# Patient Record
Sex: MALE | Race: WHITE | Employment: OTHER | URBAN - NONMETROPOLITAN AREA
[De-identification: names, ages, dates, MRNs, and addresses within clinical notes are randomized per-mention and may not be internally consistent; named-entity substitution may affect disease eponyms.]

---

## 2017-03-03 ENCOUNTER — HOSPITAL ENCOUNTER (OUTPATIENT)
Dept: CT IMAGING | Age: 67
Discharge: HOME | End: 2017-03-03
Payer: OTHER GOVERNMENT

## 2017-03-03 DIAGNOSIS — D86.0 SARCOIDOSIS OF LUNG (HCC): ICD-10-CM

## 2017-03-03 PROCEDURE — 71250 CT THORAX DX C-: CPT

## 2017-05-16 ENCOUNTER — OFFICE VISIT (OUTPATIENT)
Dept: CARDIOLOGY | Age: 67
End: 2017-05-16
Payer: MEDICARE

## 2017-05-16 VITALS
HEIGHT: 69 IN | HEART RATE: 96 BPM | WEIGHT: 192.2 LBS | SYSTOLIC BLOOD PRESSURE: 170 MMHG | BODY MASS INDEX: 28.47 KG/M2 | DIASTOLIC BLOOD PRESSURE: 70 MMHG

## 2017-05-16 DIAGNOSIS — I48.0 PAROXYSMAL A-FIB (HCC): Primary | ICD-10-CM

## 2017-05-16 DIAGNOSIS — Z98.890 S/P ABLATION OF ATRIAL FIBRILLATION: ICD-10-CM

## 2017-05-16 DIAGNOSIS — Z86.79 S/P ABLATION OF ATRIAL FIBRILLATION: ICD-10-CM

## 2017-05-16 DIAGNOSIS — E78.2 MIXED HYPERLIPIDEMIA: ICD-10-CM

## 2017-05-16 DIAGNOSIS — I10 ESSENTIAL HYPERTENSION: ICD-10-CM

## 2017-05-16 PROCEDURE — G8420 CALC BMI NORM PARAMETERS: HCPCS | Performed by: CLINICAL NURSE SPECIALIST

## 2017-05-16 PROCEDURE — 1123F ACP DISCUSS/DSCN MKR DOCD: CPT | Performed by: CLINICAL NURSE SPECIALIST

## 2017-05-16 PROCEDURE — G8427 DOCREV CUR MEDS BY ELIG CLIN: HCPCS | Performed by: CLINICAL NURSE SPECIALIST

## 2017-05-16 PROCEDURE — 93000 ELECTROCARDIOGRAM COMPLETE: CPT | Performed by: CLINICAL NURSE SPECIALIST

## 2017-05-16 PROCEDURE — 99213 OFFICE O/P EST LOW 20 MIN: CPT | Performed by: CLINICAL NURSE SPECIALIST

## 2017-05-16 PROCEDURE — 3017F COLORECTAL CA SCREEN DOC REV: CPT | Performed by: CLINICAL NURSE SPECIALIST

## 2017-05-16 PROCEDURE — 1036F TOBACCO NON-USER: CPT | Performed by: CLINICAL NURSE SPECIALIST

## 2017-05-16 PROCEDURE — 4040F PNEUMOC VAC/ADMIN/RCVD: CPT | Performed by: CLINICAL NURSE SPECIALIST

## 2017-05-16 RX ORDER — LOSARTAN POTASSIUM 50 MG/1
TABLET ORAL
COMMUNITY
Start: 2017-03-30 | End: 2017-05-22 | Stop reason: DRUGHIGH

## 2017-05-16 RX ORDER — METOPROLOL SUCCINATE 25 MG/1
25 TABLET, EXTENDED RELEASE ORAL DAILY
Qty: 30 TABLET | Refills: 5 | Status: SHIPPED | OUTPATIENT
Start: 2017-05-16 | End: 2017-05-22 | Stop reason: ALTCHOICE

## 2017-05-16 ASSESSMENT — ENCOUNTER SYMPTOMS
VOMITING: 0
BLOOD IN STOOL: 0
COUGH: 0
NAUSEA: 0
BLURRED VISION: 0
SHORTNESS OF BREATH: 0
HEARTBURN: 0
ORTHOPNEA: 0

## 2017-05-22 RX ORDER — LOSARTAN POTASSIUM 50 MG/1
50 TABLET ORAL DAILY
COMMUNITY
End: 2017-08-17 | Stop reason: SDUPTHER

## 2017-06-14 ENCOUNTER — OFFICE VISIT (OUTPATIENT)
Dept: CARDIOLOGY | Age: 67
End: 2017-06-14
Payer: MEDICARE

## 2017-06-14 VITALS
HEIGHT: 69 IN | SYSTOLIC BLOOD PRESSURE: 144 MMHG | WEIGHT: 188 LBS | HEART RATE: 96 BPM | DIASTOLIC BLOOD PRESSURE: 76 MMHG | BODY MASS INDEX: 27.85 KG/M2

## 2017-06-14 DIAGNOSIS — I48.0 PAROXYSMAL A-FIB (HCC): ICD-10-CM

## 2017-06-14 DIAGNOSIS — I10 ESSENTIAL HYPERTENSION: Primary | ICD-10-CM

## 2017-06-14 PROCEDURE — 4040F PNEUMOC VAC/ADMIN/RCVD: CPT | Performed by: CLINICAL NURSE SPECIALIST

## 2017-06-14 PROCEDURE — G8419 CALC BMI OUT NRM PARAM NOF/U: HCPCS | Performed by: CLINICAL NURSE SPECIALIST

## 2017-06-14 PROCEDURE — 1123F ACP DISCUSS/DSCN MKR DOCD: CPT | Performed by: CLINICAL NURSE SPECIALIST

## 2017-06-14 PROCEDURE — 3017F COLORECTAL CA SCREEN DOC REV: CPT | Performed by: CLINICAL NURSE SPECIALIST

## 2017-06-14 PROCEDURE — G8427 DOCREV CUR MEDS BY ELIG CLIN: HCPCS | Performed by: CLINICAL NURSE SPECIALIST

## 2017-06-14 PROCEDURE — 1036F TOBACCO NON-USER: CPT | Performed by: CLINICAL NURSE SPECIALIST

## 2017-06-14 PROCEDURE — 99213 OFFICE O/P EST LOW 20 MIN: CPT | Performed by: CLINICAL NURSE SPECIALIST

## 2017-06-14 RX ORDER — METOPROLOL SUCCINATE 25 MG/1
TABLET, EXTENDED RELEASE ORAL
COMMUNITY
Start: 2017-05-16 | End: 2017-06-14 | Stop reason: ALTCHOICE

## 2017-08-17 ENCOUNTER — OFFICE VISIT (OUTPATIENT)
Dept: INTERNAL MEDICINE | Age: 67
End: 2017-08-17
Payer: MEDICARE

## 2017-08-17 VITALS
DIASTOLIC BLOOD PRESSURE: 82 MMHG | OXYGEN SATURATION: 98 % | BODY MASS INDEX: 28.29 KG/M2 | HEART RATE: 97 BPM | SYSTOLIC BLOOD PRESSURE: 140 MMHG | WEIGHT: 191 LBS | HEIGHT: 69 IN

## 2017-08-17 DIAGNOSIS — R25.2 CRAMP OF BOTH LOWER EXTREMITIES: ICD-10-CM

## 2017-08-17 DIAGNOSIS — I48.0 PAROXYSMAL A-FIB (HCC): ICD-10-CM

## 2017-08-17 DIAGNOSIS — E11.42 TYPE 2 DIABETES MELLITUS WITH PERIPHERAL NEUROPATHY (HCC): ICD-10-CM

## 2017-08-17 DIAGNOSIS — I10 ESSENTIAL HYPERTENSION: ICD-10-CM

## 2017-08-17 DIAGNOSIS — Z00.00 ROUTINE GENERAL MEDICAL EXAMINATION AT A HEALTH CARE FACILITY: Primary | ICD-10-CM

## 2017-08-17 LAB
ALBUMIN SERPL-MCNC: 4.3 G/DL (ref 3.5–5.2)
ALP BLD-CCNC: 95 U/L (ref 40–130)
ALT SERPL-CCNC: 23 U/L (ref 5–41)
ANION GAP SERPL CALCULATED.3IONS-SCNC: 13 MMOL/L (ref 7–19)
AST SERPL-CCNC: 25 U/L (ref 5–40)
BILIRUB SERPL-MCNC: 0.6 MG/DL (ref 0.2–1.2)
BUN BLDV-MCNC: 15 MG/DL (ref 8–23)
CALCIUM SERPL-MCNC: 10.5 MG/DL (ref 8.8–10.2)
CHLORIDE BLD-SCNC: 95 MMOL/L (ref 98–111)
CHOLESTEROL, TOTAL: 175 MG/DL (ref 160–199)
CO2: 29 MMOL/L (ref 22–29)
CREAT SERPL-MCNC: 0.8 MG/DL (ref 0.5–1.2)
CREATININE URINE: 131.6 MG/DL (ref 4.2–622)
GFR NON-AFRICAN AMERICAN: >60
GLUCOSE BLD-MCNC: 248 MG/DL (ref 74–109)
HBA1C MFR BLD: 7.5 %
HCT VFR BLD CALC: 38.2 % (ref 42–52)
HDLC SERPL-MCNC: 42 MG/DL (ref 55–121)
HEMOGLOBIN: 13.3 G/DL (ref 14–18)
LDL CHOLESTEROL CALCULATED: 98 MG/DL
MCH RBC QN AUTO: 29.7 PG (ref 27–31)
MCHC RBC AUTO-ENTMCNC: 34.8 G/DL (ref 33–37)
MCV RBC AUTO: 85.3 FL (ref 80–94)
MICROALBUMIN UR-MCNC: 3.8 MG/DL (ref 0–19)
MICROALBUMIN/CREAT UR-RTO: 28.9 MG/G
PDW BLD-RTO: 13.5 % (ref 11.5–14.5)
PLATELET # BLD: 158 K/UL (ref 130–400)
PMV BLD AUTO: 10.4 FL (ref 9.4–12.4)
POTASSIUM SERPL-SCNC: 4.4 MMOL/L (ref 3.5–5)
RBC # BLD: 4.48 M/UL (ref 4.7–6.1)
SODIUM BLD-SCNC: 137 MMOL/L (ref 136–145)
TOTAL PROTEIN: 7.8 G/DL (ref 6.6–8.7)
TRIGL SERPL-MCNC: 177 MG/DL (ref 150–199)
WBC # BLD: 4.8 K/UL (ref 4.8–10.8)

## 2017-08-17 PROCEDURE — G0438 PPPS, INITIAL VISIT: HCPCS | Performed by: INTERNAL MEDICINE

## 2017-08-17 PROCEDURE — 1036F TOBACCO NON-USER: CPT | Performed by: INTERNAL MEDICINE

## 2017-08-17 PROCEDURE — 4040F PNEUMOC VAC/ADMIN/RCVD: CPT | Performed by: INTERNAL MEDICINE

## 2017-08-17 PROCEDURE — G8419 CALC BMI OUT NRM PARAM NOF/U: HCPCS | Performed by: INTERNAL MEDICINE

## 2017-08-17 PROCEDURE — 3017F COLORECTAL CA SCREEN DOC REV: CPT | Performed by: INTERNAL MEDICINE

## 2017-08-17 PROCEDURE — G8427 DOCREV CUR MEDS BY ELIG CLIN: HCPCS | Performed by: INTERNAL MEDICINE

## 2017-08-17 PROCEDURE — 1123F ACP DISCUSS/DSCN MKR DOCD: CPT | Performed by: INTERNAL MEDICINE

## 2017-08-17 PROCEDURE — 99213 OFFICE O/P EST LOW 20 MIN: CPT | Performed by: INTERNAL MEDICINE

## 2017-08-17 PROCEDURE — 3046F HEMOGLOBIN A1C LEVEL >9.0%: CPT | Performed by: INTERNAL MEDICINE

## 2017-08-17 RX ORDER — LOSARTAN POTASSIUM 100 MG/1
100 TABLET ORAL DAILY
Qty: 90 TABLET | Refills: 3 | Status: SHIPPED | OUTPATIENT
Start: 2017-08-17 | End: 2018-03-01 | Stop reason: SDUPTHER

## 2017-08-17 RX ORDER — QUININE SULFATE 324 MG/1
324 CAPSULE ORAL NIGHTLY PRN
Qty: 90 CAPSULE | Refills: 1 | Status: SHIPPED | OUTPATIENT
Start: 2017-08-17 | End: 2017-09-16

## 2017-08-17 ASSESSMENT — ENCOUNTER SYMPTOMS
RHINORRHEA: 0
SORE THROAT: 0
COUGH: 0
SHORTNESS OF BREATH: 0
ABDOMINAL PAIN: 0
NAUSEA: 0
TROUBLE SWALLOWING: 0

## 2017-08-17 ASSESSMENT — LIFESTYLE VARIABLES: HOW OFTEN DO YOU HAVE A DRINK CONTAINING ALCOHOL: 0

## 2017-08-17 ASSESSMENT — ANXIETY QUESTIONNAIRES: GAD7 TOTAL SCORE: 0

## 2017-08-17 ASSESSMENT — PATIENT HEALTH QUESTIONNAIRE - PHQ9: SUM OF ALL RESPONSES TO PHQ QUESTIONS 1-9: 0

## 2017-10-02 ENCOUNTER — HOSPITAL ENCOUNTER (OUTPATIENT)
Dept: GENERAL RADIOLOGY | Age: 67
Discharge: HOME | End: 2017-10-02
Payer: OTHER GOVERNMENT

## 2017-10-02 DIAGNOSIS — D86.0 PULMONARY SARCOIDOSIS (HCC): ICD-10-CM

## 2017-10-02 PROCEDURE — 71020 XR CHEST STANDARD TWO VW: CPT

## 2017-11-27 ENCOUNTER — OFFICE VISIT (OUTPATIENT)
Dept: CARDIOLOGY | Age: 67
End: 2017-11-27
Payer: MEDICARE

## 2017-11-27 VITALS
WEIGHT: 189 LBS | HEIGHT: 69 IN | SYSTOLIC BLOOD PRESSURE: 160 MMHG | DIASTOLIC BLOOD PRESSURE: 80 MMHG | HEART RATE: 84 BPM | BODY MASS INDEX: 27.99 KG/M2

## 2017-11-27 DIAGNOSIS — I10 ESSENTIAL HYPERTENSION: ICD-10-CM

## 2017-11-27 DIAGNOSIS — Z86.79 S/P ABLATION OF ATRIAL FIBRILLATION: ICD-10-CM

## 2017-11-27 DIAGNOSIS — I48.0 PAROXYSMAL A-FIB (HCC): ICD-10-CM

## 2017-11-27 DIAGNOSIS — R06.09 DOE (DYSPNEA ON EXERTION): Primary | ICD-10-CM

## 2017-11-27 DIAGNOSIS — Z98.890 S/P ABLATION OF ATRIAL FIBRILLATION: ICD-10-CM

## 2017-11-27 PROCEDURE — 1036F TOBACCO NON-USER: CPT | Performed by: NURSE PRACTITIONER

## 2017-11-27 PROCEDURE — G8417 CALC BMI ABV UP PARAM F/U: HCPCS | Performed by: NURSE PRACTITIONER

## 2017-11-27 PROCEDURE — G8427 DOCREV CUR MEDS BY ELIG CLIN: HCPCS | Performed by: NURSE PRACTITIONER

## 2017-11-27 PROCEDURE — G8484 FLU IMMUNIZE NO ADMIN: HCPCS | Performed by: NURSE PRACTITIONER

## 2017-11-27 PROCEDURE — 4040F PNEUMOC VAC/ADMIN/RCVD: CPT | Performed by: NURSE PRACTITIONER

## 2017-11-27 PROCEDURE — 99214 OFFICE O/P EST MOD 30 MIN: CPT | Performed by: NURSE PRACTITIONER

## 2017-11-27 PROCEDURE — 3017F COLORECTAL CA SCREEN DOC REV: CPT | Performed by: NURSE PRACTITIONER

## 2017-11-27 PROCEDURE — 1123F ACP DISCUSS/DSCN MKR DOCD: CPT | Performed by: NURSE PRACTITIONER

## 2017-11-27 RX ORDER — AMLODIPINE BESYLATE 5 MG/1
5 TABLET ORAL DAILY
Qty: 30 TABLET | Refills: 5 | Status: SHIPPED | OUTPATIENT
Start: 2017-11-27 | End: 2017-12-12 | Stop reason: SDUPTHER

## 2017-11-27 NOTE — PATIENT INSTRUCTIONS
Add Norvasc (amlodipine) 5 mg one daily for blood pressure control. Continue other current medications as prescribed. Continue to follow up with primary care provider for non cardiac medical problems. Call the office with any problems, questions or concerns at [phone]. Follow up as scheduled with your cardiologist - 6 months Dr. Oksana Thomas. The following educational material has been included in this after visit summary for your review: Xarelto.     Additional instructions:  Xarelto can increase your risk of bleeding. If you notice blood in urine or stool, bleeding gums, excessive bruising or cough productive of bloody sputum, notify the office. Information on this blood thinner has been included in your after visit summary. Coronary artery disease risk factors you can control: Smoking, high blood pressure, high cholesterol, diabetes, being overweight, lack of exercise and stress. Continue heart healthy diet. Take medications as directed. Exercise as tolerated. Strive for 15 minutes of exercise most days of the week. Keep a blood pressure log, do so for 2 weeks. Call the office to report readings at [phone]. Blood pressure goal is 140/90 or less. If you are a diabetic, the goal is 130/80 or less. If you are taking cholesterol lowering medications, it is recommended that lab work be checked annually. Always keep a current medication list. Bring your medications to every office visit.      Patient Education     rivaroxaban  Pronunciation:  CHERYL a GASTON a ban  Brand:  Xarelto  What is the most important information I should know about rivaroxaban? You should not use this medicine if you have an artificial heart valve, or if you have active or uncontrolled bleeding.   Rivaroxaban can cause a very serious blood clot around your spinal cord if you undergo a spinal tap or receive spinal anesthesia (epidural), especially if you have a genetic spinal defect, if you have a spinal catheter in place, if you have a history of spinal surgery or repeated spinal taps, or if you are also using other drugs that can affect blood clotting. This type of blood clot can lead to long-term or permanent paralysis. Get emergency medical help if you have symptoms of a spinal cord blood clot such as back pain, numbness or muscle weakness in your lower body, or loss of bladder or bowel control. Do not stop taking rivaroxaban without first talking to your doctor. Stopping suddenly can increase your risk of blood clot or stroke. What is rivaroxaban? Rivaroxaban is an anticoagulant (blood thinner) that prevents the formation of blood clots. Rivaroxaban is used to prevent or treat a type of blood clot called deep vein thrombosis (DVT), which can lead to blood clots in the lungs (pulmonary embolism). A DVT can occur after certain types of surgery. Rivaroxaban is also used in people with atrial fibrillation (a heart rhythm disorder) to lower the risk of stroke caused by a blood clot. Rivaroxaban may also be used for purposes not listed in this medication guide. What should I discuss with my healthcare provider before taking rivaroxaban? You should not use this medication if you are allergic to rivaroxaban, or if you have:  · an artificial heart valve; or  · active or uncontrolled bleeding. Rivaroxaban can cause a very serious blood clot around your spinal cord if you undergo a spinal tap or receive spinal anesthesia (epidural). This type of blood clot could cause long-term paralysis, and may be more likely to occur if:   · you have a genetic spinal defect;  · you have a spinal catheter in place;  · you have a history of spinal surgery or repeated spinal taps;  · you have recently had a spinal tap or epidural anesthesia;  · you are taking an NSAID--Advil, Aleve, Motrin, and others; or  · you are using other medicines to treat or prevent blood clots.   Rivaroxaban may cause you to bleed more easily, especially if you have:  · a your dose or stop taking this medication without first talking to your doctor. Stopping suddenly can increase your risk of blood clot or stroke. Store at room temperature away from moisture and heat. What happens if I miss a dose? If you take rivaroxaban 1 time each day: Take the missed dose as soon as you remember. Take your next dose the following day and stay on your once-daily schedule. Do not take extra medicine to make up the missed dose. If you take rivaroxaban 2 times each day: Take the missed dose as soon as you remember. You may take 2 doses at the same time to make up a missed dose. What happens if I overdose? Seek emergency medical attention or call the Poison Help line at [phone]. Overdose may cause excessive bleeding. What should I avoid while taking rivaroxaban? Avoid activities that may increase your risk of bleeding or injury. Use extra care to prevent bleeding while shaving or brushing your teeth. What are the possible side effects of rivaroxaban? Get emergency medical help if you have signs of an allergic reaction: hives; difficult breathing; swelling of your face, lips, tongue, or throat. Also seek emergency medical attention if you have symptoms of a spinal blood clot: back pain, numbness or muscle weakness in your lower body, or loss of bladder or bowel control. Call your doctor at once if you have:  · easy bruising or bleeding (nosebleeds, bleeding gums, heavy menstrual bleeding);  · pain, swelling, or drainage from a wound or where a needle was injected in your skin;  · bleeding from wounds or needle injections, any bleeding that will not stop;  · headache, dizziness, weakness, feeling like you might pass out;  · urine that looks red, pink, or brown; or  · bloody or tarry stools, coughing up blood or vomit that looks like coffee grounds. Common side effects may include:  · muscle pain;  · itching; or  · pain in your arms or legs.   This is not a complete list of side drug combination in no way should be construed to indicate that the drug or drug combination is safe, effective or appropriate for any given patient. [facility] does not assume any responsibility for any aspect of healthcare administered with the aid of information [facility] provides. The information contained herein is not intended to cover all possible uses, directions, precautions, warnings, drug interactions, allergic reactions, or adverse effects. If you have questions about the drugs you are taking, check with your doctor, nurse or pharmacist.  Copyright 4915-7228 [address]: 2.08. Revision date: 5/24/2016. Care instructions adapted under license by [facility] ([facility]). If you have questions about a medical condition or this instruction, always ask your healthcare professional. [place] [address] any warranty or liability for your use of this information.

## 2017-11-27 NOTE — PROGRESS NOTES
neuropathy ([facility] E11.42    Sarcoidosis, lung ([facility] D86.0    Leg cramps R25.2    GARNETT (dyspnea on exertion) R06.09     Past Medical History:   Diagnosis Date    Acute pancreatitis     Anemia     Asthma     Atrial fibrillation (HCC)     h/o paroxysmal a-fib    Benign colonic polyp     Chest pain     Chronic pain syndrome     Depression     Diabetic peripheral neuropathy (HCC)     Extrinsic asthma     Hyperlipidemia     Hypertension     Idiopathic peripheral neuropathy     Lyme disease     Mediastinal lymphadenopathy     Microalbuminuria     Mixed hyperlipidemia     Paroxysmal a-fib (HCC)     S/P ablation of atrial fibrillation     4/16 A fib ablation , Dr Shawn Brantley, [place]     Sarcoidosis, lung ([facility]     Tick bite     Type 2 diabetes mellitus with peripheral neuropathy (HCC)     Type 2 diabetes, controlled, with neuropathy ([facility]      Past Surgical History:   Procedure Laterality Date    ANKLE FRACTURE SURGERY  april 14, 2015    ATRIAL ABLATION SURGERY  2015    Dr. Shawn Brantley- [facility]      Catheter Ablation A-fib    CHOLECYSTECTOMY      COLONOSCOPY  12/02/2014    Melecio     Family History   Problem Relation Age of Onset    Coronary Art Dis Father     Cancer Sister      Social History   Substance Use Topics    Smoking status: Former Smoker    Smokeless tobacco: Never Used    Alcohol use No      Current Outpatient Prescriptions   Medication Sig Dispense Refill    amLODIPine (NORVASC) 5 MG tablet Take 1 tablet by mouth daily 30 tablet 5    losartan (COZAAR) 100 MG tablet Take 1 tablet by mouth daily 90 tablet 3    metFORMIN (GLUCOPHAGE) 500 MG tablet Take 500 mg by mouth daily (with breakfast)      fluticasone-salmeterol (ADVAIR) 500-50 MCG/DOSE diskus inhaler Inhale 1 puff into the lungs every 12 hours      rivaroxaban (XARELTO) 20 MG TABS tablet Take 20 mg by mouth daily (with breakfast).  aspirin 81 MG tablet Take 81 mg by mouth daily.        No current facility-administered medications for this visit. Allergies: Acetaminophen; Gabapentin; Penicillins; and Toprol xl [metoprolol]    Review of Systems  Constitutional - no appetite change, or unexpected weight change. No fever, chills or diaphoresis. No significant change in activity level or new onset of fatigue. HEENT - no significant rhinorrhea or epistaxis. No tinnitus or significant hearing loss. Eyes - no sudden vision change or amaurosis. No corneal arcus, xantholasma, subconjunctival hemorrhage or discharge. Respiratory - no significant wheezing, stridor, apnea or cough.  + GARNETT. Cardiovascular - no exertional chest pain to suggest myocardial ischemia. No orthopnea or PND. No sensation of sustained arrythmia. No occurrence of slow heart rate. No palpitations. No claudication. No leg edema. Gastrointestinal - no abdominal swelling or pain. No blood in stool. No severe constipation, diarrhea, nausea, or vomiting. Genitourinary - no dysuria, frequency, or urgency. No flank pain or hematuria. Musculoskeletal - no back pain or myalgia. No problems with gait. Extremities - no clubbing, cyanosis or edema. Skin - no color change or rash. No pallor. No new surgical incision. Neurologic - no speech difficulty, facial asymmetry or lateralizing weakness. No seizures, presyncope or syncope. No significant dizziness. Hematologic - no easy bruising or excessive bleeding. Psychiatric - no severe anxiety or insomnia. No confusion. All other review of systems are negative. Objective  Vital Signs - BP (!) 160/80   Pulse 84   Ht 5' 9\" (1.753 m)   Wt 189 lb (85.7 kg)   BMI 27.91 kg/m²   General - Alvaro is alert, cooperative, and pleasant. Well groomed. No acute distress. Body habitus - Body mass index is 27.91 kg/m². HEENT - Head is normocephalic. No circumoral cyanosis. Dentition is normal.  EYES -   Lids normal without ptosis. No discharge, edema or subconjunctival hemorrhage. Neck - Symmetrical without apparent mass or lymphadenopathy. Respiratory - Normal respiratory effort without use of accessory muscles. Ausculatation reveals vesicular breath sounds without crackles, wheezes, rub or rhonchi. Cardiovascular - No jugular venous distention. Auscultation reveals regular rate and rhythm. No audible clicks, gallop or rub. No murmur. No lower extremity varicosities. No carotid bruits. Abdominal -  No visible distention, mass or pulsations. Extremities - No clubbing or cyanosis. No statis dermatitis or ulcers. without edema. Musculoskeletal -   No Osler's nodes. No kyphosis or scoliosis. Gait is even and regular without limp or shuffle. Ambulates without assistance. Skin -  Warm and dry; no rash or pallor. No new surgical wound. Neurological - No focal neurological deficits. Thought processes coherent. No apparent tremor. Oriented to person, place and time. Psychiatric -  Appropriate affect and mood. Assessment:    1. GARNETT (dyspnea on exertion)     2. Paroxysmal a-fib (HCC)     3. S/P ablation of atrial fibrillation     4. Essential hypertension     Stable CV status without symptoms of overt heart failure, arrhythmia or angina. GARNETT more than likely COPD related as patient responded to steroid therapy. He will follow up with Dr. Glory Darnell next week. CBC 8/171/7 showed hemoglobin 13.3 - previous labs show hemoglobin 11.8 in 2015. No bleeding issues on Xaretlo. Added amlodipine 5 mg daily for better BP control. Patient will log BP and call back in 2 weeks. Patient is compliant with medication regimen.     BP Readings from Last 3 Encounters:   11/27/17 (!) 160/80   08/17/17 (!) 140/82   06/14/17 (!) 144/76    Pulse Readings from Last 3 Encounters:   11/27/17 84   08/17/17 97   06/14/17 96        Wt Readings from Last 3 Encounters:   11/27/17 189 lb (85.7 kg)   08/17/17 191 lb (86.6 kg)   06/14/17 188 lb (85.3 kg)     Plan  Previous cardiac history and

## 2017-12-12 RX ORDER — AMLODIPINE BESYLATE 5 MG/1
5 TABLET ORAL DAILY
Qty: 30 TABLET | Refills: 5 | Status: SHIPPED | OUTPATIENT
Start: 2017-12-12 | End: 2017-12-27 | Stop reason: SDUPTHER

## 2017-12-14 RX ORDER — GABAPENTIN 100 MG/1
100 CAPSULE ORAL DAILY
COMMUNITY
End: 2017-12-27

## 2017-12-20 DIAGNOSIS — E11.42 TYPE 2 DIABETES MELLITUS WITH PERIPHERAL NEUROPATHY (HCC): ICD-10-CM

## 2017-12-20 DIAGNOSIS — I10 ESSENTIAL HYPERTENSION: ICD-10-CM

## 2017-12-20 DIAGNOSIS — R25.2 CRAMP OF BOTH LOWER EXTREMITIES: ICD-10-CM

## 2017-12-20 DIAGNOSIS — I48.0 PAROXYSMAL A-FIB (HCC): ICD-10-CM

## 2017-12-20 LAB
ALBUMIN SERPL-MCNC: 4.4 G/DL (ref 3.5–5.2)
ALP BLD-CCNC: 94 U/L (ref 40–130)
ALT SERPL-CCNC: 18 U/L (ref 5–41)
ANION GAP SERPL CALCULATED.3IONS-SCNC: 14 MMOL/L (ref 7–19)
AST SERPL-CCNC: 22 U/L (ref 5–40)
BILIRUB SERPL-MCNC: 0.8 MG/DL (ref 0.2–1.2)
BUN BLDV-MCNC: 10 MG/DL (ref 8–23)
CALCIUM SERPL-MCNC: 10.8 MG/DL (ref 8.8–10.2)
CHLORIDE BLD-SCNC: 99 MMOL/L (ref 98–111)
CO2: 29 MMOL/L (ref 22–29)
CREAT SERPL-MCNC: 0.9 MG/DL (ref 0.5–1.2)
GFR NON-AFRICAN AMERICAN: >60
GLUCOSE BLD-MCNC: 211 MG/DL (ref 74–109)
HBA1C MFR BLD: 6.5 %
POTASSIUM SERPL-SCNC: 4.1 MMOL/L (ref 3.5–5)
SODIUM BLD-SCNC: 142 MMOL/L (ref 136–145)
TOTAL PROTEIN: 7.6 G/DL (ref 6.6–8.7)

## 2017-12-27 ENCOUNTER — OFFICE VISIT (OUTPATIENT)
Dept: INTERNAL MEDICINE | Age: 67
End: 2017-12-27
Payer: MEDICARE

## 2017-12-27 VITALS
WEIGHT: 187 LBS | BODY MASS INDEX: 27.7 KG/M2 | HEIGHT: 69 IN | SYSTOLIC BLOOD PRESSURE: 152 MMHG | OXYGEN SATURATION: 97 % | DIASTOLIC BLOOD PRESSURE: 86 MMHG | HEART RATE: 92 BPM

## 2017-12-27 DIAGNOSIS — I10 ESSENTIAL HYPERTENSION: ICD-10-CM

## 2017-12-27 DIAGNOSIS — D86.0 SARCOIDOSIS, LUNG (HCC): ICD-10-CM

## 2017-12-27 DIAGNOSIS — E11.40 TYPE 2 DIABETES, CONTROLLED, WITH NEUROPATHY (HCC): ICD-10-CM

## 2017-12-27 DIAGNOSIS — Z23 NEED FOR INFLUENZA VACCINATION: Primary | ICD-10-CM

## 2017-12-27 DIAGNOSIS — I48.0 PAROXYSMAL A-FIB (HCC): ICD-10-CM

## 2017-12-27 PROCEDURE — 3044F HG A1C LEVEL LT 7.0%: CPT | Performed by: INTERNAL MEDICINE

## 2017-12-27 PROCEDURE — 1036F TOBACCO NON-USER: CPT | Performed by: INTERNAL MEDICINE

## 2017-12-27 PROCEDURE — 4040F PNEUMOC VAC/ADMIN/RCVD: CPT | Performed by: INTERNAL MEDICINE

## 2017-12-27 PROCEDURE — G8484 FLU IMMUNIZE NO ADMIN: HCPCS | Performed by: INTERNAL MEDICINE

## 2017-12-27 PROCEDURE — G0008 ADMIN INFLUENZA VIRUS VAC: HCPCS | Performed by: INTERNAL MEDICINE

## 2017-12-27 PROCEDURE — 99214 OFFICE O/P EST MOD 30 MIN: CPT | Performed by: INTERNAL MEDICINE

## 2017-12-27 PROCEDURE — G8427 DOCREV CUR MEDS BY ELIG CLIN: HCPCS | Performed by: INTERNAL MEDICINE

## 2017-12-27 PROCEDURE — G8417 CALC BMI ABV UP PARAM F/U: HCPCS | Performed by: INTERNAL MEDICINE

## 2017-12-27 PROCEDURE — 1123F ACP DISCUSS/DSCN MKR DOCD: CPT | Performed by: INTERNAL MEDICINE

## 2017-12-27 PROCEDURE — 90662 IIV NO PRSV INCREASED AG IM: CPT | Performed by: INTERNAL MEDICINE

## 2017-12-27 PROCEDURE — 3017F COLORECTAL CA SCREEN DOC REV: CPT | Performed by: INTERNAL MEDICINE

## 2017-12-27 RX ORDER — AMLODIPINE BESYLATE 10 MG/1
10 TABLET ORAL DAILY
Qty: 90 TABLET | Refills: 3 | Status: SHIPPED | OUTPATIENT
Start: 2017-12-27 | End: 2018-05-04

## 2017-12-27 RX ORDER — AMLODIPINE BESYLATE 10 MG/1
10 TABLET ORAL DAILY
Qty: 30 TABLET | Refills: 0 | Status: SHIPPED | OUTPATIENT
Start: 2017-12-27 | End: 2017-12-27 | Stop reason: SDUPTHER

## 2017-12-27 RX ORDER — AMLODIPINE BESYLATE 10 MG/1
10 TABLET ORAL DAILY
Qty: 90 TABLET | Refills: 3 | Status: SHIPPED | OUTPATIENT
Start: 2017-12-27 | End: 2017-12-27 | Stop reason: SDUPTHER

## 2017-12-27 ASSESSMENT — ENCOUNTER SYMPTOMS
SORE THROAT: 0
SHORTNESS OF BREATH: 0
ABDOMINAL PAIN: 0
COUGH: 0
NAUSEA: 0
RHINORRHEA: 0
TROUBLE SWALLOWING: 0

## 2017-12-27 NOTE — PROGRESS NOTES
[facility] Internal Medicine    Chief Complaint   Patient presents with    Other     Pt is here for 4 month follow up of hypertension and DM and review. of recent labs. Pt states that he has been experiencing some pain to his right flank area over the past  2-3 weeks. States this comes and goes. He feels he may have \"strained\" his back. Pt's bp is elevated this am. He states that has been the case lately and when he saw Alexa Langston a few weeks ago he was started on amlodipine 5 mg one daily. HPI:Alvaro returns for reassessment several problems, including paroxysmal atrial fibrillation, hypertension, sarcoidosis, diabetes type II. Is doing fairly well, continues to see Dr. Marii Greenwood for eye exams, Dr. Peggy Rosales for pulmonology/sarcoidosis, and Dr. Jill Vásquez for his rhythm disturbance. Amlodipine 5 mg was recently added. He does have occasional shortness of breath, Dr. Peggy Rosales felt the primary status was stable, did prescribe 2 weeks of steroids. Emmanuel Barreto He relates no headache, syncope, chest pain. he recently had to do some plumbing at home, will replace a toilet, water heater, and strained his back, has some pain over the right flank. recently took 2 weeks of steroids, Dr. Peggy Rosales, is happy to be over this . that was difficult.     Past Medical History:   Diagnosis Date    Acute pancreatitis     Anemia     Asthma     Atrial fibrillation (HCC)     h/o paroxysmal a-fib ? anesthsia induced    Benign colonic polyp     cscope 12/07 Dr Charbel Whitt adenomatous: 12/14 adenoma    Chest pain     Chronic pain syndrome     Depression     Diabetic peripheral neuropathy (HCC)     Extrinsic asthma     Hyperlipidemia     Hypertension     Idiopathic peripheral neuropathy     Lyme disease     Mediastinal lymphadenopathy     Microalbuminuria     Mixed hyperlipidemia     Paroxysmal a-fib (HCC)     S/P ablation of atrial fibrillation     4/16 A fib ablation , Dr Eber Macdonald, [place]     Sarcoidosis, lung ([facility]     restrictive lung impairment    Tick bite     Type 2 diabetes mellitus with peripheral neuropathy (HCC)     Type 2 diabetes, controlled, with neuropathy ([facility]       Family History   Problem Relation Age of Onset    Coronary Art Dis Father      AICD pacer age 68    Cancer Sister      childhood chest tumor      Social History     Social History    Marital status:      Spouse name: N/A    Number of children: N/A    Years of education: N/A     Occupational History    Not on file. Social History Main Topics    Smoking status: Former Smoker    Smokeless tobacco: Never Used    Alcohol use No    Drug use: Unknown    Sexual activity: Not on file     Other Topics Concern    Not on file     Social History Narrative    No narrative on file      Allergies   Allergen Reactions    Acetaminophen     Gabapentin      Causes blisters to head.  Penicillins     Toprol Xl [Metoprolol]      Leg pain       Current Outpatient Prescriptions   Medication Sig Dispense Refill    amLODIPine (NORVASC) 10 MG tablet Take 1 tablet by mouth daily 90 tablet 3    losartan (COZAAR) 100 MG tablet Take 1 tablet by mouth daily 90 tablet 3    metFORMIN (GLUCOPHAGE) 500 MG tablet Take 500 mg by mouth daily (with breakfast)      fluticasone-salmeterol (ADVAIR) 500-50 MCG/DOSE diskus inhaler Inhale 1 puff into the lungs every 12 hours      rivaroxaban (XARELTO) 20 MG TABS tablet Take 20 mg by mouth daily (with breakfast).  aspirin 81 MG tablet Take 81 mg by mouth daily. No current facility-administered medications for this visit. Review of Systems   Constitutional: Negative for fatigue and fever. HENT: Negative for rhinorrhea, sore throat and trouble swallowing. Respiratory: Negative for cough and shortness of breath. Cardiovascular: Negative for chest pain and palpitations. Gastrointestinal: Negative for abdominal pain and nausea. Endocrine: Negative for cold intolerance and heat intolerance. Genitourinary: Negative for dysuria and frequency. Skin: Negative for rash and wound. Neurological: Negative for seizures and syncope. Psychiatric/Behavioral: Negative for behavioral problems and hallucinations. occasional shortness of air    BP (!) 152/86   Pulse 92   Ht 5' 9\" (1.753 m)   Wt 187 lb (84.8 kg)   SpO2 97%   BMI 27.62 kg/m²   Physical Exam   Constitutional: He appears well-developed and well-nourished. HENT:   Head: Normocephalic and atraumatic. Eyes: Pupils are equal, round, and reactive to light. No scleral icterus. Neck: No JVD present. Cardiovascular: Regular rhythm. No murmur heard. Pulmonary/Chest: No respiratory distress. He exhibits no tenderness. Abdominal: He exhibits no mass. There is no tenderness. Musculoskeletal: He exhibits no edema or deformity. Lymphadenopathy:     He has no cervical adenopathy. Neurological: He is alert. No cranial nerve deficit. Skin: Skin is warm. No erythema.     Repeat blood pressure 170/90  Foot exam: Feet are warm, well-perfused, no lesions, sensation slightly decreased   Orders Only on 12/20/2017   Component Date Value Ref Range Status    Sodium 12/20/2017 142  136 - 145 mmol/L Final    Potassium 12/20/2017 4.1  3.5 - 5.0 mmol/L Final    Chloride 12/20/2017 99  98 - 111 mmol/L Final    CO2 12/20/2017 29  22 - 29 mmol/L Final    Anion Gap 12/20/2017 14  7 - 19 mmol/L Final    Glucose 12/20/2017 211* 74 - 109 mg/dL Final    BUN 12/20/2017 10  8 - 23 mg/dL Final    CREATININE 12/20/2017 0.9  0.5 - 1.2 mg/dL Final    GFR Non- 12/20/2017 >60  >60 Final    Calcium 12/20/2017 10.8* 8.8 - 10.2 mg/dL Final    Total Protein 12/20/2017 7.6  6.6 - 8.7 g/dL Final    Alb 12/20/2017 4.4  3.5 - 5.2 g/dL Final    Total Bilirubin 12/20/2017 0.8  0.2 - 1.2 mg/dL Final    Alkaline Phosphatase 12/20/2017 94  40 - 130 U/L Final    ALT 12/20/2017 18  5 - 41 U/L Final    AST 12/20/2017 22  5 - 40 U/L Final   

## 2018-02-28 NOTE — TELEPHONE ENCOUNTER
Pt lmom asking for a call. No other details. Attempted to return call. No ans. Lmom asking him to call back.

## 2018-03-01 RX ORDER — LOSARTAN POTASSIUM 100 MG/1
100 TABLET ORAL DAILY
Qty: 90 TABLET | Refills: 3 | Status: SHIPPED | OUTPATIENT
Start: 2018-03-01 | End: 2018-12-28 | Stop reason: SDUPTHER

## 2018-03-05 RX ORDER — LOSARTAN POTASSIUM 50 MG/1
TABLET ORAL
Qty: 90 TABLET | Refills: 3 | Status: SHIPPED | OUTPATIENT
Start: 2018-03-05 | End: 2018-04-10

## 2018-03-15 ENCOUNTER — TELEPHONE (OUTPATIENT)
Dept: INTERNAL MEDICINE | Age: 68
End: 2018-03-15

## 2018-03-15 NOTE — TELEPHONE ENCOUNTER
[facility] sent paperwork asking for clarification on pt's losartan dosage. Spoke with pt's wife. They are to call back with strength and dosing instructions.

## 2018-03-28 ENCOUNTER — HOSPITAL ENCOUNTER (EMERGENCY)
Age: 68
Discharge: HOME | End: 2018-03-28
Payer: MEDICARE

## 2018-03-28 VITALS
TEMPERATURE: 97.9 F | OXYGEN SATURATION: 96 % | WEIGHT: 185 LBS | HEIGHT: 69 IN | HEART RATE: 97 BPM | DIASTOLIC BLOOD PRESSURE: 80 MMHG | SYSTOLIC BLOOD PRESSURE: 178 MMHG | BODY MASS INDEX: 27.4 KG/M2 | RESPIRATION RATE: 18 BRPM

## 2018-03-28 DIAGNOSIS — L03.115 CELLULITIS OF RIGHT LOWER EXTREMITY: Primary | ICD-10-CM

## 2018-03-28 LAB
ALBUMIN SERPL-MCNC: 4 G/DL (ref 3.5–5.2)
ALP BLD-CCNC: 97 U/L (ref 40–130)
ALT SERPL-CCNC: 15 U/L (ref 5–41)
ANION GAP SERPL CALCULATED.3IONS-SCNC: 16 MMOL/L (ref 7–19)
APTT: 40.3 SEC (ref 26–36.2)
AST SERPL-CCNC: 18 U/L (ref 5–40)
BASOPHILS ABSOLUTE: 0 K/UL (ref 0–0.2)
BASOPHILS RELATIVE PERCENT: 0.3 % (ref 0–1)
BILIRUB SERPL-MCNC: 1.6 MG/DL (ref 0.2–1.2)
BUN BLDV-MCNC: 15 MG/DL (ref 8–23)
CALCIUM SERPL-MCNC: 10.6 MG/DL (ref 8.8–10.2)
CHLORIDE BLD-SCNC: 92 MMOL/L (ref 98–111)
CO2: 26 MMOL/L (ref 22–29)
CREAT SERPL-MCNC: 1 MG/DL (ref 0.5–1.2)
EOSINOPHILS ABSOLUTE: 0.1 K/UL (ref 0–0.6)
EOSINOPHILS RELATIVE PERCENT: 1.5 % (ref 0–5)
GFR NON-AFRICAN AMERICAN: >60
GLUCOSE BLD-MCNC: 304 MG/DL (ref 74–109)
HCT VFR BLD CALC: 37.1 % (ref 42–52)
HEMOGLOBIN: 12.6 G/DL (ref 14–18)
INR BLD: 1.36 (ref 0.88–1.18)
LYMPHOCYTES ABSOLUTE: 1.6 K/UL (ref 1.1–4.5)
LYMPHOCYTES RELATIVE PERCENT: 17 % (ref 20–40)
MCH RBC QN AUTO: 29.4 PG (ref 27–31)
MCHC RBC AUTO-ENTMCNC: 34 G/DL (ref 33–37)
MCV RBC AUTO: 86.5 FL (ref 80–94)
MONOCYTES ABSOLUTE: 0.9 K/UL (ref 0–0.9)
MONOCYTES RELATIVE PERCENT: 9.7 % (ref 0–10)
NEUTROPHILS ABSOLUTE: 6.7 K/UL (ref 1.5–7.5)
NEUTROPHILS RELATIVE PERCENT: 71 % (ref 50–65)
PDW BLD-RTO: 13.9 % (ref 11.5–14.5)
PLATELET # BLD: 150 K/UL (ref 130–400)
PMV BLD AUTO: 10.2 FL (ref 9.4–12.4)
POTASSIUM SERPL-SCNC: 3.7 MMOL/L (ref 3.5–5)
PROTHROMBIN TIME: 16.7 SEC (ref 12–14.6)
RBC # BLD: 4.29 M/UL (ref 4.7–6.1)
SODIUM BLD-SCNC: 134 MMOL/L (ref 136–145)
TOTAL PROTEIN: 7.4 G/DL (ref 6.6–8.7)
WBC # BLD: 9.5 K/UL (ref 4.8–10.8)

## 2018-03-28 PROCEDURE — 99284 EMERGENCY DEPT VISIT MOD MDM: CPT | Performed by: NURSE PRACTITIONER

## 2018-03-28 PROCEDURE — 80053 COMPREHEN METABOLIC PANEL: CPT

## 2018-03-28 PROCEDURE — 36415 COLL VENOUS BLD VENIPUNCTURE: CPT

## 2018-03-28 PROCEDURE — 85610 PROTHROMBIN TIME: CPT

## 2018-03-28 PROCEDURE — 85025 COMPLETE CBC W/AUTO DIFF WBC: CPT

## 2018-03-28 PROCEDURE — 85730 THROMBOPLASTIN TIME PARTIAL: CPT

## 2018-03-28 PROCEDURE — 99283 EMERGENCY DEPT VISIT LOW MDM: CPT

## 2018-03-28 PROCEDURE — 93971 EXTREMITY STUDY: CPT

## 2018-03-28 RX ORDER — TRAMADOL HYDROCHLORIDE 50 MG/1
50 TABLET ORAL EVERY 6 HOURS PRN
Qty: 10 TABLET | Refills: 0 | Status: SHIPPED | OUTPATIENT
Start: 2018-03-28 | End: 2018-03-31

## 2018-03-28 RX ORDER — DOXYCYCLINE HYCLATE 100 MG/1
100 CAPSULE ORAL 2 TIMES DAILY
Qty: 20 CAPSULE | Refills: 0 | Status: SHIPPED | OUTPATIENT
Start: 2018-03-28 | End: 2018-04-07

## 2018-03-28 ASSESSMENT — ENCOUNTER SYMPTOMS
SHORTNESS OF BREATH: 0
DIARRHEA: 0
RHINORRHEA: 0
CONSTIPATION: 0
COUGH: 0
NAUSEA: 0
TROUBLE SWALLOWING: 0
ABDOMINAL PAIN: 0
SORE THROAT: 0
VOMITING: 0

## 2018-03-28 ASSESSMENT — PAIN DESCRIPTION - LOCATION: LOCATION: LEG

## 2018-03-28 ASSESSMENT — PAIN SCALES - GENERAL: PAINLEVEL_OUTOF10: 5

## 2018-03-28 ASSESSMENT — PAIN DESCRIPTION - ORIENTATION: ORIENTATION: RIGHT

## 2018-03-28 ASSESSMENT — PAIN DESCRIPTION - PAIN TYPE: TYPE: ACUTE PAIN

## 2018-03-29 NOTE — ED PROVIDER NOTES
except as noted above in the HPI. PAST MEDICAL HISTORY     Past Medical History:   Diagnosis Date    Acute pancreatitis     Anemia     Asthma     Atrial fibrillation (HCC)     h/o paroxysmal a-fib ? anesthsia induced    Benign colonic polyp     cscope 12/07 Dr Mead Him adenomatous: 12/14 adenoma    Chest pain     Chronic pain syndrome     Depression     Diabetic peripheral neuropathy (HCC)     Extrinsic asthma     Hyperlipidemia     Hypertension     Idiopathic peripheral neuropathy     Lyme disease     Mediastinal lymphadenopathy     Microalbuminuria     Mixed hyperlipidemia     Paroxysmal a-fib (HCC)     S/P ablation of atrial fibrillation     4/16 A fib ablation , Dr Tommy Bentley, [place]     Sarcoidosis, lung ([facility]     restrictive lung impairment    Tick bite     Type 2 diabetes mellitus with peripheral neuropathy ([facility]     Type 2 diabetes, controlled, with neuropathy ([facility]          SURGICAL HISTORY       Past Surgical History:   Procedure Laterality Date    ANKLE FRACTURE SURGERY  april 14, 2015    ATRIAL ABLATION SURGERY  2015    Dr. Tommy Hopper [place]      Catheter Ablation A-fib    CHOLECYSTECTOMY      COLONOSCOPY  12/02/2014    Melecio         CURRENT MEDICATIONS       Previous Medications    ADVAIR DISKUS 500-50 MCG/DOSE DISKUS INHALER    INHALE ONE DOSE BY MOUTH TWICE DAILY    AMLODIPINE (NORVASC) 10 MG TABLET    Take 1 tablet by mouth daily    ASPIRIN 81 MG TABLET    Take 81 mg by mouth daily. LOSARTAN (COZAAR) 100 MG TABLET    Take 1 tablet by mouth daily    LOSARTAN (COZAAR) 50 MG TABLET    TAKE 1 TABLET EVERY DAY    METFORMIN (GLUCOPHAGE) 500 MG TABLET    Take 1 tablet by mouth daily (with breakfast)    RIVAROXABAN (XARELTO) 20 MG TABS TABLET    Take 20 mg by mouth daily (with breakfast).        ALLERGIES     Acetaminophen; Gabapentin; Penicillins; and Toprol xl [metoprolol]    FAMILY HISTORY       Family History   Problem Relation Age of Onset    Pain for up to 10 doses. Attestation: The Prescription Monitoring Report for this patient was reviewed today. ([id number]) NUNO Nava)  Documentation: Possible medication side effects, risk of tolerance/dependence & alternative treatments discussed., No signs of potential drug abuse or diversion identified.  Jael Cuello, NUNO)    (Please note that portions of this note were completed with a voice recognition program.  Efforts were made to edit the dictations but occasionally words are mis-transcribed.)    NUNO Nava (electronically signed)  Attending Emergency Physician           Sakina Kinsey, [address] [place] [address]  03/28/18 [id number]

## 2018-04-09 ENCOUNTER — TELEPHONE (OUTPATIENT)
Dept: INTERNAL MEDICINE | Age: 68
End: 2018-04-09

## 2018-04-10 ENCOUNTER — OFFICE VISIT (OUTPATIENT)
Dept: INTERNAL MEDICINE | Age: 68
End: 2018-04-10
Payer: MEDICARE

## 2018-04-10 VITALS
HEART RATE: 83 BPM | OXYGEN SATURATION: 97 % | DIASTOLIC BLOOD PRESSURE: 80 MMHG | BODY MASS INDEX: 27.64 KG/M2 | SYSTOLIC BLOOD PRESSURE: 140 MMHG | WEIGHT: 186.6 LBS | HEIGHT: 69 IN | TEMPERATURE: 96.6 F

## 2018-04-10 DIAGNOSIS — M25.571 ACUTE RIGHT ANKLE PAIN: ICD-10-CM

## 2018-04-10 DIAGNOSIS — L03.115 CELLULITIS OF RIGHT LOWER EXTREMITY: Primary | ICD-10-CM

## 2018-04-10 DIAGNOSIS — R60.0 LOWER EXTREMITY EDEMA: ICD-10-CM

## 2018-04-10 PROCEDURE — 4040F PNEUMOC VAC/ADMIN/RCVD: CPT | Performed by: PHYSICIAN ASSISTANT

## 2018-04-10 PROCEDURE — G8417 CALC BMI ABV UP PARAM F/U: HCPCS | Performed by: PHYSICIAN ASSISTANT

## 2018-04-10 PROCEDURE — 99213 OFFICE O/P EST LOW 20 MIN: CPT | Performed by: PHYSICIAN ASSISTANT

## 2018-04-10 PROCEDURE — 3017F COLORECTAL CA SCREEN DOC REV: CPT | Performed by: PHYSICIAN ASSISTANT

## 2018-04-10 PROCEDURE — G8427 DOCREV CUR MEDS BY ELIG CLIN: HCPCS | Performed by: PHYSICIAN ASSISTANT

## 2018-04-10 PROCEDURE — 1123F ACP DISCUSS/DSCN MKR DOCD: CPT | Performed by: PHYSICIAN ASSISTANT

## 2018-04-10 PROCEDURE — 1036F TOBACCO NON-USER: CPT | Performed by: PHYSICIAN ASSISTANT

## 2018-04-10 RX ORDER — DOXYCYCLINE 100 MG/1
100 TABLET ORAL 2 TIMES DAILY
Qty: 20 TABLET | Refills: 0 | Status: SHIPPED | OUTPATIENT
Start: 2018-04-10 | End: 2018-04-20

## 2018-04-10 ASSESSMENT — ENCOUNTER SYMPTOMS
WHEEZING: 0
SHORTNESS OF BREATH: 0
DIARRHEA: 0
EYE REDNESS: 0
COUGH: 0
SINUS PRESSURE: 0
RHINORRHEA: 0
VOMITING: 0
NAUSEA: 0
PHOTOPHOBIA: 0
ABDOMINAL PAIN: 0
CONSTIPATION: 0
COLOR CHANGE: 0
BACK PAIN: 0
CHEST TIGHTNESS: 0
EYE PAIN: 0
SORE THROAT: 0

## 2018-04-20 ENCOUNTER — TELEPHONE (OUTPATIENT)
Dept: INTERNAL MEDICINE | Age: 68
End: 2018-04-20

## 2018-04-20 DIAGNOSIS — E11.40 TYPE 2 DIABETES, CONTROLLED, WITH NEUROPATHY (HCC): ICD-10-CM

## 2018-04-20 DIAGNOSIS — Z23 NEED FOR INFLUENZA VACCINATION: Primary | ICD-10-CM

## 2018-04-20 DIAGNOSIS — I48.0 PAROXYSMAL A-FIB (HCC): ICD-10-CM

## 2018-04-20 DIAGNOSIS — I10 ESSENTIAL HYPERTENSION: ICD-10-CM

## 2018-04-20 DIAGNOSIS — D86.0 SARCOIDOSIS, LUNG (HCC): ICD-10-CM

## 2018-04-20 DIAGNOSIS — R73.9 HYPERGLYCEMIA: ICD-10-CM

## 2018-04-20 DIAGNOSIS — E83.52 SERUM CALCIUM ELEVATED: ICD-10-CM

## 2018-04-20 LAB
ALBUMIN SERPL-MCNC: 4.3 G/DL (ref 3.5–5.2)
ALP BLD-CCNC: 81 U/L (ref 40–130)
ALT SERPL-CCNC: 15 U/L (ref 5–41)
ANION GAP SERPL CALCULATED.3IONS-SCNC: 19 MMOL/L (ref 7–19)
AST SERPL-CCNC: 21 U/L (ref 5–40)
BILIRUB SERPL-MCNC: 0.9 MG/DL (ref 0.2–1.2)
BUN BLDV-MCNC: 14 MG/DL (ref 8–23)
CALCIUM SERPL-MCNC: 12.2 MG/DL (ref 8.8–10.2)
CHLORIDE BLD-SCNC: 99 MMOL/L (ref 98–111)
CHOLESTEROL, TOTAL: 182 MG/DL (ref 160–199)
CO2: 26 MMOL/L (ref 22–29)
CREAT SERPL-MCNC: 1.1 MG/DL (ref 0.5–1.2)
CREATININE URINE: 173.2 MG/DL (ref 4.2–622)
GFR NON-AFRICAN AMERICAN: >60
GLUCOSE BLD-MCNC: 135 MG/DL (ref 74–109)
HBA1C MFR BLD: 6.6 %
HCT VFR BLD CALC: 36.3 % (ref 42–52)
HDLC SERPL-MCNC: 41 MG/DL (ref 55–121)
HEMOGLOBIN: 12.3 G/DL (ref 14–18)
LDL CHOLESTEROL CALCULATED: 100 MG/DL
MCH RBC QN AUTO: 29.2 PG (ref 27–31)
MCHC RBC AUTO-ENTMCNC: 33.9 G/DL (ref 33–37)
MCV RBC AUTO: 86.2 FL (ref 80–94)
MICROALBUMIN UR-MCNC: 4.9 MG/DL (ref 0–19)
MICROALBUMIN/CREAT UR-RTO: 28.3 MG/G
PDW BLD-RTO: 13.2 % (ref 11.5–14.5)
PLATELET # BLD: 138 K/UL (ref 130–400)
PMV BLD AUTO: 10.4 FL (ref 9.4–12.4)
POTASSIUM SERPL-SCNC: 3.9 MMOL/L (ref 3.5–5)
RBC # BLD: 4.21 M/UL (ref 4.7–6.1)
SODIUM BLD-SCNC: 144 MMOL/L (ref 136–145)
TOTAL PROTEIN: 7.5 G/DL (ref 6.6–8.7)
TRIGL SERPL-MCNC: 204 MG/DL (ref 0–149)
TSH SERPL DL<=0.05 MIU/L-ACNC: 5.28 UIU/ML (ref 0.27–4.2)
WBC # BLD: 3.4 K/UL (ref 4.8–10.8)

## 2018-04-23 DIAGNOSIS — R73.9 HYPERGLYCEMIA: ICD-10-CM

## 2018-04-23 DIAGNOSIS — E83.52 SERUM CALCIUM ELEVATED: ICD-10-CM

## 2018-04-23 LAB
ANION GAP SERPL CALCULATED.3IONS-SCNC: 15 MMOL/L (ref 7–19)
BUN BLDV-MCNC: 12 MG/DL (ref 8–23)
CALCIUM SERPL-MCNC: 11.3 MG/DL (ref 8.8–10.2)
CHLORIDE BLD-SCNC: 98 MMOL/L (ref 98–111)
CO2: 28 MMOL/L (ref 22–29)
CREAT SERPL-MCNC: 1 MG/DL (ref 0.5–1.2)
GFR NON-AFRICAN AMERICAN: >60
GLUCOSE BLD-MCNC: 138 MG/DL (ref 74–109)
PARATHYROID HORMONE INTACT: 8.5 PG/ML (ref 15–65)
POTASSIUM SERPL-SCNC: 4.4 MMOL/L (ref 3.5–5)
SODIUM BLD-SCNC: 141 MMOL/L (ref 136–145)
VITAMIN D 25-HYDROXY: 15 NG/ML

## 2018-04-26 ENCOUNTER — OFFICE VISIT (OUTPATIENT)
Dept: INTERNAL MEDICINE | Age: 68
End: 2018-04-26
Payer: MEDICARE

## 2018-04-26 VITALS
BODY MASS INDEX: 27.25 KG/M2 | WEIGHT: 184 LBS | HEART RATE: 89 BPM | OXYGEN SATURATION: 98 % | HEIGHT: 69 IN | DIASTOLIC BLOOD PRESSURE: 70 MMHG | SYSTOLIC BLOOD PRESSURE: 154 MMHG

## 2018-04-26 DIAGNOSIS — E78.2 MIXED HYPERLIPIDEMIA: ICD-10-CM

## 2018-04-26 DIAGNOSIS — D86.0 SARCOIDOSIS, LUNG (HCC): Primary | ICD-10-CM

## 2018-04-26 DIAGNOSIS — D86.9 HYPERCALCEMIA DUE TO SARCOIDOSIS: ICD-10-CM

## 2018-04-26 DIAGNOSIS — I48.0 PAROXYSMAL A-FIB (HCC): ICD-10-CM

## 2018-04-26 DIAGNOSIS — I10 ESSENTIAL HYPERTENSION: ICD-10-CM

## 2018-04-26 DIAGNOSIS — E11.42 TYPE 2 DIABETES MELLITUS WITH PERIPHERAL NEUROPATHY (HCC): ICD-10-CM

## 2018-04-26 DIAGNOSIS — E11.40 TYPE 2 DIABETES, CONTROLLED, WITH NEUROPATHY (HCC): ICD-10-CM

## 2018-04-26 DIAGNOSIS — E83.52 HYPERCALCEMIA DUE TO SARCOIDOSIS: ICD-10-CM

## 2018-04-26 PROCEDURE — 2022F DILAT RTA XM EVC RTNOPTHY: CPT | Performed by: INTERNAL MEDICINE

## 2018-04-26 PROCEDURE — 3044F HG A1C LEVEL LT 7.0%: CPT | Performed by: INTERNAL MEDICINE

## 2018-04-26 PROCEDURE — 4040F PNEUMOC VAC/ADMIN/RCVD: CPT | Performed by: INTERNAL MEDICINE

## 2018-04-26 PROCEDURE — 99214 OFFICE O/P EST MOD 30 MIN: CPT | Performed by: INTERNAL MEDICINE

## 2018-04-26 PROCEDURE — 1036F TOBACCO NON-USER: CPT | Performed by: INTERNAL MEDICINE

## 2018-04-26 PROCEDURE — G8417 CALC BMI ABV UP PARAM F/U: HCPCS | Performed by: INTERNAL MEDICINE

## 2018-04-26 PROCEDURE — 3017F COLORECTAL CA SCREEN DOC REV: CPT | Performed by: INTERNAL MEDICINE

## 2018-04-26 PROCEDURE — G8427 DOCREV CUR MEDS BY ELIG CLIN: HCPCS | Performed by: INTERNAL MEDICINE

## 2018-04-26 PROCEDURE — 1123F ACP DISCUSS/DSCN MKR DOCD: CPT | Performed by: INTERNAL MEDICINE

## 2018-04-26 ASSESSMENT — ENCOUNTER SYMPTOMS
SHORTNESS OF BREATH: 0
COUGH: 0
RHINORRHEA: 0
NAUSEA: 0
TROUBLE SWALLOWING: 0
SORE THROAT: 0
ABDOMINAL PAIN: 0

## 2018-05-04 DIAGNOSIS — E11.40 TYPE 2 DIABETES, CONTROLLED, WITH NEUROPATHY (HCC): ICD-10-CM

## 2018-05-04 RX ORDER — AMLODIPINE BESYLATE 10 MG/1
TABLET ORAL
Qty: 30 TABLET | Refills: 0 | Status: SHIPPED | OUTPATIENT
Start: 2018-05-04 | End: 2018-06-01 | Stop reason: SDUPTHER

## 2018-05-09 ENCOUNTER — HOSPITAL ENCOUNTER (EMERGENCY)
Age: 68
Discharge: HOME | End: 2018-05-09
Attending: EMERGENCY MEDICINE
Payer: MEDICARE

## 2018-05-09 VITALS
SYSTOLIC BLOOD PRESSURE: 129 MMHG | HEART RATE: 83 BPM | DIASTOLIC BLOOD PRESSURE: 69 MMHG | OXYGEN SATURATION: 95 % | RESPIRATION RATE: 14 BRPM | TEMPERATURE: 97.8 F

## 2018-05-09 DIAGNOSIS — T63.441A ALLERGIC REACTION TO BEE STING: Primary | ICD-10-CM

## 2018-05-09 LAB
GLUCOSE BLD-MCNC: 197 MG/DL
GLUCOSE BLD-MCNC: 197 MG/DL (ref 70–99)
PERFORMED ON: ABNORMAL
PERFORMED ON: NORMAL
POC TROPONIN I: 0.01 NG/ML (ref 0–0.08)
TROPONIN: <0.01 NG/ML (ref 0–0.03)

## 2018-05-09 PROCEDURE — 96375 TX/PRO/DX INJ NEW DRUG ADDON: CPT

## 2018-05-09 PROCEDURE — 99283 EMERGENCY DEPT VISIT LOW MDM: CPT | Performed by: EMERGENCY MEDICINE

## 2018-05-09 PROCEDURE — 84484 ASSAY OF TROPONIN QUANT: CPT

## 2018-05-09 PROCEDURE — 36415 COLL VENOUS BLD VENIPUNCTURE: CPT

## 2018-05-09 PROCEDURE — 2500000003 HC RX 250 WO HCPCS: Performed by: EMERGENCY MEDICINE

## 2018-05-09 PROCEDURE — 96374 THER/PROPH/DIAG INJ IV PUSH: CPT

## 2018-05-09 PROCEDURE — 82948 REAGENT STRIP/BLOOD GLUCOSE: CPT

## 2018-05-09 PROCEDURE — 99283 EMERGENCY DEPT VISIT LOW MDM: CPT

## 2018-05-09 PROCEDURE — 93005 ELECTROCARDIOGRAM TRACING: CPT

## 2018-05-09 PROCEDURE — 6360000002 HC RX W HCPCS: Performed by: EMERGENCY MEDICINE

## 2018-05-09 PROCEDURE — S0028 INJECTION, FAMOTIDINE, 20 MG: HCPCS | Performed by: EMERGENCY MEDICINE

## 2018-05-09 RX ORDER — EPINEPHRINE 0.3 MG/.3ML
0.3 INJECTION SUBCUTANEOUS ONCE
Qty: 1 EACH | Refills: 0 | Status: SHIPPED | OUTPATIENT
Start: 2018-05-09 | End: 2018-11-26 | Stop reason: DRUGHIGH

## 2018-05-09 RX ORDER — DIPHENHYDRAMINE HYDROCHLORIDE 50 MG/ML
50 INJECTION INTRAMUSCULAR; INTRAVENOUS ONCE
Status: COMPLETED | OUTPATIENT
Start: 2018-05-09 | End: 2018-05-09

## 2018-05-09 RX ORDER — METHYLPREDNISOLONE SODIUM SUCCINATE 125 MG/2ML
80 INJECTION, POWDER, LYOPHILIZED, FOR SOLUTION INTRAMUSCULAR; INTRAVENOUS ONCE
Status: COMPLETED | OUTPATIENT
Start: 2018-05-09 | End: 2018-05-09

## 2018-05-09 RX ADMIN — METHYLPREDNISOLONE SODIUM SUCCINATE 80 MG: 125 INJECTION, POWDER, FOR SOLUTION INTRAMUSCULAR; INTRAVENOUS at 12:21

## 2018-05-09 RX ADMIN — FAMOTIDINE 20 MG: 10 INJECTION INTRAVENOUS at 12:21

## 2018-05-09 RX ADMIN — DIPHENHYDRAMINE HYDROCHLORIDE 50 MG: 50 INJECTION, SOLUTION INTRAMUSCULAR; INTRAVENOUS at 12:21

## 2018-05-09 ASSESSMENT — ENCOUNTER SYMPTOMS
DIFFICULTY BREATHING: 1
ALLERGIC REACTION: 1
WHEEZING: 0
TROUBLE SWALLOWING: 1

## 2018-05-10 LAB
EKG P AXIS: 56 DEGREES
EKG P AXIS: 73 DEGREES
EKG P-R INTERVAL: 148 MS
EKG P-R INTERVAL: 148 MS
EKG Q-T INTERVAL: 328 MS
EKG Q-T INTERVAL: 342 MS
EKG QRS DURATION: 92 MS
EKG QRS DURATION: 98 MS
EKG QTC CALCULATION (BAZETT): 368 MS
EKG QTC CALCULATION (BAZETT): 370 MS
EKG T AXIS: 58 DEGREES
EKG T AXIS: 69 DEGREES

## 2018-05-25 RX ORDER — RIVAROXABAN 20 MG/1
TABLET, FILM COATED ORAL
Qty: 90 TABLET | Refills: 3 | Status: ON HOLD | OUTPATIENT
Start: 2018-05-25 | End: 2018-06-27

## 2018-05-30 ENCOUNTER — OFFICE VISIT (OUTPATIENT)
Dept: CARDIOLOGY | Age: 68
End: 2018-05-30
Payer: MEDICARE

## 2018-05-30 VITALS
HEART RATE: 96 BPM | DIASTOLIC BLOOD PRESSURE: 64 MMHG | BODY MASS INDEX: 27.4 KG/M2 | HEIGHT: 69 IN | SYSTOLIC BLOOD PRESSURE: 132 MMHG | WEIGHT: 185 LBS

## 2018-05-30 DIAGNOSIS — I10 ESSENTIAL HYPERTENSION: ICD-10-CM

## 2018-05-30 DIAGNOSIS — R07.9 CHEST PAIN IN ADULT: Primary | ICD-10-CM

## 2018-05-30 DIAGNOSIS — I48.0 PAROXYSMAL A-FIB (HCC): ICD-10-CM

## 2018-05-30 PROCEDURE — 1123F ACP DISCUSS/DSCN MKR DOCD: CPT | Performed by: INTERNAL MEDICINE

## 2018-05-30 PROCEDURE — 1036F TOBACCO NON-USER: CPT | Performed by: INTERNAL MEDICINE

## 2018-05-30 PROCEDURE — 3017F COLORECTAL CA SCREEN DOC REV: CPT | Performed by: INTERNAL MEDICINE

## 2018-05-30 PROCEDURE — 99213 OFFICE O/P EST LOW 20 MIN: CPT | Performed by: INTERNAL MEDICINE

## 2018-05-30 PROCEDURE — G8417 CALC BMI ABV UP PARAM F/U: HCPCS | Performed by: INTERNAL MEDICINE

## 2018-05-30 PROCEDURE — G8428 CUR MEDS NOT DOCUMENT: HCPCS | Performed by: INTERNAL MEDICINE

## 2018-05-30 PROCEDURE — 4040F PNEUMOC VAC/ADMIN/RCVD: CPT | Performed by: INTERNAL MEDICINE

## 2018-05-31 ENCOUNTER — TELEPHONE (OUTPATIENT)
Dept: CARDIOLOGY | Age: 68
End: 2018-05-31

## 2018-06-01 DIAGNOSIS — E11.40 TYPE 2 DIABETES, CONTROLLED, WITH NEUROPATHY (HCC): ICD-10-CM

## 2018-06-01 RX ORDER — AMLODIPINE BESYLATE 10 MG/1
TABLET ORAL
Qty: 30 TABLET | Refills: 0 | Status: SHIPPED | OUTPATIENT
Start: 2018-06-01 | End: 2018-06-07

## 2018-06-05 ENCOUNTER — HOSPITAL ENCOUNTER (OUTPATIENT)
Dept: NON INVASIVE DIAGNOSTICS | Age: 68
Discharge: HOME | End: 2018-06-05
Payer: MEDICARE

## 2018-06-05 DIAGNOSIS — R07.9 CHEST PAIN IN ADULT: ICD-10-CM

## 2018-06-05 LAB
LV EF: 50 %
LVEF MODALITY: NORMAL

## 2018-06-05 PROCEDURE — 93350 STRESS TTE ONLY: CPT

## 2018-06-07 ENCOUNTER — OFFICE VISIT (OUTPATIENT)
Dept: INTERNAL MEDICINE | Age: 68
End: 2018-06-07
Payer: MEDICARE

## 2018-06-07 VITALS
SYSTOLIC BLOOD PRESSURE: 182 MMHG | TEMPERATURE: 98.1 F | DIASTOLIC BLOOD PRESSURE: 80 MMHG | HEART RATE: 90 BPM | OXYGEN SATURATION: 98 %

## 2018-06-07 DIAGNOSIS — R82.998 DARK URINE: Primary | ICD-10-CM

## 2018-06-07 DIAGNOSIS — R31.9 HEMATURIA, UNSPECIFIED TYPE: Primary | ICD-10-CM

## 2018-06-07 PROCEDURE — 1123F ACP DISCUSS/DSCN MKR DOCD: CPT | Performed by: INTERNAL MEDICINE

## 2018-06-07 PROCEDURE — 1036F TOBACCO NON-USER: CPT | Performed by: INTERNAL MEDICINE

## 2018-06-07 PROCEDURE — 4040F PNEUMOC VAC/ADMIN/RCVD: CPT | Performed by: INTERNAL MEDICINE

## 2018-06-07 PROCEDURE — 3017F COLORECTAL CA SCREEN DOC REV: CPT | Performed by: INTERNAL MEDICINE

## 2018-06-07 PROCEDURE — G8427 DOCREV CUR MEDS BY ELIG CLIN: HCPCS | Performed by: INTERNAL MEDICINE

## 2018-06-07 PROCEDURE — G8417 CALC BMI ABV UP PARAM F/U: HCPCS | Performed by: INTERNAL MEDICINE

## 2018-06-07 PROCEDURE — 99213 OFFICE O/P EST LOW 20 MIN: CPT | Performed by: INTERNAL MEDICINE

## 2018-06-07 RX ORDER — CIPROFLOXACIN 500 MG/1
500 TABLET, FILM COATED ORAL 2 TIMES DAILY
Qty: 20 TABLET | Refills: 0 | Status: SHIPPED | OUTPATIENT
Start: 2018-06-07 | End: 2018-06-17

## 2018-06-07 RX ORDER — AMLODIPINE BESYLATE 5 MG/1
5 TABLET ORAL
COMMUNITY
End: 2018-06-08 | Stop reason: SDUPTHER

## 2018-06-07 ASSESSMENT — ENCOUNTER SYMPTOMS
BACK PAIN: 0
EYE DISCHARGE: 0
NAUSEA: 0
ABDOMINAL PAIN: 0
SHORTNESS OF BREATH: 0
VOMITING: 0
TROUBLE SWALLOWING: 0
WHEEZING: 0
SORE THROAT: 0
BLOOD IN STOOL: 0
EYE ITCHING: 0
ABDOMINAL DISTENTION: 0
SINUS PRESSURE: 0

## 2018-06-08 DIAGNOSIS — I10 HYPERTENSION, UNSPECIFIED TYPE: Primary | ICD-10-CM

## 2018-06-08 PROBLEM — J18.9 PNEUMONIA: Status: ACTIVE | Noted: 2018-06-08

## 2018-06-08 PROBLEM — J45.909 ASTHMA: Status: ACTIVE | Noted: 2018-06-08

## 2018-06-08 PROBLEM — K63.5 POLYP OF COLON: Status: ACTIVE | Noted: 2018-06-08

## 2018-06-08 PROBLEM — F32.A DEPRESSION: Status: ACTIVE | Noted: 2018-06-08

## 2018-06-08 PROBLEM — K85.90 PANCREATITIS: Status: ACTIVE | Noted: 2018-06-08

## 2018-06-08 PROBLEM — G62.9 PERIPHERAL NERVE DISEASE: Status: ACTIVE | Noted: 2018-06-08

## 2018-06-08 RX ORDER — AMLODIPINE BESYLATE 10 MG/1
10 TABLET ORAL DAILY
Qty: 30 TABLET | Refills: 5
Start: 2018-06-08 | End: 2018-08-22 | Stop reason: ALTCHOICE

## 2018-06-12 ENCOUNTER — HOSPITAL ENCOUNTER (OUTPATIENT)
Dept: ULTRASOUND IMAGING | Age: 68
Discharge: HOME | End: 2018-06-12
Payer: MEDICARE

## 2018-06-12 DIAGNOSIS — R31.9 HEMATURIA, UNSPECIFIED TYPE: ICD-10-CM

## 2018-06-12 DIAGNOSIS — N20.0 NEPHROLITHIASIS: Primary | ICD-10-CM

## 2018-06-12 PROCEDURE — 76770 US EXAM ABDO BACK WALL COMP: CPT

## 2018-06-12 RX ORDER — TAMSULOSIN HYDROCHLORIDE 0.4 MG/1
0.4 CAPSULE ORAL DAILY
Qty: 14 CAPSULE | Refills: 0 | Status: SHIPPED | OUTPATIENT
Start: 2018-06-12 | End: 2018-06-25 | Stop reason: SDUPTHER

## 2018-06-13 ENCOUNTER — TELEPHONE (OUTPATIENT)
Dept: INTERNAL MEDICINE | Age: 68
End: 2018-06-13

## 2018-06-21 ENCOUNTER — OFFICE VISIT (OUTPATIENT)
Dept: UROLOGY | Age: 68
End: 2018-06-21
Payer: MEDICARE

## 2018-06-21 ENCOUNTER — HOSPITAL ENCOUNTER (OUTPATIENT)
Dept: GENERAL RADIOLOGY | Age: 68
Discharge: HOME | End: 2018-06-21
Payer: MEDICARE

## 2018-06-21 ENCOUNTER — HOSPITAL ENCOUNTER (OUTPATIENT)
Dept: CT IMAGING | Age: 68
Discharge: HOME | End: 2018-06-21
Payer: MEDICARE

## 2018-06-21 VITALS
HEIGHT: 69 IN | SYSTOLIC BLOOD PRESSURE: 144 MMHG | HEART RATE: 87 BPM | BODY MASS INDEX: 26.96 KG/M2 | WEIGHT: 182 LBS | TEMPERATURE: 97.5 F | DIASTOLIC BLOOD PRESSURE: 77 MMHG

## 2018-06-21 DIAGNOSIS — N13.30 HYDRONEPHROSIS, LEFT: ICD-10-CM

## 2018-06-21 DIAGNOSIS — N20.0 NEPHROLITHIASIS: ICD-10-CM

## 2018-06-21 DIAGNOSIS — N20.0 NEPHROLITHIASIS: Primary | ICD-10-CM

## 2018-06-21 LAB
BACTERIA URINE, POC: ABNORMAL
BILIRUBIN URINE: 0 MG/DL
BLOOD, URINE: POSITIVE
CASTS URINE, POC: ABNORMAL
CLARITY: CLEAR
COLOR: YELLOW
CRYSTALS URINE, POC: ABNORMAL
EPI CELLS URINE, POC: ABNORMAL
GLUCOSE URINE: POSITIVE
KETONES, URINE: NEGATIVE
LEUKOCYTE EST, POC: ABNORMAL
NITRITE, URINE: NEGATIVE
PH UA: 6 (ref 4.5–8)
PROTEIN UA: POSITIVE
RBC URINE, POC: ABNORMAL
SPECIFIC GRAVITY UA: 1.02 (ref 1–1.03)
UROBILINOGEN, URINE: NORMAL
WBC URINE, POC: ABNORMAL
YEAST URINE, POC: ABNORMAL

## 2018-06-21 PROCEDURE — 74018 RADEX ABDOMEN 1 VIEW: CPT

## 2018-06-21 PROCEDURE — 3017F COLORECTAL CA SCREEN DOC REV: CPT | Performed by: PHYSICIAN ASSISTANT

## 2018-06-21 PROCEDURE — 1036F TOBACCO NON-USER: CPT | Performed by: PHYSICIAN ASSISTANT

## 2018-06-21 PROCEDURE — 1123F ACP DISCUSS/DSCN MKR DOCD: CPT | Performed by: PHYSICIAN ASSISTANT

## 2018-06-21 PROCEDURE — 81001 URINALYSIS AUTO W/SCOPE: CPT | Performed by: PHYSICIAN ASSISTANT

## 2018-06-21 PROCEDURE — G8417 CALC BMI ABV UP PARAM F/U: HCPCS | Performed by: PHYSICIAN ASSISTANT

## 2018-06-21 PROCEDURE — 74176 CT ABD & PELVIS W/O CONTRAST: CPT

## 2018-06-21 PROCEDURE — G8427 DOCREV CUR MEDS BY ELIG CLIN: HCPCS | Performed by: PHYSICIAN ASSISTANT

## 2018-06-21 PROCEDURE — 4040F PNEUMOC VAC/ADMIN/RCVD: CPT | Performed by: PHYSICIAN ASSISTANT

## 2018-06-21 PROCEDURE — 99212 OFFICE O/P EST SF 10 MIN: CPT | Performed by: PHYSICIAN ASSISTANT

## 2018-06-21 ASSESSMENT — ENCOUNTER SYMPTOMS
ABDOMINAL PAIN: 0
BLURRED VISION: 0
WHEEZING: 0
SHORTNESS OF BREATH: 0
EYE PAIN: 0
SINUS PAIN: 0
VOMITING: 0
BACK PAIN: 0

## 2018-06-22 ENCOUNTER — OFFICE VISIT (OUTPATIENT)
Dept: INTERNAL MEDICINE | Age: 68
End: 2018-06-22
Payer: MEDICARE

## 2018-06-22 VITALS
DIASTOLIC BLOOD PRESSURE: 60 MMHG | HEART RATE: 87 BPM | OXYGEN SATURATION: 96 % | WEIGHT: 182 LBS | RESPIRATION RATE: 18 BRPM | SYSTOLIC BLOOD PRESSURE: 130 MMHG | HEIGHT: 69 IN | BODY MASS INDEX: 26.96 KG/M2

## 2018-06-22 DIAGNOSIS — R16.1 SPLENOMEGALY: ICD-10-CM

## 2018-06-22 DIAGNOSIS — N20.0 NEPHROLITHIASIS: ICD-10-CM

## 2018-06-22 DIAGNOSIS — I10 HYPERTENSION, UNSPECIFIED TYPE: Primary | ICD-10-CM

## 2018-06-22 DIAGNOSIS — R63.4 WEIGHT LOSS: ICD-10-CM

## 2018-06-22 DIAGNOSIS — Z86.19 HISTORY OF HISTOPLASMOSIS: ICD-10-CM

## 2018-06-22 DIAGNOSIS — I48.20 CHRONIC ATRIAL FIBRILLATION (HCC): ICD-10-CM

## 2018-06-22 DIAGNOSIS — D86.9 SARCOIDOSIS: ICD-10-CM

## 2018-06-22 DIAGNOSIS — R59.0 ABDOMINAL LYMPHADENOPATHY: ICD-10-CM

## 2018-06-22 PROBLEM — R59.1 LYMPHADENOPATHY: Status: ACTIVE | Noted: 2018-06-22

## 2018-06-22 PROCEDURE — G8427 DOCREV CUR MEDS BY ELIG CLIN: HCPCS | Performed by: NURSE PRACTITIONER

## 2018-06-22 PROCEDURE — 99214 OFFICE O/P EST MOD 30 MIN: CPT | Performed by: NURSE PRACTITIONER

## 2018-06-22 PROCEDURE — 1123F ACP DISCUSS/DSCN MKR DOCD: CPT | Performed by: NURSE PRACTITIONER

## 2018-06-22 PROCEDURE — G8417 CALC BMI ABV UP PARAM F/U: HCPCS | Performed by: NURSE PRACTITIONER

## 2018-06-22 PROCEDURE — 3017F COLORECTAL CA SCREEN DOC REV: CPT | Performed by: NURSE PRACTITIONER

## 2018-06-22 PROCEDURE — 4040F PNEUMOC VAC/ADMIN/RCVD: CPT | Performed by: NURSE PRACTITIONER

## 2018-06-22 PROCEDURE — 1036F TOBACCO NON-USER: CPT | Performed by: NURSE PRACTITIONER

## 2018-06-22 ASSESSMENT — ENCOUNTER SYMPTOMS
ABDOMINAL DISTENTION: 0
BLOOD IN STOOL: 0
STRIDOR: 0
CHOKING: 0
ABDOMINAL PAIN: 0
EYE ITCHING: 0
CONSTIPATION: 0
EYE DISCHARGE: 0
DIARRHEA: 0
COLOR CHANGE: 0
VOMITING: 0
WHEEZING: 0
NAUSEA: 0
TROUBLE SWALLOWING: 0
SHORTNESS OF BREATH: 0
COUGH: 0
SORE THROAT: 0

## 2018-06-25 ENCOUNTER — PREP FOR PROCEDURE (OUTPATIENT)
Dept: UROLOGY | Age: 68
End: 2018-06-25

## 2018-06-25 ENCOUNTER — TELEPHONE (OUTPATIENT)
Dept: UROLOGY | Age: 68
End: 2018-06-25

## 2018-06-25 DIAGNOSIS — N20.1 LEFT URETERAL STONE: ICD-10-CM

## 2018-06-25 DIAGNOSIS — N13.30 HYDRONEPHROSIS, LEFT: ICD-10-CM

## 2018-06-25 RX ORDER — TAMSULOSIN HYDROCHLORIDE 0.4 MG/1
CAPSULE ORAL
Qty: 14 CAPSULE | Refills: 0 | Status: ON HOLD | OUTPATIENT
Start: 2018-06-25 | End: 2018-06-27

## 2018-06-26 ENCOUNTER — HOSPITAL ENCOUNTER (OUTPATIENT)
Dept: PREADMISSION TESTING | Age: 68
Discharge: HOME | End: 2018-06-30
Payer: MEDICARE

## 2018-06-26 ENCOUNTER — TELEPHONE (OUTPATIENT)
Dept: UROLOGY | Age: 68
End: 2018-06-26

## 2018-06-26 VITALS — WEIGHT: 180 LBS | HEIGHT: 69 IN | BODY MASS INDEX: 26.66 KG/M2

## 2018-06-26 LAB
ALBUMIN SERPL-MCNC: 3.9 G/DL (ref 3.5–5.2)
ALP BLD-CCNC: 85 U/L (ref 40–130)
ALT SERPL-CCNC: 8 U/L (ref 5–41)
ANION GAP SERPL CALCULATED.3IONS-SCNC: 19 MMOL/L (ref 7–19)
AST SERPL-CCNC: 12 U/L (ref 5–40)
BACTERIA: NEGATIVE /HPF
BASOPHILS ABSOLUTE: 0 K/UL (ref 0–0.2)
BASOPHILS RELATIVE PERCENT: 0.6 % (ref 0–1)
BILIRUB SERPL-MCNC: 0.6 MG/DL (ref 0.2–1.2)
BILIRUBIN URINE: NEGATIVE
BLOOD, URINE: ABNORMAL
BUN BLDV-MCNC: 15 MG/DL (ref 8–23)
CALCIUM SERPL-MCNC: 11.8 MG/DL (ref 8.8–10.2)
CHLORIDE BLD-SCNC: 95 MMOL/L (ref 98–111)
CLARITY: CLEAR
CO2: 25 MMOL/L (ref 22–29)
COLOR: YELLOW
CREAT SERPL-MCNC: 1.6 MG/DL (ref 0.5–1.2)
EOSINOPHILS ABSOLUTE: 0.3 K/UL (ref 0–0.6)
EOSINOPHILS RELATIVE PERCENT: 5.4 % (ref 0–5)
EPITHELIAL CELLS, UA: 0 /HPF (ref 0–5)
GFR NON-AFRICAN AMERICAN: 43
GLUCOSE BLD-MCNC: 182 MG/DL (ref 74–109)
GLUCOSE URINE: 250 MG/DL
HCT VFR BLD CALC: 32.3 % (ref 42–52)
HEMOGLOBIN: 10.8 G/DL (ref 14–18)
HYALINE CASTS: 2 /HPF (ref 0–8)
KETONES, URINE: NEGATIVE MG/DL
LEUKOCYTE ESTERASE, URINE: ABNORMAL
LYMPHOCYTES ABSOLUTE: 1.3 K/UL (ref 1.1–4.5)
LYMPHOCYTES RELATIVE PERCENT: 25.2 % (ref 20–40)
MCH RBC QN AUTO: 28.3 PG (ref 27–31)
MCHC RBC AUTO-ENTMCNC: 33.4 G/DL (ref 33–37)
MCV RBC AUTO: 84.8 FL (ref 80–94)
MONOCYTES ABSOLUTE: 0.6 K/UL (ref 0–0.9)
MONOCYTES RELATIVE PERCENT: 12.3 % (ref 0–10)
NEUTROPHILS ABSOLUTE: 2.9 K/UL (ref 1.5–7.5)
NEUTROPHILS RELATIVE PERCENT: 55.7 % (ref 50–65)
NITRITE, URINE: NEGATIVE
PDW BLD-RTO: 12.9 % (ref 11.5–14.5)
PH UA: 6.5
PLATELET # BLD: 211 K/UL (ref 130–400)
PMV BLD AUTO: 10.2 FL (ref 9.4–12.4)
POTASSIUM SERPL-SCNC: 4.2 MMOL/L (ref 3.5–5)
PROTEIN UA: NEGATIVE MG/DL
RBC # BLD: 3.81 M/UL (ref 4.7–6.1)
RBC UA: 4 /HPF (ref 0–4)
SODIUM BLD-SCNC: 139 MMOL/L (ref 136–145)
SPECIFIC GRAVITY UA: 1.01
TOTAL PROTEIN: 7.5 G/DL (ref 6.6–8.7)
URINE REFLEX TO CULTURE: YES
UROBILINOGEN, URINE: 0.2 E.U./DL
WBC # BLD: 5.2 K/UL (ref 4.8–10.8)
WBC UA: 8 /HPF (ref 0–5)
YEAST: ABNORMAL /HPF

## 2018-06-26 PROCEDURE — 85025 COMPLETE CBC W/AUTO DIFF WBC: CPT

## 2018-06-26 PROCEDURE — 81001 URINALYSIS AUTO W/SCOPE: CPT

## 2018-06-26 PROCEDURE — 80053 COMPREHEN METABOLIC PANEL: CPT

## 2018-06-26 PROCEDURE — 87086 URINE CULTURE/COLONY COUNT: CPT

## 2018-06-26 PROCEDURE — 93005 ELECTROCARDIOGRAM TRACING: CPT

## 2018-06-26 RX ORDER — CIPROFLOXACIN 2 MG/ML
400 INJECTION, SOLUTION INTRAVENOUS ONCE
Status: CANCELLED | OUTPATIENT
Start: 2018-06-27

## 2018-06-27 ENCOUNTER — ANESTHESIA EVENT (OUTPATIENT)
Dept: OPERATING ROOM | Age: 68
End: 2018-06-27
Payer: MEDICARE

## 2018-06-27 ENCOUNTER — ANESTHESIA (OUTPATIENT)
Dept: OPERATING ROOM | Age: 68
End: 2018-06-27
Payer: MEDICARE

## 2018-06-27 ENCOUNTER — HOSPITAL ENCOUNTER (OUTPATIENT)
Dept: GENERAL RADIOLOGY | Age: 68
Discharge: HOME | End: 2018-06-27
Attending: UROLOGY
Payer: MEDICARE

## 2018-06-27 ENCOUNTER — TELEPHONE (OUTPATIENT)
Dept: UROLOGY | Age: 68
End: 2018-06-27

## 2018-06-27 ENCOUNTER — APPOINTMENT (OUTPATIENT)
Dept: GENERAL RADIOLOGY | Age: 68
End: 2018-06-27
Attending: UROLOGY
Payer: MEDICARE

## 2018-06-27 ENCOUNTER — HOSPITAL ENCOUNTER (OUTPATIENT)
Age: 68
Setting detail: OUTPATIENT SURGERY
Discharge: HOME | End: 2018-06-27
Attending: UROLOGY | Admitting: UROLOGY
Payer: MEDICARE

## 2018-06-27 VITALS
SYSTOLIC BLOOD PRESSURE: 107 MMHG | RESPIRATION RATE: 12 BRPM | OXYGEN SATURATION: 94 % | DIASTOLIC BLOOD PRESSURE: 58 MMHG

## 2018-06-27 VITALS
WEIGHT: 180 LBS | BODY MASS INDEX: 26.66 KG/M2 | HEIGHT: 69 IN | TEMPERATURE: 98.7 F | OXYGEN SATURATION: 97 % | RESPIRATION RATE: 16 BRPM | DIASTOLIC BLOOD PRESSURE: 55 MMHG | SYSTOLIC BLOOD PRESSURE: 135 MMHG | HEART RATE: 77 BPM

## 2018-06-27 DIAGNOSIS — N20.1 LEFT URETERAL STONE: Primary | ICD-10-CM

## 2018-06-27 DIAGNOSIS — G89.18 POST-OP PAIN: ICD-10-CM

## 2018-06-27 DIAGNOSIS — N20.0 KIDNEY STONE ON LEFT SIDE: ICD-10-CM

## 2018-06-27 LAB
GLUCOSE BLD-MCNC: 128 MG/DL (ref 70–99)
PERFORMED ON: ABNORMAL

## 2018-06-27 PROCEDURE — 7100000001 HC PACU RECOVERY - ADDTL 15 MIN: Performed by: UROLOGY

## 2018-06-27 PROCEDURE — 3700000001 HC ADD 15 MINUTES (ANESTHESIA): Performed by: UROLOGY

## 2018-06-27 PROCEDURE — 2500000003 HC RX 250 WO HCPCS: Performed by: NURSE ANESTHETIST, CERTIFIED REGISTERED

## 2018-06-27 PROCEDURE — 7100000010 HC PHASE II RECOVERY - FIRST 15 MIN: Performed by: UROLOGY

## 2018-06-27 PROCEDURE — 6370000000 HC RX 637 (ALT 250 FOR IP): Performed by: UROLOGY

## 2018-06-27 PROCEDURE — 7100000000 HC PACU RECOVERY - FIRST 15 MIN: Performed by: UROLOGY

## 2018-06-27 PROCEDURE — 2720000010 HC SURG SUPPLY STERILE: Performed by: UROLOGY

## 2018-06-27 PROCEDURE — 74420 UROGRAPHY RTRGR +-KUB: CPT

## 2018-06-27 PROCEDURE — C2617 STENT, NON-COR, TEM W/O DEL: HCPCS | Performed by: UROLOGY

## 2018-06-27 PROCEDURE — 3209999900 FLUORO FOR SURGICAL PROCEDURES

## 2018-06-27 PROCEDURE — C1726 CATH, BAL DIL, NON-VASCULAR: HCPCS | Performed by: UROLOGY

## 2018-06-27 PROCEDURE — 3700000000 HC ANESTHESIA ATTENDED CARE: Performed by: UROLOGY

## 2018-06-27 PROCEDURE — 6360000002 HC RX W HCPCS: Performed by: UROLOGY

## 2018-06-27 PROCEDURE — 52356 CYSTO/URETERO W/LITHOTRIPSY: CPT | Performed by: UROLOGY

## 2018-06-27 PROCEDURE — 3600000014 HC SURGERY LEVEL 4 ADDTL 15MIN: Performed by: UROLOGY

## 2018-06-27 PROCEDURE — 99999 PR OFFICE/OUTPT VISIT,PROCEDURE ONLY: CPT | Performed by: UROLOGY

## 2018-06-27 PROCEDURE — C1758 CATHETER, URETERAL: HCPCS | Performed by: UROLOGY

## 2018-06-27 PROCEDURE — 6360000002 HC RX W HCPCS: Performed by: NURSE ANESTHETIST, CERTIFIED REGISTERED

## 2018-06-27 PROCEDURE — 82948 REAGENT STRIP/BLOOD GLUCOSE: CPT

## 2018-06-27 PROCEDURE — C1769 GUIDE WIRE: HCPCS | Performed by: UROLOGY

## 2018-06-27 PROCEDURE — 2580000003 HC RX 258: Performed by: UROLOGY

## 2018-06-27 PROCEDURE — 7100000011 HC PHASE II RECOVERY - ADDTL 15 MIN: Performed by: UROLOGY

## 2018-06-27 PROCEDURE — 3600000004 HC SURGERY LEVEL 4 BASE: Performed by: UROLOGY

## 2018-06-27 PROCEDURE — C1887 CATHETER, GUIDING: HCPCS | Performed by: UROLOGY

## 2018-06-27 DEVICE — STENT URET 6FR L24CM PERCFLX HYDR+ DBL PGTL THRD 2: Type: IMPLANTABLE DEVICE | Site: URETER | Status: FUNCTIONAL

## 2018-06-27 RX ORDER — SODIUM CHLORIDE 0.9 % (FLUSH) 0.9 %
10 SYRINGE (ML) INJECTION PRN
Status: DISCONTINUED | OUTPATIENT
Start: 2018-06-27 | End: 2018-06-27 | Stop reason: HOSPADM

## 2018-06-27 RX ORDER — TAMSULOSIN HYDROCHLORIDE 0.4 MG/1
0.4 CAPSULE ORAL DAILY
Qty: 30 CAPSULE | Refills: 1 | Status: SHIPPED | OUTPATIENT
Start: 2018-06-27 | End: 2018-08-22 | Stop reason: ALTCHOICE

## 2018-06-27 RX ORDER — ONDANSETRON 2 MG/ML
INJECTION INTRAMUSCULAR; INTRAVENOUS PRN
Status: DISCONTINUED | OUTPATIENT
Start: 2018-06-27 | End: 2018-06-27 | Stop reason: SDUPTHER

## 2018-06-27 RX ORDER — MORPHINE SULFATE 1 MG/ML
2 INJECTION, SOLUTION EPIDURAL; INTRATHECAL; INTRAVENOUS EVERY 5 MIN PRN
Status: DISCONTINUED | OUTPATIENT
Start: 2018-06-27 | End: 2018-06-27 | Stop reason: HOSPADM

## 2018-06-27 RX ORDER — OXYCODONE HYDROCHLORIDE 5 MG/1
5 TABLET ORAL EVERY 4 HOURS PRN
Qty: 30 TABLET | Refills: 0 | Status: SHIPPED | OUTPATIENT
Start: 2018-06-27 | End: 2018-07-04

## 2018-06-27 RX ORDER — MORPHINE SULFATE 1 MG/ML
4 INJECTION, SOLUTION EPIDURAL; INTRATHECAL; INTRAVENOUS EVERY 5 MIN PRN
Status: DISCONTINUED | OUTPATIENT
Start: 2018-06-27 | End: 2018-06-27 | Stop reason: HOSPADM

## 2018-06-27 RX ORDER — MIDAZOLAM HYDROCHLORIDE 1 MG/ML
2 INJECTION INTRAMUSCULAR; INTRAVENOUS
Status: DISCONTINUED | OUTPATIENT
Start: 2018-06-27 | End: 2018-06-27 | Stop reason: HOSPADM

## 2018-06-27 RX ORDER — ROCURONIUM BROMIDE 10 MG/ML
INJECTION, SOLUTION INTRAVENOUS PRN
Status: DISCONTINUED | OUTPATIENT
Start: 2018-06-27 | End: 2018-06-27 | Stop reason: SDUPTHER

## 2018-06-27 RX ORDER — HYDROMORPHONE HCL IN 0.9% NACL 0.5 MG/ML
0.5 SYRINGE (ML) INTRAVENOUS EVERY 5 MIN PRN
Status: DISCONTINUED | OUTPATIENT
Start: 2018-06-27 | End: 2018-06-27 | Stop reason: HOSPADM

## 2018-06-27 RX ORDER — LIDOCAINE HYDROCHLORIDE 10 MG/ML
INJECTION, SOLUTION EPIDURAL; INFILTRATION; INTRACAUDAL; PERINEURAL PRN
Status: DISCONTINUED | OUTPATIENT
Start: 2018-06-27 | End: 2018-06-27 | Stop reason: SDUPTHER

## 2018-06-27 RX ORDER — FENTANYL CITRATE 50 UG/ML
50 INJECTION, SOLUTION INTRAMUSCULAR; INTRAVENOUS
Status: DISCONTINUED | OUTPATIENT
Start: 2018-06-27 | End: 2018-06-27 | Stop reason: HOSPADM

## 2018-06-27 RX ORDER — HYDRALAZINE HYDROCHLORIDE 20 MG/ML
5 INJECTION INTRAMUSCULAR; INTRAVENOUS EVERY 10 MIN PRN
Status: DISCONTINUED | OUTPATIENT
Start: 2018-06-27 | End: 2018-06-27 | Stop reason: HOSPADM

## 2018-06-27 RX ORDER — MEPERIDINE HYDROCHLORIDE 50 MG/ML
12.5 INJECTION INTRAMUSCULAR; INTRAVENOUS; SUBCUTANEOUS EVERY 5 MIN PRN
Status: DISCONTINUED | OUTPATIENT
Start: 2018-06-27 | End: 2018-06-27 | Stop reason: HOSPADM

## 2018-06-27 RX ORDER — HYDROMORPHONE HCL IN 0.9% NACL 0.5 MG/ML
1 SYRINGE (ML) INTRAVENOUS
Status: DISCONTINUED | OUTPATIENT
Start: 2018-06-27 | End: 2018-06-27 | Stop reason: HOSPADM

## 2018-06-27 RX ORDER — HYDROMORPHONE HCL IN 0.9% NACL 0.5 MG/ML
0.25 SYRINGE (ML) INTRAVENOUS EVERY 5 MIN PRN
Status: DISCONTINUED | OUTPATIENT
Start: 2018-06-27 | End: 2018-06-27 | Stop reason: HOSPADM

## 2018-06-27 RX ORDER — FENTANYL CITRATE 50 UG/ML
25 INJECTION, SOLUTION INTRAMUSCULAR; INTRAVENOUS
Status: DISCONTINUED | OUTPATIENT
Start: 2018-06-27 | End: 2018-06-27 | Stop reason: HOSPADM

## 2018-06-27 RX ORDER — SODIUM CHLORIDE, SODIUM LACTATE, POTASSIUM CHLORIDE, CALCIUM CHLORIDE 600; 310; 30; 20 MG/100ML; MG/100ML; MG/100ML; MG/100ML
INJECTION, SOLUTION INTRAVENOUS CONTINUOUS
Status: DISCONTINUED | OUTPATIENT
Start: 2018-06-27 | End: 2018-06-27 | Stop reason: HOSPADM

## 2018-06-27 RX ORDER — ATROPA BELLADONNA AND OPIUM 16.2; 6 MG/1; MG/1
SUPPOSITORY RECTAL PRN
Status: DISCONTINUED | OUTPATIENT
Start: 2018-06-27 | End: 2018-06-27 | Stop reason: HOSPADM

## 2018-06-27 RX ORDER — LIDOCAINE HYDROCHLORIDE 10 MG/ML
1 INJECTION, SOLUTION EPIDURAL; INFILTRATION; INTRACAUDAL; PERINEURAL
Status: DISCONTINUED | OUTPATIENT
Start: 2018-06-27 | End: 2018-06-27 | Stop reason: HOSPADM

## 2018-06-27 RX ORDER — PROPOFOL 10 MG/ML
INJECTION, EMULSION INTRAVENOUS PRN
Status: DISCONTINUED | OUTPATIENT
Start: 2018-06-27 | End: 2018-06-27 | Stop reason: SDUPTHER

## 2018-06-27 RX ORDER — SODIUM CHLORIDE 9 MG/ML
INJECTION, SOLUTION INTRAVENOUS CONTINUOUS
Status: DISCONTINUED | OUTPATIENT
Start: 2018-06-27 | End: 2018-06-27 | Stop reason: HOSPADM

## 2018-06-27 RX ORDER — CIPROFLOXACIN 2 MG/ML
400 INJECTION, SOLUTION INTRAVENOUS ONCE
Status: COMPLETED | OUTPATIENT
Start: 2018-06-27 | End: 2018-06-27

## 2018-06-27 RX ORDER — MIDAZOLAM HYDROCHLORIDE 1 MG/ML
INJECTION INTRAMUSCULAR; INTRAVENOUS PRN
Status: DISCONTINUED | OUTPATIENT
Start: 2018-06-27 | End: 2018-06-27 | Stop reason: SDUPTHER

## 2018-06-27 RX ORDER — ENALAPRILAT 2.5 MG/2ML
1.25 INJECTION INTRAVENOUS
Status: DISCONTINUED | OUTPATIENT
Start: 2018-06-27 | End: 2018-06-27 | Stop reason: HOSPADM

## 2018-06-27 RX ORDER — EPHEDRINE SULFATE 50 MG/ML
INJECTION, SOLUTION INTRAVENOUS PRN
Status: DISCONTINUED | OUTPATIENT
Start: 2018-06-27 | End: 2018-06-27 | Stop reason: SDUPTHER

## 2018-06-27 RX ORDER — TAMSULOSIN HYDROCHLORIDE 0.4 MG/1
0.4 CAPSULE ORAL ONCE
Status: COMPLETED | OUTPATIENT
Start: 2018-06-27 | End: 2018-06-27

## 2018-06-27 RX ORDER — DIPHENHYDRAMINE HYDROCHLORIDE 50 MG/ML
12.5 INJECTION INTRAMUSCULAR; INTRAVENOUS
Status: DISCONTINUED | OUTPATIENT
Start: 2018-06-27 | End: 2018-06-27 | Stop reason: HOSPADM

## 2018-06-27 RX ORDER — FENTANYL CITRATE 50 UG/ML
INJECTION, SOLUTION INTRAMUSCULAR; INTRAVENOUS PRN
Status: DISCONTINUED | OUTPATIENT
Start: 2018-06-27 | End: 2018-06-27 | Stop reason: SDUPTHER

## 2018-06-27 RX ORDER — METOCLOPRAMIDE HYDROCHLORIDE 5 MG/ML
10 INJECTION INTRAMUSCULAR; INTRAVENOUS
Status: DISCONTINUED | OUTPATIENT
Start: 2018-06-27 | End: 2018-06-27 | Stop reason: HOSPADM

## 2018-06-27 RX ORDER — ONDANSETRON 2 MG/ML
4 INJECTION INTRAMUSCULAR; INTRAVENOUS EVERY 4 HOURS PRN
Status: DISCONTINUED | OUTPATIENT
Start: 2018-06-27 | End: 2018-06-27 | Stop reason: HOSPADM

## 2018-06-27 RX ORDER — LEVOFLOXACIN 250 MG/1
250 TABLET ORAL DAILY
Qty: 7 TABLET | Refills: 0 | Status: SHIPPED | OUTPATIENT
Start: 2018-06-27 | End: 2018-07-04

## 2018-06-27 RX ORDER — PROMETHAZINE HYDROCHLORIDE 25 MG/ML
6.25 INJECTION, SOLUTION INTRAMUSCULAR; INTRAVENOUS
Status: DISCONTINUED | OUTPATIENT
Start: 2018-06-27 | End: 2018-06-27 | Stop reason: HOSPADM

## 2018-06-27 RX ORDER — SODIUM CHLORIDE 0.9 % (FLUSH) 0.9 %
10 SYRINGE (ML) INJECTION EVERY 12 HOURS SCHEDULED
Status: DISCONTINUED | OUTPATIENT
Start: 2018-06-27 | End: 2018-06-27 | Stop reason: HOSPADM

## 2018-06-27 RX ADMIN — ROCURONIUM BROMIDE 10 MG: 10 INJECTION INTRAVENOUS at 09:17

## 2018-06-27 RX ADMIN — ROCURONIUM BROMIDE 30 MG: 10 INJECTION INTRAVENOUS at 08:14

## 2018-06-27 RX ADMIN — CIPROFLOXACIN 400 MG: 2 INJECTION, SOLUTION INTRAVENOUS at 08:19

## 2018-06-27 RX ADMIN — LIDOCAINE HYDROCHLORIDE 50 MG: 10 INJECTION, SOLUTION EPIDURAL; INFILTRATION; INTRACAUDAL; PERINEURAL at 08:14

## 2018-06-27 RX ADMIN — FENTANYL CITRATE 100 MCG: 50 INJECTION, SOLUTION INTRAMUSCULAR; INTRAVENOUS at 08:13

## 2018-06-27 RX ADMIN — ONDANSETRON HYDROCHLORIDE 4 MG: 2 SOLUTION INTRAMUSCULAR; INTRAVENOUS at 09:01

## 2018-06-27 RX ADMIN — MIDAZOLAM HYDROCHLORIDE 2 MG: 1 INJECTION, SOLUTION INTRAMUSCULAR; INTRAVENOUS at 08:08

## 2018-06-27 RX ADMIN — TAMSULOSIN HYDROCHLORIDE 0.4 MG: 0.4 CAPSULE ORAL at 10:29

## 2018-06-27 RX ADMIN — ROCURONIUM BROMIDE 10 MG: 10 INJECTION INTRAVENOUS at 09:03

## 2018-06-27 RX ADMIN — PROPOFOL 100 MG: 10 INJECTION, EMULSION INTRAVENOUS at 08:14

## 2018-06-27 RX ADMIN — SODIUM CHLORIDE, SODIUM LACTATE, POTASSIUM CHLORIDE, AND CALCIUM CHLORIDE: 600; 310; 30; 20 INJECTION, SOLUTION INTRAVENOUS at 08:40

## 2018-06-27 RX ADMIN — EPHEDRINE SULFATE 10 MG: 50 INJECTION, SOLUTION INTRAMUSCULAR; INTRAVENOUS; SUBCUTANEOUS at 09:03

## 2018-06-27 RX ADMIN — SODIUM CHLORIDE, SODIUM LACTATE, POTASSIUM CHLORIDE, AND CALCIUM CHLORIDE: 600; 310; 30; 20 INJECTION, SOLUTION INTRAVENOUS at 06:49

## 2018-06-27 RX ADMIN — EPHEDRINE SULFATE 20 MG: 50 INJECTION, SOLUTION INTRAMUSCULAR; INTRAVENOUS; SUBCUTANEOUS at 08:29

## 2018-06-27 RX ADMIN — PROPOFOL 30 MG: 10 INJECTION, EMULSION INTRAVENOUS at 09:18

## 2018-06-27 ASSESSMENT — PAIN SCALES - GENERAL
PAINLEVEL_OUTOF10: 0
PAINLEVEL_OUTOF10: 0

## 2018-06-27 ASSESSMENT — ENCOUNTER SYMPTOMS: SHORTNESS OF BREATH: 0

## 2018-06-27 ASSESSMENT — LIFESTYLE VARIABLES: SMOKING_STATUS: 0

## 2018-06-28 LAB
EKG P AXIS: 70 DEGREES
EKG P-R INTERVAL: 152 MS
EKG Q-T INTERVAL: 336 MS
EKG QRS DURATION: 98 MS
EKG QTC CALCULATION (BAZETT): 390 MS
EKG T AXIS: 85 DEGREES
URINE CULTURE, ROUTINE: NORMAL

## 2018-07-12 ENCOUNTER — TELEPHONE (OUTPATIENT)
Dept: UROLOGY | Age: 68
End: 2018-07-12

## 2018-07-13 ENCOUNTER — HOSPITAL ENCOUNTER (OUTPATIENT)
Dept: CT IMAGING | Age: 68
Discharge: HOME | End: 2018-07-13
Payer: MEDICARE

## 2018-07-13 DIAGNOSIS — R16.1 SPLENOMEGALY: ICD-10-CM

## 2018-07-13 LAB
GFR NON-AFRICAN AMERICAN: 43
PERFORMED ON: ABNORMAL
POC CREATININE: 1.6 MG/DL (ref 0.3–1.3)
POC SAMPLE TYPE: ABNORMAL

## 2018-07-13 PROCEDURE — 74177 CT ABD & PELVIS W/CONTRAST: CPT

## 2018-07-13 PROCEDURE — 6360000004 HC RX CONTRAST MEDICATION: Performed by: INTERNAL MEDICINE

## 2018-07-13 PROCEDURE — 82565 ASSAY OF CREATININE: CPT

## 2018-07-13 PROCEDURE — 71260 CT THORAX DX C+: CPT

## 2018-07-13 RX ADMIN — IOPAMIDOL 90 ML: 755 INJECTION, SOLUTION INTRAVENOUS at 08:34

## 2018-07-17 ENCOUNTER — OFFICE VISIT (OUTPATIENT)
Dept: UROLOGY | Age: 68
End: 2018-07-17
Payer: MEDICARE

## 2018-07-17 VITALS
TEMPERATURE: 97.7 F | HEIGHT: 69 IN | WEIGHT: 179 LBS | BODY MASS INDEX: 26.51 KG/M2 | DIASTOLIC BLOOD PRESSURE: 67 MMHG | HEART RATE: 90 BPM | SYSTOLIC BLOOD PRESSURE: 132 MMHG

## 2018-07-17 DIAGNOSIS — N32.89 BLADDER WALL THICKENING: ICD-10-CM

## 2018-07-17 DIAGNOSIS — R31.0 HEMATURIA, GROSS: Primary | ICD-10-CM

## 2018-07-17 LAB
BILIRUBIN, POC: ABNORMAL
BLOOD URINE, POC: ABNORMAL
CLARITY, POC: ABNORMAL
COLOR, POC: ABNORMAL
GLUCOSE URINE, POC: ABNORMAL
KETONES, POC: ABNORMAL
LEUKOCYTE EST, POC: ABNORMAL
NITRITE, POC: POSITIVE
PH, POC: 6
PROTEIN, POC: 100
SPECIFIC GRAVITY, POC: 1.01
UROBILINOGEN, POC: 0.2

## 2018-07-17 PROCEDURE — 4040F PNEUMOC VAC/ADMIN/RCVD: CPT | Performed by: NURSE PRACTITIONER

## 2018-07-17 PROCEDURE — 99213 OFFICE O/P EST LOW 20 MIN: CPT | Performed by: NURSE PRACTITIONER

## 2018-07-17 PROCEDURE — 1036F TOBACCO NON-USER: CPT | Performed by: NURSE PRACTITIONER

## 2018-07-17 PROCEDURE — 1123F ACP DISCUSS/DSCN MKR DOCD: CPT | Performed by: NURSE PRACTITIONER

## 2018-07-17 PROCEDURE — G8417 CALC BMI ABV UP PARAM F/U: HCPCS | Performed by: NURSE PRACTITIONER

## 2018-07-17 PROCEDURE — 81003 URINALYSIS AUTO W/O SCOPE: CPT | Performed by: NURSE PRACTITIONER

## 2018-07-17 PROCEDURE — 1101F PT FALLS ASSESS-DOCD LE1/YR: CPT | Performed by: NURSE PRACTITIONER

## 2018-07-17 PROCEDURE — G8427 DOCREV CUR MEDS BY ELIG CLIN: HCPCS | Performed by: NURSE PRACTITIONER

## 2018-07-17 PROCEDURE — 3017F COLORECTAL CA SCREEN DOC REV: CPT | Performed by: NURSE PRACTITIONER

## 2018-07-17 ASSESSMENT — ENCOUNTER SYMPTOMS
BACK PAIN: 0
SHORTNESS OF BREATH: 0
SINUS PAIN: 0
BLURRED VISION: 0
ABDOMINAL PAIN: 0
VOMITING: 0
EYE PAIN: 0
WHEEZING: 0

## 2018-07-17 NOTE — PROGRESS NOTES
Jerardo Reed is a 75 y.o. male who presents today   Chief Complaint   Patient presents with    Follow-up     has sx on 06/27  still having bleeding since sx      Patient presents with hematuria post Left ureteroscopy, laser lithotripsy and placement of a 6-Icelandic x 24 cm left double-J ureteral stent. Patient is currently on Xarelto for treatment of atrial fibrillation. He has noted bright red blood, clots, and dark brown urine. He does note slight pain to the left flank with the beginning of urination, this pain is mild and last only around one second. He is having no difficulty voiding, there is no sensation of poor bladder emptying. He did stop his Xarelto for 5 days last week and noted improvement in the urine.      Past Medical History:   Diagnosis Date    Acute pancreatitis     Anemia     Asthma     Atrial fibrillation (HCC)     h/o paroxysmal a-fib ? anesthsia induced    Benign colonic polyp     cscope 12/07 Dr Fredda Brittle adenomatous: 12/14 adenoma    Chest pain     Chronic pain syndrome     Depression     Diabetic peripheral neuropathy (HCC)     Extrinsic asthma     Hyperlipidemia     Hypertension     Idiopathic peripheral neuropathy     Lyme disease     Mediastinal lymphadenopathy     Microalbuminuria     Mixed hyperlipidemia     Paroxysmal a-fib (HCC)     S/P ablation of atrial fibrillation     4/16 A fib ablation , Dr Carlitos Falk, [facility]     Sarcoidosis, lung ([facility] [address]     restrictive lung impairment    Tick bite     Type 2 diabetes mellitus with peripheral neuropathy ([facility] [address]     Type 2 diabetes, controlled, with neuropathy [facility])        Past Surgical History:   Procedure Laterality Date    ANKLE FRACTURE SURGERY  april 14, 2015    ATRIAL ABLATION SURGERY  2015    Dr. Ash Fisher      Catheter Ablation A-fib    CHOLECYSTECTOMY      COLONOSCOPY  12/02/2014    Melecio VALDEZ CYSTOSCOPY,MANIPULATN Left 6/27/2018    LASER LITHOTRIPSY STONE MANIPULATION WITH DOUBLE J Psychiatric: He has a normal mood and affect. His behavior is normal. Judgment normal.   Nursing note and vitals reviewed. DATA:  CBC with Differential:    Lab Results   Component Value Date    WBC 5.2 06/26/2018    RBC 3.81 06/26/2018    HGB 10.8 06/26/2018    HCT 32.3 06/26/2018     06/26/2018    MCV 84.8 06/26/2018    MCH 28.3 06/26/2018    MCHC 33.4 06/26/2018    RDW 12.9 06/26/2018    LYMPHOPCT 25.2 06/26/2018    MONOPCT 12.3 06/26/2018    BASOPCT 0.6 06/26/2018    MONOSABS 0.60 06/26/2018    LYMPHSABS 1.3 06/26/2018    EOSABS 0.30 06/26/2018    BASOSABS 0.00 06/26/2018     CMP:    Lab Results   Component Value Date     06/26/2018    K 4.2 06/26/2018    CL 95 06/26/2018    CO2 25 06/26/2018    BUN 15 06/26/2018    CREATININE 1.6 07/13/2018    CREATININE 1.6 06/26/2018    LABGLOM 43 07/13/2018    GLUCOSE 182 06/26/2018    PROT 7.5 06/26/2018    LABALBU 3.9 06/26/2018    CALCIUM 11.8 06/26/2018    BILITOT 0.6 06/26/2018    ALKPHOS 85 06/26/2018    AST 12 06/26/2018    ALT 8 06/26/2018       Imaging:  CT abd pelvis with contrast 7/13/18  1. Redemonstrated pulmonary sarcoidosis. 2. Chronic mild splenomegaly with tiny 10 mm less nodules. This may be   also related to history of sarcoidosis. 3. Retroperitoneal, pelvic, and inguinal adenopathy. An underlying   lymphoproliferative disorder is possible although this may be a   systemic manifestation of known sarcoidosis. No prior imaging for   comparison. A right inguinal node may be accessible for biopsy. 4. Asymmetric wall thickening of the right lateral urinary bladder. Correlate with cystoscopy. 1. Hematuria, gross  Discussed with patient that hematuria is a common finding while ureteral stent is in place. Patient is to keep appointment for cystoscopy and removal of stent in six days on 7/23/18 with Dr. Charlotte Taylor. Urine shows no evidence of infection.  - POCT Urinalysis No Micro (Auto)     2.  Bladder wall thickening  Right sided,

## 2018-07-23 ENCOUNTER — PROCEDURE VISIT (OUTPATIENT)
Dept: UROLOGY | Age: 68
End: 2018-07-23
Payer: MEDICARE

## 2018-07-23 DIAGNOSIS — N20.1 LEFT URETERAL STONE: Primary | ICD-10-CM

## 2018-07-23 LAB
BACTERIA URINE, POC: ABNORMAL
BILIRUBIN URINE: 2 MG/DL
BLOOD, URINE: POSITIVE
CASTS URINE, POC: ABNORMAL
CLARITY: ABNORMAL
COLOR: ABNORMAL
CRYSTALS URINE, POC: ABNORMAL
EPI CELLS URINE, POC: ABNORMAL
GLUCOSE URINE: 100
KETONES, URINE: POSITIVE
LEUKOCYTE EST, POC: ABNORMAL
NITRITE, URINE: NEGATIVE
PH UA: 5.5 (ref 4.5–8)
PROTEIN UA: POSITIVE
RBC URINE, POC: ABNORMAL
SPECIFIC GRAVITY UA: 1.02 (ref 1–1.03)
UROBILINOGEN, URINE: 4
WBC URINE, POC: ABNORMAL
YEAST URINE, POC: ABNORMAL

## 2018-07-23 PROCEDURE — 52310 CYSTOSCOPY AND TREATMENT: CPT | Performed by: UROLOGY

## 2018-07-23 PROCEDURE — 99999 PR OFFICE/OUTPT VISIT,PROCEDURE ONLY: CPT | Performed by: UROLOGY

## 2018-07-23 PROCEDURE — 81001 URINALYSIS AUTO W/SCOPE: CPT | Performed by: UROLOGY

## 2018-07-23 NOTE — LETTER
CONSENT TO OPERATION  AND/OR OTHER PROCEDURE(S)          PATIENT :  Nickie Jain            YOB: 1950      DATE :  7/23/18        1. I request and consent that Dr. Dixon Dawson, and/or his associates or assistants perform an operation and/or procedures on the above patient at  [facility], to treat the condition(s) which appear indicated by the diagnostic studies already performed. I have been told that in general terms the nature, purpose and reasonable expectations of the operation and/or procedure(s) are:     2. It has been explained to me by the informing physician that during the course of the operation and/or procedure(s) unforeseen conditions may be revealed that necessitate an extension of the original operation and/or procedure(s) or different operation and/or procedures than those set forth in Paragraph 1. I therefore authorize and request that my physician and/or his associates or assistants perform such operations and/or procedures as are necessary and desirable in the exercise of professional judgment. The authority granted under this Paragraph 2 shall extend to all conditions that require treatment and are known to my physician at the time the operation is commenced. 3. I have been made aware by the informing physician of certain risks and consequences that are associated with the operation and/or procedure(s) described in Paragraph 1, the reasonable alternative methods or treatment, the possible consequences, the possibility that the operation and/or procedure(s) may be unsuccessful and the possibility of complications. I understand the reasonably known risks to be:      ? Bleeding  ? Infection  ? Poor Healing  ? Possible Damage to Nerve, Vessel, Tendon/Muscle or Bone  ? Need for further Treatment/Surgery  ? Stiffness  ? Pain  ? Residual or Recurrent Symptoms  ? Anesthetic and/or Medical Risks          4.  I have also been informed by the informing physician that there are

## 2018-08-01 ENCOUNTER — TELEPHONE (OUTPATIENT)
Dept: CARDIOLOGY | Age: 68
End: 2018-08-01

## 2018-08-02 ENCOUNTER — HOSPITAL ENCOUNTER (OUTPATIENT)
Dept: GENERAL RADIOLOGY | Facility: HOSPITAL | Age: 68
Discharge: HOME | End: 2018-08-02
Admitting: SPECIALIST

## 2018-08-02 ENCOUNTER — APPOINTMENT (OUTPATIENT)
Dept: PREADMISSION TESTING | Facility: HOSPITAL | Age: 68
End: 2018-08-02

## 2018-08-02 VITALS
OXYGEN SATURATION: 97 % | HEART RATE: 85 BPM | HEIGHT: 68 IN | RESPIRATION RATE: 18 BRPM | SYSTOLIC BLOOD PRESSURE: 159 MMHG | WEIGHT: 181 LBS | DIASTOLIC BLOOD PRESSURE: 71 MMHG | BODY MASS INDEX: 27.43 KG/M2

## 2018-08-02 LAB
ALBUMIN SERPL-MCNC: 4.1 G/DL (ref 3.5–5)
ALBUMIN/GLOB SERPL: 1.4 G/DL (ref 1.1–2.5)
ALP SERPL-CCNC: 76 U/L (ref 24–120)
ALT SERPL W P-5'-P-CCNC: <15 U/L (ref 0–54)
ANION GAP SERPL CALCULATED.3IONS-SCNC: 12 MMOL/L (ref 4–13)
AST SERPL-CCNC: 24 U/L (ref 7–45)
BASOPHILS # BLD AUTO: 0.02 10*3/MM3 (ref 0–0.2)
BASOPHILS NFR BLD AUTO: 0.6 % (ref 0–2)
BILIRUB SERPL-MCNC: 0.6 MG/DL (ref 0.1–1)
BUN BLD-MCNC: 12 MG/DL (ref 5–21)
BUN/CREAT SERPL: 11 (ref 7–25)
CALCIUM SPEC-SCNC: 10.7 MG/DL (ref 8.4–10.4)
CHLORIDE SERPL-SCNC: 101 MMOL/L (ref 98–110)
CO2 SERPL-SCNC: 28 MMOL/L (ref 24–31)
CREAT BLD-MCNC: 1.09 MG/DL (ref 0.5–1.4)
DEPRECATED RDW RBC AUTO: 42.5 FL (ref 40–54)
EOSINOPHIL # BLD AUTO: 0.22 10*3/MM3 (ref 0–0.7)
EOSINOPHIL NFR BLD AUTO: 6.4 % (ref 0–4)
ERYTHROCYTE [DISTWIDTH] IN BLOOD BY AUTOMATED COUNT: 14.4 % (ref 12–15)
GFR SERPL CREATININE-BSD FRML MDRD: 67 ML/MIN/1.73
GLOBULIN UR ELPH-MCNC: 2.9 GM/DL
GLUCOSE BLD-MCNC: 179 MG/DL (ref 70–100)
HCT VFR BLD AUTO: 29.8 % (ref 40–52)
HGB BLD-MCNC: 10.1 G/DL (ref 14–18)
IMM GRANULOCYTES # BLD: 0.03 10*3/MM3 (ref 0–0.03)
IMM GRANULOCYTES NFR BLD: 0.9 % (ref 0–5)
LYMPHOCYTES # BLD AUTO: 1.01 10*3/MM3 (ref 0.72–4.86)
LYMPHOCYTES NFR BLD AUTO: 29.5 % (ref 15–45)
MCH RBC QN AUTO: 28.1 PG (ref 28–32)
MCHC RBC AUTO-ENTMCNC: 33.9 G/DL (ref 33–36)
MCV RBC AUTO: 82.8 FL (ref 82–95)
MONOCYTES # BLD AUTO: 0.38 10*3/MM3 (ref 0.19–1.3)
MONOCYTES NFR BLD AUTO: 11.1 % (ref 4–12)
NEUTROPHILS # BLD AUTO: 1.76 10*3/MM3 (ref 1.87–8.4)
NEUTROPHILS NFR BLD AUTO: 51.5 % (ref 39–78)
NRBC BLD MANUAL-RTO: 0 /100 WBC (ref 0–0)
PLATELET # BLD AUTO: 145 10*3/MM3 (ref 130–400)
PMV BLD AUTO: 9.9 FL (ref 6–12)
POTASSIUM BLD-SCNC: 4.5 MMOL/L (ref 3.5–5.3)
PROT SERPL-MCNC: 7 G/DL (ref 6.3–8.7)
RBC # BLD AUTO: 3.6 10*6/MM3 (ref 4.8–5.9)
SODIUM BLD-SCNC: 141 MMOL/L (ref 135–145)
WBC NRBC COR # BLD: 3.42 10*3/MM3 (ref 4.8–10.8)

## 2018-08-02 PROCEDURE — 93005 ELECTROCARDIOGRAM TRACING: CPT

## 2018-08-02 PROCEDURE — 71046 X-RAY EXAM CHEST 2 VIEWS: CPT

## 2018-08-02 PROCEDURE — 85025 COMPLETE CBC W/AUTO DIFF WBC: CPT | Performed by: SPECIALIST

## 2018-08-02 PROCEDURE — 80053 COMPREHEN METABOLIC PANEL: CPT | Performed by: SPECIALIST

## 2018-08-02 PROCEDURE — 36415 COLL VENOUS BLD VENIPUNCTURE: CPT

## 2018-08-02 PROCEDURE — 93010 ELECTROCARDIOGRAM REPORT: CPT | Performed by: INTERNAL MEDICINE

## 2018-08-02 RX ORDER — AMLODIPINE BESYLATE 10 MG/1
10 TABLET ORAL DAILY
COMMUNITY
End: 2019-04-02

## 2018-08-02 RX ORDER — ASPIRIN 81 MG/1
81 TABLET ORAL DAILY
COMMUNITY
End: 2019-04-02

## 2018-08-02 RX ORDER — LOSARTAN POTASSIUM 50 MG/1
100 TABLET ORAL DAILY
COMMUNITY
End: 2019-04-02

## 2018-08-02 NOTE — DISCHARGE INSTRUCTIONS
DAY OF SURGERY INSTRUCTIONS        YOUR SURGEON: JAMEY SHERONSARAH    PROCEDURE: RIGHT GROIN EXCISIONAL BIOPSY    DATE OF SURGERY: AUGUST 7TH 2018    ARRIVAL TIME: AS DIRECTED BY OFFICE    DAY OF SURGERY TAKE ONLY THESE MEDICATIONS UNLESS OTHERWISE INSTRUCTED BY YOUR PHYSICIAN: AMLODIPINE AND METOPROLOL        MANAGING PAIN AFTER SURGERY    We know you are probably wondering what your pain will be like after surgery.  Following surgery it is unrealistic to expect you will not have pain.   Pain is how our bodies let us know that something is wrong or cautions us to be careful.  That said, our goal is to make your pain tolerable.    Methods we may use to treat your pain include (oral or IV medications, PCAs, epidurals, nerve blocks, etc.)   While some procedures require IV pain medications for a short time after surgery, transitioning to pain medications by mouth allows for better management of pain.   Your nurse will encourage you to take oral pain medications whenever possible.  IV medications work almost immediately, but only last a short while.  Taking medications by mouth allows for a more constant level of medication in your blood stream for a longer period of time.      Once your pain is out of control it is harder to get back under control.  It is important you are aware when your next dose of pain medication is due.  If you are admitted, your nurse may write the time of your next dose on the white board in your room to help you remember.      We are interested in your pain and encourage you to inform us about aggravating factors during your visit.   Many times a simple repositioning every few hours can make a big difference.    If your physician says it is okay, do not let your pain prevent you from getting out of bed. Be sure to call your nurse for assistance prior to getting up so you do not fall.      Before surgery, please decide your tolerable pain goal.  These faces help describe the pain ratings we use  on a 0-10 scale.   Be prepared to tell us your goal and whether or not you take pain or anxiety medications at home.          BEFORE YOU COME TO THE HOSPITAL  (Pre-op instructions)  • Do not eat, drink, smoke or chew gum after midnight the night before surgery.  This also includes no mints.  • Morning of surgery take only the medicines you have been instructed with a sip of water unless otherwise instructed  by your physician.  • Do not shave, wear makeup or dark nail polish.  • Remove all jewelry including rings.  • Leave anything you consider valuable at home.  • Leave your suitcase in the car until after your surgery.  • Bring the following with you if applicable:  o Picture ID and insurance, Medicare or Medicaid cards  o Co-pay/deductible required by insurance (cash, check, credit card)  o Copy of advance directive, living will or power-of- documents if not brought to PAT  o CPAP or BIPAP mask and tubing  o Relaxation aids (MP3 player, book, magazine)  • On the day of surgery check in at registration located at the main entrance of the hospital.       Outpatient Surgery Guidelines, Adult  Outpatient procedures are those for which the person having the procedure is allowed to go home the same day as the procedure. Various procedures are done on an outpatient basis. You should follow some general guidelines if you will be having an outpatient procedure.  LET YOUR HEALTH CARE PROVIDER KNOW ABOUT:  · Any allergies you have.  · All medicines you are taking, including vitamins, herbs, eye drops, creams, and over-the-counter medicines.  · Previous problems you or members of your family have had with the use of anesthetics.  · Any blood disorders you have.  · Previous surgeries you have had.  · Medical conditions you have.  RISKS AND COMPLICATIONS  Your health care provider will discuss possible risks and complications with you before surgery. Common risks and complications include:    · Problems due to the use  of anesthetics.  · Blood loss and replacement (does not apply to minor surgical procedures).  · Temporary increase in pain due to surgery.  · Uncorrected pain or problems that the surgery was meant to correct.  · Infection.  · New damage.  BEFORE THE PROCEDURE  · Ask your health care provider about changing or stopping your regular medicines. You may need to stop taking certain medicines in the days or weeks before the procedure.  · Stop smoking at least 2 weeks before surgery. This lowers your risk for complications during and after surgery. Ask your health care provider for help with this if needed.  · Eat your usual meals and a light supper the day before surgery. Continue fluid intake. Do not drink alcohol.  · Do not eat or drink after midnight the night before your surgery.   · Arrange for someone to take you home and to stay with you for 24 hours after the procedure. Medicine given for your procedure may affect your ability to drive or to care for yourself.  · Call your health care provider's office if you develop an illness or problem that may prevent you from safely having your procedure.  AFTER THE PROCEDURE  After surgery, you will be taken to a recovery area, where your progress will be monitored. If there are no complications, you will be allowed to go home when you are awake, stable, and taking fluids well. You may have numbness around the surgical site. Healing will take some time. You will have tenderness at the surgical site and may have some swelling and bruising. You may also have some nausea.  HOME CARE INSTRUCTIONS  · Do not drive for 24 hours, or as directed by your health care provider. Do not drive while taking prescription pain medicines.  · Do not drink alcohol for 24 hours.  · Do not make important decisions or sign legal documents for 24 hours.  · You may resume a normal diet and activities as directed.  · Do not lift anything heavier than 10 pounds (4.5 kg) or play contact sports until  your health care provider says it is okay.  · Change your bandages (dressings) as directed.  · Only take over-the-counter or prescription medicines as directed by your health care provider.  · Follow up with your health care provider as directed.  SEEK MEDICAL CARE IF:  · You have increased bleeding (more than a small spot) from the surgical site.  · You have redness, swelling, or increasing pain in the wound.  · You see pus coming from the wound.  · You have a fever.  · You notice a bad smell coming from the wound or dressing.  · You feel lightheaded or faint.  · You develop a rash.  · You have trouble breathing.  · You develop allergies.  MAKE SURE YOU:  · Understand these instructions.  · Will watch your condition.  · Will get help right away if you are not doing well or get worse.     This information is not intended to replace advice given to you by your health care provider. Make sure you discuss any questions you have with your health care provider.     Document Released: 09/12/2002 Document Revised: 05/03/2016 Document Reviewed: 05/22/2014  [employer] Interactive Patient Education ©2016 [employer]       Fall Prevention in Hospitals, Adult  As a hospital patient, your condition and the treatments you receive can increase your risk for falls. Some additional risk factors for falls in a hospital include:  · Being in an unfamiliar environment.  · Being on bed rest.  · Your surgery.  · Taking certain medicines.  · Your tubing requirements, such as intravenous (IV) therapy or catheters.  It is important that you learn how to decrease fall risks while at the hospital. Below are important tips that can help prevent falls.  SAFETY TIPS FOR PREVENTING FALLS  Talk about your risk of falling.  · Ask your health care provider why you are at risk for falling. Is it your medicine, illness, tubing placement, or something else?  · Make a plan with your health care provider to keep you safe from falls.  · Ask your health  care provider or pharmacist about side effects of your medicines. Some medicines can make you dizzy or affect your coordination.  Ask for help.  · Ask for help before getting out of bed. You may need to press your call button.  · Ask for assistance in getting safely to the toilet.  · Ask for a walker or cane to be put at your bedside. Ask that most of the side rails on your bed be placed up before your health care provider leaves the room.  · Ask family or friends to sit with you.  · Ask for things that are out of your reach, such as your glasses, hearing aids, telephone, bedside table, or call button.  Follow these tips to avoid falling:  · Stay lying or seated, rather than standing, while waiting for help.  · Wear rubber-soled slippers or shoes whenever you walk in the hospital.  · Avoid quick, sudden movements.  ¨ Change positions slowly.  ¨ Sit on the side of your bed before standing.  ¨ Stand up slowly and wait before you start to walk.  · Let your health care provider know if there is a spill on the floor.  · Pay careful attention to the medical equipment, electrical cords, and tubes around you.  · When you need help, use your call button by your bed or in the bathroom. Wait for one of your health care providers to help you.  · If you feel dizzy or unsure of your footing, return to bed and wait for assistance.  · Avoid being distracted by the TV, telephone, or another person in your room.  · Do not lean or support yourself on rolling objects, such as IV poles or bedside tables.     This information is not intended to replace advice given to you by your health care provider. Make sure you discuss any questions you have with your health care provider.     Document Released: 12/15/2001 Document Revised: 01/08/2016 Document Reviewed: 08/25/2013  [employer] Interactive Patient Education ©2016 [employer]       Surgical Site Infections FAQs  What is a Surgical Site Infection (SSI)?  A surgical site infection is an  infection that occurs after surgery in the part of the body where the surgery took place. Most patients who have surgery do not develop an infection. However, infections develop in about 1 to 3 out of every 100 patients who have surgery.  Some of the common symptoms of a surgical site infection are:  · Redness and pain around the area where you had surgery  · Drainage of cloudy fluid from your surgical wound  · Fever  Can SSIs be treated?  Yes. Most surgical site infections can be treated with antibiotics. The antibiotic given to you depends on the bacteria (germs) causing the infection. Sometimes patients with SSIs also need another surgery to treat the infection.  What are some of the things that hospitals are doing to prevent SSIs?  To prevent SSIs, doctors, nurses, and other healthcare providers:  · Clean their hands and arms up to their elbows with an antiseptic agent just before the surgery.  · Clean their hands with soap and water or an alcohol-based hand rub before and after caring for each patient.  · May remove some of your hair immediately before your surgery using electric clippers if the hair is in the same area where the procedure will occur. They should not shave you with a razor.  · Wear special hair covers, masks, gowns, and gloves during surgery to keep the surgery area clean.  · Give you antibiotics before your surgery starts. In most cases, you should get antibiotics within 60 minutes before the surgery starts and the antibiotics should be stopped within 24 hours after surgery.  · Clean the skin at the site of your surgery with a special soap that kills germs.  What can I do to help prevent SSIs?  Before your surgery:  · Tell your doctor about other medical problems you may have. Health problems such as allergies, diabetes, and obesity could affect your surgery and your treatment.  · Quit smoking. Patients who smoke get more infections. Talk to your doctor about how you can quit before your  surgery.  · Do not shave near where you will have surgery. Shaving with a razor can irritate your skin and make it easier to develop an infection.  At the time of your surgery:  · Speak up if someone tries to shave you with a razor before surgery. Ask why you need to be shaved and talk with your surgeon if you have any concerns.  · Ask if you will get antibiotics before surgery.  After your surgery:  · Make sure that your healthcare providers clean their hands before examining you, either with soap and water or an alcohol-based hand rub.  · If you do not see your providers clean their hands, please ask them to do so.  · Family and friends who visit you should not touch the surgical wound or dressings.  · Family and friends should clean their hands with soap and water or an alcohol-based hand rub before and after visiting you. If you do not see them clean their hands, ask them to clean their hands.  What do I need to do when I go home from the hospital?  · Before you go home, your doctor or nurse should explain everything you need to know about taking care of your wound. Make sure you understand how to care for your wound before you leave the hospital.  · Always clean your hands before and after caring for your wound.  · Before you go home, make sure you know who to contact if you have questions or problems after you get home.  · If you have any symptoms of an infection, such as redness and pain at the surgery site, drainage, or fever, call your doctor immediately.  If you have additional questions, please ask your doctor or nurse.  Developed and co-sponsored by The Society for Healthcare Epidemiology of [place] (SHEA); Infectious Diseases Society of [place] (IDSA); American Hospital Association; Association for Professionals in Infection Control and Epidemiology (APIC); Centers for Disease Control and Prevention (CDC); and The Joint Commission.     This information is not intended to replace advice given to you by  your health care provider. Make sure you discuss any questions you have with your health care provider.     Document Released: 12/23/2014 Document Revised: 01/08/2016 Document Reviewed: 03/02/2016  [employer] Interactive Patient Education ©2016 [employer]       [facility]  CHG 4% Patient Instruction Sheet    Preparing the Skin Before Surgery  Preparing or “prepping” skin before surgery can reduce the risk of infection at the surgical site. To make the process easier,[facility] has chosen 4% Chlorhexidine Gluconate (CHG) antiseptic solution.   The steps below outline the prepping process and should be carefully followed.                                                                                                                                                      Prep the skin at the following time(s):                                                      We recommend you shower the night before surgery, and again the morning of surgery with the 4% CHG antiseptic solution using half of the bottle and a cloth each time.  Dress in clean clothes/sleepwear after showering.  See instructions below for application.          Do not apply any lotions or moisturizers.       Do not shave the area to be prepped for at least 2 days prior to surgery.    Clipping the hair may be done immediately prior to your surgery at the hospital    if needed.    Directions:  Thoroughly rinse your body with water.  Apply 4% CHG to a cloth and wash skin gently, paying special attention to the operative site.  Rinse again thoroughly.  Once you have begun using this product do not apply anything else to your skin. If itching or redness persists, rinse affected areas and discontinue use.    When using this product:  • Keep out of eyes, ears, and mouth.  • If solution should contact these areas, rinse out promptly and thoroughly with water.  • For external use only.  • Do not use in genital area, on your face or  head.      PATIENT/FAMILY/RESPONSIBLE PARTY VERBALIZES UNDERSTANDING OF ABOVE EDUCATION.  COPY OF PAIN SCALE GIVEN AND REVIEWED WITH VERBALIZED UNDERSTANDING.

## 2018-08-04 DIAGNOSIS — E11.40 TYPE 2 DIABETES, CONTROLLED, WITH NEUROPATHY (HCC): ICD-10-CM

## 2018-08-06 RX ORDER — AMLODIPINE BESYLATE 10 MG/1
TABLET ORAL
Qty: 30 TABLET | Refills: 0 | Status: SHIPPED | OUTPATIENT
Start: 2018-08-06 | End: 2018-08-22 | Stop reason: ALTCHOICE

## 2018-08-07 ENCOUNTER — ANESTHESIA EVENT (OUTPATIENT)
Dept: PERIOP | Facility: HOSPITAL | Age: 68
End: 2018-08-07

## 2018-08-07 ENCOUNTER — HOSPITAL ENCOUNTER (OUTPATIENT)
Facility: HOSPITAL | Age: 68
Setting detail: HOSPITAL OUTPATIENT SURGERY
Discharge: HOME | End: 2018-08-07
Attending: SPECIALIST | Admitting: SPECIALIST

## 2018-08-07 ENCOUNTER — ANESTHESIA (OUTPATIENT)
Dept: PERIOP | Facility: HOSPITAL | Age: 68
End: 2018-08-07

## 2018-08-07 VITALS
TEMPERATURE: 99.8 F | RESPIRATION RATE: 16 BRPM | DIASTOLIC BLOOD PRESSURE: 53 MMHG | SYSTOLIC BLOOD PRESSURE: 134 MMHG | HEART RATE: 79 BPM | OXYGEN SATURATION: 94 %

## 2018-08-07 DIAGNOSIS — R59.9 LYMPH NODE ENLARGEMENT: ICD-10-CM

## 2018-08-07 LAB
GLUCOSE BLDC GLUCOMTR-MCNC: 106 MG/DL (ref 70–130)
GLUCOSE BLDC GLUCOMTR-MCNC: 140 MG/DL (ref 70–130)

## 2018-08-07 PROCEDURE — 25010000002 ONDANSETRON PER 1 MG: Performed by: NURSE ANESTHETIST, CERTIFIED REGISTERED

## 2018-08-07 PROCEDURE — 87116 MYCOBACTERIA CULTURE: CPT | Performed by: SPECIALIST

## 2018-08-07 PROCEDURE — 88305 TISSUE EXAM BY PATHOLOGIST: CPT | Performed by: SPECIALIST

## 2018-08-07 PROCEDURE — 88185 FLOWCYTOMETRY/TC ADD-ON: CPT | Performed by: SPECIALIST

## 2018-08-07 PROCEDURE — 25010000002 MIDAZOLAM PER 1 MG: Performed by: NURSE ANESTHETIST, CERTIFIED REGISTERED

## 2018-08-07 PROCEDURE — 88184 FLOWCYTOMETRY/ TC 1 MARKER: CPT | Performed by: SPECIALIST

## 2018-08-07 PROCEDURE — 82962 GLUCOSE BLOOD TEST: CPT

## 2018-08-07 PROCEDURE — 88331 PATH CONSLTJ SURG 1 BLK 1SPC: CPT | Performed by: SPECIALIST

## 2018-08-07 PROCEDURE — 88334 PATH CONSLTJ SURG CYTO XM EA: CPT | Performed by: SPECIALIST

## 2018-08-07 PROCEDURE — 87102 FUNGUS ISOLATION CULTURE: CPT | Performed by: SPECIALIST

## 2018-08-07 PROCEDURE — 25010000002 FENTANYL CITRATE (PF) 100 MCG/2ML SOLUTION: Performed by: NURSE ANESTHETIST, CERTIFIED REGISTERED

## 2018-08-07 PROCEDURE — 25010000002 PROPOFOL 10 MG/ML EMULSION: Performed by: NURSE ANESTHETIST, CERTIFIED REGISTERED

## 2018-08-07 PROCEDURE — 87206 SMEAR FLUORESCENT/ACID STAI: CPT | Performed by: SPECIALIST

## 2018-08-07 PROCEDURE — 25010000002 VANCOMYCIN PER 500 MG: Performed by: SPECIALIST

## 2018-08-07 PROCEDURE — 88312 SPECIAL STAINS GROUP 1: CPT | Performed by: SPECIALIST

## 2018-08-07 RX ORDER — MAGNESIUM HYDROXIDE 1200 MG/15ML
LIQUID ORAL AS NEEDED
Status: DISCONTINUED | OUTPATIENT
Start: 2018-08-07 | End: 2018-08-07 | Stop reason: HOSPADM

## 2018-08-07 RX ORDER — LIDOCAINE HYDROCHLORIDE 20 MG/ML
INJECTION, SOLUTION INFILTRATION; PERINEURAL AS NEEDED
Status: DISCONTINUED | OUTPATIENT
Start: 2018-08-07 | End: 2018-08-07 | Stop reason: SURG

## 2018-08-07 RX ORDER — PROPOFOL 10 MG/ML
VIAL (ML) INTRAVENOUS AS NEEDED
Status: DISCONTINUED | OUTPATIENT
Start: 2018-08-07 | End: 2018-08-07 | Stop reason: SURG

## 2018-08-07 RX ORDER — SODIUM CHLORIDE 0.9 % (FLUSH) 0.9 %
1-10 SYRINGE (ML) INJECTION AS NEEDED
Status: DISCONTINUED | OUTPATIENT
Start: 2018-08-07 | End: 2018-08-07 | Stop reason: HOSPADM

## 2018-08-07 RX ORDER — SODIUM CHLORIDE, SODIUM LACTATE, POTASSIUM CHLORIDE, CALCIUM CHLORIDE 600; 310; 30; 20 MG/100ML; MG/100ML; MG/100ML; MG/100ML
100 INJECTION, SOLUTION INTRAVENOUS CONTINUOUS
Status: DISCONTINUED | OUTPATIENT
Start: 2018-08-07 | End: 2018-08-07 | Stop reason: HOSPADM

## 2018-08-07 RX ORDER — PHENYLEPHRINE HCL IN 0.9% NACL 0.8MG/10ML
SYRINGE (ML) INTRAVENOUS AS NEEDED
Status: DISCONTINUED | OUTPATIENT
Start: 2018-08-07 | End: 2018-08-07 | Stop reason: SURG

## 2018-08-07 RX ORDER — SODIUM CHLORIDE 0.9 % (FLUSH) 0.9 %
3 SYRINGE (ML) INJECTION AS NEEDED
Status: DISCONTINUED | OUTPATIENT
Start: 2018-08-07 | End: 2018-08-07 | Stop reason: HOSPADM

## 2018-08-07 RX ORDER — OXYCODONE AND ACETAMINOPHEN 10; 325 MG/1; MG/1
1 TABLET ORAL ONCE AS NEEDED
Status: DISCONTINUED | OUTPATIENT
Start: 2018-08-07 | End: 2018-08-07 | Stop reason: HOSPADM

## 2018-08-07 RX ORDER — ONDANSETRON HCL 8 MG
8 TABLET ORAL EVERY 8 HOURS PRN
Qty: 10 TABLET | Refills: 1 | Status: SHIPPED | OUTPATIENT
Start: 2018-08-07 | End: 2019-04-02

## 2018-08-07 RX ORDER — ACETAMINOPHEN 500 MG
1000 TABLET ORAL ONCE
Status: COMPLETED | OUTPATIENT
Start: 2018-08-07 | End: 2018-08-07

## 2018-08-07 RX ORDER — IBUPROFEN 600 MG/1
600 TABLET ORAL ONCE AS NEEDED
Status: DISCONTINUED | OUTPATIENT
Start: 2018-08-07 | End: 2018-08-07 | Stop reason: HOSPADM

## 2018-08-07 RX ORDER — MIDAZOLAM HYDROCHLORIDE 1 MG/ML
1 INJECTION INTRAMUSCULAR; INTRAVENOUS
Status: DISCONTINUED | OUTPATIENT
Start: 2018-08-07 | End: 2018-08-07 | Stop reason: HOSPADM

## 2018-08-07 RX ORDER — ONDANSETRON 2 MG/ML
INJECTION INTRAMUSCULAR; INTRAVENOUS AS NEEDED
Status: DISCONTINUED | OUTPATIENT
Start: 2018-08-07 | End: 2018-08-07 | Stop reason: SURG

## 2018-08-07 RX ORDER — METOPROLOL TARTRATE 5 MG/5ML
2 INJECTION INTRAVENOUS ONCE AS NEEDED
Status: COMPLETED | OUTPATIENT
Start: 2018-08-07 | End: 2018-08-07

## 2018-08-07 RX ORDER — IPRATROPIUM BROMIDE AND ALBUTEROL SULFATE 2.5; .5 MG/3ML; MG/3ML
3 SOLUTION RESPIRATORY (INHALATION) ONCE AS NEEDED
Status: DISCONTINUED | OUTPATIENT
Start: 2018-08-07 | End: 2018-08-07 | Stop reason: HOSPADM

## 2018-08-07 RX ORDER — FENTANYL CITRATE 50 UG/ML
25 INJECTION, SOLUTION INTRAMUSCULAR; INTRAVENOUS AS NEEDED
Status: DISCONTINUED | OUTPATIENT
Start: 2018-08-07 | End: 2018-08-07 | Stop reason: HOSPADM

## 2018-08-07 RX ORDER — SULFAMETHOXAZOLE AND TRIMETHOPRIM 800; 160 MG/1; MG/1
1 TABLET ORAL 2 TIMES DAILY
Qty: 14 TABLET | Refills: 0 | Status: SHIPPED | OUTPATIENT
Start: 2018-08-07 | End: 2018-08-14

## 2018-08-07 RX ORDER — OXYCODONE AND ACETAMINOPHEN 7.5; 325 MG/1; MG/1
2 TABLET ORAL ONCE AS NEEDED
Status: DISCONTINUED | OUTPATIENT
Start: 2018-08-07 | End: 2018-08-07 | Stop reason: HOSPADM

## 2018-08-07 RX ORDER — NALOXONE HCL 0.4 MG/ML
0.4 VIAL (ML) INJECTION AS NEEDED
Status: DISCONTINUED | OUTPATIENT
Start: 2018-08-07 | End: 2018-08-07 | Stop reason: HOSPADM

## 2018-08-07 RX ORDER — SODIUM CHLORIDE, SODIUM LACTATE, POTASSIUM CHLORIDE, CALCIUM CHLORIDE 600; 310; 30; 20 MG/100ML; MG/100ML; MG/100ML; MG/100ML
1000 INJECTION, SOLUTION INTRAVENOUS CONTINUOUS
Status: DISCONTINUED | OUTPATIENT
Start: 2018-08-07 | End: 2018-08-07 | Stop reason: HOSPADM

## 2018-08-07 RX ORDER — BUPIVACAINE HYDROCHLORIDE AND EPINEPHRINE 5; 5 MG/ML; UG/ML
INJECTION, SOLUTION PERINEURAL AS NEEDED
Status: DISCONTINUED | OUTPATIENT
Start: 2018-08-07 | End: 2018-08-07 | Stop reason: HOSPADM

## 2018-08-07 RX ORDER — LABETALOL HYDROCHLORIDE 5 MG/ML
5 INJECTION, SOLUTION INTRAVENOUS
Status: DISCONTINUED | OUTPATIENT
Start: 2018-08-07 | End: 2018-08-07 | Stop reason: HOSPADM

## 2018-08-07 RX ORDER — METHYLCELLULOSE 2 G/19G
2 POWDER, FOR SOLUTION ORAL DAILY
Qty: 60 G | Refills: 6 | Status: SHIPPED | OUTPATIENT
Start: 2018-08-07 | End: 2018-09-06

## 2018-08-07 RX ORDER — MEPERIDINE HYDROCHLORIDE 50 MG/ML
12.5 INJECTION INTRAMUSCULAR; INTRAVENOUS; SUBCUTANEOUS
Status: DISCONTINUED | OUTPATIENT
Start: 2018-08-07 | End: 2018-08-07 | Stop reason: HOSPADM

## 2018-08-07 RX ORDER — ONDANSETRON 2 MG/ML
4 INJECTION INTRAMUSCULAR; INTRAVENOUS ONCE AS NEEDED
Status: DISCONTINUED | OUTPATIENT
Start: 2018-08-07 | End: 2018-08-07 | Stop reason: HOSPADM

## 2018-08-07 RX ORDER — HYDROCODONE BITARTRATE AND ACETAMINOPHEN 7.5; 325 MG/1; MG/1
1 TABLET ORAL EVERY 4 HOURS PRN
Qty: 20 TABLET | Refills: 0 | Status: SHIPPED | OUTPATIENT
Start: 2018-08-07 | End: 2019-04-02

## 2018-08-07 RX ORDER — FENTANYL CITRATE 50 UG/ML
INJECTION, SOLUTION INTRAMUSCULAR; INTRAVENOUS AS NEEDED
Status: DISCONTINUED | OUTPATIENT
Start: 2018-08-07 | End: 2018-08-07 | Stop reason: SURG

## 2018-08-07 RX ORDER — METOCLOPRAMIDE HYDROCHLORIDE 5 MG/ML
5 INJECTION INTRAMUSCULAR; INTRAVENOUS
Status: DISCONTINUED | OUTPATIENT
Start: 2018-08-07 | End: 2018-08-07 | Stop reason: HOSPADM

## 2018-08-07 RX ORDER — MIDAZOLAM HYDROCHLORIDE 1 MG/ML
2 INJECTION INTRAMUSCULAR; INTRAVENOUS
Status: DISCONTINUED | OUTPATIENT
Start: 2018-08-07 | End: 2018-08-07 | Stop reason: HOSPADM

## 2018-08-07 RX ADMIN — FENTANYL CITRATE 50 MCG: 50 INJECTION, SOLUTION INTRAMUSCULAR; INTRAVENOUS at 08:05

## 2018-08-07 RX ADMIN — Medication 160 MCG: at 08:29

## 2018-08-07 RX ADMIN — PROPOFOL 150 MG: 10 INJECTION, EMULSION INTRAVENOUS at 07:51

## 2018-08-07 RX ADMIN — FENTANYL CITRATE 50 MCG: 50 INJECTION, SOLUTION INTRAMUSCULAR; INTRAVENOUS at 07:53

## 2018-08-07 RX ADMIN — METOROPROLOL TARTRATE 2 MG: 5 INJECTION, SOLUTION INTRAVENOUS at 07:02

## 2018-08-07 RX ADMIN — ONDANSETRON HYDROCHLORIDE 4 MG: 2 SOLUTION INTRAMUSCULAR; INTRAVENOUS at 09:09

## 2018-08-07 RX ADMIN — Medication 80 MCG: at 08:19

## 2018-08-07 RX ADMIN — SODIUM CHLORIDE, POTASSIUM CHLORIDE, SODIUM LACTATE AND CALCIUM CHLORIDE 100 ML/HR: 600; 310; 30; 20 INJECTION, SOLUTION INTRAVENOUS at 07:02

## 2018-08-07 RX ADMIN — Medication 160 MCG: at 08:53

## 2018-08-07 RX ADMIN — ACETAMINOPHEN 1000 MG: 500 TABLET, FILM COATED ORAL at 07:01

## 2018-08-07 RX ADMIN — VANCOMYCIN HYDROCHLORIDE 1000 MG: 1 INJECTION, POWDER, LYOPHILIZED, FOR SOLUTION INTRAVENOUS at 07:42

## 2018-08-07 RX ADMIN — Medication 160 MCG: at 08:38

## 2018-08-07 RX ADMIN — MIDAZOLAM 2 MG: 1 INJECTION INTRAMUSCULAR; INTRAVENOUS at 07:02

## 2018-08-07 RX ADMIN — LIDOCAINE HYDROCHLORIDE 100 MG: 20 INJECTION, SOLUTION INFILTRATION; PERINEURAL at 07:51

## 2018-08-07 RX ADMIN — SODIUM CHLORIDE, POTASSIUM CHLORIDE, SODIUM LACTATE AND CALCIUM CHLORIDE: 600; 310; 30; 20 INJECTION, SOLUTION INTRAVENOUS at 07:42

## 2018-08-07 RX ADMIN — EPHEDRINE SULFATE 10 MG: 50 INJECTION INTRAMUSCULAR; INTRAVENOUS; SUBCUTANEOUS at 08:38

## 2018-08-07 NOTE — OP NOTE
INGUINAL NODE BIOPSY  Procedure Note    Caio Riley  8/7/2018    Pre-op Diagnosis:   Enlarged right inguinal lymph nodes  Post-op Diagnosis:     Excisional biopsy of several enlarged right inguinal lymph nodes    Procedure/CPT® Codes:      Procedure(s):  Excisional biopsy of several enlarged right inguinal lymph nodes and drain placement    Surgeon(s):  Ced Aguirre MD    Anesthesia: General    Staff:   Circulator: Kailee Leon RN  Scrub Person: Emily Kaplan  Assistant: Saadia Henderson    Estimated Blood Loss: 25 mL    Specimens:                ID Type Source Tests Collected by Time   A : RIGHT GROIN LYMPH NODE TISSUE (F LO- CYTOMETRY) Tissue Lymph Node TISSUE PATHOLOGY EXAM Ced Aguirre MD 8/7/2018 0816         Drains:   Closed/Suction Drain 1 Right Groin Other (Comment) 15 Fr. (Active)       Indications: Mr. Caio Riley is a 67-year-old gentleman who has several enlarged right inguinal lymph nodes.  I have treated him in the past for a very difficult acute and chronic cholecystitis in 2001.  Well from surgery noted recently to have some adenopathy and with this he had some enlarged lymph nodes in his intra-abdominal area also splenomegaly has a history of pulmonary sarcoidosis history of histoplasmosis and now with enlarged lymph nodes in his inguinal region as large as 2 cm.  He is for excisional biopsy of these right inguinal lymph nodes and closure.  He is aware of the procedure the risk and benefits and gives his informed consent for surgery.    Findings: Exploration of the right infra inguinal crease, removal of several enlarged lymph nodes from the right inguinal region some as large as 2 cm, capsular dissection was  completed and removal of several enlarged lymph nodes and closure over a 15 mm round Garrett-Kennedy drain.    Complications: There were no complications blood loss 25 mL    Procedure: Patient was placed in supine position the surgical suite and after adequate  prepping and draping had been completed with alcohol and Betadine and Ioban was applied.  Local anesthesia of 1% Xylocaine with epinephrine was infiltrated with 25 mL of local injected with a 25-gauge spinal needle a curvilinear incision was made in the infra-inguinal fold incising through the skin and subcutaneous tissue.  Dissection down to subcutaneous taste tissue was accomplished Bishnu's fascia was incised and dissection in the deep cavity into the right medial thigh was completed.  There was a superficial saphenous branch noted and this was ligated with 2-0 silk suture dissection was completed to this large adenopathy in the groin and using capsular dissection was completed and the lymph node was dissected free from the surrounding attachments and ligated with 2-0 silk suture.  With the lymph node dissected free of surrounding attachments and the attachments were controlled using 2-0 silk excision of this lymph node was removed and sent for pathologic evaluation to include a flow cytometry evaluation.  Once completed a 15 mm round Garrett-Kennedy drain was placed and was affixed to the skin using 3-0 nylon, the deep dermis was reapproximated using simple inverted interrupted sutures of 3-0 Vicryl and the skin was enclosed using a running subcuticular suture of 4-0 Vicryl.  Following this Mastisol, Steri-Strips, 4 x 4 and Tegaderm applied the patient was then extubated and transported to the recovery room in good condition.    Ced Aguirre MD     Date: 8/7/2018  Time: 9:15 AM    EMR Dragon/Transcription disclaimer: Much of this encounter note is an electronic transcription/translation of spoken language to printed text. The electronic translation of spoken language may permit erroneous, or at times, nonsensical words or phrases to be inadvertently transcribed; although I have reviewed the note for such errors, some may still exist.

## 2018-08-07 NOTE — ANESTHESIA POSTPROCEDURE EVALUATION
Patient: Caio Riley    Procedure Summary     Date:  08/07/18 Room / Location:  [facility] OR  /  [facility] OR    Anesthesia Start:  0746 Anesthesia Stop:  0918    Procedure:  RIGHT GROIN EXCISIONAL BIOPSY (Right Groin) Diagnosis:  (ENLARGE LYMPH NODES)    Surgeon:  Ced Aguirre MD Provider:  Ephraim Hubbard CRNA    Anesthesia Type:  general ASA Status:  3          Anesthesia Type: general  Last vitals  BP   137/53 (08/07/18 1000)   Temp   99.8 °F (37.7 °C) (08/07/18 0934)   Pulse   78 (08/07/18 1000)   Resp   16 (08/07/18 1000)     SpO2   93 % (08/07/18 1000)     Post Anesthesia Care and Evaluation    PONV Status: none  Comments: Patient d/c from PACU prior to anes eval based on Marti score.  Please see RN notes for details of d/c criteria.    Blood pressure 137/53, pulse 78, temperature 99.8 °F (37.7 °C), temperature source Temporal Artery , resp. rate 16, SpO2 93 %.

## 2018-08-07 NOTE — ANESTHESIA PREPROCEDURE EVALUATION
Anesthesia Evaluation     NPO Solid Status: > 8 hours  NPO Liquid Status: > 8 hours           Airway   Mallampati: II  TM distance: >3 FB  Neck ROM: full  No difficulty expected  Dental - normal exam     Pulmonary - normal exam   (+) COPD, asthma,   Cardiovascular - normal exam  Exercise tolerance: good (4-7 METS)    ECG reviewed  Patient on routine beta blocker and Beta blocker not taken-may be given intraoperatively    (+) hypertension, dysrhythmias Atrial Fib,       Neuro/Psych- negative ROS  GI/Hepatic/Renal/Endo    (+)   renal disease stones, diabetes mellitus,     Musculoskeletal     Abdominal  - normal exam   Substance History - negative use     OB/GYN negative ob/gyn ROS         Other   (+) arthritis                     Anesthesia Plan    ASA 3     general     intravenous induction   Anesthetic plan and risks discussed with patient.

## 2018-08-07 NOTE — DISCHARGE INSTRUCTIONS

## 2018-08-07 NOTE — ANESTHESIA PROCEDURE NOTES
Airway  Urgency: elective    Airway not difficult    General Information and Staff    Patient location during procedure: OR  CRNA: CLOVIS BENITO    Indications and Patient Condition  Indications for airway management: airway protection    Preoxygenated: yes  Mask difficulty assessment: 0 - not attempted    Final Airway Details  Final airway type: supraglottic airway      Successful airway: classic  Size 4  Airway Seal Pressure (cm H2O): 18    Number of attempts at approach: 1

## 2018-08-09 LAB
CYTO UR: NORMAL
LAB AP CASE REPORT: NORMAL
LAB AP CLINICAL INFORMATION: NORMAL
Lab: NORMAL
PATH REPORT.FINAL DX SPEC: NORMAL
PATH REPORT.GROSS SPEC: NORMAL

## 2018-08-22 ENCOUNTER — OFFICE VISIT (OUTPATIENT)
Dept: INTERNAL MEDICINE | Age: 68
End: 2018-08-22
Payer: MEDICARE

## 2018-08-22 VITALS
OXYGEN SATURATION: 98 % | HEIGHT: 69 IN | HEART RATE: 95 BPM | WEIGHT: 181 LBS | BODY MASS INDEX: 26.81 KG/M2 | SYSTOLIC BLOOD PRESSURE: 152 MMHG | DIASTOLIC BLOOD PRESSURE: 84 MMHG

## 2018-08-22 DIAGNOSIS — E11.42 TYPE 2 DIABETES MELLITUS WITH PERIPHERAL NEUROPATHY (HCC): ICD-10-CM

## 2018-08-22 DIAGNOSIS — I10 HYPERTENSION, UNSPECIFIED TYPE: ICD-10-CM

## 2018-08-22 DIAGNOSIS — M89.9 DISORDER OF BONE: ICD-10-CM

## 2018-08-22 DIAGNOSIS — Z12.5 ENCOUNTER FOR SCREENING FOR MALIGNANT NEOPLASM OF PROSTATE: ICD-10-CM

## 2018-08-22 DIAGNOSIS — N52.8 OTHER MALE ERECTILE DYSFUNCTION: ICD-10-CM

## 2018-08-22 DIAGNOSIS — R59.9 LYMPH NODE ENLARGEMENT: ICD-10-CM

## 2018-08-22 DIAGNOSIS — D86.9 SARCOIDOSIS: ICD-10-CM

## 2018-08-22 DIAGNOSIS — D50.9 IRON DEFICIENCY ANEMIA, UNSPECIFIED IRON DEFICIENCY ANEMIA TYPE: ICD-10-CM

## 2018-08-22 DIAGNOSIS — Z00.00 HEALTH CARE MAINTENANCE: Primary | ICD-10-CM

## 2018-08-22 PROBLEM — J18.9 PNEUMONIA: Status: RESOLVED | Noted: 2018-06-08 | Resolved: 2018-08-22

## 2018-08-22 PROBLEM — R25.2 LEG CRAMPS: Status: RESOLVED | Noted: 2017-08-17 | Resolved: 2018-08-22

## 2018-08-22 PROCEDURE — 1036F TOBACCO NON-USER: CPT | Performed by: NURSE PRACTITIONER

## 2018-08-22 PROCEDURE — 99214 OFFICE O/P EST MOD 30 MIN: CPT | Performed by: NURSE PRACTITIONER

## 2018-08-22 PROCEDURE — 2022F DILAT RTA XM EVC RTNOPTHY: CPT | Performed by: NURSE PRACTITIONER

## 2018-08-22 PROCEDURE — G8427 DOCREV CUR MEDS BY ELIG CLIN: HCPCS | Performed by: NURSE PRACTITIONER

## 2018-08-22 PROCEDURE — 4040F PNEUMOC VAC/ADMIN/RCVD: CPT | Performed by: NURSE PRACTITIONER

## 2018-08-22 PROCEDURE — 3044F HG A1C LEVEL LT 7.0%: CPT | Performed by: NURSE PRACTITIONER

## 2018-08-22 PROCEDURE — G8417 CALC BMI ABV UP PARAM F/U: HCPCS | Performed by: NURSE PRACTITIONER

## 2018-08-22 PROCEDURE — 1101F PT FALLS ASSESS-DOCD LE1/YR: CPT | Performed by: NURSE PRACTITIONER

## 2018-08-22 PROCEDURE — 1123F ACP DISCUSS/DSCN MKR DOCD: CPT | Performed by: NURSE PRACTITIONER

## 2018-08-22 PROCEDURE — 3017F COLORECTAL CA SCREEN DOC REV: CPT | Performed by: NURSE PRACTITIONER

## 2018-08-22 RX ORDER — SILDENAFIL CITRATE 20 MG/1
TABLET ORAL
Qty: 60 TABLET | Refills: 2 | Status: SHIPPED | OUTPATIENT
Start: 2018-08-22 | End: 2018-10-31 | Stop reason: CLARIF

## 2018-08-22 ASSESSMENT — ENCOUNTER SYMPTOMS
EYE DISCHARGE: 0
EYE ITCHING: 0
SHORTNESS OF BREATH: 0
WHEEZING: 0
CONSTIPATION: 0
COUGH: 0
SORE THROAT: 0
STRIDOR: 0
DIARRHEA: 0
NAUSEA: 0
ABDOMINAL DISTENTION: 0
TROUBLE SWALLOWING: 0
BLOOD IN STOOL: 0
ABDOMINAL PAIN: 0
VOMITING: 0
CHOKING: 0
COLOR CHANGE: 0

## 2018-08-22 ASSESSMENT — PATIENT HEALTH QUESTIONNAIRE - PHQ9
SUM OF ALL RESPONSES TO PHQ QUESTIONS 1-9: 0
SUM OF ALL RESPONSES TO PHQ QUESTIONS 1-9: 0
SUM OF ALL RESPONSES TO PHQ9 QUESTIONS 1 & 2: 0
2. FEELING DOWN, DEPRESSED OR HOPELESS: 0
1. LITTLE INTEREST OR PLEASURE IN DOING THINGS: 0

## 2018-08-22 NOTE — PROGRESS NOTES
diabetes mellitus with peripheral neuropathy (HCC)     Type 2 diabetes, controlled, with neuropathy ([address]       Past Surgical History:   Procedure Laterality Date    ANKLE FRACTURE SURGERY  april 14, 2015    ATRIAL ABLATION SURGERY  2015    Dr. Halle Hurt COLONOSCOPY  12/02/2014    Melecio VALDEZ CYSTOSCOPY,MANIPULATN Left 6/27/2018    LASER LITHOTRIPSY STONE MANIPULATION WITH DOUBLE J STENT PLACEMENT performed by Malena Cano MD at [facility] Left 6/27/2018    CYSTOSCOPY URETEROSCOPY RETROGRADE PYELOGRAMS performed by Malena Cano MD at [address] [place] [address] 8/22/2018 7/17/2018 6/27/2018 6/27/2018 6/27/2018 1/69/9036   SYSTOLIC 462 932 - - 353 -   DIASTOLIC 84 67 - - 58 -   Site Left Arm - - - - -   Position Sitting - - - - -   Pulse 95 90 - - - -   Temp - 97.7 - - - -   Resp - - 12 15 5 8   Weight 181 lb 179 lb - - - -   Height 5' 9\" 5' 9\" - - - -   BMI (wt*703/ht~2) 26.73 kg/m2 26.43 kg/m2 - - - -   Some recent data might be hidden       Family History   Problem Relation Age of Onset    Coronary Art Dis Father         AICD pacer age 68    Cancer Sister         childhood chest tumor       Social History   Substance Use Topics    Smoking status: Former Smoker     Packs/day: 1.00     Years: 3.00     Quit date: 6/22/1983    Smokeless tobacco: Never Used    Alcohol use No      Current Outpatient Prescriptions   Medication Sig Dispense Refill    metoprolol tartrate (LOPRESSOR) 25 MG tablet Take 25 mg by mouth 2 times daily      IRON PO Take by mouth      sildenafil (REVATIO) 20 MG tablet Take 1-2 as needed 60 tablet 2    rivaroxaban (XARELTO) 20 MG TABS tablet TAKE ONE TABLET DAILY WITH  EVENING  MEAL 90 tablet 3    metFORMIN (GLUCOPHAGE) 500 MG tablet Take 1 tablet by mouth daily (with breakfast) 90 tablet 3    losartan (COZAAR) 100 MG tablet Take 1 tablet by mouth daily 90 and gait problem. Skin: Negative for color change and rash. Allergic/Immunologic: Negative for food allergies and immunocompromised state. Neurological: Negative for dizziness, tremors, syncope, speech difficulty, weakness and headaches. Hematological: Negative for adenopathy. Does not bruise/bleed easily. Psychiatric/Behavioral: Negative for confusion and hallucinations. Objective:     Physical Exam   Constitutional: He is oriented to person, place, and time. He appears well-developed and well-nourished. No distress. HENT:   Head: Normocephalic and atraumatic. Eyes: Pupils are equal, round, and reactive to light. Right eye exhibits no discharge. Left eye exhibits no discharge. No scleral icterus. Neck: Normal range of motion. Neck supple. No JVD present. No thyromegaly present. Cardiovascular: Normal rate, regular rhythm and normal heart sounds. No murmur heard. Pulmonary/Chest: Effort normal and breath sounds normal. No respiratory distress. He has no wheezes. He has no rales. Abdominal: Soft. Bowel sounds are normal. He exhibits no distension and no mass. There is no tenderness. There is no rebound and no guarding. Musculoskeletal: Normal range of motion. He exhibits no edema or tenderness. Neurological: He is alert and oriented to person, place, and time. He has normal reflexes. No cranial nerve deficit. Coordination normal.   Skin: Skin is warm and dry. No rash noted. No erythema. Psychiatric: He has a normal mood and affect. His behavior is normal. Judgment and thought content normal. He does not exhibit a depressed mood. BP (!) 152/84 (Site: Left Arm, Position: Sitting)   Pulse 95   Ht 5' 9\" (1.753 m)   Wt 181 lb (82.1 kg)   SpO2 98%   BMI 26.73 kg/m²     Assessment:       Diagnosis Orders   1. Health care maintenance  CBC    Comprehensive Metabolic Panel    Lipid Panel    Vitamin D 25 Hydroxy    TSH without Reflex    Psa screening   2.  Disorder of bone   Vitamin D exist.

## 2018-08-22 NOTE — PATIENT INSTRUCTIONS
1.  Right groin biopsy; Stable for now   2. Anemia; Stable followed by DR Brewer  3. Left renal stone  Followed by Kimberly Drake   4.   Sarcoidosis  Followed by Dr Colby Castillo  5.  HTn;  Stable no changes  6.  ED;  We will try viagra for now;  Script sent     Fu in 6 months with labs

## 2018-09-04 ENCOUNTER — HOSPITAL ENCOUNTER (OUTPATIENT)
Dept: ULTRASOUND IMAGING | Age: 68
Discharge: HOME | End: 2018-09-04
Payer: MEDICARE

## 2018-09-04 DIAGNOSIS — N20.1 LEFT URETERAL STONE: ICD-10-CM

## 2018-09-04 PROCEDURE — 76770 US EXAM ABDO BACK WALL COMP: CPT

## 2018-09-10 ENCOUNTER — OFFICE VISIT (OUTPATIENT)
Dept: UROLOGY | Age: 68
End: 2018-09-10
Payer: MEDICARE

## 2018-09-10 VITALS
WEIGHT: 181 LBS | DIASTOLIC BLOOD PRESSURE: 75 MMHG | TEMPERATURE: 97.8 F | BODY MASS INDEX: 26.81 KG/M2 | HEIGHT: 69 IN | HEART RATE: 90 BPM | SYSTOLIC BLOOD PRESSURE: 136 MMHG

## 2018-09-10 DIAGNOSIS — N20.1 LEFT URETERAL STONE: Primary | ICD-10-CM

## 2018-09-10 PROCEDURE — 99213 OFFICE O/P EST LOW 20 MIN: CPT | Performed by: UROLOGY

## 2018-09-10 PROCEDURE — 3017F COLORECTAL CA SCREEN DOC REV: CPT | Performed by: UROLOGY

## 2018-09-10 PROCEDURE — 1036F TOBACCO NON-USER: CPT | Performed by: UROLOGY

## 2018-09-10 PROCEDURE — G8417 CALC BMI ABV UP PARAM F/U: HCPCS | Performed by: UROLOGY

## 2018-09-10 PROCEDURE — 1123F ACP DISCUSS/DSCN MKR DOCD: CPT | Performed by: UROLOGY

## 2018-09-10 PROCEDURE — 1101F PT FALLS ASSESS-DOCD LE1/YR: CPT | Performed by: UROLOGY

## 2018-09-10 PROCEDURE — 4040F PNEUMOC VAC/ADMIN/RCVD: CPT | Performed by: UROLOGY

## 2018-09-10 PROCEDURE — G8427 DOCREV CUR MEDS BY ELIG CLIN: HCPCS | Performed by: UROLOGY

## 2018-09-10 ASSESSMENT — ENCOUNTER SYMPTOMS
HEARTBURN: 0
SHORTNESS OF BREATH: 0
SORE THROAT: 0
BLURRED VISION: 0
NAUSEA: 0
WHEEZING: 0
DOUBLE VISION: 0

## 2018-09-10 NOTE — PROGRESS NOTES
lung ([facility]     restrictive lung impairment    Tick bite     Type 2 diabetes mellitus with peripheral neuropathy (HCC)     Type 2 diabetes, controlled, with neuropathy ([facility]        Past Surgical History:   Procedure Laterality Date    ANKLE FRACTURE SURGERY  april 14, 2015    ATRIAL ABLATION SURGERY  2015    Dr. Halle Hurt COLONOSCOPY  12/02/2014    Melecio VALDEZ CYSTOSCOPY,MANIPULATN Left 6/27/2018    LASER LITHOTRIPSY STONE MANIPULATION WITH DOUBLE J STENT PLACEMENT performed by Malena Cano MD at [facility] Left 6/27/2018    CYSTOSCOPY URETEROSCOPY RETROGRADE PYELOGRAMS performed by Malena Cano MD at [address] [place] [address] OR       Current Outpatient Prescriptions   Medication Sig Dispense Refill    metoprolol tartrate (LOPRESSOR) 25 MG tablet Take 25 mg by mouth 2 times daily      IRON PO Take by mouth      sildenafil (REVATIO) 20 MG tablet Take 1-2 as needed 60 tablet 2    rivaroxaban (XARELTO) 20 MG TABS tablet TAKE ONE TABLET DAILY WITH  EVENING  MEAL 90 tablet 3    metFORMIN (GLUCOPHAGE) 500 MG tablet Take 1 tablet by mouth daily (with breakfast) 90 tablet 3    losartan (COZAAR) 100 MG tablet Take 1 tablet by mouth daily 90 tablet 3    ADVAIR DISKUS 500-50 MCG/DOSE diskus inhaler INHALE ONE DOSE BY MOUTH TWICE DAILY 1 Inhaler 5    aspirin 81 MG tablet Take 81 mg by mouth daily.  EPINEPHrine (EPIPEN 2-PRACHI) 0.3 MG/0.3ML SOAJ injection Inject 0.3 mLs into the muscle once for 1 dose Use as directed for allergic reaction 1 each 0     No current facility-administered medications for this visit. Allergies   Allergen Reactions    Acetaminophen     Bee Venom     Gabapentin      Causes blisters to head.      Penicillins     Toprol Xl [Metoprolol]      Leg pain        Social History     Social History    Marital status:      Spouse name: N/A    Number of children: N/A  Years of education: N/A     Social History Main Topics    Smoking status: Former Smoker     Packs/day: 1.00     Years: 3.00     Quit date: 6/22/1983    Smokeless tobacco: Never Used    Alcohol use No    Drug use: No    Sexual activity: Not Asked     Other Topics Concern    None     Social History Narrative    None       Family History   Problem Relation Age of Onset    Coronary Art Dis Father         AICLADARIUS pacer age 68    Cancer Sister         childhood chest tumor       REVIEW OF SYSTEMS:  Review of Systems   Constitutional: Negative for chills and fever. HENT: Negative for congestion and sore throat. Eyes: Negative for blurred vision and double vision. Respiratory: Negative for shortness of breath and wheezing. Cardiovascular: Negative for chest pain and palpitations. Gastrointestinal: Negative for heartburn and nausea. Genitourinary: Negative for dysuria, flank pain, frequency, hematuria and urgency. Musculoskeletal: Negative for falls and neck pain. Skin: Negative for itching and rash. Neurological: Negative for dizziness and tingling. Endo/Heme/Allergies: Does not bruise/bleed easily. Psychiatric/Behavioral: The patient does not have insomnia. PHYSICAL EXAM:  /75   Pulse 90   Temp 97.8 °F (36.6 °C) (Temporal)   Ht 5' 9\" (1.753 m)   Wt 181 lb (82.1 kg)   BMI 26.73 kg/m²   Physical Exam   Constitutional: He is oriented to person, place, and time. He appears well-developed and well-nourished. No distress. HENT:   Head: Normocephalic and atraumatic. Nose: Nose normal.   Eyes: Pupils are equal, round, and reactive to light. Conjunctivae and EOM are normal. No scleral icterus. Neck: Normal range of motion. Neck supple. No tracheal deviation present. Cardiovascular: Normal rate, regular rhythm and intact distal pulses. Pulmonary/Chest: Effort normal and breath sounds normal. No stridor. He exhibits no tenderness. Abdominal: Soft.  Bowel sounds are

## 2018-09-17 LAB
FUNGUS WND CULT: NORMAL
MYCOBACTERIUM SPEC CULT: NORMAL
NIGHT BLUE STAIN TISS: NORMAL
NIGHT BLUE STAIN TISS: NORMAL

## 2018-10-09 ENCOUNTER — OFFICE VISIT (OUTPATIENT)
Dept: INTERNAL MEDICINE | Age: 68
End: 2018-10-09
Payer: MEDICARE

## 2018-10-09 VITALS
BODY MASS INDEX: 27.5 KG/M2 | DIASTOLIC BLOOD PRESSURE: 88 MMHG | WEIGHT: 185.7 LBS | SYSTOLIC BLOOD PRESSURE: 184 MMHG | OXYGEN SATURATION: 98 % | HEART RATE: 85 BPM | HEIGHT: 69 IN

## 2018-10-09 DIAGNOSIS — N28.9 RENAL INSUFFICIENCY: ICD-10-CM

## 2018-10-09 DIAGNOSIS — E11.42 TYPE 2 DIABETES MELLITUS WITH PERIPHERAL NEUROPATHY (HCC): Primary | ICD-10-CM

## 2018-10-09 DIAGNOSIS — D50.8 OTHER IRON DEFICIENCY ANEMIA: ICD-10-CM

## 2018-10-09 DIAGNOSIS — E11.42 TYPE 2 DIABETES MELLITUS WITH DIABETIC POLYNEUROPATHY, WITHOUT LONG-TERM CURRENT USE OF INSULIN (HCC): ICD-10-CM

## 2018-10-09 DIAGNOSIS — D86.9 SARCOIDOSIS: ICD-10-CM

## 2018-10-09 PROBLEM — D64.9 ANEMIA: Status: ACTIVE | Noted: 2018-10-09

## 2018-10-09 PROCEDURE — 1036F TOBACCO NON-USER: CPT | Performed by: NURSE PRACTITIONER

## 2018-10-09 PROCEDURE — G8417 CALC BMI ABV UP PARAM F/U: HCPCS | Performed by: NURSE PRACTITIONER

## 2018-10-09 PROCEDURE — 4040F PNEUMOC VAC/ADMIN/RCVD: CPT | Performed by: NURSE PRACTITIONER

## 2018-10-09 PROCEDURE — 3017F COLORECTAL CA SCREEN DOC REV: CPT | Performed by: NURSE PRACTITIONER

## 2018-10-09 PROCEDURE — 3044F HG A1C LEVEL LT 7.0%: CPT | Performed by: NURSE PRACTITIONER

## 2018-10-09 PROCEDURE — G8484 FLU IMMUNIZE NO ADMIN: HCPCS | Performed by: NURSE PRACTITIONER

## 2018-10-09 PROCEDURE — 1101F PT FALLS ASSESS-DOCD LE1/YR: CPT | Performed by: NURSE PRACTITIONER

## 2018-10-09 PROCEDURE — 2022F DILAT RTA XM EVC RTNOPTHY: CPT | Performed by: NURSE PRACTITIONER

## 2018-10-09 PROCEDURE — G8427 DOCREV CUR MEDS BY ELIG CLIN: HCPCS | Performed by: NURSE PRACTITIONER

## 2018-10-09 PROCEDURE — 99214 OFFICE O/P EST MOD 30 MIN: CPT | Performed by: NURSE PRACTITIONER

## 2018-10-09 PROCEDURE — 1123F ACP DISCUSS/DSCN MKR DOCD: CPT | Performed by: NURSE PRACTITIONER

## 2018-10-09 RX ORDER — PREGABALIN 50 MG/1
50 CAPSULE ORAL NIGHTLY
Qty: 30 CAPSULE | Refills: 0 | Status: SHIPPED | OUTPATIENT
Start: 2018-10-09 | End: 2018-11-13 | Stop reason: SDUPTHER

## 2018-10-09 RX ORDER — CLONIDINE HYDROCHLORIDE 0.1 MG/1
0.1 TABLET ORAL 2 TIMES DAILY PRN
Qty: 60 TABLET | Refills: 3 | Status: SHIPPED | OUTPATIENT
Start: 2018-10-09 | End: 2018-11-26 | Stop reason: DRUGHIGH

## 2018-10-09 ASSESSMENT — ENCOUNTER SYMPTOMS
NAUSEA: 0
TROUBLE SWALLOWING: 0
ABDOMINAL PAIN: 0
EYE DISCHARGE: 0
BLOOD IN STOOL: 0
VOMITING: 0
CHOKING: 0
CONSTIPATION: 0
STRIDOR: 0
SORE THROAT: 0
COLOR CHANGE: 0
COUGH: 0
EYE ITCHING: 0
ABDOMINAL DISTENTION: 0
DIARRHEA: 0
SHORTNESS OF BREATH: 1
WHEEZING: 0

## 2018-10-09 NOTE — PROGRESS NOTES
Genitourinary: Negative for difficulty urinating, dysuria, frequency and urgency. Musculoskeletal: Negative for arthralgias and gait problem. Skin: Negative for color change and rash. Allergic/Immunologic: Negative for food allergies and immunocompromised state. Neurological: Negative for dizziness, tremors, syncope, speech difficulty, weakness and headaches. Neuropathy    Hematological: Negative for adenopathy. Does not bruise/bleed easily. Psychiatric/Behavioral: Negative for confusion and hallucinations. Objective:     Physical Exam   Constitutional: He is oriented to person, place, and time. He appears well-developed and well-nourished. No distress. HENT:   Head: Normocephalic and atraumatic. Eyes: Pupils are equal, round, and reactive to light. Right eye exhibits no discharge. Left eye exhibits no discharge. No scleral icterus. Neck: Normal range of motion. Neck supple. No JVD present. No thyromegaly present. Cardiovascular: Normal rate, regular rhythm and normal heart sounds. No murmur heard. Pulmonary/Chest: Effort normal and breath sounds normal. No respiratory distress. He has no wheezes. He has no rales. Abdominal: Soft. Bowel sounds are normal. He exhibits no distension and no mass. There is no tenderness. There is no rebound and no guarding. Musculoskeletal: Normal range of motion. He exhibits no edema or tenderness. Neurological: He is alert and oriented to person, place, and time. He has normal reflexes. He displays normal reflexes. No cranial nerve deficit. Coordination normal.   Skin: Skin is warm and dry. No rash noted. No erythema. Psychiatric: His behavior is normal. Judgment and thought content normal. He does not exhibit a depressed mood. BP (!) 184/88   Pulse 85   Ht 5' 9\" (1.753 m)   Wt 185 lb 11.2 oz (84.2 kg)   SpO2 98%   BMI 27.42 kg/m²     Assessment:       Diagnosis Orders   1.  Type 2 diabetes mellitus with peripheral neuropathy (HCC)

## 2018-10-18 ENCOUNTER — HOSPITAL ENCOUNTER (OUTPATIENT)
Dept: CT IMAGING | Age: 68
Discharge: HOME | End: 2018-10-18
Payer: COMMERCIAL

## 2018-10-18 ENCOUNTER — HOSPITAL ENCOUNTER (OUTPATIENT)
Dept: GENERAL RADIOLOGY | Age: 68
Discharge: HOME | End: 2018-10-18
Payer: COMMERCIAL

## 2018-10-18 ENCOUNTER — OFFICE VISIT (OUTPATIENT)
Dept: INTERNAL MEDICINE | Age: 68
End: 2018-10-18
Payer: COMMERCIAL

## 2018-10-18 VITALS
HEIGHT: 69 IN | WEIGHT: 190.4 LBS | OXYGEN SATURATION: 96 % | DIASTOLIC BLOOD PRESSURE: 80 MMHG | SYSTOLIC BLOOD PRESSURE: 182 MMHG | BODY MASS INDEX: 28.2 KG/M2 | HEART RATE: 94 BPM

## 2018-10-18 DIAGNOSIS — R06.2 WHEEZING: ICD-10-CM

## 2018-10-18 DIAGNOSIS — R06.02 SHORTNESS OF BREATH: ICD-10-CM

## 2018-10-18 DIAGNOSIS — J98.4: ICD-10-CM

## 2018-10-18 DIAGNOSIS — N28.9 RENAL INSUFFICIENCY: ICD-10-CM

## 2018-10-18 DIAGNOSIS — R05.9 COUGHING: ICD-10-CM

## 2018-10-18 DIAGNOSIS — I10 HYPERTENSION, UNSPECIFIED TYPE: ICD-10-CM

## 2018-10-18 DIAGNOSIS — D86.9 SARCOIDOSIS: Primary | ICD-10-CM

## 2018-10-18 DIAGNOSIS — I48.0 PAROXYSMAL A-FIB (HCC): ICD-10-CM

## 2018-10-18 LAB
ANION GAP SERPL CALCULATED.3IONS-SCNC: 14 MMOL/L (ref 7–19)
BUN BLDV-MCNC: 13 MG/DL (ref 8–23)
CALCIUM SERPL-MCNC: 10.5 MG/DL (ref 8.8–10.2)
CHLORIDE BLD-SCNC: 102 MMOL/L (ref 98–111)
CO2: 27 MMOL/L (ref 22–29)
CREAT SERPL-MCNC: 1.1 MG/DL (ref 0.5–1.2)
D DIMER: 1.12 UG/ML FEU (ref 0–0.48)
GFR NON-AFRICAN AMERICAN: >60
GLUCOSE BLD-MCNC: 204 MG/DL (ref 74–109)
POTASSIUM SERPL-SCNC: 4.3 MMOL/L (ref 3.5–5)
SODIUM BLD-SCNC: 143 MMOL/L (ref 136–145)

## 2018-10-18 PROCEDURE — G8427 DOCREV CUR MEDS BY ELIG CLIN: HCPCS | Performed by: PHYSICIAN ASSISTANT

## 2018-10-18 PROCEDURE — 71275 CT ANGIOGRAPHY CHEST: CPT

## 2018-10-18 PROCEDURE — 71046 X-RAY EXAM CHEST 2 VIEWS: CPT

## 2018-10-18 PROCEDURE — G8417 CALC BMI ABV UP PARAM F/U: HCPCS | Performed by: PHYSICIAN ASSISTANT

## 2018-10-18 PROCEDURE — 1101F PT FALLS ASSESS-DOCD LE1/YR: CPT | Performed by: PHYSICIAN ASSISTANT

## 2018-10-18 PROCEDURE — 93000 ELECTROCARDIOGRAM COMPLETE: CPT | Performed by: PHYSICIAN ASSISTANT

## 2018-10-18 PROCEDURE — 3017F COLORECTAL CA SCREEN DOC REV: CPT | Performed by: PHYSICIAN ASSISTANT

## 2018-10-18 PROCEDURE — 1036F TOBACCO NON-USER: CPT | Performed by: PHYSICIAN ASSISTANT

## 2018-10-18 PROCEDURE — 96372 THER/PROPH/DIAG INJ SC/IM: CPT | Performed by: PHYSICIAN ASSISTANT

## 2018-10-18 PROCEDURE — 1123F ACP DISCUSS/DSCN MKR DOCD: CPT | Performed by: PHYSICIAN ASSISTANT

## 2018-10-18 PROCEDURE — 6360000004 HC RX CONTRAST MEDICATION: Performed by: PHYSICIAN ASSISTANT

## 2018-10-18 PROCEDURE — G8484 FLU IMMUNIZE NO ADMIN: HCPCS | Performed by: PHYSICIAN ASSISTANT

## 2018-10-18 PROCEDURE — 4040F PNEUMOC VAC/ADMIN/RCVD: CPT | Performed by: PHYSICIAN ASSISTANT

## 2018-10-18 PROCEDURE — 99214 OFFICE O/P EST MOD 30 MIN: CPT | Performed by: PHYSICIAN ASSISTANT

## 2018-10-18 RX ORDER — METHYLPREDNISOLONE ACETATE 80 MG/ML
80 INJECTION, SUSPENSION INTRA-ARTICULAR; INTRALESIONAL; INTRAMUSCULAR; SOFT TISSUE ONCE
Status: COMPLETED | OUTPATIENT
Start: 2018-10-18 | End: 2018-10-18

## 2018-10-18 RX ORDER — LEVOFLOXACIN 500 MG/1
500 TABLET, FILM COATED ORAL DAILY
Qty: 10 TABLET | Refills: 0 | Status: SHIPPED | OUTPATIENT
Start: 2018-10-18 | End: 2018-10-19 | Stop reason: CLARIF

## 2018-10-18 RX ORDER — METHYLPREDNISOLONE 4 MG/1
TABLET ORAL
Qty: 1 KIT | Refills: 0 | Status: SHIPPED | OUTPATIENT
Start: 2018-10-18 | End: 2018-10-19

## 2018-10-18 RX ORDER — ALBUTEROL SULFATE 90 UG/1
2 AEROSOL, METERED RESPIRATORY (INHALATION) EVERY 6 HOURS PRN
Qty: 1 INHALER | Refills: 5 | Status: SHIPPED | OUTPATIENT
Start: 2018-10-18 | End: 2019-11-25 | Stop reason: SDUPTHER

## 2018-10-18 RX ADMIN — IOPAMIDOL 90 ML: 755 INJECTION, SOLUTION INTRAVENOUS at 14:21

## 2018-10-18 RX ADMIN — METHYLPREDNISOLONE ACETATE 80 MG: 80 INJECTION, SUSPENSION INTRA-ARTICULAR; INTRALESIONAL; INTRAMUSCULAR; SOFT TISSUE at 13:21

## 2018-10-18 ASSESSMENT — ENCOUNTER SYMPTOMS
BACK PAIN: 0
PHOTOPHOBIA: 0
EYE PAIN: 0
WHEEZING: 1
VOMITING: 0
SORE THROAT: 0
RHINORRHEA: 0
CONSTIPATION: 0
NAUSEA: 0
DIARRHEA: 0
SHORTNESS OF BREATH: 1
CHEST TIGHTNESS: 0
SINUS PRESSURE: 0
ABDOMINAL PAIN: 0
COLOR CHANGE: 0
COUGH: 1
EYE REDNESS: 0

## 2018-10-18 NOTE — PROGRESS NOTES
increase in size of a left axillary   lymph node. The scattered nodular infiltrate and pulmonary nodules bilaterally   which appears stable since the previous study. An encasement and marked narrowing of the right middle lobe bronchus   and right middle lobe consolidation/atelectasis which is stable since   the previous study. A moderate right basal pleural effusion and a trace left basal pleural   effusion which was not noted in the previous study. Signed by Dr Oscar Crowder on 10/18/2018 2:49 PM     Narrative   EXAMINATION:  XR CHEST (2 VW)  10/18/2018 2:24 PM   HISTORY: Shortness of breath. History of sarcoidosis   COMPARISON: Chest x-rays 10/2/2017. CT the chest with contrast   7/13/2018. FINDINGS:  PA and lateral views of the chest were obtained. The lungs   are hypoaerated, there is interstitial thickening and nodular   opacities are seen within both lungs, centrally predominant and   probably related to the history of sarcoidosis. There is no definite   superimposed acute pneumonia. No pneumothorax or pleural effusion is   seen. Heart size is normal.       Impression   Shallow inspiration with extensive chronic lung changes   compatible with the history of sarcoidosis, with no definite   superimposed acute infiltrate/pneumonia. Signed by Dr Vonna Bosworth. Frank on 10/18/2018 2:28 PM         10/18/2018  2:55 PM - Rafa Joel Incoming Lab Results From [facility] [facility] Results     Component Value Ref Range & Units Status Collected Lab   D-Dimer, Quant 1.12   0.00 - 0.48 ug/mL FEU Final 10/18/2018  1:41 PM [facility]   Interpretation:     D-DIMER < 0.48 UG/FEUs     Thrombosis or Disseminated   Intravascular Coagulation is   unlikely. D-DIMER >OR = 0.48 UG/FEUs Thrombosis or Disseminated   Intravascular Coagulation cannot   be ruled out. EKG: Rate 87 bpm,  MS,  MS, QTC H 381 MS, QRSd 102 MS, P/QRS/T 90/-17/54°.   Sinus rhythm with an occasional premature atrial contraction ×1 incomplete right bundle branch block. No ST elevations or depression noted no significant Q waves. Fairly unremarkable EKG    ASSESSMENT      ICD-10-CM    1. Sarcoidosis D86.9    2. Shortness of breath R06.02 XR CHEST STANDARD (2 VW)     CTA CHEST W WO CONTRAST     D-Dimer, Quantitative   3. Wheezing R06.2 albuterol sulfate HFA (VENTOLIN HFA) 108 (90 Base) MCG/ACT inhaler     levofloxacin (LEVAQUIN) 500 MG tablet     methylPREDNISolone (MEDROL DOSEPACK) 4 MG tablet     methylPREDNISolone acetate (DEPO-MEDROL) injection 80 mg     CTA CHEST W WO CONTRAST     D-Dimer, Quantitative   4. Coughing R05    5. Lung trouble J98.4    6. Hypertension, unspecified type I10    7. Paroxysmal A-fib ([address] I48.0        PLAN  Patient has a history of sarcoidosis. We will go ahead and do a chest x-ray and d-dimer will be in process and then with patient having history of atrial fibrillation and having the right-sided chest pain and patient was recently worked up for cancer but it evidently was benign lymph nodes not enlarged. I went ahead and got patient in for a CT angiogram of the chest to rule out pulmonary embolism since this came on fairly quickly and patient having the wheezing in the pain in the right side of the chest and lung. We did a EKG which was fairly unremarkable other than a premature atrial contraction and an incomplete right bundle-branch block and no ST elevation or depression. No significant Q waves. Patient is on Xarelto 20 mg for clot prevention. Patient's blood pressure has been elevated. Patient was given clonidine 0.1 mg in the office. Patient has not taken his clonidine today. Patient denies any chest pain. Patient will continue to monitor blood pressure if consistently staying high may need to adjust his medication more. Patient does need to take his clonidine every day if his blood pressures are running high or we need to start a different medication.   We went ahead and started

## 2018-10-19 ENCOUNTER — OFFICE VISIT (OUTPATIENT)
Dept: INTERNAL MEDICINE | Age: 68
End: 2018-10-19
Payer: OTHER GOVERNMENT

## 2018-10-19 VITALS
HEART RATE: 91 BPM | WEIGHT: 185 LBS | OXYGEN SATURATION: 95 % | BODY MASS INDEX: 27.4 KG/M2 | HEIGHT: 69 IN | DIASTOLIC BLOOD PRESSURE: 82 MMHG | SYSTOLIC BLOOD PRESSURE: 176 MMHG | RESPIRATION RATE: 16 BRPM

## 2018-10-19 DIAGNOSIS — N28.9 RENAL INSUFFICIENCY: ICD-10-CM

## 2018-10-19 DIAGNOSIS — I10 HYPERTENSION, UNSPECIFIED TYPE: ICD-10-CM

## 2018-10-19 DIAGNOSIS — J90 PLEURAL EFFUSION ON RIGHT: Primary | ICD-10-CM

## 2018-10-19 DIAGNOSIS — E11.42 TYPE 2 DIABETES MELLITUS WITH PERIPHERAL NEUROPATHY (HCC): ICD-10-CM

## 2018-10-19 DIAGNOSIS — J20.9 ACUTE BRONCHITIS, UNSPECIFIED ORGANISM: ICD-10-CM

## 2018-10-19 PROCEDURE — 2022F DILAT RTA XM EVC RTNOPTHY: CPT | Performed by: NURSE PRACTITIONER

## 2018-10-19 PROCEDURE — G8427 DOCREV CUR MEDS BY ELIG CLIN: HCPCS | Performed by: NURSE PRACTITIONER

## 2018-10-19 PROCEDURE — G8484 FLU IMMUNIZE NO ADMIN: HCPCS | Performed by: NURSE PRACTITIONER

## 2018-10-19 PROCEDURE — 3044F HG A1C LEVEL LT 7.0%: CPT | Performed by: NURSE PRACTITIONER

## 2018-10-19 PROCEDURE — 1036F TOBACCO NON-USER: CPT | Performed by: NURSE PRACTITIONER

## 2018-10-19 PROCEDURE — 3017F COLORECTAL CA SCREEN DOC REV: CPT | Performed by: NURSE PRACTITIONER

## 2018-10-19 PROCEDURE — 1101F PT FALLS ASSESS-DOCD LE1/YR: CPT | Performed by: NURSE PRACTITIONER

## 2018-10-19 PROCEDURE — G8417 CALC BMI ABV UP PARAM F/U: HCPCS | Performed by: NURSE PRACTITIONER

## 2018-10-19 PROCEDURE — 1123F ACP DISCUSS/DSCN MKR DOCD: CPT | Performed by: NURSE PRACTITIONER

## 2018-10-19 PROCEDURE — 4040F PNEUMOC VAC/ADMIN/RCVD: CPT | Performed by: NURSE PRACTITIONER

## 2018-10-19 PROCEDURE — 99214 OFFICE O/P EST MOD 30 MIN: CPT | Performed by: NURSE PRACTITIONER

## 2018-10-19 ASSESSMENT — ENCOUNTER SYMPTOMS
TROUBLE SWALLOWING: 0
DIARRHEA: 0
STRIDOR: 0
ABDOMINAL DISTENTION: 0
NAUSEA: 0
SHORTNESS OF BREATH: 0
CHOKING: 0
ABDOMINAL PAIN: 0
EYE ITCHING: 0
COLOR CHANGE: 0
BLOOD IN STOOL: 0
VOMITING: 0
SORE THROAT: 0
CONSTIPATION: 0
EYE DISCHARGE: 0
COUGH: 0
WHEEZING: 0

## 2018-10-31 RX ORDER — SILDENAFIL 100 MG/1
100 TABLET, FILM COATED ORAL PRN
COMMUNITY
End: 2018-10-31 | Stop reason: SDUPTHER

## 2018-10-31 RX ORDER — SILDENAFIL 100 MG/1
100 TABLET, FILM COATED ORAL PRN
Qty: 30 TABLET | Refills: 5 | Status: SHIPPED | OUTPATIENT
Start: 2018-10-31 | End: 2018-11-26

## 2018-11-02 DIAGNOSIS — N28.9 RENAL INSUFFICIENCY: ICD-10-CM

## 2018-11-02 LAB
ANION GAP SERPL CALCULATED.3IONS-SCNC: 16 MMOL/L (ref 7–19)
BUN BLDV-MCNC: 21 MG/DL (ref 8–23)
CALCIUM SERPL-MCNC: 11.3 MG/DL (ref 8.8–10.2)
CHLORIDE BLD-SCNC: 95 MMOL/L (ref 98–111)
CO2: 29 MMOL/L (ref 22–29)
CREAT SERPL-MCNC: 1.2 MG/DL (ref 0.5–1.2)
GFR NON-AFRICAN AMERICAN: >60
GLUCOSE BLD-MCNC: 211 MG/DL (ref 74–109)
POTASSIUM SERPL-SCNC: 4.3 MMOL/L (ref 3.5–5)
SODIUM BLD-SCNC: 140 MMOL/L (ref 136–145)

## 2018-11-05 DIAGNOSIS — E11.42 TYPE 2 DIABETES MELLITUS WITH PERIPHERAL NEUROPATHY (HCC): Primary | ICD-10-CM

## 2018-11-05 RX ORDER — GLIPIZIDE 5 MG/1
5 TABLET ORAL DAILY
Qty: 60 TABLET | Refills: 3 | Status: SHIPPED | OUTPATIENT
Start: 2018-11-05 | End: 2019-03-26 | Stop reason: ALTCHOICE

## 2018-11-13 DIAGNOSIS — E11.42 TYPE 2 DIABETES MELLITUS WITH PERIPHERAL NEUROPATHY (HCC): ICD-10-CM

## 2018-11-13 NOTE — TELEPHONE ENCOUNTER
Jd Finch called requesting a refill of the below medication which has been pended for you:     Requested Prescriptions     Pending Prescriptions Disp Refills    LYRICA 50 MG capsule [Pharmacy Med Name: LYRICA 50MG         CAP] 30 capsule 0     Sig: TAKE 1 CAPSULE BY MOUTH NIGHTLY       Last Appointment Date: 10/19/2018  Next Appointment Date: 2/25/2019    Allergies   Allergen Reactions    Acetaminophen     Bee Venom     Gabapentin      Causes blisters to head.      Penicillins     Toprol Xl [Metoprolol]      Leg pain

## 2018-11-18 DIAGNOSIS — E11.42 TYPE 2 DIABETES MELLITUS WITH PERIPHERAL NEUROPATHY (HCC): ICD-10-CM

## 2018-11-19 RX ORDER — PREGABALIN 50 MG/1
50 CAPSULE ORAL EVERY EVENING
Qty: 30 CAPSULE | Refills: 0 | Status: SHIPPED | OUTPATIENT
Start: 2018-11-19 | End: 2019-01-31 | Stop reason: SDUPTHER

## 2018-11-26 ENCOUNTER — OFFICE VISIT (OUTPATIENT)
Dept: CARDIOLOGY | Age: 68
End: 2018-11-26
Payer: MEDICARE

## 2018-11-26 VITALS
WEIGHT: 188 LBS | HEART RATE: 84 BPM | SYSTOLIC BLOOD PRESSURE: 138 MMHG | DIASTOLIC BLOOD PRESSURE: 70 MMHG | BODY MASS INDEX: 27.85 KG/M2 | HEIGHT: 69 IN

## 2018-11-26 DIAGNOSIS — Z86.79 S/P ABLATION OF ATRIAL FIBRILLATION: ICD-10-CM

## 2018-11-26 DIAGNOSIS — Z98.890 S/P ABLATION OF ATRIAL FIBRILLATION: ICD-10-CM

## 2018-11-26 DIAGNOSIS — I10 ESSENTIAL HYPERTENSION: ICD-10-CM

## 2018-11-26 DIAGNOSIS — I48.20 CHRONIC ATRIAL FIBRILLATION (HCC): Primary | ICD-10-CM

## 2018-11-26 DIAGNOSIS — E78.2 MIXED HYPERLIPIDEMIA: ICD-10-CM

## 2018-11-26 PROCEDURE — 1101F PT FALLS ASSESS-DOCD LE1/YR: CPT | Performed by: NURSE PRACTITIONER

## 2018-11-26 PROCEDURE — G8484 FLU IMMUNIZE NO ADMIN: HCPCS | Performed by: NURSE PRACTITIONER

## 2018-11-26 PROCEDURE — 3017F COLORECTAL CA SCREEN DOC REV: CPT | Performed by: NURSE PRACTITIONER

## 2018-11-26 PROCEDURE — 99214 OFFICE O/P EST MOD 30 MIN: CPT | Performed by: NURSE PRACTITIONER

## 2018-11-26 PROCEDURE — G8427 DOCREV CUR MEDS BY ELIG CLIN: HCPCS | Performed by: NURSE PRACTITIONER

## 2018-11-26 PROCEDURE — G8417 CALC BMI ABV UP PARAM F/U: HCPCS | Performed by: NURSE PRACTITIONER

## 2018-11-26 RX ORDER — EPINEPHRINE 0.3 MG/.3ML
0.3 INJECTION SUBCUTANEOUS PRN
COMMUNITY
End: 2019-05-31 | Stop reason: SDUPTHER

## 2018-11-26 RX ORDER — CLONIDINE HYDROCHLORIDE 0.1 MG/1
0.1 TABLET ORAL DAILY
COMMUNITY
End: 2019-03-26

## 2018-11-26 NOTE — PROGRESS NOTES
SOAJ injection Inject 0.3 mg into the muscle as needed      rivaroxaban (XARELTO) 20 MG TABS tablet Take 1 tablet by mouth Daily with supper TAKE ONE TABLET DAILY WITH  EVENING  MEAL 30 tablet 5    SITagliptin (JANUVIA) 50 MG tablet Take 2 tablets by mouth daily 90 tablet 2    pregabalin (LYRICA) 50 MG capsule Take 1 capsule by mouth every evening for 30 days. . 30 capsule 0    LYRICA 50 MG capsule TAKE 1 CAPSULE BY MOUTH NIGHTLY 30 capsule 0    glipiZIDE (GLUCOTROL) 5 MG tablet Take 1 tablet by mouth daily 60 tablet 3    albuterol sulfate HFA (VENTOLIN HFA) 108 (90 Base) MCG/ACT inhaler Inhale 2 puffs into the lungs every 6 hours as needed for Wheezing 1 Inhaler 5    metoprolol tartrate (LOPRESSOR) 25 MG tablet Take 25 mg by mouth 2 times daily      IRON PO Take 2 tablets by mouth daily       losartan (COZAAR) 100 MG tablet Take 1 tablet by mouth daily 90 tablet 3    aspirin 81 MG tablet Take 81 mg by mouth daily. No current facility-administered medications for this visit. Allergies: Acetaminophen; Bee venom; Gabapentin; Penicillins; and Toprol xl [metoprolol]    Review of Systems  Constitutional - no appetite change, or unexpected weight change. No fever, chills or diaphoresis. No significant change in activity level or new onset of fatigue. HEENT - no significant rhinorrhea or epistaxis. No tinnitus or significant hearing loss. Eyes - no sudden vision change or amaurosis. No corneal arcus, xantholasma, subconjunctival hemorrhage or discharge. Respiratory - no significant wheezing, stridor, apnea or cough. No dyspnea on exertion or shortness of air. Cardiovascular - no exertional chest pain to suggest myocardial ischemia. No orthopnea or PND. No sensation of sustained arrythmia. No occurrence of slow heart rate. No palpitations. No claudication. No leg edema. Gastrointestinal - no abdominal swelling or pain. No blood in stool.  No severe constipation, diarrhea, nausea, or medical problems. Call the office with any problems, questions or concerns at [phone]. Follow up as scheduled with your cardiologist. Dr. Elias Mahan 6 months. The following educational material has been included in this after visit summary for your review: Life simple 7. Atrial fibrillation, Xarelto.     Additional instructions:  Xarelto can increase your risk of bleeding. If you notice blood in urine or stool, bleeding gums, excessive bruising or cough productive of bloody sputum, notify the office. Information on this blood thinner has been included in your after visit summary. Coronary artery disease risk factors you can control: Smoking, high blood pressure, high cholesterol, diabetes, being overweight, lack of exercise and stress. Continue heart healthy diet. Take medications as directed. Exercise as tolerated. Strive for 15 minutes of exercise most days of the week. If asked to keep a blood pressure log, do so for 2 weeks. Call the office to report readings at [phone]. Blood pressure goal  is less than 120/70. Elevated blood pressure at 120-129/80 or less. High blood pressure at 130-139/80-89. If you are taking cholesterol lowering medications, it is recommended that lab work be checked annually. Always keep a current medication list. Bring your medications to every office visit. Life simple 7  1) Manage blood pressure - high blood pressure is a major risk factor for heart disease and stroke. Keeping blood pressure in health range reduces strain on your heart, arteries and kidneys. 2) Control cholesterol - contributes to plaque, which can clog arteries and lead to heart disease and stroke. When you control your cholesterol you are giving your arteries their best chance to remain clear. 3) Reduce blood sugar - most of the food we eat is turning into glucose or blood sugar that our body uses for energy.   Over time, high levels of blood sugar can damage your heart, kidneys, eyes and nerves. 4) Get active - living an active life is one of the most rewarding gifts you can give yourself and those you love. Simply put, daily physical activity increases your length and quality of life. 5)  Eat better - A healthy diet is one of your best weapons for fighting cardiovascular disease. When you eat a heart healthy diet, you improve your chances for feeling good and staying healthy for life. 6)  Lose weight - when you shed extra fat an unnecessary pounds, you reduce the burden on your hear, lungs, blood vessels and skeleton. You give yourself the gift of active living, you lower your blood pressure and help yourself feel better. 7) Stop smoking - cigarette smokers have a higher risk of developing cardiovascular disease. If  You smoke, quitting is the best thing you can do for your health.   Check American Heart Association on line for more information on Life's Simple 7 and tips for healthy living.        NUNO Gasca

## 2018-11-26 NOTE — PATIENT INSTRUCTIONS
one time. If you take rivaroxaban 2 times each day: Take the missed dose as soon as you remember. You may take 2 doses at the same time to make up a missed dose. Get your prescription refilled before you run out of medicine completely. What happens if I overdose? Seek emergency medical attention or call the Poison Help line at [phone]. Overdose may cause excessive bleeding. What should I avoid while taking rivaroxaban? Avoid activities that may increase your risk of bleeding or injury. Use extra care to prevent bleeding while shaving or brushing your teeth. What are the possible side effects of rivaroxaban? Get emergency medical help if you have signs of an allergic reaction: hives; difficult breathing; swelling of your face, lips, tongue, or throat. Also seek emergency medical attention if you have symptoms of a spinal blood clot: back pain, numbness or muscle weakness in your lower body, or loss of bladder or bowel control. Rivaroxaban can cause you to bleed more easily. Call your doctor at once if you have signs of bleeding such as:  · easy bruising or bleeding (nosebleeds, bleeding gums, heavy menstrual bleeding);  · pain, swelling, or drainage from a wound or where a needle was injected in your skin;  · bleeding from wounds or needle injections, any bleeding that will not stop;  · headaches, dizziness, weakness, feeling like you might pass out;  · urine that looks red, pink, or brown; or  · bloody or tarry stools, coughing up blood or vomit that looks like coffee grounds. Bleeding is the most common side effect of rivaroxaban. This is not a complete list of side effects and others may occur. Call your doctor for medical advice about side effects. You may report side effects to FDA at 4-228-FDA-9330. What other drugs will affect rivaroxaban? Sometimes it is not safe to use certain medications at the same time.  Some drugs can affect your blood levels of other drugs you take, which may increase side effects or make the medications less effective. Other drugs may affect rivaroxaban, including prescription and over-the-counter medicines, vitamins, and herbal products. Tell your doctor about all your current medicines and any medicine you start or stop using. Where can I get more information? Your pharmacist can provide more information about rivaroxaban. Remember, keep this and all other medicines out of the reach of children, never share your medicines with others, and use this medication only for the indication prescribed. Every effort has been made to ensure that the information provided by [address] is accurate, up-to-date, and complete, but no guarantee is made to that effect. Drug information contained herein may be time sensitive. [facility] information has been compiled for use by healthcare practitioners and consumers in the [place] and therefore [facility] does not warrant that uses outside of the [place] are appropriate, unless specifically indicated otherwise. [facility]'s drug information does not endorse drugs, diagnose patients or recommend therapy. [facility] drug information is an informational resource designed to assist licensed healthcare practitioners in caring for their patients and/or to serve consumers viewing this service as a supplement to, and not a substitute for, the expertise, skill, knowledge and judgment of healthcare practitioners. The absence of a warning for a given drug or drug combination in no way should be construed to indicate that the drug or drug combination is safe, effective or appropriate for any given patient. [facility] does not assume any responsibility for any aspect of healthcare administered with the aid of information [facility] provides. The information contained herein is not intended to cover all possible uses, directions, precautions, warnings, drug interactions, allergic reactions, or adverse effects.  If you have questions

## 2018-11-27 ENCOUNTER — OFFICE VISIT (OUTPATIENT)
Dept: PULMONOLOGY | Facility: CLINIC | Age: 68
End: 2018-11-27

## 2018-11-27 VITALS
OXYGEN SATURATION: 97 % | HEIGHT: 68 IN | RESPIRATION RATE: 16 BRPM | SYSTOLIC BLOOD PRESSURE: 138 MMHG | DIASTOLIC BLOOD PRESSURE: 70 MMHG | WEIGHT: 185 LBS | BODY MASS INDEX: 28.04 KG/M2 | HEART RATE: 80 BPM

## 2018-11-27 DIAGNOSIS — J98.4 RESTRICTIVE LUNG DISEASE: ICD-10-CM

## 2018-11-27 DIAGNOSIS — D86.9 SARCOIDOSIS: Primary | ICD-10-CM

## 2018-11-27 DIAGNOSIS — J44.9 STAGE 2 MODERATE COPD BY GOLD CLASSIFICATION (HCC): ICD-10-CM

## 2018-11-27 LAB
DIFF CAP.CO: NORMAL ML/MMHG SEC
FEV1/FVC: NORMAL %
FEV1: NORMAL LITERS
FVC VOL RESPIRATORY: NORMAL LITERS
TLC: NORMAL LITERS

## 2018-11-27 PROCEDURE — 94729 DIFFUSING CAPACITY: CPT | Performed by: NURSE PRACTITIONER

## 2018-11-27 PROCEDURE — 99214 OFFICE O/P EST MOD 30 MIN: CPT | Performed by: NURSE PRACTITIONER

## 2018-11-27 PROCEDURE — 94010 BREATHING CAPACITY TEST: CPT | Performed by: NURSE PRACTITIONER

## 2018-11-27 PROCEDURE — 94727 GAS DIL/WSHOT DETER LNG VOL: CPT | Performed by: NURSE PRACTITIONER

## 2018-11-27 RX ORDER — EPINEPHRINE 0.3 MG/.3ML
0.3 INJECTION SUBCUTANEOUS
COMMUNITY

## 2018-11-27 RX ORDER — PREGABALIN 50 MG/1
50 CAPSULE ORAL
COMMUNITY
Start: 2018-11-13 | End: 2018-12-19

## 2018-11-27 RX ORDER — CLONIDINE HYDROCHLORIDE 0.1 MG/1
0.1 TABLET ORAL
COMMUNITY
End: 2019-07-12

## 2018-11-27 RX ORDER — ALBUTEROL SULFATE 90 UG/1
AEROSOL, METERED RESPIRATORY (INHALATION)
COMMUNITY
Start: 2018-10-18 | End: 2019-11-25 | Stop reason: SDUPTHER

## 2018-11-27 NOTE — PROGRESS NOTES
AURORA Dailey  [facility]   Respiratory Disease Clinic  [address] [place] [address]  Phone: [phone]  Fax: [phone]     Caio Riley is a 68 y.o. male.   CC:   Chief Complaint   Patient presents with   • Sarcoidosis      HPI: Caio Riley is a pleasant 68 y.o. male. The patient is here today for follow up of sarcoidosis.  The patient states that from a breathing standpoint he has been doing okay.  He had a spell last month where he saw his primary care provider, was given Levaquin and a Medrol Dosepak and is better now.  I did review CT scan and chest x-ray from October 2018.  Currently he has no increasing shortness of breath, no fever, no chills, no cough with purulent sputum.  He states that he has also had a recent kidney stone and removal of the stone.  He has also followed up with Dr. Hairston regarding abnormal calcium levels, and has now been placed on prednisone daily.  He does not know the dosage.  The patient is not happy about having to be on prednisone for his sarcoidosis and is asking if there are other treatment options.  We did discuss the possibility of Acthar; however, we would refer the patient to a tertiary care facility for evaluation.  He was given some information from the office today regarding Acthar and will take it home and read over it.  He will discuss this with Dr. Shah his next follow-up visit.  The patient's PCP is Iris Valdovinos APRN.  The patient is current with flu vaccine.  The patient is current with pneumonia vaccine.      The following portions of the patient's history were reviewed and updated as appropriate: allergies, current medications, past family history, past medical history, past social history, past surgical history and problem list.  Past Medical History:   Diagnosis Date   • A-fib (CMS/[facility])    • Arthritis    • Asthma    • COPD (chronic obstructive pulmonary disease) (CMS/[facility])    • Diabetes mellitus (CMS/[facility])    •  Hypertension    • Kidney stones    • Neuropathy    • Pancreatitis    • Sarcoidosis      History reviewed. No pertinent family history.  Social History     Socioeconomic History   • Marital status:      Spouse name: Not on file   • Number of children: Not on file   • Years of education: Not on file   • Highest education level: Not on file   Social Needs   • Financial resource strain: Not on file   • Food insecurity - worry: Not on file   • Food insecurity - inability: Not on file   • Transportation needs - medical: Not on file   • Transportation needs - non-medical: Not on file   Occupational History   • Not on file   Tobacco Use   • Smoking status: Former Smoker     Packs/day: 0.50     Years: 2.00     Pack years: 1.00     Types: Cigarettes     Last attempt to quit:      Years since quittin.9   • Smokeless tobacco: Never Used   Substance and Sexual Activity   • Alcohol use: No   • Drug use: Not on file   • Sexual activity: Defer   Other Topics Concern   • Not on file   Social History Narrative   • Not on file     Review of Systems   Constitutional: Negative for chills and fever.   HENT: Negative for congestion.    Eyes: Negative for blurred vision.   Respiratory: Negative for cough and shortness of breath.    Cardiovascular: Negative for chest pain.   Gastrointestinal: Negative for diarrhea, nausea and vomiting.   Endocrine: Negative for cold intolerance and heat intolerance.   Genitourinary: Negative for dysuria.   Musculoskeletal: Negative for arthralgias.   Skin: Negative for rash.   Neurological: Negative for dizziness, weakness and light-headedness.   Hematological: Does not bruise/bleed easily.   Psychiatric/Behavioral: Negative for agitation. The patient is not nervous/anxious.      Vitals:    18 1024   BP: 138/70   Pulse: 80   Resp: 16   SpO2: 97%     Physical Exam   Constitutional: He is oriented to person, place, and time. He appears well-developed and well-nourished. No distress.    HENT:   Head: Normocephalic and atraumatic.   Eyes: Conjunctivae and EOM are normal. Pupils are equal, round, and reactive to light. No scleral icterus.   Neck: Normal range of motion. Neck supple.   Cardiovascular: Normal rate, regular rhythm and normal heart sounds. Exam reveals no friction rub.   No murmur heard.  Pulmonary/Chest: Effort normal and breath sounds normal. No respiratory distress. He has no wheezes. He has no rales.   Abdominal: Soft. Bowel sounds are normal. He exhibits no distension. There is no tenderness.   Musculoskeletal: Normal range of motion. He exhibits no edema.   Neurological: He is alert and oriented to person, place, and time.   Skin: Skin is warm and dry.   Psychiatric: He has a normal mood and affect. His behavior is normal. Judgment and thought content normal.   Nursing note and vitals reviewed.    Pulmonary Functions Testing Results:  FEV1   Date Value Ref Range Status   11/27/2018 53% liters Final     FVC   Date Value Ref Range Status   11/27/2018 61% liters Final     FEV1/FVC   Date Value Ref Range Status   11/27/2018 68.07% % Final     TLC   Date Value Ref Range Status   11/27/2018 69% liters Final     DLCO   Date Value Ref Range Status   11/27/2018 55% ml/mmHg sec Final     My PFT Interpretation: Spirometry is consistent with moderate obstructive disease and very severe small airway disease.  Lung volumes are indicative of restrictive lung disease as evidenced by reduced vital capacity and residual volume.  Diffusion shows a moderate diffusion impairment when corrected for alveolar volume is normal.  Imaging: CTA of the chest on 10/18/2018 at [facility]  No evidence of pulmonary embolism.  Atheromatous changes of thoracic aorta. No aneurysmal dilatation or  dissection.  Atheromatous changes of coronary arteries.  Evidence of persistent mediastinal and hilar lymphadenopathy. Evidence  of axillary lymphadenopathy with increase in size of a left axillary  lymph node.  The  scattered nodular infiltrate and pulmonary nodules bilaterally  which appears stable since the previous study.  An encasement and marked narrowing of the right middle lobe bronchus  and right middle lobe consolidation/atelectasis which is stable since  the previous study.  A moderate right basal pleural effusion and a trace left basal pleural  effusion which was not noted in the previous study.    Chest x-ray from 10/18/2018 at [facility]  Shallow inspiration with extensive chronic lung changes  compatible with the history of sarcoidosis, with no definite  superimposed acute infiltrate/pneumonia.    Assessment and Plan:   Caio was seen today for sarcoidosis.    Diagnoses and all orders for this visit:    Sarcoidosis  -     Cancel: XR Chest 2 View; Future  -     XR Chest 2 View; Future  Continue current treatment per Dr. Hairston with prednisone.  The patient was given information regarding Acthar today from the office.  If he does wish to pursue this and would recommend referral to tertiary care facility for evaluation.  He will discuss this at his next office visit with Dr. Shah.  Chest x-ray prior to return  Stage 2 moderate COPD by GOLD classification (CMS/HCC)  Most likely this is related to restrictive lung disease from sarcoidosis.  Continue Advair.  Restrictive lung disease  Secondary to sarcoidosis.    Patient's Body mass index is 28.13 kg/m². BMI is within normal parameters. No follow-up required.    Follow up: 4 months with chest x-ray  Tamera Ferrer, APRN  11/27/2018  4:16 PM

## 2018-11-27 NOTE — PATIENT INSTRUCTIONS
Sarcoidosis  Sarcoidosis is a disease that causes inflammation in your organs and other areas of your body. The lungs are most often affected (pulmonary sarcoidosis). Sarcoidosis can also affect your lymph nodes, liver, eyes, skin, or any other body tissue.  When you have sarcoidosis, small clumps of tissue (granulomas) form in the affected area of your body. Granulomas are made up of your body’s defense (immune) cells. Inflammation results when your body reacts to a harmful substance. Normally, inflammation goes away after immune cells get rid of the harmful substance. In sarcoidosis, the immune cells form granulomas instead.  What are the causes?  The exact cause of sarcoidosis is not known. Something triggers the immune system to respond, such as dust, chemicals, bacteria, or a virus.  What increases the risk?  You may be at a greater risk for sarcoidosis if you:  · Have a family history of the disease.  · Are .  · Are of [place]  ancestry.  · Are 20-50 years old.  · Are female.    What are the signs or symptoms?  Many people with sarcoidosis have no symptoms. Others have very mild symptoms. Sarcoidosis most often affects the lungs. Symptoms include:  · Chest pain.  · Coughing.  · Wheezing.  · Shortness of breath.    Other common symptoms include:  · Night sweats.  · Weight loss.  · Fatigue.  · Depression.  · A sense of uneasiness.    How is this diagnosed?  Sarcoidosis may be diagnosed by:  · Medical history and physical exam.  · Chest X-ray. This looks for granulomas in your lungs.  · Lung function tests. These measure your breathing and look for problems related to sarcoidosis.  · Examining a sample of tissue under a microscope (biopsy).    How is this treated?  Sarcoidosis usually clears up without treatment. You may take medicines to reduce inflammation or relieve symptoms. These may include:  · Prednisone. This steroid reduces inflammation related to sarcoidosis.  · Chloroquine or  hydroxychloroquine. These are antimalarial medicines used to treat sarcoidosis that affects the skin or brain.  · Methotrexate, leflunomide, or azathioprine. These medicines affect the immune system and can help with sarcoidosis in the joints, eyes, skin, or lungs.  · Inhalers. Inhaled medicines can help you breathe if sarcoidosis is affecting your lungs.    Follow these instructions at home:  · Do not use any tobacco products, including cigarettes, chewing tobacco, or electronic cigarettes. If you need help quitting, ask your health care provider.  · Avoid secondhand smoke.  · Avoid irritating dust and chemicals. Stay indoors on days when air quality is poor in your area.  · Take medicines only as directed by your health care provider.  Contact a health care provider if:  · You have vision problems.  · You have shortness of breath.  · You have a dry, persistent cough.  · You have an irregular heartbeat.  · You have pain or ache in your joints, hands, or feet.  · You have an unexplained rash.  Get help right away if:  You have chest pain.  This information is not intended to replace advice given to you by your health care provider. Make sure you discuss any questions you have with your health care provider.  Document Released: 10/18/2005 Document Revised: 05/25/2017 Document Reviewed: 04/15/2015  [employer] Patient Education © 2018 [employer]

## 2018-12-03 DIAGNOSIS — E11.42 TYPE 2 DIABETES MELLITUS WITH PERIPHERAL NEUROPATHY (HCC): ICD-10-CM

## 2018-12-28 ENCOUNTER — TELEPHONE (OUTPATIENT)
Dept: INTERNAL MEDICINE | Age: 68
End: 2018-12-28

## 2018-12-28 RX ORDER — LOSARTAN POTASSIUM 100 MG/1
100 TABLET ORAL DAILY
Qty: 90 TABLET | Refills: 3 | Status: SHIPPED | OUTPATIENT
Start: 2018-12-28 | End: 2020-01-16

## 2019-01-02 RX ORDER — SITAGLIPTIN 50 MG/1
TABLET, FILM COATED ORAL
Qty: 30 TABLET | Refills: 2 | Status: SHIPPED | OUTPATIENT
Start: 2019-01-02 | End: 2019-02-25 | Stop reason: DRUGHIGH

## 2019-01-31 DIAGNOSIS — E11.42 TYPE 2 DIABETES MELLITUS WITH PERIPHERAL NEUROPATHY (HCC): ICD-10-CM

## 2019-02-25 ENCOUNTER — OFFICE VISIT (OUTPATIENT)
Dept: INTERNAL MEDICINE | Age: 69
End: 2019-02-25
Payer: COMMERCIAL

## 2019-02-25 VITALS
HEIGHT: 69 IN | BODY MASS INDEX: 28.29 KG/M2 | SYSTOLIC BLOOD PRESSURE: 136 MMHG | HEART RATE: 82 BPM | DIASTOLIC BLOOD PRESSURE: 80 MMHG | OXYGEN SATURATION: 97 % | WEIGHT: 191 LBS | RESPIRATION RATE: 16 BRPM

## 2019-02-25 DIAGNOSIS — E11.42 TYPE 2 DIABETES MELLITUS WITH PERIPHERAL NEUROPATHY (HCC): Primary | ICD-10-CM

## 2019-02-25 DIAGNOSIS — D86.9 SARCOIDOSIS: ICD-10-CM

## 2019-02-25 DIAGNOSIS — Z00.00 HEALTH CARE MAINTENANCE: ICD-10-CM

## 2019-02-25 DIAGNOSIS — E11.42 TYPE 2 DIABETES MELLITUS WITH PERIPHERAL NEUROPATHY (HCC): ICD-10-CM

## 2019-02-25 DIAGNOSIS — G89.4 CHRONIC PAIN SYNDROME: ICD-10-CM

## 2019-02-25 DIAGNOSIS — E83.52 HYPERCALCEMIA DUE TO SARCOIDOSIS: ICD-10-CM

## 2019-02-25 DIAGNOSIS — M89.9 DISORDER OF BONE: ICD-10-CM

## 2019-02-25 DIAGNOSIS — D86.9 HYPERCALCEMIA DUE TO SARCOIDOSIS: ICD-10-CM

## 2019-02-25 DIAGNOSIS — J43.8 OTHER EMPHYSEMA (HCC): ICD-10-CM

## 2019-02-25 DIAGNOSIS — E11.42 TYPE 2 DIABETES MELLITUS WITH DIABETIC POLYNEUROPATHY, WITHOUT LONG-TERM CURRENT USE OF INSULIN (HCC): ICD-10-CM

## 2019-02-25 DIAGNOSIS — Z12.5 ENCOUNTER FOR SCREENING FOR MALIGNANT NEOPLASM OF PROSTATE: ICD-10-CM

## 2019-02-25 DIAGNOSIS — I10 ESSENTIAL HYPERTENSION: ICD-10-CM

## 2019-02-25 DIAGNOSIS — L98.9 ARM SKIN LESION, LEFT: ICD-10-CM

## 2019-02-25 LAB
ALBUMIN SERPL-MCNC: 4.3 G/DL (ref 3.5–5.2)
ALP BLD-CCNC: 84 U/L (ref 40–130)
ALT SERPL-CCNC: 11 U/L (ref 5–41)
ANION GAP SERPL CALCULATED.3IONS-SCNC: 21 MMOL/L (ref 7–19)
AST SERPL-CCNC: 16 U/L (ref 5–40)
BILIRUB SERPL-MCNC: 0.6 MG/DL (ref 0.2–1.2)
BUN BLDV-MCNC: 15 MG/DL (ref 8–23)
CALCIUM SERPL-MCNC: 10.5 MG/DL (ref 8.8–10.2)
CHLORIDE BLD-SCNC: 101 MMOL/L (ref 98–111)
CHOLESTEROL, TOTAL: 178 MG/DL (ref 160–199)
CO2: 23 MMOL/L (ref 22–29)
CREAT SERPL-MCNC: 1.1 MG/DL (ref 0.5–1.2)
GFR NON-AFRICAN AMERICAN: >60
GLUCOSE BLD-MCNC: 208 MG/DL (ref 74–109)
HBA1C MFR BLD: 6.5 % (ref 4–6)
HCT VFR BLD CALC: 33.5 % (ref 42–52)
HDLC SERPL-MCNC: 41 MG/DL (ref 55–121)
HEMOGLOBIN: 11.5 G/DL (ref 14–18)
LDL CHOLESTEROL CALCULATED: 104 MG/DL
MCH RBC QN AUTO: 29.9 PG (ref 27–31)
MCHC RBC AUTO-ENTMCNC: 34.3 G/DL (ref 33–37)
MCV RBC AUTO: 87 FL (ref 80–94)
PDW BLD-RTO: 13.9 % (ref 11.5–14.5)
PLATELET # BLD: 125 K/UL (ref 130–400)
PMV BLD AUTO: 10.3 FL (ref 9.4–12.4)
POTASSIUM SERPL-SCNC: 4.3 MMOL/L (ref 3.5–5)
PROSTATE SPECIFIC ANTIGEN: 1.3 NG/ML (ref 0–4)
RBC # BLD: 3.85 M/UL (ref 4.7–6.1)
SODIUM BLD-SCNC: 145 MMOL/L (ref 136–145)
TOTAL PROTEIN: 7.4 G/DL (ref 6.6–8.7)
TRIGL SERPL-MCNC: 166 MG/DL (ref 0–149)
TSH SERPL DL<=0.05 MIU/L-ACNC: 4.87 UIU/ML (ref 0.27–4.2)
VITAMIN D 25-HYDROXY: 11.1 NG/ML
WBC # BLD: 5.1 K/UL (ref 4.8–10.8)

## 2019-02-25 PROCEDURE — 3044F HG A1C LEVEL LT 7.0%: CPT | Performed by: NURSE PRACTITIONER

## 2019-02-25 PROCEDURE — 3023F SPIROM DOC REV: CPT | Performed by: NURSE PRACTITIONER

## 2019-02-25 PROCEDURE — 4040F PNEUMOC VAC/ADMIN/RCVD: CPT | Performed by: NURSE PRACTITIONER

## 2019-02-25 PROCEDURE — G8427 DOCREV CUR MEDS BY ELIG CLIN: HCPCS | Performed by: NURSE PRACTITIONER

## 2019-02-25 PROCEDURE — G8926 SPIRO NO PERF OR DOC: HCPCS | Performed by: NURSE PRACTITIONER

## 2019-02-25 PROCEDURE — 1123F ACP DISCUSS/DSCN MKR DOCD: CPT | Performed by: NURSE PRACTITIONER

## 2019-02-25 PROCEDURE — G8417 CALC BMI ABV UP PARAM F/U: HCPCS | Performed by: NURSE PRACTITIONER

## 2019-02-25 PROCEDURE — G8482 FLU IMMUNIZE ORDER/ADMIN: HCPCS | Performed by: NURSE PRACTITIONER

## 2019-02-25 PROCEDURE — 99214 OFFICE O/P EST MOD 30 MIN: CPT | Performed by: NURSE PRACTITIONER

## 2019-02-25 PROCEDURE — 1101F PT FALLS ASSESS-DOCD LE1/YR: CPT | Performed by: NURSE PRACTITIONER

## 2019-02-25 PROCEDURE — 1036F TOBACCO NON-USER: CPT | Performed by: NURSE PRACTITIONER

## 2019-02-25 PROCEDURE — 2022F DILAT RTA XM EVC RTNOPTHY: CPT | Performed by: NURSE PRACTITIONER

## 2019-02-25 PROCEDURE — 3017F COLORECTAL CA SCREEN DOC REV: CPT | Performed by: NURSE PRACTITIONER

## 2019-02-25 RX ORDER — AZATHIOPRINE 50 MG/1
50 TABLET ORAL DAILY
COMMUNITY
Start: 2019-02-15

## 2019-02-25 RX ORDER — ERGOCALCIFEROL 1.25 MG/1
50000 CAPSULE ORAL WEEKLY
Qty: 30 CAPSULE | Refills: 3 | Status: SHIPPED | OUTPATIENT
Start: 2019-02-25 | End: 2019-05-21 | Stop reason: ALTCHOICE

## 2019-02-25 ASSESSMENT — ENCOUNTER SYMPTOMS
VOMITING: 0
CHOKING: 0
SORE THROAT: 0
DIARRHEA: 0
ABDOMINAL PAIN: 0
COLOR CHANGE: 0
BLOOD IN STOOL: 0
TROUBLE SWALLOWING: 0
COUGH: 0
SHORTNESS OF BREATH: 0
CONSTIPATION: 0
EYE ITCHING: 0
WHEEZING: 0
ABDOMINAL DISTENTION: 0
EYE DISCHARGE: 0
STRIDOR: 0
NAUSEA: 0

## 2019-02-26 ENCOUNTER — TELEPHONE (OUTPATIENT)
Dept: INTERNAL MEDICINE | Age: 69
End: 2019-02-26

## 2019-02-26 RX ORDER — CLONIDINE HYDROCHLORIDE 0.1 MG/1
TABLET ORAL
Qty: 60 TABLET | Refills: 3 | Status: SHIPPED | OUTPATIENT
Start: 2019-02-26 | End: 2019-05-21 | Stop reason: SDUPTHER

## 2019-03-07 ENCOUNTER — TELEPHONE (OUTPATIENT)
Dept: INTERNAL MEDICINE | Age: 69
End: 2019-03-07

## 2019-03-08 DIAGNOSIS — E11.42 TYPE 2 DIABETES MELLITUS WITH PERIPHERAL NEUROPATHY (HCC): ICD-10-CM

## 2019-03-26 ENCOUNTER — OFFICE VISIT (OUTPATIENT)
Dept: INTERNAL MEDICINE | Age: 69
End: 2019-03-26
Payer: MEDICARE

## 2019-03-26 ENCOUNTER — HOSPITAL ENCOUNTER (OUTPATIENT)
Dept: GENERAL RADIOLOGY | Age: 69
Discharge: HOME | End: 2019-03-26
Payer: MEDICARE

## 2019-03-26 VITALS
HEIGHT: 69 IN | DIASTOLIC BLOOD PRESSURE: 72 MMHG | WEIGHT: 188 LBS | OXYGEN SATURATION: 95 % | BODY MASS INDEX: 27.85 KG/M2 | RESPIRATION RATE: 18 BRPM | SYSTOLIC BLOOD PRESSURE: 180 MMHG | HEART RATE: 76 BPM

## 2019-03-26 DIAGNOSIS — M25.551 PAIN OF RIGHT HIP JOINT: ICD-10-CM

## 2019-03-26 DIAGNOSIS — M89.9 DISORDER OF BONE: ICD-10-CM

## 2019-03-26 DIAGNOSIS — E11.42 TYPE 2 DIABETES MELLITUS WITH PERIPHERAL NEUROPATHY (HCC): ICD-10-CM

## 2019-03-26 DIAGNOSIS — R06.09 DOE (DYSPNEA ON EXERTION): ICD-10-CM

## 2019-03-26 DIAGNOSIS — D86.0 SARCOIDOSIS, LUNG (HCC): ICD-10-CM

## 2019-03-26 DIAGNOSIS — I48.20 CHRONIC ATRIAL FIBRILLATION (HCC): ICD-10-CM

## 2019-03-26 DIAGNOSIS — M25.551 PAIN OF RIGHT HIP JOINT: Primary | ICD-10-CM

## 2019-03-26 DIAGNOSIS — H00.014 HORDEOLUM EXTERNUM OF LEFT UPPER EYELID: ICD-10-CM

## 2019-03-26 PROCEDURE — G8417 CALC BMI ABV UP PARAM F/U: HCPCS | Performed by: NURSE PRACTITIONER

## 2019-03-26 PROCEDURE — G8427 DOCREV CUR MEDS BY ELIG CLIN: HCPCS | Performed by: NURSE PRACTITIONER

## 2019-03-26 PROCEDURE — G8482 FLU IMMUNIZE ORDER/ADMIN: HCPCS | Performed by: NURSE PRACTITIONER

## 2019-03-26 PROCEDURE — 3044F HG A1C LEVEL LT 7.0%: CPT | Performed by: NURSE PRACTITIONER

## 2019-03-26 PROCEDURE — 1036F TOBACCO NON-USER: CPT | Performed by: NURSE PRACTITIONER

## 2019-03-26 PROCEDURE — 1123F ACP DISCUSS/DSCN MKR DOCD: CPT | Performed by: NURSE PRACTITIONER

## 2019-03-26 PROCEDURE — 99214 OFFICE O/P EST MOD 30 MIN: CPT | Performed by: NURSE PRACTITIONER

## 2019-03-26 PROCEDURE — 2022F DILAT RTA XM EVC RTNOPTHY: CPT | Performed by: NURSE PRACTITIONER

## 2019-03-26 PROCEDURE — 72100 X-RAY EXAM L-S SPINE 2/3 VWS: CPT

## 2019-03-26 PROCEDURE — 4040F PNEUMOC VAC/ADMIN/RCVD: CPT | Performed by: NURSE PRACTITIONER

## 2019-03-26 PROCEDURE — 3017F COLORECTAL CA SCREEN DOC REV: CPT | Performed by: NURSE PRACTITIONER

## 2019-03-26 PROCEDURE — 73502 X-RAY EXAM HIP UNI 2-3 VIEWS: CPT

## 2019-03-26 RX ORDER — DONEPEZIL HYDROCHLORIDE 5 MG/1
5 TABLET, FILM COATED ORAL NIGHTLY
Qty: 30 TABLET | Refills: 0 | Status: SHIPPED | OUTPATIENT
Start: 2019-03-26 | End: 2019-04-18 | Stop reason: SDUPTHER

## 2019-03-26 RX ORDER — TRAMADOL HYDROCHLORIDE 50 MG/1
50 TABLET ORAL EVERY 4 HOURS PRN
Qty: 42 TABLET | Refills: 0 | Status: SHIPPED | OUTPATIENT
Start: 2019-03-26 | End: 2019-05-13 | Stop reason: SDUPTHER

## 2019-03-26 ASSESSMENT — ENCOUNTER SYMPTOMS
BLOOD IN STOOL: 0
SHORTNESS OF BREATH: 1
STRIDOR: 0
NAUSEA: 0
CONSTIPATION: 0
CHOKING: 0
WHEEZING: 0
BACK PAIN: 1
ABDOMINAL PAIN: 0
SORE THROAT: 0
ABDOMINAL DISTENTION: 0
EYE DISCHARGE: 0
DIARRHEA: 0
EYE PAIN: 1
EYE ITCHING: 0
VOMITING: 0
COLOR CHANGE: 0
COUGH: 0
TROUBLE SWALLOWING: 0

## 2019-03-27 ENCOUNTER — HOSPITAL ENCOUNTER (OUTPATIENT)
Dept: GENERAL RADIOLOGY | Age: 69
Discharge: HOME | End: 2019-03-27
Payer: COMMERCIAL

## 2019-03-27 DIAGNOSIS — J90 PLEURAL EFFUSION ON RIGHT: ICD-10-CM

## 2019-03-27 DIAGNOSIS — D86.9 SARCOIDOSIS: ICD-10-CM

## 2019-03-27 PROCEDURE — 71046 X-RAY EXAM CHEST 2 VIEWS: CPT

## 2019-03-29 ENCOUNTER — HOSPITAL ENCOUNTER (OUTPATIENT)
Dept: WOMENS IMAGING | Age: 69
Discharge: HOME | End: 2019-03-29
Payer: MEDICARE

## 2019-03-29 DIAGNOSIS — M89.9 DISORDER OF BONE: ICD-10-CM

## 2019-03-29 DIAGNOSIS — M25.551 PAIN OF RIGHT HIP JOINT: ICD-10-CM

## 2019-03-29 PROCEDURE — 77080 DXA BONE DENSITY AXIAL: CPT

## 2019-04-02 ENCOUNTER — OFFICE VISIT (OUTPATIENT)
Dept: PULMONOLOGY | Facility: CLINIC | Age: 69
End: 2019-04-02

## 2019-04-02 VITALS
SYSTOLIC BLOOD PRESSURE: 138 MMHG | HEART RATE: 80 BPM | HEIGHT: 69 IN | DIASTOLIC BLOOD PRESSURE: 74 MMHG | OXYGEN SATURATION: 98 % | BODY MASS INDEX: 27.99 KG/M2 | WEIGHT: 189 LBS

## 2019-04-02 DIAGNOSIS — J98.4 RESTRICTIVE LUNG DISEASE: ICD-10-CM

## 2019-04-02 DIAGNOSIS — D86.2 SARCOIDOSIS OF LUNG WITH SARCOIDOSIS OF LYMPH NODES (HCC): Primary | ICD-10-CM

## 2019-04-02 DIAGNOSIS — J44.9 COPD, MODERATE (HCC): ICD-10-CM

## 2019-04-02 PROCEDURE — 99214 OFFICE O/P EST MOD 30 MIN: CPT | Performed by: INTERNAL MEDICINE

## 2019-04-02 RX ORDER — SULFAMETHOXAZOLE AND TRIMETHOPRIM 400; 80 MG/1; MG/1
1 TABLET ORAL 3 TIMES WEEKLY
Refills: 6 | COMMUNITY
Start: 2019-03-23 | End: 2022-06-17

## 2019-04-02 RX ORDER — PEN NEEDLE, DIABETIC 32GX 5/32"
NEEDLE, DISPOSABLE MISCELLANEOUS
COMMUNITY
Start: 2019-02-25

## 2019-04-02 RX ORDER — DONEPEZIL HYDROCHLORIDE 5 MG/1
TABLET, FILM COATED ORAL
Refills: 0 | COMMUNITY
Start: 2019-03-26

## 2019-04-02 RX ORDER — INSULIN GLARGINE 100 [IU]/ML
14 INJECTION, SOLUTION SUBCUTANEOUS
COMMUNITY
Start: 2019-02-25 | End: 2022-06-17

## 2019-04-02 RX ORDER — TRAMADOL HYDROCHLORIDE 50 MG/1
TABLET ORAL
Refills: 0 | COMMUNITY
Start: 2019-03-26 | End: 2021-05-05

## 2019-04-02 RX ORDER — AZATHIOPRINE 50 MG/1
TABLET ORAL
Refills: 5 | COMMUNITY
Start: 2019-03-22

## 2019-04-02 RX ORDER — SITAGLIPTIN 50 MG/1
TABLET, FILM COATED ORAL
COMMUNITY
Start: 2019-03-05

## 2019-04-02 RX ORDER — LOSARTAN POTASSIUM 100 MG/1
TABLET ORAL
COMMUNITY
Start: 2019-03-18

## 2019-04-02 RX ORDER — MULTIVIT WITH MINERALS/LUTEIN
1000 TABLET ORAL DAILY
COMMUNITY

## 2019-04-02 NOTE — PATIENT INSTRUCTIONS
I did advise the patient that if he had problems with wheezing positionally at night he could use his inhaler short-term and also we will try to position himself so that the wheezing is demise.  Otherwise I told him I think he is doing well on his Imuran therapy and I will see him back in 3 months with complete pulmonary functions on return.  I also advised him I reviewed his recent chest x-ray and it appears stable.

## 2019-04-02 NOTE — PROGRESS NOTES
Subjective   Caio Riley is a 68 y.o. male.     Chief Complaint   Patient presents with   • Shortness of Breath      No My Sticky Note on file.    History of Present Illness   The patient states that he had difficulties with his vision while on prednisone and he stopped this is now on Imuran for his sarcoidosis and problems with hypercalcemia.  He apparently developed some renal insufficiency.  His most recent x-ray appears stable compared to films done several months ago with some bilateral infiltrates which feel are due to some scarring from his sarcoidosis.  He and his wife accompanies him state that his breathing seems a bit better since he has been on the Imuran.  He does state however at times when he lays on his left side at night he will have some wheezing.  I told him I am not sure the exact etiology of this in terms of the positional change.  He denies any reflux symptoms or problems with postnasal drip which might make wheezing worse when he is laying down in general.  I told him just to use his rescue inhaler as needed for these episodes.    Medical/Family/Social History   has a past medical history of A-fib (CMS/[facility]), Arthritis, Asthma, COPD (chronic obstructive pulmonary disease) (CMS/[facility]), Diabetes mellitus (CMS/[facility]), Hypertension, Kidney stones, Neuropathy, Pancreatitis, and Sarcoidosis.   has a past surgical history that includes Fracture surgery (Right); Laparoscopic cholecystectomy; LASIK (Bilateral); Fracture surgery (Left); and Inguinal Node Biopsy (Right, 8/7/2018).  family history is not on file.   reports that he quit smoking about 51 years ago. His smoking use included cigarettes. He has a 1.00 pack-year smoking history. He has never used smokeless tobacco. Drug use questions deferred to the physician. He reports that he does not drink alcohol.  Allergies   Allergen Reactions   • Bee Venom Anaphylaxis   • Penicillins Swelling and Unknown (See Comments)     Reaction: SWELLING/RASH     •  Acetaminophen Unknown (See Comments)     Other reaction(s): NAUSEA   Start Date: ADULT     • Gabapentin Unknown (See Comments)     Causes blisters to head.   Causes blisters to head.      • Metoprolol Unknown (See Comments)     Leg pain   Leg pain        Medications    Current Outpatient Medications:   •  albuterol (PROVENTIL HFA;VENTOLIN HFA) 108 (90 Base) MCG/ACT inhaler, Inhale., Disp: , Rfl:   •  ascorbic acid (VITAMIN C) 1000 MG tablet, Take 1,000 mg by mouth Daily., Disp: , Rfl:   •  azaTHIOprine (IMURAN) 50 MG tablet, , Disp: , Rfl: 5  •  CloNIDine (CATAPRES) 0.1 MG tablet, Take 0.1 mg by mouth., Disp: , Rfl:   •  donepezil (ARICEPT) 5 MG tablet, , Disp: , Rfl: 0  •  EPINEPHrine (EPIPEN) 0.3 MG/0.3ML solution auto-injector injection, Inject 0.3 mg into the appropriate muscle as directed by prescriber., Disp: , Rfl:   •  fluticasone-salmeterol (ADVAIR) 500-50 MCG/DOSE DISKUS, Inhale 2 (Two) Times a Day., Disp: , Rfl:   •  Insulin Pen Needle (BD PEN NEEDLE DAGMAR U/F) 32G X 4 MM misc, 1 each., Disp: , Rfl:   •  IRON PO, Take 2 tablets by mouth., Disp: , Rfl:   •  JANUVIA 50 MG tablet, , Disp: , Rfl:   •  LANTUS SOLOSTAR 100 UNIT/ML injection pen, , Disp: , Rfl:   •  losartan (COZAAR) 100 MG tablet, , Disp: , Rfl:   •  LYRICA 50 MG capsule, , Disp: , Rfl:   •  RELION PEN NEEDLES 32G X 4 MM misc, , Disp: , Rfl:   •  rivaroxaban (XARELTO) 20 MG tablet, Take 20 mg by mouth Daily. STOPPED 8/1/18, Disp: , Rfl:   •  sulfamethoxazole-trimethoprim (BACTRIM,SEPTRA) 400-80 MG tablet, , Disp: , Rfl: 6  •  traMADol (ULTRAM) 50 MG tablet, , Disp: , Rfl: 0    Review of Systems  Constitutional: Negative for chills and fever.   HENT: Negative for congestion.    Eyes: Negative for blurred vision.   Respiratory: He has some mild dyspnea but that is doing better since he has been on the Imuran.  Again he is having some intermittent wheezing particularly at night if he lays on his left side.  Cardiovascular: Negative for chest pain.  "  Gastrointestinal: Negative for diarrhea, nausea and vomiting.   Endocrine: Negative for cold intolerance and heat intolerance.   Genitourinary: Negative for dysuria.   Musculoskeletal: Negative for arthralgias.   Skin: Negative for rash.   Neurological: Negative for dizziness, weakness and light-headedness.   Hematological: Does not bruise/bleed easily.   Psychiatric/Behavioral: Negative for agitation. The patient is not nervous/anxious.        Objective   /74   Pulse 80   Ht 175.3 cm (69\")   Wt 85.7 kg (189 lb)   SpO2 98% Comment: RA  BMI 27.91 kg/m²   Physical Exam Constitutional: He is oriented to person, place, and time. He appears well-developed and well-nourished. No distress.   HENT:   Head: Normocephalic and atraumatic.   Eyes: Conjunctivae and EOM are normal. Pupils are equal, round, and reactive to light. No scleral icterus.   Neck: Normal range of motion. Neck supple.   Cardiovascular: Normal rate, regular rhythm and normal heart sounds. Exam reveals no friction rub.   No murmur heard.  Pulmonary/Chest: Effort normal and breath sounds normal. No respiratory distress. He has no wheezes. He has no rales.  Breath sounds are somewhat diminished.  Abdominal: Soft. Bowel sounds are normal. He exhibits no distension. There is no tenderness.   Musculoskeletal: Normal range of motion. He exhibits no edema.   Neurological: He is alert and oriented to person, place, and time.   Skin: Skin is warm and dry.   Psychiatric: He has a normal mood and affect. His behavior is normal. Judgment and thought content normal.             Pulmonary Functions Testing Results:  FEV1   Date Value Ref Range Status   11/27/2018 53% liters Final     FVC   Date Value Ref Range Status   11/27/2018 61% liters Final     FEV1/FVC   Date Value Ref Range Status   11/27/2018 68.07% % Final     TLC   Date Value Ref Range Status   11/27/2018 69% liters Final     DLCO   Date Value Ref Range Status   11/27/2018 55% ml/mmHg sec Final    "     Assessment/Plan   Caio was seen today for shortness of breath.    Diagnoses and all orders for this visit:    Sarcoidosis of lung with sarcoidosis of lymph nodes (CMS/HCC)    COPD, moderate (CMS/HCC)    Restrictive lung disease      Patient's Body mass index is 27.91 kg/m². BMI is within normal parameters. No follow-up required..    Again he is doing better with the Imuran.  I told him he can use his rescue inhaler if he has episodes of wheezing particularly when laying flat.  I will see him back in 3 months with complete pulmonary functions on return.

## 2019-04-03 RX ORDER — SITAGLIPTIN 50 MG/1
TABLET, FILM COATED ORAL
Qty: 30 TABLET | Refills: 2 | OUTPATIENT
Start: 2019-04-03

## 2019-04-06 DIAGNOSIS — E11.42 TYPE 2 DIABETES MELLITUS WITH PERIPHERAL NEUROPATHY (HCC): ICD-10-CM

## 2019-04-08 RX ORDER — SITAGLIPTIN 50 MG/1
TABLET, FILM COATED ORAL
Qty: 30 TABLET | Refills: 2 | Status: SHIPPED | OUTPATIENT
Start: 2019-04-08 | End: 2019-07-03 | Stop reason: SDUPTHER

## 2019-04-15 ENCOUNTER — TELEPHONE (OUTPATIENT)
Dept: INTERNAL MEDICINE | Age: 69
End: 2019-04-15

## 2019-04-15 NOTE — TELEPHONE ENCOUNTER
PCM called to follow up on the paperwork they dropped off for this patient and to see if it can be ready to  soon. They dropped it off on March 28.

## 2019-04-22 RX ORDER — DONEPEZIL HYDROCHLORIDE 5 MG/1
5 TABLET, FILM COATED ORAL NIGHTLY
Qty: 90 TABLET | Refills: 3 | Status: SHIPPED | OUTPATIENT
Start: 2019-04-22 | End: 2020-08-18

## 2019-04-25 ENCOUNTER — TELEPHONE (OUTPATIENT)
Dept: INTERNAL MEDICINE | Age: 69
End: 2019-04-25

## 2019-05-04 DIAGNOSIS — E11.42 TYPE 2 DIABETES MELLITUS WITH PERIPHERAL NEUROPATHY (HCC): ICD-10-CM

## 2019-05-13 DIAGNOSIS — M25.551 PAIN OF RIGHT HIP JOINT: ICD-10-CM

## 2019-05-13 RX ORDER — TRAMADOL HYDROCHLORIDE 50 MG/1
TABLET ORAL
Qty: 42 TABLET | Refills: 0 | Status: SHIPPED | OUTPATIENT
Start: 2019-05-13 | End: 2019-06-03 | Stop reason: ALTCHOICE

## 2019-05-13 NOTE — TELEPHONE ENCOUNTER
Alvaro called requesting a refill of the below medication which has been pended for you:     Requested Prescriptions     Pending Prescriptions Disp Refills    traMADol (ULTRAM) 50 MG tablet [Pharmacy Med Name: TRAMADOL 50MG TAB] 42 tablet 0     Sig: TAKE 1 TABLET BY MOUTH EVERY 4 HOURS AS NEEDED FOR PAIN FOR  UP  TO  7  DAYS  **TAKE  LOWEST  DOSE  POSSIBLE  TO  MANAGE  PAIN       Last Appointment Date: 3/26/2019  Next Appointment Date: 6/3/2019    Allergies   Allergen Reactions    Acetaminophen     Bee Venom     Gabapentin      Causes blisters to head.      Penicillins     Toprol Xl [Metoprolol]      Leg pain

## 2019-05-20 ENCOUNTER — TELEPHONE (OUTPATIENT)
Dept: INTERNAL MEDICINE | Age: 69
End: 2019-05-20

## 2019-05-20 DIAGNOSIS — E11.42 TYPE 2 DIABETES MELLITUS WITH PERIPHERAL NEUROPATHY (HCC): ICD-10-CM

## 2019-05-20 DIAGNOSIS — I10 ESSENTIAL HYPERTENSION: ICD-10-CM

## 2019-05-20 LAB
ALBUMIN SERPL-MCNC: 4.2 G/DL (ref 3.5–5.2)
ALP BLD-CCNC: 72 U/L (ref 40–130)
ALT SERPL-CCNC: 13 U/L (ref 5–41)
ANION GAP SERPL CALCULATED.3IONS-SCNC: 12 MMOL/L (ref 7–19)
AST SERPL-CCNC: 21 U/L (ref 5–40)
BASOPHILS ABSOLUTE: 0 K/UL (ref 0–0.2)
BASOPHILS RELATIVE PERCENT: 0.4 % (ref 0–1)
BILIRUB SERPL-MCNC: 0.4 MG/DL (ref 0.2–1.2)
BUN BLDV-MCNC: 20 MG/DL (ref 8–23)
CALCIUM SERPL-MCNC: 12.2 MG/DL (ref 8.8–10.2)
CHLORIDE BLD-SCNC: 102 MMOL/L (ref 98–111)
CO2: 26 MMOL/L (ref 22–29)
CREAT SERPL-MCNC: 1.2 MG/DL (ref 0.5–1.2)
EOSINOPHILS ABSOLUTE: 0.3 K/UL (ref 0–0.6)
EOSINOPHILS RELATIVE PERCENT: 6.9 % (ref 0–5)
GFR NON-AFRICAN AMERICAN: 60
GLUCOSE BLD-MCNC: 203 MG/DL (ref 74–109)
HBA1C MFR BLD: 5.8 % (ref 4–6)
HCT VFR BLD CALC: 32.9 % (ref 42–52)
HEMOGLOBIN: 11.4 G/DL (ref 14–18)
LYMPHOCYTES ABSOLUTE: 1.2 K/UL (ref 1.1–4.5)
LYMPHOCYTES RELATIVE PERCENT: 26 % (ref 20–40)
MCH RBC QN AUTO: 31.2 PG (ref 27–31)
MCHC RBC AUTO-ENTMCNC: 34.7 G/DL (ref 33–37)
MCV RBC AUTO: 90.1 FL (ref 80–94)
MONOCYTES ABSOLUTE: 0.6 K/UL (ref 0–0.9)
MONOCYTES RELATIVE PERCENT: 13.9 % (ref 0–10)
NEUTROPHILS ABSOLUTE: 2.3 K/UL (ref 1.5–7.5)
NEUTROPHILS RELATIVE PERCENT: 52.4 % (ref 50–65)
PDW BLD-RTO: 13.5 % (ref 11.5–14.5)
PLATELET # BLD: 141 K/UL (ref 130–400)
PMV BLD AUTO: 10.9 FL (ref 9.4–12.4)
POTASSIUM SERPL-SCNC: 4.4 MMOL/L (ref 3.5–5)
RBC # BLD: 3.65 M/UL (ref 4.7–6.1)
SODIUM BLD-SCNC: 140 MMOL/L (ref 136–145)
TOTAL PROTEIN: 7.1 G/DL (ref 6.6–8.7)
WBC # BLD: 4.5 K/UL (ref 4.8–10.8)

## 2019-05-20 NOTE — TELEPHONE ENCOUNTER
Please call him; He has had this for a long time; Ask when he sees kidney next;     If not soon w/i a month we need to call and get him in sooner for hypercalcemia;

## 2019-05-21 ENCOUNTER — OFFICE VISIT (OUTPATIENT)
Dept: CARDIOLOGY | Age: 69
End: 2019-05-21
Payer: MEDICARE

## 2019-05-21 VITALS
BODY MASS INDEX: 28.14 KG/M2 | HEIGHT: 69 IN | HEART RATE: 90 BPM | WEIGHT: 190 LBS | SYSTOLIC BLOOD PRESSURE: 132 MMHG | DIASTOLIC BLOOD PRESSURE: 70 MMHG

## 2019-05-21 DIAGNOSIS — R68.84 JAW PAIN: ICD-10-CM

## 2019-05-21 DIAGNOSIS — R07.9 CHEST PAIN IN ADULT: ICD-10-CM

## 2019-05-21 DIAGNOSIS — I48.20 CHRONIC ATRIAL FIBRILLATION (HCC): Primary | ICD-10-CM

## 2019-05-21 PROCEDURE — G8427 DOCREV CUR MEDS BY ELIG CLIN: HCPCS | Performed by: INTERNAL MEDICINE

## 2019-05-21 PROCEDURE — 93000 ELECTROCARDIOGRAM COMPLETE: CPT | Performed by: INTERNAL MEDICINE

## 2019-05-21 PROCEDURE — 3017F COLORECTAL CA SCREEN DOC REV: CPT | Performed by: INTERNAL MEDICINE

## 2019-05-21 PROCEDURE — 99213 OFFICE O/P EST LOW 20 MIN: CPT | Performed by: INTERNAL MEDICINE

## 2019-05-21 PROCEDURE — 1123F ACP DISCUSS/DSCN MKR DOCD: CPT | Performed by: INTERNAL MEDICINE

## 2019-05-21 PROCEDURE — 1036F TOBACCO NON-USER: CPT | Performed by: INTERNAL MEDICINE

## 2019-05-21 PROCEDURE — G8417 CALC BMI ABV UP PARAM F/U: HCPCS | Performed by: INTERNAL MEDICINE

## 2019-05-21 PROCEDURE — 4040F PNEUMOC VAC/ADMIN/RCVD: CPT | Performed by: INTERNAL MEDICINE

## 2019-05-21 RX ORDER — NITROGLYCERIN 0.4 MG/1
0.4 TABLET SUBLINGUAL EVERY 5 MIN PRN
Qty: 25 TABLET | Refills: 3 | Status: SHIPPED | OUTPATIENT
Start: 2019-05-21 | End: 2022-02-28 | Stop reason: SDUPTHER

## 2019-05-21 RX ORDER — MULTIVIT WITH MINERALS/LUTEIN
1000 TABLET ORAL DAILY
COMMUNITY
End: 2021-01-26

## 2019-05-21 RX ORDER — CLONIDINE HYDROCHLORIDE 0.1 MG/1
TABLET ORAL
Qty: 270 TABLET | Refills: 3 | Status: SHIPPED | OUTPATIENT
Start: 2019-05-21 | End: 2019-10-08

## 2019-05-21 RX ORDER — SULFAMETHOXAZOLE AND TRIMETHOPRIM 400; 80 MG/1; MG/1
1 TABLET ORAL EVERY OTHER DAY
Status: ON HOLD | COMMUNITY
End: 2019-08-22 | Stop reason: HOSPADM

## 2019-05-21 ASSESSMENT — ENCOUNTER SYMPTOMS
BACK PAIN: 0
CHOKING: 0
COUGH: 0
WHEEZING: 0
BLOOD IN STOOL: 0
CHEST TIGHTNESS: 0
SHORTNESS OF BREATH: 0
ABDOMINAL DISTENTION: 0
APNEA: 0
NAUSEA: 0

## 2019-05-21 NOTE — PATIENT INSTRUCTIONS
may be having a heart attack or other serious heart problem. · To prevent angina from exercise or stress, use 1 tablet 5 to 10 minutes before the activity.

## 2019-05-21 NOTE — PROGRESS NOTES
MRN: [id number] Todays Date: 2019  LastAppointment: Visit date not found  Elena Lott is a 70 y.o. patient  : 1950  PCP: NUNO Pretty    [x] Old records reviewed  [x]  Independent review of EKG and documented below    History of Present Illness:   59-year-old diabetic with a history of prior ablation for atrial fibrillation and long-standing hypertension returns for follow-up. Ablation performed in 2016 since which time he's had no symptoms to suggest recurrent episodes of atrial fibrillation. He remains on Xarelto daily. 25 year history of diabetes originally controlled with oral agents but more recently requiring daily insulin therapy. No prior history of coronary disease though strong family history of such with father manifesting disease in his 45s. This past week while doing some yard work he experienced an episode of dull aching bilateral jaw pain which lasted less than a minute but prompted him to stop his activity. No prior or subsequent episodes of such. Risk Factors:  Family History   Problem Relation Age of Onset    Coronary Art Dis Father         AICD pacer age 68    Cancer Sister         childhood chest tumor     HTN: uncontrolled      Diabetes: Yes  insulin      Allergies   Allergen Reactions    Acetaminophen     Bee Venom     Gabapentin      Causes blisters to head.      Penicillins     Toprol Xl [Metoprolol]      Leg pain          Current Outpatient Medications:     sulfamethoxazole-trimethoprim (BACTRIM;SEPTRA) 400-80 MG per tablet, Take 1 tablet by mouth every other day, Disp: , Rfl:     Ascorbic Acid (VITAMIN C) 1000 MG tablet, Take 1,000 mg by mouth daily, Disp: , Rfl:     Xylitol (XYLIMELTS) 550 MG DISK, Take 550 mg by mouth daily, Disp: , Rfl:     traMADol (ULTRAM) 50 MG tablet, TAKE 1 TABLET BY MOUTH EVERY 4 HOURS AS NEEDED FOR PAIN FOR  UP  TO  7  DAYS  **TAKE  LOWEST  DOSE  POSSIBLE  TO  MANAGE  PAIN, Disp: 42 tablet, Rfl: 0    LYRICA 50 MG capsule, episode) ). Negative for palpitations and leg swelling. PAF - Ablation 2016 with no subsequent symptomatic episodes (remains on Xarelto)  Father had CAD in his 45s  Longstanding hypertension with persistent elevation of pressures lately   Gastrointestinal: Negative for abdominal distention, blood in stool and nausea. Endocrine: Negative for cold intolerance and heat intolerance. 25 year history of Type II DM with addition of insulin in the past year  Hypercalcemia     Genitourinary: Negative for difficulty urinating. History of ureterolithiasis with surgical address  History of hydronephrosis   Musculoskeletal: Negative for arthralgias, back pain and myalgias. Right hip pain - undergoing evaluation     Skin: Negative for rash. Allergic/Immunologic:        A  who is allergic to bee stings (has epi-pen)    Neurological: Negative for dizziness, tremors, seizures, syncope, speech difficulty, weakness, light-headedness, numbness and headaches. Hematological: Does not bruise/bleed easily. Xarelto therapy - past ablation 2016 for PAF   Psychiatric/Behavioral: Negative for agitation, confusion, dysphoric mood and sleep disturbance. The patient is not nervous/anxious. All other systems reviewed and are negative. /70   Pulse 90   Ht 5' 9\" (1.753 m)   Wt 190 lb (86.2 kg)   BMI 28.06 kg/m²     Last BP Reading:  BP Readings from Last 3 Encounters:   05/21/19 132/70   03/26/19 (!) 180/72   02/25/19 136/80        Physical Exam   Constitutional: He is oriented to person, place, and time. He appears well-developed and well-nourished. No distress. Mildly endomorphic matty-complexion    HENT:   Head: Normocephalic. Right Ear: Hearing and external ear normal.   Left Ear: Hearing and external ear normal.   Nose: Nose normal.   Mouth/Throat: Normal dentition. Normal male balding pattern   Eyes: Pupils are equal, round, and reactive to light.  Conjunctivae and EOM are normal. Right eye exhibits no discharge. Left eye exhibits no discharge. Neck: No JVD (none at 45 degrees) present. Carotid bruit is not present. No thyroid mass and no thyromegaly present. Cardiovascular: Normal rate, regular rhythm, normal heart sounds and normal pulses. PMI is not displaced. Exam reveals no gallop, no distant heart sounds and no friction rub. No murmur heard. Pulmonary/Chest: Effort normal and breath sounds normal. No respiratory distress. He has no wheezes. He has no rales. He exhibits no tenderness. Abdominal: Soft. Normal appearance and bowel sounds are normal. He exhibits no distension, no pulsatile liver and no mass. There is no tenderness. There is no rebound and no guarding. Mild abdominal obesity   Musculoskeletal: He exhibits no edema or deformity. Neurological: He is alert and oriented to person, place, and time. Skin: Skin is warm. No rash noted. He is not diaphoretic. No erythema. No pallor. Dany complexion     Psychiatric: He has a normal mood and affect. His behavior is normal. Judgment and thought content normal. His mood appears not anxious. He is not agitated. Vitals reviewed. Orders Placed This Encounter   Procedures    EKG 12 lead     Order Specific Question:   Reason for Exam?     Answer:   Irregular heart rate     Comments:   Chronic Afib     No orders of the defined types were placed in this encounter. ECG interpretation from today:   Normal sinus rhythm with an occasional PAC and some nonspecific ST-T wave changes in the lateral leads. Assessment / Plan:  1. Jaw pain - given his risk profile for coronary disease will assume it might well be angina until proven otherwise. Given prescription for nitroglycerin and scheduled for dobutamine stress echo. Advised to seek medical attention should he have any recurrent episodes  2. Hypertension - deserves tighter control.   We will increase his nocturnal clonidine to 0.2 mg as a first step.  3.  Paroxysmal atrial fibrillation - appears to be only at the store: Interest no Xarelto will have to be addressed should invasive evaluation be needed.       Electronically sign by Arley Brewer MD 5/21/2019 9:01 AM

## 2019-05-21 NOTE — TELEPHONE ENCOUNTER
Patient states has appointment with DR Marcela Jackson next week.  I will fax lab results to him for review

## 2019-05-23 ENCOUNTER — TELEPHONE (OUTPATIENT)
Dept: CARDIOLOGY | Age: 69
End: 2019-05-23

## 2019-05-23 NOTE — TELEPHONE ENCOUNTER
Pt's wife is wanting ANIKA Bansal to give call back to discuss stress test before testing is done. Please contact back.

## 2019-05-31 ENCOUNTER — TELEPHONE (OUTPATIENT)
Dept: CARDIOLOGY | Age: 69
End: 2019-05-31

## 2019-05-31 ENCOUNTER — TELEPHONE (OUTPATIENT)
Dept: INTERNAL MEDICINE | Age: 69
End: 2019-05-31

## 2019-05-31 RX ORDER — EPINEPHRINE 0.3 MG/.3ML
0.3 INJECTION SUBCUTANEOUS PRN
Qty: 2 EACH | Refills: 1 | Status: SHIPPED | OUTPATIENT
Start: 2019-05-31 | End: 2019-06-03 | Stop reason: SDUPTHER

## 2019-06-03 ENCOUNTER — OFFICE VISIT (OUTPATIENT)
Dept: INTERNAL MEDICINE | Age: 69
End: 2019-06-03
Payer: MEDICARE

## 2019-06-03 VITALS
HEIGHT: 69 IN | SYSTOLIC BLOOD PRESSURE: 138 MMHG | BODY MASS INDEX: 27.4 KG/M2 | HEART RATE: 113 BPM | OXYGEN SATURATION: 98 % | WEIGHT: 185 LBS | DIASTOLIC BLOOD PRESSURE: 70 MMHG

## 2019-06-03 DIAGNOSIS — I10 ESSENTIAL HYPERTENSION: ICD-10-CM

## 2019-06-03 DIAGNOSIS — I48.0 PAROXYSMAL ATRIAL FIBRILLATION (HCC): ICD-10-CM

## 2019-06-03 DIAGNOSIS — Z91.030 ALLERGY TO HONEY BEE VENOM: ICD-10-CM

## 2019-06-03 DIAGNOSIS — D86.0 SARCOIDOSIS, LUNG (HCC): ICD-10-CM

## 2019-06-03 DIAGNOSIS — E11.42 TYPE 2 DIABETES MELLITUS WITH PERIPHERAL NEUROPATHY (HCC): ICD-10-CM

## 2019-06-03 DIAGNOSIS — D86.9 HYPERCALCEMIA DUE TO SARCOIDOSIS: ICD-10-CM

## 2019-06-03 DIAGNOSIS — E83.52 HYPERCALCEMIA DUE TO SARCOIDOSIS: ICD-10-CM

## 2019-06-03 DIAGNOSIS — Z00.00 HEALTHCARE MAINTENANCE: ICD-10-CM

## 2019-06-03 DIAGNOSIS — E11.42 TYPE 2 DIABETES MELLITUS WITH DIABETIC POLYNEUROPATHY, WITHOUT LONG-TERM CURRENT USE OF INSULIN (HCC): Primary | ICD-10-CM

## 2019-06-03 PROCEDURE — 4040F PNEUMOC VAC/ADMIN/RCVD: CPT | Performed by: NURSE PRACTITIONER

## 2019-06-03 PROCEDURE — 3017F COLORECTAL CA SCREEN DOC REV: CPT | Performed by: NURSE PRACTITIONER

## 2019-06-03 PROCEDURE — G0439 PPPS, SUBSEQ VISIT: HCPCS | Performed by: NURSE PRACTITIONER

## 2019-06-03 PROCEDURE — 1036F TOBACCO NON-USER: CPT | Performed by: NURSE PRACTITIONER

## 2019-06-03 PROCEDURE — 99214 OFFICE O/P EST MOD 30 MIN: CPT | Performed by: NURSE PRACTITIONER

## 2019-06-03 PROCEDURE — 2022F DILAT RTA XM EVC RTNOPTHY: CPT | Performed by: NURSE PRACTITIONER

## 2019-06-03 PROCEDURE — G8417 CALC BMI ABV UP PARAM F/U: HCPCS | Performed by: NURSE PRACTITIONER

## 2019-06-03 PROCEDURE — 3044F HG A1C LEVEL LT 7.0%: CPT | Performed by: NURSE PRACTITIONER

## 2019-06-03 PROCEDURE — 1123F ACP DISCUSS/DSCN MKR DOCD: CPT | Performed by: NURSE PRACTITIONER

## 2019-06-03 PROCEDURE — G8427 DOCREV CUR MEDS BY ELIG CLIN: HCPCS | Performed by: NURSE PRACTITIONER

## 2019-06-03 RX ORDER — PREGABALIN 75 MG/1
75 CAPSULE ORAL EVERY EVENING
Qty: 30 CAPSULE | Refills: 0 | Status: SHIPPED | OUTPATIENT
Start: 2019-06-03 | End: 2019-07-03 | Stop reason: SDUPTHER

## 2019-06-03 RX ORDER — EPINEPHRINE 0.3 MG/.3ML
0.3 INJECTION SUBCUTANEOUS PRN
Qty: 2 EACH | Refills: 1 | Status: SHIPPED | OUTPATIENT
Start: 2019-06-03

## 2019-06-03 ASSESSMENT — ENCOUNTER SYMPTOMS
COLOR CHANGE: 0
NAUSEA: 0
EYE DISCHARGE: 0
SHORTNESS OF BREATH: 1
WHEEZING: 0
ABDOMINAL PAIN: 0
EYE ITCHING: 0
SORE THROAT: 0
DIARRHEA: 0
ABDOMINAL DISTENTION: 0
CHOKING: 0
VOMITING: 0
CONSTIPATION: 0
BLOOD IN STOOL: 0
COUGH: 1
STRIDOR: 0
TROUBLE SWALLOWING: 0

## 2019-06-03 ASSESSMENT — PATIENT HEALTH QUESTIONNAIRE - PHQ9
SUM OF ALL RESPONSES TO PHQ QUESTIONS 1-9: 0
SUM OF ALL RESPONSES TO PHQ QUESTIONS 1-9: 0

## 2019-06-03 ASSESSMENT — ANXIETY QUESTIONNAIRES: GAD7 TOTAL SCORE: 0

## 2019-06-03 ASSESSMENT — LIFESTYLE VARIABLES: HOW OFTEN DO YOU HAVE A DRINK CONTAINING ALCOHOL: 0

## 2019-06-03 NOTE — PATIENT INSTRUCTIONS
goal.  · When exposed to the sun, use a sunscreen that protects against both UVA and UVB radiation with an SPF of 30 or greater. Reapply every 2 to 3 hours or after sweating, drying off with a towel, or swimming. · Always wear a seat belt when traveling in a car. Always wear a helmet when riding a bicycle or motorcycle.

## 2019-06-03 NOTE — PROGRESS NOTES
Medicare Annual Wellness Visit  Name: Marbella Amaya Date: 6/3/2019   MRN: [id number] Sex: Male   Age: 70 y.o. Ethnicity: Non-/Non    : 1950 Race: Kiana Akers is here for Medicare AWV (No complaints)    Screenings for behavioral, psychosocial and functional/safety risks, and cognitive dysfunction are all negative except as indicated below. These results, as well as other patient data from the [address] form, are documented in Flowsheets linked to this Encounter. Allergies   Allergen Reactions    Acetaminophen     Bee Venom     Gabapentin      Causes blisters to head.  Penicillins     Toprol Xl [Metoprolol]      Leg pain     (  Prior to Visit Medications    Medication Sig Taking? Authorizing Provider   EPINEPHrine (EPIPEN) 0.3 MG/0.3ML SOAJ injection Inject 0.3 mLs into the muscle as needed (Allergic Reaction) Yes NUNO Saravia   pregabalin (LYRICA) 75 MG capsule Take 1 capsule by mouth every evening for 30 days. Yes NUNO Saravia   insulin glargine (LANTUS SOLOSTAR) 100 UNIT/ML injection pen Inject 12 Units into the skin nightly Yes NUNO Saravia   sulfamethoxazole-trimethoprim (BACTRIM;SEPTRA) 400-80 MG per tablet Take 1 tablet by mouth every other day Yes Historical Provider, MD   Ascorbic Acid (VITAMIN C) 1000 MG tablet Take 1,000 mg by mouth daily Yes Historical Provider, MD   Xylitol (XYLIMELTS) 550 MG DISK Take 550 mg by mouth daily Yes Historical Provider, MD   cloNIDine (CATAPRES) 0.1 MG tablet Take one tablet by mouth in the morning and two tablets at bedtime.  Yes Sridhar Preciado MD   nitroGLYCERIN (NITROSTAT) 0.4 MG SL tablet Place 1 tablet under the tongue every 5 minutes as needed for Chest pain Yes Sridhar Preciado MD   donepezil (ARICEPT) 5 MG tablet TAKE 1 TABLET BY MOUTH NIGHTLY Yes NUNO Saravia   JANUVIA 50 MG tablet TAKE 1 TABLET BY MOUTH ONCE DAILY Yes NUNO Saravia   azaTHIOprine (IMURAN) 50 MG tablet into the lungs 2 times daily Yes NUNO Smith   losartan (COZAAR) 100 MG tablet Take 1 tablet by mouth daily Yes NUNO Smith   rivaroxaban (XARELTO) 20 MG TABS tablet Take 1 tablet by mouth Daily with supper TAKE ONE TABLET DAILY WITH  EVENING  MEAL Yes NUNO Geronimo   albuterol sulfate HFA (VENTOLIN HFA) 108 (90 Base) MCG/ACT inhaler Inhale 2 puffs into the lungs every 6 hours as needed for Wheezing Yes RANJANA Hall   IRON PO Take 2 tablets by mouth daily  Yes Historical Provider, MD   aspirin 81 MG tablet Take 81 mg by mouth daily.  Yes Historical Provider, MD      Diagnosis Date    Acute pancreatitis     Anemia     Asthma     Atrial fibrillation ([facility] [address] [address]     h/o paroxysmal a-fib ? anesthsia induced    Benign colonic polyp     cscope 12/07 Dr Milagros Shafer adenomatous: 12/14 adenoma    Chest pain     Chronic pain syndrome     Depression     Diabetic peripheral neuropathy (HCC)     Extrinsic asthma     Hyperlipidemia     Hypertension     Idiopathic peripheral neuropathy     Lyme disease     Mediastinal lymphadenopathy     Microalbuminuria     Mixed hyperlipidemia     Paroxysmal A-fib (HCC)     S/P ablation of atrial fibrillation     4/16 A fib ablation , Dr Yue Baeza, [facility]     Sarcoidosis, lung ([facility] [address]     restrictive lung impairment    Tick bite     Type 2 diabetes mellitus with peripheral neuropathy ([facility] [address] [facility]     Type 2 diabetes, controlled, with neuropathy [facility])      Past Surgical History:   Procedure Laterality Date    ANKLE FRACTURE SURGERY  april 14, 2015    ATRIAL ABLATION SURGERY  2015    Dr. Kelly Villarreal      Catheter Ablation A-fib    CHOLECYSTECTOMY      COLONOSCOPY  12/02/2014    Melecio VALDEZ CYSTOSCOPY,BIJU Left 6/27/2018    LASER LITHOTRIPSY STONE MANIPULATION WITH DOUBLE J STENT PLACEMENT performed by Neisha De La Torre MD at [facility] Left 6/27/2018    CYSTOSCOPY URETEROSCOPY RETROGRADE PYELOGRAMS performed by Julee Porras MD at [facility] OR       Family History   Problem Relation Age of Onset    Coronary Art Dis Father         AICD pacer age 68    Cancer Sister         childhood chest tumor       CareTeam (Including outside providers/suppliers regularly involved in providing care):   Patient Care Team:  NUNO Briscoe as PCP - General (Nurse Practitioner Acute Care)  NUNO Briscoe as PCP - [facility]  Robert Foley MD as Consulting Physician (Pulmonology)  Wesley Clemons MD as Consulting Physician (Cardiology)    Wt Readings from Last 3 Encounters:   06/03/19 185 lb (83.9 kg)   05/21/19 190 lb (86.2 kg)   03/26/19 188 lb (85.3 kg)     Vitals:    06/03/19 1103   BP: 138/70   Site: Left Upper Arm   Position: Sitting   Pulse: 113   SpO2: 98%   Weight: 185 lb (83.9 kg)   Height: 5' 9\" (1.753 m)     Body mass index is 27.32 kg/m². Based upon direct observation of the patient, evaluation of cognition reveals recent and remote memory intact. Patient's complete Health Risk Assessment and screening values have been reviewed and are found in Flowsheets. The following problems were reviewed today and where indicated follow up appointments were made and/or referrals ordered. Positive Risk Factor Screenings with Interventions:     General Health:  General  In general, how would you say your health is?: Good  In the past 7 days, have you experienced any of the following?  New or Increased Pain, New or Increased Fatigue, Loneliness, Social Isolation, Stress or Anger?: None of These  Do you get the social and emotional support that you need?: Yes  Do you have a Living Will?: (!) No  General Health Risk Interventions:  ·     Hearing/Vision:  Hearing/Vision  Do you or your family notice any trouble with your hearing?: (!) Yes  Do you have difficulty driving, watching TV, or doing any of your daily activities because of your eyesight?: No  Have you had an eye exam within the past year?: Yes  Hearing/Vision Interventions:  · Hearing concerns:  patient declines any further evaluation/treatment for hearing issues  PROVIDERs   DR Thiago Medina  Pulmonology   Dr Sherryl Dakin  Cardiology ; They may change to tally   DR Syd Mancera;  Nephrology   DR Sis Falcon;  Dermatology left arm; And a couple of lesions of sarcoid        Personalized Preventive Plan   Current Health Maintenance Status  Immunization History   Administered Date(s) Administered    Influenza, High Dose (Fluzone 65 yrs and older) 12/21/2016, 12/27/2017, 11/05/2018    Pneumococcal 13-valent Conjugate ([id number]) 10/05/2016    Pneumococcal Polysaccharide ([id number]) 04/24/2008, 10/01/2015        Health Maintenance   Topic Date Due    Diabetic foot exam  09/15/1959    Diabetic microalbuminuria test  04/20/2019    Diabetic retinal exam  05/09/2019    Shingles Vaccine (1 of 2) 06/22/2019 (Originally 9/15/1999)    DTaP/Tdap/Td vaccine (1 - Tdap) 10/07/2019 (Originally 9/15/1968)    Colon cancer screen colonoscopy  12/02/2019    Lipid screen  02/25/2020    A1C test (Diabetic or Prediabetic)  05/20/2020    Potassium monitoring  05/20/2020    Creatinine monitoring  05/20/2020    Flu vaccine  Completed    Pneumococcal 65+ years Vaccine  Completed    AAA screen  Completed    Hepatitis C screen  Discontinued     Recommendations for Preventive Services Due: see orders and patient instructions/AVS.  . Recommended screening schedule for the next 5-10 years is provided to the patient in written form: see Patient Instructions/AVS.                                [address] [place] [address]  Dept: [phone]  Dept Fax: [phone]: [phone]    Юлия Jacome is a 70 y.o. male who presents today for his medical conditions/complaints as noted below.   Юлия Jacome is c/alexys Medicare AWV (No complaints)        HPI:     HPI   1.  TYep II DM SURGERY      Catheter Ablation A-fib    CHOLECYSTECTOMY      COLONOSCOPY  12/02/2014    eMlecio VALDEZ CYSTOSCOPY,MANIPULATN Left 6/27/2018    LASER LITHOTRIPSY STONE MANIPULATION WITH DOUBLE J STENT PLACEMENT performed by Ramesh Dao MD at [facility] Left 6/27/2018    CYSTOSCOPY URETEROSCOPY RETROGRADE PYELOGRAMS performed by Ramesh Dao MD at [facility] 6/3/2019 5/21/2019 3/26/2019 3/26/2019 2/25/2019 75/83/2297   SYSTOLIC 090 535 334 451 478 574   DIASTOLIC 70 70 72 72 80 70   Site Left Upper Arm - - - - -   Position Sitting - - - - -   Pulse 113 90 - 76 82 84   Temp - - - - - -   Resp - - - 18 16 -   SpO2 98 - - 95 97 -   Weight 185 lb 190 lb - 188 lb 191 lb 188 lb   Height 5' 9\" 5' 9\" - 5' 9\" 5' 9\" 5' 9\"   BMI (wt*703/ht~2) 27.32 kg/m2 28.06 kg/m2 - 27.76 kg/m2 28.2 kg/m2 27.76 kg/m2   Some recent data might be hidden       Family History   Problem Relation Age of Onset    Coronary Art Dis Father         AICD pacer age 68    Cancer Sister         childhood chest tumor       Social History     Tobacco Use    Smoking status: Former Smoker     Packs/day: 0.00     Years: 0.00     Pack years: 0.00     Types: Cigarettes    Smokeless tobacco: Never Used   Substance Use Topics    Alcohol use: No      Current Outpatient Medications   Medication Sig Dispense Refill    EPINEPHrine (EPIPEN) 0.3 MG/0.3ML SOAJ injection Inject 0.3 mLs into the muscle as needed (Allergic Reaction) 2 each 1    pregabalin (LYRICA) 75 MG capsule Take 1 capsule by mouth every evening for 30 days.  30 capsule 0    insulin glargine (LANTUS SOLOSTAR) 100 UNIT/ML injection pen Inject 12 Units into the skin nightly 5 pen 3    sulfamethoxazole-trimethoprim (BACTRIM;SEPTRA) 400-80 MG per tablet Take 1 tablet by mouth every other day      Ascorbic Acid (VITAMIN C) 1000 MG tablet Take 1,000 mg by mouth daily      Xylitol (XYLIMELTS) 550 MG DISK Take 550 mg by mouth daily      disturbance. Respiratory: Positive for cough and shortness of breath. Negative for choking, wheezing and stridor. Cardiovascular: Negative for chest pain, palpitations and leg swelling. Gastrointestinal: Negative for abdominal distention, abdominal pain, blood in stool, constipation, diarrhea, nausea and vomiting. Endocrine: Negative for cold intolerance, polydipsia and polyuria. Genitourinary: Negative for difficulty urinating, dysuria, frequency and urgency. Musculoskeletal: Negative for arthralgias and gait problem. Skin: Negative for color change and rash. Allergic/Immunologic: Negative for food allergies and immunocompromised state. Neurological: Negative for dizziness, tremors, syncope, speech difficulty, weakness and headaches. Hematological: Negative for adenopathy. Does not bruise/bleed easily. Psychiatric/Behavioral: Negative for confusion and hallucinations. Objective:     Physical Exam   Constitutional: He is oriented to person, place, and time. He appears well-developed and well-nourished. No distress. HENT:   Head: Normocephalic and atraumatic. Eyes: Pupils are equal, round, and reactive to light. Right eye exhibits no discharge. Left eye exhibits no discharge. No scleral icterus. Neck: Normal range of motion. Neck supple. No JVD present. No thyromegaly present. Cardiovascular: Normal rate, regular rhythm and normal heart sounds. No murmur heard. Pulmonary/Chest: Effort normal and breath sounds normal. No respiratory distress. He has no wheezes. He has no rales. Chronic lung crackles   Abdominal: Soft. Bowel sounds are normal. He exhibits no distension and no mass. There is no tenderness. There is no rebound and no guarding. Musculoskeletal: Normal range of motion. He exhibits no edema or tenderness. Neurological: He is alert and oriented to person, place, and time. He has normal reflexes. He displays normal reflexes. No cranial nerve deficit.  Coordination normal.   Skin: Skin is warm and dry. No rash noted. No erythema. Psychiatric: His behavior is normal. Judgment and thought content normal. He does not exhibit a depressed mood. /70 (Site: Left Upper Arm, Position: Sitting)   Pulse 113   Ht 5' 9\" (1.753 m)   Wt 185 lb (83.9 kg)   SpO2 98%   BMI 27.32 kg/m²      Assessment:       Diagnosis Orders   1. Healthcare maintenance  Microalbumin / creatinine urine ratio    Comprehensive Metabolic Panel    Hemoglobin A1C    Lipid Panel    Psa screening    Vitamin D 25 Hydroxy    TSH without Reflex    Urinalysis Reflex to Culture    Microalbumin / Creatinine Urine Ratio    Hepatitis C Antibody    US ABDOMINAL AORTA LIMITED   2. Type 2 diabetes mellitus with peripheral neuropathy (HCC)  pregabalin (LYRICA) 75 MG capsule     Labs reviewedfrom 5/20/19  Diagnostics reviewed from 12/18  Plan:        Patient given educational materials - see patient instructions. Discussed use, benefit, and side effects of prescribed medications. Allpatient questions answered. Pt voiced understanding. Reviewed health maintenance. Instructed to continue current medications, diet and exercise. Patient agreed with treatment plan. Follow up as directed. MEDICATIONS:  Orders Placed This Encounter   Medications    EPINEPHrine (EPIPEN) 0.3 MG/0.3ML SOAJ injection     Sig: Inject 0.3 mLs into the muscle as needed (Allergic Reaction)     Dispense:  2 each     Refill:  1    pregabalin (LYRICA) 75 MG capsule     Sig: Take 1 capsule by mouth every evening for 30 days.      Dispense:  30 capsule     Refill:  0    insulin glargine (LANTUS SOLOSTAR) 100 UNIT/ML injection pen     Sig: Inject 12 Units into the skin nightly     Dispense:  5 pen     Refill:  3         ORDERS:  Orders Placed This Encounter   Procedures    US ABDOMINAL AORTA LIMITED    Microalbumin / creatinine urine ratio    Comprehensive Metabolic Panel    Hemoglobin A1C    Lipid Panel    Psa screening    Vitamin D 25 Hydroxy    TSH without Reflex    Urinalysis Reflex to Culture    Microalbumin / Creatinine Urine Ratio    Hepatitis C Antibody       Follow-up:  No follow-ups on file. PATIENT INSTRUCTIONS:  Patient Instructions   1. HTN;  Send me blood pressure and pulse over the next few weeks;  2. TYpe II DM; The current medical regimen is effective;  continue present plan and medications. 3.  Hypercalcemia; let me know wht Dr Boom Gilliam says about maybe using BOniva   4. Osteopenia; May need a supplement   5. Neuropathy      Electronically signed by NUNO Wall on 6/3/2019 at 11:47 AM    EMRDragon/transcription disclaimer:  Much of this encounter note is electronic transcription/translation of spoken language to printed texts. The electronic translation of spoken language may be erroneous, or at times,nonsensical words or phrases may be inadvertently transcribed.   Although I have reviewed the note for such errors, some may still exist.  DR Nolan Moura  Oncology

## 2019-06-06 ENCOUNTER — TELEPHONE (OUTPATIENT)
Dept: INTERNAL MEDICINE | Age: 69
End: 2019-06-06

## 2019-06-06 NOTE — TELEPHONE ENCOUNTER
Spoke with wife, states she was following up with us- Dr. Preston Ovalle said it would be a good idea to prescribe Boniva

## 2019-06-07 DIAGNOSIS — Z13.6 SCREENING FOR AAA (ABDOMINAL AORTIC ANEURYSM): Primary | ICD-10-CM

## 2019-06-07 RX ORDER — IBANDRONATE SODIUM 150 MG/1
150 TABLET, FILM COATED ORAL
Qty: 30 TABLET | Refills: 3 | Status: SHIPPED | OUTPATIENT
Start: 2019-06-07 | End: 2020-03-17

## 2019-06-10 ENCOUNTER — HOSPITAL ENCOUNTER (OUTPATIENT)
Dept: ULTRASOUND IMAGING | Age: 69
Discharge: HOME | End: 2019-06-10
Payer: MEDICARE

## 2019-06-10 DIAGNOSIS — Z00.00 HEALTHCARE MAINTENANCE: ICD-10-CM

## 2019-06-10 PROCEDURE — 76706 US ABDL AORTA SCREEN AAA: CPT

## 2019-06-25 ENCOUNTER — OFFICE VISIT (OUTPATIENT)
Dept: CARDIOLOGY | Age: 69
End: 2019-06-25
Payer: MEDICARE

## 2019-06-25 VITALS
HEIGHT: 69 IN | BODY MASS INDEX: 27.85 KG/M2 | DIASTOLIC BLOOD PRESSURE: 66 MMHG | SYSTOLIC BLOOD PRESSURE: 152 MMHG | HEART RATE: 80 BPM | WEIGHT: 188 LBS

## 2019-06-25 DIAGNOSIS — I48.91 ATRIAL FIBRILLATION, UNSPECIFIED TYPE (HCC): ICD-10-CM

## 2019-06-25 DIAGNOSIS — I10 ESSENTIAL HYPERTENSION: Primary | ICD-10-CM

## 2019-06-25 DIAGNOSIS — E78.5 HYPERLIPIDEMIA, UNSPECIFIED HYPERLIPIDEMIA TYPE: ICD-10-CM

## 2019-06-25 PROCEDURE — 93000 ELECTROCARDIOGRAM COMPLETE: CPT | Performed by: NURSE PRACTITIONER

## 2019-06-25 PROCEDURE — 99213 OFFICE O/P EST LOW 20 MIN: CPT | Performed by: NURSE PRACTITIONER

## 2019-06-25 NOTE — PROGRESS NOTES
[facility]  Established Patient Office Visit  [id number]  [phone]        OFFICE VISIT:  2019    Katelynn Gupta - : 1950    Reason For Visit:  Marga Oviedo is a 70 y.o. male who is here for 1 Month Follow-Up (Patient denies any cardiac issues today ); Atrial Fibrillation; and Hypertension    Patient presents to clinic today for follow-up. Patient with a history of atrial fib with prior ablation and long-standing hypertension. Patient has had no recurrence of his atrial fib and remains on Xarelto. Patient was seen by Dr. Letitia Johnson on 2019 and ordered a dobutamine stress test given his complaints of jaw pain. Patient was also given a prescription for nitroglycerin as needed sublingual.  Patient was noted to have elevated blood pressure in the office and was given instruction to increase his nighttime clonidine 0.2 mg.    .  Patient states he has had no episodes of jaw pain other than that one time. Patient states he has been cutting wood without any difficulty. Patient did not go for his dobutamine stress echo test given the fact that he has a history of atrial fib and was concerned that that might put him back into it. Patient denies any chest pain, pressure or tightness. Did discuss with patient we could possibly order a Lexiscan. However patient declines at this point for further ischemic study. There is no shortness of breath, orthopnea or PND. Patient denies any lightheadedness, dizziness or syncope. Patient states he knows when he goes into atrial fib and has had no episodes. 1. Essential hypertension    2. Atrial fibrillation, unspecified type ([address]    3.  Hyperlipidemia, unspecified hyperlipidemia type        Subjective    Katelynn Gupta is a 70 y.o. male with the following history as recorded in [facility]:    Patient Active Problem List    Diagnosis Date Noted   [address] maintenance 2019    Other emphysema ([address] azaTHIOprine (IMURAN) 50 MG tablet Take 50 mg by mouth daily       fluticasone-salmeterol (ADVAIR) 500-50 MCG/DOSE diskus inhaler Inhale 1 puff into the lungs 2 times daily 60 each 5    losartan (COZAAR) 100 MG tablet Take 1 tablet by mouth daily 90 tablet 3    rivaroxaban (XARELTO) 20 MG TABS tablet Take 1 tablet by mouth Daily with supper TAKE ONE TABLET DAILY WITH  EVENING  MEAL 30 tablet 5    albuterol sulfate HFA (VENTOLIN HFA) 108 (90 Base) MCG/ACT inhaler Inhale 2 puffs into the lungs every 6 hours as needed for Wheezing 1 Inhaler 5    IRON PO Take 2 tablets by mouth daily       aspirin 81 MG tablet Take 81 mg by mouth daily. No current facility-administered medications for this visit.       Allergies: Acetaminophen; Bee venom; Gabapentin; Penicillins; and Toprol xl [metoprolol]  Past Medical History:   Diagnosis Date    Acute pancreatitis     Anemia     Asthma     Atrial fibrillation (HCC)     h/o paroxysmal a-fib ? anesthsia induced    Benign colonic polyp     cscope 12/07 Dr Orin Litten adenomatous: 12/14 adenoma    Chest pain     Chronic pain syndrome     Depression     Diabetic peripheral neuropathy (HCC)     Extrinsic asthma     Hyperlipidemia     Hypertension     Idiopathic peripheral neuropathy     Lyme disease     Mediastinal lymphadenopathy     Microalbuminuria     Mixed hyperlipidemia     Paroxysmal A-fib (HCC)     S/P ablation of atrial fibrillation     4/16 A fib ablation , Dr Susan Montana, [facility]     Sarcoidosis, lung [address] [address]     restrictive lung impairment    Tick bite     Type 2 diabetes mellitus with peripheral neuropathy ([facility] [address]     Type 2 diabetes, controlled, with neuropathy [facility])      Past Surgical History:   Procedure Laterality Date    ANKLE FRACTURE SURGERY  april 14, 2015    ATRIAL ABLATION SURGERY  2015    Dr. Ramona Bell      Catheter Ablation A-fib    CHOLECYSTECTOMY      COLONOSCOPY  12/02/2014    Melecio VALDEZ CYSTOSCOPY,MANIPULATN Left 6/27/2018    LASER LITHOTRIPSY STONE MANIPULATION WITH DOUBLE J STENT PLACEMENT performed by Emil Grimes MD at [place] [id number] Left 6/27/2018    CYSTOSCOPY URETEROSCOPY RETROGRADE PYELOGRAMS performed by Emil Grimes MD at [facility] OR     Family History   Problem Relation Age of Onset    Coronary Art Dis Father         AICD pacer age 68    Heart Attack Father     Cancer Sister         childhood chest tumor    Heart Failure Mother      Social History     Tobacco Use    Smoking status: Former Smoker     Packs/day: 0.00     Years: 0.00     Pack years: 0.00     Types: Cigarettes    Smokeless tobacco: Never Used   Substance Use Topics    Alcohol use: No          Review of Systems:    General:      Complaint / Symptom Yes / No / Description if Yes       Fatigue No   Weight gain N/A   Insomnia N/A       Respiratory:        Complaint / Symptom Yes / No / Description if Yes       Cough No   Horseness N/A       Cardiovascular:    Complaint / Symptom Yes / No / Description if Yes       Chest Pain No   Shortness of Air / Orthopnea No   Presyncope / Syncope No   Palpitations No         Objective:    BP (!) 152/66   Pulse 80   Ht 5' 9\" (1.753 m)   Wt 188 lb (85.3 kg)   BMI 27.76 kg/m²     GENERAL - well developed and well nourished, conversant  HEENT -  PERRLA, Hearing appears normal, gentleman lids are normal.  External inspection of ears and nose appear normal.  NECK - no thyromegaly, no JVD, trachea is in the midline  CARDIOVASCULAR - PMI is in the mid line clavicular position, Normal S1 and S2 with no systolic murmur. No S3 or S4    PULMONARY - no respiratory distress. No wheezes or rales. Lungs are clear to ausculation, normal respiratory effort.   ABDOMEN  - soft, non tender, no rebound  MUSCULOSKELETAL  - range of motion of the upper and lower extermites appears normal and equal and is without pain   EXTREMITIES - no significant edema NEUROLOGIC - gait and station are normal  SKIN - turgor is normal, can warm and dry. PSYCHIATRIC - normal mood and affect, alert and orientated x 3,      ASSESSMENT:    ALL THE CARDIOLOGY PROBLEMS ARE LISTED ABOVE; HOWEVER, THE FOLLOWING SPECIFIC CARDIAC PROBLEMS / CONDITIONS WERE ADDRESSED AND TREATED DURING THE OFFICE VISIT TODAY:                                                                                            MEDICAL DECISION MAKING             Cardiac Specific Problem / Diagnosis  Discussion and Data Reviewed Diagnostic Procedures Ordered Management Options Selected           1. History of atrial fib status post ablation  Stable   Review and summation of old records:    EGD in the office showing sinus rhythm with a heart rate of 87 bpm.  Patient is on Xarelto 20 mg daily. No Continue current medications:     Yes           2. Hypertension  Stable   Blood pressure in the office 152/66. Rechecked 138/68. Patient states his wife took his blood pressure this morning she is a registered nurse. And he is normally running in the mornings 120s over 70s. No Continue current medications: Yes                                     Orders Placed This Encounter   Procedures    EKG 12 lead     Order Specific Question:   Reason for Exam?     Answer: Other     No orders of the defined types were placed in this encounter. Discussed with patient. Return in about 6 months (around 12/25/2019) for Routine with Dr Low Lo . I greatly appreciate the opportunity to meet Lisa Velez and your confidence in allowing me to participate in his cardiovascular care.     NUNO Castellanos NP  6/25/2019 9:19 AM

## 2019-06-26 ENCOUNTER — TELEPHONE (OUTPATIENT)
Dept: INTERNAL MEDICINE | Age: 69
End: 2019-06-26

## 2019-06-26 NOTE — TELEPHONE ENCOUNTER
[place] the . Is asking why she received a request to increase skilled nursing visits for 8 additional hours a month?   Can call her at [phone]

## 2019-06-27 DIAGNOSIS — M25.551 PAIN OF RIGHT HIP JOINT: ICD-10-CM

## 2019-06-28 RX ORDER — TRAMADOL HYDROCHLORIDE 50 MG/1
TABLET ORAL
Qty: 42 TABLET | Refills: 0 | Status: SHIPPED | OUTPATIENT
Start: 2019-06-28 | End: 2019-08-07 | Stop reason: SDUPTHER

## 2019-07-03 DIAGNOSIS — E11.42 TYPE 2 DIABETES MELLITUS WITH PERIPHERAL NEUROPATHY (HCC): ICD-10-CM

## 2019-07-03 PROBLEM — Z00.00 HEALTHCARE MAINTENANCE: Status: RESOLVED | Noted: 2019-06-03 | Resolved: 2019-07-03

## 2019-07-03 RX ORDER — SITAGLIPTIN 50 MG/1
TABLET, FILM COATED ORAL
Qty: 30 TABLET | Refills: 2 | Status: SHIPPED | OUTPATIENT
Start: 2019-07-03 | End: 2019-10-11 | Stop reason: SDUPTHER

## 2019-07-08 ENCOUNTER — HOSPITAL ENCOUNTER (OUTPATIENT)
Dept: INFUSION THERAPY | Age: 69
Discharge: HOME | End: 2019-07-08
Payer: MEDICARE

## 2019-07-08 PROCEDURE — 36415 COLL VENOUS BLD VENIPUNCTURE: CPT

## 2019-07-08 PROCEDURE — 80053 COMPREHEN METABOLIC PANEL: CPT

## 2019-07-12 ENCOUNTER — OFFICE VISIT (OUTPATIENT)
Dept: PULMONOLOGY | Facility: CLINIC | Age: 69
End: 2019-07-12

## 2019-07-12 VITALS
OXYGEN SATURATION: 98 % | BODY MASS INDEX: 29.66 KG/M2 | DIASTOLIC BLOOD PRESSURE: 64 MMHG | HEIGHT: 67 IN | HEART RATE: 76 BPM | RESPIRATION RATE: 16 BRPM | SYSTOLIC BLOOD PRESSURE: 138 MMHG | WEIGHT: 189 LBS

## 2019-07-12 DIAGNOSIS — J44.9 COPD, MODERATE (HCC): ICD-10-CM

## 2019-07-12 DIAGNOSIS — E66.3 OVERWEIGHT: ICD-10-CM

## 2019-07-12 DIAGNOSIS — J98.4 RESTRICTIVE LUNG DISEASE: ICD-10-CM

## 2019-07-12 DIAGNOSIS — D86.2 SARCOIDOSIS OF LUNG WITH SARCOIDOSIS OF LYMPH NODES (HCC): Primary | ICD-10-CM

## 2019-07-12 DIAGNOSIS — E83.52 HYPERCALCEMIA: ICD-10-CM

## 2019-07-12 LAB
DIFF CAP.CO: NORMAL ML/MMHG SEC
FEV1/FVC: 69.71 %
FEV1: NORMAL LITERS
FVC VOL RESPIRATORY: NORMAL LITERS
TLC: NORMAL LITERS

## 2019-07-12 PROCEDURE — 94727 GAS DIL/WSHOT DETER LNG VOL: CPT | Performed by: INTERNAL MEDICINE

## 2019-07-12 PROCEDURE — 94010 BREATHING CAPACITY TEST: CPT | Performed by: INTERNAL MEDICINE

## 2019-07-12 PROCEDURE — 94729 DIFFUSING CAPACITY: CPT | Performed by: INTERNAL MEDICINE

## 2019-07-12 PROCEDURE — 99214 OFFICE O/P EST MOD 30 MIN: CPT | Performed by: INTERNAL MEDICINE

## 2019-07-12 RX ORDER — IPRATROPIUM BROMIDE AND ALBUTEROL SULFATE 2.5; .5 MG/3ML; MG/3ML
3 SOLUTION RESPIRATORY (INHALATION) 4 TIMES DAILY PRN
Qty: 120 ML | Refills: 11 | Status: SHIPPED | OUTPATIENT
Start: 2019-07-12

## 2019-07-12 RX ORDER — PREDNISONE 1 MG/1
5 TABLET ORAL DAILY
COMMUNITY

## 2019-07-12 ASSESSMENT — PULMONARY FUNCTION TESTS: FEV1/FVC: 69.71

## 2019-07-12 NOTE — PATIENT INSTRUCTIONS
The patient was advised that his pulmonary functions are reasonably stable.  He is now on low-dose steroids for his hypercalcemia associated with his sarcoidosis.  I will see him back in 6 months with complete pulmonary functions on return.  Because of the airflow obstruction associate with sarcoidosis, we will try him on home neb treatments with duo nebs and prescriptions were provided.

## 2019-07-12 NOTE — PROGRESS NOTES
Subjective   Caio Riley is a 68 y.o. male.     Chief Complaint   Patient presents with   • follow up sarcoidosis      No My Sticky Note on file.    History of Present Illness   The patient returns today for follow-up.  He has had problems with hypercalcemia associated with his sarcoidosis and did have to go back on steroids with the goal being to try to treat him with his low dose as possible.  He has had some eye issues related to steroids on his current dose these are fairly minimal.  I did tell him that we will just continue to see him periodically with pulmonary function studies.  He is on both Imuran and now low-dose prednisone.  He does have airflow obstruction associated with his sarcoid and wonders about a nebulizer and I did write a prescription for home nebulizer and DuoNeb solution to use up to 4 times daily.    Medical/Family/Social History   has a past medical history of A-fib (CMS/[facility]), Arthritis, Asthma, COPD (chronic obstructive pulmonary disease) (CMS/[facility]), Diabetes mellitus (CMS/[facility]), Hypertension, Kidney stones, Neuropathy, Pancreatitis, and Sarcoidosis.   has a past surgical history that includes Fracture surgery (Right); Laparoscopic cholecystectomy; LASIK (Bilateral); Fracture surgery (Left); Inguinal Node Biopsy (Right, 8/7/2018); and Squamous cell carcinoma excision.  family history is not on file.   reports that he quit smoking about 51 years ago. His smoking use included cigarettes. He has a 1.00 pack-year smoking history. He has never used smokeless tobacco. He reports that he does not drink alcohol or use drugs.  Allergies   Allergen Reactions   • Bee Venom Anaphylaxis   • Penicillins Swelling and Unknown (See Comments)     Reaction: SWELLING/RASH    Reaction: SWELLING/RASH   • Acetaminophen Unknown (See Comments)     Other reaction(s): NAUSEA   Start Date: ADULT    Other reaction(s): NAUSEA   Start Date: ADULT   • Gabapentin Unknown (See Comments)     Causes blisters to head.    Causes blisters to head.     Causes blisters to head.    • Metoprolol Unknown (See Comments)     Leg pain   Leg pain     Leg pain      Medications    Current Outpatient Medications:   •  albuterol (PROVENTIL HFA;VENTOLIN HFA) 108 (90 Base) MCG/ACT inhaler, Inhale., Disp: , Rfl:   •  ascorbic acid (VITAMIN C) 1000 MG tablet, Take 1,000 mg by mouth Daily., Disp: , Rfl:   •  aspirin 81 MG chewable tablet, Chew 81 mg Daily., Disp: , Rfl:   •  azaTHIOprine (IMURAN) 50 MG tablet, , Disp: , Rfl: 5  •  donepezil (ARICEPT) 5 MG tablet, , Disp: , Rfl: 0  •  EPINEPHrine (EPIPEN) 0.3 MG/0.3ML solution auto-injector injection, Inject 0.3 mg into the appropriate muscle as directed by prescriber., Disp: , Rfl:   •  fluticasone-salmeterol (ADVAIR) 500-50 MCG/DOSE DISKUS, Inhale 2 (Two) Times a Day., Disp: , Rfl:   •  Insulin Pen Needle (BD PEN NEEDLE DAGMAR U/F) 32G X 4 MM misc, 1 each., Disp: , Rfl:   •  IRON PO, Take 27 mg by mouth 2 (Two) Times a Day., Disp: , Rfl:   •  JANUVIA 50 MG tablet, , Disp: , Rfl:   •  LANTUS SOLOSTAR 100 UNIT/ML injection pen, 14 Units., Disp: , Rfl:   •  losartan (COZAAR) 100 MG tablet, , Disp: , Rfl:   •  LYRICA 50 MG capsule, 75 mg., Disp: , Rfl:   •  predniSONE (DELTASONE) 5 MG tablet, Take 5 mg by mouth Daily., Disp: , Rfl:   •  RELION PEN NEEDLES 32G X 4 MM misc, , Disp: , Rfl:   •  rivaroxaban (XARELTO) 20 MG tablet, Take 20 mg by mouth Daily. STOPPED 8/1/18, Disp: , Rfl:   •  sulfamethoxazole-trimethoprim (BACTRIM,SEPTRA) 400-80 MG tablet, , Disp: , Rfl: 6  •  traMADol (ULTRAM) 50 MG tablet, , Disp: , Rfl: 0  •  ipratropium-albuterol (DUO-NEB) 0.5-2.5 mg/3 ml nebulizer, Take 3 mL by nebulization 4 (Four) Times a Day As Needed for Wheezing., Disp: 120 mL, Rfl: 11    Review of Systems   Constitutional: Negative for chills and fever.   HENT: Negative for congestion.    Eyes: Positive for visual disturbance.   Respiratory: Positive for cough and shortness of breath.    Cardiovascular: Negative for  "chest pain.   Gastrointestinal: Negative for diarrhea, nausea and vomiting.   Genitourinary: Negative for difficulty urinating.   Musculoskeletal: Negative for arthralgias.   Skin: Negative for rash.   Neurological: Negative for dizziness and speech difficulty.   Hematological: Negative for adenopathy.   Psychiatric/Behavioral: The patient is not nervous/anxious.      ------------------------------------  Objective   /64   Pulse 76   Resp 16   Ht 168.9 cm (66.5\") Comment: measured today  Wt 85.7 kg (189 lb)   SpO2 98%   BMI 30.05 kg/m²   Physical Exam   Constitutional: He is oriented to person, place, and time. He appears well-developed and well-nourished.   HENT:   Head: Normocephalic and atraumatic.   Eyes: EOM are normal. Pupils are equal, round, and reactive to light.   Neck: Normal range of motion. Neck supple.   Cardiovascular: Normal rate, regular rhythm and normal heart sounds.   Pulmonary/Chest: Effort normal and breath sounds normal.   Abdominal: Soft.   Musculoskeletal: Normal range of motion.   Neurological: He is alert and oriented to person, place, and time.   Skin: Skin is warm and dry.   Psychiatric: He has a normal mood and affect.   Nursing note and vitals reviewed.          Pulmonary Functions Testing Results:  FEV1   Date Value Ref Range Status   07/12/2019 61% liters Final     FVC   Date Value Ref Range Status   07/12/2019 69% liters Final     FEV1/FVC   Date Value Ref Range Status   07/12/2019 69.71 % Final     TLC   Date Value Ref Range Status   07/12/2019 69% liters Final     DLCO   Date Value Ref Range Status   07/12/2019 61% ml/mmHg sec Final      My interpretation of PFT:  1.  Spirometry is consistent with a moderate obstructive ventilatory defect.  2.  Lung volumes really coexisting moderate restrictive ventilatory defect.  There is also mild decrease in inspiratory capacity.  3.  There is a mild to moderate diffusion impairment which when corrected for alveolar volume is " normalized.  5.  When current studies are compared to studies from November of last year, his spirometry is basically unchanged.  His total lung capacity is dropped somewhat.  His diffusion capacity actually has improved slightly.  Assessment/Plan   Caio was seen today for follow up sarcoidosis.    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    COPD, moderate (CMS/HCC)    Restrictive lung disease    Hypercalcemia    Overweight      Patient's Body mass index is 30.05 kg/m². BMI is above normal parameters. Recommendations include: referral to primary care.      Again, I am going to have home neb treatments to his regimen.  He does have airflow obstruction which would be consistent with COPD but I clearly think this relates to his sarcoidosis as he does not have any tobacco use history.  He does have a history of beryllium exposure and certainly possible that he has more issues could relate to berylliosis which could cause a similar picture to sarcoidosis.

## 2019-07-15 ENCOUNTER — HOSPITAL ENCOUNTER (OUTPATIENT)
Dept: INFUSION THERAPY | Age: 69
Discharge: HOME | End: 2019-07-15
Payer: COMMERCIAL

## 2019-07-15 PROCEDURE — 85025 COMPLETE CBC W/AUTO DIFF WBC: CPT

## 2019-07-16 ENCOUNTER — OFFICE VISIT (OUTPATIENT)
Dept: GASTROENTEROLOGY | Facility: CLINIC | Age: 69
End: 2019-07-16

## 2019-07-16 VITALS
BODY MASS INDEX: 27.99 KG/M2 | HEART RATE: 96 BPM | OXYGEN SATURATION: 98 % | WEIGHT: 189 LBS | HEIGHT: 69 IN | DIASTOLIC BLOOD PRESSURE: 70 MMHG | SYSTOLIC BLOOD PRESSURE: 142 MMHG

## 2019-07-16 DIAGNOSIS — D12.6 ADENOMATOUS POLYP OF COLON, UNSPECIFIED PART OF COLON: ICD-10-CM

## 2019-07-16 DIAGNOSIS — K21.9 GASTROESOPHAGEAL REFLUX DISEASE, ESOPHAGITIS PRESENCE NOT SPECIFIED: ICD-10-CM

## 2019-07-16 DIAGNOSIS — Z79.01 ANTICOAGULATED: ICD-10-CM

## 2019-07-16 DIAGNOSIS — R13.19 ESOPHAGEAL DYSPHAGIA: Primary | ICD-10-CM

## 2019-07-16 PROCEDURE — 99204 OFFICE O/P NEW MOD 45 MIN: CPT | Performed by: INTERNAL MEDICINE

## 2019-07-16 RX ORDER — IBANDRONATE SODIUM 150 MG/1
150 TABLET, FILM COATED ORAL
COMMUNITY
End: 2022-06-17

## 2019-07-16 NOTE — PROGRESS NOTES
[facility] - Office note  7/16/2019    Caio Riley 1950     Chief Complaint   Patient presents with   • GI Problem     Problems swallowing       HISTORY OF PRESENT ILLNESS    Caio Riley is a 68 y.o. male who presents here today for evaluation of dysphagia.  He tells me over the past several months he is been having trouble swallowing.  Has difficulty with solids as well as liquids.  States sometimes when he drinks he will hear things gurgle and then he will regurgitate back up foamy looking material.  There is been no blood.  Occasionally solids will hang in his esophagus and require him to regurgitate these back up as well.  Last endoscopy was in 2002.  He is on no acid suppressive medicines.  Some pain in the mid epigastric area.  He is on immunosuppressive therapy for sarcoidosis.  Including prednisone and Imuran.  History of atrial fibrillation which we diagnosed at time of his last colonoscopy.  He has since gone for ablation in [place] and has been maintained in sinus rhythm since that time.  He has been left on Xarelto by his primary care physician post ablation.  Also has a history of pancreatitis which was managed by Dr. Yahaira Alvares in [place].  This resolved and has not bothered him now for several years.  History of colon polyps and will be due for colonoscopy in December of this year.    Past Medical History:   Diagnosis Date   • A-fib (CMS/HCC)    • Arthritis    • Asthma    • COPD (chronic obstructive pulmonary disease) (CMS/HCC)    • Diabetes mellitus (CMS/HCC)    • Hypertension    • Kidney stones    • Neuropathy    • Pancreatitis    • Sarcoidosis        Past Surgical History:   Procedure Laterality Date   • COLONOSCOPY W/ POLYPECTOMY  12/02/2014    Tubular adenoma ascending colon    • ENDOSCOPY  01/08/2002    Mild chronic gastritis   • FRACTURE SURGERY Right     ARM   • FRACTURE SURGERY Left     LEG   • INGUINAL NODE BIOPSY Right 8/7/2018    Procedure: RIGHT  GROIN EXCISIONAL BIOPSY;  Surgeon: Ced Aguirre MD;  Location:  [facility] OR;  Service: General   • LAPAROSCOPIC CHOLECYSTECTOMY     • LASIK Bilateral    • SQUAMOUS CELL CARCINOMA EXCISION      back and left arm         Current Outpatient Medications:   •  albuterol (PROVENTIL HFA;VENTOLIN HFA) 108 (90 Base) MCG/ACT inhaler, Inhale., Disp: , Rfl:   •  ascorbic acid (VITAMIN C) 1000 MG tablet, Take 1,000 mg by mouth Daily., Disp: , Rfl:   •  aspirin 81 MG chewable tablet, Chew 81 mg Daily., Disp: , Rfl:   •  azaTHIOprine (IMURAN) 50 MG tablet, , Disp: , Rfl: 5  •  donepezil (ARICEPT) 5 MG tablet, , Disp: , Rfl: 0  •  EPINEPHrine (EPIPEN) 0.3 MG/0.3ML solution auto-injector injection, Inject 0.3 mg into the appropriate muscle as directed by prescriber., Disp: , Rfl:   •  fluticasone-salmeterol (ADVAIR) 500-50 MCG/DOSE DISKUS, Inhale 2 (Two) Times a Day., Disp: , Rfl:   •  ibandronate (BONIVA) 150 MG tablet, Take 150 mg by mouth Every 30 (Thirty) Days., Disp: , Rfl:   •  Insulin Pen Needle (BD PEN NEEDLE DAGMAR U/F) 32G X 4 MM misc, 1 each., Disp: , Rfl:   •  ipratropium-albuterol (DUO-NEB) 0.5-2.5 mg/3 ml nebulizer, Take 3 mL by nebulization 4 (Four) Times a Day As Needed for Wheezing., Disp: 120 mL, Rfl: 11  •  IRON PO, Take 27 mg by mouth 2 (Two) Times a Day., Disp: , Rfl:   •  JANUVIA 50 MG tablet, , Disp: , Rfl:   •  LANTUS SOLOSTAR 100 UNIT/ML injection pen, 14 Units., Disp: , Rfl:   •  losartan (COZAAR) 100 MG tablet, , Disp: , Rfl:   •  LYRICA 50 MG capsule, 75 mg., Disp: , Rfl:   •  predniSONE (DELTASONE) 5 MG tablet, Take 5 mg by mouth Daily., Disp: , Rfl:   •  RELION PEN NEEDLES 32G X 4 MM misc, , Disp: , Rfl:   •  rivaroxaban (XARELTO) 20 MG tablet, Take 20 mg by mouth Daily. STOPPED 8/1/18, Disp: , Rfl:   •  sulfamethoxazole-trimethoprim (BACTRIM,SEPTRA) 400-80 MG tablet, , Disp: , Rfl: 6  •  traMADol (ULTRAM) 50 MG tablet, , Disp: , Rfl: 0  •  polyethylene glycol (GoLYTELY) 236 g solution, Per office  "instructions, Disp: 4000 mL, Rfl: 0    Allergies   Allergen Reactions   • Bee Venom Anaphylaxis   • Penicillins Swelling and Unknown (See Comments)     Reaction: SWELLING/RASH    Reaction: SWELLING/RASH   • Acetaminophen Unknown (See Comments)     Other reaction(s): NAUSEA   Start Date: ADULT    Other reaction(s): NAUSEA   Start Date: ADULT   • Gabapentin Unknown (See Comments)     Causes blisters to head.   Causes blisters to head.     Causes blisters to head.    • Metoprolol Unknown (See Comments)     Leg pain   Leg pain     Leg pain        Social History     Socioeconomic History   • Marital status:      Spouse name: Not on file   • Number of children: Not on file   • Years of education: Not on file   • Highest education level: Not on file   Tobacco Use   • Smoking status: Former Smoker     Packs/day: 0.50     Years: 2.00     Pack years: 1.00     Types: Cigarettes     Last attempt to quit: 1968     Years since quittin.5   • Smokeless tobacco: Never Used   Substance and Sexual Activity   • Alcohol use: No   • Drug use: No   • Sexual activity: Defer       Family History   Problem Relation Age of Onset   • Colon cancer Neg Hx    • Colon polyps Neg Hx        Review of Systems   Constitutional: Negative.    HENT: Negative.    Eyes: Negative.    Respiratory: Positive for shortness of breath.    Cardiovascular: Negative.    Gastrointestinal: Positive for abdominal pain. Negative for abdominal distention, anal bleeding, blood in stool, constipation and diarrhea.   Genitourinary: Negative.        Vitals:    19 1341   BP: 142/70   Pulse: 96   SpO2: 98%   Weight: 85.7 kg (189 lb)   Height: 175.3 cm (69\")    Body mass index is 27.91 kg/m².    Physical Exam   Constitutional: He is oriented to person, place, and time. He appears well-developed and well-nourished.   HENT:   Head: Normocephalic.   Eyes: Conjunctivae are normal.   Neck: Neck supple.   Cardiovascular: Normal rate and regular rhythm. "   Pulmonary/Chest: Effort normal and breath sounds normal.   Abdominal: Soft. Bowel sounds are normal.   Neurological: He is alert and oriented to person, place, and time.   Skin: Skin is warm and dry.   Psychiatric: He has a normal mood and affect.       Lab Results - Last 18 Months   Lab Units 05/20/19  1010  08/02/18  1326   WBC K/uL 4.5*   < > 3.42*   HEMOGLOBIN g/dL 11.4*   < > 10.1*   HEMATOCRIT % 32.9*   < > 29.8*   MCV fL 90.1   < > 82.8   PLATELETS K/uL 141   < > 145   GLUCOSE mg/dL 203*   < > 179*   BUN mg/dL 20   < > 12   CREATININE mg/dL 1.2   < > 1.09   SODIUM mmol/L 140   < > 141   POTASSIUM mmol/L 4.4   < > 4.5   CHLORIDE mmol/L 102   < > 101   TOTAL CO2 mmol/L 26   < >  --    CO2 mmol/L  --   --  28.0   TOTAL PROTEIN g/dL 7.1   < > 7.0   ALBUMIN g/dL 4.2   < > 4.10   ALT (SGPT) U/L 13   < > <15   AST (SGOT) U/L 21   < > 24   ALK PHOS U/L 72   < > 76   BILIRUBIN mg/dL 0.4   < > 0.6   GLOBULIN gm/dL  --   --  2.9    < > = values in this interval not displayed.         ASSESSMENT AND PLAN      1. Esophageal dysphagia  Having both symptoms to solids and liquids.  Last endoscopy in 2002.  He said ERCP since that time but no other endoscopies at this facility.  Will need endoscopic evaluation dilatation.  We will not start acid suppressive medication until after that procedure.  We will need to hold Xarelto  - Case Request    2. Gastroesophageal reflux disease, esophagitis presence not specified  Again, having symptoms of heartburn but will delay treatment until endoscopy.    3. Adenomatous polyp of colon, unspecified part of colon  Will be due for colonoscopy at the end of the year.  Given he will be off Xarelto for his endoscopy we will set him up for colonoscopy at the same time.  Having no symptoms at this time.  - Case Request    4. Anticoagulated  Chronic anticoagulation for A. fib.  His A. fib was controlled with ablation over 4 years ago however he has been left on Xarelto in the event that it  recurs.  Liz Valdovinos is now managing this and will need to check with her before holding 2 days prior to procedures       EMR Dragon/transcription disclaimer: Much of this encounter note is an electronic transcription/translation of spoken language to printed text.  The electronic translation of spoken language may permit erroneous, or at times, nonsensical words or phrases to be inadvertently transcribed.  Although I have reviewed the note for such errors, some may still exist.    Ino Starks MD  7/16/2019  2:13 PM

## 2019-07-17 ENCOUNTER — TELEPHONE (OUTPATIENT)
Dept: INTERNAL MEDICINE | Age: 69
End: 2019-07-17

## 2019-07-17 NOTE — TELEPHONE ENCOUNTER
I spoke with Hermes Montes De Oca today after getting a letter from Dr. Angelia Leger regarding his colonoscopy. She requires him to be off his xarelto 2 days prior to his procedure. I have sent Lovenox to his pharmacy to take 80 mg twice a day for 2 days prior to the procedure. I also notify him to let us know if she remains polyps or is not comfortable with him restarting his Xarelto post procedure.

## 2019-07-24 ENCOUNTER — TELEPHONE (OUTPATIENT)
Dept: GASTROENTEROLOGY | Facility: CLINIC | Age: 69
End: 2019-07-24

## 2019-07-24 NOTE — TELEPHONE ENCOUNTER
Received clearance for pt to hold xarelto for 1 day prior to procedure. PT is scheduled for 8/1/19 and last dose of xarelto is 7/30/19. He will be bridged with Lovenox. jenny pt and lizz.

## 2019-07-30 ENCOUNTER — TELEPHONE (OUTPATIENT)
Dept: PULMONOLOGY | Facility: CLINIC | Age: 69
End: 2019-07-30

## 2019-07-30 DIAGNOSIS — J44.9 COPD, MODERATE (HCC): Primary | ICD-10-CM

## 2019-07-30 RX ORDER — LEVALBUTEROL INHALATION SOLUTION 1.25 MG/3ML
3 SOLUTION RESPIRATORY (INHALATION) 4 TIMES DAILY
Qty: 120 AMPULE | Refills: 11 | Status: SHIPPED | OUTPATIENT
Start: 2019-07-30

## 2019-07-30 NOTE — TELEPHONE ENCOUNTER
Please call the patient his wife and advise them that that is a common side effect of the albuterol component of the DuoNeb's.  The solution would be to discontinue the albuterol and use plain ipratropium.  Xopenex could be substituted for the albuterol as it has less likelihood of causing jitteriness but it generally is not well covered and the out-of-pocket cost could be quite substantial.  We can certainly switch him to plain ipratropium and we could even try him on Xopenex to use with it again realizing the cost of Xopenex is usually quite high.

## 2019-07-30 NOTE — TELEPHONE ENCOUNTER
"Patient was given Duoneb and it is really making him nervous and jittery for several hours after taking it. Wife is asking if there was anything you could give them to \"counteract\" the jitteriness?  "

## 2019-07-30 NOTE — TELEPHONE ENCOUNTER
Wife thinks insurance will cover xopenex. Please send in plain ipratropium and xopenex to [facility].

## 2019-08-01 ENCOUNTER — HOSPITAL ENCOUNTER (OUTPATIENT)
Facility: HOSPITAL | Age: 69
Setting detail: HOSPITAL OUTPATIENT SURGERY
Discharge: HOME | End: 2019-08-01
Attending: INTERNAL MEDICINE | Admitting: INTERNAL MEDICINE

## 2019-08-01 ENCOUNTER — ANESTHESIA (OUTPATIENT)
Dept: GASTROENTEROLOGY | Facility: HOSPITAL | Age: 69
End: 2019-08-01

## 2019-08-01 ENCOUNTER — ANESTHESIA EVENT (OUTPATIENT)
Dept: GASTROENTEROLOGY | Facility: HOSPITAL | Age: 69
End: 2019-08-01

## 2019-08-01 VITALS
RESPIRATION RATE: 21 BRPM | HEART RATE: 65 BPM | DIASTOLIC BLOOD PRESSURE: 56 MMHG | OXYGEN SATURATION: 98 % | HEIGHT: 67 IN | BODY MASS INDEX: 29.19 KG/M2 | TEMPERATURE: 98.7 F | WEIGHT: 186 LBS | SYSTOLIC BLOOD PRESSURE: 121 MMHG

## 2019-08-01 DIAGNOSIS — E11.42 TYPE 2 DIABETES MELLITUS WITH PERIPHERAL NEUROPATHY (HCC): ICD-10-CM

## 2019-08-01 DIAGNOSIS — R13.19 ESOPHAGEAL DYSPHAGIA: Primary | ICD-10-CM

## 2019-08-01 DIAGNOSIS — D12.6 ADENOMATOUS POLYP OF COLON, UNSPECIFIED PART OF COLON: ICD-10-CM

## 2019-08-01 LAB — GLUCOSE BLDC GLUCOMTR-MCNC: 114 MG/DL (ref 70–130)

## 2019-08-01 PROCEDURE — 82962 GLUCOSE BLOOD TEST: CPT

## 2019-08-01 PROCEDURE — 88305 TISSUE EXAM BY PATHOLOGIST: CPT | Performed by: INTERNAL MEDICINE

## 2019-08-01 PROCEDURE — 43235 EGD DIAGNOSTIC BRUSH WASH: CPT | Performed by: INTERNAL MEDICINE

## 2019-08-01 PROCEDURE — 25010000002 PROPOFOL 10 MG/ML EMULSION: Performed by: NURSE ANESTHETIST, CERTIFIED REGISTERED

## 2019-08-01 PROCEDURE — 45385 COLONOSCOPY W/LESION REMOVAL: CPT | Performed by: INTERNAL MEDICINE

## 2019-08-01 RX ORDER — PROPOFOL 10 MG/ML
VIAL (ML) INTRAVENOUS AS NEEDED
Status: DISCONTINUED | OUTPATIENT
Start: 2019-08-01 | End: 2019-08-01 | Stop reason: SURG

## 2019-08-01 RX ORDER — LIDOCAINE HYDROCHLORIDE 20 MG/ML
INJECTION, SOLUTION INFILTRATION; PERINEURAL AS NEEDED
Status: DISCONTINUED | OUTPATIENT
Start: 2019-08-01 | End: 2019-08-01 | Stop reason: SURG

## 2019-08-01 RX ORDER — SODIUM CHLORIDE 0.9 % (FLUSH) 0.9 %
3 SYRINGE (ML) INJECTION AS NEEDED
Status: DISCONTINUED | OUTPATIENT
Start: 2019-08-01 | End: 2019-08-01 | Stop reason: HOSPADM

## 2019-08-01 RX ORDER — SODIUM CHLORIDE 9 MG/ML
500 INJECTION, SOLUTION INTRAVENOUS CONTINUOUS PRN
Status: DISCONTINUED | OUTPATIENT
Start: 2019-08-01 | End: 2019-08-01 | Stop reason: HOSPADM

## 2019-08-01 RX ADMIN — SODIUM CHLORIDE 500 ML: 9 INJECTION, SOLUTION INTRAVENOUS at 07:45

## 2019-08-01 RX ADMIN — PROPOFOL 500 MG: 10 INJECTION, EMULSION INTRAVENOUS at 08:30

## 2019-08-01 RX ADMIN — LIDOCAINE HYDROCHLORIDE 100 MG: 20 INJECTION, SOLUTION INFILTRATION; PERINEURAL at 08:30

## 2019-08-01 NOTE — ANESTHESIA POSTPROCEDURE EVALUATION
"Patient: Caio Riley    Procedure Summary     Date:  08/01/19 Room / Location:  [facility] ENDOSCOPY 2 / [facility]    Anesthesia Start:  0828 Anesthesia Stop:  0906    Procedures:       ESOPHAGOGASTRODUODENOSCOPY WITH ANESTHESIA (N/A )      COLONOSCOPY WITH ANESTHESIA (N/A ) Diagnosis:       Esophageal dysphagia      Adenomatous polyp of colon, unspecified part of colon      (Esophageal dysphagia [R13.10])      (Adenomatous polyp of colon, unspecified part of colon [D12.6])    Surgeon:  Iris Duke MD Provider:  Indigo Luo CRNA    Anesthesia Type:  MAC ASA Status:  3          Anesthesia Type: MAC  Last vitals  BP   (!) 89/51 (08/01/19 0906)   Temp   98.7 °F (37.1 °C) (08/01/19 0725)   Pulse   69 (08/01/19 0725)   Resp   19 (08/01/19 0906)     SpO2   98 % (08/01/19 0725)     Post Anesthesia Care and Evaluation    Patient location during evaluation: PACU  Patient participation: complete - patient participated  Level of consciousness: awake and alert  Pain management: adequate  Airway patency: patent  Anesthetic complications: No anesthetic complications    Cardiovascular status: acceptable  Respiratory status: acceptable  Hydration status: acceptable    Comments: Blood pressure (!) 89/51, pulse 69, temperature 98.7 °F (37.1 °C), temperature source Temporal, resp. rate 19, height 170.2 cm (67\"), weight 84.4 kg (186 lb), SpO2 98 %.    Pt discharged from PACU based on keyanna score >8      "

## 2019-08-01 NOTE — ANESTHESIA PREPROCEDURE EVALUATION
Anesthesia Evaluation     Patient summary reviewed and Nursing notes reviewed   NPO Solid Status: > 8 hours  NPO Liquid Status: > 2 hours           Airway   Mallampati: I  TM distance: >3 FB  Neck ROM: full  No difficulty expected  Dental      Pulmonary    (+) a smoker Former, COPD (no home o2, uses inhalers daily), asthma,     ROS comment: Restrictive lung disease  Sarcoidosis, on imuran ,taking 5 mg prednisone daily, 4-5 months ago was taking 20 mg daily for a few weeks  Cardiovascular   Exercise tolerance: poor (<4 METS)    (+) hypertension, dysrhythmias Atrial Fib,       Neuro/Psych  (-) seizures, TIA, CVA  GI/Hepatic/Renal/Endo    (+)   renal disease stones, diabetes mellitus type 2,   (-) liver disease    Musculoskeletal     Abdominal    Substance History      OB/GYN          Other   (+) arthritis                     Anesthesia Plan    ASA 3     MAC     intravenous induction   Anesthetic plan, all risks, benefits, and alternatives have been provided, discussed and informed consent has been obtained with: patient.

## 2019-08-02 LAB
CYTO UR: NORMAL
LAB AP CASE REPORT: NORMAL
LAB AP CLINICAL INFORMATION: NORMAL
PATH REPORT.FINAL DX SPEC: NORMAL
PATH REPORT.GROSS SPEC: NORMAL

## 2019-08-05 DIAGNOSIS — E11.42 TYPE 2 DIABETES MELLITUS WITH PERIPHERAL NEUROPATHY (HCC): ICD-10-CM

## 2019-08-05 RX ORDER — PREGABALIN 75 MG/1
CAPSULE ORAL
Qty: 30 CAPSULE | Refills: 0 | Status: SHIPPED | OUTPATIENT
Start: 2019-08-05 | End: 2019-08-16 | Stop reason: SDUPTHER

## 2019-08-05 RX ORDER — PREGABALIN 75 MG/1
CAPSULE ORAL
Qty: 30 CAPSULE | Refills: 0 | Status: SHIPPED | OUTPATIENT
Start: 2019-08-05 | End: 2019-12-09

## 2019-08-07 DIAGNOSIS — M25.551 PAIN OF RIGHT HIP JOINT: ICD-10-CM

## 2019-08-07 RX ORDER — TRAMADOL HYDROCHLORIDE 50 MG/1
50 TABLET ORAL 2 TIMES DAILY PRN
Qty: 60 TABLET | Refills: 0 | Status: SHIPPED | OUTPATIENT
Start: 2019-08-07 | End: 2019-10-15 | Stop reason: SDUPTHER

## 2019-08-08 ENCOUNTER — HOSPITAL ENCOUNTER (OUTPATIENT)
Dept: GENERAL RADIOLOGY | Facility: HOSPITAL | Age: 69
Discharge: HOME | End: 2019-08-08
Admitting: INTERNAL MEDICINE

## 2019-08-08 DIAGNOSIS — R13.19 ESOPHAGEAL DYSPHAGIA: ICD-10-CM

## 2019-08-08 PROCEDURE — A9270 NON-COVERED ITEM OR SERVICE: HCPCS | Performed by: INTERNAL MEDICINE

## 2019-08-08 PROCEDURE — 63710000001 BARIUM SULFATE 700 MG TABLET: Performed by: INTERNAL MEDICINE

## 2019-08-08 PROCEDURE — 74220 X-RAY XM ESOPHAGUS 1CNTRST: CPT

## 2019-08-08 PROCEDURE — 63710000001 BARIUM SULFATE 96 % RECONSTITUTED SUSPENSION: Performed by: INTERNAL MEDICINE

## 2019-08-08 RX ADMIN — BARIUM SULFATE 75 ML: 960 POWDER, FOR SUSPENSION ORAL at 09:01

## 2019-08-08 RX ADMIN — BARIUM SULFATE 700 MG: 700 TABLET ORAL at 09:01

## 2019-08-11 ENCOUNTER — TELEPHONE (OUTPATIENT)
Dept: GASTROENTEROLOGY | Facility: CLINIC | Age: 69
End: 2019-08-11

## 2019-08-12 NOTE — TELEPHONE ENCOUNTER
Called and spoke to pt re: results-he VU and will be at his f/u with Dr. Starks to discuss further.

## 2019-08-15 ENCOUNTER — TELEPHONE (OUTPATIENT)
Dept: INTERNAL MEDICINE | Age: 69
End: 2019-08-15

## 2019-08-16 ENCOUNTER — ANESTHESIA EVENT (OUTPATIENT)
Dept: OPERATING ROOM | Age: 69
DRG: 853 | End: 2019-08-16
Payer: MEDICARE

## 2019-08-16 ENCOUNTER — OFFICE VISIT (OUTPATIENT)
Dept: INTERNAL MEDICINE | Age: 69
End: 2019-08-16
Payer: COMMERCIAL

## 2019-08-16 ENCOUNTER — APPOINTMENT (OUTPATIENT)
Dept: CT IMAGING | Age: 69
DRG: 853 | End: 2019-08-16
Payer: MEDICARE

## 2019-08-16 ENCOUNTER — HOSPITAL ENCOUNTER (INPATIENT)
Age: 69
LOS: 6 days | Discharge: HOME | DRG: 853 | End: 2019-08-22
Attending: EMERGENCY MEDICINE | Admitting: UROLOGY
Payer: MEDICARE

## 2019-08-16 ENCOUNTER — APPOINTMENT (OUTPATIENT)
Dept: GENERAL RADIOLOGY | Age: 69
DRG: 853 | End: 2019-08-16
Payer: MEDICARE

## 2019-08-16 ENCOUNTER — ANESTHESIA (OUTPATIENT)
Dept: OPERATING ROOM | Age: 69
DRG: 853 | End: 2019-08-16
Payer: MEDICARE

## 2019-08-16 VITALS
SYSTOLIC BLOOD PRESSURE: 140 MMHG | RESPIRATION RATE: 2 BRPM | TEMPERATURE: 98 F | DIASTOLIC BLOOD PRESSURE: 59 MMHG | OXYGEN SATURATION: 100 %

## 2019-08-16 VITALS
DIASTOLIC BLOOD PRESSURE: 62 MMHG | SYSTOLIC BLOOD PRESSURE: 124 MMHG | HEART RATE: 104 BPM | TEMPERATURE: 102.3 F | RESPIRATION RATE: 18 BRPM | OXYGEN SATURATION: 93 %

## 2019-08-16 DIAGNOSIS — N20.1 URETEROLITHIASIS: Primary | ICD-10-CM

## 2019-08-16 DIAGNOSIS — R50.9 FEVER, UNSPECIFIED FEVER CAUSE: ICD-10-CM

## 2019-08-16 DIAGNOSIS — R31.9 HEMATURIA, UNSPECIFIED TYPE: Primary | ICD-10-CM

## 2019-08-16 DIAGNOSIS — K92.1 MELENA: ICD-10-CM

## 2019-08-16 DIAGNOSIS — R82.81 PYURIA: ICD-10-CM

## 2019-08-16 DIAGNOSIS — D86.0 SARCOIDOSIS, LUNG (HCC): ICD-10-CM

## 2019-08-16 DIAGNOSIS — N20.0 NEPHROLITHIASIS: ICD-10-CM

## 2019-08-16 DIAGNOSIS — M54.50 ACUTE LOW BACK PAIN WITHOUT SCIATICA, UNSPECIFIED BACK PAIN LATERALITY: ICD-10-CM

## 2019-08-16 PROBLEM — N30.01 ACUTE CYSTITIS WITH HEMATURIA: Status: ACTIVE | Noted: 2019-08-16

## 2019-08-16 LAB
ALBUMIN SERPL-MCNC: 4.2 G/DL (ref 3.5–5.2)
ALP BLD-CCNC: 75 U/L (ref 40–130)
ALT SERPL-CCNC: 11 U/L (ref 5–41)
ANION GAP SERPL CALCULATED.3IONS-SCNC: 14 MMOL/L (ref 7–19)
APTT: 42 SEC (ref 26–36.2)
AST SERPL-CCNC: 17 U/L (ref 5–40)
BACTERIA: ABNORMAL /HPF
BASOPHILS ABSOLUTE: 0 K/UL (ref 0–0.2)
BASOPHILS RELATIVE PERCENT: 0.2 % (ref 0–1)
BILIRUB SERPL-MCNC: 1.7 MG/DL (ref 0.2–1.2)
BILIRUBIN URINE: NEGATIVE
BLOOD, URINE: ABNORMAL
BUN BLDV-MCNC: 21 MG/DL (ref 8–23)
CALCIUM SERPL-MCNC: 10.4 MG/DL (ref 8.8–10.2)
CHLORIDE BLD-SCNC: 93 MMOL/L (ref 98–111)
CLARITY: ABNORMAL
CO2: 27 MMOL/L (ref 22–29)
COLOR: ABNORMAL
CREAT SERPL-MCNC: 1.4 MG/DL (ref 0.5–1.2)
EKG P AXIS: 66 DEGREES
EKG P-R INTERVAL: 146 MS
EKG Q-T INTERVAL: 310 MS
EKG QRS DURATION: 96 MS
EKG QTC CALCULATION (BAZETT): 381 MS
EKG T AXIS: 85 DEGREES
EOSINOPHILS ABSOLUTE: 0 K/UL (ref 0–0.6)
EOSINOPHILS RELATIVE PERCENT: 0.1 % (ref 0–5)
EPITHELIAL CELLS, UA: 0 /HPF (ref 0–5)
GFR NON-AFRICAN AMERICAN: 50
GLUCOSE BLD-MCNC: 172 MG/DL (ref 74–109)
GLUCOSE BLD-MCNC: 235 MG/DL (ref 70–99)
GLUCOSE URINE: 250 MG/DL
HCT VFR BLD CALC: 34.7 % (ref 42–52)
HEMOGLOBIN: 11.8 G/DL (ref 14–18)
HYALINE CASTS: 7 /HPF (ref 0–8)
IMMATURE GRANULOCYTES #: 0.1 K/UL
INR BLD: 1.59 (ref 0.88–1.18)
KETONES, URINE: 15 MG/DL
LACTIC ACID: 1.8 MMOL/L (ref 0.5–1.9)
LEUKOCYTE ESTERASE, URINE: ABNORMAL
LIPASE: 37 U/L (ref 13–60)
LYMPHOCYTES ABSOLUTE: 0.7 K/UL (ref 1.1–4.5)
LYMPHOCYTES RELATIVE PERCENT: 6.7 % (ref 20–40)
MCH RBC QN AUTO: 30.6 PG (ref 27–31)
MCHC RBC AUTO-ENTMCNC: 34 G/DL (ref 33–37)
MCV RBC AUTO: 89.9 FL (ref 80–94)
MONOCYTES ABSOLUTE: 1.3 K/UL (ref 0–0.9)
MONOCYTES RELATIVE PERCENT: 11.3 % (ref 0–10)
NEUTROPHILS ABSOLUTE: 9 K/UL (ref 1.5–7.5)
NEUTROPHILS RELATIVE PERCENT: 81.2 % (ref 50–65)
NITRITE, URINE: POSITIVE
PDW BLD-RTO: 13.3 % (ref 11.5–14.5)
PERFORMED ON: ABNORMAL
PH UA: 6 (ref 5–8)
PLATELET # BLD: 113 K/UL (ref 130–400)
PMV BLD AUTO: 10.3 FL (ref 9.4–12.4)
POTASSIUM SERPL-SCNC: 4 MMOL/L (ref 3.5–5)
PROTEIN UA: 30 MG/DL
PROTHROMBIN TIME: 18.3 SEC (ref 12–14.6)
RBC # BLD: 3.86 M/UL (ref 4.7–6.1)
RBC UA: 717 /HPF (ref 0–4)
SODIUM BLD-SCNC: 134 MMOL/L (ref 136–145)
SPECIFIC GRAVITY UA: 1.02 (ref 1–1.03)
TOTAL PROTEIN: 7.4 G/DL (ref 6.6–8.7)
URINE REFLEX TO CULTURE: YES
UROBILINOGEN, URINE: 0.2 E.U./DL
WBC # BLD: 11.1 K/UL (ref 4.8–10.8)
WBC UA: 123 /HPF (ref 0–5)

## 2019-08-16 PROCEDURE — 99285 EMERGENCY DEPT VISIT HI MDM: CPT

## 2019-08-16 PROCEDURE — 7100000000 HC PACU RECOVERY - FIRST 15 MIN: Performed by: UROLOGY

## 2019-08-16 PROCEDURE — 74150 CT ABDOMEN W/O CONTRAST: CPT

## 2019-08-16 PROCEDURE — 4040F PNEUMOC VAC/ADMIN/RCVD: CPT | Performed by: NURSE PRACTITIONER

## 2019-08-16 PROCEDURE — 1123F ACP DISCUSS/DSCN MKR DOCD: CPT | Performed by: NURSE PRACTITIONER

## 2019-08-16 PROCEDURE — 2709999900 HC NON-CHARGEABLE SUPPLY: Performed by: UROLOGY

## 2019-08-16 PROCEDURE — 87086 URINE CULTURE/COLONY COUNT: CPT

## 2019-08-16 PROCEDURE — 85610 PROTHROMBIN TIME: CPT

## 2019-08-16 PROCEDURE — 52332 CYSTOSCOPY AND TREATMENT: CPT | Performed by: UROLOGY

## 2019-08-16 PROCEDURE — 87077 CULTURE AEROBIC IDENTIFY: CPT

## 2019-08-16 PROCEDURE — 83690 ASSAY OF LIPASE: CPT

## 2019-08-16 PROCEDURE — 36415 COLL VENOUS BLD VENIPUNCTURE: CPT

## 2019-08-16 PROCEDURE — 96374 THER/PROPH/DIAG INJ IV PUSH: CPT

## 2019-08-16 PROCEDURE — 0T778DZ DILATION OF LEFT URETER WITH INTRALUMINAL DEVICE, VIA NATURAL OR ARTIFICIAL OPENING ENDOSCOPIC: ICD-10-PCS | Performed by: UROLOGY

## 2019-08-16 PROCEDURE — 85025 COMPLETE CBC W/AUTO DIFF WBC: CPT

## 2019-08-16 PROCEDURE — 93005 ELECTROCARDIOGRAM TRACING: CPT

## 2019-08-16 PROCEDURE — 6360000002 HC RX W HCPCS: Performed by: NURSE ANESTHETIST, CERTIFIED REGISTERED

## 2019-08-16 PROCEDURE — 81001 URINALYSIS AUTO W/SCOPE: CPT

## 2019-08-16 PROCEDURE — 3600000014 HC SURGERY LEVEL 4 ADDTL 15MIN: Performed by: UROLOGY

## 2019-08-16 PROCEDURE — C1769 GUIDE WIRE: HCPCS | Performed by: UROLOGY

## 2019-08-16 PROCEDURE — BT1FZZZ FLUOROSCOPY OF LEFT KIDNEY, URETER AND BLADDER: ICD-10-PCS | Performed by: UROLOGY

## 2019-08-16 PROCEDURE — 2580000003 HC RX 258: Performed by: NURSE PRACTITIONER

## 2019-08-16 PROCEDURE — 0T9B70Z DRAINAGE OF BLADDER WITH DRAINAGE DEVICE, VIA NATURAL OR ARTIFICIAL OPENING: ICD-10-PCS | Performed by: UROLOGY

## 2019-08-16 PROCEDURE — 83605 ASSAY OF LACTIC ACID: CPT

## 2019-08-16 PROCEDURE — 99222 1ST HOSP IP/OBS MODERATE 55: CPT | Performed by: NURSE PRACTITIONER

## 2019-08-16 PROCEDURE — 3700000000 HC ANESTHESIA ATTENDED CARE: Performed by: UROLOGY

## 2019-08-16 PROCEDURE — 6370000000 HC RX 637 (ALT 250 FOR IP): Performed by: UROLOGY

## 2019-08-16 PROCEDURE — 3017F COLORECTAL CA SCREEN DOC REV: CPT | Performed by: NURSE PRACTITIONER

## 2019-08-16 PROCEDURE — 1210000000 HC MED SURG R&B

## 2019-08-16 PROCEDURE — 2580000003 HC RX 258: Performed by: PHYSICIAN ASSISTANT

## 2019-08-16 PROCEDURE — 2500000003 HC RX 250 WO HCPCS: Performed by: NURSE ANESTHETIST, CERTIFIED REGISTERED

## 2019-08-16 PROCEDURE — C1758 CATHETER, URETERAL: HCPCS | Performed by: UROLOGY

## 2019-08-16 PROCEDURE — 1036F TOBACCO NON-USER: CPT | Performed by: NURSE PRACTITIONER

## 2019-08-16 PROCEDURE — 6360000002 HC RX W HCPCS: Performed by: NURSE PRACTITIONER

## 2019-08-16 PROCEDURE — 6360000002 HC RX W HCPCS: Performed by: PHYSICIAN ASSISTANT

## 2019-08-16 PROCEDURE — G8417 CALC BMI ABV UP PARAM F/U: HCPCS | Performed by: NURSE PRACTITIONER

## 2019-08-16 PROCEDURE — 3700000001 HC ADD 15 MINUTES (ANESTHESIA): Performed by: UROLOGY

## 2019-08-16 PROCEDURE — 7100000001 HC PACU RECOVERY - ADDTL 15 MIN: Performed by: UROLOGY

## 2019-08-16 PROCEDURE — 3600000004 HC SURGERY LEVEL 4 BASE: Performed by: UROLOGY

## 2019-08-16 PROCEDURE — C2617 STENT, NON-COR, TEM W/O DEL: HCPCS | Performed by: UROLOGY

## 2019-08-16 PROCEDURE — 87186 SC STD MICRODIL/AGAR DIL: CPT

## 2019-08-16 PROCEDURE — 94640 AIRWAY INHALATION TREATMENT: CPT

## 2019-08-16 PROCEDURE — 6370000000 HC RX 637 (ALT 250 FOR IP): Performed by: NURSE PRACTITIONER

## 2019-08-16 PROCEDURE — 2580000003 HC RX 258: Performed by: UROLOGY

## 2019-08-16 PROCEDURE — G8427 DOCREV CUR MEDS BY ELIG CLIN: HCPCS | Performed by: NURSE PRACTITIONER

## 2019-08-16 PROCEDURE — 87040 BLOOD CULTURE FOR BACTERIA: CPT

## 2019-08-16 PROCEDURE — 85730 THROMBOPLASTIN TIME PARTIAL: CPT

## 2019-08-16 PROCEDURE — 82948 REAGENT STRIP/BLOOD GLUCOSE: CPT

## 2019-08-16 PROCEDURE — 74420 UROGRAPHY RTRGR +-KUB: CPT

## 2019-08-16 PROCEDURE — 52330 CYSTOSCOPY AND TREATMENT: CPT | Performed by: UROLOGY

## 2019-08-16 PROCEDURE — 80053 COMPREHEN METABOLIC PANEL: CPT

## 2019-08-16 PROCEDURE — 99213 OFFICE O/P EST LOW 20 MIN: CPT | Performed by: NURSE PRACTITIONER

## 2019-08-16 DEVICE — URETERAL STENT
Type: IMPLANTABLE DEVICE | Site: URETER | Status: FUNCTIONAL
Brand: POLARIS™ ULTRA

## 2019-08-16 RX ORDER — LIDOCAINE HYDROCHLORIDE 10 MG/ML
INJECTION, SOLUTION INFILTRATION; PERINEURAL PRN
Status: DISCONTINUED | OUTPATIENT
Start: 2019-08-16 | End: 2019-08-16 | Stop reason: SDUPTHER

## 2019-08-16 RX ORDER — SODIUM CHLORIDE 9 MG/ML
INJECTION, SOLUTION INTRAVENOUS CONTINUOUS
Status: DISCONTINUED | OUTPATIENT
Start: 2019-08-16 | End: 2019-08-16

## 2019-08-16 RX ORDER — METOCLOPRAMIDE HYDROCHLORIDE 5 MG/ML
10 INJECTION INTRAMUSCULAR; INTRAVENOUS
Status: DISCONTINUED | OUTPATIENT
Start: 2019-08-16 | End: 2019-08-16 | Stop reason: HOSPADM

## 2019-08-16 RX ORDER — ALBUTEROL SULFATE 90 UG/1
2 AEROSOL, METERED RESPIRATORY (INHALATION) EVERY 6 HOURS PRN
Status: DISCONTINUED | OUTPATIENT
Start: 2019-08-16 | End: 2019-08-22 | Stop reason: HOSPADM

## 2019-08-16 RX ORDER — PROPOFOL 10 MG/ML
INJECTION, EMULSION INTRAVENOUS PRN
Status: DISCONTINUED | OUTPATIENT
Start: 2019-08-16 | End: 2019-08-16 | Stop reason: SDUPTHER

## 2019-08-16 RX ORDER — DEXAMETHASONE SODIUM PHOSPHATE 10 MG/ML
INJECTION INTRAMUSCULAR; INTRAVENOUS PRN
Status: DISCONTINUED | OUTPATIENT
Start: 2019-08-16 | End: 2019-08-16 | Stop reason: SDUPTHER

## 2019-08-16 RX ORDER — SODIUM CHLORIDE 0.9 % (FLUSH) 0.9 %
10 SYRINGE (ML) INJECTION PRN
Status: DISCONTINUED | OUTPATIENT
Start: 2019-08-16 | End: 2019-08-22 | Stop reason: HOSPADM

## 2019-08-16 RX ORDER — NICOTINE POLACRILEX 4 MG
15 LOZENGE BUCCAL PRN
Status: DISCONTINUED | OUTPATIENT
Start: 2019-08-16 | End: 2019-08-17 | Stop reason: SDUPTHER

## 2019-08-16 RX ORDER — ALBUTEROL SULFATE 2.5 MG/3ML
2.5 SOLUTION RESPIRATORY (INHALATION) 4 TIMES DAILY
Status: DISCONTINUED | OUTPATIENT
Start: 2019-08-16 | End: 2019-08-22 | Stop reason: HOSPADM

## 2019-08-16 RX ORDER — DIPHENHYDRAMINE HYDROCHLORIDE 50 MG/ML
12.5 INJECTION INTRAMUSCULAR; INTRAVENOUS
Status: DISCONTINUED | OUTPATIENT
Start: 2019-08-16 | End: 2019-08-16 | Stop reason: HOSPADM

## 2019-08-16 RX ORDER — ASCORBIC ACID 500 MG
1000 TABLET ORAL DAILY
Status: DISCONTINUED | OUTPATIENT
Start: 2019-08-17 | End: 2019-08-22 | Stop reason: HOSPADM

## 2019-08-16 RX ORDER — KETOROLAC TROMETHAMINE 30 MG/ML
30 INJECTION, SOLUTION INTRAMUSCULAR; INTRAVENOUS ONCE
Status: COMPLETED | OUTPATIENT
Start: 2019-08-16 | End: 2019-08-16

## 2019-08-16 RX ORDER — CIPROFLOXACIN 2 MG/ML
400 INJECTION, SOLUTION INTRAVENOUS EVERY 12 HOURS
Status: DISCONTINUED | OUTPATIENT
Start: 2019-08-17 | End: 2019-08-21

## 2019-08-16 RX ORDER — ONDANSETRON 2 MG/ML
INJECTION INTRAMUSCULAR; INTRAVENOUS PRN
Status: DISCONTINUED | OUTPATIENT
Start: 2019-08-16 | End: 2019-08-16 | Stop reason: SDUPTHER

## 2019-08-16 RX ORDER — LOSARTAN POTASSIUM 100 MG/1
100 TABLET ORAL DAILY
Status: DISCONTINUED | OUTPATIENT
Start: 2019-08-17 | End: 2019-08-17

## 2019-08-16 RX ORDER — PREGABALIN 75 MG/1
75 CAPSULE ORAL DAILY
Status: DISCONTINUED | OUTPATIENT
Start: 2019-08-17 | End: 2019-08-22 | Stop reason: HOSPADM

## 2019-08-16 RX ORDER — SODIUM CHLORIDE 9 MG/ML
INJECTION, SOLUTION INTRAVENOUS CONTINUOUS
Status: DISCONTINUED | OUTPATIENT
Start: 2019-08-16 | End: 2019-08-20

## 2019-08-16 RX ORDER — CLONIDINE HYDROCHLORIDE 0.2 MG/1
0.2 TABLET ORAL NIGHTLY
Status: DISCONTINUED | OUTPATIENT
Start: 2019-08-16 | End: 2019-08-22 | Stop reason: HOSPADM

## 2019-08-16 RX ORDER — 0.9 % SODIUM CHLORIDE 0.9 %
1000 INTRAVENOUS SOLUTION INTRAVENOUS ONCE
Status: DISCONTINUED | OUTPATIENT
Start: 2019-08-16 | End: 2019-08-22 | Stop reason: HOSPADM

## 2019-08-16 RX ORDER — DONEPEZIL HYDROCHLORIDE 5 MG/1
5 TABLET, FILM COATED ORAL NIGHTLY
Status: DISCONTINUED | OUTPATIENT
Start: 2019-08-16 | End: 2019-08-22 | Stop reason: HOSPADM

## 2019-08-16 RX ORDER — AZATHIOPRINE 50 MG/1
50 TABLET ORAL DAILY
Status: DISCONTINUED | OUTPATIENT
Start: 2019-08-17 | End: 2019-08-22 | Stop reason: HOSPADM

## 2019-08-16 RX ORDER — BUDESONIDE 0.5 MG/2ML
1 INHALANT ORAL 2 TIMES DAILY
Status: DISCONTINUED | OUTPATIENT
Start: 2019-08-16 | End: 2019-08-22 | Stop reason: HOSPADM

## 2019-08-16 RX ORDER — DEXTROSE MONOHYDRATE 25 G/50ML
12.5 INJECTION, SOLUTION INTRAVENOUS PRN
Status: DISCONTINUED | OUTPATIENT
Start: 2019-08-16 | End: 2019-08-17 | Stop reason: SDUPTHER

## 2019-08-16 RX ORDER — FAMOTIDINE 20 MG/1
20 TABLET, FILM COATED ORAL 2 TIMES DAILY
Status: DISCONTINUED | OUTPATIENT
Start: 2019-08-16 | End: 2019-08-22 | Stop reason: HOSPADM

## 2019-08-16 RX ORDER — MORPHINE SULFATE 2 MG/ML
4 INJECTION, SOLUTION INTRAMUSCULAR; INTRAVENOUS EVERY 5 MIN PRN
Status: DISCONTINUED | OUTPATIENT
Start: 2019-08-16 | End: 2019-08-16 | Stop reason: HOSPADM

## 2019-08-16 RX ORDER — ONDANSETRON 2 MG/ML
4 INJECTION INTRAMUSCULAR; INTRAVENOUS EVERY 6 HOURS PRN
Status: DISCONTINUED | OUTPATIENT
Start: 2019-08-16 | End: 2019-08-22 | Stop reason: HOSPADM

## 2019-08-16 RX ORDER — INSULIN GLARGINE 100 [IU]/ML
12 INJECTION, SOLUTION SUBCUTANEOUS NIGHTLY
Status: DISCONTINUED | OUTPATIENT
Start: 2019-08-16 | End: 2019-08-17

## 2019-08-16 RX ORDER — PROMETHAZINE HYDROCHLORIDE 25 MG/ML
6.25 INJECTION, SOLUTION INTRAMUSCULAR; INTRAVENOUS
Status: DISCONTINUED | OUTPATIENT
Start: 2019-08-16 | End: 2019-08-16 | Stop reason: HOSPADM

## 2019-08-16 RX ORDER — ENALAPRILAT 2.5 MG/2ML
1.25 INJECTION INTRAVENOUS
Status: DISCONTINUED | OUTPATIENT
Start: 2019-08-16 | End: 2019-08-16 | Stop reason: HOSPADM

## 2019-08-16 RX ORDER — MEPERIDINE HYDROCHLORIDE 50 MG/ML
12.5 INJECTION INTRAMUSCULAR; INTRAVENOUS; SUBCUTANEOUS EVERY 5 MIN PRN
Status: DISCONTINUED | OUTPATIENT
Start: 2019-08-16 | End: 2019-08-16 | Stop reason: HOSPADM

## 2019-08-16 RX ORDER — ROCURONIUM BROMIDE 10 MG/ML
INJECTION, SOLUTION INTRAVENOUS PRN
Status: DISCONTINUED | OUTPATIENT
Start: 2019-08-16 | End: 2019-08-16 | Stop reason: SDUPTHER

## 2019-08-16 RX ORDER — HYDRALAZINE HYDROCHLORIDE 20 MG/ML
5 INJECTION INTRAMUSCULAR; INTRAVENOUS EVERY 10 MIN PRN
Status: DISCONTINUED | OUTPATIENT
Start: 2019-08-16 | End: 2019-08-16 | Stop reason: HOSPADM

## 2019-08-16 RX ORDER — MORPHINE SULFATE 2 MG/ML
2 INJECTION, SOLUTION INTRAMUSCULAR; INTRAVENOUS EVERY 5 MIN PRN
Status: DISCONTINUED | OUTPATIENT
Start: 2019-08-16 | End: 2019-08-16 | Stop reason: HOSPADM

## 2019-08-16 RX ORDER — FENTANYL CITRATE 50 UG/ML
INJECTION, SOLUTION INTRAMUSCULAR; INTRAVENOUS PRN
Status: DISCONTINUED | OUTPATIENT
Start: 2019-08-16 | End: 2019-08-16 | Stop reason: SDUPTHER

## 2019-08-16 RX ORDER — OXYCODONE HYDROCHLORIDE 5 MG/1
5 TABLET ORAL EVERY 4 HOURS PRN
Status: DISCONTINUED | OUTPATIENT
Start: 2019-08-16 | End: 2019-08-22 | Stop reason: HOSPADM

## 2019-08-16 RX ORDER — TAMSULOSIN HYDROCHLORIDE 0.4 MG/1
0.4 CAPSULE ORAL ONCE
Status: COMPLETED | OUTPATIENT
Start: 2019-08-16 | End: 2019-08-16

## 2019-08-16 RX ORDER — SODIUM CHLORIDE 0.9 % (FLUSH) 0.9 %
10 SYRINGE (ML) INJECTION EVERY 12 HOURS SCHEDULED
Status: DISCONTINUED | OUTPATIENT
Start: 2019-08-16 | End: 2019-08-22 | Stop reason: HOSPADM

## 2019-08-16 RX ORDER — CLONIDINE HYDROCHLORIDE 0.1 MG/1
0.1 TABLET ORAL EVERY MORNING
Status: DISCONTINUED | OUTPATIENT
Start: 2019-08-17 | End: 2019-08-22 | Stop reason: HOSPADM

## 2019-08-16 RX ORDER — DEXTROSE MONOHYDRATE 50 MG/ML
100 INJECTION, SOLUTION INTRAVENOUS PRN
Status: DISCONTINUED | OUTPATIENT
Start: 2019-08-16 | End: 2019-08-17 | Stop reason: SDUPTHER

## 2019-08-16 RX ORDER — NITROGLYCERIN 0.4 MG/1
0.4 TABLET SUBLINGUAL EVERY 5 MIN PRN
Status: DISCONTINUED | OUTPATIENT
Start: 2019-08-16 | End: 2019-08-22 | Stop reason: HOSPADM

## 2019-08-16 RX ORDER — ATROPA BELLADONNA AND OPIUM 16.2; 6 MG/1; MG/1
SUPPOSITORY RECTAL PRN
Status: DISCONTINUED | OUTPATIENT
Start: 2019-08-16 | End: 2019-08-16 | Stop reason: ALTCHOICE

## 2019-08-16 RX ORDER — CIPROFLOXACIN 2 MG/ML
400 INJECTION, SOLUTION INTRAVENOUS EVERY 12 HOURS
Status: DISCONTINUED | OUTPATIENT
Start: 2019-08-16 | End: 2019-08-16 | Stop reason: HOSPADM

## 2019-08-16 RX ADMIN — CEFTRIAXONE 1 G: 1 INJECTION, POWDER, FOR SOLUTION INTRAMUSCULAR; INTRAVENOUS at 15:53

## 2019-08-16 RX ADMIN — FENTANYL CITRATE 50 MCG: 50 INJECTION INTRAMUSCULAR; INTRAVENOUS at 18:03

## 2019-08-16 RX ADMIN — PROPOFOL 160 MG: 10 INJECTION, EMULSION INTRAVENOUS at 18:03

## 2019-08-16 RX ADMIN — PHENYLEPHRINE HYDROCHLORIDE 100 MCG: 10 INJECTION INTRAVENOUS at 18:27

## 2019-08-16 RX ADMIN — LIDOCAINE HYDROCHLORIDE 50 MG: 10 INJECTION, SOLUTION INFILTRATION; PERINEURAL at 18:03

## 2019-08-16 RX ADMIN — DONEPEZIL HYDROCHLORIDE 5 MG: 5 TABLET, FILM COATED ORAL at 20:48

## 2019-08-16 RX ADMIN — SUGAMMADEX 170 MG: 100 INJECTION, SOLUTION INTRAVENOUS at 18:31

## 2019-08-16 RX ADMIN — SODIUM CHLORIDE: 9 INJECTION, SOLUTION INTRAVENOUS at 17:59

## 2019-08-16 RX ADMIN — FAMOTIDINE 20 MG: 20 TABLET ORAL at 20:48

## 2019-08-16 RX ADMIN — ROCURONIUM BROMIDE 10 MG: 10 INJECTION INTRAVENOUS at 18:13

## 2019-08-16 RX ADMIN — TAMSULOSIN HYDROCHLORIDE 0.4 MG: 0.4 CAPSULE ORAL at 20:48

## 2019-08-16 RX ADMIN — SODIUM CHLORIDE: 9 INJECTION, SOLUTION INTRAVENOUS at 22:22

## 2019-08-16 RX ADMIN — ROCURONIUM BROMIDE 30 MG: 10 INJECTION INTRAVENOUS at 18:03

## 2019-08-16 RX ADMIN — KETOROLAC TROMETHAMINE 30 MG: 30 INJECTION, SOLUTION INTRAMUSCULAR; INTRAVENOUS at 15:31

## 2019-08-16 RX ADMIN — ONDANSETRON HYDROCHLORIDE 4 MG: 2 INJECTION, SOLUTION INTRAMUSCULAR; INTRAVENOUS at 18:28

## 2019-08-16 RX ADMIN — BUDESONIDE 1000 MCG: 0.5 INHALANT RESPIRATORY (INHALATION) at 19:59

## 2019-08-16 RX ADMIN — BENZOCAINE 6 MG-MENTHOL 10 MG LOZENGES 1 LOZENGE: at 20:48

## 2019-08-16 RX ADMIN — DEXAMETHASONE SODIUM PHOSPHATE 10 MG: 10 INJECTION INTRAMUSCULAR; INTRAVENOUS at 18:10

## 2019-08-16 RX ADMIN — INSULIN GLARGINE 12 UNITS: 100 INJECTION, SOLUTION SUBCUTANEOUS at 22:21

## 2019-08-16 RX ADMIN — ALBUTEROL SULFATE 2.5 MG: 2.5 SOLUTION RESPIRATORY (INHALATION) at 19:59

## 2019-08-16 RX ADMIN — PHENYLEPHRINE HYDROCHLORIDE 100 MCG: 10 INJECTION INTRAVENOUS at 18:16

## 2019-08-16 RX ADMIN — CIPROFLOXACIN 400 MG: 2 INJECTION INTRAVENOUS at 18:10

## 2019-08-16 ASSESSMENT — LIFESTYLE VARIABLES: SMOKING_STATUS: 0

## 2019-08-16 ASSESSMENT — ENCOUNTER SYMPTOMS
COUGH: 0
BACK PAIN: 1
DIARRHEA: 0
CHOKING: 0
TROUBLE SWALLOWING: 0
COLOR CHANGE: 0
BLOOD IN STOOL: 0
SORE THROAT: 0
VOMITING: 0
EYE DISCHARGE: 0
EYE ITCHING: 0
NAUSEA: 0
SHORTNESS OF BREATH: 0
WHEEZING: 0
STRIDOR: 0
SHORTNESS OF BREATH: 0
CONSTIPATION: 0
ABDOMINAL DISTENTION: 0
ABDOMINAL PAIN: 0

## 2019-08-16 ASSESSMENT — PAIN SCALES - GENERAL
PAINLEVEL_OUTOF10: 6
PAINLEVEL_OUTOF10: 0

## 2019-08-16 ASSESSMENT — PAIN DESCRIPTION - PAIN TYPE: TYPE: ACUTE PAIN

## 2019-08-16 NOTE — ANESTHESIA POSTPROCEDURE EVALUATION
Department of Anesthesiology  Postprocedure Note    Patient: Charlotte Montgomery  MRN: [id number]  YOB: 1950  Date of evaluation: 8/16/2019  Time:  6:39 PM     Procedure Summary     Date:  08/16/19 Room / Location:  [facility] / [facility]    Anesthesia Start:  9021 Anesthesia Stop:  4304    Procedure:  CYSTOSCOPY; LEFT RETROGRADE PYELOGRAM; INSERTION LEFT URETERAL STENT (Left ) Diagnosis:  (left ureteral stone)    Surgeon:  Jony Pierce MD Responsible Provider:  NUNO Vasquez CRNA    Anesthesia Type:  general ASA Status:  3          Anesthesia Type: general    Alma Phase I: Alma Score: 10    Alma Phase II:      Last vitals: Reviewed and per EMR flowsheets.        Anesthesia Post Evaluation    Patient location during evaluation: PACU  Patient participation: complete - patient participated  Level of consciousness: awake and alert  Pain score: 0  Airway patency: patent  Nausea & Vomiting: no nausea and no vomiting  Complications: no  Cardiovascular status: hemodynamically stable  Respiratory status: acceptable  Hydration status: euvolemic

## 2019-08-16 NOTE — PROGRESS NOTES
adenomatous: 12/14 adenoma    Chest pain     Chronic pain syndrome     Depression     Diabetic peripheral neuropathy (HCC)     Extrinsic asthma     Hyperlipidemia     Hypertension     Idiopathic peripheral neuropathy     Lyme disease     Mediastinal lymphadenopathy     Microalbuminuria     Mixed hyperlipidemia     Paroxysmal A-fib ([address]     S/P ablation of atrial fibrillation     4/16 A fib ablation , Dr Luzmaria Estrada, [address] Sarcoidosis, lung [facility])     restrictive lung impairment    Tick bite     Type 2 diabetes mellitus with peripheral neuropathy ([facility]     Type 2 diabetes, controlled, with neuropathy ([address]       Past Surgical History:   Procedure Laterality Date    ANKLE FRACTURE SURGERY  april 14, 2015    ATRIAL ABLATION SURGERY  2015    Dr. Kaylee Mata      Catheter Ablation A-fib    CHOLECYSTECTOMY      COLONOSCOPY  12/02/2014    Melecio VALDEZ CYSTOSCOPY,MANIPULATN Left 6/27/2018    LASER LITHOTRIPSY STONE MANIPULATION WITH DOUBLE J STENT PLACEMENT performed by Yoana Hirsch MD at [facility] 6/27/2018    CYSTOSCOPY URETEROSCOPY RETROGRADE PYELOGRAMS performed by Yoana Hirsch MD at [address] [place] [address] 8/16/2019 6/25/2019 6/25/2019 6/3/2019 5/21/2019 0/78/2034   SYSTOLIC 218 352 [phone]   DIASTOLIC 62 66 70 70 70 72   Site - - - Left Upper Arm - -   Position - - - Sitting - -   Pulse 104 - 80 113 90 -   Temp 102.3 - - - - -   Resp 18 - - - - -   SpO2 93 - - 98 - -   Weight - - 188 lb 185 lb 190 lb -   Height - - 5' 9\" 5' 9\" 5' 9\" -   BMI (wt*703/ht~2) - - 27.76 kg/m2 27.32 kg/m2 28.06 kg/m2 -   Some recent data might be hidden       Family History   Problem Relation Age of Onset    Coronary Art Dis Father         AICD pacer age 68    Heart Attack Father     Cancer Sister         childhood chest tumor    Heart Failure Mother        Social History     Tobacco Use    Smoking status: [employer]) MCG/ACT inhaler Inhale 2 puffs into the lungs every 6 hours as needed for Wheezing 1 Inhaler 5    IRON PO Take 2 tablets by mouth daily       aspirin 81 MG tablet Take 81 mg by mouth daily. No current facility-administered medications for this visit. Allergies   Allergen Reactions    Acetaminophen     Bee Venom     Gabapentin      Causes blisters to head.      Penicillins     Toprol Xl [Metoprolol]      Leg pain        Health Maintenance   Topic Date Due    Diabetic foot exam  09/15/1959    Shingles Vaccine (1 of 2) 09/15/1999    Diabetic microalbuminuria test  04/20/2019    Diabetic retinal exam  05/09/2019    DTaP/Tdap/Td vaccine (1 - Tdap) 10/07/2019 (Originally 9/15/1969)    Flu vaccine (1) 09/01/2019    Lipid screen  02/25/2020    A1C test (Diabetic or Prediabetic)  05/20/2020    Potassium monitoring  05/20/2020    Creatinine monitoring  05/20/2020    Colon cancer screen colonoscopy  08/01/2024    Pneumococcal 65+ years Vaccine  Completed    AAA screen  Completed    Hepatitis C screen  Discontinued       Lab Results   Component Value Date    LABA1C 5.8 05/20/2019     Lab Results   Component Value Date    PSA 1.30 02/25/2019     TSH   Date Value Ref Range Status   02/25/2019 4.870 (H) 0.270 - 4.200 uIU/mL Final   ]  Lab Results   Component Value Date     05/20/2019    K 4.4 05/20/2019     05/20/2019    CO2 26 05/20/2019    BUN 20 05/20/2019    CREATININE 1.2 05/20/2019    GLUCOSE 203 (H) 05/20/2019    CALCIUM 12.2 (HH) 05/20/2019    PROT 7.1 05/20/2019    LABALBU 4.2 05/20/2019    BILITOT 0.4 05/20/2019    ALKPHOS 72 05/20/2019    AST 21 05/20/2019    ALT 13 05/20/2019    LABGLOM 60 (A) 05/20/2019     Lab Results   Component Value Date    CHOL 178 02/25/2019    CHOL 182 04/20/2018    CHOL 175 08/17/2017     Lab Results   Component Value Date    TRIG 166 (H) 02/25/2019    TRIG 204 (H) 04/20/2018    TRIG 177 08/17/2017     Lab Results   Component Value Date    HDL Negative for confusion and hallucinations. Objective:     Physical Exam   Constitutional: He is oriented to person, place, and time. He appears well-developed and well-nourished. No distress. Ill appearing flushed;     HENT:   Head: Normocephalic and atraumatic. Eyes: Pupils are equal, round, and reactive to light. Right eye exhibits no discharge. Left eye exhibits no discharge. No scleral icterus. Neck: Normal range of motion. Neck supple. No JVD present. No thyromegaly present. Cardiovascular: Normal rate, regular rhythm and normal heart sounds. No murmur heard. Pulmonary/Chest: Effort normal and breath sounds normal. No respiratory distress. He has no wheezes. He has no rales. Chronic crackles with sarcoid   Abdominal: Soft. Bowel sounds are normal. He exhibits no distension and no mass. There is no tenderness. There is no rebound and no guarding. Musculoskeletal: Normal range of motion. He exhibits no edema or tenderness. Neurological: He is alert and oriented to person, place, and time. He has normal reflexes. He displays normal reflexes. No cranial nerve deficit. Coordination normal.   Skin: Skin is warm and dry. No rash noted. No erythema. Psychiatric: His behavior is normal. Judgment and thought content normal. He does not exhibit a depressed mood. /62   Pulse 104   Temp 102.3 °F (39.1 °C)   Resp 18   SpO2 93%     Assessment:       Diagnosis Orders   1. Hematuria, unspecified type  POCT Urinalysis no Micro   2. Acute low back pain without sciatica, unspecified back pain laterality  POCT Urinalysis no Micro   3. Fever, unspecified fever cause  POCT Urinalysis no Micro   4. Sarcoidosis, lung ([facility]     5. Melena         Plan:        Patient given educational materials - see patient instructions. Discussed use, benefit, and side effects of prescribed medications. Allpatient questions answered. Pt voiced understanding. Reviewed health maintenance.   Instructed to continue current medications, diet and exercise. Patient agreed with treatment plan. Follow up as directed. MEDICATIONS:  No orders of the defined types were placed in this encounter. Quality & Risk Score Accuracy    Last edited 08/16/19 14:12 CDT by NUNO Alexander         ORDERS:  Orders Placed This Encounter   Procedures    POCT Urinalysis no Micro       Follow-up:  No follow-ups on file. PATIENT INSTRUCTIONS:  Patient Instructions   1. Fever and dysuria; Has positive blood in urine with POC postiive for leuko  2. Right hip pain  Will need xrays / CT  3. Sarcoidosis;    4. Recent colonoscopy with melena; Labs    I have spoken with [facility] emergency department nurse Jeff King to make him aware that I am sending Alvaro there for treatment and further evaluation. His wife is here with him today we will transport him private vehicle    Electronically signed by NUNO Alexander on 8/16/2019 at 2:20 PM    EMRDragon/transcription disclaimer:  Much of this encounter note is electronic transcription/translation of spoken language to printed texts. The electronic translation of spoken language may be erroneous, or at times,nonsensical words or phrases may be inadvertently transcribed.   Although I have reviewed the note for such errors, some may still exist.

## 2019-08-16 NOTE — H&P
[facility] Urology  History and Physical    8/16/2019      HISTORY OF PRESENT ILLNESS:      Rob Clemons presented to [address] [facility] ED 8/16/19 with left hip pain, fever, and gross hematuria. Patient states that pain started around 2 weeks ago, gross hematuria started today. Patient has history of nephrolithiasis and has undergone ureteroscopy and stent placement in the past. Ine the ED labs revealed elevated creatinine and leukocytosis. Urine suspicious for infection. Patient will be admitted and taken to surgery for cystoscopy, left ureteral stent placement, possible left ureteroscopy, laser lithotripsy.      Past Medical History:        Diagnosis Date    Acute pancreatitis     Anemia     Asthma     Atrial fibrillation (HCC)     h/o paroxysmal a-fib ? anesthsia induced    Benign colonic polyp     cscope 12/07 Dr Coy Crump adenomatous: 12/14 adenoma    Chest pain     Chronic pain syndrome     Depression     Diabetic peripheral neuropathy (HCC)     Extrinsic asthma     Hyperlipidemia     Hypertension     Idiopathic peripheral neuropathy     Lyme disease     Mediastinal lymphadenopathy     Microalbuminuria     Mixed hyperlipidemia     Paroxysmal A-fib (HCC)     S/P ablation of atrial fibrillation     4/16 A fib ablation , Dr Mira Almazan, [facility]     Sarcoidosis, lung ([facility]     restrictive lung impairment    Tick bite     Type 2 diabetes mellitus with peripheral neuropathy ([facility]     Type 2 diabetes, controlled, with neuropathy [facility])      Past Surgical History:        Procedure Laterality Date    ANKLE FRACTURE SURGERY  april 14, 2015    ATRIAL ABLATION SURGERY  2015    Dr. Makayla Hernandez      Catheter Ablation A-fib    CHOLECYSTECTOMY      COLONOSCOPY  12/02/2014    Melecio VALDEZ CYSTOSCOPY,MANIPULATN Left 6/27/2018    LASER LITHOTRIPSY STONE MANIPULATION WITH DOUBLE J STENT PLACEMENT performed by Raquel Rockwell MD at [facility] Left 6/27/2018    CYSTOSCOPY URETEROSCOPY RETROGRADE PYELOGRAMS performed by Medina Henson MD at [address] Emergency Department OR       Medications Prior to Admission:   Not in a hospital admission.   Allergies:  Acetaminophen; Bee venom; Gabapentin; Penicillins; and Toprol xl [metoprolol]    Social History:   Social History     Socioeconomic History    Marital status:      Spouse name: Not on file    Number of children: Not on file    Years of education: Not on file    Highest education level: Not on file   Occupational History    Not on file   Social Needs    Financial resource strain: Not on file    Food insecurity:     Worry: Not on file     Inability: Not on file    Transportation needs:     Medical: Not on file     Non-medical: Not on file   Tobacco Use    Smoking status: Former Smoker     Packs/day: 0.00     Years: 0.00     Pack years: 0.00     Types: Cigarettes    Smokeless tobacco: Never Used   Substance and Sexual Activity    Alcohol use: No    Drug use: No    Sexual activity: Not on file   Lifestyle    Physical activity:     Days per week: Not on file     Minutes per session: Not on file    Stress: Not on file   Relationships    Social connections:     Talks on phone: Not on file     Gets together: Not on file     Attends [employer] service: Not on file     Active member of club or organization: Not on file     Attends meetings of clubs or organizations: Not on file     Relationship status: Not on file    Intimate partner violence:     Fear of current or ex partner: Not on file     Emotionally abused: Not on file     Physically abused: Not on file     Forced sexual activity: Not on file   Other Topics Concern    Not on file   Social History Narrative    Not on file     Family History:       Problem Relation Age of Onset    Coronary Art Dis Father         AICD pacer age 68    Heart Attack Father     Cancer Sister         childhood chest tumor    Heart Failure Mother      REVIEW OF SYSTEMS:  Review of Systems   Constitutional: Positive for chills and fever. Genitourinary: Positive for dysuria, flank pain and hematuria. Negative for difficulty urinating and penile pain. Musculoskeletal: Positive for back pain. All other systems reviewed and are negative. PHYSICAL EXAM:  Physical Exam   Constitutional: He is oriented to person, place, and time. He appears well-developed and well-nourished. No distress. HENT:   Head: Normocephalic and atraumatic. Eyes: Pupils are equal, round, and reactive to light. Conjunctivae are normal.   Neck: Normal range of motion. Neck supple. Cardiovascular: Normal rate. Pulmonary/Chest: Effort normal. No respiratory distress. Abdominal: Soft. Musculoskeletal: Normal range of motion. Neurological: He is alert and oriented to person, place, and time. Skin: Skin is warm and dry. Psychiatric: He has a normal mood and affect. His behavior is normal. Judgment and thought content normal.   Vitals reviewed.         DATA:  CBC:  Lab Results   Component Value Date    WBC 11.1 08/16/2019    RBC 3.86 08/16/2019    HGB 11.8 08/16/2019    HCT 34.7 08/16/2019    MCV 89.9 08/16/2019    MCH 30.6 08/16/2019    MCHC 34.0 08/16/2019    RDW 13.3 08/16/2019     08/16/2019    MPV 10.3 08/16/2019     BMP:    Lab Results   Component Value Date     08/16/2019    K 4.0 08/16/2019    CL 93 08/16/2019    CO2 27 08/16/2019    BUN 21 08/16/2019    CREATININE 1.4 08/16/2019    CALCIUM 10.4 08/16/2019    LABGLOM 50 08/16/2019    GLUCOSE 172 08/16/2019     BUN/Creatinine:    Lab Results   Component Value Date    BUN 21 08/16/2019    CREATININE 1.4 08/16/2019     Magnesium:  No results found for: MG  Phosphorus:  No results found for: PHOS  PT/INR:    Lab Results   Component Value Date    PROTIME 18.3 08/16/2019    INR 1.59 08/16/2019     PTT:    Lab Results   Component Value Date    APTT 42.0 08/16/2019   [APTT  Urine Culture:  No components found for: CURINEUrinalysis:  Lab (dyspnea on exertion)    Hypercalcemia due to sarcoidosis    Asthma    Depression    Pancreatitis    Peripheral nerve disease    Polyp of colon    History of histoplasmosis    Lymphadenopathy    Splenomegaly    Left ureteral stone    Hydronephrosis, left    Other male erectile dysfunction    Anemia    Other emphysema ([facility] [address] [address]       Lelo Alvarez, [address] [place] [address] Drive  8/16/2019 4:42 PM

## 2019-08-16 NOTE — ANESTHESIA PRE PROCEDURE
Department of Anesthesiology  Preprocedure Note       Name:  Rob Clemons   Age:  70 y.o.  :  1950                                          MRN:  [id number]         Date:  2019      Surgeon: Yadira Cervantes):  Raquel Rockwell MD    Procedure: Procedure(s):  CYSTOSCOPY URETERAL STENT INSERTION POSSIBLE LASER LITHO (Left )  Medications prior to admission:   Prior to Admission medications    Medication Sig Start Date End Date Taking? Authorizing Provider   traMADol (ULTRAM) 50 MG tablet Take 1 tablet by mouth 2 times daily as needed for Pain for up to 30 days. 19  NUNO Donald   pregabalin (LYRICA) 75 MG capsule TAKE 1 CAPSULE BY MOUTH ONCE DAILY IN THE EVENING FOR 30 DAYS 19  NUNO Donald   JANUVIA 50 MG tablet TAKE 1 TABLET BY MOUTH ONCE DAILY 7/3/19   NUNO Donald   ibandronate (BONIVA) 150 MG tablet Take 1 tablet by mouth every 30 days On empty stomach and nothing by mouth or lie down for 1 hour 19   NUNO Donald   EPINEPHrine (EPIPEN) 0.3 MG/0.3ML SOAJ injection Inject 0.3 mLs into the muscle as needed (Allergic Reaction) 6/3/19   NUNO Donald   insulin glargine (LANTUS SOLOSTAR) 100 UNIT/ML injection pen Inject 12 Units into the skin nightly 6/3/19   NUNO Donald   sulfamethoxazole-trimethoprim (BACTRIM;SEPTRA) 400-80 MG per tablet Take 1 tablet by mouth every other day    Historical Provider, MD   Ascorbic Acid (VITAMIN C) 1000 MG tablet Take 1,000 mg by mouth daily    Historical Provider, MD   Xylitol (XYLIMELTS) 550 MG DISK Take 550 mg by mouth daily    Historical Provider, MD   cloNIDine (CATAPRES) 0.1 MG tablet Take one tablet by mouth in the morning and two tablets at bedtime.  19   Sydna Phalen, MD   nitroGLYCERIN (NITROSTAT) 0.4 MG SL tablet Place 1 tablet under the tongue every 5 minutes as needed for Chest pain 19   Sydna Phalen, MD   donepezil (ARICEPT) 5 MG tablet TAKE 1 TABLET BY MOUTH NIGHTLY 19   Amna Naylor NUNO Phillips   azaTHIOprine (IMURAN) 50 MG tablet Take 50 mg by mouth daily  2/15/19   Historical Provider, MD   fluticasone-salmeterol (ADVAIR) 500-50 MCG/DOSE diskus inhaler Inhale 1 puff into the lungs 2 times daily 1/7/19   NUNO Alexander   losartan (COZAAR) 100 MG tablet Take 1 tablet by mouth daily 12/28/18   NUNO Alexander   rivaroxaban (XARELTO) 20 MG TABS tablet Take 1 tablet by mouth Daily with supper TAKE ONE TABLET DAILY WITH  EVENING  MEAL 11/26/18   NUNO Lee   albuterol sulfate HFA (VENTOLIN HFA) 108 (90 Base) MCG/ACT inhaler Inhale 2 puffs into the lungs every 6 hours as needed for Wheezing 10/18/18   RANJANA Galindo   IRON PO Take 2 tablets by mouth daily     Historical Provider, MD   aspirin 81 MG tablet Take 81 mg by mouth daily. Historical Provider, MD       Current medications:    No current facility-administered medications for this visit. Current Outpatient Medications   Medication Sig Dispense Refill    traMADol (ULTRAM) 50 MG tablet Take 1 tablet by mouth 2 times daily as needed for Pain for up to 30 days.  60 tablet 0    pregabalin (LYRICA) 75 MG capsule TAKE 1 CAPSULE BY MOUTH ONCE DAILY IN THE EVENING FOR 30 DAYS 30 capsule 0    JANUVIA 50 MG tablet TAKE 1 TABLET BY MOUTH ONCE DAILY 30 tablet 2    ibandronate (BONIVA) 150 MG tablet Take 1 tablet by mouth every 30 days On empty stomach and nothing by mouth or lie down for 1 hour 30 tablet 3    EPINEPHrine (EPIPEN) 0.3 MG/0.3ML SOAJ injection Inject 0.3 mLs into the muscle as needed (Allergic Reaction) 2 each 1    insulin glargine (LANTUS SOLOSTAR) 100 UNIT/ML injection pen Inject 12 Units into the skin nightly 5 pen 3    sulfamethoxazole-trimethoprim (BACTRIM;SEPTRA) 400-80 MG per tablet Take 1 tablet by mouth every other day      Ascorbic Acid (VITAMIN C) 1000 MG tablet Take 1,000 mg by mouth daily      Xylitol (XYLIMELTS) 550 MG DISK Take 550 mg by mouth daily      cloNIDine (CATAPRES) 0.1 MG

## 2019-08-17 LAB
ANION GAP SERPL CALCULATED.3IONS-SCNC: 15 MMOL/L (ref 7–19)
ANION GAP SERPL CALCULATED.3IONS-SCNC: 16 MMOL/L (ref 7–19)
BUN BLDV-MCNC: 30 MG/DL (ref 8–23)
BUN BLDV-MCNC: 32 MG/DL (ref 8–23)
CALCIUM SERPL-MCNC: 9.2 MG/DL (ref 8.8–10.2)
CALCIUM SERPL-MCNC: 9.7 MG/DL (ref 8.8–10.2)
CHLORIDE BLD-SCNC: 95 MMOL/L (ref 98–111)
CHLORIDE BLD-SCNC: 97 MMOL/L (ref 98–111)
CO2: 22 MMOL/L (ref 22–29)
CO2: 22 MMOL/L (ref 22–29)
CREAT SERPL-MCNC: 1.5 MG/DL (ref 0.5–1.2)
CREAT SERPL-MCNC: 1.6 MG/DL (ref 0.5–1.2)
GFR NON-AFRICAN AMERICAN: 43
GFR NON-AFRICAN AMERICAN: 46
GLUCOSE BLD-MCNC: 276 MG/DL (ref 70–99)
GLUCOSE BLD-MCNC: 306 MG/DL (ref 70–99)
GLUCOSE BLD-MCNC: 373 MG/DL (ref 70–99)
GLUCOSE BLD-MCNC: 399 MG/DL (ref 70–99)
GLUCOSE BLD-MCNC: 400 MG/DL (ref 74–109)
GLUCOSE BLD-MCNC: 401 MG/DL (ref 70–99)
GLUCOSE BLD-MCNC: 447 MG/DL (ref 70–99)
GLUCOSE BLD-MCNC: 450 MG/DL (ref 70–99)
GLUCOSE BLD-MCNC: 472 MG/DL (ref 74–109)
GLUCOSE BLD-MCNC: 481 MG/DL (ref 70–99)
HBA1C MFR BLD: 6.8 % (ref 4–6)
HCT VFR BLD CALC: 29.3 % (ref 42–52)
HEMOGLOBIN: 10 G/DL (ref 14–18)
MCH RBC QN AUTO: 30.9 PG (ref 27–31)
MCHC RBC AUTO-ENTMCNC: 34.1 G/DL (ref 33–37)
MCV RBC AUTO: 90.4 FL (ref 80–94)
PDW BLD-RTO: 13.2 % (ref 11.5–14.5)
PERFORMED ON: ABNORMAL
PLATELET # BLD: 103 K/UL (ref 130–400)
PMV BLD AUTO: 11 FL (ref 9.4–12.4)
POTASSIUM REFLEX MAGNESIUM: 5.3 MMOL/L (ref 3.5–5)
POTASSIUM SERPL-SCNC: 4.3 MMOL/L (ref 3.5–5)
RBC # BLD: 3.24 M/UL (ref 4.7–6.1)
SODIUM BLD-SCNC: 132 MMOL/L (ref 136–145)
SODIUM BLD-SCNC: 135 MMOL/L (ref 136–145)
WBC # BLD: 9.8 K/UL (ref 4.8–10.8)

## 2019-08-17 PROCEDURE — 6370000000 HC RX 637 (ALT 250 FOR IP): Performed by: NURSE PRACTITIONER

## 2019-08-17 PROCEDURE — 85027 COMPLETE CBC AUTOMATED: CPT

## 2019-08-17 PROCEDURE — 2580000003 HC RX 258: Performed by: UROLOGY

## 2019-08-17 PROCEDURE — 99232 SBSQ HOSP IP/OBS MODERATE 35: CPT | Performed by: PHYSICIAN ASSISTANT

## 2019-08-17 PROCEDURE — 6360000002 HC RX W HCPCS: Performed by: NURSE PRACTITIONER

## 2019-08-17 PROCEDURE — 80048 BASIC METABOLIC PNL TOTAL CA: CPT

## 2019-08-17 PROCEDURE — 36415 COLL VENOUS BLD VENIPUNCTURE: CPT

## 2019-08-17 PROCEDURE — 82948 REAGENT STRIP/BLOOD GLUCOSE: CPT

## 2019-08-17 PROCEDURE — 6370000000 HC RX 637 (ALT 250 FOR IP): Performed by: HOSPITALIST

## 2019-08-17 PROCEDURE — 94640 AIRWAY INHALATION TREATMENT: CPT

## 2019-08-17 PROCEDURE — 6360000002 HC RX W HCPCS: Performed by: UROLOGY

## 2019-08-17 PROCEDURE — 83036 HEMOGLOBIN GLYCOSYLATED A1C: CPT

## 2019-08-17 PROCEDURE — 6370000000 HC RX 637 (ALT 250 FOR IP): Performed by: INTERNAL MEDICINE

## 2019-08-17 PROCEDURE — 2580000003 HC RX 258: Performed by: NURSE PRACTITIONER

## 2019-08-17 PROCEDURE — 2000000000 HC ICU R&B

## 2019-08-17 PROCEDURE — 2580000003 HC RX 258: Performed by: INTERNAL MEDICINE

## 2019-08-17 RX ORDER — DEXTROSE MONOHYDRATE 25 G/50ML
12.5 INJECTION, SOLUTION INTRAVENOUS PRN
Status: DISCONTINUED | OUTPATIENT
Start: 2019-08-17 | End: 2019-08-22 | Stop reason: HOSPADM

## 2019-08-17 RX ORDER — ACETAMINOPHEN 325 MG/1
650 TABLET ORAL EVERY 4 HOURS PRN
Status: DISCONTINUED | OUTPATIENT
Start: 2019-08-17 | End: 2019-08-22 | Stop reason: HOSPADM

## 2019-08-17 RX ORDER — DEXTROSE MONOHYDRATE 50 MG/ML
100 INJECTION, SOLUTION INTRAVENOUS PRN
Status: DISCONTINUED | OUTPATIENT
Start: 2019-08-17 | End: 2019-08-22 | Stop reason: HOSPADM

## 2019-08-17 RX ORDER — INSULIN GLARGINE 100 [IU]/ML
15 INJECTION, SOLUTION SUBCUTANEOUS NIGHTLY
Status: DISCONTINUED | OUTPATIENT
Start: 2019-08-17 | End: 2019-08-17

## 2019-08-17 RX ORDER — NICOTINE POLACRILEX 4 MG
15 LOZENGE BUCCAL PRN
Status: DISCONTINUED | OUTPATIENT
Start: 2019-08-17 | End: 2019-08-22 | Stop reason: HOSPADM

## 2019-08-17 RX ADMIN — FAMOTIDINE 20 MG: 20 TABLET ORAL at 08:56

## 2019-08-17 RX ADMIN — CIPROFLOXACIN 400 MG: 2 INJECTION, SOLUTION INTRAVENOUS at 05:58

## 2019-08-17 RX ADMIN — SODIUM CHLORIDE 0.5 UNITS/HR: 9 INJECTION, SOLUTION INTRAVENOUS at 20:43

## 2019-08-17 RX ADMIN — SODIUM CHLORIDE: 9 INJECTION, SOLUTION INTRAVENOUS at 20:45

## 2019-08-17 RX ADMIN — OXYCODONE HYDROCHLORIDE AND ACETAMINOPHEN 1000 MG: 500 TABLET ORAL at 08:56

## 2019-08-17 RX ADMIN — INSULIN LISPRO 12 UNITS: 100 INJECTION, SOLUTION INTRAVENOUS; SUBCUTANEOUS at 17:41

## 2019-08-17 RX ADMIN — FAMOTIDINE 20 MG: 20 TABLET ORAL at 21:02

## 2019-08-17 RX ADMIN — BUDESONIDE 1000 MCG: 0.5 INHALANT RESPIRATORY (INHALATION) at 21:21

## 2019-08-17 RX ADMIN — Medication 10 ML: at 08:57

## 2019-08-17 RX ADMIN — DONEPEZIL HYDROCHLORIDE 5 MG: 5 TABLET, FILM COATED ORAL at 20:42

## 2019-08-17 RX ADMIN — BUDESONIDE 1000 MCG: 0.5 INHALANT RESPIRATORY (INHALATION) at 07:15

## 2019-08-17 RX ADMIN — LINAGLIPTIN 5 MG: 5 TABLET, FILM COATED ORAL at 08:56

## 2019-08-17 RX ADMIN — ALBUTEROL SULFATE 2.5 MG: 2.5 SOLUTION RESPIRATORY (INHALATION) at 07:16

## 2019-08-17 RX ADMIN — LOSARTAN POTASSIUM 100 MG: 100 TABLET ORAL at 08:56

## 2019-08-17 RX ADMIN — PREGABALIN 75 MG: 75 CAPSULE ORAL at 08:56

## 2019-08-17 RX ADMIN — AZATHIOPRINE 50 MG: 50 TABLET ORAL at 08:57

## 2019-08-17 RX ADMIN — CLONIDINE HYDROCHLORIDE 0.1 MG: 0.1 TABLET ORAL at 08:56

## 2019-08-17 RX ADMIN — ALBUTEROL SULFATE 2.5 MG: 2.5 SOLUTION RESPIRATORY (INHALATION) at 15:04

## 2019-08-17 RX ADMIN — CLONIDINE HYDROCHLORIDE 0.2 MG: 0.2 TABLET ORAL at 20:43

## 2019-08-17 RX ADMIN — ALBUTEROL SULFATE 2.5 MG: 2.5 SOLUTION RESPIRATORY (INHALATION) at 11:58

## 2019-08-17 RX ADMIN — ALBUTEROL SULFATE 2.5 MG: 2.5 SOLUTION RESPIRATORY (INHALATION) at 21:20

## 2019-08-17 RX ADMIN — RIVAROXABAN 20 MG: 20 TABLET, FILM COATED ORAL at 18:00

## 2019-08-17 RX ADMIN — ACETAMINOPHEN 650 MG: 325 TABLET ORAL at 21:05

## 2019-08-17 RX ADMIN — INSULIN LISPRO 12 UNITS: 100 INJECTION, SOLUTION INTRAVENOUS; SUBCUTANEOUS at 12:32

## 2019-08-17 RX ADMIN — CIPROFLOXACIN 400 MG: 2 INJECTION, SOLUTION INTRAVENOUS at 17:41

## 2019-08-17 ASSESSMENT — PAIN SCALES - GENERAL
PAINLEVEL_OUTOF10: 0
PAINLEVEL_OUTOF10: 2

## 2019-08-17 NOTE — CONSULTS
[facility] Medicine  History and Physical    Patient:  Yonas Villar  MRN: [id number]    CHIEF COMPLAINT: Medical management, diabetes management, hyperkalemia    History Obtained From: Patient and chart review    PCP: NUNO Chung    HISTORY OF PRESENT ILLNESS:   70 y.o. male who presents with left hip pain x2 weeks and hematuria. In the emergency department patient was found to have distal left ureteral stone with mild obstructive uropathy. Patient taken to the OR currently postop day 1 for cystoscopy, left retrograde stone manipulation, and placement of a left double-J stent. The patient states that he was febrile at home and did endorse sweats. Currently states that he feels well. He denies nausea, vomiting, chest pain, shortness of breath, fevers, chills, sweats, abdominal pain, diarrhea or constipation. His blood sugar has been elevated since admission and his creatinine is elevated. Also noted to have hyperkalemia with potassium 5.3. Baseline creatinine appears to be within normal limits. REVIEW OF SYSTEMS:  14 systems reviewed and negative except as noted above.     Past Medical History:      Diagnosis Date    Acute pancreatitis     Anemia     Asthma     Atrial fibrillation (HCC)     h/o paroxysmal a-fib ? anesthsia induced    Benign colonic [facility]     [facility]    Chest pain     Chronic pain syndrome     Depression     Diabetic peripheral neuropathy (HCC)     Extrinsic asthma     Hyperlipidemia     Hypertension     Idiopathic peripheral neuropathy     Lyme disease     Mediastinal lymphadenopathy     Microalbuminuria     Mixed hyperlipidemia     Paroxysmal A-fib (HCC)     S/P ablation of atrial fibrillation     4/16 A fib ablation , Dr Murali Johnston, [place]     Sarcoidosis, lung ([facility] [address]     restrictive lung impairment    Tick bite     Type 2 diabetes mellitus with peripheral neuropathy (HCC)     Type 2

## 2019-08-17 NOTE — PROGRESS NOTES
135 08/17/2019    K 5.3 08/17/2019    CL 97 08/17/2019    CO2 22 08/17/2019    BUN 30 08/17/2019    CREATININE 1.6 08/17/2019    LABGLOM 43 08/17/2019    GLUCOSE 400 08/17/2019    PROT 7.4 08/16/2019    LABALBU 4.2 08/16/2019    CALCIUM 9.2 08/17/2019    BILITOT 1.7 08/16/2019    ALKPHOS 75 08/16/2019    AST 17 08/16/2019    ALT 11 08/16/2019     Intake: 2,392  Output: 1,350 ml. Impression:   1. Distal left ureteral stone measuring 3 mm, resulting in mild   obstructive uropathy. 2. Lymphadenopathy and lung changes compatible with known sarcoidosis. 3. Mild circumferential urinary bladder wall thickening, which may be   related to chronic bladder outlet obstruction from prostatomegaly. Signed by Dr Eugenio Newell on 8/16/2019 3:45 PM         ASSESSMENT AND PLAN    1.  POD #1 for cystoscopy, left retrograde stone manipulation, and placement of a left double-J stent. 2.  Left ureteral calculus with obstructive uropathy. Patient states he is essentially pain-free today feels much better than yesterday. No flank pain. 3.  Left obstructive pyelonephritis. Urine culture is pending he is currently on IV Cipro. No fevers recorded today. Will need to be treated with culture specific antibiotic once final cultures are available. Dr. Sarabjit Goldberg had drained dark almost tea colored urine from the left renal pelvis. Sent for culture. Has a Cook catheter in place for maximal drainage it is red-tinged. 4.  Fever which was up to 103. Afebrile    5. Hyperkalemia of 5.3 slight increase in creatinine to 1.6 from 1.3. I have consulted the hospitalist for general management for this and his diabetes his glucose was 400. 6.  Plan is to treat the stone at a later date after course of culture specific antibiotics and passive dilation with ureteral stent.     Signed  Rodrigo Zavala PA-C

## 2019-08-17 NOTE — PROGRESS NOTES
PHARMACY NOTE  Alvaro Pena was ordered Xylitol disk. Per the [address], this medication is non-formulary and not stocked by pharmacy. It has been discontinued. The medication can be reordered at discharge.      Electronically signed by Joya Sun [facility] on 8/16/2019 at 7:55 PM

## 2019-08-17 NOTE — OP NOTE
[id number]
left  renal pelvis. I then replaced the guidewire and advanced a 5-Lithuanian  catheter up into the renal pelvis and then aspirated some dark, almost  tea-colored looking urine that was cloudy from the left renal pelvis. This was collected and will be sent for culture. At this point, I felt  since the stone had been retropulsed and manipulated at more proximal  that we would just treat the stone at a later date since it was no  longer distal and that we would place a stent. A 6 Lithuanian x 22 cm left  double-J ureteral stent was then advanced using cystoscopic and  fluoroscopic guidance up into the kidney. This was coiled in the renal  pelvis in good position. The procedure was then terminated. An  18-Lithuanian Cook catheter was inserted to drain the bladder. He was  awakened from his anesthetic and taken to the recovery room in stable  condition.         Maribel Conteh MD    D: 08/16/2019 19:52:22      T: 08/17/2019 0:05:35     [id number]  Job#: [id number]     Doc#: [id number]    CC:

## 2019-08-18 LAB
ANION GAP SERPL CALCULATED.3IONS-SCNC: 15 MMOL/L (ref 7–19)
BUN BLDV-MCNC: 32 MG/DL (ref 8–23)
CALCIUM SERPL-MCNC: 9.5 MG/DL (ref 8.8–10.2)
CHLORIDE BLD-SCNC: 103 MMOL/L (ref 98–111)
CO2: 22 MMOL/L (ref 22–29)
CREAT SERPL-MCNC: 1.4 MG/DL (ref 0.5–1.2)
GFR NON-AFRICAN AMERICAN: 50
GLUCOSE BLD-MCNC: 166 MG/DL (ref 70–99)
GLUCOSE BLD-MCNC: 178 MG/DL (ref 70–99)
GLUCOSE BLD-MCNC: 185 MG/DL (ref 70–99)
GLUCOSE BLD-MCNC: 185 MG/DL (ref 70–99)
GLUCOSE BLD-MCNC: 193 MG/DL (ref 70–99)
GLUCOSE BLD-MCNC: 194 MG/DL (ref 70–99)
GLUCOSE BLD-MCNC: 195 MG/DL (ref 70–99)
GLUCOSE BLD-MCNC: 200 MG/DL (ref 70–99)
GLUCOSE BLD-MCNC: 202 MG/DL (ref 70–99)
GLUCOSE BLD-MCNC: 220 MG/DL (ref 70–99)
GLUCOSE BLD-MCNC: 236 MG/DL (ref 70–99)
GLUCOSE BLD-MCNC: 248 MG/DL (ref 70–99)
GLUCOSE BLD-MCNC: 258 MG/DL (ref 74–109)
GLUCOSE BLD-MCNC: 261 MG/DL (ref 70–99)
HBA1C MFR BLD: 6.9 % (ref 4–6)
ORGANISM: ABNORMAL
PERFORMED ON: ABNORMAL
POTASSIUM REFLEX MAGNESIUM: 4.4 MMOL/L (ref 3.5–5)
SODIUM BLD-SCNC: 140 MMOL/L (ref 136–145)
URINE CULTURE, ROUTINE: ABNORMAL
URINE CULTURE, ROUTINE: ABNORMAL
URINE CULTURE, ROUTINE: NORMAL

## 2019-08-18 PROCEDURE — 2580000003 HC RX 258: Performed by: UROLOGY

## 2019-08-18 PROCEDURE — 6360000002 HC RX W HCPCS: Performed by: NURSE PRACTITIONER

## 2019-08-18 PROCEDURE — 82948 REAGENT STRIP/BLOOD GLUCOSE: CPT

## 2019-08-18 PROCEDURE — 36415 COLL VENOUS BLD VENIPUNCTURE: CPT

## 2019-08-18 PROCEDURE — 2000000000 HC ICU R&B

## 2019-08-18 PROCEDURE — 80048 BASIC METABOLIC PNL TOTAL CA: CPT

## 2019-08-18 PROCEDURE — 2580000003 HC RX 258: Performed by: NURSE PRACTITIONER

## 2019-08-18 PROCEDURE — 6370000000 HC RX 637 (ALT 250 FOR IP): Performed by: NURSE PRACTITIONER

## 2019-08-18 PROCEDURE — 6370000000 HC RX 637 (ALT 250 FOR IP): Performed by: HOSPITALIST

## 2019-08-18 PROCEDURE — 83036 HEMOGLOBIN GLYCOSYLATED A1C: CPT

## 2019-08-18 PROCEDURE — 6360000002 HC RX W HCPCS: Performed by: UROLOGY

## 2019-08-18 PROCEDURE — 99232 SBSQ HOSP IP/OBS MODERATE 35: CPT | Performed by: UROLOGY

## 2019-08-18 PROCEDURE — 94640 AIRWAY INHALATION TREATMENT: CPT

## 2019-08-18 PROCEDURE — 6370000000 HC RX 637 (ALT 250 FOR IP): Performed by: UROLOGY

## 2019-08-18 RX ORDER — INSULIN GLARGINE 100 [IU]/ML
12 INJECTION, SOLUTION SUBCUTANEOUS NIGHTLY
Status: DISCONTINUED | OUTPATIENT
Start: 2019-08-18 | End: 2019-08-22 | Stop reason: HOSPADM

## 2019-08-18 RX ADMIN — CIPROFLOXACIN 400 MG: 2 INJECTION, SOLUTION INTRAVENOUS at 17:36

## 2019-08-18 RX ADMIN — SODIUM CHLORIDE: 9 INJECTION, SOLUTION INTRAVENOUS at 21:51

## 2019-08-18 RX ADMIN — DONEPEZIL HYDROCHLORIDE 5 MG: 5 TABLET, FILM COATED ORAL at 20:45

## 2019-08-18 RX ADMIN — ALBUTEROL SULFATE 2.5 MG: 2.5 SOLUTION RESPIRATORY (INHALATION) at 18:35

## 2019-08-18 RX ADMIN — Medication 12 UNITS: at 20:45

## 2019-08-18 RX ADMIN — OXYCODONE HYDROCHLORIDE AND ACETAMINOPHEN 1000 MG: 500 TABLET ORAL at 09:24

## 2019-08-18 RX ADMIN — SODIUM CHLORIDE: 9 INJECTION, SOLUTION INTRAVENOUS at 13:07

## 2019-08-18 RX ADMIN — AZATHIOPRINE 50 MG: 50 TABLET ORAL at 09:24

## 2019-08-18 RX ADMIN — ALBUTEROL SULFATE 2.5 MG: 2.5 SOLUTION RESPIRATORY (INHALATION) at 10:46

## 2019-08-18 RX ADMIN — INSULIN LISPRO 3 UNITS: 100 INJECTION, SOLUTION INTRAVENOUS; SUBCUTANEOUS at 20:45

## 2019-08-18 RX ADMIN — RIVAROXABAN 20 MG: 20 TABLET, FILM COATED ORAL at 17:36

## 2019-08-18 RX ADMIN — BUDESONIDE 1000 MCG: 0.5 INHALANT RESPIRATORY (INHALATION) at 18:35

## 2019-08-18 RX ADMIN — INSULIN LISPRO 3 UNITS: 100 INJECTION, SOLUTION INTRAVENOUS; SUBCUTANEOUS at 12:48

## 2019-08-18 RX ADMIN — ALBUTEROL SULFATE 2.5 MG: 2.5 SOLUTION RESPIRATORY (INHALATION) at 06:52

## 2019-08-18 RX ADMIN — PREGABALIN 75 MG: 75 CAPSULE ORAL at 09:24

## 2019-08-18 RX ADMIN — OXYCODONE HYDROCHLORIDE 5 MG: 5 TABLET ORAL at 13:07

## 2019-08-18 RX ADMIN — CLONIDINE HYDROCHLORIDE 0.1 MG: 0.1 TABLET ORAL at 09:24

## 2019-08-18 RX ADMIN — SODIUM CHLORIDE: 9 INJECTION, SOLUTION INTRAVENOUS at 04:12

## 2019-08-18 RX ADMIN — Medication 10 ML: at 09:28

## 2019-08-18 RX ADMIN — FAMOTIDINE 20 MG: 20 TABLET ORAL at 09:24

## 2019-08-18 RX ADMIN — CLONIDINE HYDROCHLORIDE 0.2 MG: 0.2 TABLET ORAL at 20:46

## 2019-08-18 RX ADMIN — INSULIN LISPRO 3 UNITS: 100 INJECTION, SOLUTION INTRAVENOUS; SUBCUTANEOUS at 17:30

## 2019-08-18 RX ADMIN — ALBUTEROL SULFATE 2.5 MG: 2.5 SOLUTION RESPIRATORY (INHALATION) at 15:07

## 2019-08-18 RX ADMIN — BUDESONIDE 1000 MCG: 0.5 INHALANT RESPIRATORY (INHALATION) at 06:51

## 2019-08-18 RX ADMIN — CIPROFLOXACIN 400 MG: 2 INJECTION, SOLUTION INTRAVENOUS at 05:30

## 2019-08-18 RX ADMIN — FAMOTIDINE 20 MG: 20 TABLET ORAL at 20:46

## 2019-08-18 ASSESSMENT — PAIN SCALES - GENERAL
PAINLEVEL_OUTOF10: 0
PAINLEVEL_OUTOF10: 4
PAINLEVEL_OUTOF10: 0

## 2019-08-18 ASSESSMENT — ENCOUNTER SYMPTOMS
GASTROINTESTINAL NEGATIVE: 1
RESPIRATORY NEGATIVE: 1
EYES NEGATIVE: 1
BACK PAIN: 1
ALLERGIC/IMMUNOLOGIC NEGATIVE: 1

## 2019-08-18 NOTE — CARE COORDINATION
4 Eyes Skin Assessment    Alvaro Pena is being assessed upon: Transfer to New Unit    I agree that Mira Schumacher, along with Daylin Ellis (either 2 RN's or 1 LPN and 1 RN) have performed a thorough Head to Toe Skin Assessment on the patient. ALL assessment sites listed below have been assessed. Areas assessed by both nurses:     [x]   Head, Face, and Ears   [x]   Shoulders, Back, and Chest  [x]   Arms, Elbows, and Hands   [x]   Coccyx, Sacrum, and Ischium  [x]   Legs, Feet, and Heels    Does the Patient have Skin Breakdown?  No    Rakesh Prevention initiated: No  Wound Care Orders initiated: Yes    [id number]  nurse consulted for Pressure Injury (Stage 3,4, Unstageable, DTI, NWPT, and Complex wounds) and New or Established Ostomies: NA        Primary Nurse eSignature: Pelon Rico RN on 8/17/2019 at 9:38 PM      Co-Signer eSignature: Electronically signed by Jeannette Metcalf RN on 8/17/19 at 11:18 PM

## 2019-08-18 NOTE — PLAN OF CARE
Patient states he is feeling better. Denies any pain currently. Good uop noted in lindsey bag. Bed is in the lowest position with alarm on. Call light and table within reach.

## 2019-08-18 NOTE — PROGRESS NOTES
Urology Progress Note      SUBJECTIVE:  No  complaints feels OK some mild discomfort associated with lindsey    OBJECTIVE:  abx day # 2    REVIEW OF SYSTEMS:   Review of Systems   Constitutional: Positive for chills and fever. HENT: Negative. Eyes: Negative. Respiratory: Negative. Cardiovascular: Negative. Gastrointestinal: Negative. Endocrine:        Diabetes with elevated BS   Genitourinary: Positive for dysuria, flank pain and hematuria. Negative for difficulty urinating and penile pain. Musculoskeletal: Positive for back pain. Allergic/Immunologic: Negative. Neurological: Negative. Hematological: Negative. Psychiatric/Behavioral: Negative. All other systems reviewed and are negative. Physical  VITALS:  BP (!) 125/59   Pulse 75   Temp 97.6 °F (36.4 °C) (Temporal)   Resp 18   Ht 5' 7\" (1.702 m) Comment: Simultaneous filing. User may not have seen previous data. Wt 189 lb (85.7 kg)   SpO2 96%   BMI 29.60 kg/m²   TEMPERATURE:  Current - Temp: 97.6 °F (36.4 °C);  Max - Temp  Av.8 °F (36.6 °C)  Min: 97.5 °F (36.4 °C)  Max: 98.3 °F (36.8 °C)   24 HR I&O     Intake/Output Summary (Last 24 hours) at 2019 1041  Last data filed at 2019 0800  Gross per 24 hour   Intake 2272.66 ml   Output 1725 ml   Net 547.66 ml     BACK: flank tenderness: left  ABDOMEN:  soft, non-distended and tenderness noted in the left lower quadrant  HEART:  normal rate and regular rhythm  CHEST: Normal respiratory effort  GENITAL/URINARY: Lindsey catheter placed bloody urine    Data       CBC:   Recent Labs     19  1500 19  0414   WBC 11.1* 9.8   HGB 11.8* 10.0*   HCT 34.7* 29.3*   * 103*     BMP:    Recent Labs     19  0414 19  1314 19  0131   * 132* 140   K 5.3* 4.3 4.4   CL 97* 95* 103   CO2 22 22 22   BUN 30* 32* 32*   CREATININE 1.6* 1.5* 1.4*   GLUCOSE 400* 472* 258*       Recent Labs     19  1822   LABURIN No growth     Recent Labs

## 2019-08-18 NOTE — PROGRESS NOTES
input(s): BNP in the last 72 hours. INR:   Recent Labs     08/16/19  1500   INR 1.59*     ABGs: No results found for: PHART, PO2ART, [id number]  UA:  Recent Labs     08/16/19  1509   COLORU DK YELLOW   PHUR 6.0   WBCUA 123*   RBCUA 717*   BACTERIA 4+   CLARITYU CLOUDY*   SPECGRAV 1.023   LEUKOCYTESUR MODERATE*   UROBILINOGEN 0.2   BILIRUBINUR Negative   BLOODU LARGE*   GLUCOSEU 250*       Imaging:   FL RETROGRADE PYELOGRAM LEFT   Final Result   Impression:   Intraoperative fluoroscopic images obtained during left-sided a laser   lithotripsy. A double-J ureteral stent was placed. Please refer to the operative note for more details. Signed by Dr Charo Jones on 8/16/2019 10:21 PM      CT Kidney WO Contrast   Final Result   Impression:   1. Distal left ureteral stone measuring 3 mm, resulting in mild   obstructive uropathy. 2. Lymphadenopathy and lung changes compatible with known sarcoidosis. 3. Mild circumferential urinary bladder wall thickening, which may be   related to chronic bladder outlet obstruction from prostatomegaly. Signed by Dr Ema Villafana on 8/16/2019 3:45 PM        Micro:   Blood Culture, Routine   Date Value Ref Range Status   08/16/2019   Preliminary    No Growth to date.   Any change in status will be called.   , No components found for: LABURINE  Patient Active Problem List    Diagnosis Date Noted    Acute cystitis with hematuria 08/16/2019    Acute kidney injury ([address]     Gross hematuria     Other emphysema ([address] 02/25/2019    Anemia 10/09/2018    Other male erectile dysfunction 08/22/2018    Ureterolithiasis 06/25/2018    Hydronephrosis, left 06/25/2018    History of histoplasmosis 06/22/2018    Lymphadenopathy 06/22/2018    Splenomegaly 06/22/2018    Asthma 06/08/2018     Overview:   : [  ]      Depression 06/08/2018    Pancreatitis 06/08/2018    Peripheral nerve disease 06/08/2018    Polyp of colon 06/08/2018    Hypercalcemia due to sarcoidosis 04/26/2018    GARNETT

## 2019-08-19 LAB
GLUCOSE BLD-MCNC: 117 MG/DL (ref 70–99)
GLUCOSE BLD-MCNC: 180 MG/DL (ref 70–99)
GLUCOSE BLD-MCNC: 213 MG/DL (ref 70–99)
GLUCOSE BLD-MCNC: 93 MG/DL (ref 70–99)
GLUCOSE BLD-MCNC: 99 MG/DL (ref 70–99)
PERFORMED ON: ABNORMAL
PERFORMED ON: NORMAL
PERFORMED ON: NORMAL

## 2019-08-19 PROCEDURE — 1210000000 HC MED SURG R&B

## 2019-08-19 PROCEDURE — 6370000000 HC RX 637 (ALT 250 FOR IP): Performed by: UROLOGY

## 2019-08-19 PROCEDURE — 94640 AIRWAY INHALATION TREATMENT: CPT

## 2019-08-19 PROCEDURE — 6370000000 HC RX 637 (ALT 250 FOR IP): Performed by: INTERNAL MEDICINE

## 2019-08-19 PROCEDURE — 6360000002 HC RX W HCPCS: Performed by: INTERNAL MEDICINE

## 2019-08-19 PROCEDURE — 6360000002 HC RX W HCPCS: Performed by: NURSE PRACTITIONER

## 2019-08-19 PROCEDURE — 6360000002 HC RX W HCPCS: Performed by: UROLOGY

## 2019-08-19 PROCEDURE — 2580000003 HC RX 258: Performed by: INTERNAL MEDICINE

## 2019-08-19 PROCEDURE — 6370000000 HC RX 637 (ALT 250 FOR IP): Performed by: HOSPITALIST

## 2019-08-19 PROCEDURE — 2580000003 HC RX 258: Performed by: UROLOGY

## 2019-08-19 PROCEDURE — 82948 REAGENT STRIP/BLOOD GLUCOSE: CPT

## 2019-08-19 PROCEDURE — 87081 CULTURE SCREEN ONLY: CPT

## 2019-08-19 PROCEDURE — 99232 SBSQ HOSP IP/OBS MODERATE 35: CPT | Performed by: PHYSICIAN ASSISTANT

## 2019-08-19 PROCEDURE — 6370000000 HC RX 637 (ALT 250 FOR IP): Performed by: NURSE PRACTITIONER

## 2019-08-19 RX ADMIN — ALBUTEROL SULFATE 2.5 MG: 2.5 SOLUTION RESPIRATORY (INHALATION) at 07:00

## 2019-08-19 RX ADMIN — BUDESONIDE 1000 MCG: 0.5 INHALANT RESPIRATORY (INHALATION) at 19:17

## 2019-08-19 RX ADMIN — PREGABALIN 75 MG: 75 CAPSULE ORAL at 08:02

## 2019-08-19 RX ADMIN — SODIUM CHLORIDE: 9 INJECTION, SOLUTION INTRAVENOUS at 19:41

## 2019-08-19 RX ADMIN — ALBUTEROL SULFATE 2.5 MG: 2.5 SOLUTION RESPIRATORY (INHALATION) at 15:13

## 2019-08-19 RX ADMIN — DONEPEZIL HYDROCHLORIDE 5 MG: 5 TABLET, FILM COATED ORAL at 19:39

## 2019-08-19 RX ADMIN — Medication 10 ML: at 19:41

## 2019-08-19 RX ADMIN — AZATHIOPRINE 50 MG: 50 TABLET ORAL at 08:02

## 2019-08-19 RX ADMIN — INSULIN LISPRO 6 UNITS: 100 INJECTION, SOLUTION INTRAVENOUS; SUBCUTANEOUS at 18:30

## 2019-08-19 RX ADMIN — RIVAROXABAN 20 MG: 20 TABLET, FILM COATED ORAL at 18:30

## 2019-08-19 RX ADMIN — ACETAMINOPHEN 650 MG: 325 TABLET ORAL at 22:42

## 2019-08-19 RX ADMIN — SODIUM CHLORIDE: 9 INJECTION, SOLUTION INTRAVENOUS at 12:32

## 2019-08-19 RX ADMIN — ALBUTEROL SULFATE 2.5 MG: 2.5 SOLUTION RESPIRATORY (INHALATION) at 10:34

## 2019-08-19 RX ADMIN — CLONIDINE HYDROCHLORIDE 0.2 MG: 0.2 TABLET ORAL at 19:40

## 2019-08-19 RX ADMIN — OXYCODONE HYDROCHLORIDE AND ACETAMINOPHEN 1000 MG: 500 TABLET ORAL at 08:03

## 2019-08-19 RX ADMIN — OXYCODONE HYDROCHLORIDE 5 MG: 5 TABLET ORAL at 19:39

## 2019-08-19 RX ADMIN — Medication 12 UNITS: at 21:38

## 2019-08-19 RX ADMIN — OXYCODONE HYDROCHLORIDE 5 MG: 5 TABLET ORAL at 09:43

## 2019-08-19 RX ADMIN — FAMOTIDINE 20 MG: 20 TABLET ORAL at 08:03

## 2019-08-19 RX ADMIN — CIPROFLOXACIN 400 MG: 2 INJECTION, SOLUTION INTRAVENOUS at 05:48

## 2019-08-19 RX ADMIN — CIPROFLOXACIN 400 MG: 2 INJECTION, SOLUTION INTRAVENOUS at 18:35

## 2019-08-19 RX ADMIN — SODIUM CHLORIDE: 9 INJECTION, SOLUTION INTRAVENOUS at 05:48

## 2019-08-19 RX ADMIN — FAMOTIDINE 20 MG: 20 TABLET ORAL at 19:40

## 2019-08-19 RX ADMIN — ALBUTEROL SULFATE 2.5 MG: 2.5 SOLUTION RESPIRATORY (INHALATION) at 19:17

## 2019-08-19 RX ADMIN — BUDESONIDE 500 MCG: 0.5 INHALANT RESPIRATORY (INHALATION) at 07:00

## 2019-08-19 RX ADMIN — CLONIDINE HYDROCHLORIDE 0.1 MG: 0.1 TABLET ORAL at 08:03

## 2019-08-19 RX ADMIN — INSULIN LISPRO 3 UNITS: 100 INJECTION, SOLUTION INTRAVENOUS; SUBCUTANEOUS at 07:57

## 2019-08-19 ASSESSMENT — PAIN SCALES - GENERAL
PAINLEVEL_OUTOF10: 0
PAINLEVEL_OUTOF10: 5
PAINLEVEL_OUTOF10: 0
PAINLEVEL_OUTOF10: 6

## 2019-08-19 ASSESSMENT — PAIN DESCRIPTION - DESCRIPTORS: DESCRIPTORS: STABBING

## 2019-08-19 ASSESSMENT — PAIN DESCRIPTION - ONSET: ONSET: ON-GOING

## 2019-08-19 ASSESSMENT — PAIN DESCRIPTION - PROGRESSION: CLINICAL_PROGRESSION: GRADUALLY IMPROVING

## 2019-08-19 ASSESSMENT — PAIN DESCRIPTION - LOCATION
LOCATION: OTHER (COMMENT)
LOCATION: FLANK

## 2019-08-19 ASSESSMENT — PAIN DESCRIPTION - PAIN TYPE
TYPE: ACUTE PAIN
TYPE: ACUTE PAIN

## 2019-08-19 ASSESSMENT — PAIN DESCRIPTION - ORIENTATION: ORIENTATION: LEFT

## 2019-08-19 NOTE — PROGRESS NOTES
1.4*   GLUCOSE 400* 472* 258*     Recent Labs     08/16/19  1500   AST 17   ALT 11   BILITOT 1.7*   ALKPHOS 75     Troponin T: No results for input(s): TROPONINI in the last 72 hours. Pro-BNP: No results for input(s): BNP in the last 72 hours. INR:   Recent Labs     08/16/19  1500   INR 1.59*     ABGs: No results found for: PHART, PO2ART, TSH1DHV  UA:  Recent Labs     08/16/19  1509   COLORU DK YELLOW   PHUR 6.0   WBCUA 123*   RBCUA 717*   BACTERIA 4+   CLARITYU CLOUDY*   SPECGRAV 1.023   LEUKOCYTESUR MODERATE*   UROBILINOGEN 0.2   BILIRUBINUR Negative   BLOODU LARGE*   GLUCOSEU 250*       Imaging:   FL RETROGRADE PYELOGRAM LEFT   Final Result   Impression:   Intraoperative fluoroscopic images obtained during left-sided a laser   lithotripsy. A double-J ureteral stent was placed. Please refer to the operative note for more details. Signed by Dr Ralf Davidson on 8/16/2019 10:21 PM      CT Kidney WO Contrast   Final Result   Impression:   1. Distal left ureteral stone measuring 3 mm, resulting in mild   obstructive uropathy. 2. Lymphadenopathy and lung changes compatible with known sarcoidosis. 3. Mild circumferential urinary bladder wall thickening, which may be   related to chronic bladder outlet obstruction from prostatomegaly. Signed by Dr Vivian Charlton on 8/16/2019 3:45 PM        Micro:   Blood Culture, Routine   Date Value Ref Range Status   08/16/2019   Preliminary    No Growth to date.   Any change in status will be called.   , No components found for: LABURINE  Patient Active Problem List    Diagnosis Date Noted    Acute cystitis with hematuria 08/16/2019    Acute kidney injury ([address]     Gross hematuria     Other emphysema ([address] 02/25/2019    Anemia 10/09/2018    Other male erectile dysfunction 08/22/2018    Ureterolithiasis 06/25/2018    Hydronephrosis, left 06/25/2018    History of histoplasmosis 06/22/2018    Lymphadenopathy 06/22/2018    Splenomegaly 06/22/2018    Asthma 06/08/2018     Overview:   : [  ]      Depression 06/08/2018    Pancreatitis 06/08/2018    Peripheral nerve disease 06/08/2018    Polyp of colon 06/08/2018    Hypercalcemia due to sarcoidosis 04/26/2018    GARNETT (dyspnea on exertion) 11/27/2017    Diabetes mellitus ([address]     Type 2 diabetes mellitus with peripheral neuropathy ([address]     Sarcoidosis, lung ([address]     S/P ablation of atrial fibrillation 05/16/2017    Atrial fibrillation ([address]      Overview:   Chadsvasc-3  onset 12/2014  on sotalol   Echo 12/2014 EF 60%, LA mildly enlarged  : [  ]      Hypertension      Overview:   : [  ]      Hyperlipidemia        Assessment/plan: Active Problems:    Ureterolithiasis    Acute cystitis with hematuria    Acute kidney injury ([address]    Gross hematuria  Resolved Problems:    * No resolved hospital problems. *      Plan:       8/18/18  H/o afib s/p ablation, on xarelto, DM POD #2  S/p  cystoscopy, left retrograde stone manipulation, and placement of a left double-J stent,we were consulted for DM ( a1c 6.9) hyperkalemia resolved, NATALIE cr around 1.4, UC klebsiella pneumonia pansensitive, now on cipro, transferred her on insulin gtt but no DKA, will transfer back to floor restart home insulin high dose SSI and ok to d/c per urology   8/19/19  POD #3 As above,bs failrly good transfer to floor, d/c home per urology  ?  In am, d/c lindsey per urology     Norman Diggs MD  Hospitalist Service  8/19/2019  7:47 AM

## 2019-08-20 ENCOUNTER — APPOINTMENT (OUTPATIENT)
Dept: GENERAL RADIOLOGY | Age: 69
DRG: 853 | End: 2019-08-20
Payer: MEDICARE

## 2019-08-20 LAB
ANION GAP SERPL CALCULATED.3IONS-SCNC: 12 MMOL/L (ref 7–19)
BUN BLDV-MCNC: 15 MG/DL (ref 8–23)
CALCIUM SERPL-MCNC: 8.7 MG/DL (ref 8.8–10.2)
CHLORIDE BLD-SCNC: 104 MMOL/L (ref 98–111)
CO2: 23 MMOL/L (ref 22–29)
CREAT SERPL-MCNC: 0.9 MG/DL (ref 0.5–1.2)
GFR NON-AFRICAN AMERICAN: >60
GLUCOSE BLD-MCNC: 116 MG/DL (ref 70–99)
GLUCOSE BLD-MCNC: 132 MG/DL (ref 74–109)
GLUCOSE BLD-MCNC: 147 MG/DL (ref 70–99)
GLUCOSE BLD-MCNC: 156 MG/DL (ref 70–99)
GLUCOSE BLD-MCNC: 165 MG/DL (ref 70–99)
GLUCOSE BLD-MCNC: 287 MG/DL (ref 70–99)
HCT VFR BLD CALC: 29.6 % (ref 42–52)
HEMOGLOBIN: 9.9 G/DL (ref 14–18)
MCH RBC QN AUTO: 30.8 PG (ref 27–31)
MCHC RBC AUTO-ENTMCNC: 33.4 G/DL (ref 33–37)
MCV RBC AUTO: 92.2 FL (ref 80–94)
MRSA CULTURE ONLY: NORMAL
PDW BLD-RTO: 13.3 % (ref 11.5–14.5)
PERFORMED ON: ABNORMAL
PLATELET # BLD: 106 K/UL (ref 130–400)
PMV BLD AUTO: 10.6 FL (ref 9.4–12.4)
POTASSIUM SERPL-SCNC: 3.8 MMOL/L (ref 3.5–5)
RBC # BLD: 3.21 M/UL (ref 4.7–6.1)
SODIUM BLD-SCNC: 139 MMOL/L (ref 136–145)
WBC # BLD: 6 K/UL (ref 4.8–10.8)

## 2019-08-20 PROCEDURE — 99233 SBSQ HOSP IP/OBS HIGH 50: CPT | Performed by: NURSE PRACTITIONER

## 2019-08-20 PROCEDURE — 85027 COMPLETE CBC AUTOMATED: CPT

## 2019-08-20 PROCEDURE — 2580000003 HC RX 258: Performed by: INTERNAL MEDICINE

## 2019-08-20 PROCEDURE — 6360000002 HC RX W HCPCS: Performed by: INTERNAL MEDICINE

## 2019-08-20 PROCEDURE — 6370000000 HC RX 637 (ALT 250 FOR IP): Performed by: INTERNAL MEDICINE

## 2019-08-20 PROCEDURE — 1210000000 HC MED SURG R&B

## 2019-08-20 PROCEDURE — 80048 BASIC METABOLIC PNL TOTAL CA: CPT

## 2019-08-20 PROCEDURE — 6360000002 HC RX W HCPCS: Performed by: HOSPITALIST

## 2019-08-20 PROCEDURE — 36415 COLL VENOUS BLD VENIPUNCTURE: CPT

## 2019-08-20 PROCEDURE — 71045 X-RAY EXAM CHEST 1 VIEW: CPT

## 2019-08-20 PROCEDURE — 6370000000 HC RX 637 (ALT 250 FOR IP): Performed by: HOSPITALIST

## 2019-08-20 PROCEDURE — 82948 REAGENT STRIP/BLOOD GLUCOSE: CPT

## 2019-08-20 PROCEDURE — 94640 AIRWAY INHALATION TREATMENT: CPT

## 2019-08-20 RX ORDER — FUROSEMIDE 10 MG/ML
20 INJECTION INTRAMUSCULAR; INTRAVENOUS 2 TIMES DAILY
Status: DISCONTINUED | OUTPATIENT
Start: 2019-08-20 | End: 2019-08-22 | Stop reason: HOSPADM

## 2019-08-20 RX ORDER — FUROSEMIDE 10 MG/ML
40 INJECTION INTRAMUSCULAR; INTRAVENOUS ONCE
Status: COMPLETED | OUTPATIENT
Start: 2019-08-20 | End: 2019-08-20

## 2019-08-20 RX ADMIN — INSULIN LISPRO 5 UNITS: 100 INJECTION, SOLUTION INTRAVENOUS; SUBCUTANEOUS at 22:08

## 2019-08-20 RX ADMIN — CIPROFLOXACIN 400 MG: 2 INJECTION, SOLUTION INTRAVENOUS at 18:14

## 2019-08-20 RX ADMIN — ALBUTEROL SULFATE 2.5 MG: 2.5 SOLUTION RESPIRATORY (INHALATION) at 07:27

## 2019-08-20 RX ADMIN — FAMOTIDINE 20 MG: 20 TABLET ORAL at 09:49

## 2019-08-20 RX ADMIN — BUDESONIDE 500 MCG: 0.5 INHALANT RESPIRATORY (INHALATION) at 07:27

## 2019-08-20 RX ADMIN — SODIUM CHLORIDE: 9 INJECTION, SOLUTION INTRAVENOUS at 04:42

## 2019-08-20 RX ADMIN — AZATHIOPRINE 50 MG: 50 TABLET ORAL at 09:50

## 2019-08-20 RX ADMIN — ALBUTEROL SULFATE 2.5 MG: 2.5 SOLUTION RESPIRATORY (INHALATION) at 15:56

## 2019-08-20 RX ADMIN — Medication 12 UNITS: at 22:08

## 2019-08-20 RX ADMIN — DONEPEZIL HYDROCHLORIDE 5 MG: 5 TABLET, FILM COATED ORAL at 22:07

## 2019-08-20 RX ADMIN — Medication 10 ML: at 22:07

## 2019-08-20 RX ADMIN — CLONIDINE HYDROCHLORIDE 0.1 MG: 0.1 TABLET ORAL at 09:49

## 2019-08-20 RX ADMIN — BUDESONIDE 500 MCG: 0.5 INHALANT RESPIRATORY (INHALATION) at 19:29

## 2019-08-20 RX ADMIN — FUROSEMIDE 20 MG: 10 INJECTION, SOLUTION INTRAMUSCULAR; INTRAVENOUS at 18:14

## 2019-08-20 RX ADMIN — ALBUTEROL SULFATE 2.5 MG: 2.5 SOLUTION RESPIRATORY (INHALATION) at 19:29

## 2019-08-20 RX ADMIN — PREGABALIN 75 MG: 75 CAPSULE ORAL at 09:49

## 2019-08-20 RX ADMIN — Medication 10 ML: at 09:50

## 2019-08-20 RX ADMIN — ALBUTEROL SULFATE 2 PUFF: 90 AEROSOL, METERED RESPIRATORY (INHALATION) at 04:42

## 2019-08-20 RX ADMIN — CLONIDINE HYDROCHLORIDE 0.2 MG: 0.2 TABLET ORAL at 22:07

## 2019-08-20 RX ADMIN — FAMOTIDINE 20 MG: 20 TABLET ORAL at 22:07

## 2019-08-20 RX ADMIN — CIPROFLOXACIN 400 MG: 2 INJECTION, SOLUTION INTRAVENOUS at 04:42

## 2019-08-20 RX ADMIN — OXYCODONE HYDROCHLORIDE AND ACETAMINOPHEN 1000 MG: 500 TABLET ORAL at 09:50

## 2019-08-20 RX ADMIN — FUROSEMIDE 40 MG: 10 INJECTION, SOLUTION INTRAMUSCULAR; INTRAVENOUS at 09:49

## 2019-08-20 RX ADMIN — ALBUTEROL SULFATE 2.5 MG: 2.5 SOLUTION RESPIRATORY (INHALATION) at 11:15

## 2019-08-20 ASSESSMENT — PAIN DESCRIPTION - LOCATION: LOCATION: FLANK

## 2019-08-20 ASSESSMENT — PAIN DESCRIPTION - PAIN TYPE: TYPE: ACUTE PAIN

## 2019-08-20 ASSESSMENT — PAIN SCALES - GENERAL
PAINLEVEL_OUTOF10: 0
PAINLEVEL_OUTOF10: 0

## 2019-08-20 ASSESSMENT — ENCOUNTER SYMPTOMS
CHEST TIGHTNESS: 1
SHORTNESS OF BREATH: 1

## 2019-08-20 NOTE — PROGRESS NOTES
[facility]rology   Progress Note      SUBJECTIVE:  Shortness of breath last night, states he coughed up a blood clot this morning. States that he had chest pain earlier this morning when he felt as though he could not breathe. Denies flank pain. OBJECTIVE:  Sitting up in chair lindsey draining red urine, currently in no distress on 02. REVIEW OF SYSTEMS:   Review of Systems   Constitutional: Positive for chills and fever. Respiratory: Positive for chest tightness and shortness of breath. All other systems reviewed and are negative. Physical  VITALS:  BP (!) 155/80   Pulse 96   Temp 98.8 °F (37.1 °C) (Temporal)   Resp 22   Ht 5' 7\" (1.702 m) Comment: Simultaneous filing. User may not have seen previous data. Wt 192 lb 3.2 oz (87.2 kg)   SpO2 95%   BMI 30.10 kg/m²   Physical Exam   Constitutional: He is oriented to person, place, and time. He appears well-developed and well-nourished. No distress. HENT:   Head: Normocephalic and atraumatic. Eyes: Pupils are equal, round, and reactive to light. Conjunctivae are normal.   Neck: Normal range of motion. Neck supple. Cardiovascular: Normal rate. Pulmonary/Chest: Effort normal. No respiratory distress. Abdominal: Soft. Musculoskeletal: Normal range of motion. Neurological: He is alert and oriented to person, place, and time. Skin: Skin is warm and dry. Psychiatric: He has a normal mood and affect. His behavior is normal. Judgment and thought content normal.   Vitals reviewed. Data     CBC: No results for input(s): WBC, HGB, HCT, PLT in the last 72 hours. BMP:    Recent Labs     08/17/19  1314 08/18/19  0131   * 140   K 4.3 4.4   CL 95* 103   CO2 22 22   BUN 32* 32*   CREATININE 1.5* 1.4*   GLUCOSE 472* 258*         ASSESSMENT AND PLAN  1. UTI- cystitis vs left obstructive pyelonephritis. Bladder culture grew klebsiella, no growth from surgical culture of left kidney. 14 day course of ABX needed.     2. Left ureteral

## 2019-08-21 ENCOUNTER — APPOINTMENT (OUTPATIENT)
Dept: GENERAL RADIOLOGY | Age: 69
DRG: 853 | End: 2019-08-21
Payer: MEDICARE

## 2019-08-21 LAB
BLOOD CULTURE, ROUTINE: NORMAL
CULTURE, BLOOD 2: NORMAL
GLUCOSE BLD-MCNC: 148 MG/DL (ref 70–99)
GLUCOSE BLD-MCNC: 164 MG/DL (ref 70–99)
GLUCOSE BLD-MCNC: 190 MG/DL (ref 70–99)
GLUCOSE BLD-MCNC: 272 MG/DL (ref 70–99)
GLUCOSE BLD-MCNC: 284 MG/DL (ref 70–99)
PERFORMED ON: ABNORMAL

## 2019-08-21 PROCEDURE — 2580000003 HC RX 258: Performed by: INTERNAL MEDICINE

## 2019-08-21 PROCEDURE — 6360000002 HC RX W HCPCS: Performed by: INTERNAL MEDICINE

## 2019-08-21 PROCEDURE — 99232 SBSQ HOSP IP/OBS MODERATE 35: CPT | Performed by: NURSE PRACTITIONER

## 2019-08-21 PROCEDURE — 1210000000 HC MED SURG R&B

## 2019-08-21 PROCEDURE — 6370000000 HC RX 637 (ALT 250 FOR IP): Performed by: INTERNAL MEDICINE

## 2019-08-21 PROCEDURE — 94640 AIRWAY INHALATION TREATMENT: CPT

## 2019-08-21 PROCEDURE — 71045 X-RAY EXAM CHEST 1 VIEW: CPT

## 2019-08-21 PROCEDURE — 82948 REAGENT STRIP/BLOOD GLUCOSE: CPT

## 2019-08-21 PROCEDURE — 6370000000 HC RX 637 (ALT 250 FOR IP): Performed by: HOSPITALIST

## 2019-08-21 PROCEDURE — 6360000002 HC RX W HCPCS: Performed by: HOSPITALIST

## 2019-08-21 RX ORDER — IPRATROPIUM BROMIDE AND ALBUTEROL SULFATE 2.5; .5 MG/3ML; MG/3ML
1 SOLUTION RESPIRATORY (INHALATION)
Status: DISCONTINUED | OUTPATIENT
Start: 2019-08-21 | End: 2019-08-22 | Stop reason: HOSPADM

## 2019-08-21 RX ORDER — METHYLPREDNISOLONE 4 MG/1
4 TABLET ORAL DAILY
Status: DISCONTINUED | OUTPATIENT
Start: 2019-08-21 | End: 2019-08-22 | Stop reason: HOSPADM

## 2019-08-21 RX ORDER — LEVOFLOXACIN 5 MG/ML
500 INJECTION, SOLUTION INTRAVENOUS EVERY 24 HOURS
Status: DISCONTINUED | OUTPATIENT
Start: 2019-08-21 | End: 2019-08-22 | Stop reason: HOSPADM

## 2019-08-21 RX ADMIN — FAMOTIDINE 20 MG: 20 TABLET ORAL at 08:07

## 2019-08-21 RX ADMIN — BUDESONIDE 1000 MCG: 0.5 INHALANT RESPIRATORY (INHALATION) at 19:31

## 2019-08-21 RX ADMIN — Medication 10 ML: at 06:26

## 2019-08-21 RX ADMIN — FAMOTIDINE 20 MG: 20 TABLET ORAL at 19:56

## 2019-08-21 RX ADMIN — Medication 12 UNITS: at 20:28

## 2019-08-21 RX ADMIN — INSULIN LISPRO 5 UNITS: 100 INJECTION, SOLUTION INTRAVENOUS; SUBCUTANEOUS at 20:28

## 2019-08-21 RX ADMIN — ACETAMINOPHEN 650 MG: 325 TABLET ORAL at 06:33

## 2019-08-21 RX ADMIN — IPRATROPIUM BROMIDE AND ALBUTEROL SULFATE 1 AMPULE: .5; 3 SOLUTION RESPIRATORY (INHALATION) at 11:36

## 2019-08-21 RX ADMIN — PREGABALIN 75 MG: 75 CAPSULE ORAL at 08:07

## 2019-08-21 RX ADMIN — CLONIDINE HYDROCHLORIDE 0.1 MG: 0.1 TABLET ORAL at 08:07

## 2019-08-21 RX ADMIN — AZATHIOPRINE 50 MG: 50 TABLET ORAL at 08:07

## 2019-08-21 RX ADMIN — OXYCODONE HYDROCHLORIDE AND ACETAMINOPHEN 1000 MG: 500 TABLET ORAL at 08:07

## 2019-08-21 RX ADMIN — Medication 10 ML: at 19:56

## 2019-08-21 RX ADMIN — CIPROFLOXACIN 400 MG: 2 INJECTION, SOLUTION INTRAVENOUS at 06:21

## 2019-08-21 RX ADMIN — IPRATROPIUM BROMIDE AND ALBUTEROL SULFATE 1 AMPULE: .5; 3 SOLUTION RESPIRATORY (INHALATION) at 19:31

## 2019-08-21 RX ADMIN — LEVOFLOXACIN 500 MG: 5 INJECTION, SOLUTION INTRAVENOUS at 10:28

## 2019-08-21 RX ADMIN — INSULIN LISPRO 3 UNITS: 100 INJECTION, SOLUTION INTRAVENOUS; SUBCUTANEOUS at 09:15

## 2019-08-21 RX ADMIN — ALBUTEROL SULFATE 2.5 MG: 2.5 SOLUTION RESPIRATORY (INHALATION) at 07:37

## 2019-08-21 RX ADMIN — IPRATROPIUM BROMIDE AND ALBUTEROL SULFATE 1 AMPULE: .5; 3 SOLUTION RESPIRATORY (INHALATION) at 15:09

## 2019-08-21 RX ADMIN — FUROSEMIDE 20 MG: 10 INJECTION, SOLUTION INTRAMUSCULAR; INTRAVENOUS at 08:08

## 2019-08-21 RX ADMIN — INSULIN LISPRO 3 UNITS: 100 INJECTION, SOLUTION INTRAVENOUS; SUBCUTANEOUS at 12:06

## 2019-08-21 RX ADMIN — DONEPEZIL HYDROCHLORIDE 5 MG: 5 TABLET, FILM COATED ORAL at 19:56

## 2019-08-21 RX ADMIN — METHYLPREDNISOLONE 4 MG: 4 TABLET ORAL at 10:27

## 2019-08-21 RX ADMIN — BUDESONIDE 500 MCG: 0.5 INHALANT RESPIRATORY (INHALATION) at 07:37

## 2019-08-21 RX ADMIN — INSULIN LISPRO 9 UNITS: 100 INJECTION, SOLUTION INTRAVENOUS; SUBCUTANEOUS at 16:40

## 2019-08-21 RX ADMIN — FUROSEMIDE 20 MG: 10 INJECTION, SOLUTION INTRAMUSCULAR; INTRAVENOUS at 18:00

## 2019-08-21 ASSESSMENT — PAIN SCALES - GENERAL
PAINLEVEL_OUTOF10: 0
PAINLEVEL_OUTOF10: 0
PAINLEVEL_OUTOF10: 3
PAINLEVEL_OUTOF10: 0

## 2019-08-21 ASSESSMENT — PAIN DESCRIPTION - LOCATION: LOCATION: FLANK

## 2019-08-21 ASSESSMENT — PAIN DESCRIPTION - PAIN TYPE: TYPE: ACUTE PAIN

## 2019-08-21 ASSESSMENT — ENCOUNTER SYMPTOMS
CHEST TIGHTNESS: 1
SHORTNESS OF BREATH: 1

## 2019-08-21 NOTE — PROGRESS NOTES
[id number] [facility]   Progress Note      SUBJECTIVE:  Shortness of breath resolved, complains of continued fever. OBJECTIVE:  Resting in bed, taking breathing treatment. In no distress. REVIEW OF SYSTEMS:   Review of Systems   Constitutional: Positive for chills and fever. Respiratory: Positive for chest tightness and shortness of breath. All other systems reviewed and are negative. Physical  VITALS:  /63   Pulse 86   Temp 99.1 °F (37.3 °C) (Temporal)   Resp 17   Ht 5' 7\" (1.702 m) Comment: Simultaneous filing. User may not have seen previous data. Wt 183 lb 3.2 oz (83.1 kg)   SpO2 91%   BMI 28.69 kg/m²   Physical Exam   Constitutional: He is oriented to person, place, and time. He appears well-developed and well-nourished. No distress. HENT:   Head: Normocephalic and atraumatic. Eyes: Pupils are equal, round, and reactive to light. Conjunctivae are normal.   Neck: Normal range of motion. Neck supple. Cardiovascular: Normal rate. Pulmonary/Chest: Effort normal. No respiratory distress. Abdominal: Soft. Musculoskeletal: Normal range of motion. Neurological: He is alert and oriented to person, place, and time. Skin: Skin is warm and dry. Psychiatric: He has a normal mood and affect. His behavior is normal. Judgment and thought content normal.   Vitals reviewed. Data     CBC:   Recent Labs     08/20/19  0806   WBC 6.0   HGB 9.9*   HCT 29.6*   *     BMP:    Recent Labs     08/20/19  0806      K 3.8      CO2 23   BUN 15   CREATININE 0.9   GLUCOSE 132*         ASSESSMENT AND PLAN  1. UTI- cystitis vs left obstructive pyelonephritis. Bladder culture grew klebsiella, no growth from surgical culture of left kidney. 14 day course of ABX needed. 2. Left ureteral calculus- SP left retrograde stone manipulation and stent placement. Once ABX course has been completed and follow up cultures are negative will require left ureteroscopy laser lithotripsy.      3.

## 2019-08-21 NOTE — PROGRESS NOTES
Data:     Recent Labs     08/20/19  0806   WBC 6.0   HGB 9.9*   *     Recent Labs     08/20/19  0806      K 3.8      CO2 23   BUN 15   CREATININE 0.9   GLUCOSE 132*     No results for input(s): AST, ALT, ALB, BILITOT, ALKPHOS in the last 72 hours. Troponin T: No results for input(s): TROPONINI in the last 72 hours. Pro-BNP: No results for input(s): BNP in the last 72 hours. INR:   No results for input(s): INR in the last 72 hours. ABGs: No results found for: PHART, PO2ART, [id number]  UA:  No results for input(s): NITRITE, COLORU, PHUR, LABCAST, WBCUA, RBCUA, MUCUS, TRICHOMONAS, YEAST, BACTERIA, CLARITYU, SPECGRAV, LEUKOCYTESUR, UROBILINOGEN, BILIRUBINUR, BLOODU, GLUCOSEU, AMORPHOUS in the last 72 hours. Invalid input(s): KETONESU    Imaging:   XR CHEST PORTABLE   Final Result   Pulmonary hypoventilation with central vascular congestion   and mild pulmonary edema compatible with partial overload. Pneumonia   is felt less likely. COPD. Signed by Dr Alejandra Prince. [place] on 8/20/2019 8:10 AM      FL RETROGRADE PYELOGRAM LEFT   Final Result   Impression:   Intraoperative fluoroscopic images obtained during left-sided a laser   lithotripsy. A double-J ureteral stent was placed. Please refer to the operative note for more details. Signed by Dr Veto Carballo on 8/16/2019 10:21 PM      CT Kidney WO Contrast   Final Result   Impression:   1. Distal left ureteral stone measuring 3 mm, resulting in mild   obstructive uropathy. 2. Lymphadenopathy and lung changes compatible with known sarcoidosis. 3. Mild circumferential urinary bladder wall thickening, which may be   related to chronic bladder outlet obstruction from prostatomegaly. Signed by Dr Bessy Shaw on 8/16/2019 3:45 PM        Micro:   Blood Culture, Routine   Date Value Ref Range Status   08/16/2019   Preliminary    No Growth to date.   Any change in status will be called.   , No components found for: Oscarnathan Monico  Patient Active

## 2019-08-22 ENCOUNTER — TELEPHONE (OUTPATIENT)
Dept: INTERNAL MEDICINE | Age: 69
End: 2019-08-22

## 2019-08-22 VITALS
BODY MASS INDEX: 28.75 KG/M2 | SYSTOLIC BLOOD PRESSURE: 127 MMHG | WEIGHT: 183.2 LBS | DIASTOLIC BLOOD PRESSURE: 63 MMHG | TEMPERATURE: 99.2 F | RESPIRATION RATE: 16 BRPM | HEIGHT: 67 IN | HEART RATE: 89 BPM | OXYGEN SATURATION: 92 %

## 2019-08-22 LAB
GLUCOSE BLD-MCNC: 151 MG/DL (ref 70–99)
GLUCOSE BLD-MCNC: 282 MG/DL (ref 70–99)
PERFORMED ON: ABNORMAL
PERFORMED ON: ABNORMAL

## 2019-08-22 PROCEDURE — 82948 REAGENT STRIP/BLOOD GLUCOSE: CPT

## 2019-08-22 PROCEDURE — 6360000002 HC RX W HCPCS: Performed by: HOSPITALIST

## 2019-08-22 PROCEDURE — 2580000003 HC RX 258: Performed by: INTERNAL MEDICINE

## 2019-08-22 PROCEDURE — 6360000002 HC RX W HCPCS: Performed by: INTERNAL MEDICINE

## 2019-08-22 PROCEDURE — 6370000000 HC RX 637 (ALT 250 FOR IP): Performed by: HOSPITALIST

## 2019-08-22 PROCEDURE — 99232 SBSQ HOSP IP/OBS MODERATE 35: CPT | Performed by: PHYSICIAN ASSISTANT

## 2019-08-22 PROCEDURE — 99239 HOSP IP/OBS DSCHRG MGMT >30: CPT | Performed by: NURSE PRACTITIONER

## 2019-08-22 PROCEDURE — 6370000000 HC RX 637 (ALT 250 FOR IP): Performed by: INTERNAL MEDICINE

## 2019-08-22 PROCEDURE — 94640 AIRWAY INHALATION TREATMENT: CPT

## 2019-08-22 RX ORDER — LEVOFLOXACIN 500 MG/1
500 TABLET, FILM COATED ORAL DAILY
Qty: 10 TABLET | Refills: 0 | Status: SHIPPED | OUTPATIENT
Start: 2019-08-22 | End: 2019-09-01

## 2019-08-22 RX ORDER — FUROSEMIDE 20 MG/1
20 TABLET ORAL DAILY
Qty: 30 TABLET | Refills: 3 | Status: SHIPPED | OUTPATIENT
Start: 2019-08-22 | End: 2022-05-10

## 2019-08-22 RX ADMIN — AZATHIOPRINE 50 MG: 50 TABLET ORAL at 07:43

## 2019-08-22 RX ADMIN — IPRATROPIUM BROMIDE AND ALBUTEROL SULFATE 1 AMPULE: .5; 3 SOLUTION RESPIRATORY (INHALATION) at 11:00

## 2019-08-22 RX ADMIN — PREGABALIN 75 MG: 75 CAPSULE ORAL at 07:42

## 2019-08-22 RX ADMIN — INSULIN LISPRO 3 UNITS: 100 INJECTION, SOLUTION INTRAVENOUS; SUBCUTANEOUS at 08:38

## 2019-08-22 RX ADMIN — CLONIDINE HYDROCHLORIDE 0.1 MG: 0.1 TABLET ORAL at 07:43

## 2019-08-22 RX ADMIN — OXYCODONE HYDROCHLORIDE AND ACETAMINOPHEN 1000 MG: 500 TABLET ORAL at 07:43

## 2019-08-22 RX ADMIN — INSULIN LISPRO 9 UNITS: 100 INJECTION, SOLUTION INTRAVENOUS; SUBCUTANEOUS at 12:47

## 2019-08-22 RX ADMIN — BUDESONIDE 1000 MCG: 0.5 INHALANT RESPIRATORY (INHALATION) at 07:29

## 2019-08-22 RX ADMIN — METHYLPREDNISOLONE 4 MG: 4 TABLET ORAL at 07:43

## 2019-08-22 RX ADMIN — IPRATROPIUM BROMIDE AND ALBUTEROL SULFATE 1 AMPULE: .5; 3 SOLUTION RESPIRATORY (INHALATION) at 07:28

## 2019-08-22 RX ADMIN — FUROSEMIDE 20 MG: 10 INJECTION, SOLUTION INTRAMUSCULAR; INTRAVENOUS at 07:42

## 2019-08-22 RX ADMIN — Medication 10 ML: at 10:04

## 2019-08-22 RX ADMIN — LEVOFLOXACIN 500 MG: 5 INJECTION, SOLUTION INTRAVENOUS at 07:42

## 2019-08-22 RX ADMIN — FAMOTIDINE 20 MG: 20 TABLET ORAL at 07:43

## 2019-08-22 ASSESSMENT — PAIN SCALES - GENERAL: PAINLEVEL_OUTOF10: 0

## 2019-08-22 NOTE — PROGRESS NOTES
morning. Chest x-ray was obtained and showed possible fluid overload, patient has been placed on Lasix 20 mg PO daily. He has had no further difficulty breathing. It has been suggested that patient follow up with Cardiology, or discuss echocardiogram with his PCP. Continued fever was noted and patient was changed from ciprofloxacin to Levaquin for possible treatment of pneumonia. He will complete KUB and a 10-day course of Levaquin prior to following up in urology office to schedule treatment of stone. Mr. Rhoda Carcamo is medically stable for discharge and will be discharged home. Please see hospital medication reconciliation. Analisa Schneider

## 2019-08-22 NOTE — DISCHARGE SUMMARY
[facility] Urology  Discharge Summary:      Patient Identification  Dona Wilhelm is a 70 y.o. male. :  1950  Admit Date:  2019  2:29 PM    Discharge date:   19                                   Disposition: home    Discharge Diagnoses:   Patient Active Problem List   Diagnosis    Hypertension    Hyperlipidemia    [facility]    S/P ablation of atrial fibrillation    Diabetes mellitus ([facility]    Type 2 diabetes mellitus with peripheral neuropathy (HCC)    Sarcoidosis, lung ([address]    GARNETT (dyspnea on exertion)    Hypercalcemia due to sarcoidosis    Asthma    Depression    Pancreatitis    Peripheral nerve disease    Polyp of colon    History of histoplasmosis    Lymphadenopathy    Splenomegaly    Ureterolithiasis    Hydronephrosis, left    Other male erectile dysfunction    Anemia    Other emphysema ([address]    Acute cystitis with hematuria    Acute kidney injury ([address]    Gross hematuria    Shortness of breath    Pyuria         Consults: hospitalist    Procedures:     Patient Instructions: Activity: no heavy lifting, pushing, pulling for 3 weeks, no driving while on analgesics  Diet: As tolerated  Follow-up with Max Humphries in 14 days. Hospital course:  Patient presented to [address] [place] [address] on 2019 with a 2-week history of left flank pain and hematuria. Patient was found to have distal left ureteral stone with obstruction and infection in the ED. He was taken to the OR on day of arrival for cystoscopy, left retrograde stone manipulation, and placement of double-J stent per Dr. Julian Lyon. Upon admission patient was noted to be hyperglycemic, the hospitalist were consulted. Patient was placed in the intensive care unit for close monitoring with sliding scale insulin. He was transferred to [facility] 5 floor as of 2019.   On 2019 patient complained of coughing and shortness of breath through the night, he stated that he coughed up a blood clot that

## 2019-08-22 NOTE — TELEPHONE ENCOUNTER
[address] called requesting status of disability forms. Please return call to update him on status. Thank you.

## 2019-08-22 NOTE — PROGRESS NOTES
08/20/2019    BUN 15 08/20/2019    CREATININE 0.9 08/20/2019    LABGLOM >60 08/20/2019    GLUCOSE 132 08/20/2019    PROT 7.4 08/16/2019    LABALBU 4.2 08/16/2019    CALCIUM 8.7 08/20/2019    BILITOT 1.7 08/16/2019    ALKPHOS 75 08/16/2019    AST 17 08/16/2019    ALT 11 08/16/2019     Impression:CT   1. Distal left ureteral stone measuring 3 mm, resulting in mild   obstructive uropathy. 2. Lymphadenopathy and lung changes compatible with known sarcoidosis. 3. Mild circumferential urinary bladder wall thickening, which may be   related to chronic bladder outlet obstruction from prostatomegaly. Signed by Dr Jian Hood on 8/16/2019 3:45 PM         ASSESSMENT AND PLAN  Patient Active Problem List   Diagnosis    Hypertension    Hyperlipidemia    Atrial fibrillation (HCC)    S/P ablation of atrial fibrillation    Diabetes mellitus ([address]    Type 2 diabetes mellitus with peripheral neuropathy (HCC)    Sarcoidosis, lung ([address]    GARNETT (dyspnea on exertion)    Hypercalcemia due to sarcoidosis    Asthma    Depression    Pancreatitis    Peripheral nerve disease    Polyp of colon    History of histoplasmosis    Lymphadenopathy    Splenomegaly    Left ureteral stone    Hydronephrosis, left    Other male erectile dysfunction    Anemia    Other emphysema (HCC)    Acute cystitis with hematuria    Acute kidney injury ([address]    Gross hematuria    Shortness of breath    Pyuria     1. Left ureteral calculus status post left retrograde stone manipulation and stent placement. Once patient has completed a full 14-day course of antibiotics cultures are negative will require left ureteroscopy laser lithotripsy. 2.  UTI: His left obstructive pyelonephritis. Culture grew Klebsiella no growth from surgical culture of left kidney blood cultures negative. Will need 14-day course of antibiotics. 3.  Hematuria expected with stent. Catheter removed patient's voiding fine    4.   Previous shortness of breath episode has resolved x-ray showed slight overload. 5. Low-grade fever no fever today. Is on appropriate antibiotics per urine culture    6. Hyperglycemia by hospitalist.  If hospitalist believes patient is stable or clear for discharge then patient can be discharged from urologic standpoint.     Signed  Brandy Omalley PA-C.

## 2019-08-23 ENCOUNTER — OFFICE VISIT (OUTPATIENT)
Dept: INTERNAL MEDICINE | Age: 69
End: 2019-08-23
Payer: MEDICARE

## 2019-08-23 ENCOUNTER — CARE COORDINATION (OUTPATIENT)
Dept: CASE MANAGEMENT | Age: 69
End: 2019-08-23

## 2019-08-23 VITALS
HEIGHT: 69 IN | OXYGEN SATURATION: 96 % | RESPIRATION RATE: 18 BRPM | BODY MASS INDEX: 27.25 KG/M2 | HEART RATE: 108 BPM | SYSTOLIC BLOOD PRESSURE: 128 MMHG | DIASTOLIC BLOOD PRESSURE: 72 MMHG | TEMPERATURE: 101.7 F | WEIGHT: 184 LBS

## 2019-08-23 DIAGNOSIS — Z86.79 STATUS POST ABLATION OF ATRIAL FIBRILLATION: ICD-10-CM

## 2019-08-23 DIAGNOSIS — F33.40 RECURRENT MAJOR DEPRESSIVE DISORDER, IN REMISSION (HCC): ICD-10-CM

## 2019-08-23 DIAGNOSIS — Z98.890 STATUS POST ABLATION OF ATRIAL FIBRILLATION: ICD-10-CM

## 2019-08-23 DIAGNOSIS — E11.42 TYPE 2 DIABETES MELLITUS WITH PERIPHERAL NEUROPATHY (HCC): ICD-10-CM

## 2019-08-23 DIAGNOSIS — M25.551 RIGHT HIP PAIN: ICD-10-CM

## 2019-08-23 DIAGNOSIS — D86.0 SARCOIDOSIS, LUNG (HCC): ICD-10-CM

## 2019-08-23 DIAGNOSIS — N13.30 HYDRONEPHROSIS, LEFT: ICD-10-CM

## 2019-08-23 DIAGNOSIS — N18.9 CHRONIC KIDNEY DISEASE, UNSPECIFIED CKD STAGE: Primary | ICD-10-CM

## 2019-08-23 DIAGNOSIS — J18.9 PNEUMONIA OF BOTH UPPER LOBES DUE TO INFECTIOUS ORGANISM: ICD-10-CM

## 2019-08-23 PROCEDURE — 1111F DSCHRG MED/CURRENT MED MERGE: CPT | Performed by: NURSE PRACTITIONER

## 2019-08-23 PROCEDURE — 99496 TRANSJ CARE MGMT HIGH F2F 7D: CPT | Performed by: NURSE PRACTITIONER

## 2019-08-26 ENCOUNTER — CARE COORDINATION (OUTPATIENT)
Dept: CASE MANAGEMENT | Age: 69
End: 2019-08-26

## 2019-08-26 DIAGNOSIS — N30.01 ACUTE CYSTITIS WITH HEMATURIA: Primary | ICD-10-CM

## 2019-08-26 RX ORDER — LEVALBUTEROL INHALATION SOLUTION 1.25 MG/3ML
1 SOLUTION RESPIRATORY (INHALATION) EVERY 6 HOURS PRN
COMMUNITY

## 2019-08-26 ASSESSMENT — ENCOUNTER SYMPTOMS
BACK PAIN: 0
SHORTNESS OF BREATH: 0
APNEA: 0
EYE ITCHING: 0
EYE DISCHARGE: 0
PHOTOPHOBIA: 0
COUGH: 0
COLOR CHANGE: 0

## 2019-08-26 NOTE — ED PROVIDER NOTES
Benign colonic polyp     cscope 12/07 Dr Deawyne Avilez adenomatous: 12/14 adenoma    Chest pain     Chronic pain syndrome     Depression     Diabetic peripheral neuropathy (HCC)     Extrinsic asthma     Hyperlipidemia     Hypertension     Idiopathic peripheral neuropathy     Lyme disease     Mediastinal lymphadenopathy     Microalbuminuria     Mixed hyperlipidemia     Paroxysmal A-fib (HCC)     S/P ablation of atrial fibrillation     4/16 A fib ablation , Dr Cait Lebron, [facility]     Sarcoidosis, lung ([facility]     restrictive lung impairment    Tick bite     Type 2 diabetes mellitus with peripheral neuropathy ([address]     Type 2 diabetes, controlled, with neuropathy ([address]          SURGICAL HISTORY       Past Surgical History:   Procedure Laterality Date    ANKLE FRACTURE SURGERY  april 14, 2015    ATRIAL ABLATION SURGERY  2015    Dr. Jordin Meadows      Catheter Ablation A-fib    CHOLECYSTECTOMY      COLONOSCOPY  12/02/2014    Melecio    CYSTOSCOPY Left 8/16/2019    CYSTOSCOPY; LEFT RETROGRADE PYELOGRAM; INSERTION LEFT URETERAL STENT performed by Suzette Yeung MD at [address] Left 6/27/2018    LASER LITHOTRIPSY STONE MANIPULATION WITH DOUBLE J STENT PLACEMENT performed by Suzette Yeung MD at [facility] Left 6/27/2018    CYSTOSCOPY URETEROSCOPY RETROGRADE PYELOGRAMS performed by Suzette Yeung MD at [address] [place] [address]       Discharge Medication List as of 8/22/2019  1:24 PM      CONTINUE these medications which have NOT CHANGED    Details   traMADol (ULTRAM) 50 MG tablet Take 1 tablet by mouth 2 times daily as needed for Pain for up to 30 days. , Disp-60 tablet, R-0Normal      pregabalin (LYRICA) 75 MG capsule TAKE 1 CAPSULE BY MOUTH ONCE DAILY IN THE EVENING FOR 30 DAYS, Disp-30 capsule, R-0Normal      JANUVIA 50 MG tablet TAKE 1 TABLET BY MOUTH ONCE DAILY, Disp-30 tablet, R-2Please consider 90 day supplies to promote better adherenceNormal      ibandronate (BONIVA) 150 MG tablet Take 1 tablet by mouth every 30 days On empty stomach and nothing by mouth or lie down for 1 hour, Disp-30 tablet, R-3Normal      EPINEPHrine (EPIPEN) 0.3 MG/0.3ML SOAJ injection Inject 0.3 mLs into the muscle as needed (Allergic Reaction), Disp-2 each, R-1Normal      insulin glargine (LANTUS SOLOSTAR) 100 UNIT/ML injection pen Inject 12 Units into the skin nightly, Disp-5 pen, R-3Normal      Ascorbic Acid (VITAMIN C) 1000 MG tablet Take 1,000 mg by mouth dailyHistorical Med      Xylitol (XYLIMELTS) 550 MG DISK Take 550 mg by mouth dailyHistorical Med      cloNIDine (CATAPRES) 0.1 MG tablet Take one tablet by mouth in the morning and two tablets at bedtime. , Disp-270 tablet, R-3Please consider 90 day supplies to promote better adherenceNormal      nitroGLYCERIN (NITROSTAT) 0.4 MG SL tablet Place 1 tablet under the tongue every 5 minutes as needed for Chest pain, Disp-25 tablet, R-3Normal      donepezil (ARICEPT) 5 MG tablet TAKE 1 TABLET BY MOUTH NIGHTLY, Disp-90 tablet, R-3Please consider 90 day supplies to promote better adherenceNormal      azaTHIOprine (IMURAN) 50 MG tablet Take 50 mg by mouth daily Historical Med      fluticasone-salmeterol (ADVAIR) 500-50 MCG/DOSE diskus inhaler Inhale 1 puff into the lungs 2 times daily, Disp-60 each, R-5Normal      losartan (COZAAR) 100 MG tablet Take 1 tablet by mouth daily, Disp-90 tablet, R-3Normal      rivaroxaban (XARELTO) 20 MG TABS tablet Take 1 tablet by mouth Daily with supper TAKE ONE TABLET DAILY WITH  EVENING  MEAL, Disp-30 tablet, R-5hold medication for the next 24-48 hours to make sure urine is clearing.  If urine clearing may resume in 24-48 hoursNormal      albuterol sulfate HFA (VENTOLIN HFA) 108 (90 Base) MCG/ACT inhaler Inhale 2 puffs into the lungs every 6 hours as needed for Wheezing, Disp-1 Inhaler, R-5Normal      IRON PO Take 2 tablets by mouth daily Historical Med 08/16/19 1443 08/16/19 1443 08/16/19 1443 08/16/19 1443 08/16/19 1443 08/16/19 1939 08/16/19 1443   134/70 102.7 °F (39.3 °C) Oral 108 20 92 % 5' 7\" (1.702 m) 180 lb (81.6 kg)       Physical Exam   Constitutional: He is oriented to person, place, and time. He appears well-developed and well-nourished. No distress. HENT:   Head: Normocephalic and atraumatic. Right Ear: External ear normal.   Left Ear: External ear normal.   Nose: Nose normal.   Mouth/Throat: Oropharynx is clear and moist.   Eyes: Pupils are equal, round, and reactive to light. Conjunctivae and EOM are normal.   Neck: Normal range of motion. Neck supple. No tracheal deviation present. Cardiovascular: Normal rate, regular rhythm, normal heart sounds and intact distal pulses. No murmur heard. Pulmonary/Chest: Effort normal and breath sounds normal. No stridor. He has no wheezes. He exhibits no tenderness. Abdominal: Soft. Bowel sounds are normal. He exhibits no distension. There is no tenderness. Musculoskeletal: Normal range of motion. He exhibits tenderness. Positive left CTA with fist percussion   Neurological: He is alert and oriented to person, place, and time. He displays normal reflexes. No cranial nerve deficit or sensory deficit. He exhibits normal muscle tone. Coordination normal.   Skin: Skin is warm and dry. Capillary refill takes less than 2 seconds. He is not diaphoretic. Psychiatric: He has a normal mood and affect. His behavior is normal. Judgment and thought content normal.   Nursing note and vitals reviewed. DIAGNOSTIC RESULTS     RADIOLOGY:   Non-plain film images such as CT, Ultrasound and MRI are read by the radiologist. Plain radiographic images are visualized and preliminarilyinterpreted by No att. providers found with the below findings:      Interpretation per the Radiologist below, if available at the time of this note:    XR CHEST PORTABLE   Final Result   1.  Bilateral perihilar interstitial infiltrates. Most likely this   pulmonary vascular congestion however this is slightly improved from   August 20. Signed by Dr Josselin Sylvester on 8/21/2019 2:35 PM      XR CHEST PORTABLE   Final Result   Pulmonary hypoventilation with central vascular congestion   and mild pulmonary edema compatible with partial overload. Pneumonia   is felt less likely. COPD. Signed by Dr Ave Coello. Carloshofernie on 8/20/2019 8:10 AM      FL RETROGRADE PYELOGRAM LEFT   Final Result   Impression:   Intraoperative fluoroscopic images obtained during left-sided a laser   lithotripsy. A double-J ureteral stent was placed. Please refer to the operative note for more details. Signed by Dr Gabrielle Norwood on 8/16/2019 10:21 PM      CT Kidney WO Contrast   Final Result   Impression:   1. Distal left ureteral stone measuring 3 mm, resulting in mild   obstructive uropathy. 2. Lymphadenopathy and lung changes compatible with known sarcoidosis. 3. Mild circumferential urinary bladder wall thickening, which may be   related to chronic bladder outlet obstruction from prostatomegaly.    Signed by Dr Rizvi Lipoma on 8/16/2019 3:45 PM      XR ABDOMEN (KUB) (SINGLE AP VIEW)    (Results Pending)       LABS:  Labs Reviewed   URINE CULTURE - Abnormal; Notable for the following components:       Result Value    Urine Culture, Routine >100,000 CFU/ml (*)     Organism Klebsiella pneumoniae ssp pneumoniae (*)     All other components within normal limits    Narrative:     ORDER#: [id number]                          ORDERED BY: AROLDO AMAYA  SOURCE: Urine Clean Catch                  COLLECTED:  08/16/19 15:09  ANTIBIOTICS AT [facility].:                      RECEIVED :  08/16/19 15:13   CBC WITH AUTO DIFFERENTIAL - Abnormal; Notable for the following components:    WBC 11.1 (*)     RBC 3.86 (*)     Hemoglobin 11.8 (*)     Hematocrit 34.7 (*)     Platelets 890 (*)     Neutrophils % 81.2 (*)     Lymphocytes % 6.7 (*)     Monocytes % 11.3 (*)     Neutrophils note that portions of this note were completed with a voice recognition program.  Efforts were made to edit the dictations but occasionallywords are mis-transcribed.)    Rula Bray [address], [place]  08/26/19 8793

## 2019-08-27 DIAGNOSIS — N18.9 CHRONIC KIDNEY DISEASE, UNSPECIFIED CKD STAGE: ICD-10-CM

## 2019-08-27 DIAGNOSIS — Z00.00 HEALTHCARE MAINTENANCE: ICD-10-CM

## 2019-08-27 DIAGNOSIS — E11.42 TYPE 2 DIABETES MELLITUS WITH DIABETIC POLYNEUROPATHY, WITHOUT LONG-TERM CURRENT USE OF INSULIN (HCC): ICD-10-CM

## 2019-08-27 DIAGNOSIS — E11.42 TYPE 2 DIABETES MELLITUS WITH PERIPHERAL NEUROPATHY (HCC): ICD-10-CM

## 2019-08-27 LAB
ALBUMIN SERPL-MCNC: 3.9 G/DL (ref 3.5–5.2)
ALP BLD-CCNC: 79 U/L (ref 40–130)
ALT SERPL-CCNC: 10 U/L (ref 5–41)
ANION GAP SERPL CALCULATED.3IONS-SCNC: 13 MMOL/L (ref 7–19)
AST SERPL-CCNC: 12 U/L (ref 5–40)
BASOPHILS ABSOLUTE: 0 K/UL (ref 0–0.2)
BASOPHILS RELATIVE PERCENT: 0.4 % (ref 0–1)
BILIRUB SERPL-MCNC: 0.5 MG/DL (ref 0.2–1.2)
BUN BLDV-MCNC: 17 MG/DL (ref 8–23)
CALCIUM SERPL-MCNC: 10.5 MG/DL (ref 8.8–10.2)
CHLORIDE BLD-SCNC: 99 MMOL/L (ref 98–111)
CHOLESTEROL, TOTAL: 137 MG/DL (ref 160–199)
CO2: 28 MMOL/L (ref 22–29)
CREAT SERPL-MCNC: 1.2 MG/DL (ref 0.5–1.2)
EOSINOPHILS ABSOLUTE: 0.1 K/UL (ref 0–0.6)
EOSINOPHILS RELATIVE PERCENT: 1.4 % (ref 0–5)
GFR NON-AFRICAN AMERICAN: 60
GLUCOSE BLD-MCNC: 258 MG/DL (ref 74–109)
HBA1C MFR BLD: 7.3 % (ref 4–6)
HCT VFR BLD CALC: 32.1 % (ref 42–52)
HDLC SERPL-MCNC: 30 MG/DL (ref 55–121)
HEMOGLOBIN: 10.5 G/DL (ref 14–18)
IMMATURE GRANULOCYTES #: 0.1 K/UL
LDL CHOLESTEROL CALCULATED: 70 MG/DL
LYMPHOCYTES ABSOLUTE: 0.9 K/UL (ref 1.1–4.5)
LYMPHOCYTES RELATIVE PERCENT: 11.8 % (ref 20–40)
MCH RBC QN AUTO: 29.6 PG (ref 27–31)
MCHC RBC AUTO-ENTMCNC: 32.7 G/DL (ref 33–37)
MCV RBC AUTO: 90.4 FL (ref 80–94)
MONOCYTES ABSOLUTE: 0.5 K/UL (ref 0–0.9)
MONOCYTES RELATIVE PERCENT: 6.7 % (ref 0–10)
NEUTROPHILS ABSOLUTE: 5.6 K/UL (ref 1.5–7.5)
NEUTROPHILS RELATIVE PERCENT: 77.9 % (ref 50–65)
PDW BLD-RTO: 12.9 % (ref 11.5–14.5)
PLATELET # BLD: 180 K/UL (ref 130–400)
PMV BLD AUTO: 10.7 FL (ref 9.4–12.4)
POTASSIUM SERPL-SCNC: 3.4 MMOL/L (ref 3.5–5)
PROSTATE SPECIFIC ANTIGEN: 14.2 NG/ML (ref 0–4)
RBC # BLD: 3.55 M/UL (ref 4.7–6.1)
SODIUM BLD-SCNC: 140 MMOL/L (ref 136–145)
TOTAL PROTEIN: 7.2 G/DL (ref 6.6–8.7)
TRIGL SERPL-MCNC: 184 MG/DL (ref 0–149)
TSH SERPL DL<=0.05 MIU/L-ACNC: 3.93 UIU/ML (ref 0.27–4.2)
VITAMIN D 25-HYDROXY: 14.1 NG/ML
WBC # BLD: 7.2 K/UL (ref 4.8–10.8)

## 2019-08-28 ENCOUNTER — OFFICE VISIT (OUTPATIENT)
Dept: INTERNAL MEDICINE | Age: 69
End: 2019-08-28
Payer: MEDICARE

## 2019-08-28 VITALS
BODY MASS INDEX: 27.32 KG/M2 | WEIGHT: 185 LBS | HEART RATE: 80 BPM | RESPIRATION RATE: 98 BRPM | DIASTOLIC BLOOD PRESSURE: 62 MMHG | OXYGEN SATURATION: 98 % | SYSTOLIC BLOOD PRESSURE: 148 MMHG

## 2019-08-28 DIAGNOSIS — E11.42 TYPE 2 DIABETES MELLITUS WITH DIABETIC POLYNEUROPATHY, WITHOUT LONG-TERM CURRENT USE OF INSULIN (HCC): ICD-10-CM

## 2019-08-28 DIAGNOSIS — D86.0 SARCOIDOSIS, LUNG (HCC): ICD-10-CM

## 2019-08-28 DIAGNOSIS — J18.9 PNEUMONIA OF BOTH UPPER LOBES DUE TO INFECTIOUS ORGANISM: ICD-10-CM

## 2019-08-28 DIAGNOSIS — E11.42 TYPE 2 DIABETES MELLITUS WITH PERIPHERAL NEUROPATHY (HCC): Primary | ICD-10-CM

## 2019-08-28 DIAGNOSIS — E83.52 HYPERCALCEMIA: ICD-10-CM

## 2019-08-28 DIAGNOSIS — N20.0 NEPHROLITHIASIS: ICD-10-CM

## 2019-08-28 DIAGNOSIS — E55.9 VITAMIN D DEFICIENCY: ICD-10-CM

## 2019-08-28 DIAGNOSIS — Z00.00 HEALTHCARE MAINTENANCE: ICD-10-CM

## 2019-08-28 DIAGNOSIS — N12 PYELONEPHRITIS: ICD-10-CM

## 2019-08-28 PROBLEM — N30.01 ACUTE CYSTITIS WITH HEMATURIA: Status: RESOLVED | Noted: 2019-08-16 | Resolved: 2019-08-28

## 2019-08-28 LAB
CREATININE URINE: 113.6 MG/DL (ref 4.2–622)
HEPATITIS C ANTIBODY INTERPRETATION: NORMAL
MICROALBUMIN UR-MCNC: 78.5 MG/DL (ref 0–19)
MICROALBUMIN/CREAT UR-RTO: 691 MG/G

## 2019-08-28 PROCEDURE — 1111F DSCHRG MED/CURRENT MED MERGE: CPT | Performed by: NURSE PRACTITIONER

## 2019-08-28 PROCEDURE — G8417 CALC BMI ABV UP PARAM F/U: HCPCS | Performed by: NURSE PRACTITIONER

## 2019-08-28 PROCEDURE — G8427 DOCREV CUR MEDS BY ELIG CLIN: HCPCS | Performed by: NURSE PRACTITIONER

## 2019-08-28 PROCEDURE — 99214 OFFICE O/P EST MOD 30 MIN: CPT | Performed by: NURSE PRACTITIONER

## 2019-08-28 PROCEDURE — 4040F PNEUMOC VAC/ADMIN/RCVD: CPT | Performed by: NURSE PRACTITIONER

## 2019-08-28 PROCEDURE — 3045F PR MOST RECENT HEMOGLOBIN A1C LEVEL 7.0-9.0%: CPT | Performed by: NURSE PRACTITIONER

## 2019-08-28 PROCEDURE — 3017F COLORECTAL CA SCREEN DOC REV: CPT | Performed by: NURSE PRACTITIONER

## 2019-08-28 PROCEDURE — 1123F ACP DISCUSS/DSCN MKR DOCD: CPT | Performed by: NURSE PRACTITIONER

## 2019-08-28 PROCEDURE — 1036F TOBACCO NON-USER: CPT | Performed by: NURSE PRACTITIONER

## 2019-08-28 PROCEDURE — 2022F DILAT RTA XM EVC RTNOPTHY: CPT | Performed by: NURSE PRACTITIONER

## 2019-08-28 ASSESSMENT — ENCOUNTER SYMPTOMS
STRIDOR: 0
EYE DISCHARGE: 0
TROUBLE SWALLOWING: 0
CONSTIPATION: 0
ABDOMINAL PAIN: 0
SORE THROAT: 0
CHOKING: 0
NAUSEA: 0
DIARRHEA: 0
WHEEZING: 0
EYE ITCHING: 0
BLOOD IN STOOL: 0
ABDOMINAL DISTENTION: 0
COUGH: 1
SHORTNESS OF BREATH: 0
COLOR CHANGE: 0
VOMITING: 0

## 2019-08-28 NOTE — PATIENT INSTRUCTIONS
1 type 2 diabetes mellitus you can take an extra Januvia 50 if your blood sugar was over 300. I think over the next couple weeks this will calm down from all the steroids that you are given in the hospital  2. Nephrolithiasis/pyelonephritis keep appointment with Bre Allred next week  3. Sarcoidosis followed very closely by Dr. Veliz Listen no changes are necessary  4. Hypercalcemia stable we will ask all your labs from today to Dr. Mani Mcghee for his review  5. Vitamin D deficiency he cannot take that due to his previous kidney function.   6.  Osteopenia again he cannot take calcium or vitamin D right now we will just have to monitor this the best we can

## 2019-08-29 ENCOUNTER — OFFICE VISIT (OUTPATIENT)
Dept: GASTROENTEROLOGY | Facility: CLINIC | Age: 69
End: 2019-08-29

## 2019-08-29 VITALS
OXYGEN SATURATION: 98 % | SYSTOLIC BLOOD PRESSURE: 128 MMHG | WEIGHT: 182 LBS | BODY MASS INDEX: 26.96 KG/M2 | DIASTOLIC BLOOD PRESSURE: 68 MMHG | HEART RATE: 100 BPM | HEIGHT: 69 IN

## 2019-08-29 DIAGNOSIS — R13.19 ESOPHAGEAL DYSPHAGIA: Primary | ICD-10-CM

## 2019-08-29 PROCEDURE — 99213 OFFICE O/P EST LOW 20 MIN: CPT | Performed by: INTERNAL MEDICINE

## 2019-08-29 NOTE — PROGRESS NOTES
[facility] - Office note  8/29/2019    Caio Riley 1950     Chief Complaint   Patient presents with   • GI Problem     Here to discuss problems swallowing       HISTORY OF PRESENT ILLNESS    Caio Riley is a 68 y.o. male who presents for follow-up.  Dr. Duke did his endoscopy in my absence.  She noted the lower esophageal sphincter to be very tight and was concern for possible achalasia.  Barium esophagram was performed which revealed significant esophageal dysmotility with esophageal spasm and delayed of clearance of the liquid barium.  It was a persistent narrowing at the distal esophageal segment without discrete ring being seen.  Not consistent with achalasia although Dr. Duke's findings were concerning for possible achalasia    Past Medical History:   Diagnosis Date   • A-fib (CMS/HCC)    • Arthritis    • Asthma    • COPD (chronic obstructive pulmonary disease) (CMS/[facility])    • Diabetes mellitus (CMS/HCC)    • Hypertension    • Kidney stones    • Neuropathy    • Pancreatitis    • Sarcoidosis        Past Surgical History:   Procedure Laterality Date   • ABLATION OF DYSRHYTHMIC FOCUS     • COLONOSCOPY N/A 8/1/2019    4 Tubular adenomas transverse colon and cecum, Diverticulosis repeat exam in 3 years   • COLONOSCOPY W/ POLYPECTOMY  12/02/2014    Tubular adenoma ascending colon    • ENDOSCOPY  01/08/2002    Mild chronic gastritis   • ENDOSCOPY N/A 8/1/2019    Procedure: ESOPHAGOGASTRODUODENOSCOPY WITH ANESTHESIA;  Surgeon: Iris Duke MD;  Location: [facility] ENDOSCOPY;  Service: Gastroenterology   • ENDOSCOPY  08/01/2019    Dilation in the entire esophagus   • FRACTURE SURGERY Right     ARM   • FRACTURE SURGERY Left     LEG   • INGUINAL NODE BIOPSY Right 8/7/2018    Procedure: RIGHT GROIN EXCISIONAL BIOPSY;  Surgeon: Ced Aguirre MD;  Location: [facility] OR;  Service: General   • LAPAROSCOPIC CHOLECYSTECTOMY     • LASIK Bilateral    • SQUAMOUS CELL CARCINOMA EXCISION      back  and left arm         Current Outpatient Medications:   •  albuterol (PROVENTIL HFA;VENTOLIN HFA) 108 (90 Base) MCG/ACT inhaler, Inhale., Disp: , Rfl:   •  ascorbic acid (VITAMIN C) 1000 MG tablet, Take 1,000 mg by mouth Daily., Disp: , Rfl:   •  aspirin 81 MG chewable tablet, Chew 81 mg Daily., Disp: , Rfl:   •  azaTHIOprine (IMURAN) 50 MG tablet, , Disp: , Rfl: 5  •  donepezil (ARICEPT) 5 MG tablet, , Disp: , Rfl: 0  •  EPINEPHrine (EPIPEN) 0.3 MG/0.3ML solution auto-injector injection, Inject 0.3 mg into the appropriate muscle as directed by prescriber., Disp: , Rfl:   •  fluticasone-salmeterol (ADVAIR) 500-50 MCG/DOSE DISKUS, Inhale 2 (Two) Times a Day., Disp: , Rfl:   •  ibandronate (BONIVA) 150 MG tablet, Take 150 mg by mouth Every 30 (Thirty) Days., Disp: , Rfl:   •  Insulin Pen Needle (BD PEN NEEDLE DAGMAR U/F) 32G X 4 MM misc, 1 each., Disp: , Rfl:   •  ipratropium (ATROVENT) 0.02 % nebulizer solution, Take 2.5 mL by nebulization 4 (Four) Times a Day., Disp: 300 mL, Rfl: 11  •  ipratropium-albuterol (DUO-NEB) 0.5-2.5 mg/3 ml nebulizer, Take 3 mL by nebulization 4 (Four) Times a Day As Needed for Wheezing., Disp: 120 mL, Rfl: 11  •  IRON PO, Take 27 mg by mouth 2 (Two) Times a Day., Disp: , Rfl:   •  JANUVIA 50 MG tablet, , Disp: , Rfl:   •  LANTUS SOLOSTAR 100 UNIT/ML injection pen, 14 Units., Disp: , Rfl:   •  levalbuterol (XOPENEX) 1.25 MG/3ML nebulizer solution, Take 1 ampule by nebulization 4 (Four) Times a Day., Disp: 120 ampule, Rfl: 11  •  losartan (COZAAR) 100 MG tablet, , Disp: , Rfl:   •  LYRICA 50 MG capsule, 75 mg., Disp: , Rfl:   •  predniSONE (DELTASONE) 5 MG tablet, Take 5 mg by mouth Daily., Disp: , Rfl:   •  RELION PEN NEEDLES 32G X 4 MM misc, , Disp: , Rfl:   •  rivaroxaban (XARELTO) 20 MG tablet, Take 20 mg by mouth Daily. STOPPED 8/1/18, Disp: , Rfl:   •  sulfamethoxazole-trimethoprim (BACTRIM,SEPTRA) 400-80 MG tablet, , Disp: , Rfl: 6  •  traMADol (ULTRAM) 50 MG tablet, , Disp: , Rfl:  "0    Allergies   Allergen Reactions   • Bee Venom Anaphylaxis   • Penicillins Swelling and Unknown (See Comments)     Reaction: SWELLING/RASH    Reaction: SWELLING/RASH   • Acetaminophen Unknown (See Comments)     Other reaction(s): NAUSEA   Start Date: ADULT    Other reaction(s): NAUSEA   Start Date: ADULT   • Gabapentin Unknown (See Comments)     Causes blisters to head.   Causes blisters to head.     Causes blisters to head.    • Metoprolol Unknown (See Comments)     Leg pain   Leg pain     Leg pain        Social History     Socioeconomic History   • Marital status:      Spouse name: Not on file   • Number of children: Not on file   • Years of education: Not on file   • Highest education level: Not on file   Tobacco Use   • Smoking status: Former Smoker     Packs/day: 0.50     Years: 2.00     Pack years: 1.00     Types: Cigarettes     Last attempt to quit: 1968     Years since quittin.6   • Smokeless tobacco: Never Used   Substance and Sexual Activity   • Alcohol use: No   • Drug use: No   • Sexual activity: Defer       Family History   Problem Relation Age of Onset   • Colon cancer Neg Hx    • Colon polyps Neg Hx        Review of Systems   HENT: Positive for trouble swallowing.    Respiratory: Negative.    Cardiovascular: Negative.    Gastrointestinal: Negative.    Genitourinary: Positive for flank pain.       Vitals:    19 1256   BP: 128/68   Pulse: 100   SpO2: 98%   Weight: 82.6 kg (182 lb)   Height: 175.3 cm (69\")    Body mass index is 26.88 kg/m².    Physical Exam   Constitutional: He is oriented to person, place, and time. He appears well-developed.   HENT:   Head: Normocephalic.   Eyes: Conjunctivae are normal.   Cardiovascular: Normal rate.   Pulmonary/Chest: Effort normal.   Abdominal: Soft. Bowel sounds are normal. He exhibits no distension.   Neurological: He is alert and oriented to person, place, and time.   Skin: Skin is warm and dry.   Psychiatric: He has a normal mood and affect. "       Lab Results - Last 18 Months   Lab Units 08/27/19  1129  08/02/18  1326   WBC K/uL 7.2   < > 3.42*   HEMOGLOBIN g/dL 10.5*   < > 10.1*   HEMATOCRIT % 32.1*   < > 29.8*   MCV fL 90.4   < > 82.8   PLATELETS K/uL 180   < > 145   GLUCOSE mg/dL 258*   < > 179*   BUN mg/dL 17   < > 12   CREATININE mg/dL 1.2   < > 1.09   SODIUM mmol/L 140   < > 141   POTASSIUM mmol/L 3.4*   < > 4.5   CHLORIDE mmol/L 99   < > 101   TOTAL CO2 mmol/L 28   < >  --    CO2 mmol/L  --   --  28.0   TOTAL PROTEIN g/dL 7.2   < > 7.0   ALBUMIN g/dL 3.9   < > 4.10   ALT (SGPT) U/L 10   < > <15   AST (SGOT) U/L 12   < > 24   ALK PHOS U/L 79   < > 76   BILIRUBIN mg/dL 0.5   < > 0.6   GLOBULIN gm/dL  --   --  2.9    < > = values in this interval not displayed.         ASSESSMENT AND PLAN      1. Esophageal dysphagia  Dr. Duke felt the findings were more concerning for possible achalasia.  Barium esophagram does show significant dysmotility.  The patient no specific symptoms when he drinks cold liquids.  I think the best option is to get a definitive esophageal motility study and if negative for achalasia and then we can begin with balloon dilation of his GE junction/LES.  We will refer him to Dr. Sequeira at [facility].       EMR Dragon/transcription disclaimer: Much of this encounter note is an electronic transcription/translation of spoken language to printed text.  The electronic translation of spoken language may permit erroneous, or at times, nonsensical words or phrases to be inadvertently transcribed.  Although I have reviewed the note for such errors, some may still exist.    Ino Starks MD  8/29/2019  1:24 PM

## 2019-08-30 ENCOUNTER — CARE COORDINATION (OUTPATIENT)
Dept: CASE MANAGEMENT | Age: 69
End: 2019-08-30

## 2019-08-30 NOTE — CARE COORDINATION
[facility] Transitions Follow Up Call    2019    Patient: Charlotte Montgomery  Patient : 1950   MRN: [id number]    Discharge Date: 19 RARS: Readmission Risk Score: 20         Spoke with:N/A    Care Transitions Subsequent and Final Call    Subsequent and Final Calls  Are you currently active with any services?:  Home Health  Care Transitions Interventions  Other Interventions: Follow Up: Attempted to make contact with patient for a routine follow up call without success. Left a message regarding intent of call and call back information. Will try again at a later time.       Future Appointments   Date Time Provider Sanford Carter   2019 10:20 AM [facility] CT RM 1 [facility] CT [facility] Rad   2019  2:00 PM NUNO Cee URO [facility]   2019  2:00 PM NUNO Huffman LPS [facility]   2019  9:45 AM Elvis Cartwright MD LPS Cardio [facility]       Jose Reyes, SAMANTHA

## 2019-09-03 ENCOUNTER — CARE COORDINATION (OUTPATIENT)
Dept: CASE MANAGEMENT | Age: 69
End: 2019-09-03

## 2019-09-04 ENCOUNTER — TELEPHONE (OUTPATIENT)
Dept: GASTROENTEROLOGY | Facility: CLINIC | Age: 69
End: 2019-09-04

## 2019-09-04 ENCOUNTER — HOSPITAL ENCOUNTER (OUTPATIENT)
Dept: CT IMAGING | Age: 69
Discharge: HOME | End: 2019-09-04
Payer: MEDICARE

## 2019-09-04 DIAGNOSIS — M25.551 RIGHT HIP PAIN: ICD-10-CM

## 2019-09-04 PROCEDURE — 73700 CT LOWER EXTREMITY W/O DYE: CPT

## 2019-09-04 NOTE — TELEPHONE ENCOUNTER
----- Message from Ino Starks MD sent at 8/29/2019  1:27 PM CDT -----  Make him an appointment with Dr. Sequeira for esophageal motility testing.  In the copy of Dr. Duke's note, my note, and the barium esophagram.  Diagnosis possible achalasia

## 2019-09-04 NOTE — TELEPHONE ENCOUNTER
Referral sent to Dr. Sequeira office for review, they will contact the patient with an appointment.

## 2019-09-05 ENCOUNTER — OFFICE VISIT (OUTPATIENT)
Dept: UROLOGY | Age: 69
End: 2019-09-05
Payer: MEDICARE

## 2019-09-05 ENCOUNTER — HOSPITAL ENCOUNTER (OUTPATIENT)
Dept: GENERAL RADIOLOGY | Age: 69
Discharge: HOME | End: 2019-09-05
Payer: MEDICARE

## 2019-09-05 VITALS
WEIGHT: 182 LBS | SYSTOLIC BLOOD PRESSURE: 155 MMHG | HEIGHT: 69 IN | BODY MASS INDEX: 26.96 KG/M2 | DIASTOLIC BLOOD PRESSURE: 76 MMHG | TEMPERATURE: 97.9 F

## 2019-09-05 DIAGNOSIS — N20.0 NEPHROLITHIASIS: ICD-10-CM

## 2019-09-05 DIAGNOSIS — R97.20 ELEVATED PSA: ICD-10-CM

## 2019-09-05 DIAGNOSIS — N20.1 URETEROLITHIASIS: ICD-10-CM

## 2019-09-05 DIAGNOSIS — Z96.0 S/P URETERAL STENT PLACEMENT: Primary | ICD-10-CM

## 2019-09-05 LAB
BACTERIA URINE, POC: ABNORMAL
BILIRUBIN URINE: 2 MG/DL
BLOOD, URINE: POSITIVE
CASTS URINE, POC: ABNORMAL
CLARITY: ABNORMAL
COLOR: ABNORMAL
CRYSTALS URINE, POC: ABNORMAL
EPI CELLS URINE, POC: ABNORMAL
GLUCOSE URINE: ABNORMAL
KETONES, URINE: NEGATIVE
LEUKOCYTE EST, POC: ABNORMAL
NITRITE, URINE: NEGATIVE
PH UA: 5.5 (ref 4.5–8)
PROTEIN UA: POSITIVE
RBC URINE, POC: ABNORMAL
SPECIFIC GRAVITY UA: 1.01 (ref 1–1.03)
UROBILINOGEN, URINE: NORMAL
WBC URINE, POC: ABNORMAL
YEAST URINE, POC: ABNORMAL

## 2019-09-05 PROCEDURE — 3017F COLORECTAL CA SCREEN DOC REV: CPT | Performed by: NURSE PRACTITIONER

## 2019-09-05 PROCEDURE — G8417 CALC BMI ABV UP PARAM F/U: HCPCS | Performed by: NURSE PRACTITIONER

## 2019-09-05 PROCEDURE — G8427 DOCREV CUR MEDS BY ELIG CLIN: HCPCS | Performed by: NURSE PRACTITIONER

## 2019-09-05 PROCEDURE — 74018 RADEX ABDOMEN 1 VIEW: CPT

## 2019-09-05 PROCEDURE — 1036F TOBACCO NON-USER: CPT | Performed by: NURSE PRACTITIONER

## 2019-09-05 PROCEDURE — 1123F ACP DISCUSS/DSCN MKR DOCD: CPT | Performed by: NURSE PRACTITIONER

## 2019-09-05 PROCEDURE — 1111F DSCHRG MED/CURRENT MED MERGE: CPT | Performed by: NURSE PRACTITIONER

## 2019-09-05 PROCEDURE — 81001 URINALYSIS AUTO W/SCOPE: CPT | Performed by: NURSE PRACTITIONER

## 2019-09-05 PROCEDURE — 99214 OFFICE O/P EST MOD 30 MIN: CPT | Performed by: NURSE PRACTITIONER

## 2019-09-05 PROCEDURE — 4040F PNEUMOC VAC/ADMIN/RCVD: CPT | Performed by: NURSE PRACTITIONER

## 2019-09-05 NOTE — PROGRESS NOTES
with neuropathy [facility])       Past Surgical History:   Procedure Laterality Date    ANKLE FRACTURE SURGERY  april 14, 2015    ATRIAL ABLATION SURGERY  2015    Dr. Juliana Vasquez      Catheter Ablation A-fib    CHOLECYSTECTOMY      COLONOSCOPY  12/02/2014    Melecio    CYSTOSCOPY Left 8/16/2019    CYSTOSCOPY; LEFT RETROGRADE PYELOGRAM; INSERTION LEFT URETERAL STENT performed by Anabelle Kirkland MD at [address] Left 6/27/2018    LASER LITHOTRIPSY STONE MANIPULATION WITH DOUBLE J STENT PLACEMENT performed by Anabelle Kirkland MD at [facility] Left 6/27/2018    CYSTOSCOPY URETEROSCOPY RETROGRADE PYELOGRAMS performed by Anabelle Kirkland MD at [facility] OR       Family History   Problem Relation Age of Onset    Coronary Art Dis Father         AICD pacer age 68    Heart Attack Father     Cancer Sister         childhood chest tumor    Heart Failure Mother        Social History     Tobacco Use    Smoking status: Former Smoker     Packs/day: 0.00     Years: 0.00     Pack years: 0.00     Types: Cigarettes    Smokeless tobacco: Never Used   Substance Use Topics    Alcohol use: No      Current Outpatient Medications   Medication Sig Dispense Refill    levalbuterol (XOPENEX) 1.25 MG/3ML nebulizer solution Take 1 ampule by nebulization every 6 hours as needed for Wheezing      furosemide (LASIX) 20 MG tablet Take 1 tablet by mouth daily (Patient taking differently: Take 10 mg by mouth daily as needed (if greater than 3 pound weight gain daily) ) 30 tablet 3    traMADol (ULTRAM) 50 MG tablet Take 1 tablet by mouth 2 times daily as needed for Pain for up to 30 days.  60 tablet 0    JANUVIA 50 MG tablet TAKE 1 TABLET BY MOUTH ONCE DAILY 30 tablet 2    ibandronate (BONIVA) 150 MG tablet Take 1 tablet by mouth every 30 days On empty stomach and nothing by mouth or lie down for 1 hour 30 tablet 3    EPINEPHrine (EPIPEN) 0.3 MG/0.3ML SOAJ

## 2019-09-06 ENCOUNTER — HOSPITAL ENCOUNTER (OUTPATIENT)
Dept: CT IMAGING | Age: 69
Discharge: HOME | End: 2019-09-06
Payer: MEDICARE

## 2019-09-06 DIAGNOSIS — N20.0 NEPHROLITHIASIS: ICD-10-CM

## 2019-09-06 PROCEDURE — 74176 CT ABD & PELVIS W/O CONTRAST: CPT

## 2019-09-06 ASSESSMENT — ENCOUNTER SYMPTOMS: SHORTNESS OF BREATH: 0

## 2019-09-09 ENCOUNTER — OFFICE VISIT (OUTPATIENT)
Dept: INTERNAL MEDICINE | Age: 69
End: 2019-09-09
Payer: MEDICARE

## 2019-09-09 VITALS
DIASTOLIC BLOOD PRESSURE: 62 MMHG | SYSTOLIC BLOOD PRESSURE: 128 MMHG | BODY MASS INDEX: 26.66 KG/M2 | OXYGEN SATURATION: 96 % | WEIGHT: 180 LBS | HEIGHT: 69 IN | RESPIRATION RATE: 18 BRPM | HEART RATE: 86 BPM

## 2019-09-09 DIAGNOSIS — N20.0 NEPHROLITHIASIS: ICD-10-CM

## 2019-09-09 DIAGNOSIS — I48.0 PAF (PAROXYSMAL ATRIAL FIBRILLATION) (HCC): ICD-10-CM

## 2019-09-09 DIAGNOSIS — D86.0 SARCOIDOSIS, LUNG (HCC): ICD-10-CM

## 2019-09-09 DIAGNOSIS — M25.551 RIGHT HIP PAIN: ICD-10-CM

## 2019-09-09 DIAGNOSIS — Z00.00 HEALTHCARE MAINTENANCE: ICD-10-CM

## 2019-09-09 DIAGNOSIS — R93.89 ABNORMAL X-RAY: Primary | ICD-10-CM

## 2019-09-09 DIAGNOSIS — E11.42 TYPE 2 DIABETES MELLITUS WITH PERIPHERAL NEUROPATHY (HCC): ICD-10-CM

## 2019-09-09 PROCEDURE — 1123F ACP DISCUSS/DSCN MKR DOCD: CPT | Performed by: NURSE PRACTITIONER

## 2019-09-09 PROCEDURE — 3045F PR MOST RECENT HEMOGLOBIN A1C LEVEL 7.0-9.0%: CPT | Performed by: NURSE PRACTITIONER

## 2019-09-09 PROCEDURE — G8427 DOCREV CUR MEDS BY ELIG CLIN: HCPCS | Performed by: NURSE PRACTITIONER

## 2019-09-09 PROCEDURE — 2022F DILAT RTA XM EVC RTNOPTHY: CPT | Performed by: NURSE PRACTITIONER

## 2019-09-09 PROCEDURE — 1111F DSCHRG MED/CURRENT MED MERGE: CPT | Performed by: NURSE PRACTITIONER

## 2019-09-09 PROCEDURE — 1036F TOBACCO NON-USER: CPT | Performed by: NURSE PRACTITIONER

## 2019-09-09 PROCEDURE — 3017F COLORECTAL CA SCREEN DOC REV: CPT | Performed by: NURSE PRACTITIONER

## 2019-09-09 PROCEDURE — G8417 CALC BMI ABV UP PARAM F/U: HCPCS | Performed by: NURSE PRACTITIONER

## 2019-09-09 PROCEDURE — 4040F PNEUMOC VAC/ADMIN/RCVD: CPT | Performed by: NURSE PRACTITIONER

## 2019-09-09 PROCEDURE — 99214 OFFICE O/P EST MOD 30 MIN: CPT | Performed by: NURSE PRACTITIONER

## 2019-09-09 ASSESSMENT — ENCOUNTER SYMPTOMS
SHORTNESS OF BREATH: 0
DIARRHEA: 0
COLOR CHANGE: 0
WHEEZING: 0
VOMITING: 0
EYE DISCHARGE: 0
COUGH: 0
ABDOMINAL PAIN: 0
TROUBLE SWALLOWING: 0
BLOOD IN STOOL: 0
STRIDOR: 0
CONSTIPATION: 0
CHOKING: 0
EYE ITCHING: 0
NAUSEA: 0
SORE THROAT: 0
ABDOMINAL DISTENTION: 0

## 2019-09-09 NOTE — PROGRESS NOTES
Lipid Panel    Vitamin D 25 Hydroxy    Urinalysis Reflex to Culture    TSH without Reflex       Plan:        Patient given educational materials - see patient instructions. Discussed use, benefit, and side effects of prescribed medications. Allpatient questions answered. Pt voiced understanding. Reviewed health maintenance. Instructed to continue current medications, diet and exercise. Patient agreed with treatment plan. Follow up as directed. MEDICATIONS:  No orders of the defined types were placed in this encounter. ORDERS:  Orders Placed This Encounter   Procedures    MRI HIP RIGHT WO CONTRAST    CBC Auto Differential    Comprehensive Metabolic Panel    Hemoglobin A1C    Lipid Panel    Vitamin D 25 Hydroxy    Urinalysis Reflex to Culture    TSH without Reflex       Follow-up:  Return in about 3 months (around 12/9/2019) for have labs done prior to appt. PATIENT INSTRUCTIONS:  Patient Instructions   1. Type 2 diabetes mellitus try to do some Glucerna with fruit or peanut butter and it to help build her strength back we will worry about getting better control when you are feeling better  2. Nephrolithiasis keep appointment with Dr. Raisa Madrigal for stent removal  3. Recent Eddie he is improving and has finished off his antibiotics is afebrile  4. Paroxysmal atrial fib is stable on current regimen of Xarelto  5. Hip pain with abnormal x-ray we will get an MRI of the right hip  6. Sarcoid of the lung is followed by Dr. Rosy Newman and this is stable at this time  Addendum Alvaro had the above-mentioned MRI of his hip. The MRI showed diffuse marrow replacing lesions throughout the bones this was thought to be sarcoid. But there is a question of sarcoidosis metastatic disease. I then had a full body PET scan done. With the results of that was an abnormal whole-body PET scan with hypermetabolism and diffuse met bone marrow lesions in the chest and upper abdomen in both lungs.   It is thought

## 2019-09-13 ENCOUNTER — PROCEDURE VISIT (OUTPATIENT)
Dept: UROLOGY | Age: 69
End: 2019-09-13
Payer: MEDICARE

## 2019-09-13 VITALS — WEIGHT: 179 LBS | TEMPERATURE: 97.9 F | HEIGHT: 68 IN | BODY MASS INDEX: 27.13 KG/M2

## 2019-09-13 DIAGNOSIS — D64.9 ANEMIA, UNSPECIFIED TYPE: ICD-10-CM

## 2019-09-13 DIAGNOSIS — N20.1 LEFT URETERAL STONE: ICD-10-CM

## 2019-09-13 DIAGNOSIS — R97.20 ELEVATED PSA: Primary | ICD-10-CM

## 2019-09-13 LAB
HCT VFR BLD CALC: 27.1 % (ref 42–52)
HEMOGLOBIN: 8.9 G/DL (ref 14–18)

## 2019-09-13 PROCEDURE — 99213 OFFICE O/P EST LOW 20 MIN: CPT | Performed by: UROLOGY

## 2019-09-13 PROCEDURE — 52310 CYSTOSCOPY AND TREATMENT: CPT | Performed by: UROLOGY

## 2019-09-13 PROCEDURE — 1123F ACP DISCUSS/DSCN MKR DOCD: CPT | Performed by: UROLOGY

## 2019-09-13 PROCEDURE — G8427 DOCREV CUR MEDS BY ELIG CLIN: HCPCS | Performed by: UROLOGY

## 2019-09-13 PROCEDURE — 4040F PNEUMOC VAC/ADMIN/RCVD: CPT | Performed by: UROLOGY

## 2019-09-13 PROCEDURE — 3017F COLORECTAL CA SCREEN DOC REV: CPT | Performed by: UROLOGY

## 2019-09-13 PROCEDURE — 1111F DSCHRG MED/CURRENT MED MERGE: CPT | Performed by: UROLOGY

## 2019-09-13 PROCEDURE — 1036F TOBACCO NON-USER: CPT | Performed by: UROLOGY

## 2019-09-13 PROCEDURE — G8417 CALC BMI ABV UP PARAM F/U: HCPCS | Performed by: UROLOGY

## 2019-09-13 ASSESSMENT — ENCOUNTER SYMPTOMS: SHORTNESS OF BREATH: 0

## 2019-09-13 NOTE — LETTER
[id number] [facility]  [place] [address]  [address] [facility] [address]  Phone: [phone]  Fax: [phone]    Sai Valdes MD        September 13, 2019     Aric Acosta, APRN  [address] [place] [id number]      Patient: Charlotte Montgomery   MR Number: [id number]   YOB: 1950   Date of Visit: 9/13/2019       Dear Provider: Thank you for referring Amy Hallman to me for evaluation. Below are the relevant portions of my assessment and plan of care. If you have questions, please do not hesitate to call me. I look forward to following Alvaro along with you.     Sincerely,        Sai Valdes MD

## 2019-09-13 NOTE — PROGRESS NOTES
bite     Type 2 diabetes mellitus with peripheral neuropathy (HCC)     Type 2 diabetes, controlled, with neuropathy ([address]        Past Surgical History:   Procedure Laterality Date    ANKLE FRACTURE SURGERY  april 14, 2015    ATRIAL ABLATION SURGERY  2015    Dr. Barb Das COLONOSCOPY  12/02/2014    [place]    CYSTOSCOPY Left 8/16/2019    CYSTOSCOPY; LEFT RETROGRADE PYELOGRAM; INSERTION LEFT URETERAL STENT performed by Nancy Chopra MD at [place] [address]. Left 6/27/2018    LASER LITHOTRIPSY STONE MANIPULATION WITH DOUBLE J STENT PLACEMENT performed by Nancy Chopra MD at [address] [place] [address] Left 6/27/2018    CYSTOSCOPY URETEROSCOPY RETROGRADE PYELOGRAMS performed by Nancy Chopra MD at [facility] OR       Current Outpatient Medications   Medication Sig Dispense Refill    levalbuterol (Meriam Ohs) 1.25 MG/3ML nebulizer solution Take 1 ampule by nebulization every 6 hours as needed for Wheezing      furosemide (LASIX) 20 MG tablet Take 1 tablet by mouth daily (Patient taking differently: Take 10 mg by mouth daily as needed (if greater than 3 pound weight gain daily) ) 30 tablet 3    JANUVIA 50 MG tablet TAKE 1 TABLET BY MOUTH ONCE DAILY 30 tablet 2    ibandronate (BONIVA) 150 MG tablet Take 1 tablet by mouth every 30 days On empty stomach and nothing by mouth or lie down for 1 hour 30 tablet 3    EPINEPHrine (EPIPEN) 0.3 MG/0.3ML SOAJ injection Inject 0.3 mLs into the muscle as needed (Allergic Reaction) 2 each 1    insulin glargine (LANTUS SOLOSTAR) 100 UNIT/ML injection pen Inject 12 Units into the skin nightly 5 pen 3    Ascorbic Acid (VITAMIN C) 1000 MG tablet Take 1,000 mg by mouth daily      Xylitol (XYLIMELTS) 550 MG DISK Take 550 mg by mouth daily      cloNIDine (CATAPRES) 0.1 MG tablet Take one tablet by mouth in the morning and two tablets at bedtime.  270 tablet 3

## 2019-09-20 ENCOUNTER — HOSPITAL ENCOUNTER (OUTPATIENT)
Dept: MRI IMAGING | Age: 69
Discharge: HOME | End: 2019-09-20
Payer: MEDICARE

## 2019-09-20 DIAGNOSIS — R93.89 ABNORMAL X-RAY: ICD-10-CM

## 2019-09-20 PROCEDURE — 73721 MRI JNT OF LWR EXTRE W/O DYE: CPT

## 2019-09-23 DIAGNOSIS — R93.7 ABNORMAL MRI, MUSCULOSKELETAL: Primary | ICD-10-CM

## 2019-09-27 ENCOUNTER — HOSPITAL ENCOUNTER (OUTPATIENT)
Dept: NUCLEAR MEDICINE | Age: 69
Discharge: HOME | End: 2019-09-29
Payer: MEDICARE

## 2019-09-27 DIAGNOSIS — R93.7 ABNORMAL MRI, MUSCULOSKELETAL: ICD-10-CM

## 2019-09-27 LAB
GLUCOSE BLD-MCNC: 167 MG/DL (ref 70–99)
PERFORMED ON: ABNORMAL

## 2019-09-27 PROCEDURE — 3430000000 HC RX DIAGNOSTIC RADIOPHARMACEUTICAL: Performed by: NURSE PRACTITIONER

## 2019-09-27 PROCEDURE — A9552 F18 FDG: HCPCS | Performed by: NURSE PRACTITIONER

## 2019-09-27 PROCEDURE — 82948 REAGENT STRIP/BLOOD GLUCOSE: CPT

## 2019-09-27 PROCEDURE — 78813 PET IMAGE FULL BODY: CPT

## 2019-09-27 RX ORDER — FLUDEOXYGLUCOSE F 18 200 MCI/ML
10 INJECTION, SOLUTION INTRAVENOUS
Status: COMPLETED | OUTPATIENT
Start: 2019-09-27 | End: 2019-09-27

## 2019-09-27 RX ADMIN — FLUDEOXYGLUCOSE F 18 10 MILLICURIE: 200 INJECTION, SOLUTION INTRAVENOUS at 15:13

## 2019-10-03 DIAGNOSIS — D86.0 SARCOIDOSIS, LUNG (HCC): Primary | ICD-10-CM

## 2019-10-08 ENCOUNTER — HOSPITAL ENCOUNTER (OUTPATIENT)
Dept: CT IMAGING | Age: 69
Discharge: HOME | End: 2019-10-08
Payer: COMMERCIAL

## 2019-10-08 VITALS
BODY MASS INDEX: 28.25 KG/M2 | DIASTOLIC BLOOD PRESSURE: 66 MMHG | RESPIRATION RATE: 18 BRPM | HEIGHT: 67 IN | WEIGHT: 180 LBS | HEART RATE: 79 BPM | OXYGEN SATURATION: 97 % | TEMPERATURE: 98.5 F | SYSTOLIC BLOOD PRESSURE: 156 MMHG

## 2019-10-08 DIAGNOSIS — D86.0 SARCOIDOSIS, LUNG (HCC): ICD-10-CM

## 2019-10-08 LAB
INR BLD: 1.12 (ref 0.88–1.18)
PLATELET # BLD: 143 K/UL (ref 130–400)
PROTHROMBIN TIME: 13.8 SEC (ref 12–14.6)

## 2019-10-08 PROCEDURE — 88184 FLOWCYTOMETRY/ TC 1 MARKER: CPT

## 2019-10-08 PROCEDURE — 88312 SPECIAL STAINS GROUP 1: CPT

## 2019-10-08 PROCEDURE — 87015 SPECIMEN INFECT AGNT CONCNTJ: CPT

## 2019-10-08 PROCEDURE — 87206 SMEAR FLUORESCENT/ACID STAI: CPT

## 2019-10-08 PROCEDURE — 88311 DECALCIFY TISSUE: CPT

## 2019-10-08 PROCEDURE — 87205 SMEAR GRAM STAIN: CPT

## 2019-10-08 PROCEDURE — 85610 PROTHROMBIN TIME: CPT

## 2019-10-08 PROCEDURE — 88185 FLOWCYTOMETRY/TC ADD-ON: CPT

## 2019-10-08 PROCEDURE — 88291 CYTO/MOLECULAR REPORT: CPT

## 2019-10-08 PROCEDURE — 6360000002 HC RX W HCPCS: Performed by: RADIOLOGY

## 2019-10-08 PROCEDURE — 88305 TISSUE EXAM BY PATHOLOGIST: CPT

## 2019-10-08 PROCEDURE — 88313 SPECIAL STAINS GROUP 2: CPT

## 2019-10-08 PROCEDURE — 87102 FUNGUS ISOLATION CULTURE: CPT

## 2019-10-08 PROCEDURE — 87116 MYCOBACTERIA CULTURE: CPT

## 2019-10-08 PROCEDURE — 88264 CHROMOSOME ANALYSIS 20-25: CPT

## 2019-10-08 PROCEDURE — 2709999900 CT BONE MARROW ASPIRATION

## 2019-10-08 PROCEDURE — 36415 COLL VENOUS BLD VENIPUNCTURE: CPT

## 2019-10-08 PROCEDURE — 88237 TISSUE CULTURE BONE MARROW: CPT

## 2019-10-08 PROCEDURE — 85049 AUTOMATED PLATELET COUNT: CPT

## 2019-10-08 RX ORDER — MIDAZOLAM HYDROCHLORIDE 1 MG/ML
INJECTION INTRAMUSCULAR; INTRAVENOUS
Status: COMPLETED | OUTPATIENT
Start: 2019-10-08 | End: 2019-10-08

## 2019-10-08 RX ORDER — FENTANYL CITRATE 50 UG/ML
INJECTION, SOLUTION INTRAMUSCULAR; INTRAVENOUS
Status: DISPENSED
Start: 2019-10-08 | End: 2019-10-08

## 2019-10-08 RX ORDER — SODIUM CHLORIDE 0.9 % (FLUSH) 0.9 %
10 SYRINGE (ML) INJECTION PRN
Status: DISCONTINUED | OUTPATIENT
Start: 2019-10-08 | End: 2019-10-10 | Stop reason: HOSPADM

## 2019-10-08 RX ORDER — PREDNISONE 1 MG/1
5 TABLET ORAL DAILY
Refills: 6 | COMMUNITY
Start: 2019-09-10

## 2019-10-08 RX ORDER — FENTANYL CITRATE 50 UG/ML
INJECTION, SOLUTION INTRAMUSCULAR; INTRAVENOUS
Status: COMPLETED | OUTPATIENT
Start: 2019-10-08 | End: 2019-10-08

## 2019-10-08 RX ORDER — TRAMADOL HYDROCHLORIDE 50 MG/1
50 TABLET ORAL NIGHTLY
COMMUNITY
End: 2020-12-29

## 2019-10-08 RX ORDER — SULFAMETHOXAZOLE AND TRIMETHOPRIM 400; 80 MG/1; MG/1
1 TABLET ORAL DAILY
Refills: 5 | COMMUNITY
Start: 2019-09-24 | End: 2020-06-23 | Stop reason: ALTCHOICE

## 2019-10-08 RX ORDER — MIDAZOLAM HYDROCHLORIDE 1 MG/ML
INJECTION INTRAMUSCULAR; INTRAVENOUS
Status: DISPENSED
Start: 2019-10-08 | End: 2019-10-08

## 2019-10-08 RX ADMIN — MIDAZOLAM HYDROCHLORIDE 1 MG: 1 INJECTION, SOLUTION INTRAMUSCULAR; INTRAVENOUS at 11:24

## 2019-10-08 RX ADMIN — Medication 50 MCG: at 11:24

## 2019-10-08 RX ADMIN — Medication 50 MCG: at 11:15

## 2019-10-08 RX ADMIN — MIDAZOLAM HYDROCHLORIDE 1 MG: 1 INJECTION, SOLUTION INTRAMUSCULAR; INTRAVENOUS at 11:15

## 2019-10-08 ASSESSMENT — PAIN SCALES - GENERAL
PAINLEVEL_OUTOF10: 0

## 2019-10-11 RX ORDER — SITAGLIPTIN 50 MG/1
TABLET, FILM COATED ORAL
Qty: 30 TABLET | Refills: 2 | Status: SHIPPED | OUTPATIENT
Start: 2019-10-11 | End: 2020-02-26

## 2019-10-15 DIAGNOSIS — M25.551 PAIN OF RIGHT HIP JOINT: ICD-10-CM

## 2019-10-15 RX ORDER — TRAMADOL HYDROCHLORIDE 50 MG/1
TABLET ORAL
Qty: 60 TABLET | Refills: 0 | Status: SHIPPED | OUTPATIENT
Start: 2019-10-15 | End: 2019-11-14

## 2019-10-17 ENCOUNTER — TELEPHONE (OUTPATIENT)
Dept: INTERNAL MEDICINE | Age: 69
End: 2019-10-17

## 2019-10-21 ENCOUNTER — HOSPITAL ENCOUNTER (OUTPATIENT)
Dept: GENERAL RADIOLOGY | Age: 69
Discharge: HOME | End: 2019-10-21
Payer: COMMERCIAL

## 2019-10-21 ENCOUNTER — OFFICE VISIT (OUTPATIENT)
Dept: INTERNAL MEDICINE | Age: 69
End: 2019-10-21
Payer: COMMERCIAL

## 2019-10-21 VITALS
WEIGHT: 188 LBS | HEART RATE: 84 BPM | HEIGHT: 68 IN | TEMPERATURE: 98.9 F | DIASTOLIC BLOOD PRESSURE: 70 MMHG | OXYGEN SATURATION: 97 % | SYSTOLIC BLOOD PRESSURE: 146 MMHG | BODY MASS INDEX: 28.49 KG/M2 | RESPIRATION RATE: 18 BRPM

## 2019-10-21 DIAGNOSIS — D86.9 SARCOIDOSIS: ICD-10-CM

## 2019-10-21 DIAGNOSIS — R05.9 COUGH: ICD-10-CM

## 2019-10-21 DIAGNOSIS — E11.42 TYPE 2 DIABETES MELLITUS WITH PERIPHERAL NEUROPATHY (HCC): ICD-10-CM

## 2019-10-21 DIAGNOSIS — J18.9 PNEUMONIA OF BOTH LUNGS DUE TO INFECTIOUS ORGANISM, UNSPECIFIED PART OF LUNG: ICD-10-CM

## 2019-10-21 DIAGNOSIS — D86.0 SARCOIDOSIS, LUNG (HCC): ICD-10-CM

## 2019-10-21 DIAGNOSIS — R05.9 COUGH: Primary | ICD-10-CM

## 2019-10-21 PROCEDURE — 99213 OFFICE O/P EST LOW 20 MIN: CPT | Performed by: NURSE PRACTITIONER

## 2019-10-21 PROCEDURE — 1036F TOBACCO NON-USER: CPT | Performed by: NURSE PRACTITIONER

## 2019-10-21 PROCEDURE — 4040F PNEUMOC VAC/ADMIN/RCVD: CPT | Performed by: NURSE PRACTITIONER

## 2019-10-21 PROCEDURE — 2022F DILAT RTA XM EVC RTNOPTHY: CPT | Performed by: NURSE PRACTITIONER

## 2019-10-21 PROCEDURE — G8417 CALC BMI ABV UP PARAM F/U: HCPCS | Performed by: NURSE PRACTITIONER

## 2019-10-21 PROCEDURE — 71046 X-RAY EXAM CHEST 2 VIEWS: CPT

## 2019-10-21 PROCEDURE — 1123F ACP DISCUSS/DSCN MKR DOCD: CPT | Performed by: NURSE PRACTITIONER

## 2019-10-21 PROCEDURE — 3017F COLORECTAL CA SCREEN DOC REV: CPT | Performed by: NURSE PRACTITIONER

## 2019-10-21 PROCEDURE — G8427 DOCREV CUR MEDS BY ELIG CLIN: HCPCS | Performed by: NURSE PRACTITIONER

## 2019-10-21 PROCEDURE — G8484 FLU IMMUNIZE NO ADMIN: HCPCS | Performed by: NURSE PRACTITIONER

## 2019-10-21 RX ORDER — CEFDINIR 300 MG/1
300 CAPSULE ORAL 2 TIMES DAILY
Qty: 14 CAPSULE | Refills: 0 | Status: SHIPPED | OUTPATIENT
Start: 2019-10-21 | End: 2019-10-28 | Stop reason: ALTCHOICE

## 2019-10-21 RX ORDER — METHYLPREDNISOLONE 4 MG/1
TABLET ORAL
Qty: 1 KIT | Refills: 0 | Status: SHIPPED | OUTPATIENT
Start: 2019-10-21 | End: 2019-10-27

## 2019-10-21 ASSESSMENT — ENCOUNTER SYMPTOMS
COUGH: 1
BLOOD IN STOOL: 0
CONSTIPATION: 0
NAUSEA: 0
STRIDOR: 0
CHOKING: 0
ABDOMINAL PAIN: 0
DIARRHEA: 0
EYE ITCHING: 0
COLOR CHANGE: 0
SORE THROAT: 0
BACK PAIN: 1
VOMITING: 0
SHORTNESS OF BREATH: 1
WHEEZING: 0
EYE DISCHARGE: 0
TROUBLE SWALLOWING: 0
ABDOMINAL DISTENTION: 0

## 2019-10-23 ENCOUNTER — HOSPITAL ENCOUNTER (OUTPATIENT)
Dept: INFUSION THERAPY | Age: 69
Discharge: HOME | End: 2019-10-23
Payer: COMMERCIAL

## 2019-10-23 ENCOUNTER — OFFICE VISIT (OUTPATIENT)
Dept: HEMATOLOGY | Age: 69
End: 2019-10-23
Payer: COMMERCIAL

## 2019-10-23 VITALS
BODY MASS INDEX: 27.78 KG/M2 | DIASTOLIC BLOOD PRESSURE: 64 MMHG | SYSTOLIC BLOOD PRESSURE: 128 MMHG | HEIGHT: 68 IN | OXYGEN SATURATION: 96 % | WEIGHT: 183.3 LBS | HEART RATE: 85 BPM

## 2019-10-23 DIAGNOSIS — J18.9 PNEUMONIA OF BOTH UPPER LOBES DUE TO INFECTIOUS ORGANISM: ICD-10-CM

## 2019-10-23 DIAGNOSIS — D50.8 OTHER IRON DEFICIENCY ANEMIA: ICD-10-CM

## 2019-10-23 DIAGNOSIS — N18.30 STAGE 3 CHRONIC KIDNEY DISEASE (HCC): ICD-10-CM

## 2019-10-23 DIAGNOSIS — D86.0 SARCOIDOSIS, LUNG (HCC): Primary | ICD-10-CM

## 2019-10-23 DIAGNOSIS — D86.0 SARCOIDOSIS, LUNG (HCC): ICD-10-CM

## 2019-10-23 DIAGNOSIS — F41.9 ANXIETY: Primary | ICD-10-CM

## 2019-10-23 DIAGNOSIS — R06.02 SOB (SHORTNESS OF BREATH): Primary | ICD-10-CM

## 2019-10-23 PROCEDURE — 3017F COLORECTAL CA SCREEN DOC REV: CPT | Performed by: NURSE PRACTITIONER

## 2019-10-23 PROCEDURE — 1036F TOBACCO NON-USER: CPT | Performed by: NURSE PRACTITIONER

## 2019-10-23 PROCEDURE — 99214 OFFICE O/P EST MOD 30 MIN: CPT | Performed by: NURSE PRACTITIONER

## 2019-10-23 PROCEDURE — G8427 DOCREV CUR MEDS BY ELIG CLIN: HCPCS | Performed by: NURSE PRACTITIONER

## 2019-10-23 PROCEDURE — G8417 CALC BMI ABV UP PARAM F/U: HCPCS | Performed by: NURSE PRACTITIONER

## 2019-10-23 PROCEDURE — G8484 FLU IMMUNIZE NO ADMIN: HCPCS | Performed by: NURSE PRACTITIONER

## 2019-10-23 PROCEDURE — 1123F ACP DISCUSS/DSCN MKR DOCD: CPT | Performed by: NURSE PRACTITIONER

## 2019-10-23 PROCEDURE — 85025 COMPLETE CBC W/AUTO DIFF WBC: CPT

## 2019-10-23 PROCEDURE — 4040F PNEUMOC VAC/ADMIN/RCVD: CPT | Performed by: NURSE PRACTITIONER

## 2019-10-23 PROCEDURE — 36415 COLL VENOUS BLD VENIPUNCTURE: CPT

## 2019-10-23 PROCEDURE — 80053 COMPREHEN METABOLIC PANEL: CPT

## 2019-10-23 RX ORDER — LORAZEPAM 0.5 MG/1
0.5 TABLET ORAL 3 TIMES DAILY PRN
Qty: 60 TABLET | Refills: 0 | Status: SHIPPED | OUTPATIENT
Start: 2019-10-23 | End: 2019-11-22

## 2019-10-23 ASSESSMENT — ENCOUNTER SYMPTOMS
EYE REDNESS: 0
DIARRHEA: 0
EYE DISCHARGE: 0
EYE PAIN: 0
SHORTNESS OF BREATH: 1
CONSTIPATION: 0
ABDOMINAL PAIN: 0
COUGH: 1
VOMITING: 0
BACK PAIN: 0
BLOOD IN STOOL: 0
NAUSEA: 0
WHEEZING: 0
EYES NEGATIVE: 1
SORE THROAT: 0
GASTROINTESTINAL NEGATIVE: 1

## 2019-10-25 ENCOUNTER — CLINICAL DOCUMENTATION (OUTPATIENT)
Dept: HEMATOLOGY | Age: 69
End: 2019-10-25

## 2019-10-25 ENCOUNTER — HOSPITAL ENCOUNTER (OUTPATIENT)
Dept: NON INVASIVE DIAGNOSTICS | Age: 69
Discharge: HOME | End: 2019-10-25
Payer: COMMERCIAL

## 2019-10-25 ENCOUNTER — TELEPHONE (OUTPATIENT)
Dept: INFUSION THERAPY | Age: 69
End: 2019-10-25

## 2019-10-25 DIAGNOSIS — R06.02 SOB (SHORTNESS OF BREATH): ICD-10-CM

## 2019-10-25 LAB
LV EF: 64 %
LVEF MODALITY: NORMAL

## 2019-10-25 PROCEDURE — 93306 TTE W/DOPPLER COMPLETE: CPT

## 2019-10-28 ENCOUNTER — TELEPHONE (OUTPATIENT)
Dept: INTERNAL MEDICINE | Age: 69
End: 2019-10-28

## 2019-10-28 ENCOUNTER — OFFICE VISIT (OUTPATIENT)
Dept: INTERNAL MEDICINE | Age: 69
End: 2019-10-28
Payer: MEDICARE

## 2019-10-28 ENCOUNTER — TELEPHONE (OUTPATIENT)
Dept: PULMONOLOGY | Facility: CLINIC | Age: 69
End: 2019-10-28

## 2019-10-28 VITALS
RESPIRATION RATE: 18 BRPM | SYSTOLIC BLOOD PRESSURE: 121 MMHG | BODY MASS INDEX: 27.28 KG/M2 | HEIGHT: 68 IN | DIASTOLIC BLOOD PRESSURE: 71 MMHG | HEART RATE: 80 BPM | OXYGEN SATURATION: 98 % | WEIGHT: 180 LBS

## 2019-10-28 DIAGNOSIS — D86.0 SARCOIDOSIS, LUNG (HCC): ICD-10-CM

## 2019-10-28 DIAGNOSIS — G62.9 PERIPHERAL NERVE DISEASE: ICD-10-CM

## 2019-10-28 DIAGNOSIS — J18.9 PNEUMONIA OF BOTH UPPER LOBES DUE TO INFECTIOUS ORGANISM: Primary | ICD-10-CM

## 2019-10-28 PROCEDURE — 3017F COLORECTAL CA SCREEN DOC REV: CPT | Performed by: NURSE PRACTITIONER

## 2019-10-28 PROCEDURE — 1036F TOBACCO NON-USER: CPT | Performed by: NURSE PRACTITIONER

## 2019-10-28 PROCEDURE — 1123F ACP DISCUSS/DSCN MKR DOCD: CPT | Performed by: NURSE PRACTITIONER

## 2019-10-28 PROCEDURE — 4040F PNEUMOC VAC/ADMIN/RCVD: CPT | Performed by: NURSE PRACTITIONER

## 2019-10-28 PROCEDURE — G8484 FLU IMMUNIZE NO ADMIN: HCPCS | Performed by: NURSE PRACTITIONER

## 2019-10-28 PROCEDURE — 99213 OFFICE O/P EST LOW 20 MIN: CPT | Performed by: NURSE PRACTITIONER

## 2019-10-28 PROCEDURE — G8417 CALC BMI ABV UP PARAM F/U: HCPCS | Performed by: NURSE PRACTITIONER

## 2019-10-28 PROCEDURE — G8427 DOCREV CUR MEDS BY ELIG CLIN: HCPCS | Performed by: NURSE PRACTITIONER

## 2019-10-28 ASSESSMENT — ENCOUNTER SYMPTOMS
WHEEZING: 0
SORE THROAT: 0
ABDOMINAL PAIN: 0
ABDOMINAL DISTENTION: 0
BLOOD IN STOOL: 0
STRIDOR: 0
EYE ITCHING: 0
VOMITING: 0
SHORTNESS OF BREATH: 1
TROUBLE SWALLOWING: 0
CONSTIPATION: 0
COLOR CHANGE: 0
CHOKING: 0
NAUSEA: 0
COUGH: 0
DIARRHEA: 0
EYE DISCHARGE: 0

## 2019-10-29 NOTE — TELEPHONE ENCOUNTER
I had called the patient last week to let him know that I received the results of his recent imaging studies including his CAT scans and PET scan.  His wife returned the call today.  I do suspect the abnormality's are most likely related to his sarcoidosis.  If there was concern about a lymphoproliferative disorder particular lymphoma, then bronchoscopy with endobronchial ultrasound would have very little role in making this diagnosis.  Standard bronchoscopy could be considered particular to assess his right middle lobe as that was the area of highest activity but if there was a question about a lymphoproliferative disorder, then he needs a lymph node biopsy and not a standard bronchoscopy or even bronchoscopy with endobronchial ultrasound and lymph node aspirate.  He is going to be seeing general surgery and apparently Dr. Aguirre regarding a possible biopsy of 1 of his intra-abdominal lesions.  The radiologist recommendation was a follow-up CT in 3 months which would be in late December and I certainly think that would be reasonable and he already has an appointment with me in mid January at which time we can follow-up on this.  Also the has been placed on antibiotics recently for some possible superimposed pneumonia and if he failed to improve and had persistent respiratory symptoms again bronchoscopy could be considered to evaluate his airways but the utility in making a diagnosis regarding a secondary lymphoproliferative disorder independent of his sarcoidosis would be extremely limited.

## 2019-11-11 DIAGNOSIS — E11.42 TYPE 2 DIABETES MELLITUS WITH PERIPHERAL NEUROPATHY (HCC): ICD-10-CM

## 2019-11-11 RX ORDER — PREGABALIN 50 MG/1
CAPSULE ORAL
Qty: 30 CAPSULE | Refills: 0 | Status: SHIPPED | OUTPATIENT
Start: 2019-11-11 | End: 2020-02-03

## 2019-11-12 LAB
FUNGUS (MYCOLOGY) CULTURE: NORMAL
KOH PREP: NORMAL

## 2019-11-15 DIAGNOSIS — E11.42 TYPE 2 DIABETES MELLITUS WITH PERIPHERAL NEUROPATHY (HCC): ICD-10-CM

## 2019-11-18 RX ORDER — PREGABALIN 50 MG/1
CAPSULE ORAL
Qty: 30 CAPSULE | Refills: 0 | Status: SHIPPED | OUTPATIENT
Start: 2019-11-18 | End: 2019-12-09

## 2019-11-20 ENCOUNTER — NURSE ONLY (OUTPATIENT)
Dept: INTERNAL MEDICINE | Age: 69
End: 2019-11-20
Payer: MEDICARE

## 2019-11-20 DIAGNOSIS — Z23 NEED FOR INFLUENZA VACCINATION: Primary | ICD-10-CM

## 2019-11-20 LAB
AFB CULTURE (MYCOBACTERIA): NORMAL
AFB SMEAR: NORMAL

## 2019-11-20 PROCEDURE — 90653 IIV ADJUVANT VACCINE IM: CPT | Performed by: NURSE PRACTITIONER

## 2019-11-20 PROCEDURE — G0008 ADMIN INFLUENZA VIRUS VAC: HCPCS | Performed by: NURSE PRACTITIONER

## 2019-11-25 DIAGNOSIS — J44.9 COPD, MODERATE (HCC): Primary | ICD-10-CM

## 2019-11-25 DIAGNOSIS — R06.2 WHEEZING: ICD-10-CM

## 2019-11-25 RX ORDER — ALBUTEROL SULFATE 90 UG/1
2 AEROSOL, METERED RESPIRATORY (INHALATION) EVERY 4 HOURS PRN
Qty: 1 INHALER | Refills: 11 | Status: SHIPPED | OUTPATIENT
Start: 2019-11-25 | End: 2020-12-23

## 2019-11-25 RX ORDER — ALBUTEROL SULFATE 90 UG/1
AEROSOL, METERED RESPIRATORY (INHALATION)
Qty: 1 INHALER | Refills: 5 | Status: SHIPPED | OUTPATIENT
Start: 2019-11-25

## 2019-12-09 ENCOUNTER — OFFICE VISIT (OUTPATIENT)
Dept: INTERNAL MEDICINE | Age: 69
End: 2019-12-09
Payer: MEDICARE

## 2019-12-09 VITALS
DIASTOLIC BLOOD PRESSURE: 76 MMHG | HEART RATE: 91 BPM | BODY MASS INDEX: 29.51 KG/M2 | RESPIRATION RATE: 20 BRPM | HEIGHT: 67 IN | WEIGHT: 188 LBS | OXYGEN SATURATION: 94 % | SYSTOLIC BLOOD PRESSURE: 132 MMHG

## 2019-12-09 DIAGNOSIS — E55.9 VITAMIN D DEFICIENCY: ICD-10-CM

## 2019-12-09 DIAGNOSIS — R97.20 ELEVATED PSA: ICD-10-CM

## 2019-12-09 DIAGNOSIS — J43.8 OTHER EMPHYSEMA (HCC): ICD-10-CM

## 2019-12-09 DIAGNOSIS — Z00.00 HEALTHCARE MAINTENANCE: ICD-10-CM

## 2019-12-09 DIAGNOSIS — R06.09 DOE (DYSPNEA ON EXERTION): ICD-10-CM

## 2019-12-09 DIAGNOSIS — D86.9 SARCOIDOSIS: ICD-10-CM

## 2019-12-09 DIAGNOSIS — E11.42 TYPE 2 DIABETES MELLITUS WITH PERIPHERAL NEUROPATHY (HCC): ICD-10-CM

## 2019-12-09 DIAGNOSIS — E11.42 TYPE 2 DIABETES MELLITUS WITH PERIPHERAL NEUROPATHY (HCC): Primary | ICD-10-CM

## 2019-12-09 DIAGNOSIS — I48.0 PAF (PAROXYSMAL ATRIAL FIBRILLATION) (HCC): ICD-10-CM

## 2019-12-09 DIAGNOSIS — N20.1 LEFT URETERAL STONE: ICD-10-CM

## 2019-12-09 DIAGNOSIS — D50.8 OTHER IRON DEFICIENCY ANEMIA: ICD-10-CM

## 2019-12-09 LAB
ALBUMIN SERPL-MCNC: 4.5 G/DL (ref 3.5–5.2)
ALP BLD-CCNC: 74 U/L (ref 40–130)
ALT SERPL-CCNC: 11 U/L (ref 5–41)
ANION GAP SERPL CALCULATED.3IONS-SCNC: 18 MMOL/L (ref 7–19)
AST SERPL-CCNC: 14 U/L (ref 5–40)
BASOPHILS ABSOLUTE: 0 K/UL (ref 0–0.2)
BASOPHILS RELATIVE PERCENT: 0.7 % (ref 0–1)
BILIRUB SERPL-MCNC: 0.6 MG/DL (ref 0.2–1.2)
BILIRUBIN URINE: NEGATIVE
BLOOD, URINE: NEGATIVE
BUN BLDV-MCNC: 15 MG/DL (ref 8–23)
CALCIUM SERPL-MCNC: 9.9 MG/DL (ref 8.8–10.2)
CHLORIDE BLD-SCNC: 98 MMOL/L (ref 98–111)
CHOLESTEROL, TOTAL: 191 MG/DL (ref 160–199)
CLARITY: CLEAR
CO2: 26 MMOL/L (ref 22–29)
COLOR: ABNORMAL
CREAT SERPL-MCNC: 1.1 MG/DL (ref 0.5–1.2)
EOSINOPHILS ABSOLUTE: 0.2 K/UL (ref 0–0.6)
EOSINOPHILS RELATIVE PERCENT: 4.1 % (ref 0–5)
GFR NON-AFRICAN AMERICAN: >60
GLUCOSE BLD-MCNC: 170 MG/DL (ref 74–109)
GLUCOSE URINE: 100 MG/DL
HBA1C MFR BLD: 9.1 % (ref 4–6)
HCT VFR BLD CALC: 37.3 % (ref 42–52)
HDLC SERPL-MCNC: 47 MG/DL (ref 55–121)
HEMOGLOBIN: 12.4 G/DL (ref 14–18)
IMMATURE GRANULOCYTES #: 0.1 K/UL
KETONES, URINE: NEGATIVE MG/DL
LDL CHOLESTEROL CALCULATED: 96 MG/DL
LEUKOCYTE ESTERASE, URINE: NEGATIVE
LYMPHOCYTES ABSOLUTE: 1.2 K/UL (ref 1.1–4.5)
LYMPHOCYTES RELATIVE PERCENT: 29.5 % (ref 20–40)
MCH RBC QN AUTO: 29.4 PG (ref 27–31)
MCHC RBC AUTO-ENTMCNC: 33.2 G/DL (ref 33–37)
MCV RBC AUTO: 88.4 FL (ref 80–94)
MONOCYTES ABSOLUTE: 0.5 K/UL (ref 0–0.9)
MONOCYTES RELATIVE PERCENT: 12.9 % (ref 0–10)
NEUTROPHILS ABSOLUTE: 2.1 K/UL (ref 1.5–7.5)
NEUTROPHILS RELATIVE PERCENT: 50.6 % (ref 50–65)
NITRITE, URINE: NEGATIVE
PARATHYROID HORMONE INTACT: 19.5 PG/ML (ref 15–65)
PDW BLD-RTO: 13.4 % (ref 11.5–14.5)
PH UA: 6 (ref 5–8)
PLATELET # BLD: 128 K/UL (ref 130–400)
PMV BLD AUTO: 11 FL (ref 9.4–12.4)
POTASSIUM SERPL-SCNC: 3.9 MMOL/L (ref 3.5–5)
PROSTATE SPECIFIC ANTIGEN: 5.61 NG/ML (ref 0–4)
PROTEIN UA: NEGATIVE MG/DL
RBC # BLD: 4.22 M/UL (ref 4.7–6.1)
SODIUM BLD-SCNC: 142 MMOL/L (ref 136–145)
SPECIFIC GRAVITY UA: 1.02 (ref 1–1.03)
TOTAL PROTEIN: 7.2 G/DL (ref 6.6–8.7)
TRIGL SERPL-MCNC: 242 MG/DL (ref 0–149)
TSH SERPL DL<=0.05 MIU/L-ACNC: 4.02 UIU/ML (ref 0.27–4.2)
URIC ACID, SERUM: 6.1 MG/DL (ref 3.4–7)
URINE REFLEX TO CULTURE: ABNORMAL
UROBILINOGEN, URINE: 0.2 E.U./DL
VITAMIN D 25-HYDROXY: 14.2 NG/ML
WBC # BLD: 4.1 K/UL (ref 4.8–10.8)

## 2019-12-09 PROCEDURE — 4040F PNEUMOC VAC/ADMIN/RCVD: CPT | Performed by: NURSE PRACTITIONER

## 2019-12-09 PROCEDURE — G8417 CALC BMI ABV UP PARAM F/U: HCPCS | Performed by: NURSE PRACTITIONER

## 2019-12-09 PROCEDURE — 99214 OFFICE O/P EST MOD 30 MIN: CPT | Performed by: NURSE PRACTITIONER

## 2019-12-09 PROCEDURE — 3046F HEMOGLOBIN A1C LEVEL >9.0%: CPT | Performed by: NURSE PRACTITIONER

## 2019-12-09 PROCEDURE — 3017F COLORECTAL CA SCREEN DOC REV: CPT | Performed by: NURSE PRACTITIONER

## 2019-12-09 PROCEDURE — G8926 SPIRO NO PERF OR DOC: HCPCS | Performed by: NURSE PRACTITIONER

## 2019-12-09 PROCEDURE — 3023F SPIROM DOC REV: CPT | Performed by: NURSE PRACTITIONER

## 2019-12-09 PROCEDURE — G8482 FLU IMMUNIZE ORDER/ADMIN: HCPCS | Performed by: NURSE PRACTITIONER

## 2019-12-09 PROCEDURE — 1036F TOBACCO NON-USER: CPT | Performed by: NURSE PRACTITIONER

## 2019-12-09 PROCEDURE — 1123F ACP DISCUSS/DSCN MKR DOCD: CPT | Performed by: NURSE PRACTITIONER

## 2019-12-09 PROCEDURE — 2022F DILAT RTA XM EVC RTNOPTHY: CPT | Performed by: NURSE PRACTITIONER

## 2019-12-09 PROCEDURE — G8427 DOCREV CUR MEDS BY ELIG CLIN: HCPCS | Performed by: NURSE PRACTITIONER

## 2019-12-09 ASSESSMENT — ENCOUNTER SYMPTOMS
NAUSEA: 0
TROUBLE SWALLOWING: 0
SINUS PAIN: 0
COLOR CHANGE: 0
ABDOMINAL DISTENTION: 0
BLOOD IN STOOL: 0
SHORTNESS OF BREATH: 1
COUGH: 0
DIARRHEA: 0
STRIDOR: 0
EYE DISCHARGE: 0
CONSTIPATION: 0
VOMITING: 0
SORE THROAT: 0
ABDOMINAL PAIN: 0
CHOKING: 0
EYE ITCHING: 0
SINUS PRESSURE: 0
WHEEZING: 0
BACK PAIN: 1

## 2019-12-11 LAB
PROSTATE SPECIFIC ANTIGEN FREE: 0.7 NG/ML
PROSTATE SPECIFIC ANTIGEN PERCENT FREE: 13 %
PROSTATE SPECIFIC ANTIGEN: 5.3 NG/ML (ref 0–4)

## 2019-12-13 ENCOUNTER — HOSPITAL ENCOUNTER (OUTPATIENT)
Dept: CT IMAGING | Age: 69
Discharge: HOME | End: 2019-12-13
Payer: MEDICARE

## 2019-12-13 DIAGNOSIS — N20.1 LEFT URETERAL STONE: ICD-10-CM

## 2019-12-13 PROCEDURE — 74176 CT ABD & PELVIS W/O CONTRAST: CPT

## 2019-12-16 ENCOUNTER — HOSPITAL ENCOUNTER (OUTPATIENT)
Dept: INFUSION THERAPY | Age: 69
Discharge: HOME | End: 2019-12-16
Payer: COMMERCIAL

## 2019-12-16 ENCOUNTER — HOSPITAL ENCOUNTER (OUTPATIENT)
Dept: INFUSION THERAPY | Age: 69
Discharge: HOME | End: 2019-12-16
Payer: MEDICARE

## 2019-12-16 ENCOUNTER — OFFICE VISIT (OUTPATIENT)
Dept: HEMATOLOGY | Age: 69
End: 2019-12-16
Payer: COMMERCIAL

## 2019-12-16 VITALS
HEIGHT: 67 IN | WEIGHT: 189.7 LBS | SYSTOLIC BLOOD PRESSURE: 130 MMHG | BODY MASS INDEX: 29.78 KG/M2 | HEART RATE: 70 BPM | OXYGEN SATURATION: 97 % | DIASTOLIC BLOOD PRESSURE: 64 MMHG

## 2019-12-16 DIAGNOSIS — D86.0 SARCOIDOSIS, LUNG (HCC): Primary | ICD-10-CM

## 2019-12-16 DIAGNOSIS — D86.0 SARCOIDOSIS, LUNG (HCC): ICD-10-CM

## 2019-12-16 PROCEDURE — 99211 OFF/OP EST MAY X REQ PHY/QHP: CPT

## 2019-12-16 PROCEDURE — G8482 FLU IMMUNIZE ORDER/ADMIN: HCPCS | Performed by: INTERNAL MEDICINE

## 2019-12-16 PROCEDURE — 3017F COLORECTAL CA SCREEN DOC REV: CPT | Performed by: INTERNAL MEDICINE

## 2019-12-16 PROCEDURE — 1036F TOBACCO NON-USER: CPT | Performed by: INTERNAL MEDICINE

## 2019-12-16 PROCEDURE — 1123F ACP DISCUSS/DSCN MKR DOCD: CPT | Performed by: INTERNAL MEDICINE

## 2019-12-16 PROCEDURE — 4040F PNEUMOC VAC/ADMIN/RCVD: CPT | Performed by: INTERNAL MEDICINE

## 2019-12-16 PROCEDURE — 99214 OFFICE O/P EST MOD 30 MIN: CPT | Performed by: INTERNAL MEDICINE

## 2019-12-16 PROCEDURE — G8427 DOCREV CUR MEDS BY ELIG CLIN: HCPCS | Performed by: INTERNAL MEDICINE

## 2019-12-16 PROCEDURE — 85025 COMPLETE CBC W/AUTO DIFF WBC: CPT

## 2019-12-16 PROCEDURE — G8417 CALC BMI ABV UP PARAM F/U: HCPCS | Performed by: INTERNAL MEDICINE

## 2019-12-20 ENCOUNTER — OFFICE VISIT (OUTPATIENT)
Dept: UROLOGY | Age: 69
End: 2019-12-20
Payer: MEDICARE

## 2019-12-20 VITALS — TEMPERATURE: 97.4 F | WEIGHT: 186 LBS | BODY MASS INDEX: 29.19 KG/M2 | HEIGHT: 67 IN

## 2019-12-20 DIAGNOSIS — N20.1 LEFT URETERAL STONE: Primary | ICD-10-CM

## 2019-12-20 DIAGNOSIS — R97.20 ELEVATED PSA: ICD-10-CM

## 2019-12-20 DIAGNOSIS — R35.0 BENIGN PROSTATIC HYPERPLASIA WITH URINARY FREQUENCY: ICD-10-CM

## 2019-12-20 DIAGNOSIS — N40.1 BENIGN PROSTATIC HYPERPLASIA WITH URINARY FREQUENCY: ICD-10-CM

## 2019-12-20 LAB
BILIRUBIN, POC: NORMAL
BLOOD URINE, POC: NORMAL
CLARITY, POC: CLEAR
COLOR, POC: YELLOW
GLUCOSE URINE, POC: 1000
KETONES, POC: NORMAL
LEUKOCYTE EST, POC: NORMAL
NITRITE, POC: NORMAL
PH, POC: 5.5
PROTEIN, POC: NORMAL
SPECIFIC GRAVITY, POC: 1.03
UROBILINOGEN, POC: 0.2

## 2019-12-20 PROCEDURE — G8482 FLU IMMUNIZE ORDER/ADMIN: HCPCS | Performed by: UROLOGY

## 2019-12-20 PROCEDURE — G8417 CALC BMI ABV UP PARAM F/U: HCPCS | Performed by: UROLOGY

## 2019-12-20 PROCEDURE — 81003 URINALYSIS AUTO W/O SCOPE: CPT | Performed by: UROLOGY

## 2019-12-20 PROCEDURE — G8427 DOCREV CUR MEDS BY ELIG CLIN: HCPCS | Performed by: UROLOGY

## 2019-12-20 PROCEDURE — 99214 OFFICE O/P EST MOD 30 MIN: CPT | Performed by: UROLOGY

## 2019-12-20 PROCEDURE — 3017F COLORECTAL CA SCREEN DOC REV: CPT | Performed by: UROLOGY

## 2019-12-20 PROCEDURE — 4040F PNEUMOC VAC/ADMIN/RCVD: CPT | Performed by: UROLOGY

## 2019-12-20 PROCEDURE — 1036F TOBACCO NON-USER: CPT | Performed by: UROLOGY

## 2019-12-20 PROCEDURE — 1123F ACP DISCUSS/DSCN MKR DOCD: CPT | Performed by: UROLOGY

## 2019-12-20 ASSESSMENT — ENCOUNTER SYMPTOMS
VOMITING: 0
WHEEZING: 0
COLOR CHANGE: 0
NAUSEA: 0
COUGH: 0
ABDOMINAL PAIN: 0
CONSTIPATION: 0
DIARRHEA: 0
EYE DISCHARGE: 0
SHORTNESS OF BREATH: 0
BLOOD IN STOOL: 0
SINUS PRESSURE: 0
FACIAL SWELLING: 0
EYE REDNESS: 0
BACK PAIN: 0
ABDOMINAL DISTENTION: 0
SORE THROAT: 0
EYE PAIN: 0
RHINORRHEA: 0

## 2019-12-31 ENCOUNTER — OFFICE VISIT (OUTPATIENT)
Dept: INTERNAL MEDICINE | Age: 69
End: 2019-12-31
Payer: COMMERCIAL

## 2019-12-31 VITALS
HEART RATE: 97 BPM | DIASTOLIC BLOOD PRESSURE: 60 MMHG | HEIGHT: 67 IN | OXYGEN SATURATION: 96 % | WEIGHT: 193.8 LBS | BODY MASS INDEX: 30.42 KG/M2 | SYSTOLIC BLOOD PRESSURE: 132 MMHG

## 2019-12-31 DIAGNOSIS — J20.9 ACUTE BRONCHITIS, UNSPECIFIED ORGANISM: ICD-10-CM

## 2019-12-31 DIAGNOSIS — D86.0 SARCOIDOSIS, LUNG (HCC): Primary | ICD-10-CM

## 2019-12-31 DIAGNOSIS — J43.8 OTHER EMPHYSEMA (HCC): ICD-10-CM

## 2019-12-31 DIAGNOSIS — R06.09 EXERTIONAL DYSPNEA: ICD-10-CM

## 2019-12-31 PROCEDURE — 3023F SPIROM DOC REV: CPT | Performed by: NURSE PRACTITIONER

## 2019-12-31 PROCEDURE — G8926 SPIRO NO PERF OR DOC: HCPCS | Performed by: NURSE PRACTITIONER

## 2019-12-31 PROCEDURE — G8417 CALC BMI ABV UP PARAM F/U: HCPCS | Performed by: NURSE PRACTITIONER

## 2019-12-31 PROCEDURE — 1123F ACP DISCUSS/DSCN MKR DOCD: CPT | Performed by: NURSE PRACTITIONER

## 2019-12-31 PROCEDURE — 1036F TOBACCO NON-USER: CPT | Performed by: NURSE PRACTITIONER

## 2019-12-31 PROCEDURE — 3017F COLORECTAL CA SCREEN DOC REV: CPT | Performed by: NURSE PRACTITIONER

## 2019-12-31 PROCEDURE — 99213 OFFICE O/P EST LOW 20 MIN: CPT | Performed by: NURSE PRACTITIONER

## 2019-12-31 PROCEDURE — G8427 DOCREV CUR MEDS BY ELIG CLIN: HCPCS | Performed by: NURSE PRACTITIONER

## 2019-12-31 PROCEDURE — G8482 FLU IMMUNIZE ORDER/ADMIN: HCPCS | Performed by: NURSE PRACTITIONER

## 2019-12-31 PROCEDURE — 4040F PNEUMOC VAC/ADMIN/RCVD: CPT | Performed by: NURSE PRACTITIONER

## 2019-12-31 RX ORDER — CEFDINIR 300 MG/1
300 CAPSULE ORAL 2 TIMES DAILY
Qty: 14 CAPSULE | Refills: 0 | Status: SHIPPED | OUTPATIENT
Start: 2019-12-31 | End: 2020-01-07

## 2020-01-02 RX ORDER — PROMETHAZINE HYDROCHLORIDE AND CODEINE PHOSPHATE 6.25; 1 MG/5ML; MG/5ML
5 SYRUP ORAL EVERY 4 HOURS PRN
Qty: 180 ML | Refills: 0 | Status: SHIPPED | OUTPATIENT
Start: 2020-01-02 | End: 2020-01-05

## 2020-01-08 ENCOUNTER — TELEPHONE (OUTPATIENT)
Dept: CARDIOLOGY | Age: 70
End: 2020-01-08

## 2020-01-08 NOTE — TELEPHONE ENCOUNTER
Procedure Date: 2/4/2020    Cardiologist: Lyubov Collazo    Procedure: EGD    Surgeon: Apoorva Herbert    Last Office Visit: 6/25/19  Reason for office visit and medical concerns addressed at this office visit: HTN, Afib, s/p ablation 2016, HL, DM,   From your visit note 6/25/19  1. History of atrial fib status post ablation  Stable    Review and summation of old records:     EGD in the office showing sinus rhythm with a heart rate of 87 bpm.  Patient is on Xarelto 20 mg daily. No Continue current medications:      Yes       Testing Performed and Date of Service:  Pt declined DSE 5/2019, and Pearl offered after that due to fear it would put him in Afib again. 2D Echo 10/25/19  Does the patient have a stent? na    Current Medications:Xarelto,ASA, Iron, Albuterol, Lyrica, Januvia, Deltasone, Ultram, Xopenex, Lasix, Boniva, Epipen, Nitrostat, Aricept, Imuran, Advair, Cozaar,     Is the patient currently taking an anticoagulant?  Xarelto, ASA    Additional Notes: Request for Xarelto 2 day hold

## 2020-01-14 ENCOUNTER — OFFICE VISIT (OUTPATIENT)
Dept: PULMONOLOGY | Facility: CLINIC | Age: 70
End: 2020-01-14

## 2020-01-14 VITALS
OXYGEN SATURATION: 98 % | HEART RATE: 86 BPM | HEIGHT: 66 IN | BODY MASS INDEX: 31.02 KG/M2 | DIASTOLIC BLOOD PRESSURE: 80 MMHG | WEIGHT: 193 LBS | SYSTOLIC BLOOD PRESSURE: 138 MMHG

## 2020-01-14 DIAGNOSIS — J44.9 COPD, MODERATE (HCC): ICD-10-CM

## 2020-01-14 DIAGNOSIS — E66.3 OVERWEIGHT: ICD-10-CM

## 2020-01-14 DIAGNOSIS — J44.9 COPD, MODERATE (HCC): Primary | ICD-10-CM

## 2020-01-14 DIAGNOSIS — D86.2 SARCOIDOSIS OF LUNG WITH SARCOIDOSIS OF LYMPH NODES (HCC): Primary | ICD-10-CM

## 2020-01-14 DIAGNOSIS — E83.52 HYPERCALCEMIA: ICD-10-CM

## 2020-01-14 DIAGNOSIS — J98.4 RESTRICTIVE LUNG DISEASE: ICD-10-CM

## 2020-01-14 PROCEDURE — 99214 OFFICE O/P EST MOD 30 MIN: CPT | Performed by: INTERNAL MEDICINE

## 2020-01-14 PROCEDURE — 94729 DIFFUSING CAPACITY: CPT | Performed by: INTERNAL MEDICINE

## 2020-01-14 PROCEDURE — 94727 GAS DIL/WSHOT DETER LNG VOL: CPT | Performed by: INTERNAL MEDICINE

## 2020-01-14 PROCEDURE — 94010 BREATHING CAPACITY TEST: CPT | Performed by: INTERNAL MEDICINE

## 2020-01-14 NOTE — PATIENT INSTRUCTIONS
The patient did have a recent influenza infection and some associated bronchitis.  His pulmonary function studies do show some worsening and this could relate to his recent acute respiratory infection but this could relate to the sarcoid itself as he also has bony involvement.  He is going to see about getting a referral to [facility] to a specialist there and the issue would be if he would be a candidate for some other immunosuppressive or anti-inflammatory therapy besides his current regimen of Imuran and he does take low-dose prednisone which she had been off for about 3 weeks due to some visual issues but has just restarted. I told him I be glad to assist with such referral and otherwise we will see him back in 2 months and get a follow-up flow volume loop.  I did tell him to try to utilize the lowest dose of prednisone possible at least in the short-term to try to minimize potential side effects.

## 2020-01-14 NOTE — PROCEDURES
Pulmonary Function Test  Performed by: Salvatore Shah MD  Authorized by: Salvatore Shah MD      Pre Drug    FVC: 57%   FEV1: 43%   FEF 25-75%: 17%   FEV1/FVC: 59.48%   T%   RV: 75%   DLCO: 47%   D/VAsb: 97%

## 2020-01-15 NOTE — PROGRESS NOTES
Subjective   Caio Riley is a 69 y.o. male.     No chief complaint on file.     No My Sticky Note on file.    History of Present Illness   The patient comes a for follow-up to include pulmonary functions.  He states he had some issues with the flu recently and I told this could affect the results of his current studies.  He currently is on Imuran and had been on low-dose prednisone but discontinued this temporarily because some visual issues but is got a try to resume it.  He is looking into being referred to [facility] particular because of his history of beryllium exposure and the realization that could cause granulomatous lung disease.  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  I told we be glad to facilitate the referral to [facility].  He has been at [place] previously but is not planning to go back to [place].  He is pulmonary function today do show worsening and certainly the some of this could relate to his episode of the flu.    Medical/Family/Social History   has a past medical history of A-fib (CMS/[facility]), Arthritis, Asthma, COPD (chronic obstructive pulmonary disease) (CMS/[facility]), Diabetes mellitus (CMS/[facility]), Hypertension, Kidney stones, Neuropathy, Pancreatitis, and Sarcoidosis.   has a past surgical history that includes Fracture surgery (Right); Laparoscopic cholecystectomy; LASIK (Bilateral); Fracture surgery (Left); Inguinal Node Biopsy (Right, 8/7/2018); Squamous cell carcinoma excision; Colonoscopy w/ polypectomy (12/02/2014); Esophagogastroduodenoscopy (01/08/2002); Ablation of  dysrhythmic focus; Esophagogastroduodenoscopy (N/A, 8/1/2019); Colonoscopy (N/A, 8/1/2019); and Esophagogastroduodenoscopy (08/01/2019).  family history is not on file.   reports that he quit smoking about 52 years ago. His smoking use included cigarettes. He has a 1.00 pack-year smoking history. He has never used smokeless tobacco. He reports that he does not drink alcohol or use drugs.  Allergies   Allergen Reactions   • Bee Venom Anaphylaxis   • Penicillins Swelling and Unknown (See Comments)     Reaction: SWELLING/RASH    Reaction: SWELLING/RASH   • Acetaminophen Unknown (See Comments)     Other reaction(s): NAUSEA   Start Date: ADULT    Other reaction(s): NAUSEA   Start Date: ADULT   • Gabapentin Unknown (See Comments)     Causes blisters to head.   Causes blisters to head.     Causes blisters to head.    • Metoprolol Unknown (See Comments)     Leg pain   Leg pain     Leg pain      Medications    Current Outpatient Medications:   •  albuterol sulfate  (90 Base) MCG/ACT inhaler, Inhale 2 puffs Every 4 (Four) Hours As Needed for Wheezing or Shortness of Air., Disp: 1 inhaler, Rfl: 11  •  ascorbic acid (VITAMIN C) 1000 MG tablet, Take 1,000 mg by mouth Daily., Disp: , Rfl:   •  aspirin 81 MG chewable tablet, Chew 81 mg Daily., Disp: , Rfl:   •  azaTHIOprine (IMURAN) 50 MG tablet, , Disp: , Rfl: 5  •  donepezil (ARICEPT) 5 MG tablet, , Disp: , Rfl: 0  •  EPINEPHrine (EPIPEN) 0.3 MG/0.3ML solution auto-injector injection, Inject 0.3 mg into the appropriate muscle as directed by prescriber., Disp: , Rfl:   •  fluticasone-salmeterol (ADVAIR) 500-50 MCG/DOSE DISKUS, Inhale 2 (Two) Times a Day., Disp: , Rfl:   •  ibandronate (BONIVA) 150 MG tablet, Take 150 mg by mouth Every 30 (Thirty) Days., Disp: , Rfl:   •  Insulin Pen Needle (BD PEN NEEDLE DAGMAR U/F) 32G X 4 MM misc, 1 each., Disp: , Rfl:   •  ipratropium (ATROVENT) 0.02 % nebulizer solution, Take 2.5 mL by nebulization 4 (Four) Times a Day., Disp: 300 mL,  "Rfl: 11  •  ipratropium-albuterol (DUO-NEB) 0.5-2.5 mg/3 ml nebulizer, Take 3 mL by nebulization 4 (Four) Times a Day As Needed for Wheezing., Disp: 120 mL, Rfl: 11  •  IRON PO, Take 27 mg by mouth 2 (Two) Times a Day., Disp: , Rfl:   •  JANUVIA 50 MG tablet, , Disp: , Rfl:   •  LANTUS SOLOSTAR 100 UNIT/ML injection pen, 14 Units., Disp: , Rfl:   •  levalbuterol (XOPENEX) 1.25 MG/3ML nebulizer solution, Take 1 ampule by nebulization 4 (Four) Times a Day., Disp: 120 ampule, Rfl: 11  •  losartan (COZAAR) 100 MG tablet, , Disp: , Rfl:   •  LYRICA 50 MG capsule, 75 mg., Disp: , Rfl:   •  predniSONE (DELTASONE) 5 MG tablet, Take 5 mg by mouth Daily., Disp: , Rfl:   •  RELION PEN NEEDLES 32G X 4 MM misc, , Disp: , Rfl:   •  rivaroxaban (XARELTO) 20 MG tablet, Take 20 mg by mouth Daily. STOPPED 8/1/18, Disp: , Rfl:   •  sulfamethoxazole-trimethoprim (BACTRIM,SEPTRA) 400-80 MG tablet, , Disp: , Rfl: 6  •  traMADol (ULTRAM) 50 MG tablet, , Disp: , Rfl: 0    Review of Systems   Constitutional: Positive for fatigue. Negative for chills and fever.   HENT: Negative for congestion.    Eyes: Positive for visual disturbance.   Respiratory: Positive for cough, shortness of breath and wheezing.    Cardiovascular: Negative for chest pain.   Gastrointestinal: Negative for diarrhea, nausea and vomiting.   Genitourinary: Negative for difficulty urinating.   Musculoskeletal: Negative for arthralgias.   Skin: Negative for rash.   Neurological: Negative for dizziness and speech difficulty.   Psychiatric/Behavioral: The patient is not nervous/anxious.      ------------------------------------  Objective   /80   Pulse 86   Ht 167.6 cm (66\")   Wt 87.5 kg (193 lb)   SpO2 98% Comment: RA  BMI 31.15 kg/m²   Physical Exam   Constitutional: He is oriented to person, place, and time. He appears well-developed.   He is a slightly overweight white male.   HENT:   Head: Normocephalic and atraumatic.   Eyes: Pupils are equal, round, and " reactive to light. EOM are normal.   Neck: Normal range of motion. Neck supple.   Cardiovascular: Normal rate, regular rhythm and normal heart sounds.   Pulmonary/Chest: Effort normal.   Breath sounds are diminished throughout.   Abdominal: Soft.   Musculoskeletal: Normal range of motion.   Neurological: He is alert and oriented to person, place, and time.   Skin: Skin is warm and dry.   Psychiatric: He has a normal mood and affect.   Nursing note and vitals reviewed.            Pulmonary Functions Testing Results:  PFT Values        Some values may be hidden. Unless noted otherwise, only the newest values recorded on each date are displayed.         Old Values PFT Results 11/27/18 7/12/19 1/14/20   FVC 61% 69%    FEV1 53% 61%    FEV1/FVC 68.07% 69.71    DLCO 55% 61%    TLC 69% 69%       Pre Drug PFT Results 11/27/18 7/12/19 1/14/20   FVC   57   FEV1   43   FEF 25-75%   17   FEV1/FVC   59.48      Post Drug PFT Results 11/27/18 7/12/19 1/14/20   No data to display.      Other Tests PFT Results 11/27/18 7/12/19 1/14/20   TLC   61   RV   75   DLCO   47   D/VAsb   97               My interpretation of PFT:  1.  Spirometry is consistent with a severe obstructive ventilatory defect.  2.  Lung volumes really coexisting moderate restrictive ventilatory defect.  There is also a decrease in inspiratory capacity.  3.  There is a moderate diffusion impairment which when corrected for alveolar volume is normalized.  4.  His current studies do show worsening compared to studies done in July of last year in terms of his FVC, FEV1, and total lung capacity.  When corrected for alveolar volume, his diffusion capacity is actually improved.  Assessment/Plan   Diagnoses and all orders for this visit:    Sarcoidosis of lung with sarcoidosis of lymph nodes (CMS/HCC)    COPD, moderate (CMS/HCC)  -     Pulmonary Function Test    Restrictive lung disease    Hypercalcemia    Overweight      Patient's Body mass index is 31.15 kg/m². BMI is  above normal parameters. Recommendations include: referral to primary care.      I do think his pulmonary function abnormalities are related to underlying sarcoidosis with both a restrictive and obstructive pattern although the obstructive pattern could have transiently been worsened by his recent respiratory tract infection due to the flu.  I did sign a form that he needs the Xopenex because of difficulty tolerating plain albuterol.  I will plan on seeing him back in follow-up in several months.  If he needs assistance with facilitating a referral to [place] or to some other specialist in sarcoidosis would I would be glad to help him with this.  Apparently [place] referral relates to the fact that he had beryllium exposure and again there would always be the question whether or not he has granulomatous lung disease relates to beryllium exposure though per the patient and his wife, his blood tests have been negative previously.  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

## 2020-01-16 RX ORDER — LOSARTAN POTASSIUM 100 MG/1
TABLET ORAL
Qty: 90 TABLET | Refills: 0 | Status: SHIPPED | OUTPATIENT
Start: 2020-01-16 | End: 2020-05-19

## 2020-01-20 ENCOUNTER — OFFICE VISIT (OUTPATIENT)
Dept: CARDIOLOGY | Age: 70
End: 2020-01-20
Payer: MEDICARE

## 2020-01-20 VITALS
HEIGHT: 67 IN | DIASTOLIC BLOOD PRESSURE: 72 MMHG | WEIGHT: 194 LBS | SYSTOLIC BLOOD PRESSURE: 142 MMHG | BODY MASS INDEX: 30.45 KG/M2 | HEART RATE: 90 BPM

## 2020-01-20 PROCEDURE — 1123F ACP DISCUSS/DSCN MKR DOCD: CPT | Performed by: NURSE PRACTITIONER

## 2020-01-20 PROCEDURE — 99214 OFFICE O/P EST MOD 30 MIN: CPT | Performed by: NURSE PRACTITIONER

## 2020-01-20 PROCEDURE — G8427 DOCREV CUR MEDS BY ELIG CLIN: HCPCS | Performed by: NURSE PRACTITIONER

## 2020-01-20 PROCEDURE — G8417 CALC BMI ABV UP PARAM F/U: HCPCS | Performed by: NURSE PRACTITIONER

## 2020-01-20 PROCEDURE — 1036F TOBACCO NON-USER: CPT | Performed by: NURSE PRACTITIONER

## 2020-01-20 PROCEDURE — G8482 FLU IMMUNIZE ORDER/ADMIN: HCPCS | Performed by: NURSE PRACTITIONER

## 2020-01-20 PROCEDURE — 3017F COLORECTAL CA SCREEN DOC REV: CPT | Performed by: NURSE PRACTITIONER

## 2020-01-20 PROCEDURE — 4040F PNEUMOC VAC/ADMIN/RCVD: CPT | Performed by: NURSE PRACTITIONER

## 2020-01-20 PROCEDURE — 93000 ELECTROCARDIOGRAM COMPLETE: CPT | Performed by: NURSE PRACTITIONER

## 2020-01-20 NOTE — PROGRESS NOTES
[id number] [facility]   Established Patient Office Visit  [address]  [address] [address] [facility]  [phone]        OFFICE VISIT:  2020    Efe Toribio - : 1950    Reason For Visit:  Saundra Bhardwaj is a 78 y.o. male who is here for 6 Month Follow-Up (no cardiac symptoms); Hypertension; and Atrial Fibrillation    1. Essential hypertension    2. PAF (paroxysmal atrial fibrillation) ([address]    3. Hyperlipidemia, unspecified hyperlipidemia type      Patient with a history of paroxysmal atrial fib, hypertension and hyperlipidemia. He is a patient of Dr. Jodi Posadas. Patient presents to clinic today for 6-month follow-up. Patient denies any complaints of chest pain, pressure or tightness. There is no change in shortness of breath due to his sarcoidosis, there is no orthopnea or PND. Patient denies any lightheadedness, dizziness or syncope. DATA:   2D echo 10/25/2019  Normal left ventricular size and function. Mild concentric left ventricular hypertrophy. Left ventricular ejection fraction is estimated at 64%. Trace mitral regurg. Stress echocardiogram 2018  Stress echocardiogram without clinical, electrocardiographic, or echocardiographic evidence of myocardial ischemia. Brewster Treadmill Score was 3.0 .  Test was supervised by Dr. Radha Benzon is a 78 y.o. male with the following history as recorded in [facility]:    Patient Active Problem List    Diagnosis Date Noted    Sarcoidosis 10/21/2019    Nephrolithiasis 2019    Vitamin D deficiency 2019    Chronic kidney disease 2019    Pneumonia of both upper lobes due to infectious organism ([facility] 2019    Gross hematuria     Other emphysema ([facility] 2019    Anemia 10/09/2018    Other male erectile dysfunction 2018    Ureterolithiasis 2018    Hydronephrosis, left 2018    History of histoplasmosis 2018    Lymphadenopathy 2018    Splenomegaly donepezil (ARICEPT) 5 MG tablet TAKE 1 TABLET BY MOUTH NIGHTLY 90 tablet 3    azaTHIOprine (IMURAN) 50 MG tablet Take 50 mg by mouth daily       fluticasone-salmeterol (ADVAIR) 500-50 MCG/DOSE diskus inhaler Inhale 1 puff into the lungs 2 times daily (Patient taking differently: Inhale 1 puff into the lungs daily ) 60 each 5    rivaroxaban (XARELTO) 20 MG TABS tablet Take 1 tablet by mouth Daily with supper TAKE ONE TABLET DAILY WITH  EVENING  MEAL 30 tablet 5    IRON PO Take 2 tablets by mouth daily       aspirin 81 MG tablet Take 81 mg by mouth daily. No current facility-administered medications for this visit.       Allergies: Bee venom; Gabapentin; Penicillins; and Toprol xl [metoprolol]  Past Medical History:   Diagnosis Date    Achalasia     going to [facility] for surgery in March    Acute pancreatitis     Anemia     Asthma     Atrial fibrillation (HCC)     h/o paroxysmal a-fib ? anesthsia induced    Benign colonic polyp     cscope 12/07 Dr Erlinda Garcia adenomatous: 12/14 adenoma    Chest pain     Chronic pain syndrome     Depression     Diabetic peripheral neuropathy (HCC)     Extrinsic asthma     Hyperlipidemia     Hypertension     Idiopathic peripheral neuropathy     Lyme disease     Mediastinal lymphadenopathy     Microalbuminuria     Mixed hyperlipidemia     Paroxysmal A-fib (HCC)     S/P ablation of atrial fibrillation     4/16 A fib ablation , Dr Renell Lennox, [facility]     Sarcoidosis, lung ([facility]     restrictive lung impairment    Tick bite     Type 2 diabetes mellitus with peripheral neuropathy ([facility]     Type 2 diabetes, controlled, with neuropathy [facility])      Past Surgical History:   Procedure Laterality Date    ANKLE FRACTURE SURGERY  april 14, 2015    ATRIAL ABLATION SURGERY  2015    Dr. Jessenia Kennedy      Catheter Ablation A-fib    CHOLECYSTECTOMY      COLONOSCOPY  12/02/2014    [place]    CYSTOSCOPY Left 8/16/2019    CYSTOSCOPY; LEFT RETROGRADE PYELOGRAM; INSERTION LEFT URETERAL STENT performed by Dane Gaming MD at [facility] Left 6/27/2018    LASER LITHOTRIPSY STONE MANIPULATION WITH DOUBLE J STENT PLACEMENT performed by Dane Gamign MD at [facility] Left 6/27/2018    CYSTOSCOPY URETEROSCOPY RETROGRADE PYELOGRAMS performed by Dane Gaming MD at [facility] SKIN CANCER EXCISION       Family History   Problem Relation Age of Onset    Coronary Art Dis Father         AICD pacer age 68    Heart Attack Father     Cancer Sister         childhood chest tumor    Heart Failure Mother      Social History     Tobacco Use    Smoking status: Never Smoker    Smokeless tobacco: Never Used   Substance Use Topics    Alcohol use: No          Review of Systems:    General:      Complaint / Symptom Yes / No / Description if Yes       Fatigue No   Weight gain N/A   Insomnia N/A       Respiratory:        Complaint / Symptom Yes / No / Description if Yes       Cough No   Horseness N/A       Cardiovascular:    Complaint / Symptom Yes / No / Description if Yes       Chest Pain No   Shortness of Air / Orthopnea Yes: No change baseline   Presyncope / Syncope No   Palpitations No         Objective:    BP (!) 142/72   Pulse 90   Ht 5' 7\" (1.702 m)   Wt 194 lb (88 kg)   BMI 30.38 kg/m²     GENERAL - well developed and well nourished, conversant  HEENT -  PERRLA, Hearing appears normal, gentleman lids are normal.  External inspection of ears and nose appear normal.  NECK - no thyromegaly, no JVD, trachea is in the midline  CARDIOVASCULAR - PMI is in the mid line clavicular position, Normal S1 and S2 with no systolic murmur. No S3 or S4    PULMONARY - no respiratory distress. No wheezes or rales. Lungs are clear to ausculation, normal respiratory effort.   ABDOMEN  - soft, non tender, no rebound  MUSCULOSKELETAL  - range of motion of the upper and lower extermites appears normal and equal and is without pain   EXTREMITIES - no significant edema   NEUROLOGIC - gait and station are normal  SKIN - turgor is normal, can warm and dry. PSYCHIATRIC - normal mood and affect, alert and orientated x 3,      ASSESSMENT:    ALL THE CARDIOLOGY PROBLEMS ARE LISTED ABOVE; HOWEVER, THE FOLLOWING SPECIFIC CARDIAC PROBLEMS / CONDITIONS WERE ADDRESSED AND TREATED DURING THE OFFICE VISIT TODAY:                                                                                            MEDICAL DECISION MAKING             Cardiac Specific Problem / Diagnosis  Discussion and Data Reviewed Diagnostic Procedures Ordered Management Options Selected           1. Paroxysmal atrial fib  Well Controlled   Review and summation of old records:    No recurrence. EKG in the office showing sinus rhythm with a heart rate of 90 bpm.  Occasional PAC. Patient is on Xarelto 20 mg daily. EKG Continue current medications:     Yes           2. Hypertension  Stable   Initial blood pressure in the office today 158/84. Rechecked 142/72. Home blood pressure readings running systolic 468-488. Patient does have whitecoat syndrome. No Continue current medications:    Yes           3. Hyperlipidemia Stable  managed by primary care provider. Diet controlled. No Continue current medications:       NA           4. Diabetes Stable  managed by primary care provider. No Continue current medications:       Yes     Orders Placed This Encounter   Procedures    EKG 12 lead     Order Specific Question:   Reason for Exam?     Answer:   Irregular heart rate     Comments:   afib    EKG 12 lead     Order Specific Question:   Reason for Exam?     Answer:   Irregular heart rate     Comments:   afib     No orders of the defined types were placed in this encounter. Discussed with patient and spouse. Return in about 6 months (around 7/20/2020) for Routine with me.     I greatly appreciate the opportunity to meet Sobeida Christie and your

## 2020-01-22 RX ORDER — RIVAROXABAN 20 MG/1
TABLET, FILM COATED ORAL
Qty: 90 TABLET | Refills: 1 | Status: SHIPPED | OUTPATIENT
Start: 2020-01-22 | End: 2020-11-19

## 2020-02-03 ENCOUNTER — OFFICE VISIT (OUTPATIENT)
Dept: INTERNAL MEDICINE | Age: 70
End: 2020-02-03
Payer: COMMERCIAL

## 2020-02-03 ENCOUNTER — HOSPITAL ENCOUNTER (OUTPATIENT)
Dept: GENERAL RADIOLOGY | Age: 70
Discharge: HOME | End: 2020-02-03
Payer: COMMERCIAL

## 2020-02-03 VITALS
WEIGHT: 189 LBS | OXYGEN SATURATION: 95 % | BODY MASS INDEX: 29.66 KG/M2 | DIASTOLIC BLOOD PRESSURE: 82 MMHG | SYSTOLIC BLOOD PRESSURE: 128 MMHG | HEIGHT: 67 IN | HEART RATE: 82 BPM

## 2020-02-03 PROBLEM — K22.0 ACHALASIA: Status: ACTIVE | Noted: 2020-02-03

## 2020-02-03 PROCEDURE — G8926 SPIRO NO PERF OR DOC: HCPCS | Performed by: NURSE PRACTITIONER

## 2020-02-03 PROCEDURE — G8417 CALC BMI ABV UP PARAM F/U: HCPCS | Performed by: NURSE PRACTITIONER

## 2020-02-03 PROCEDURE — 1123F ACP DISCUSS/DSCN MKR DOCD: CPT | Performed by: NURSE PRACTITIONER

## 2020-02-03 PROCEDURE — 71046 X-RAY EXAM CHEST 2 VIEWS: CPT

## 2020-02-03 PROCEDURE — 96372 THER/PROPH/DIAG INJ SC/IM: CPT | Performed by: NURSE PRACTITIONER

## 2020-02-03 PROCEDURE — 3017F COLORECTAL CA SCREEN DOC REV: CPT | Performed by: NURSE PRACTITIONER

## 2020-02-03 PROCEDURE — 1036F TOBACCO NON-USER: CPT | Performed by: NURSE PRACTITIONER

## 2020-02-03 PROCEDURE — 3023F SPIROM DOC REV: CPT | Performed by: NURSE PRACTITIONER

## 2020-02-03 PROCEDURE — G8427 DOCREV CUR MEDS BY ELIG CLIN: HCPCS | Performed by: NURSE PRACTITIONER

## 2020-02-03 PROCEDURE — 99214 OFFICE O/P EST MOD 30 MIN: CPT | Performed by: NURSE PRACTITIONER

## 2020-02-03 PROCEDURE — G8482 FLU IMMUNIZE ORDER/ADMIN: HCPCS | Performed by: NURSE PRACTITIONER

## 2020-02-03 PROCEDURE — 4040F PNEUMOC VAC/ADMIN/RCVD: CPT | Performed by: NURSE PRACTITIONER

## 2020-02-03 RX ORDER — PREGABALIN 50 MG/1
CAPSULE ORAL
Qty: 30 CAPSULE | Refills: 0 | Status: SHIPPED | OUTPATIENT
Start: 2020-02-03 | End: 2020-04-14

## 2020-02-03 RX ORDER — FUROSEMIDE 10 MG/ML
40 INJECTION INTRAMUSCULAR; INTRAVENOUS ONCE
Status: COMPLETED | OUTPATIENT
Start: 2020-02-03 | End: 2020-02-03

## 2020-02-03 RX ADMIN — FUROSEMIDE 40 MG: 10 INJECTION INTRAMUSCULAR; INTRAVENOUS at 16:29

## 2020-02-03 ASSESSMENT — ENCOUNTER SYMPTOMS
DIARRHEA: 0
CHOKING: 0
TROUBLE SWALLOWING: 0
WHEEZING: 0
SORE THROAT: 0
EYE DISCHARGE: 0
VOMITING: 0
BLOOD IN STOOL: 0
COUGH: 1
CONSTIPATION: 0
ABDOMINAL PAIN: 0
ABDOMINAL DISTENTION: 0
EYE ITCHING: 0
STRIDOR: 0
SHORTNESS OF BREATH: 1
COLOR CHANGE: 0
NAUSEA: 0

## 2020-02-03 NOTE — PROGRESS NOTES
bite     Type 2 diabetes mellitus with peripheral neuropathy (HCC)     Type 2 diabetes, controlled, with neuropathy ([address]       Past Surgical History:   Procedure Laterality Date    ANKLE FRACTURE SURGERY  april 14, 2015    ATRIAL ABLATION SURGERY  2015    Dr. Tabor [facility] COLONOSCOPY  12/02/2014    [place]    CYSTOSCOPY Left 8/16/2019    CYSTOSCOPY; LEFT RETROGRADE PYELOGRAM; INSERTION LEFT URETERAL STENT performed by Casimiro Moran MD at [address] [place] [address] Left 6/27/2018    LASER LITHOTRIPSY STONE MANIPULATION WITH DOUBLE J STENT PLACEMENT performed by Casimiro Moran MD at [facility] 6/27/2018    CYSTOSCOPY URETEROSCOPY RETROGRADE PYELOGRAMS performed by Casimiro Moran MD at [facility] 2/3/2020 1/20/2020 1/20/2020 1/20/2020 12/31/2019 97/41/4536   SYSTOLIC 337 671 190 166 760 -   DIASTOLIC 82 72 84 82 60 -   Pulse 82 - 90 90 97 -   Temp - - - - - 97.4   Resp - - - - - -   SpO2 95 - - - 96 -   Weight 189 lb - - 194 lb 193 lb 12.8 oz 186 lb   Height 5' 7\" - - 5' 7\" 5' 7\" 5' 7\"   BMI (wt*703/ht~2) 29.6 kg/m2 - - 30.38 kg/m2 30.35 kg/m2 29.13 kg/m2   Pain Level - - - - - -   Some recent data might be hidden       Family History   Problem Relation Age of Onset    Coronary Art Dis Father         AICD pacer age 68    Heart Attack Father     Cancer Sister         childhood chest tumor    Heart Failure Mother        Social History     Tobacco Use    Smoking status: Never Smoker    Smokeless tobacco: Never Used   Substance Use Topics    Alcohol use: No      Current Outpatient Medications   Medication Sig Dispense Refill    pregabalin (LYRICA) 50 MG capsule TAKE 1 CAPSULE BY MOUTH IN THE EVENING 30 capsule 0    XARELTO 20 MG TABS tablet TAKE 1 TABLET BY MOUTH ONCE DAILY WITH  EVENING  MEAL 90 tablet 1    [Metoprolol]      Leg pain        Health Maintenance   Topic Date Due    Diabetic foot exam  09/15/1959    DTaP/Tdap/Td vaccine (1 - Tdap) 09/15/1960    Shingles Vaccine (1 of 2) 09/09/2020 (Originally 9/15/1999)    Diabetic retinal exam  09/18/2020 (Originally 5/9/2019)    A1C test (Diabetic or Prediabetic)  03/09/2020    Lipid screen  12/09/2020    Potassium monitoring  12/09/2020    Creatinine monitoring  12/09/2020    Colon cancer screen colonoscopy  08/01/2024    Flu vaccine  Completed    Pneumococcal 65+ years Vaccine  Completed    Hepatitis C screen  Discontinued       Lab Results   Component Value Date    LABA1C 9.1 (H) 12/09/2019     Lab Results   Component Value Date    PSA 5.61 (H) 12/09/2019    PSA 5.3 (H) 12/09/2019    PSA 14.20 (H) 08/27/2019     TSH   Date Value Ref Range Status   12/09/2019 4.020 0.270 - 4.200 uIU/mL Final   ]  Lab Results   Component Value Date     12/09/2019    K 3.9 12/09/2019    CL 98 12/09/2019    CO2 26 12/09/2019    BUN 15 12/09/2019    CREATININE 1.1 12/09/2019    GLUCOSE 170 (H) 12/09/2019    CALCIUM 9.9 12/09/2019    PROT 7.2 12/09/2019    LABALBU 4.5 12/09/2019    BILITOT 0.6 12/09/2019    ALKPHOS 74 12/09/2019    AST 14 12/09/2019    ALT 11 12/09/2019    LABGLOM >60 12/09/2019     Lab Results   Component Value Date    CHOL 191 12/09/2019    CHOL 137 (L) 08/27/2019    CHOL 178 02/25/2019     Lab Results   Component Value Date    TRIG 242 (H) 12/09/2019    TRIG 184 (H) 08/27/2019    TRIG 166 (H) 02/25/2019     Lab Results   Component Value Date    HDL 47 (L) 12/09/2019    HDL 30 (L) 08/27/2019    HDL 41 (L) 02/25/2019     Lab Results   Component Value Date    LDLCALC 96 12/09/2019    LDLCALC 70 08/27/2019    LDLCALC 104 02/25/2019     Lab Results   Component Value Date     12/09/2019    K 3.9 12/09/2019    K 4.4 08/18/2019    CL 98 12/09/2019    CO2 26 12/09/2019    BUN 15 12/09/2019    CREATININE 1.1 12/09/2019    GLUCOSE 170 12/09/2019    CALCIUM 9.9 12/09/2019      Lab Results   Component Value Date    WBC 4.1 (L) 12/09/2019    HGB 12.4 (L) 12/09/2019    HCT 37.3 (L) 12/09/2019    MCV 88.4 12/09/2019     (L) 12/09/2019    LABLYMP 1.58 09/20/2015    LYMPHOPCT 29.5 12/09/2019    RBC 4.22 (L) 12/09/2019    MCH 29.4 12/09/2019    MCHC 33.2 12/09/2019    RDW 13.4 12/09/2019     Lab Results   Component Value Date    VITD25 14.2 (L) 12/09/2019       Subjective:      Review of Systems   Constitutional: Negative for fatigue, fever and unexpected weight change. HENT: Negative for ear discharge, ear pain, mouth sores, sore throat and trouble swallowing. Eyes: Negative for discharge, itching and visual disturbance. Respiratory: Positive for cough and shortness of breath. Negative for choking, wheezing and stridor. Cardiovascular: Negative for chest pain, palpitations and leg swelling. Gastrointestinal: Negative for abdominal distention, abdominal pain, blood in stool, constipation, diarrhea, nausea and vomiting. Endocrine: Negative for cold intolerance, polydipsia and polyuria. Genitourinary: Negative for difficulty urinating, dysuria, frequency and urgency. Musculoskeletal: Negative for arthralgias and gait problem. Skin: Negative for color change and rash. Allergic/Immunologic: Negative for food allergies and immunocompromised state. Neurological: Negative for dizziness, tremors, syncope, speech difficulty, weakness and headaches. Hematological: Negative for adenopathy. Does not bruise/bleed easily. Psychiatric/Behavioral: Negative for confusion and hallucinations. Objective:     Physical Exam  Constitutional:       General: He is not in acute distress. Appearance: He is well-developed. HENT:      Head: Normocephalic and atraumatic. Eyes:      General: No scleral icterus. Right eye: No discharge. Left eye: No discharge. Pupils: Pupils are equal, round, and reactive to light.    Neck:

## 2020-02-04 RX ORDER — SPIRONOLACTONE 25 MG/1
TABLET ORAL
Qty: 9 TABLET | Refills: 1 | Status: SHIPPED | OUTPATIENT
Start: 2020-02-04 | End: 2022-05-10

## 2020-02-07 ENCOUNTER — HOSPITAL ENCOUNTER (EMERGENCY)
Age: 70
Discharge: HOME | End: 2020-02-07
Attending: EMERGENCY MEDICINE
Payer: COMMERCIAL

## 2020-02-07 ENCOUNTER — APPOINTMENT (OUTPATIENT)
Dept: GENERAL RADIOLOGY | Age: 70
End: 2020-02-07
Payer: COMMERCIAL

## 2020-02-07 VITALS
SYSTOLIC BLOOD PRESSURE: 126 MMHG | HEIGHT: 67 IN | WEIGHT: 189 LBS | DIASTOLIC BLOOD PRESSURE: 53 MMHG | HEART RATE: 96 BPM | BODY MASS INDEX: 29.66 KG/M2 | OXYGEN SATURATION: 92 % | TEMPERATURE: 98.4 F | RESPIRATION RATE: 21 BRPM

## 2020-02-07 LAB
ALBUMIN SERPL-MCNC: 4.3 G/DL (ref 3.5–5.2)
ALP BLD-CCNC: 74 U/L (ref 40–130)
ALT SERPL-CCNC: 14 U/L (ref 5–41)
ANION GAP SERPL CALCULATED.3IONS-SCNC: 15 MMOL/L (ref 7–19)
AST SERPL-CCNC: 20 U/L (ref 5–40)
BASE EXCESS ARTERIAL: 6.3 MMOL/L (ref -2–2)
BASOPHILS ABSOLUTE: 0.1 K/UL (ref 0–0.2)
BASOPHILS RELATIVE PERCENT: 1.1 % (ref 0–1)
BILIRUB SERPL-MCNC: 0.6 MG/DL (ref 0.2–1.2)
BUN BLDV-MCNC: 22 MG/DL (ref 8–23)
CALCIUM SERPL-MCNC: 11.1 MG/DL (ref 8.8–10.2)
CARBOXYHEMOGLOBIN ARTERIAL: 2.2 % (ref 0–5)
CHLORIDE BLD-SCNC: 95 MMOL/L (ref 98–111)
CO2: 27 MMOL/L (ref 22–29)
CREAT SERPL-MCNC: 1.3 MG/DL (ref 0.5–1.2)
EOSINOPHILS ABSOLUTE: 0.3 K/UL (ref 0–0.6)
EOSINOPHILS RELATIVE PERCENT: 5.3 % (ref 0–5)
GFR NON-AFRICAN AMERICAN: 55
GLUCOSE BLD-MCNC: 254 MG/DL (ref 74–109)
HCO3 ARTERIAL: 32.7 MMOL/L (ref 22–26)
HCT VFR BLD CALC: 39.9 % (ref 42–52)
HEMOGLOBIN, ART, EXTENDED: 12.5 G/DL (ref 14–18)
HEMOGLOBIN: 12.9 G/DL (ref 14–18)
IMMATURE GRANULOCYTES #: 0.1 K/UL
LYMPHOCYTES ABSOLUTE: 1.2 K/UL (ref 1.1–4.5)
LYMPHOCYTES RELATIVE PERCENT: 25.4 % (ref 20–40)
MCH RBC QN AUTO: 29.1 PG (ref 27–31)
MCHC RBC AUTO-ENTMCNC: 32.3 G/DL (ref 33–37)
MCV RBC AUTO: 89.9 FL (ref 80–94)
METHEMOGLOBIN ARTERIAL: 0.9 %
MONOCYTES ABSOLUTE: 0.7 K/UL (ref 0–0.9)
MONOCYTES RELATIVE PERCENT: 14.5 % (ref 0–10)
NEUTROPHILS ABSOLUTE: 2.5 K/UL (ref 1.5–7.5)
NEUTROPHILS RELATIVE PERCENT: 52.6 % (ref 50–65)
O2 CONTENT ARTERIAL: 16.1 ML/DL
O2 SAT, ARTERIAL: 91.4 %
O2 THERAPY: ABNORMAL
PCO2 ARTERIAL: 54 MMHG (ref 35–45)
PDW BLD-RTO: 13.6 % (ref 11.5–14.5)
PH ARTERIAL: 7.39 (ref 7.35–7.45)
PLATELET # BLD: 146 K/UL (ref 130–400)
PMV BLD AUTO: 10.8 FL (ref 9.4–12.4)
PO2 ARTERIAL: 60 MMHG (ref 80–100)
POTASSIUM REFLEX MAGNESIUM: 4.2 MMOL/L (ref 3.5–5)
POTASSIUM, WHOLE BLOOD: 4
PRO-BNP: 32 PG/ML (ref 0–900)
RBC # BLD: 4.44 M/UL (ref 4.7–6.1)
SODIUM BLD-SCNC: 137 MMOL/L (ref 136–145)
TOTAL PROTEIN: 7.5 G/DL (ref 6.6–8.7)
WBC # BLD: 4.8 K/UL (ref 4.8–10.8)

## 2020-02-07 PROCEDURE — 93005 ELECTROCARDIOGRAM TRACING: CPT | Performed by: EMERGENCY MEDICINE

## 2020-02-07 PROCEDURE — 83880 ASSAY OF NATRIURETIC PEPTIDE: CPT

## 2020-02-07 PROCEDURE — 82803 BLOOD GASES ANY COMBINATION: CPT

## 2020-02-07 PROCEDURE — 84132 ASSAY OF SERUM POTASSIUM: CPT

## 2020-02-07 PROCEDURE — 36600 WITHDRAWAL OF ARTERIAL BLOOD: CPT

## 2020-02-07 PROCEDURE — 94640 AIRWAY INHALATION TREATMENT: CPT

## 2020-02-07 PROCEDURE — 6360000002 HC RX W HCPCS: Performed by: EMERGENCY MEDICINE

## 2020-02-07 PROCEDURE — 99285 EMERGENCY DEPT VISIT HI MDM: CPT

## 2020-02-07 PROCEDURE — 36415 COLL VENOUS BLD VENIPUNCTURE: CPT

## 2020-02-07 PROCEDURE — 6370000000 HC RX 637 (ALT 250 FOR IP): Performed by: EMERGENCY MEDICINE

## 2020-02-07 PROCEDURE — 85025 COMPLETE CBC W/AUTO DIFF WBC: CPT

## 2020-02-07 PROCEDURE — 99999 PR OFFICE/OUTPT VISIT,PROCEDURE ONLY: CPT | Performed by: EMERGENCY MEDICINE

## 2020-02-07 PROCEDURE — 80053 COMPREHEN METABOLIC PANEL: CPT

## 2020-02-07 PROCEDURE — 71046 X-RAY EXAM CHEST 2 VIEWS: CPT

## 2020-02-07 PROCEDURE — 96374 THER/PROPH/DIAG INJ IV PUSH: CPT

## 2020-02-07 PROCEDURE — 96375 TX/PRO/DX INJ NEW DRUG ADDON: CPT

## 2020-02-07 RX ORDER — AZITHROMYCIN 250 MG/1
250 TABLET, FILM COATED ORAL SEE ADMIN INSTRUCTIONS
Qty: 6 TABLET | Refills: 0 | Status: SHIPPED | OUTPATIENT
Start: 2020-02-07 | End: 2020-02-12

## 2020-02-07 RX ORDER — IPRATROPIUM BROMIDE AND ALBUTEROL SULFATE 2.5; .5 MG/3ML; MG/3ML
1 SOLUTION RESPIRATORY (INHALATION) ONCE
Status: COMPLETED | OUTPATIENT
Start: 2020-02-07 | End: 2020-02-07

## 2020-02-07 RX ORDER — PREDNISONE 20 MG/1
TABLET ORAL
Qty: 14 TABLET | Refills: 0 | Status: SHIPPED | OUTPATIENT
Start: 2020-02-07 | End: 2020-03-30 | Stop reason: ALTCHOICE

## 2020-02-07 RX ORDER — METHYLPREDNISOLONE SODIUM SUCCINATE 125 MG/2ML
60 INJECTION, POWDER, LYOPHILIZED, FOR SOLUTION INTRAMUSCULAR; INTRAVENOUS ONCE
Status: COMPLETED | OUTPATIENT
Start: 2020-02-07 | End: 2020-02-07

## 2020-02-07 RX ORDER — DEXTROMETHORPHAN HYDROBROMIDE AND PROMETHAZINE HYDROCHLORIDE 15; 6.25 MG/5ML; MG/5ML
SYRUP ORAL 4 TIMES DAILY PRN
COMMUNITY
End: 2020-06-23 | Stop reason: ALTCHOICE

## 2020-02-07 RX ADMIN — IPRATROPIUM BROMIDE AND ALBUTEROL SULFATE 1 AMPULE: .5; 3 SOLUTION RESPIRATORY (INHALATION) at 10:08

## 2020-02-07 RX ADMIN — IPRATROPIUM BROMIDE AND ALBUTEROL SULFATE 1 AMPULE: .5; 3 SOLUTION RESPIRATORY (INHALATION) at 12:26

## 2020-02-07 RX ADMIN — Medication 500 MG: at 10:16

## 2020-02-07 RX ADMIN — METHYLPREDNISOLONE SODIUM SUCCINATE 60 MG: 125 INJECTION, POWDER, FOR SOLUTION INTRAMUSCULAR; INTRAVENOUS at 10:16

## 2020-02-07 NOTE — ED PROVIDER NOTES
[facility] EMERGENCY DEPT  EMERGENCY DEPARTMENT ENCOUNTER      Pt Name: Jannet Pond  MRN: [id number]  [place] 1950  Date of evaluation: 2/7/2020  Provider: Trixie Douglas MD    [address] [place] [address]       Chief Complaint   Patient presents with    Shortness of Breath    Cough         HISTORY OF PRESENT ILLNESS   (Location/Symptom, Timing/Onset,Context/Setting, Quality, Duration, Modifying Factors, Severity)  Note limiting factors. Jannet Pond is a 78 y.o. male who presents to the emergency department with complaint of productive cough and shortness of breath for the last week. Patient has a history of sarcoidosis. Saw his primary care provider 5 days ago and was on on increased Lasix dosing without improvement. Patient also took 2 days of additional 5 mg of prednisone without improvement. States he normally is unable to tolerate significantly higher doses of prednisone because he develops vision changes. Denies any associated fever. States that his sputum recently has changed and become darker yellow/green and thicker. Has been using his Xopenex at home as well as a DuoNeb and albuterol inhalers without any significant improvement. States that his breathing got worse last night. States he feels very congested feels like he is unable to clear the sputum out of his lungs    HPI    NursingNotes were reviewed. REVIEW OF SYSTEMS    (2-9 systems for level 4, 10 or more for level 5)     Review of Systems   Constitutional: Negative for fatigue and fever. HENT: Negative for congestion, rhinorrhea and voice change. Eyes: Negative for pain and redness. Respiratory: Positive for cough and shortness of breath. Cardiovascular: Negative for chest pain and leg swelling. Gastrointestinal: Negative for abdominal pain, diarrhea and vomiting. Endocrine: Negative. Genitourinary: Negative. Musculoskeletal: Negative for arthralgias and gait problem. Skin: Negative for rash and wound.    Neurological: [facility] OR    SKIN CANCER EXCISION           CURRENT MEDICATIONS       Discharge Medication List as of 2/7/2020  1:07 PM      CONTINUE these medications which have NOT CHANGED    Details   promethazine-dextromethorphan (PROMETHAZINE-DM) 6.25-15 MG/5ML syrup Take by mouth 4 times daily as needed for CoughHistorical Med      pregabalin (LYRICA) 50 MG capsule TAKE 1 CAPSULE BY MOUTH IN THE EVENING, Disp-30 capsule, R-0Normal      XARELTO 20 MG TABS tablet TAKE 1 TABLET BY MOUTH ONCE DAILY WITH  EVENING  MEAL, Disp-90 tablet, R-1Normal      losartan (COZAAR) 100 MG tablet TAKE 1 TABLET BY MOUTH ONCE DAILY, Disp-90 tablet, R-0Normal      albuterol sulfate  (90 Base) MCG/ACT inhaler INHALE 2 PUFFS BY MOUTH EVERY 6 HOURS AS NEEDED FOR WHEEZING, Disp-1 Inhaler, R-5Please consider 90 day supplies to promote better adherenceNormal      JANUVIA 50 MG tablet TAKE 1 TABLET BY MOUTH ONCE DAILY, Disp-30 tablet, R-2Please consider 90 day supplies to promote better adherenceNormal      sulfamethoxazole-trimethoprim (BACTRIM;SEPTRA) 400-80 MG per tablet Take 1 tablet by mouth daily M,w,f only, R-5Historical Med      !! predniSONE (DELTASONE) 5 MG tablet Take 5 mg by mouth daily, R-6Historical Med      levalbuterol (XOPENEX) 1.25 MG/3ML nebulizer solution Take 1 ampule by nebulization every 6 hours as needed for WheezingHistorical Med      furosemide (LASIX) 20 MG tablet Take 1 tablet by mouth daily, Disp-30 tablet, R-3Normal      ibandronate (BONIVA) 150 MG tablet Take 1 tablet by mouth every 30 days On empty stomach and nothing by mouth or lie down for 1 hour, Disp-30 tablet, R-3Normal      EPINEPHrine (EPIPEN) 0.3 MG/0.3ML SOAJ injection Inject 0.3 mLs into the muscle as needed (Allergic Reaction), Disp-2 each, R-1Normal      Ascorbic Acid (VITAMIN C) 1000 MG tablet Take 1,000 mg by mouth dailyHistorical Med      donepezil (ARICEPT) 5 MG tablet TAKE 1 TABLET BY MOUTH NIGHTLY, Disp-90 tablet, R-3Please consider 90 day supplies to promote better adherenceNormal      azaTHIOprine (IMURAN) 50 MG tablet Take 50 mg by mouth daily Historical Med      IRON PO Take 2 tablets by mouth daily Historical Med      aspirin 81 MG tablet Take 81 mg by mouth daily. spironolactone (ALDACTONE) 25 MG tablet 3 days a week, Disp-9 tablet, R-1Normal      traMADol (ULTRAM) 50 MG tablet Take 50 mg by mouth nightly. Historical Med      nitroGLYCERIN (NITROSTAT) 0.4 MG SL tablet Place 1 tablet under the tongue every 5 minutes as needed for Chest pain, Disp-25 tablet, R-3Normal       !! - Potential duplicate medications found. Please discuss with provider.           ALLERGIES     Bee venom; Gabapentin; Penicillins; and Toprol xl [metoprolol]    FAMILY HISTORY       Family History   Problem Relation Age of Onset    Coronary Art Dis Father         AICD pacer age 68    Heart Attack Father     Cancer Sister         childhood chest tumor    Heart Failure Mother           SOCIAL HISTORY       Social History     Socioeconomic History    Marital status:      Spouse name: Not on file    Number of children: Not on file    Years of education: Not on file    Highest education level: Not on file   Occupational History    Not on file   Social Needs    Financial resource strain: Not on file    Food insecurity:     Worry: Not on file     Inability: Not on file    Transportation needs:     Medical: Not on file     Non-medical: Not on file   Tobacco Use    Smoking status: Never Smoker    Smokeless tobacco: Never Used   Substance and Sexual Activity    Alcohol use: No    Drug use: No    Sexual activity: Not on file   Lifestyle    Physical activity:     Days per week: Not on file     Minutes per session: Not on file    Stress: Not on file   Relationships    Social connections:     Talks on phone: Not on file     Gets together: Not on file     Attends Cheondo[employer] service: Not on file     Active member of club or organization: Not on file     Attends meetings of clubs or organizations: Not on file     Relationship status: Not on file    Intimate partner violence:     Fear of current or ex partner: Not on file     Emotionally abused: Not on file     Physically abused: Not on file     Forced sexual activity: Not on file   Other Topics Concern    Not on file   Social History Narrative    Not on file       SCREENINGS    Joffre Coma Scale  Eye Opening: Spontaneous  Best Verbal Response: Oriented  Best Motor Response: Obeys commands  Joffre Coma Scale Score: 15        PHYSICAL EXAM    (up to 7 for level 4, 8 or more for level 5)     ED Triage Vitals   BP Temp Temp src Pulse Resp SpO2 Height Weight   02/07/20 0930 02/07/20 0930 -- 02/07/20 0930 02/07/20 0930 02/07/20 0930 02/07/20 0929 02/07/20 0929   (!) 160/66 98.4 °F (36.9 °C)  95 21 (!) 87 % 5' 7\" (1.702 m) 189 lb (85.7 kg)       Physical Exam  Vitals signs and nursing note reviewed. Constitutional:       General: He is not in acute distress. Appearance: Normal appearance. He is well-developed. He is not diaphoretic. HENT:      Head: Normocephalic and atraumatic. Mouth/Throat:      Pharynx: No oropharyngeal exudate. Eyes:      General: No scleral icterus. Pupils: Pupils are equal, round, and reactive to light. Neck:      Musculoskeletal: Normal range of motion. Trachea: No tracheal deviation. Cardiovascular:      Rate and Rhythm: Normal rate. Pulses: Normal pulses. Heart sounds: Normal heart sounds. Pulmonary:      Effort: Pulmonary effort is normal. No respiratory distress. Breath sounds: No stridor. Examination of the right-lower field reveals rhonchi. Examination of the left-lower field reveals rhonchi. Rhonchi present. No wheezing. Comments: Frequent coughing, coarse rattly breath sounds in lower lung zones. No wheezing  Abdominal:      General: There is no distension. Palpations: Abdomen is soft. Abdomen is not rigid. Tenderness:  There is no abdominal tenderness. There is no guarding. Hernia: No hernia is present. Musculoskeletal:         General: No deformity. Skin:     General: Skin is warm and dry. Findings: No rash. Neurological:      Mental Status: He is alert and oriented to person, place, and time. Cranial Nerves: No cranial nerve deficit. Coordination: Coordination normal.   Psychiatric:         Behavior: Behavior normal.         DIAGNOSTIC RESULTS     EKG: All EKG's are interpreted by the Emergency Department Physician who either signs or Co-signs this chart in the absence of a cardiologist.        RADIOLOGY:   Non-plain film images such as CT, Ultrasound and MRI are read by the radiologist. Omntana Gaudier images are visualized and preliminarily interpreted by the emergency physician with the below findings:        Interpretation per the Radiologist below, if available at the time of this note:    XR CHEST STANDARD (2 VW)   Final Result   1. Stable chronic change.    Signed by Dr Migel Scruggs on 2/7/2020 9:56 AM            ED BEDSIDE ULTRASOUND:   Performed by ED Physician - none    LABS:  Labs Reviewed   CBC WITH AUTO DIFFERENTIAL - Abnormal; Notable for the following components:       Result Value    RBC 4.44 (*)     Hemoglobin 12.9 (*)     Hematocrit 39.9 (*)     MCHC 32.3 (*)     Monocytes % 14.5 (*)     Eosinophils % 5.3 (*)     Basophils % 1.1 (*)     All other components within normal limits   COMPREHENSIVE METABOLIC PANEL W/ REFLEX TO MG FOR LOW K - Abnormal; Notable for the following components:    Chloride 95 (*)     Glucose 254 (*)     CREATININE 1.3 (*)     GFR Non-African American 55 (*)     Calcium 11.1 (*)     All other components within normal limits   BLOOD GAS, ARTERIAL - Abnormal; Notable for the following components:    pCO2, Arterial 54.0 (*)     pO2, Arterial 60.0 (*)     HCO3, Arterial 32.7 (*)     Base Excess, Arterial 6.3 (*)     Hemoglobin, Art, Extended 12.5 (*)     All other components

## 2020-02-07 NOTE — ED TRIAGE NOTES
Patient has history of sarcoidosis and has been short of breath and had a persistent cough.  Patient states he has yellow sputum

## 2020-02-10 LAB
EKG P AXIS: 60 DEGREES
EKG P-R INTERVAL: 144 MS
EKG Q-T INTERVAL: 340 MS
EKG QRS DURATION: 110 MS
EKG QTC CALCULATION (BAZETT): 389 MS
EKG T AXIS: 80 DEGREES

## 2020-02-10 PROCEDURE — 93010 ELECTROCARDIOGRAM REPORT: CPT | Performed by: INTERNAL MEDICINE

## 2020-02-11 ENCOUNTER — OFFICE VISIT (OUTPATIENT)
Dept: INTERNAL MEDICINE | Age: 70
End: 2020-02-11
Payer: COMMERCIAL

## 2020-02-11 ENCOUNTER — APPOINTMENT (OUTPATIENT)
Dept: GENERAL RADIOLOGY | Age: 70
End: 2020-02-11
Payer: COMMERCIAL

## 2020-02-11 ENCOUNTER — HOSPITAL ENCOUNTER (EMERGENCY)
Age: 70
Discharge: HOME | End: 2020-02-12
Attending: EMERGENCY MEDICINE
Payer: COMMERCIAL

## 2020-02-11 VITALS
WEIGHT: 179 LBS | HEIGHT: 67 IN | RESPIRATION RATE: 20 BRPM | OXYGEN SATURATION: 97 % | DIASTOLIC BLOOD PRESSURE: 56 MMHG | SYSTOLIC BLOOD PRESSURE: 105 MMHG | BODY MASS INDEX: 28.09 KG/M2 | HEART RATE: 94 BPM

## 2020-02-11 DIAGNOSIS — D86.9 SARCOIDOSIS: ICD-10-CM

## 2020-02-11 LAB
ALBUMIN SERPL-MCNC: 4.2 G/DL (ref 3.5–5.2)
ALBUMIN SERPL-MCNC: 4.3 G/DL (ref 3.5–5.2)
ALP BLD-CCNC: 73 U/L (ref 40–130)
ALP BLD-CCNC: 75 U/L (ref 40–130)
ALT SERPL-CCNC: 14 U/L (ref 5–41)
ALT SERPL-CCNC: 15 U/L (ref 5–41)
ANION GAP SERPL CALCULATED.3IONS-SCNC: 17 MMOL/L (ref 7–19)
ANION GAP SERPL CALCULATED.3IONS-SCNC: 19 MMOL/L (ref 7–19)
AST SERPL-CCNC: 14 U/L (ref 5–40)
AST SERPL-CCNC: 15 U/L (ref 5–40)
BASE EXCESS ARTERIAL: 0.5 MMOL/L (ref -2–2)
BASE EXCESS VENOUS: 1 MMOL/L
BASOPHILS ABSOLUTE: 0 K/UL (ref 0–0.2)
BASOPHILS ABSOLUTE: 0 K/UL (ref 0–0.2)
BASOPHILS RELATIVE PERCENT: 0.3 % (ref 0–1)
BASOPHILS RELATIVE PERCENT: 0.4 % (ref 0–1)
BILIRUB SERPL-MCNC: 0.5 MG/DL (ref 0.2–1.2)
BILIRUB SERPL-MCNC: 0.7 MG/DL (ref 0.2–1.2)
BILIRUBIN URINE: NEGATIVE
BLOOD, URINE: ABNORMAL
BUN BLDV-MCNC: 42 MG/DL (ref 8–23)
BUN BLDV-MCNC: 43 MG/DL (ref 8–23)
CALCIUM SERPL-MCNC: 10.2 MG/DL (ref 8.8–10.2)
CALCIUM SERPL-MCNC: 10.3 MG/DL (ref 8.8–10.2)
CARBOXYHEMOGLOBIN ARTERIAL: 2.2 % (ref 0–5)
CARBOXYHEMOGLOBIN: 2.9 %
CHLORIDE BLD-SCNC: 84 MMOL/L (ref 98–111)
CHLORIDE BLD-SCNC: 84 MMOL/L (ref 98–111)
CHP ED QC CHECK: NORMAL
CLARITY: CLEAR
CO2: 24 MMOL/L (ref 22–29)
CO2: 27 MMOL/L (ref 22–29)
COLOR: YELLOW
CREAT SERPL-MCNC: 1.4 MG/DL (ref 0.5–1.2)
CREAT SERPL-MCNC: 1.5 MG/DL (ref 0.5–1.2)
EOSINOPHILS ABSOLUTE: 0 K/UL (ref 0–0.6)
EOSINOPHILS ABSOLUTE: 0 K/UL (ref 0–0.6)
EOSINOPHILS RELATIVE PERCENT: 0 % (ref 0–5)
EOSINOPHILS RELATIVE PERCENT: 0.2 % (ref 0–5)
EPITHELIAL CELLS, UA: NORMAL /HPF
GFR NON-AFRICAN AMERICAN: 46
GFR NON-AFRICAN AMERICAN: 50
GLUCOSE BLD-MCNC: 502 MG/DL
GLUCOSE BLD-MCNC: 502 MG/DL (ref 70–99)
GLUCOSE BLD-MCNC: 664 MG/DL (ref 74–109)
GLUCOSE BLD-MCNC: 795 MG/DL (ref 74–109)
GLUCOSE BLD-MCNC: >550 MG/DL (ref 70–99)
GLUCOSE URINE: >=1000 MG/DL
HCO3 ARTERIAL: 26 MMOL/L (ref 22–26)
HCO3 VENOUS: 25 MMOL/L (ref 23–29)
HCT VFR BLD CALC: 40 % (ref 42–52)
HCT VFR BLD CALC: 40.4 % (ref 42–52)
HEMOGLOBIN, ART, EXTENDED: 13.5 G/DL (ref 14–18)
HEMOGLOBIN: 13.6 G/DL (ref 14–18)
HEMOGLOBIN: 13.7 G/DL (ref 14–18)
IMMATURE GRANULOCYTES #: 0.1 K/UL
IMMATURE GRANULOCYTES #: 0.2 K/UL
KETONES, URINE: NEGATIVE MG/DL
LEUKOCYTE ESTERASE, URINE: NEGATIVE
LYMPHOCYTES ABSOLUTE: 0.9 K/UL (ref 1.1–4.5)
LYMPHOCYTES ABSOLUTE: 0.9 K/UL (ref 1.1–4.5)
LYMPHOCYTES RELATIVE PERCENT: 10.6 % (ref 20–40)
LYMPHOCYTES RELATIVE PERCENT: 8.1 % (ref 20–40)
MCH RBC QN AUTO: 29 PG (ref 27–31)
MCH RBC QN AUTO: 29.4 PG (ref 27–31)
MCHC RBC AUTO-ENTMCNC: 33.9 G/DL (ref 33–37)
MCHC RBC AUTO-ENTMCNC: 34 G/DL (ref 33–37)
MCV RBC AUTO: 85.3 FL (ref 80–94)
MCV RBC AUTO: 86.7 FL (ref 80–94)
METHEMOGLOBIN ARTERIAL: 1.1 %
METHEMOGLOBIN VENOUS: 0.8 %
MONOCYTES ABSOLUTE: 0.4 K/UL (ref 0–0.9)
MONOCYTES ABSOLUTE: 0.6 K/UL (ref 0–0.9)
MONOCYTES RELATIVE PERCENT: 5.2 % (ref 0–10)
MONOCYTES RELATIVE PERCENT: 5.6 % (ref 0–10)
NEUTROPHILS ABSOLUTE: 6.7 K/UL (ref 1.5–7.5)
NEUTROPHILS ABSOLUTE: 8.9 K/UL (ref 1.5–7.5)
NEUTROPHILS RELATIVE PERCENT: 82.4 % (ref 50–65)
NEUTROPHILS RELATIVE PERCENT: 84.4 % (ref 50–65)
NITRITE, URINE: NEGATIVE
O2 CONTENT ARTERIAL: 17.3 ML/DL
O2 CONTENT, VEN: 18 ML/DL
O2 SAT, ARTERIAL: 91.2 %
O2 SAT, VEN: 93 %
O2 THERAPY: ABNORMAL
PCO2 ARTERIAL: 44 MMHG (ref 35–45)
PCO2, VEN: 40 MMHG (ref 40–50)
PDW BLD-RTO: 13.2 % (ref 11.5–14.5)
PDW BLD-RTO: 13.4 % (ref 11.5–14.5)
PERFORMED ON: ABNORMAL
PERFORMED ON: ABNORMAL
PH ARTERIAL: 7.38 (ref 7.35–7.45)
PH UA: 6 (ref 5–8)
PH VENOUS: 7.41 (ref 7.35–7.45)
PLATELET # BLD: 171 K/UL (ref 130–400)
PLATELET # BLD: 194 K/UL (ref 130–400)
PMV BLD AUTO: 11 FL (ref 9.4–12.4)
PMV BLD AUTO: 11.1 FL (ref 9.4–12.4)
PO2 ARTERIAL: 61 MMHG (ref 80–100)
PO2, VEN: 68 MMHG
POTASSIUM SERPL-SCNC: 4 MMOL/L (ref 3.5–5)
POTASSIUM SERPL-SCNC: 4.7 MMOL/L (ref 3.5–5)
POTASSIUM, WHOLE BLOOD: 4.5
PROTEIN UA: NEGATIVE MG/DL
RBC # BLD: 4.66 M/UL (ref 4.7–6.1)
RBC # BLD: 4.69 M/UL (ref 4.7–6.1)
RBC UA: NORMAL /HPF (ref 0–2)
SODIUM BLD-SCNC: 127 MMOL/L (ref 136–145)
SODIUM BLD-SCNC: 128 MMOL/L (ref 136–145)
SPECIFIC GRAVITY UA: 1.01 (ref 1–1.03)
TOTAL PROTEIN: 7.3 G/DL (ref 6.6–8.7)
TOTAL PROTEIN: 7.5 G/DL (ref 6.6–8.7)
URINE REFLEX TO CULTURE: ABNORMAL
UROBILINOGEN, URINE: 0.2 E.U./DL
WBC # BLD: 10.5 K/UL (ref 4.8–10.8)
WBC # BLD: 8.1 K/UL (ref 4.8–10.8)
WBC UA: NORMAL /HPF (ref 0–5)

## 2020-02-11 PROCEDURE — 1111F DSCHRG MED/CURRENT MED MERGE: CPT | Performed by: NURSE PRACTITIONER

## 2020-02-11 PROCEDURE — 2580000003 HC RX 258: Performed by: EMERGENCY MEDICINE

## 2020-02-11 PROCEDURE — 36600 WITHDRAWAL OF ARTERIAL BLOOD: CPT

## 2020-02-11 PROCEDURE — 82803 BLOOD GASES ANY COMBINATION: CPT

## 2020-02-11 PROCEDURE — 3017F COLORECTAL CA SCREEN DOC REV: CPT | Performed by: NURSE PRACTITIONER

## 2020-02-11 PROCEDURE — 4040F PNEUMOC VAC/ADMIN/RCVD: CPT | Performed by: NURSE PRACTITIONER

## 2020-02-11 PROCEDURE — G8482 FLU IMMUNIZE ORDER/ADMIN: HCPCS | Performed by: NURSE PRACTITIONER

## 2020-02-11 PROCEDURE — 71046 X-RAY EXAM CHEST 2 VIEWS: CPT

## 2020-02-11 PROCEDURE — 6370000000 HC RX 637 (ALT 250 FOR IP): Performed by: EMERGENCY MEDICINE

## 2020-02-11 PROCEDURE — 81001 URINALYSIS AUTO W/SCOPE: CPT

## 2020-02-11 PROCEDURE — 96374 THER/PROPH/DIAG INJ IV PUSH: CPT

## 2020-02-11 PROCEDURE — G8926 SPIRO NO PERF OR DOC: HCPCS | Performed by: NURSE PRACTITIONER

## 2020-02-11 PROCEDURE — 85025 COMPLETE CBC W/AUTO DIFF WBC: CPT

## 2020-02-11 PROCEDURE — 3023F SPIROM DOC REV: CPT | Performed by: NURSE PRACTITIONER

## 2020-02-11 PROCEDURE — 99285 EMERGENCY DEPT VISIT HI MDM: CPT

## 2020-02-11 PROCEDURE — G8417 CALC BMI ABV UP PARAM F/U: HCPCS | Performed by: NURSE PRACTITIONER

## 2020-02-11 PROCEDURE — 82948 REAGENT STRIP/BLOOD GLUCOSE: CPT

## 2020-02-11 PROCEDURE — 84132 ASSAY OF SERUM POTASSIUM: CPT

## 2020-02-11 PROCEDURE — 80053 COMPREHEN METABOLIC PANEL: CPT

## 2020-02-11 PROCEDURE — 1123F ACP DISCUSS/DSCN MKR DOCD: CPT | Performed by: NURSE PRACTITIONER

## 2020-02-11 PROCEDURE — 36415 COLL VENOUS BLD VENIPUNCTURE: CPT

## 2020-02-11 PROCEDURE — 1036F TOBACCO NON-USER: CPT | Performed by: NURSE PRACTITIONER

## 2020-02-11 PROCEDURE — G8427 DOCREV CUR MEDS BY ELIG CLIN: HCPCS | Performed by: NURSE PRACTITIONER

## 2020-02-11 PROCEDURE — 99213 OFFICE O/P EST LOW 20 MIN: CPT | Performed by: NURSE PRACTITIONER

## 2020-02-11 RX ORDER — 0.9 % SODIUM CHLORIDE 0.9 %
1000 INTRAVENOUS SOLUTION INTRAVENOUS ONCE
Status: COMPLETED | OUTPATIENT
Start: 2020-02-11 | End: 2020-02-12

## 2020-02-11 RX ORDER — 0.9 % SODIUM CHLORIDE 0.9 %
1000 INTRAVENOUS SOLUTION INTRAVENOUS ONCE
Status: COMPLETED | OUTPATIENT
Start: 2020-02-11 | End: 2020-02-11

## 2020-02-11 RX ADMIN — INSULIN HUMAN 15 UNITS: 100 INJECTION, SOLUTION PARENTERAL at 22:56

## 2020-02-11 RX ADMIN — SODIUM CHLORIDE 1000 ML: 9 INJECTION, SOLUTION INTRAVENOUS at 22:57

## 2020-02-11 RX ADMIN — SODIUM CHLORIDE 1000 ML: 9 INJECTION, SOLUTION INTRAVENOUS at 21:01

## 2020-02-11 ASSESSMENT — ENCOUNTER SYMPTOMS
RHINORRHEA: 0
NAUSEA: 0
SHORTNESS OF BREATH: 0
BACK PAIN: 0
VOMITING: 0
ABDOMINAL PAIN: 0
COUGH: 0
SORE THROAT: 0
DIARRHEA: 0

## 2020-02-11 NOTE — PROGRESS NOTES
Acid (VITAMIN C) 1000 MG tablet  Take 1,000 mg by mouth daily             aspirin 81 MG tablet  Take 81 mg by mouth daily.              azaTHIOprine (IMURAN) 50 MG tablet  Take 50 mg by mouth daily              azithromycin (ZITHROMAX) 250 MG tablet  Take 1 tablet by mouth See Admin Instructions for 5 days 500mg on day 1 followed by 250mg on days 2 - 5             donepezil (ARICEPT) 5 MG tablet  TAKE 1 TABLET BY MOUTH NIGHTLY             EPINEPHrine (EPIPEN) 0.3 MG/0.3ML SOAJ injection  Inject 0.3 mLs into the muscle as needed (Allergic Reaction)             fluticasone-salmeterol (ADVAIR) 500-50 MCG/DOSE diskus inhaler  Inhale 1 puff into the lungs daily             furosemide (LASIX) 20 MG tablet  Take 1 tablet by mouth daily             ibandronate (BONIVA) 150 MG tablet  Take 1 tablet by mouth every 30 days On empty stomach and nothing by mouth or lie down for 1 hour             IRON PO  Take 2 tablets by mouth daily              JANUVIA 50 MG tablet  TAKE 1 TABLET BY MOUTH ONCE DAILY             levalbuterol (XOPENEX) 1.25 MG/3ML nebulizer solution  Take 1 ampule by nebulization every 6 hours as needed for Wheezing             losartan (COZAAR) 100 MG tablet  TAKE 1 TABLET BY MOUTH ONCE DAILY             nitroGLYCERIN (NITROSTAT) 0.4 MG SL tablet  Place 1 tablet under the tongue every 5 minutes as needed for Chest pain             predniSONE (DELTASONE) 20 MG tablet  Take 60mg days 1-2  Take 40mg days 3-5  Take 20mg days 6-7             predniSONE (DELTASONE) 5 MG tablet  Take 5 mg by mouth daily             pregabalin (LYRICA) 50 MG capsule  TAKE 1 CAPSULE BY MOUTH IN THE EVENING             promethazine-dextromethorphan (PROMETHAZINE-DM) 6.25-15 MG/5ML syrup  Take by mouth 4 times daily as needed for Cough             spironolactone (ALDACTONE) 25 MG tablet  3 days a week             sulfamethoxazole-trimethoprim (BACTRIM;SEPTRA) 400-80 MG per tablet  Take 1 tablet by mouth daily M,w,f only 30 tablet 3    ibandronate (BONIVA) 150 MG tablet Take 1 tablet by mouth every 30 days On empty stomach and nothing by mouth or lie down for 1 hour (Patient taking differently: Take 150 mg by mouth every 30 days On empty stomach and nothing by mouth or lie down for 1 hour. Takes on the 12th of each month.) 30 tablet 3    EPINEPHrine (EPIPEN) 0.3 MG/0.3ML SOAJ injection Inject 0.3 mLs into the muscle as needed (Allergic Reaction) 2 each 1    Ascorbic Acid (VITAMIN C) 1000 MG tablet Take 1,000 mg by mouth daily      nitroGLYCERIN (NITROSTAT) 0.4 MG SL tablet Place 1 tablet under the tongue every 5 minutes as needed for Chest pain 25 tablet 3    donepezil (ARICEPT) 5 MG tablet TAKE 1 TABLET BY MOUTH NIGHTLY 90 tablet 3    azaTHIOprine (IMURAN) 50 MG tablet Take 50 mg by mouth daily       IRON PO Take 2 tablets by mouth daily       aspirin 81 MG tablet Take 81 mg by mouth daily. Medications patient taking as of now reconciled against medications ordered at time of hospital discharge: Yes    Chief Complaint   Patient presents with   [address] from [address] follow up visit        History of Present illness - Follow up of Hospital diagnosis(es):     1. Sarcoidosis  With in creased SOB   Green sputum ; He is on zpak;  From the Er; Inpatient course: Discharge summary reviewed- see chart. Interval history/Current status:       A comprehensive review of systems was negative except for what was noted in the HPI. Vitals:    02/11/20 1536   BP: (!) 105/56   Pulse: 94   Resp: 20   SpO2: 97%   Weight: 179 lb (81.2 kg)   Height: 5' 7\" (1.702 m)     Body mass index is 28.04 kg/m².    Wt Readings from Last 3 Encounters:   02/11/20 179 lb (81.2 kg)   02/07/20 189 lb (85.7 kg)   02/03/20 189 lb (85.7 kg)     BP Readings from Last 3 Encounters:   02/11/20 (!) 105/56   02/07/20 (!) 126/53   02/03/20 128/82        Physical Exam:  General Appearance: alert and oriented to person, place and time, well developed and well- nourished, in no acute distress  Skin: warm and dry, no rash or erythema  Head: normocephalic and atraumatic  Neck: supple and non-tender without mass, no thyromegaly or thyroid nodules, no cervical lymphadenopathy  Pulmonary/Chest: Decreased breath sounds in both bases fibrotic sounds bilaterally upper lobes are fairly clear. He does continue to have a dry hacking cough  Cardiovascular: normal rate, regular rhythm, normal S1 and S2, no murmurs, rubs, clicks, or gallops, distal pulses intact, no carotid bruits  Abdomen: soft, non-tender, non-distended, normal bowel sounds, no masses or organomegaly  Extremities: no cyanosis, clubbing or edema  Musculoskeletal: normal range of motion, no joint swelling, deformity or tenderness  Neurologic: reflexes normal and symmetric, no cranial nerve deficit, gait, coordination and speech normal    Assessment/Plan:  1. Sarcoidosis    - CBC Auto Differential; Future  - Comprehensive Metabolic Panel; Future  - [place] DISCHARGE MEDS RECONCILED W/ CURRENT OUTPATIENT MED LIST    2. Other emphysema ([address]      1600procedure #20 Cathlon left wrist with saline flush.   1 g of Rocephin IV infused in the office  1615 infusion complete patient tolerated procedure well ID removed        Medical Decision Making: high complexity

## 2020-02-12 ENCOUNTER — OFFICE VISIT (OUTPATIENT)
Dept: INTERNAL MEDICINE | Age: 70
End: 2020-02-12
Payer: COMMERCIAL

## 2020-02-12 VITALS
HEIGHT: 67 IN | DIASTOLIC BLOOD PRESSURE: 59 MMHG | WEIGHT: 183 LBS | BODY MASS INDEX: 28.72 KG/M2 | HEART RATE: 78 BPM | OXYGEN SATURATION: 95 % | SYSTOLIC BLOOD PRESSURE: 113 MMHG

## 2020-02-12 VITALS
WEIGHT: 174 LBS | RESPIRATION RATE: 15 BRPM | SYSTOLIC BLOOD PRESSURE: 123 MMHG | TEMPERATURE: 97.6 F | OXYGEN SATURATION: 94 % | HEART RATE: 73 BPM | HEIGHT: 67 IN | BODY MASS INDEX: 27.31 KG/M2 | DIASTOLIC BLOOD PRESSURE: 64 MMHG

## 2020-02-12 LAB
GLUCOSE BLD-MCNC: 369 MG/DL (ref 70–99)
GLUCOSE BLD-MCNC: 409 MG/DL
GLUCOSE BLD-MCNC: 409 MG/DL (ref 70–99)
PERFORMED ON: ABNORMAL
PERFORMED ON: ABNORMAL

## 2020-02-12 PROCEDURE — 3017F COLORECTAL CA SCREEN DOC REV: CPT | Performed by: NURSE PRACTITIONER

## 2020-02-12 PROCEDURE — 2022F DILAT RTA XM EVC RTNOPTHY: CPT | Performed by: NURSE PRACTITIONER

## 2020-02-12 PROCEDURE — 82948 REAGENT STRIP/BLOOD GLUCOSE: CPT

## 2020-02-12 PROCEDURE — 1123F ACP DISCUSS/DSCN MKR DOCD: CPT | Performed by: NURSE PRACTITIONER

## 2020-02-12 PROCEDURE — 3046F HEMOGLOBIN A1C LEVEL >9.0%: CPT | Performed by: NURSE PRACTITIONER

## 2020-02-12 PROCEDURE — G8417 CALC BMI ABV UP PARAM F/U: HCPCS | Performed by: NURSE PRACTITIONER

## 2020-02-12 PROCEDURE — 96372 THER/PROPH/DIAG INJ SC/IM: CPT | Performed by: NURSE PRACTITIONER

## 2020-02-12 PROCEDURE — G8482 FLU IMMUNIZE ORDER/ADMIN: HCPCS | Performed by: NURSE PRACTITIONER

## 2020-02-12 PROCEDURE — G8427 DOCREV CUR MEDS BY ELIG CLIN: HCPCS | Performed by: NURSE PRACTITIONER

## 2020-02-12 PROCEDURE — 99214 OFFICE O/P EST MOD 30 MIN: CPT | Performed by: NURSE PRACTITIONER

## 2020-02-12 PROCEDURE — 1111F DSCHRG MED/CURRENT MED MERGE: CPT | Performed by: NURSE PRACTITIONER

## 2020-02-12 PROCEDURE — 1036F TOBACCO NON-USER: CPT | Performed by: NURSE PRACTITIONER

## 2020-02-12 PROCEDURE — 4040F PNEUMOC VAC/ADMIN/RCVD: CPT | Performed by: NURSE PRACTITIONER

## 2020-02-12 RX ORDER — CEFDINIR 300 MG/1
300 CAPSULE ORAL 2 TIMES DAILY
Qty: 14 CAPSULE | Refills: 1 | Status: SHIPPED | OUTPATIENT
Start: 2020-02-12 | End: 2020-02-19

## 2020-02-12 ASSESSMENT — ENCOUNTER SYMPTOMS
EYE REDNESS: 0
RHINORRHEA: 0
ABDOMINAL PAIN: 0
COUGH: 1
VOICE CHANGE: 0
VOMITING: 0
DIARRHEA: 0
EYE PAIN: 0
SHORTNESS OF BREATH: 1

## 2020-02-12 NOTE — PROGRESS NOTES
Post-Discharge Transitional Care Management Services or Hospital Follow Up      Mar Tomas   YOB: 1950    Date of Office Visit:  2/12/2020  Date of Hospital Admission: 2/11/20  Date of Hospital Discharge: 2/12/20  Risk of hospital readmission (high >=14%. Medium >=10%) :Readmission Risk Score: 20      Care management risk score Rising risk (score 2-5) and Complex Care (Scores >=6): 7     Non face to face  following discharge, date last encounter closed (first attempt may have been earlier): *No documented post hospital discharge outreach found in the last 14 days    Call initiated 2 business days of discharge: *No response recorded in the last 14 days    Patient Active Problem List   Diagnosis    Hypertension    Hyperlipidemia    PAF (paroxysmal atrial fibrillation) ([address]    S/P ablation of atrial fibrillation    Type 2 diabetes mellitus with peripheral neuropathy ([address]    Sarcoidosis, lung ([address]    GARNETT (dyspnea on exertion)    Hypercalcemia    Asthma    Depression    Pancreatitis    Peripheral nerve disease    Polyp of colon    History of histoplasmosis    Lymphadenopathy    Splenomegaly    Ureterolithiasis    Hydronephrosis, left    Other male erectile dysfunction    Anemia    Other emphysema ([address]    Gross hematuria    Chronic kidney disease    Pneumonia of both upper lobes due to infectious organism ([address]    Nephrolithiasis    Vitamin D deficiency    Sarcoidosis    Achalasia       Allergies   Allergen Reactions    Bee Venom     Gabapentin      Causes blisters to head.      Penicillins     Toprol Xl [Metoprolol]      Leg pain        Medications listed as ordered at the time of discharge from [facility] Medication Instructions JOSEPH:    Printed on:02/12/20 2527   Medication Information                      albuterol sulfate  (90 Base) MCG/ACT inhaler  INHALE 2 PUFFS BY MOUTH EVERY 6 HOURS AS NEEDED FOR WHEEZING             Ascorbic XARELTO 20 MG TABS tablet  TAKE 1 TABLET BY MOUTH ONCE DAILY WITH  EVENING  MEAL                   Medications marked \"taking\" at this time  Outpatient Medications Marked as Taking for the 2/12/20 encounter (Office Visit) with NUNO Greene   Medication Sig Dispense Refill    cefdinir (OMNICEF) 300 MG capsule Take 1 capsule by mouth 2 times daily for 7 days 14 capsule 1    fluticasone-salmeterol (ADVAIR) 500-50 MCG/DOSE diskus inhaler Inhale 1 puff into the lungs daily 1 Inhaler 3    promethazine-dextromethorphan (PROMETHAZINE-DM) 6.25-15 MG/5ML syrup Take by mouth 4 times daily as needed for Cough      spironolactone (ALDACTONE) 25 MG tablet 3 days a week (Patient taking differently: Prn) 9 tablet 1    pregabalin (LYRICA) 50 MG capsule TAKE 1 CAPSULE BY MOUTH IN THE EVENING 30 capsule 0    XARELTO 20 MG TABS tablet TAKE 1 TABLET BY MOUTH ONCE DAILY WITH  EVENING  MEAL 90 tablet 1    losartan (COZAAR) 100 MG tablet TAKE 1 TABLET BY MOUTH ONCE DAILY 90 tablet 0    albuterol sulfate  (90 Base) MCG/ACT inhaler INHALE 2 PUFFS BY MOUTH EVERY 6 HOURS AS NEEDED FOR WHEEZING 1 Inhaler 5    JANUVIA 50 MG tablet TAKE 1 TABLET BY MOUTH ONCE DAILY 30 tablet 2    sulfamethoxazole-trimethoprim (BACTRIM;SEPTRA) 400-80 MG per tablet Take 1 tablet by mouth daily M,w,f only  5    traMADol (ULTRAM) 50 MG tablet Take 50 mg by mouth nightly.       levalbuterol (XOPENEX) 1.25 MG/3ML nebulizer solution Take 1 ampule by nebulization every 6 hours as needed for Wheezing      furosemide (LASIX) 20 MG tablet Take 1 tablet by mouth daily (Patient taking differently: Take 10 mg by mouth daily as needed (if greater than 3 pound weight gain daily) ) 30 tablet 3    ibandronate (BONIVA) 150 MG tablet Take 1 tablet by mouth every 30 days On empty stomach and nothing by mouth or lie down for 1 hour (Patient taking differently: Take 150 mg by mouth every 30 days On empty stomach and nothing by mouth or lie down for 1

## 2020-02-12 NOTE — PROGRESS NOTES
Component Value Ref Range & Units Status Collected Lab   pH, [place] 7.41  7.35 - 7.45 Final 02/11/2020  9:03 PM [facility] Lab   pCO2, [place] 40.0  40.0 - 50.0 mmHg Final 02/11/2020  9:03 PM [address] Lab   pO2, [place] 68  Not Established mmHg Final 02/11/2020  9:03 PM [facility] Lab   HCO3, Venous 25  23 - 29 mmol/L Final 02/11/2020  9:03 PM [facility] Excess, [place] 1  Not Established mmol/L Final 02/11/2020  9:03 PM [address] Lab   O2 Sat, [place] 93  Not Established % Final 02/11/2020  9:03 PM [facility]   Carboxyhemoglobin 2.9  % Final 02/11/2020  9:03 PM [facility]        0.0-1.5   (Smokers 1.5-5.0)    MetHgb, [place] 0.8  <1.5 % Final 02/11/2020  9:03 PM [address] Lab   O2 Content, [place] 18  Not Established mL/dL Final 02/11/2020  9:03 PM [address] Lab   Testing Performed By     sujeyg

## 2020-02-12 NOTE — ED PROVIDER NOTES
Date    Achalasia     going to [facility] for surgery in March    Acute pancreatitis     Anemia     Asthma     Atrial fibrillation ([address]     h/o paroxysmal a-fib ? anesthsia induced    Benign colonic polyp     cscope 12/07 Dr Baljit Gardiner adenomatous: 12/14 adenoma    Chest pain     Chronic pain syndrome     Depression     Diabetic peripheral neuropathy (HCC)     Extrinsic asthma     Hyperlipidemia     Hypertension     Idiopathic peripheral neuropathy     Lyme disease     Mediastinal lymphadenopathy     Microalbuminuria     Mixed hyperlipidemia     Paroxysmal A-fib (HCC)     S/P ablation of atrial fibrillation     4/16 A fib ablation , Dr Lul Walker, [facility]     Sarcoidosis, lung ([address]     restrictive lung impairment    Tick bite     Type 2 diabetes mellitus with peripheral neuropathy ([address]     Type 2 diabetes, controlled, with neuropathy ([address]          SURGICAL HISTORY       Past Surgical History:   Procedure Laterality Date    ANKLE FRACTURE SURGERY  april 14, 2015    ATRIAL ABLATION SURGERY  2015    Dr. Carpenter Scrivener      Catheter Ablation A-fib    CHOLECYSTECTOMY      COLONOSCOPY  12/02/2014    Melecio    CYSTOSCOPY Left 8/16/2019    CYSTOSCOPY; LEFT RETROGRADE PYELOGRAM; INSERTION LEFT URETERAL STENT performed by Jose Bejarano MD at [address] [place] [address] Left 6/27/2018    LASER LITHOTRIPSY STONE MANIPULATION WITH DOUBLE J STENT PLACEMENT performed by Jose [facility]jarano MD at [facility] Left 6/27/2018    CYSTOSCOPY URETEROSCOPY RETROGRADE PYELOGRAMS performed by Jose Bejarano MD at [address] [place] [address]     Discharge Medication List as of 2/12/2020  1:11 AM      CONTINUE these medications which have NOT CHANGED    Details   fluticasone-salmeterol (ADVAIR) 500-50 MCG/DOSE diskus inhaler Inhale 1 puff into the lungs daily, Disp-1 Inhaler, R-3Normal mouth daily, R-6Historical Med      traMADol (ULTRAM) 50 MG tablet Take 50 mg by mouth nightly. Historical Med      furosemide (LASIX) 20 MG tablet Take 1 tablet by mouth daily, Disp-30 tablet, R-3Normal      ibandronate (BONIVA) 150 MG tablet Take 1 tablet by mouth every 30 days On empty stomach and nothing by mouth or lie down for 1 hour, Disp-30 tablet, R-3Normal      EPINEPHrine (EPIPEN) 0.3 MG/0.3ML SOAJ injection Inject 0.3 mLs into the muscle as needed (Allergic Reaction), Disp-2 each, R-1Normal      nitroGLYCERIN (NITROSTAT) 0.4 MG SL tablet Place 1 tablet under the tongue every 5 minutes as needed for Chest pain, Disp-25 tablet, R-3Normal       !! - Potential duplicate medications found. Please discuss with provider.           ALLERGIES     Bee venom; Gabapentin; Penicillins; and Toprol xl [metoprolol]    FAMILY HISTORY       Family History   Problem Relation Age of Onset    Coronary Art Dis Father         AICD pacer age 68    Heart Attack Father     Cancer Sister         childhood chest tumor    Heart Failure Mother           SOCIAL HISTORY       Social History     Socioeconomic History    Marital status:      Spouse name: None    Number of children: None    Years of education: None    Highest education level: None   Occupational History    None   Social Needs    Financial resource strain: None    Food insecurity:     Worry: None     Inability: None    Transportation needs:     Medical: None     Non-medical: None   Tobacco Use    Smoking status: Never Smoker    Smokeless tobacco: Never Used   Substance and Sexual Activity    Alcohol use: No    Drug use: No    Sexual activity: None   Lifestyle    Physical activity:     Days per week: None     Minutes per session: None    Stress: None   Relationships    Social connections:     Talks on phone: None     Gets together: None     Attends Baptism service: None     Active member of club or organization: None     Attends meetings of clubs or organizations: None     Relationship status: None    Intimate partner violence:     Fear of current or ex partner: None     Emotionally abused: None     Physically abused: None     Forced sexual activity: None   Other Topics Concern    None   Social History Narrative    None       SCREENINGS             PHYSICAL EXAM    (up to 7 for level 4, 8 or more for level 5)     ED Triage Vitals   BP Temp Temp Source Pulse Resp SpO2 Height Weight   02/11/20 2041 02/11/20 2041 02/11/20 2041 02/11/20 2041 02/11/20 2041 02/11/20 2041 02/11/20 2039 02/11/20 2039   (!) 143/74 97.5 °F (36.4 °C) Oral 90 21 93 % 5' 7\" (1.702 m) 174 lb (78.9 kg)       Physical Exam  Vitals signs and nursing note reviewed. Constitutional:       General: He is not in acute distress. Appearance: He is well-developed. He is not ill-appearing, toxic-appearing or diaphoretic. HENT:      Head: Normocephalic and atraumatic. Mouth/Throat:      Mouth: Mucous membranes are moist.   Eyes:      Conjunctiva/sclera: Conjunctivae normal.   Neck:      Musculoskeletal: Normal range of motion and neck supple. Trachea: No tracheal deviation. Cardiovascular:      Rate and Rhythm: Normal rate and regular rhythm. Heart sounds: Normal heart sounds. No murmur. Pulmonary:      Breath sounds: Examination of the right-lower field reveals decreased breath sounds. Examination of the left-lower field reveals decreased breath sounds. Decreased breath sounds present. No wheezing or rales. Abdominal:      Palpations: Abdomen is soft. There is no mass. Tenderness: There is no abdominal tenderness. Musculoskeletal: Normal range of motion. Right lower leg: No edema. Left lower leg: No edema. Skin:     General: Skin is warm and dry. Neurological:      Mental Status: He is alert and oriented to person, place, and time.          DIAGNOSTIC RESULTS         RADIOLOGY:  Non-plain film images such as CT, Ultrasound and MRI are read by the kirill Darden radiographic images are visualized and preliminarily interpreted bythe emergency physician with the below findings:          XR CHEST STANDARD (2 VW)    (Results Pending)           LABS:  Labs Reviewed   CBC WITH AUTO DIFFERENTIAL - Abnormal; Notable for the following components:       Result Value    RBC 4.69 (*)     Hemoglobin 13.6 (*)     Hematocrit 40.0 (*)     Neutrophils % 82.4 (*)     Lymphocytes % 10.6 (*)     Lymphocytes Absolute 0.9 (*)     All other components within normal limits   COMPREHENSIVE METABOLIC PANEL - Abnormal; Notable for the following components:    Sodium 127 (*)     Chloride 84 (*)     Glucose 795 (*)     BUN 43 (*)     CREATININE 1.5 (*)     GFR Non-African American 46 (*)     Calcium 10.3 (*)     All other components within normal limits    Narrative:     CALL  Posey  KLED tel. ,  Chemistry results called to and read back by [address] [place] [address] ED,  02/11/2020 21:50, by [facility]   URINE RT REFLEX TO CULTURE - Abnormal; Notable for the following components:    Glucose, Ur >=1000 (*)     Blood, Urine TRACE-INTACT (*)     All other components within normal limits   BLOOD GAS, ARTERIAL - Abnormal; Notable for the following components:    pO2, Arterial 61.0 (*)     Hemoglobin, Art, Extended 13.5 (*)     All other components within normal limits   POCT GLUCOSE - Abnormal; Notable for the following components:    POC Glucose 409 (*)     All other components within normal limits   POCT GLUCOSE - Abnormal; Notable for the following components:    POC Glucose 369 (*)     All other components within normal limits   POCT GLUCOSE - Normal   POCT GLUCOSE - Normal   POCT GLUCOSE - Normal   VENOUS BLD GAS   MICROSCOPIC URINALYSIS   POTASSIUM, WHOLE BLOOD   POCT GLUCOSE       All other labs were within normal range or not returned as of this dictation.     EMERGENCY DEPARTMENT COURSE and DIFFERENTIAL DIAGNOSIS/MDM:   Vitals:    Vitals:    02/11/20 2202 02/11/20 2332 02/12/20 2213 02/12/20 0102   BP: 120/61 135/74 121/66 123/64   Pulse: 85 79 75 73   Resp: 19 17 18 15   Temp:    97.6 °F (36.4 °C)   TempSrc:    Oral   SpO2: (!) 88% 93% 92% 94%   Weight:       Height:           MDM  Number of Diagnoses or Management Options     Amount and/or Complexity of Data Reviewed  Clinical lab tests: ordered and reviewed      Had brief positional hypoxia but resolved now, on steroids and azithro for bronchitis currently, cxr appears stable,  hx of sarcoid on 5mg pred chronically but has been on 60mg now 40mg likely cause of glucose spike, pt non toxic appearing no evidence of acidosis, has dramatically improved, will have f/u closely with pcp in next 1-2 days, addtl verbal instructions provided      CONSULTS:  None    PROCEDURES:  Unless otherwise noted below, none     Procedures    FINAL IMPRESSION      1. Hyperglycemia    2.  Acute bronchitis, unspecified organism          DISPOSITION/PLAN   DISPOSITION Decision To Discharge 02/12/2020 01:09:59 AM      PATIENT [facility]ED [facility]:  [phone]    Call in 1 day        DISCHARGE MEDICATIONS:  Discharge Medication List as of 2/12/2020  1:11 AM             (Please note that portions of this note were completed with a voice recognition program.  Efforts were made to edit thedictations but occasionally words are mis-transcribed.)    Christen Rios MD (electronically signed)  Attending Emergency Physician        Indra Mares MD  02/12/20 5307

## 2020-02-19 ENCOUNTER — TELEPHONE (OUTPATIENT)
Dept: INTERNAL MEDICINE | Age: 70
End: 2020-02-19

## 2020-02-20 ENCOUNTER — OFFICE VISIT (OUTPATIENT)
Dept: INTERNAL MEDICINE | Age: 70
End: 2020-02-20
Payer: COMMERCIAL

## 2020-02-20 ENCOUNTER — HOSPITAL ENCOUNTER (OUTPATIENT)
Dept: GENERAL RADIOLOGY | Age: 70
Discharge: HOME | End: 2020-02-20
Payer: COMMERCIAL

## 2020-02-20 VITALS
OXYGEN SATURATION: 98 % | WEIGHT: 187 LBS | DIASTOLIC BLOOD PRESSURE: 72 MMHG | BODY MASS INDEX: 29.35 KG/M2 | HEART RATE: 94 BPM | SYSTOLIC BLOOD PRESSURE: 146 MMHG | HEIGHT: 67 IN

## 2020-02-20 PROBLEM — G47.34 NOCTURNAL HYPOXIA: Status: ACTIVE | Noted: 2020-02-20

## 2020-02-20 PROCEDURE — 71046 X-RAY EXAM CHEST 2 VIEWS: CPT

## 2020-02-20 PROCEDURE — 4040F PNEUMOC VAC/ADMIN/RCVD: CPT | Performed by: NURSE PRACTITIONER

## 2020-02-20 PROCEDURE — G8417 CALC BMI ABV UP PARAM F/U: HCPCS | Performed by: NURSE PRACTITIONER

## 2020-02-20 PROCEDURE — 99213 OFFICE O/P EST LOW 20 MIN: CPT | Performed by: NURSE PRACTITIONER

## 2020-02-20 PROCEDURE — 1036F TOBACCO NON-USER: CPT | Performed by: NURSE PRACTITIONER

## 2020-02-20 PROCEDURE — 1123F ACP DISCUSS/DSCN MKR DOCD: CPT | Performed by: NURSE PRACTITIONER

## 2020-02-20 PROCEDURE — 3017F COLORECTAL CA SCREEN DOC REV: CPT | Performed by: NURSE PRACTITIONER

## 2020-02-20 PROCEDURE — G8482 FLU IMMUNIZE ORDER/ADMIN: HCPCS | Performed by: NURSE PRACTITIONER

## 2020-02-20 PROCEDURE — G8427 DOCREV CUR MEDS BY ELIG CLIN: HCPCS | Performed by: NURSE PRACTITIONER

## 2020-02-20 ASSESSMENT — ENCOUNTER SYMPTOMS
SHORTNESS OF BREATH: 1
CHOKING: 0
EYE DISCHARGE: 0
BLOOD IN STOOL: 0
SORE THROAT: 0
DIARRHEA: 0
WHEEZING: 1
COUGH: 1
COLOR CHANGE: 0
ABDOMINAL PAIN: 0
NAUSEA: 0
TROUBLE SWALLOWING: 0
EYE ITCHING: 0
VOMITING: 0
ABDOMINAL DISTENTION: 0
CONSTIPATION: 0

## 2020-02-20 NOTE — PROGRESS NOTES
Hypertension     Idiopathic peripheral neuropathy     Lyme disease     Mediastinal lymphadenopathy     Microalbuminuria     Mixed hyperlipidemia     Paroxysmal A-fib (HCC)     S/P ablation of atrial fibrillation     4/16 A fib ablation , Dr Jacklyn Gtz, [address] Sarcoidosis, lung [facility])     restrictive lung impairment    Tick bite     Type 2 diabetes mellitus with peripheral neuropathy ([facility]     Type 2 diabetes, controlled, with neuropathy ([facility]       Past Surgical History:   Procedure Laterality Date    ANKLE FRACTURE SURGERY  april 14, 2015   [address] SURGERY  2015    Dr. Nereida Wang      Catheter Ablation A-fib    CHOLECYSTECTOMY      COLONOSCOPY  12/02/2014    Melecio    CYSTOSCOPY Left 8/16/2019    CYSTOSCOPY; LEFT RETROGRADE PYELOGRAM; INSERTION LEFT URETERAL STENT performed by Micheline Engle MD at [address] [place] [address] Left 6/27/2018    LASER LITHOTRIPSY STONE MANIPULATION WITH DOUBLE J [address] performed by Micheline Engle MD at [facility] 6/27/2018    CYSTOSCOPY URETEROSCOPY RETROGRADE PYELOGRAMS performed by Micheline Engle MD at [facility] 2/20/2020 2/20/2020 2/12/2020 2/12/2020 2/12/2020 5/28/3340   SYSTOLIC 422 755 [phone]   DIASTOLIC 72 81 59 64 66 74   Site Left Upper Arm Right Upper Arm - - - -   Pulse - 94 78 73 75 79   Temp - - - 97.6 - -   Resp - - - 15 18 17   SpO2 - 98 95 94 92 93   Weight - 187 lb 183 lb - - -   Height - 5' 7\" 5' 7\" - - -   BMI (wt*703/ht~2) - 29.29 kg/m2 28.66 kg/m2 - - -   Some recent data might be hidden       Family History   Problem Relation Age of Onset    Coronary Art Dis Father         AICD pacer age 68    Heart Attack Father     Cancer Sister         childhood chest tumor    Heart Failure Mother        Social History     Tobacco Use    Smoking status: Never Smoker    Smokeless tobacco: Never Used   Substance Use Topics    Alcohol use: No      Current Outpatient Medications   Medication Sig Dispense Refill    fluticasone-salmeterol (ADVAIR) 500-50 MCG/DOSE diskus inhaler Inhale 1 puff into the lungs daily 1 Inhaler 3    promethazine-dextromethorphan (PROMETHAZINE-DM) 6.25-15 MG/5ML syrup Take by mouth 4 times daily as needed for Cough      predniSONE (DELTASONE) 20 MG tablet Take 60mg days 1-2  Take 40mg days 3-5  Take 20mg days 6-7 14 tablet 0    spironolactone (ALDACTONE) 25 MG tablet 3 days a week (Patient taking differently: Prn) 9 tablet 1    pregabalin (LYRICA) 50 MG capsule TAKE 1 CAPSULE BY MOUTH IN THE EVENING 30 capsule 0    XARELTO 20 MG TABS tablet TAKE 1 TABLET BY MOUTH ONCE DAILY WITH  EVENING  MEAL 90 tablet 1    losartan (COZAAR) 100 MG tablet TAKE 1 TABLET BY MOUTH ONCE DAILY 90 tablet 0    albuterol sulfate  (90 Base) MCG/ACT inhaler INHALE 2 PUFFS BY MOUTH EVERY 6 HOURS AS NEEDED FOR WHEEZING 1 Inhaler 5    JANUVIA 50 MG tablet TAKE 1 TABLET BY MOUTH ONCE DAILY 30 tablet 2    sulfamethoxazole-trimethoprim (BACTRIM;SEPTRA) 400-80 MG per tablet Take 1 tablet by mouth daily M,w,f only  5    predniSONE (DELTASONE) 5 MG tablet Take 5 mg by mouth daily  6    traMADol (ULTRAM) 50 MG tablet Take 50 mg by mouth nightly.  levalbuterol (XOPENEX) 1.25 MG/3ML nebulizer solution Take 1 ampule by nebulization every 6 hours as needed for Wheezing      furosemide (LASIX) 20 MG tablet Take 1 tablet by mouth daily (Patient taking differently: Take 10 mg by mouth daily as needed (if greater than 3 pound weight gain daily) ) 30 tablet 3    ibandronate (BONIVA) 150 MG tablet Take 1 tablet by mouth every 30 days On empty stomach and nothing by mouth or lie down for 1 hour (Patient taking differently: Take 150 mg by mouth every 30 days On empty stomach and nothing by mouth or lie down for 1 hour.  Takes on the 12th of each month.) 30 tablet 3    EPINEPHrine (EPIPEN) 0.3 MG/0.3ML SOAJ injection Inject 0.3 mLs into the muscle as needed (Allergic Reaction) 2 each 1    Ascorbic Acid (VITAMIN C) 1000 MG tablet Take 1,000 mg by mouth daily      nitroGLYCERIN (NITROSTAT) 0.4 MG SL tablet Place 1 tablet under the tongue every 5 minutes as needed for Chest pain 25 tablet 3    donepezil (ARICEPT) 5 MG tablet TAKE 1 TABLET BY MOUTH NIGHTLY 90 tablet 3    azaTHIOprine (IMURAN) 50 MG tablet Take 50 mg by mouth daily       IRON PO Take 1 tablet by mouth daily       aspirin 81 MG tablet Take 81 mg by mouth daily. No current facility-administered medications for this visit. Allergies   Allergen Reactions    Bee Venom     Gabapentin      Causes blisters to head.      Penicillins     Toprol Xl [Metoprolol]      Leg pain        Health Maintenance   Topic Date Due    Diabetic foot exam  09/15/1959    DTaP/Tdap/Td vaccine (1 - Tdap) 09/15/1960    Hepatitis B vaccine (1 of 3 - Risk 3-dose series) 09/15/1968    Shingles Vaccine (1 of 2) 09/09/2020 (Originally 9/15/1999)    Diabetic retinal exam  09/18/2020 (Originally 5/9/2019)    A1C test (Diabetic or Prediabetic)  03/09/2020    Lipid screen  12/09/2020    PSA counseling  12/09/2020    Potassium monitoring  02/11/2021    Creatinine monitoring  02/11/2021    Colon cancer screen colonoscopy  08/01/2024    Flu vaccine  Completed    Pneumococcal 65+ years Vaccine  Completed    Hepatitis A vaccine  Aged Out    Hib vaccine  Aged Out    Meningococcal (ACWY) vaccine  Aged Out    Hepatitis C screen  Discontinued       Lab Results   Component Value Date    LABA1C 9.1 (H) 12/09/2019     Lab Results   Component Value Date    PSA 5.61 (H) 12/09/2019    PSA 5.3 (H) 12/09/2019    PSA 14.20 (H) 08/27/2019     TSH   Date Value Ref Range Status   12/09/2019 4.020 0.270 - 4.200 uIU/mL Final   ]  Lab Results   Component Value Date     (L) 02/11/2020    K 4.5 02/11/2020    CL 84 (L) 02/11/2020    CO2 24 02/11/2020    BUN 43 (H) vomiting. Endocrine: Negative for cold intolerance, polydipsia and polyuria. Genitourinary: Negative for difficulty urinating, dysuria, frequency and urgency. Musculoskeletal: Negative for arthralgias and gait problem. Skin: Negative for color change and rash. Allergic/Immunologic: Negative for food allergies and immunocompromised state. Neurological: Negative for dizziness, tremors, syncope, speech difficulty, weakness and headaches. Hematological: Negative for adenopathy. Does not bruise/bleed easily. Psychiatric/Behavioral: Negative for confusion and hallucinations. Objective:     Physical Exam  Constitutional:       General: He is not in acute distress. Appearance: He is well-developed. HENT:      Head: Normocephalic and atraumatic. Eyes:      General: No scleral icterus. Right eye: No discharge. Left eye: No discharge. Pupils: Pupils are equal, round, and reactive to light. Neck:      Musculoskeletal: Normal range of motion and neck supple. Thyroid: No thyromegaly. Vascular: No JVD. Cardiovascular:      Rate and Rhythm: Normal rate and regular rhythm. Heart sounds: Normal heart sounds. No murmur. Pulmonary:      Effort: Pulmonary effort is normal. No respiratory distress. Breath sounds: Normal breath sounds. No wheezing or rales. Comments: He has a chronic crackles in the right base more so than the left but currently now he is having no wheezing most just a little rattly sound like in the bronchial tubes  Abdominal:      General: Bowel sounds are normal. There is no distension. Palpations: Abdomen is soft. There is no mass. Tenderness: There is no abdominal tenderness. There is no guarding or rebound. Musculoskeletal: Normal range of motion. General: No tenderness. Skin:     General: Skin is warm and dry. Findings: No erythema or rash.    Neurological:      Mental Status: He is alert and oriented to person, place, and time. Cranial Nerves: No cranial nerve deficit. Coordination: Coordination normal.      Deep Tendon Reflexes: Reflexes are normal and symmetric. Reflexes normal.   Psychiatric:         Mood and Affect: Mood is not depressed. Behavior: Behavior normal.         Thought Content: Thought content normal.         Judgment: Judgment normal.       BP (!) 146/72 (Site: Left Upper Arm)   Pulse 94   Ht 5' 7\" (1.702 m)   Wt 187 lb (84.8 kg)   SpO2 98%   BMI 29.29 kg/m²     Assessment:       Diagnosis Orders   1. Sarcoidosis, lung ([address]     2. Nocturnal hypoxia         Diagnostics reviewed from chest x-ray showing really nothing different  Plan:        Patient given educational materials - see patient instructions. Discussed use, benefit, and side effects of prescribed medications. Allpatient questions answered. Pt voiced understanding. Reviewed health maintenance. Instructed to continue current medications, diet and exercise. Patient agreed with treatment plan. Follow up as directed. MEDICATIONS:  No orders of the defined types were placed in this encounter. ORDERS:  No orders of the defined types were placed in this encounter. Follow-up:  Return for keep fu appt, have labs done prior to appt. PATIENT INSTRUCTIONS:  Patient Instructions   1. Sarcoid of the lung is actually stable and improving if you have problems over the weekend just bumped up your prednisone to 10 mg a day and start back on the Mucinex. Be sure you are drinking plenty of fluids so you can keep her secretions stand  2. Nocturnal hypoxia continue with the oxygen at night and as needed during the day especially if you nap    Electronically signed by NUNO Greene on 2/20/2020 at 2:15 PM    EMRDragon/transcription disclaimer:  Much of this encounter note is electronic transcription/translation of spoken language to printed texts.   The electronic translation of spoken language may be erroneous, or at times,nonsensical words or phrases may be inadvertently transcribed.   Although I have reviewed the note for such errors, some may still exist.

## 2020-02-21 VITALS
WEIGHT: 192 LBS | DIASTOLIC BLOOD PRESSURE: 68 MMHG | SYSTOLIC BLOOD PRESSURE: 142 MMHG | BODY MASS INDEX: 28.35 KG/M2 | OXYGEN SATURATION: 96 % | HEART RATE: 89 BPM

## 2020-02-26 RX ORDER — SITAGLIPTIN 50 MG/1
TABLET, FILM COATED ORAL
Qty: 30 TABLET | Refills: 2 | Status: SHIPPED | OUTPATIENT
Start: 2020-02-26 | End: 2020-09-09

## 2020-03-02 DIAGNOSIS — E55.9 VITAMIN D DEFICIENCY: ICD-10-CM

## 2020-03-02 DIAGNOSIS — E11.42 TYPE 2 DIABETES MELLITUS WITH PERIPHERAL NEUROPATHY (HCC): ICD-10-CM

## 2020-03-02 LAB
ALBUMIN SERPL-MCNC: 4.2 G/DL (ref 3.5–5.2)
ALP BLD-CCNC: 66 U/L (ref 40–130)
ALT SERPL-CCNC: 10 U/L (ref 5–41)
ANION GAP SERPL CALCULATED.3IONS-SCNC: 13 MMOL/L (ref 7–19)
AST SERPL-CCNC: 12 U/L (ref 5–40)
BASOPHILS ABSOLUTE: 0 K/UL (ref 0–0.2)
BASOPHILS RELATIVE PERCENT: 0.7 % (ref 0–1)
BILIRUB SERPL-MCNC: 0.6 MG/DL (ref 0.2–1.2)
BILIRUBIN URINE: NEGATIVE
BLOOD, URINE: NEGATIVE
BUN BLDV-MCNC: 13 MG/DL (ref 8–23)
CALCIUM SERPL-MCNC: 9.7 MG/DL (ref 8.8–10.2)
CHLORIDE BLD-SCNC: 98 MMOL/L (ref 98–111)
CLARITY: CLEAR
CO2: 28 MMOL/L (ref 22–29)
COLOR: YELLOW
CREAT SERPL-MCNC: 1.1 MG/DL (ref 0.5–1.2)
EOSINOPHILS ABSOLUTE: 0.2 K/UL (ref 0–0.6)
EOSINOPHILS RELATIVE PERCENT: 5.6 % (ref 0–5)
GFR NON-AFRICAN AMERICAN: >60
GLUCOSE BLD-MCNC: 214 MG/DL (ref 74–109)
GLUCOSE URINE: >=1000 MG/DL
HBA1C MFR BLD: 10.2 % (ref 4–6)
HCT VFR BLD CALC: 32.7 % (ref 42–52)
HEMOGLOBIN: 11.1 G/DL (ref 14–18)
IMMATURE GRANULOCYTES #: 0.1 K/UL
KETONES, URINE: NEGATIVE MG/DL
LEUKOCYTE ESTERASE, URINE: NEGATIVE
LYMPHOCYTES ABSOLUTE: 0.7 K/UL (ref 1.1–4.5)
LYMPHOCYTES RELATIVE PERCENT: 24.9 % (ref 20–40)
MCH RBC QN AUTO: 29.4 PG (ref 27–31)
MCHC RBC AUTO-ENTMCNC: 33.9 G/DL (ref 33–37)
MCV RBC AUTO: 86.5 FL (ref 80–94)
MONOCYTES ABSOLUTE: 0.5 K/UL (ref 0–0.9)
MONOCYTES RELATIVE PERCENT: 16.1 % (ref 0–10)
NEUTROPHILS ABSOLUTE: 1.4 K/UL (ref 1.5–7.5)
NEUTROPHILS RELATIVE PERCENT: 50.6 % (ref 50–65)
NITRITE, URINE: NEGATIVE
PDW BLD-RTO: 13.5 % (ref 11.5–14.5)
PH UA: 5.5 (ref 5–8)
PLATELET # BLD: 136 K/UL (ref 130–400)
PMV BLD AUTO: 10.4 FL (ref 9.4–12.4)
POTASSIUM SERPL-SCNC: 4 MMOL/L (ref 3.5–5)
PROTEIN UA: NEGATIVE MG/DL
RBC # BLD: 3.78 M/UL (ref 4.7–6.1)
SODIUM BLD-SCNC: 139 MMOL/L (ref 136–145)
SPECIFIC GRAVITY UA: 1.02 (ref 1–1.03)
TOTAL PROTEIN: 6.6 G/DL (ref 6.6–8.7)
TSH SERPL DL<=0.05 MIU/L-ACNC: 4.11 UIU/ML (ref 0.27–4.2)
URINE REFLEX TO CULTURE: ABNORMAL
UROBILINOGEN, URINE: 0.2 E.U./DL
VITAMIN D 25-HYDROXY: 17.4 NG/ML
WBC # BLD: 2.9 K/UL (ref 4.8–10.8)

## 2020-03-09 ENCOUNTER — OFFICE VISIT (OUTPATIENT)
Dept: INTERNAL MEDICINE | Age: 70
End: 2020-03-09
Payer: COMMERCIAL

## 2020-03-09 VITALS
HEART RATE: 74 BPM | WEIGHT: 186 LBS | BODY MASS INDEX: 29.19 KG/M2 | HEIGHT: 67 IN | DIASTOLIC BLOOD PRESSURE: 82 MMHG | SYSTOLIC BLOOD PRESSURE: 156 MMHG | OXYGEN SATURATION: 98 %

## 2020-03-09 PROCEDURE — 99214 OFFICE O/P EST MOD 30 MIN: CPT | Performed by: NURSE PRACTITIONER

## 2020-03-09 ASSESSMENT — ENCOUNTER SYMPTOMS
SORE THROAT: 0
NAUSEA: 0
VOMITING: 0
STRIDOR: 0
COUGH: 1
BLOOD IN STOOL: 0
ABDOMINAL PAIN: 0
CONSTIPATION: 0
COLOR CHANGE: 0
EYE ITCHING: 0
SHORTNESS OF BREATH: 1
CHOKING: 0
ABDOMINAL DISTENTION: 0
EYE DISCHARGE: 0
DIARRHEA: 0
TROUBLE SWALLOWING: 0
WHEEZING: 0

## 2020-03-09 NOTE — PATIENT INSTRUCTIONS
1.  Type II DM  Increase lantus  vy 4 units every 4-5 days;    2. Sarcoid; Stable for now; May want to go to [facility] clinic for specialist   3.  Htn;  Stable for now; No changes  4.   CKD:  Stable no changes

## 2020-03-09 NOTE — PROGRESS NOTES
Type 2 diabetes mellitus with peripheral neuropathy (HCC)     Type 2 diabetes, controlled, with neuropathy ([address]       Past Surgical History:   Procedure Laterality Date    ANKLE FRACTURE SURGERY  april 14, 2015    ATRIAL ABLATION SURGERY  2015    Dr. Phoebe Suero COLONOSCOPY  12/02/2014    Mele[place]io    CYSTOSCOPY Left 8/16/2019    CYSTOSCOPY; LEFT RETROGRADE PYELOGRAM; INSERTION LEFT URETERAL STENT performed by Dirk Fernandez MD at [address] [place] [address] Left 6/27/2018    LASER LITHOTRIPSY STONE MANIPULATION WITH DOUBLE J STENT PLACEMENT performed by Dirk Fernandez MD at [facility] 6/27/2018    CYSTOSCOPY URETEROSCOPY RETROGRADE PYELOGRAMS performed by Dirk Fernandez MD at [facility] 3/9/2020 2/20/2020 2/20/2020 2/12/2020 2/12/2020 0/54/7158   SYSTOLIC 979 525 [phone]   DIASTOLIC 82 72 81 59 64 66   Site - Left Upper Arm Right Upper Arm - - -   Pulse 74 - 94 78 73 75   Temp - - - - 97.6 -   Resp - - - - 15 18   SpO2 98 - 98 95 94 92   Weight 186 lb - 187 lb 183 lb - -   Height 5' 7\" - 5' 7\" 5' 7\" - -   BMI (wt*703/ht~2) 29.13 kg/m2 - 29.29 kg/m2 28.66 kg/m2 - -   Some recent data might be hidden       Family History   Problem Relation Age of Onset    Coronary Art Dis Father         AICD pacer age 68    Heart Attack Father     Cancer Sister         childhood chest tumor    Heart Failure Mother        Social History     Tobacco Use    Smoking status: Never Smoker    Smokeless tobacco: Never Used   Substance Use Topics    Alcohol use: No      Current Outpatient Medications   Medication Sig Dispense Refill    JANUVIA 50 MG tablet TAKE 1 TABLET BY MOUTH ONCE DAILY 30 tablet 2    fluticasone-salmeterol (ADVAIR) 500-50 MCG/DOSE diskus inhaler Inhale 1 puff into the lungs daily 1 Inhaler 3    promethazine-dextromethorphan (PROMETHAZINE-DM) 6.25-15 MG/5ML syrup Take by mouth 4 times daily as needed for Cough      predniSONE (DELTASONE) 20 MG tablet Take 60mg days 1-2  Take 40mg days 3-5  Take 20mg days 6-7 14 tablet 0    spironolactone (ALDACTONE) 25 MG tablet 3 days a week (Patient taking differently: Prn) 9 tablet 1    XARELTO 20 MG TABS tablet TAKE 1 TABLET BY MOUTH ONCE DAILY WITH  EVENING  MEAL 90 tablet 1    losartan (COZAAR) 100 MG tablet TAKE 1 TABLET BY MOUTH ONCE DAILY 90 tablet 0    albuterol sulfate  (90 Base) MCG/ACT inhaler INHALE 2 PUFFS BY MOUTH EVERY 6 HOURS AS NEEDED FOR WHEEZING 1 Inhaler 5    sulfamethoxazole-trimethoprim (BACTRIM;SEPTRA) 400-80 MG per tablet Take 1 tablet by mouth daily M,w,f only  5    predniSONE (DELTASONE) 5 MG tablet Take 5 mg by mouth daily  6    traMADol (ULTRAM) 50 MG tablet Take 50 mg by mouth nightly.  levalbuterol (XOPENEX) 1.25 MG/3ML nebulizer solution Take 1 ampule by nebulization every 6 hours as needed for Wheezing      furosemide (LASIX) 20 MG tablet Take 1 tablet by mouth daily (Patient taking differently: Take 10 mg by mouth daily as needed (if greater than 3 pound weight gain daily) ) 30 tablet 3    ibandronate (BONIVA) 150 MG tablet Take 1 tablet by mouth every 30 days On empty stomach and nothing by mouth or lie down for 1 hour (Patient taking differently: Take 150 mg by mouth every 30 days On empty stomach and nothing by mouth or lie down for 1 hour.  Takes on the 12th of each month.) 30 tablet 3    EPINEPHrine (EPIPEN) 0.3 MG/0.3ML SOAJ injection Inject 0.3 mLs into the muscle as needed (Allergic Reaction) 2 each 1    Ascorbic Acid (VITAMIN C) 1000 MG tablet Take 1,000 mg by mouth daily      nitroGLYCERIN (NITROSTAT) 0.4 MG SL tablet Place 1 tablet under the tongue every 5 minutes as needed for Chest pain 25 tablet 3    donepezil (ARICEPT) 5 MG tablet TAKE 1 TABLET BY MOUTH NIGHTLY 90 tablet 3    azaTHIOprine (IMURAN) 50 MG tablet Take 50 mg by mouth daily       IRON PO Take 1 tablet by mouth daily       aspirin 81 MG tablet Take 81 mg by mouth daily.  pregabalin (LYRICA) 50 MG capsule TAKE 1 CAPSULE BY MOUTH IN THE EVENING 30 capsule 0     No current facility-administered medications for this visit. Allergies   Allergen Reactions    Bee Venom     Gabapentin      Causes blisters to head.  Penicillins     Toprol Xl [Metoprolol]      Leg pain        Health Maintenance   Topic Date Due    DTaP/Tdap/Td vaccine (1 - Tdap) 03/30/2020 (Originally 9/15/1969)    Shingles Vaccine (1 of 2) 09/09/2020 (Originally 9/15/1999)    Diabetic retinal exam  09/18/2020 (Originally 5/9/2019)    Diabetic foot exam  03/09/2021 (Originally 9/15/1960)    Hepatitis B vaccine (1 of 3 - Risk 3-dose series) 03/09/2021 (Originally 9/15/1969)    A1C test (Diabetic or Prediabetic)  06/02/2020    Lipid screen  12/09/2020    PSA counseling  12/09/2020    Potassium monitoring  03/02/2021    Creatinine monitoring  03/02/2021    Colon cancer screen colonoscopy  08/01/2024    Flu vaccine  Completed    Pneumococcal 65+ years Vaccine  Completed    Hepatitis A vaccine  Aged Out    Hib vaccine  Aged Out    Meningococcal (ACWY) vaccine  Aged Out    Hepatitis C screen  Discontinued       Lab Results   Component Value Date    LABA1C 10.2 (H) 03/02/2020     Lab Results   Component Value Date    PSA 5.61 (H) 12/09/2019    PSA 5.3 (H) 12/09/2019    PSA 14.20 (H) 08/27/2019     TSH   Date Value Ref Range Status   03/02/2020 4. 110 0.270 - 4.200 uIU/mL Final   ]  Lab Results   Component Value Date     03/02/2020    K 4.0 03/02/2020    CL 98 03/02/2020    CO2 28 03/02/2020    BUN 13 03/02/2020    CREATININE 1.1 03/02/2020    GLUCOSE 214 (H) 03/02/2020    CALCIUM 9.7 03/02/2020    PROT 6.6 03/02/2020    LABALBU 4.2 03/02/2020    BILITOT 0.6 03/02/2020    ALKPHOS 66 03/02/2020    AST 12 03/02/2020    ALT 10 03/02/2020    LABGLOM >60 03/02/2020     Lab Results   Component Value Date    CHOL 191 12/09/2019    CHOL 137 (L) 08/27/2019    CHOL 178 02/25/2019     Lab Results   Component Value Date    TRIG 242 (H) 12/09/2019    TRIG 184 (H) 08/27/2019    TRIG 166 (H) 02/25/2019     Lab Results   Component Value Date    HDL 47 (L) 12/09/2019    HDL 30 (L) 08/27/2019    HDL 41 (L) 02/25/2019     Lab Results   Component Value Date    LDLCALC 96 12/09/2019    LDLCALC 70 08/27/2019    LDLCALC 104 02/25/2019     Lab Results   Component Value Date     03/02/2020    K 4.0 03/02/2020    K 4.2 02/07/2020    CL 98 03/02/2020    CO2 28 03/02/2020    BUN 13 03/02/2020    CREATININE 1.1 03/02/2020    GLUCOSE 214 03/02/2020    CALCIUM 9.7 03/02/2020      Lab Results   Component Value Date    WBC 2.9 (L) 03/02/2020    HGB 11.1 (L) 03/02/2020    HCT 32.7 (L) 03/02/2020    MCV 86.5 03/02/2020     03/02/2020    LABLYMP 1.58 09/20/2015    LYMPHOPCT 24.9 03/02/2020    RBC 3.78 (L) 03/02/2020    MCH 29.4 03/02/2020    MCHC 33.9 03/02/2020    RDW 13.5 03/02/2020     Lab Results   Component Value Date    VITD25 17.4 (L) 03/02/2020       Subjective:      Review of Systems   Constitutional: Negative for fatigue, fever and unexpected weight change. HENT: Negative for ear discharge, ear pain, mouth sores, sore throat and trouble swallowing. Eyes: Negative for discharge, itching and visual disturbance. Respiratory: Positive for cough and shortness of breath. Negative for choking, wheezing and stridor. Cardiovascular: Negative for chest pain, palpitations and leg swelling. Gastrointestinal: Negative for abdominal distention, abdominal pain, blood in stool, constipation, diarrhea, nausea and vomiting. Endocrine: Negative for cold intolerance, polydipsia and polyuria. Genitourinary: Negative for difficulty urinating, dysuria, frequency and urgency. Musculoskeletal: Negative for arthralgias and gait problem. Skin: Negative for color change and rash.

## 2020-03-17 ENCOUNTER — OFFICE VISIT (OUTPATIENT)
Dept: INTERNAL MEDICINE | Age: 70
End: 2020-03-17
Payer: MEDICARE

## 2020-03-17 ENCOUNTER — HOSPITAL ENCOUNTER (OUTPATIENT)
Dept: CT IMAGING | Age: 70
Discharge: HOME | End: 2020-03-17
Payer: MEDICARE

## 2020-03-17 VITALS
BODY MASS INDEX: 28.19 KG/M2 | SYSTOLIC BLOOD PRESSURE: 164 MMHG | DIASTOLIC BLOOD PRESSURE: 85 MMHG | HEART RATE: 99 BPM | WEIGHT: 180 LBS | OXYGEN SATURATION: 98 %

## 2020-03-17 DIAGNOSIS — R10.84 GENERALIZED ABDOMINAL PAIN: ICD-10-CM

## 2020-03-17 DIAGNOSIS — R97.20 ELEVATED PSA: ICD-10-CM

## 2020-03-17 LAB
ALBUMIN SERPL-MCNC: 4.5 G/DL (ref 3.5–5.2)
ALP BLD-CCNC: 78 U/L (ref 40–130)
ALT SERPL-CCNC: 8 U/L (ref 5–41)
ANION GAP SERPL CALCULATED.3IONS-SCNC: 18 MMOL/L (ref 7–19)
AST SERPL-CCNC: 14 U/L (ref 5–40)
BASOPHILS ABSOLUTE: 0 K/UL (ref 0–0.2)
BASOPHILS RELATIVE PERCENT: 0.7 % (ref 0–1)
BILIRUB SERPL-MCNC: 0.9 MG/DL (ref 0.2–1.2)
BUN BLDV-MCNC: 25 MG/DL (ref 8–23)
CALCIUM SERPL-MCNC: 10.8 MG/DL (ref 8.8–10.2)
CHLORIDE BLD-SCNC: 92 MMOL/L (ref 98–111)
CO2: 25 MMOL/L (ref 22–29)
CREAT SERPL-MCNC: 1 MG/DL (ref 0.5–1.2)
EOSINOPHILS ABSOLUTE: 0.1 K/UL (ref 0–0.6)
EOSINOPHILS RELATIVE PERCENT: 1.8 % (ref 0–5)
GFR NON-AFRICAN AMERICAN: 60
GFR NON-AFRICAN AMERICAN: >60
GLUCOSE BLD-MCNC: 219 MG/DL (ref 74–109)
HCT VFR BLD CALC: 39.4 % (ref 42–52)
HEMOGLOBIN: 13.6 G/DL (ref 14–18)
IMMATURE GRANULOCYTES #: 0.1 K/UL
LYMPHOCYTES ABSOLUTE: 1.1 K/UL (ref 1.1–4.5)
LYMPHOCYTES RELATIVE PERCENT: 17.9 % (ref 20–40)
MCH RBC QN AUTO: 28.9 PG (ref 27–31)
MCHC RBC AUTO-ENTMCNC: 34.5 G/DL (ref 33–37)
MCV RBC AUTO: 83.8 FL (ref 80–94)
MONOCYTES ABSOLUTE: 0.7 K/UL (ref 0–0.9)
MONOCYTES RELATIVE PERCENT: 10.9 % (ref 0–10)
NEUTROPHILS ABSOLUTE: 4.2 K/UL (ref 1.5–7.5)
NEUTROPHILS RELATIVE PERCENT: 67.6 % (ref 50–65)
PDW BLD-RTO: 13.7 % (ref 11.5–14.5)
PERFORMED ON: ABNORMAL
PLATELET # BLD: 150 K/UL (ref 130–400)
PMV BLD AUTO: 11 FL (ref 9.4–12.4)
POC CREATININE: 1.2 MG/DL (ref 0.3–1.3)
POC SAMPLE TYPE: ABNORMAL
POTASSIUM SERPL-SCNC: 4.3 MMOL/L (ref 3.5–5)
RBC # BLD: 4.7 M/UL (ref 4.7–6.1)
SODIUM BLD-SCNC: 135 MMOL/L (ref 136–145)
TOTAL PROTEIN: 8 G/DL (ref 6.6–8.7)
WBC # BLD: 6.2 K/UL (ref 4.8–10.8)

## 2020-03-17 PROCEDURE — G8417 CALC BMI ABV UP PARAM F/U: HCPCS | Performed by: NURSE PRACTITIONER

## 2020-03-17 PROCEDURE — 1036F TOBACCO NON-USER: CPT | Performed by: NURSE PRACTITIONER

## 2020-03-17 PROCEDURE — 6360000004 HC RX CONTRAST MEDICATION: Performed by: NURSE PRACTITIONER

## 2020-03-17 PROCEDURE — G8427 DOCREV CUR MEDS BY ELIG CLIN: HCPCS | Performed by: NURSE PRACTITIONER

## 2020-03-17 PROCEDURE — 4040F PNEUMOC VAC/ADMIN/RCVD: CPT | Performed by: NURSE PRACTITIONER

## 2020-03-17 PROCEDURE — 1123F ACP DISCUSS/DSCN MKR DOCD: CPT | Performed by: NURSE PRACTITIONER

## 2020-03-17 PROCEDURE — 82565 ASSAY OF CREATININE: CPT

## 2020-03-17 PROCEDURE — 99214 OFFICE O/P EST MOD 30 MIN: CPT | Performed by: NURSE PRACTITIONER

## 2020-03-17 PROCEDURE — 74178 CT ABD&PLV WO CNTR FLWD CNTR: CPT

## 2020-03-17 PROCEDURE — 3017F COLORECTAL CA SCREEN DOC REV: CPT | Performed by: NURSE PRACTITIONER

## 2020-03-17 PROCEDURE — G8482 FLU IMMUNIZE ORDER/ADMIN: HCPCS | Performed by: NURSE PRACTITIONER

## 2020-03-17 RX ORDER — SUCRALFATE 1 G/1
1 TABLET ORAL 4 TIMES DAILY
Qty: 120 TABLET | Refills: 3 | Status: SHIPPED | OUTPATIENT
Start: 2020-03-17 | End: 2020-12-29

## 2020-03-17 RX ORDER — PANTOPRAZOLE SODIUM 40 MG/1
40 TABLET, DELAYED RELEASE ORAL
Qty: 180 TABLET | Refills: 1 | Status: SHIPPED | OUTPATIENT
Start: 2020-03-17 | End: 2020-12-29

## 2020-03-17 RX ADMIN — IOPAMIDOL 75 ML: 755 INJECTION, SOLUTION INTRAVENOUS at 14:31

## 2020-03-17 ASSESSMENT — ENCOUNTER SYMPTOMS
COLOR CHANGE: 0
SHORTNESS OF BREATH: 0
TROUBLE SWALLOWING: 0
EYE DISCHARGE: 0
ABDOMINAL PAIN: 1
COUGH: 0
DIARRHEA: 0
EYE ITCHING: 0
ABDOMINAL DISTENTION: 0
SORE THROAT: 0
STRIDOR: 0
CONSTIPATION: 1
WHEEZING: 0
VOMITING: 0
CHOKING: 0
NAUSEA: 0
BLOOD IN STOOL: 0

## 2020-03-17 NOTE — PROGRESS NOTES
[facility] INTERNAL MEDICINE  [address] [facility] [id number] [address]  Dept: [phone]  Dept Fax: [phone]: [phone]    Nichole Sinclair is a 78 y.o. male who presents today for his medical conditions/complaints as noted below. iNchole Sinclair is c/alexys Abdominal Pain (Patient states has been having abdominal pain since 3/12. Patient states he has been unable to sleep, eat, or drink due to pain. Patient states it happened last month after taking Boniva but only lasted a couple of days and this time has been lasting longer.)        HPI:     HPI   1. . Abdominal pain last month when he took [place] he had a little bit of epigastric pain he did not report that syndrome lasted about 24 hours. This month when he took his Boniva he said uppercase water and began having epigastric burning. Severe enough is going through to his back he was unable to eat or drink. This started on Saturday. On Saturday he had 3 or 4 loose stools and then on Sunday he did not have any he has been somewhat constipated since then type time is actually taken a suppository but it did not relieve the constipation. By Sunday evening the pain that he is having in the chest area moved down to his epigastric area severe burning for several hours and then Monday the pain moved down into his lower abdomen. He says fairly comfortable at the moment but if he has any type of p.o. intake he starts having the the burning in the esophagus area again. He said he is only been able to get small sips of tea down for the last 3 to 4 days. He tried a couple noodles from chicken soup and was immediately struck with the pain began in the esophageal area. #2 achalasia. He has been referred to [facility] for surgery there by Dr. Garfield Calero due to the achalasia.   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 Chief Complaint   Patient presents with    Abdominal Pain     Patient states has been having abdominal pain since 3/12. Patient states he has been unable to sleep, eat, or drink due to pain. Patient states it happened last month after taking Boniva but only lasted a couple of days and this time has been lasting longer.        Past Medical History:   Diagnosis Date    Achalasia     going to [facility] for surgery in March    Acute pancreatitis     Acute pancreatitis     Anemia     Asthma     Atrial fibrillation (HCC)     h/o paroxysmal a-fib ? anesthsia induced    Benign colonic polyp     cscope 12/07 Dr Maggy Geller adenomatous: 12/14 adenoma    Chest pain     Chronic kidney disease     Chronic pain syndrome     Depression     Diabetic peripheral neuropathy (HCC)     Extrinsic asthma     Hyperlipidemia     Hypertension     Idiopathic peripheral neuropathy     Lyme disease     Mediastinal lymphadenopathy     Microalbuminuria     Mixed hyperlipidemia     Paroxysmal A-fib (HCC)     S/P ablation of atrial fibrillation     4/16 A fib ablation , Dr Heide Ramos, [facility]     Sarcoidosis, lung ([facility] [address]     restrictive lung impairment    Tick bite     Type 2 diabetes mellitus with peripheral neuropathy ([facility]     Type 2 diabetes, controlled, with neuropathy [facility])       Past Surgical History:   Procedure Laterality Date    ANKLE FRACTURE SURGERY  april 14, 2015    ATRIAL ABLATION SURGERY  2015    Dr. Rupali Eng      Catheter Ablation A-fib    CHOLECYSTECTOMY      COLONOSCOPY  12/02/2014    Melecio    CYSTOSCOPY Left 8/16/2019    CYSTOSCOPY; LEFT RETROGRADE PYELOGRAM; INSERTION LEFT URETERAL STENT performed by Theodore Helton MD at [address] [place] [address] Left 6/27/2018    LASER LITHOTRIPSY STONE MANIPULATION WITH DOUBLE J STENT PLACEMENT performed by Theodore Helton MD at [facility] Left 6/27/2018    CYSTOSCOPY URETEROSCOPY RETROGRADE PYELOGRAMS performed by Boni Wayne MD at [facility] 3/17/2020 3/9/2020 2/20/2020 2/20/2020 2/12/2020 3/35/0140   SYSTOLIC 196 237 701 [phone]   DIASTOLIC 85 82 72 81 59 64   Site - - Left Upper Arm Right Upper Arm - -   Pulse 99 74 - 94 78 73   Temp - - - - - 97.6   Resp - - - - - 15   SpO2 98 98 - 98 95 94   Weight 180 lb 186 lb - 187 lb 183 lb -   Height - 5' 7\" - 5' 7\" 5' 7\" -   BMI (wt*703/ht~2) - 29.13 kg/m2 - 29.29 kg/m2 28.66 kg/m2 -   Some recent data might be hidden       Family History   Problem Relation Age of Onset    Coronary Art Dis Father         AICD pacer age 68    Heart Attack Father     Cancer Sister         childhood chest tumor    Heart Failure Mother        Social History     Tobacco Use    Smoking status: Never Smoker    Smokeless tobacco: Never Used   Substance Use Topics    Alcohol use: No      Current Outpatient Medications   Medication Sig Dispense Refill    pantoprazole (PROTONIX) 40 MG tablet Take 1 tablet by mouth 2 times daily (before meals) 180 tablet 1    sucralfate (CARAFATE) 1 GM tablet Take 1 tablet by mouth 4 times daily 120 tablet 3    JANUVIA 50 MG tablet TAKE 1 TABLET BY MOUTH ONCE DAILY 30 tablet 2    fluticasone-salmeterol (ADVAIR) 500-50 MCG/DOSE diskus inhaler Inhale 1 puff into the lungs daily 1 Inhaler 3    promethazine-dextromethorphan (PROMETHAZINE-DM) 6.25-15 MG/5ML syrup Take by mouth 4 times daily as needed for Cough      predniSONE (DELTASONE) 20 MG tablet Take 60mg days 1-2  Take 40mg days 3-5  Take 20mg days 6-7 14 tablet 0    spironolactone (ALDACTONE) 25 MG tablet 3 days a week (Patient taking differently: Prn) 9 tablet 1    XARELTO 20 MG TABS tablet TAKE 1 TABLET BY MOUTH ONCE DAILY WITH  EVENING  MEAL 90 tablet 1    losartan (COZAAR) 100 MG tablet TAKE 1 TABLET BY MOUTH ONCE DAILY 90 tablet 0    albuterol sulfate  (90 Base) MCG/ACT MCV 86.5 03/02/2020     03/02/2020    LABLYMP 1.58 09/20/2015    LYMPHOPCT 24.9 03/02/2020    RBC 3.78 (L) 03/02/2020    MCH 29.4 03/02/2020    MCHC 33.9 03/02/2020    RDW 13.5 03/02/2020     Lab Results   Component Value Date    VITD25 17.4 (L) 03/02/2020       Subjective:      Review of Systems   Constitutional: Negative for fatigue, fever and unexpected weight change. HENT: Negative for ear discharge, ear pain, mouth sores, sore throat and trouble swallowing. Eyes: Negative for discharge, itching and visual disturbance. Respiratory: Negative for cough, choking, shortness of breath, wheezing and stridor. Cardiovascular: Negative for chest pain, palpitations and leg swelling. Gastrointestinal: Positive for abdominal pain and constipation. Negative for abdominal distention, blood in stool, diarrhea, nausea and vomiting. Endocrine: Negative for cold intolerance, polydipsia and polyuria. Genitourinary: Negative for difficulty urinating, dysuria, frequency and urgency. Musculoskeletal: Negative for arthralgias and gait problem. Skin: Negative for color change and rash. Allergic/Immunologic: Negative for food allergies and immunocompromised state. Neurological: Negative for dizziness, tremors, syncope, speech difficulty, weakness and headaches. Hematological: Negative for adenopathy. Does not bruise/bleed easily. Psychiatric/Behavioral: Negative for confusion and hallucinations. Objective:     Physical Exam  Constitutional:       General: He is not in acute distress. Appearance: He is well-developed. HENT:      Head: Normocephalic and atraumatic. Eyes:      General: No scleral icterus. Right eye: No discharge. Left eye: No discharge. Pupils: Pupils are equal, round, and reactive to light. Neck:      Musculoskeletal: Normal range of motion and neck supple. Thyroid: No thyromegaly. Vascular: No JVD.    Cardiovascular:      Rate and Rhythm: Normal rate and regular rhythm. Heart sounds: Normal heart sounds. No murmur. Pulmonary:      Effort: Pulmonary effort is normal. No respiratory distress. Breath sounds: Normal breath sounds. No wheezing or rales. Abdominal:      General: Bowel sounds are normal. There is no distension. Palpations: Abdomen is soft. There is no mass. Tenderness: There is abdominal tenderness. There is no guarding or rebound. Musculoskeletal: Normal range of motion. General: No tenderness. Skin:     General: Skin is warm and dry. Findings: No erythema or rash. Neurological:      Mental Status: He is alert and oriented to person, place, and time. Cranial Nerves: No cranial nerve deficit. Coordination: Coordination normal.      Deep Tendon Reflexes: Reflexes are normal and symmetric. Reflexes normal.   Psychiatric:         Mood and Affect: Mood is not depressed. Behavior: Behavior normal.         Thought Content: Thought content normal.         Judgment: Judgment normal.       BP (!) 164/85   Pulse 99   Wt 180 lb (81.6 kg)   SpO2 98%   BMI 28.19 kg/m²     Assessment:       Diagnosis Orders   1. Generalized abdominal pain  CT ABDOMEN PELVIS W WO CONTRAST Additional Contrast? None    CBC Auto Differential    Comprehensive Metabolic Panel   2. Achalasia     3. Gastroesophageal reflux disease without esophagitis       Labs reviewed from today   Diagnostics reviewed from today   Plan:        Patient given educational materials - see patient instructions. Discussed use, benefit, and side effects of prescribed medications. Allpatient questions answered. Pt voiced understanding. Reviewed health maintenance. Instructed to continue current medications, diet and exercise. Patient agreed with treatment plan. Follow up as directed.    MEDICATIONS:  Orders Placed This Encounter   Medications    pantoprazole (PROTONIX) 40 MG tablet     Sig: Take 1 tablet by mouth 2 times daily (before meals)     Dispense:  180 tablet     Refill:  1    sucralfate (CARAFATE) 1 GM tablet     Sig: Take 1 tablet by mouth 4 times daily     Dispense:  120 tablet     Refill:  3        1. Chronic changes of sarcoidosis in the lower chest, upper abdomen,   and bones without evidence of acute abdominopelvic process. 2. Fatty liver disease. Signed by Dr Alma Delia Flynn on 3/17/2020 2:40 PM         ORDERS:  Orders Placed This Encounter   Procedures    CT ABDOMEN PELVIS W WO CONTRAST Additional Contrast? None    CBC Auto Differential    Comprehensive Metabolic Panel       Follow-up:  No follow-ups on file. PATIENT INSTRUCTIONS:  Patient Instructions   1. Chest and abdominal pain since with normal CT of the abdomen today I think were probably dealing with some esophagitis gastritis. I have sent Protonix to take twice daily and Carafate 4 times a day. I would just stick with a very bland foods putting ice cream milkshakes mesh potatoes for the next few days. Be sure you get the Carafate in at least an hour before you eat in the Protonix 2 times a day. Let me know on Thursday if you are not feeling better and we can certainly get with Dr. Ama Bermeo about a possible Endo    Electronically signed by NUNO Hodges on 3/17/2020 at 3:05 PM    EMRDragon/transcription disclaimer:  Much of this encounter note is electronic transcription/translation of spoken language to printed texts. The electronic translation of spoken language may be erroneous, or at times,nonsensical words or phrases may be inadvertently transcribed.   Although I have reviewed the note for such errors, some may still exist.

## 2020-03-17 NOTE — PATIENT INSTRUCTIONS
1.  Chest and abdominal pain since with normal CT of the abdomen today I think were probably dealing with some esophagitis gastritis. I have sent Protonix to take twice daily and Carafate 4 times a day. I would just stick with a very bland foods putting ice cream milkshakes mesh potatoes for the next few days. Be sure you get the Carafate in at least an hour before you eat in the Protonix 2 times a day.   Let me know on Thursday if you are not feeling better and we can certainly get with Dr. Jennie Hooks about a possible Endo

## 2020-03-20 LAB
PROSTATE SPECIFIC ANTIGEN FREE: 0.6 NG/ML
PROSTATE SPECIFIC ANTIGEN PERCENT FREE: 17 %
PROSTATE SPECIFIC ANTIGEN: 3.6 NG/ML (ref 0–4)

## 2020-03-27 ENCOUNTER — TELEPHONE (OUTPATIENT)
Dept: INTERNAL MEDICINE | Age: 70
End: 2020-03-27

## 2020-03-30 ENCOUNTER — TELEMEDICINE (OUTPATIENT)
Dept: UROLOGY | Age: 70
End: 2020-03-30
Payer: MEDICARE

## 2020-03-30 PROCEDURE — 99213 OFFICE O/P EST LOW 20 MIN: CPT | Performed by: UROLOGY

## 2020-03-30 PROCEDURE — 1123F ACP DISCUSS/DSCN MKR DOCD: CPT | Performed by: UROLOGY

## 2020-03-30 PROCEDURE — 4040F PNEUMOC VAC/ADMIN/RCVD: CPT | Performed by: UROLOGY

## 2020-03-30 PROCEDURE — 3017F COLORECTAL CA SCREEN DOC REV: CPT | Performed by: UROLOGY

## 2020-03-30 PROCEDURE — G8427 DOCREV CUR MEDS BY ELIG CLIN: HCPCS | Performed by: UROLOGY

## 2020-03-30 ASSESSMENT — ENCOUNTER SYMPTOMS
RESPIRATORY NEGATIVE: 1
GASTROINTESTINAL NEGATIVE: 1
EYES NEGATIVE: 1

## 2020-03-30 NOTE — PROGRESS NOTES
3/30/2020    TELEHEALTH EVALUATION -- Audio/Visual (During RHHLL-49 public health emergency)  This is a tele-health visit with Mr. Cole Trujillo he is located at his home address. Visit with myself Dr. Ting Del Rosario is a provider at the urology office location. Medical assistant Aravind Washington was also involved in this visit as she reviewed and reconciled his history and reconciled his medications    HPI:    Janet Chavarria (:  1950) has requested an audio/video evaluation for the following concern(s):    Patient chief complaint is I am here to follow-up on my PSA  Elevated PSA:  Patient is here today for history of an elevated PSA. His last several PSA values are as follows:  Lab Results   Component Value Date    PSA 3.6 2020    PSA 5.61 (H) 2019    PSA 5.3 (H) 2019     Overall the PSA level is: decreased  Recent history of urinary tract infection/prostatitis? no  Previous prostate biopsy? no  Lower urinary tract symptoms: He currently is denying any lower urinary tract symptoms. Patient did have an episode in 2019 of pyelonephritis which resulted in his PSA going up to 14 this was monitored and trended. It is now come back down. Review of Systems   Constitutional: Negative. HENT: Negative. Eyes: Negative. Respiratory: Negative. Cardiovascular: Negative. Gastrointestinal: Negative. Endocrine: Negative. Genitourinary: Negative. Musculoskeletal: Negative. Skin: Negative. Neurological: Negative. Psychiatric/Behavioral: Negative. All other systems reviewed and are negative. Prior to Visit Medications    Medication Sig Taking?  Authorizing Provider   pantoprazole (PROTONIX) 40 MG tablet Take 1 tablet by mouth 2 times daily (before meals) Yes NUNO Farias   sucralfate (CARAFATE) 1 GM tablet Take 1 tablet by mouth 4 times daily Yes NUNO Farias   JANUVIA 50 MG tablet TAKE 1 TABLET BY MOUTH ONCE DAILY Yes NUNO Farias promethazine-dextromethorphan (PROMETHAZINE-DM) 6.25-15 MG/5ML syrup Take by mouth 4 times daily as needed for Cough Yes Historical Provider, MD   spironolactone (ALDACTONE) 25 MG tablet 3 days a week  Patient taking differently: Prn Yes NUNO Don   XARELTO 20 MG TABS tablet TAKE 1 TABLET BY MOUTH ONCE DAILY WITH  EVENING  MEAL Yes NUNO Don   losartan (COZAAR) 100 MG tablet TAKE 1 TABLET BY MOUTH ONCE DAILY Yes NUNO Don   albuterol sulfate  (90 Base) MCG/ACT inhaler INHALE 2 PUFFS BY MOUTH EVERY 6 HOURS AS NEEDED FOR WHEEZING Yes NUNO Don   sulfamethoxazole-trimethoprim (BACTRIM;SEPTRA) 400-80 MG per tablet Take 1 tablet by mouth daily M,w,f only Yes Historical Provider, MD   predniSONE (DELTASONE) 5 MG tablet Take 5 mg by mouth daily Yes Historical Provider, MD   traMADol (ULTRAM) 50 MG tablet Take 50 mg by mouth nightly. Yes Historical Provider, MD   levalbuterol (XOPENEX) 1.25 MG/3ML nebulizer solution Take 1 ampule by nebulization every 6 hours as needed for Wheezing Yes Historical Provider, MD   furosemide (LASIX) 20 MG tablet Take 1 tablet by mouth daily  Patient taking differently: Take 10 mg by mouth daily as needed (if greater than 3 pound weight gain daily)  Yes NUNO Burt   EPINEPHrine (EPIPEN) 0.3 MG/0.3ML SOAJ injection Inject 0.3 mLs into the muscle as needed (Allergic Reaction) Yes NUNO Don   Ascorbic Acid (VITAMIN C) 1000 MG tablet Take 1,000 mg by mouth daily Yes Historical Provider, MD   nitroGLYCERIN (NITROSTAT) 0.4 MG SL tablet Place 1 tablet under the tongue every 5 minutes as needed for Chest pain Yes Chato Bright MD   donepezil (ARICEPT) 5 MG tablet TAKE 1 TABLET BY MOUTH NIGHTLY Yes NUNO Don   azaTHIOprine (IMURAN) 50 MG tablet Take 50 mg by mouth daily  Yes Historical Provider, MD   aspirin 81 MG tablet Take 81 mg by mouth daily.  Yes Historical Provider, MD   pregabalin (LYRICA) 50 MG capsule TAKE 1 CAPSULE BY MOUTH IN THE EVENING  NUNO Tellez       Social History     Tobacco Use    Smoking status: Never Smoker    Smokeless tobacco: Never Used   Substance Use Topics    Alcohol use: No    Drug use: No        Allergies   Allergen Reactions    Bee Venom     Gabapentin      Causes blisters to head.      Penicillins     Toprol Xl [Metoprolol]      Leg pain    ,   Past Medical History:   Diagnosis Date    Achalasia     going to [facility] for surgery in March    Acute pancreatitis     Acute pancreatitis     Anemia     Asthma     Atrial fibrillation (HCC)     h/o paroxysmal a-fib ? anesthsia induced    Benign colonic polyp     cscope 12/07 Dr Brooke Han adenomatous: 12/14 adenoma    Chest pain     Chronic kidney disease     Chronic pain syndrome     Depression     Diabetic peripheral neuropathy (HCC)     Extrinsic asthma     Hyperlipidemia     Hypertension     Idiopathic peripheral neuropathy     Lyme disease     Mediastinal lymphadenopathy     Microalbuminuria     Mixed hyperlipidemia     Paroxysmal A-fib (HCC)     S/P ablation of atrial fibrillation     4/16 A fib ablation , Dr Kaitlin Aguilera, [facility]     Sarcoidosis, lung ([facility] [address]     restrictive lung impairment    Tick bite     Type 2 diabetes mellitus with peripheral neuropathy ([address]     Type 2 diabetes, controlled, with neuropathy ([address]    ,   Past Surgical History:   Procedure Laterality Date    ANKLE FRACTURE SURGERY  april 14, 2015    ATRIAL ABLATION SURGERY  2015    Dr. Prince Justin      Catheter Ablation A-fib    CHOLECYSTECTOMY      COLONOSCOPY  12/02/2014    Melecio    CYSTOSCOPY Left 8/16/2019    CYSTOSCOPY; LEFT RETROGRADE PYELOGRAM; INSERTION LEFT URETERAL STENT performed by Yonis Davidson MD at [address] [place] [address] Left 6/27/2018    LASER LITHOTRIPSY STONE MANIPULATION WITH DOUBLE J STENT PLACEMENT performed by Yonis Davidson MD at [facility] CYSTOURETHROSCOPY,URETER CATHETER Left 6/27/2018    CYSTOSCOPY URETEROSCOPY RETROGRADE PYELOGRAMS performed by Mario Klein MD at [address]     ,   Social History     Tobacco Use    Smoking status: Never Smoker    Smokeless tobacco: Never Used   Substance Use Topics    Alcohol use: No    Drug use: No   ,   Family History   Problem Relation Age of Onset    Coronary Art Dis Father         AICD pacer age 68    Heart Attack Father     Cancer Sister         childhood chest tumor    Heart Failure Mother    ,   Immunization History   Administered Date(s) Administered    Influenza, High Dose (Fluzone 65 yrs and older) 12/21/2016, 12/27/2017, 11/05/2018    Influenza, Triv, inactivated, subunit, adjuvanted, IM (Fluad 65 yrs and older) 11/20/2019    Pneumococcal Conjugate 13-valent ([id number]) 10/05/2016    Pneumococcal Polysaccharide ([id number]) 04/24/2008, 10/01/2015   ,   Health Maintenance   Topic Date Due    DTaP/Tdap/Td vaccine (1 - Tdap) 03/30/2020 (Originally 9/[birth year]/1968)    Shingles Vaccine (1 of 2) 09/09/2020 (Originally 9/15/1999)    Diabetic retinal exam  09/18/2020 (Originally 5/9/2019)    Diabetic foot exam  03/09/2021 (Originally 9/15/1959)    A1C test (Diabetic or Prediabetic)  06/02/2020    Annual Wellness Visit (AWV)  06/03/2020    Lipid screen  12/09/2020    Potassium monitoring  03/17/2021    Creatinine monitoring  03/17/2021    Colon cancer screen colonoscopy  08/01/2024    Flu vaccine  Completed    Pneumococcal 65+ years Vaccine  Completed    Hepatitis A vaccine  Aged Out    Hib vaccine  Aged Out    Meningococcal (ACWY) vaccine  Aged Out    Hepatitis C screen  Discontinued       PHYSICAL EXAMINATION:  [ INSTRUCTIONS:  \"[x]\" Indicates a positive item  \"[]\" Indicates a negative item  -- DELETE ALL ITEMS NOT EXAMINED]  Vital Signs: (As obtained by patient/caregiver or practitioner observation)    Blood pressure-  Heart rate-    Respiratory rate- Temperature-  Pulse oximetry-     Constitutional: [x] Appears well-developed and well-nourished [x] No apparent distress      [] Abnormal-   Mental status  [x] Alert and awake  [x] Oriented to person/place/time [x]Able to follow commands      Eyes:  EOM    []  Normal  [] Abnormal-  Sclera  [x]  Normal  [] Abnormal -         Discharge []  None visible  [] Abnormal -    HENT:   [x] Normocephalic, atraumatic. [] Abnormal   [] Mouth/Throat: Mucous membranes are moist.     External Ears [] Normal  [] Abnormal-     Neck: [x] No visualized mass     Pulmonary/Chest: [x] Respiratory effort normal.  [x] No visualized signs of difficulty breathing or respiratory distress        [] Abnormal-      Musculoskeletal:   [] Normal gait with no signs of ataxia         [] Normal range of motion of neck        [] Abnormal-       Neurological:        [] No Facial Asymmetry (Cranial nerve 7 motor function) (limited exam to video visit)          [] No gaze palsy        [] Abnormal-         Skin:        [] No significant exanthematous lesions or discoloration noted on facial skin         [] Abnormal-            Psychiatric:       [x] Normal Affect [] No Hallucinations        [] Abnormal-     Other pertinent observable physical exam findings-     Due to this being a TeleHealth encounter, evaluation of the following organ systems is limited: Vitals/Constitutional/EENT/Resp/CV/GI//MS/Neuro/Skin/Heme-Lymph-Imm. ASSESSMENT/PLAN:  1. Elevated PSA  PSA is down once more we will continue to monitor return in 6 months with a PSA prior at that time he will need an office visit for BING  - PSA, Total and Free; Future      Return in about 6 months (around 9/30/2020) for PSA prior to vext visit.     An  electronic signature was used to authenticate this note.    --Nicola Osborn MD on 3/30/2020 at 11:00 AM        Pursuant to the emergency declaration under the [facility], [address] [place] [address] authority and the Coronavirus

## 2020-03-30 NOTE — LETTER
[facility]  [place] [address]  [address] [address]  Phone: [phone]  Fax: [phone]    Audree Angelucci, MD        March 30, 2020     NUNO Mckinley  [address] [place] [address] [place] [address]      Patient: Jannet Pond   MR Number: [id number]   YOB: 1950   Date of Visit: 3/30/2020       Dear Provider: Thank you for referring Kayode Bueno to me for evaluation. Below are the relevant portions of my assessment and plan of care. If you have questions, please do not hesitate to call me. I look forward to following Alvaro along with you.     Sincerely,        Audree Angelucci, MD

## 2020-04-14 RX ORDER — PREGABALIN 75 MG/1
75 CAPSULE ORAL 3 TIMES DAILY
Qty: 90 CAPSULE | Refills: 1 | Status: SHIPPED | OUTPATIENT
Start: 2020-04-14 | End: 2021-01-18

## 2020-04-14 RX ORDER — PREGABALIN 50 MG/1
CAPSULE ORAL
Qty: 30 CAPSULE | Refills: 0 | OUTPATIENT
Start: 2020-04-14

## 2020-04-27 ENCOUNTER — TELEMEDICINE (OUTPATIENT)
Dept: PULMONOLOGY | Facility: CLINIC | Age: 70
End: 2020-04-27

## 2020-04-27 DIAGNOSIS — J98.4 RESTRICTIVE LUNG DISEASE: ICD-10-CM

## 2020-04-27 DIAGNOSIS — J44.9 COPD, MODERATE (HCC): ICD-10-CM

## 2020-04-27 DIAGNOSIS — D86.2 SARCOIDOSIS OF LUNG WITH SARCOIDOSIS OF LYMPH NODES (HCC): Primary | ICD-10-CM

## 2020-04-27 PROCEDURE — 99213 OFFICE O/P EST LOW 20 MIN: CPT | Performed by: INTERNAL MEDICINE

## 2020-04-27 RX ORDER — LEVALBUTEROL INHALATION SOLUTION 1.25 MG/3ML
3 SOLUTION RESPIRATORY (INHALATION) 4 TIMES DAILY PRN
Qty: 360 ML | Refills: 5 | Status: SHIPPED | OUTPATIENT
Start: 2020-04-27

## 2020-04-27 NOTE — PROGRESS NOTES
Subjective   Caio Riley is a 69 y.o. male.     Chief Complaint   Patient presents with   • Sarcoidosis      No My Sticky Note on file.    History of Present Illness   You have chosen to receive care through a telehealth visit.  Do you consent to use a video/audio connection for your medical care today? Yes  This was a telehealth visit.  The length of the visit was 15 minutes.  The patient has sarcoidosis and is currently on Imuran and prednisone.  He states his breathing is doing better with less problems with dyspnea particularly exertional he still has such episodes periodically.  He does have oxygen use at night and as needed for exertion.  He is using this again on a regular basis with exertion and when sleeping, he is clearly benefiting from it, and is certainly agreeable with continuing on his home oxygen therapy.  He does need refills of his Xopenex.  He rarely uses this more than once per day but could use it up to 3-4 times per day if need be.  I told him we will plan on follow-up pulmonary functions in late June or early July which will be about 6 months from his last PFTs then see him back in the office shortly thereafter for follow-up and to go over the results of the studies.  Medical/Family/Social History   has a past medical history of A-fib (CMS/HCC), Arthritis, Asthma, COPD (chronic obstructive pulmonary disease) (CMS/HCC), Diabetes mellitus (CMS/HCC), Hypertension, Kidney stones, Neuropathy, Pancreatitis, and Sarcoidosis.   has a past surgical history that includes Fracture surgery (Right); Laparoscopic cholecystectomy; LASIK (Bilateral); Fracture surgery (Left); Inguinal Node Biopsy (Right, 8/7/2018); Squamous cell carcinoma excision; Colonoscopy w/ polypectomy (12/02/2014); Esophagogastroduodenoscopy (01/08/2002); Ablation of dysrhythmic focus; Esophagogastroduodenoscopy (N/A, 8/1/2019); Colonoscopy (N/A, 8/1/2019); and Esophagogastroduodenoscopy (08/01/2019).  family history is not on  file.   reports that he quit smoking about 52 years ago. His smoking use included cigarettes. He has a 1.00 pack-year smoking history. He has never used smokeless tobacco. He reports that he does not drink alcohol or use drugs.  Allergies   Allergen Reactions   • Bee Venom Anaphylaxis   • Penicillins Swelling and Unknown (See Comments)     Reaction: SWELLING/RASH    Reaction: SWELLING/RASH   • Acetaminophen Unknown (See Comments)     Other reaction(s): NAUSEA   Start Date: ADULT    Other reaction(s): NAUSEA   Start Date: ADULT   • Gabapentin Unknown (See Comments)     Causes blisters to head.   Causes blisters to head.     Causes blisters to head.    • Metoprolol Unknown (See Comments)     Leg pain   Leg pain     Leg pain      Medications    Current Outpatient Medications:   •  albuterol sulfate  (90 Base) MCG/ACT inhaler, Inhale 2 puffs Every 4 (Four) Hours As Needed for Wheezing or Shortness of Air., Disp: 1 inhaler, Rfl: 11  •  ascorbic acid (VITAMIN C) 1000 MG tablet, Take 1,000 mg by mouth Daily., Disp: , Rfl:   •  aspirin 81 MG chewable tablet, Chew 81 mg Daily., Disp: , Rfl:   •  azaTHIOprine (IMURAN) 50 MG tablet, , Disp: , Rfl: 5  •  donepezil (ARICEPT) 5 MG tablet, , Disp: , Rfl: 0  •  EPINEPHrine (EPIPEN) 0.3 MG/0.3ML solution auto-injector injection, Inject 0.3 mg into the appropriate muscle as directed by prescriber., Disp: , Rfl:   •  fluticasone-salmeterol (ADVAIR) 500-50 MCG/DOSE DISKUS, Inhale 2 (Two) Times a Day., Disp: , Rfl:   •  ibandronate (BONIVA) 150 MG tablet, Take 150 mg by mouth Every 30 (Thirty) Days., Disp: , Rfl:   •  Insulin Pen Needle (BD PEN NEEDLE DAGMAR U/F) 32G X 4 MM misc, 1 each., Disp: , Rfl:   •  ipratropium (ATROVENT) 0.02 % nebulizer solution, Take 2.5 mL by nebulization 4 (Four) Times a Day., Disp: 300 mL, Rfl: 11  •  ipratropium-albuterol (DUO-NEB) 0.5-2.5 mg/3 ml nebulizer, Take 3 mL by nebulization 4 (Four) Times a Day As Needed for Wheezing., Disp: 120 mL, Rfl:  11  •  IRON PO, Take 27 mg by mouth 2 (Two) Times a Day., Disp: , Rfl:   •  JANUVIA 50 MG tablet, , Disp: , Rfl:   •  LANTUS SOLOSTAR 100 UNIT/ML injection pen, 14 Units., Disp: , Rfl:   •  levalbuterol (XOPENEX) 1.25 MG/3ML nebulizer solution, Take 1 ampule by nebulization 4 (Four) Times a Day., Disp: 120 ampule, Rfl: 11  •  levalbuterol (XOPENEX) 1.25 MG/3ML nebulizer solution, Take 1 ampule by nebulization 4 (Four) Times a Day As Needed for Wheezing., Disp: 360 mL, Rfl: 5  •  losartan (COZAAR) 100 MG tablet, , Disp: , Rfl:   •  LYRICA 50 MG capsule, 75 mg., Disp: , Rfl:   •  predniSONE (DELTASONE) 5 MG tablet, Take 5 mg by mouth Daily., Disp: , Rfl:   •  RELION PEN NEEDLES 32G X 4 MM misc, , Disp: , Rfl:   •  rivaroxaban (XARELTO) 20 MG tablet, Take 20 mg by mouth Daily. STOPPED 8/1/18, Disp: , Rfl:   •  sulfamethoxazole-trimethoprim (BACTRIM,SEPTRA) 400-80 MG tablet, , Disp: , Rfl: 6  •  traMADol (ULTRAM) 50 MG tablet, , Disp: , Rfl: 0    Review of Systems   Constitutional: Positive for fatigue. Negative for chills and fever.   HENT: Negative for congestion.    Eyes: Negative for visual disturbance.   Respiratory: Positive for cough, shortness of breath and wheezing.    Cardiovascular: Negative for chest pain.   Gastrointestinal: Negative for diarrhea, nausea and vomiting.   Genitourinary: Negative for difficulty urinating.   Musculoskeletal: Negative for arthralgias.   Skin: Negative for rash.   Neurological: Negative for dizziness and speech difficulty.   Psychiatric/Behavioral: The patient is not nervous/anxious.      ------------------------------------  Objective   There were no vitals taken for this visit.  Physical Exam   Constitutional: He is oriented to person, place, and time. He appears well-developed and well-nourished.   HENT:   Head: Normocephalic and atraumatic.   Eyes: Pupils are equal, round, and reactive to light. EOM are normal.   Neck: Normal range of motion.   Pulmonary/Chest: Effort normal.    Musculoskeletal: Normal range of motion.   Neurological: He is alert and oriented to person, place, and time.   Skin: No rash noted.   Psychiatric: He has a normal mood and affect.               Pulmonary Functions Testing Results:  PFT Values        Some values may be hidden. Unless noted otherwise, only the newest values recorded on each date are displayed.         Old Values PFT Results 11/27/18 7/12/19 1/14/20   FVC 61% 69%    FEV1 53% 61%    FEV1/FVC 68.07% 69.71    DLCO 55% 61%    TLC 69% 69%       Pre Drug PFT Results 11/27/18 7/12/19 1/14/20   FVC   57   FEV1   43   FEF 25-75%   17   FEV1/FVC   59.48      Post Drug PFT Results 11/27/18 7/12/19 1/14/20   No data to display.      Other Tests PFT Results 11/27/18 7/12/19 1/14/20   TLC   61   RV   75   DLCO   47   D/VAsb   97              Assessment/Plan   Caio was seen today for sarcoidosis.    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    Restrictive lung disease  -     Pulmonary Function Test; Future        The patient return in early July and we will plan on follow-up pulmonary functions shortly before that appointment.  We will have these done at [facility].  He again is utilizing his oxygen routinely and is benefiting from it and deftly wants to continue using it.  I did E prescribe refills of his Xopenex to use up to 4 times a day.

## 2020-04-27 NOTE — PATIENT INSTRUCTIONS
I will plan on follow-up pulmonary function sometime in late June or early July then see him back about a week thereafter.  They will be about 6 months from his last pulmonary functions.  He will continue his current regimen and will refill his Xopenex solution to use up to 4 times a day although he almost never has to take it more than once per day.

## 2020-05-19 RX ORDER — LOSARTAN POTASSIUM 100 MG/1
TABLET ORAL
Qty: 90 TABLET | Refills: 3 | Status: SHIPPED | OUTPATIENT
Start: 2020-05-19 | End: 2021-10-04

## 2020-05-22 ENCOUNTER — HOSPITAL ENCOUNTER (EMERGENCY)
Age: 70
Discharge: ANOTHER HOSPITAL | End: 2020-05-22
Attending: EMERGENCY MEDICINE
Payer: MEDICARE

## 2020-05-22 ENCOUNTER — APPOINTMENT (OUTPATIENT)
Dept: GENERAL RADIOLOGY | Age: 70
End: 2020-05-22
Payer: MEDICARE

## 2020-05-22 VITALS
TEMPERATURE: 98.6 F | SYSTOLIC BLOOD PRESSURE: 119 MMHG | BODY MASS INDEX: 26.07 KG/M2 | RESPIRATION RATE: 18 BRPM | DIASTOLIC BLOOD PRESSURE: 62 MMHG | OXYGEN SATURATION: 99 % | WEIGHT: 176 LBS | HEIGHT: 69 IN | HEART RATE: 73 BPM

## 2020-05-22 PROCEDURE — 90471 IMMUNIZATION ADMIN: CPT | Performed by: NURSE PRACTITIONER

## 2020-05-22 PROCEDURE — 99284 EMERGENCY DEPT VISIT MOD MDM: CPT

## 2020-05-22 PROCEDURE — 6360000002 HC RX W HCPCS

## 2020-05-22 PROCEDURE — 6360000002 HC RX W HCPCS: Performed by: NURSE PRACTITIONER

## 2020-05-22 PROCEDURE — 6370000000 HC RX 637 (ALT 250 FOR IP): Performed by: NURSE PRACTITIONER

## 2020-05-22 PROCEDURE — 90715 TDAP VACCINE 7 YRS/> IM: CPT | Performed by: NURSE PRACTITIONER

## 2020-05-22 PROCEDURE — 96376 TX/PRO/DX INJ SAME DRUG ADON: CPT

## 2020-05-22 PROCEDURE — 2580000003 HC RX 258: Performed by: NURSE PRACTITIONER

## 2020-05-22 PROCEDURE — 96374 THER/PROPH/DIAG INJ IV PUSH: CPT

## 2020-05-22 PROCEDURE — 96375 TX/PRO/DX INJ NEW DRUG ADDON: CPT

## 2020-05-22 PROCEDURE — 73130 X-RAY EXAM OF HAND: CPT

## 2020-05-22 PROCEDURE — 64450 NJX AA&/STRD OTHER PN/BRANCH: CPT

## 2020-05-22 RX ORDER — ONDANSETRON 2 MG/ML
4 INJECTION INTRAMUSCULAR; INTRAVENOUS ONCE
Status: COMPLETED | OUTPATIENT
Start: 2020-05-22 | End: 2020-05-22

## 2020-05-22 RX ORDER — MORPHINE SULFATE 4 MG/ML
4 INJECTION, SOLUTION INTRAMUSCULAR; INTRAVENOUS ONCE
Status: COMPLETED | OUTPATIENT
Start: 2020-05-22 | End: 2020-05-22

## 2020-05-22 RX ORDER — MORPHINE SULFATE 4 MG/ML
INJECTION, SOLUTION INTRAMUSCULAR; INTRAVENOUS
Status: COMPLETED
Start: 2020-05-22 | End: 2020-05-22

## 2020-05-22 RX ORDER — ONDANSETRON 2 MG/ML
INJECTION INTRAMUSCULAR; INTRAVENOUS
Status: COMPLETED
Start: 2020-05-22 | End: 2020-05-22

## 2020-05-22 RX ADMIN — MORPHINE SULFATE 4 MG: 4 INJECTION INTRAVENOUS at 22:12

## 2020-05-22 RX ADMIN — ONDANSETRON 4 MG: 2 INJECTION INTRAMUSCULAR; INTRAVENOUS at 20:43

## 2020-05-22 RX ADMIN — Medication 1 EACH: at 21:11

## 2020-05-22 RX ADMIN — TETANUS TOXOID, REDUCED DIPHTHERIA TOXOID AND ACELLULAR PERTUSSIS VACCINE, ADSORBED 0.5 ML: 5; 2.5; 8; 8; 2.5 SUSPENSION INTRAMUSCULAR at 20:44

## 2020-05-22 RX ADMIN — CEFAZOLIN 1 G: 1 INJECTION, POWDER, FOR SOLUTION INTRAMUSCULAR; INTRAVENOUS at 21:35

## 2020-05-22 RX ADMIN — MORPHINE SULFATE 4 MG: 4 INJECTION, SOLUTION INTRAMUSCULAR; INTRAVENOUS at 20:43

## 2020-05-22 RX ADMIN — MORPHINE SULFATE 4 MG: 4 INJECTION INTRAVENOUS at 20:43

## 2020-05-22 ASSESSMENT — PAIN SCALES - GENERAL
PAINLEVEL_OUTOF10: 8
PAINLEVEL_OUTOF10: 9
PAINLEVEL_OUTOF10: 7

## 2020-05-22 ASSESSMENT — PAIN DESCRIPTION - LOCATION: LOCATION: HAND

## 2020-05-22 ASSESSMENT — PAIN DESCRIPTION - ORIENTATION: ORIENTATION: LEFT

## 2020-05-22 ASSESSMENT — PAIN DESCRIPTION - PAIN TYPE: TYPE: ACUTE PAIN

## 2020-05-23 ENCOUNTER — OFFICE VISIT (OUTPATIENT)
Age: 70
End: 2020-05-23

## 2020-05-23 NOTE — ED NOTES
Pt presents to ED after cutting fingers on a wood saw. States that he was cutting wood and the piece he was cutting slipped and his fingers went into the saw. Upon arrival he could not get the fingers uncovered without them bleeding. He kept fingers covered and wrapped at this time.       Kd Desouza RN  05/22/20 [id number]

## 2020-05-23 NOTE — PATIENT INSTRUCTIONS
be washed thoroughly with soap and water. Clean all high-touch surfaces everyday  High touch surfaces include counters, tabletops, doorknobs, bathroom fixtures, toilets, phones, keyboards, tablets, and bedside tables. Also, clean any surfaces that may have blood, stool, or body fluids on them. Use a household cleaning spray or wipe, according to the label instructions. Labels contain instructions for safe and effective use of the cleaning product including precautions you should take when applying the product, such as wearing gloves and making sure you have good ventilation during use of the product. Monitor your symptoms  Seek prompt medical attention if your illness is worsening (e.g., difficulty breathing). Before seeking care, call your healthcare provider and tell them that you have, or are being evaluated for, COVID-19. Put on a facemask before you enter the facility. These steps will help the healthcare providers office to keep other people in the office or waiting room from getting infected or exposed. Ask your healthcare provider to call the local or [facility] health department. Persons who are placed under active monitoring or facilitated self-monitoring should follow instructions provided by their local health department or occupational health professionals, as appropriate. When working with your local health department check their available hours. If you have a medical emergency and need to call 911, notify the dispatch personnel that you have, or are being evaluated for COVID-19. If possible, put on a facemask before emergency medical services arrive. Discontinuing home isolation  Patients with confirmed COVID-19 should remain under home isolation precautions until the risk of secondary transmission to others is thought to be low.  The decision to discontinue home isolation precautions should be made on a case-by-case basis, in consultation with healthcare providers and state and [facility] health

## 2020-05-23 NOTE — ED PROVIDER NOTES
[facility]  eMERGENCY dEPARTMENT eNCOUnter      Pt Name: Nicole Vyas  MRN: [id number]  [place] 1950  Date of evaluation: 5/22/2020  Provider: Edna Zavala, [address]       Chief Complaint   Patient presents with    Finger Injury     c/o left hand several fingers on          HISTORY OF PRESENT ILLNESS   (Location/Symptom, Timing/Onset,Context/Setting, Quality, Duration, Modifying Factors, Severity)  Note limiting factors. Lj Harper a 78 y.o. male who presents to the emergency department for evaluation of finger injury. Pt tells me that his left hand was injured by a  tonight while he was working with wood. He tells me that he is right hand dominant. He denies other injury. He tells me that he needs his tetanus immunization updated. HPI    Nursing Notes were reviewed. REVIEW OF SYSTEMS    (2-9 systems for level 4, 10 or more for level 5)     Review of Systems   Constitutional: Negative. Skin: Positive for wound (left long and ring finger injury). All other systems reviewed and are negative. A complete review of systems was performed and is negative except as noted above in the HPI.        PAST MEDICAL HISTORY     Past Medical History:   Diagnosis Date    Achalasia     going to [facility] for [facility] in March    Acute pancreatitis     Acute pancreatitis     Anemia     Asthma     Atrial fibrillation ([address]     h/o paroxysmal a-fib ? anesthsia induced    Benign colonic polyp     cscope 12/07 Dr Delio Spence adenomatous: 12/14 adenoma    Chest pain     Chronic kidney disease     Chronic pain syndrome     Depression     Diabetic peripheral neuropathy (HCC)     Extrinsic asthma     Hyperlipidemia     Hypertension     Idiopathic peripheral neuropathy     Lyme disease     Mediastinal lymphadenopathy     Microalbuminuria     Mixed hyperlipidemia     Paroxysmal A-fib (HCC)     S/P ablation of atrial fibrillation     4/16 A fib ablation , 1% w/o epi and bupivacaine 0.5% w/o epi    Block injection procedure:  Anatomic landmarks identified, anatomic landmarks palpated, introduced needle, negative aspiration for blood and incremental injection    Block outcome:  Anesthesia achieved  Dressing:     Dressing: surgicell/triple antibiotic ointment. Wrapped with:  Bulky dressing  Post-procedure details:     Patient tolerance of procedure: Tolerated well, no immediate complications  Comments:      Wound were irrigated with copious sterile saline/syringe prior to application of dressing        FINAL IMPRESSION     1. 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 Open displaced fracture of distal phalanx of left middle finger, initial encounter    3. Open displaced fracture of distal phalanx of left ring finger, initial encounter          DISPOSITION/PLAN   DISPOSITION        PATIENT REFERRED TO:  No follow-up provider specified.     DISCHARGE MEDICATIONS:       Current Discharge Medication List          (Pleasenote that portions of this note were completed with a voice recognition program.  Efforts were made to edit the dictations but occasionally words are mis-transcribed.)              Maximus Hawkins, NUNO  05/22/20 3725

## 2020-05-27 LAB
REPORT: NORMAL
SARS-COV-2: NOT DETECTED
THIS TEST SENT TO: NORMAL

## 2020-06-23 ENCOUNTER — OFFICE VISIT (OUTPATIENT)
Dept: INTERNAL MEDICINE | Age: 70
End: 2020-06-23
Payer: COMMERCIAL

## 2020-06-23 VITALS
BODY MASS INDEX: 28.72 KG/M2 | HEIGHT: 67 IN | OXYGEN SATURATION: 97 % | HEART RATE: 82 BPM | WEIGHT: 183 LBS | DIASTOLIC BLOOD PRESSURE: 56 MMHG | SYSTOLIC BLOOD PRESSURE: 128 MMHG

## 2020-06-23 DIAGNOSIS — Z79.4 TYPE 2 DIABETES MELLITUS WITHOUT COMPLICATION, WITH LONG-TERM CURRENT USE OF INSULIN (HCC): ICD-10-CM

## 2020-06-23 DIAGNOSIS — E55.9 VITAMIN D DEFICIENCY: ICD-10-CM

## 2020-06-23 DIAGNOSIS — I10 ESSENTIAL HYPERTENSION: ICD-10-CM

## 2020-06-23 DIAGNOSIS — E11.9 TYPE 2 DIABETES MELLITUS WITHOUT COMPLICATION, WITH LONG-TERM CURRENT USE OF INSULIN (HCC): ICD-10-CM

## 2020-06-23 DIAGNOSIS — N18.30 STAGE 3 CHRONIC KIDNEY DISEASE (HCC): ICD-10-CM

## 2020-06-23 LAB
ALBUMIN SERPL-MCNC: 4.4 G/DL (ref 3.5–5.2)
ALP BLD-CCNC: 65 U/L (ref 40–130)
ALT SERPL-CCNC: 11 U/L (ref 5–41)
ANION GAP SERPL CALCULATED.3IONS-SCNC: 15 MMOL/L (ref 7–19)
AST SERPL-CCNC: 15 U/L (ref 5–40)
BACTERIA: NEGATIVE /HPF
BASOPHILS ABSOLUTE: 0 K/UL (ref 0–0.2)
BASOPHILS RELATIVE PERCENT: 0.5 % (ref 0–1)
BILIRUB SERPL-MCNC: 0.3 MG/DL (ref 0.2–1.2)
BILIRUBIN URINE: NEGATIVE
BLOOD, URINE: ABNORMAL
BUN BLDV-MCNC: 15 MG/DL (ref 8–23)
CALCIUM SERPL-MCNC: 10.3 MG/DL (ref 8.8–10.2)
CHLORIDE BLD-SCNC: 98 MMOL/L (ref 98–111)
CLARITY: CLEAR
CO2: 26 MMOL/L (ref 22–29)
COLOR: YELLOW
CREAT SERPL-MCNC: 1.1 MG/DL (ref 0.5–1.2)
EOSINOPHILS ABSOLUTE: 0.1 K/UL (ref 0–0.6)
EOSINOPHILS RELATIVE PERCENT: 3.5 % (ref 0–5)
EPITHELIAL CELLS, UA: 0 /HPF (ref 0–5)
GFR NON-AFRICAN AMERICAN: >60
GLUCOSE BLD-MCNC: 256 MG/DL (ref 74–109)
GLUCOSE URINE: >=1000 MG/DL
HBA1C MFR BLD: 7.3 % (ref 4–6)
HCT VFR BLD CALC: 34.3 % (ref 42–52)
HEMOGLOBIN: 11.1 G/DL (ref 14–18)
HYALINE CASTS: 0 /HPF (ref 0–8)
IMMATURE GRANULOCYTES #: 0.1 K/UL
KETONES, URINE: NEGATIVE MG/DL
LEUKOCYTE ESTERASE, URINE: NEGATIVE
LYMPHOCYTES ABSOLUTE: 0.7 K/UL (ref 1.1–4.5)
LYMPHOCYTES RELATIVE PERCENT: 16.7 % (ref 20–40)
MCH RBC QN AUTO: 29.2 PG (ref 27–31)
MCHC RBC AUTO-ENTMCNC: 32.4 G/DL (ref 33–37)
MCV RBC AUTO: 90.3 FL (ref 80–94)
MONOCYTES ABSOLUTE: 0.4 K/UL (ref 0–0.9)
MONOCYTES RELATIVE PERCENT: 10 % (ref 0–10)
NEUTROPHILS ABSOLUTE: 2.7 K/UL (ref 1.5–7.5)
NEUTROPHILS RELATIVE PERCENT: 68.1 % (ref 50–65)
NITRITE, URINE: NEGATIVE
PDW BLD-RTO: 14.5 % (ref 11.5–14.5)
PH UA: 5.5 (ref 5–8)
PLATELET # BLD: 132 K/UL (ref 130–400)
PMV BLD AUTO: 10.8 FL (ref 9.4–12.4)
POTASSIUM SERPL-SCNC: 4.3 MMOL/L (ref 3.5–5)
PROTEIN UA: NEGATIVE MG/DL
RBC # BLD: 3.8 M/UL (ref 4.7–6.1)
RBC UA: 16 /HPF (ref 0–4)
SODIUM BLD-SCNC: 139 MMOL/L (ref 136–145)
SPECIFIC GRAVITY UA: 1.03 (ref 1–1.03)
TOTAL PROTEIN: 7 G/DL (ref 6.6–8.7)
TSH SERPL DL<=0.05 MIU/L-ACNC: 3.01 UIU/ML (ref 0.27–4.2)
UROBILINOGEN, URINE: 0.2 E.U./DL
VITAMIN D 25-HYDROXY: 16.3 NG/ML
WBC # BLD: 4 K/UL (ref 4.8–10.8)
WBC UA: 1 /HPF (ref 0–5)

## 2020-06-23 PROCEDURE — 4040F PNEUMOC VAC/ADMIN/RCVD: CPT | Performed by: INTERNAL MEDICINE

## 2020-06-23 PROCEDURE — 99214 OFFICE O/P EST MOD 30 MIN: CPT | Performed by: INTERNAL MEDICINE

## 2020-06-23 PROCEDURE — G8926 SPIRO NO PERF OR DOC: HCPCS | Performed by: INTERNAL MEDICINE

## 2020-06-23 PROCEDURE — 1123F ACP DISCUSS/DSCN MKR DOCD: CPT | Performed by: INTERNAL MEDICINE

## 2020-06-23 PROCEDURE — G8427 DOCREV CUR MEDS BY ELIG CLIN: HCPCS | Performed by: INTERNAL MEDICINE

## 2020-06-23 PROCEDURE — 3051F HG A1C>EQUAL 7.0%<8.0%: CPT | Performed by: INTERNAL MEDICINE

## 2020-06-23 PROCEDURE — 3017F COLORECTAL CA SCREEN DOC REV: CPT | Performed by: INTERNAL MEDICINE

## 2020-06-23 PROCEDURE — 1036F TOBACCO NON-USER: CPT | Performed by: INTERNAL MEDICINE

## 2020-06-23 PROCEDURE — 2022F DILAT RTA XM EVC RTNOPTHY: CPT | Performed by: INTERNAL MEDICINE

## 2020-06-23 PROCEDURE — G8417 CALC BMI ABV UP PARAM F/U: HCPCS | Performed by: INTERNAL MEDICINE

## 2020-06-23 PROCEDURE — 3023F SPIROM DOC REV: CPT | Performed by: INTERNAL MEDICINE

## 2020-06-23 ASSESSMENT — ENCOUNTER SYMPTOMS
SINUS PRESSURE: 0
COUGH: 0
EYE REDNESS: 0
ABDOMINAL PAIN: 0
TROUBLE SWALLOWING: 1
BACK PAIN: 0
SHORTNESS OF BREATH: 1
EYE DISCHARGE: 0
ABDOMINAL DISTENTION: 0

## 2020-06-23 NOTE — PROGRESS NOTES
[place]    CARDIAC SURGERY      Catheter Ablation A-fib    CHOLECYSTECTOMY      COLONOSCOPY  12/02/2014    [place]    CYSTOSCOPY Left 8/16/2019    CYSTOSCOPY; LEFT RETROGRADE PYELOGRAM; INSERTION LEFT URETERAL STENT performed by Rita Levine MD at [address] [place] [address] Left 6/27/2018    LASER LITHOTRIPSY STONE MANIPULATION WITH DOUBLE J STENT PLACEMENT performed by Rita Levine MD at [facility] Left 6/27/2018    CYSTOSCOPY URETEROSCOPY RETROGRADE PYELOGRAMS performed by Rita Levine MD at [address] [place] [address] SKIN CANCER EXCISION         Family History   Problem Relation Age of Onset    Coronary Art Dis Father         AICD pacer age 68    Heart Attack Father     Cancer Sister         childhood chest tumor    Heart Failure Mother        Social History     Socioeconomic History    Marital status:      Spouse name: Not on file    Number of children: Not on file    Years of education: Not on file    Highest education level: Not on file   Occupational History    Not on file   Social Needs    Financial resource strain: Not on file    Food insecurity     Worry: Not on file     Inability: Not on file    Transportation needs     Medical: Not on file     Non-medical: Not on file   Tobacco Use    Smoking status: Never Smoker    Smokeless tobacco: Never Used   Substance and Sexual Activity    Alcohol use: No    Drug use: No    Sexual activity: Not on file   Lifestyle    Physical activity     Days per week: Not on file     Minutes per session: Not on file    Stress: Not on file   Relationships    Social connections     Talks on phone: Not on file     Gets together: Not on file     Attends Mu-ism service: Not on file     Active member of club or organization: Not on file     Attends meetings of clubs or organizations: Not on file     Relationship status: Not on file    Intimate partner violence     Fear of current or ex partner: Not on file     Emotionally abused: Not on file     Physically abused: Not on file     Forced sexual activity: Not on file   Other Topics Concern    Not on file   Social History Narrative    Not on file       Allergies   Allergen Reactions    Bee Venom     Gabapentin      Causes blisters to head.  Penicillins     Toprol Xl [Metoprolol]      Leg pain        Current Outpatient Medications   Medication Sig Dispense Refill    losartan (COZAAR) 100 MG tablet Take 1 tablet by mouth once daily 90 tablet 3    pregabalin (LYRICA) 75 MG capsule Take 1 capsule by mouth 3 times daily for 60 days. 90 capsule 1    pantoprazole (PROTONIX) 40 MG tablet Take 1 tablet by mouth 2 times daily (before meals) 180 tablet 1    sucralfate (CARAFATE) 1 GM tablet Take 1 tablet by mouth 4 times daily 120 tablet 3    JANUVIA 50 MG tablet TAKE 1 TABLET BY MOUTH ONCE DAILY 30 tablet 2    spironolactone (ALDACTONE) 25 MG tablet 3 days a week (Patient taking differently: Prn) 9 tablet 1    XARELTO 20 MG TABS tablet TAKE 1 TABLET BY MOUTH ONCE DAILY WITH  EVENING  MEAL 90 tablet 1    albuterol sulfate  (90 Base) MCG/ACT inhaler INHALE 2 PUFFS BY MOUTH EVERY 6 HOURS AS NEEDED FOR WHEEZING 1 Inhaler 5    predniSONE (DELTASONE) 5 MG tablet Take 5 mg by mouth daily  6    traMADol (ULTRAM) 50 MG tablet Take 50 mg by mouth nightly.       levalbuterol (XOPENEX) 1.25 MG/3ML nebulizer solution Take 1 ampule by nebulization every 6 hours as needed for Wheezing      furosemide (LASIX) 20 MG tablet Take 1 tablet by mouth daily (Patient taking differently: Take 10 mg by mouth daily as needed (if greater than 3 pound weight gain daily) ) 30 tablet 3    EPINEPHrine (EPIPEN) 0.3 MG/0.3ML SOAJ injection Inject 0.3 mLs into the muscle as needed (Allergic Reaction) 2 each 1    Ascorbic Acid (VITAMIN C) 1000 MG tablet Take 1,000 mg by mouth daily      nitroGLYCERIN (NITROSTAT) 0.4 MG SL tablet Place 1 tablet under the tongue

## 2020-07-02 ENCOUNTER — TRANSCRIBE ORDERS (OUTPATIENT)
Dept: ADMINISTRATIVE | Facility: HOSPITAL | Age: 70
End: 2020-07-02

## 2020-07-02 DIAGNOSIS — Z01.818 PREOP EXAMINATION: Primary | ICD-10-CM

## 2020-07-08 ENCOUNTER — NURSE TRIAGE (OUTPATIENT)
Dept: OTHER | Facility: CLINIC | Age: 70
End: 2020-07-08

## 2020-07-08 ENCOUNTER — OFFICE VISIT (OUTPATIENT)
Dept: INTERNAL MEDICINE | Age: 70
End: 2020-07-08
Payer: MEDICARE

## 2020-07-08 VITALS
BODY MASS INDEX: 29 KG/M2 | WEIGHT: 184.8 LBS | OXYGEN SATURATION: 97 % | SYSTOLIC BLOOD PRESSURE: 152 MMHG | DIASTOLIC BLOOD PRESSURE: 70 MMHG | HEART RATE: 100 BPM | HEIGHT: 67 IN

## 2020-07-08 PROCEDURE — 1036F TOBACCO NON-USER: CPT | Performed by: INTERNAL MEDICINE

## 2020-07-08 PROCEDURE — G8417 CALC BMI ABV UP PARAM F/U: HCPCS | Performed by: INTERNAL MEDICINE

## 2020-07-08 PROCEDURE — G8427 DOCREV CUR MEDS BY ELIG CLIN: HCPCS | Performed by: INTERNAL MEDICINE

## 2020-07-08 PROCEDURE — 4040F PNEUMOC VAC/ADMIN/RCVD: CPT | Performed by: INTERNAL MEDICINE

## 2020-07-08 PROCEDURE — 1123F ACP DISCUSS/DSCN MKR DOCD: CPT | Performed by: INTERNAL MEDICINE

## 2020-07-08 PROCEDURE — 99213 OFFICE O/P EST LOW 20 MIN: CPT | Performed by: INTERNAL MEDICINE

## 2020-07-08 PROCEDURE — 3017F COLORECTAL CA SCREEN DOC REV: CPT | Performed by: INTERNAL MEDICINE

## 2020-07-08 RX ORDER — HYDROCODONE BITARTRATE AND ACETAMINOPHEN 5; 325 MG/1; MG/1
1 TABLET ORAL EVERY 8 HOURS PRN
Qty: 21 TABLET | Refills: 0 | Status: SHIPPED | OUTPATIENT
Start: 2020-07-08 | End: 2020-07-15

## 2020-07-08 RX ORDER — PREDNISONE 20 MG/1
20 TABLET ORAL DAILY
Qty: 5 TABLET | Refills: 0 | Status: SHIPPED | OUTPATIENT
Start: 2020-07-08 | End: 2020-07-13

## 2020-07-08 RX ORDER — METHOCARBAMOL 750 MG/1
750 TABLET, FILM COATED ORAL 4 TIMES DAILY
Qty: 40 TABLET | Refills: 0 | Status: SHIPPED | OUTPATIENT
Start: 2020-07-08 | End: 2020-07-18

## 2020-07-08 RX ORDER — CLINDAMYCIN HYDROCHLORIDE 300 MG/1
300 CAPSULE ORAL 3 TIMES DAILY
Qty: 30 CAPSULE | Refills: 0 | Status: SHIPPED | OUTPATIENT
Start: 2020-07-08 | End: 2020-07-18

## 2020-07-08 ASSESSMENT — ENCOUNTER SYMPTOMS
VOMITING: 0
COUGH: 0
DIARRHEA: 0
SORE THROAT: 0
NAIL CHANGES: 0
RHINORRHEA: 0
ABDOMINAL PAIN: 0
BOWEL INCONTINENCE: 0
SHORTNESS OF BREATH: 0
BACK PAIN: 1

## 2020-07-08 NOTE — PROGRESS NOTES
[address] [place] [address]  Phone: [phone]  Fax: [phone]    Visit Date:  7/8/2020    SUBJECTIVE:     Mary Diggs is a 78 y.o. male presents today in the office for:    Chief Complaint   Patient presents with    Back Pain     pain is traveling down left leg x 4 weeks    Finger Pain     swollen and draining       Rash   This is a new problem. The current episode started in the past 7 days. The problem has been gradually worsening since onset. The affected locations include the left fingers. The rash is characterized by pain and redness (drainage). He was exposed to nothing (had procedure in [place]). Pertinent negatives include no anorexia, congestion, cough, diarrhea, facial edema, fatigue, fever, joint pain, nail changes, rhinorrhea, shortness of breath, sore throat or vomiting. Past treatments include nothing. The treatment provided no relief. Back Pain   This is a new problem. The current episode started 1 to 4 weeks ago. The problem occurs intermittently. The problem has been gradually worsening since onset. The pain is present in the lumbar spine. The quality of the pain is described as shooting and aching. The pain radiates to the left thigh. The pain is at a severity of 7/10. The pain is moderate. The pain is the same all the time. The symptoms are aggravated by bending, standing and twisting. Stiffness is present all day. Associated symptoms include numbness. Pertinent negatives include no abdominal pain, bladder incontinence, bowel incontinence, chest pain, dysuria, fever, headaches, leg pain, paresis, paresthesias, pelvic pain, perianal numbness, tingling or weakness. Risk factors include poor posture and sedentary lifestyle. He has tried nothing for the symptoms. The treatment provided no relief.        Past Medical History:   Diagnosis Date    Achalasia     going to [place] for surgery in March    Acute pancreatitis     Anemia     Asthma     Atrial fibrillation ([facility]     h/o paroxysmal a-fib ? 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 David Jimenez      Catheter Ablation A-fib    CHOLECYSTECTOMY      COLONOSCOPY  12/02/2014    [place]    CYSTOSCOPY Left 8/16/2019    CYSTOSCOPY; LEFT RETROGRADE PYELOGRAM; INSERTION LEFT URETERAL STENT performed by Rocio Reid MD at [address] [place] [address] Left 6/27/2018    LASER LITHOTRIPSY STONE MANIPULATION WITH DOUBLE J STENT PLACEMENT performed by Rocio Reid MD at [facility] Left 6/27/2018    CYSTOSCOPY URETEROSCOPY RETROGRADE PYELOGRAMS performed by Rocio Reid MD at [place] [address] History     Socioeconomic History    Marital status:      Spouse name: Not on file    Number of children: Not on file    Years of education: Not on file    Highest education level: Not on file   Occupational History    Not on file   Social Needs    Financial resource strain: Not on file    Food insecurity     Worry: Not on file     Inability: Not on file    Transportation needs     Medical: Not on file     Non-medical: Not on file   Tobacco Use    Smoking status: Never Smoker    Smokeless tobacco: Never Used   Substance and Sexual Activity    Alcohol use: No    Drug use: No    Sexual activity: Not on file   Lifestyle    Physical activity     Days per week: Not on file     Minutes per session: Not on file    Stress: Not on file   Relationships    Social connections     Talks on phone: Not on file     Gets together: Not on file     Attends Zoroastrianism service: Not on file     Active member of club or organization: Not on file     Attends meetings of clubs or organizations: Not on file     Relationship status: Not on file    Intimate partner violence     Fear of current or ex partner: Not on file     Emotionally abused: Not on file     Physically abused: Not on file     Forced sexual activity: Not on file   Other Topics Concern    Not on file   Social History Narrative    Not on file       Family History   Problem Relation Age of Onset    Coronary Art Dis Father         AICD pacer age 68    Heart Attack Father     Cancer Sister         childhood chest tumor    Heart Failure Mother        Allergies   Allergen Reactions    Bee Venom     Gabapentin      Causes blisters to head.  Penicillins     Toprol Xl [Metoprolol]      Leg pain        Current Outpatient Medications   Medication Sig Dispense Refill    clindamycin (CLEOCIN) 300 MG capsule Take 1 capsule by mouth 3 times daily for 10 days 30 capsule 0    predniSONE (DELTASONE) 20 MG tablet Take 1 tablet by mouth daily for 5 days 5 tablet 0    methocarbamol (ROBAXIN-750) 750 MG tablet Take 1 tablet by mouth 4 times daily for 10 days 40 tablet 0    HYDROcodone-acetaminophen (LORCET) 5-325 MG per tablet Take 1 tablet by mouth every 8 hours as needed for Pain for up to 7 days. Intended supply: 3 days.  Take lowest dose possible to manage pain 21 tablet 0    losartan (COZAAR) 100 MG tablet Take 1 tablet by mouth once daily 90 tablet 3    pregabalin (LYRICA) 75 MG capsule Take 1 capsule by mouth 3 times daily for 60 days. 90 capsule 1    JANUVIA 50 MG tablet TAKE 1 TABLET BY MOUTH ONCE DAILY 30 tablet 2    spironolactone (ALDACTONE) 25 MG tablet 3 days a week (Patient taking differently: Prn) 9 tablet 1    XARELTO 20 MG TABS tablet TAKE 1 TABLET BY MOUTH ONCE DAILY WITH  EVENING  MEAL 90 tablet 1    albuterol sulfate  (90 Base) MCG/ACT inhaler INHALE 2 PUFFS BY MOUTH EVERY 6 HOURS AS NEEDED FOR WHEEZING 1 Inhaler 5    predniSONE (DELTASONE) 5 MG tablet Take 5 mg by mouth daily  6    traMADol (ULTRAM) 50 MG tablet Take 50 mg by mouth nightly.  levalbuterol (XOPENEX) 1.25 MG/3ML nebulizer solution Take 1 ampule by nebulization every 6 hours as needed for Wheezing      furosemide (LASIX) 20 MG tablet Take 1 tablet by mouth daily (Patient taking differently: Take 10 mg by mouth daily as needed (if greater than 3 pound weight gain daily) ) 30 tablet 3    EPINEPHrine (EPIPEN) 0.3 MG/0.3ML SOAJ injection Inject 0.3 mLs into the muscle as needed (Allergic Reaction) 2 each 1    Ascorbic Acid (VITAMIN C) 1000 MG tablet Take 1,000 mg by mouth daily      nitroGLYCERIN (NITROSTAT) 0.4 MG SL tablet Place 1 tablet under the tongue every 5 minutes as needed for Chest pain 25 tablet 3    donepezil (ARICEPT) 5 MG tablet TAKE 1 TABLET BY MOUTH NIGHTLY 90 tablet 3    azaTHIOprine (IMURAN) 50 MG tablet Take 50 mg by mouth daily       aspirin 81 MG tablet Take 81 mg by mouth daily.  pantoprazole (PROTONIX) 40 MG tablet Take 1 tablet by mouth 2 times daily (before meals) (Patient not taking: Reported on 7/8/2020) 180 tablet 1    sucralfate (CARAFATE) 1 GM tablet Take 1 tablet by mouth 4 times daily (Patient not taking: Reported on 7/8/2020) 120 tablet 3     No current facility-administered medications for this visit.         Patient Active Problem List   Diagnosis    Hypertension    Hyperlipidemia    [facility]ox[facility]mal [facility] fibrillation) ([facility]    S/P ablation of atrial fibrillation    Type 2 diabetes mellitus without complication, with long-term current use of insulin (HCC)    Sarcoidosis, lung (HCC)    GARNETT (dyspnea on exertion)    Hypercalcemia    Asthma    Depression    Pancreatitis    Peripheral nerve disease    Polyp of colon    History of histoplasmosis    Lymphadenopathy    Splenomegaly    Ureterolithiasis    Hydronephrosis, left    Other male erectile dysfunction    Anemia    Other emphysema ([address]    Gross hematuria    Chronic kidney disease    Pneumonia of both upper lobes due to infectious organism ([facility]    Nephrolithiasis    Vitamin D deficiency    Sarcoidosis    Achalasia    Nocturnal hypoxia             Review of Systems:   Review of Systems   Constitutional: Negative for fatigue and fever. HENT: Negative for congestion, rhinorrhea and sore throat. Respiratory: Negative for cough and shortness of breath. Cardiovascular: Negative for chest pain. Gastrointestinal: Negative for abdominal pain, anorexia, bowel incontinence, diarrhea and vomiting. Genitourinary: Negative for bladder incontinence, dysuria and pelvic pain. No fecal or urinary incontinence     Musculoskeletal: Positive for back pain. Negative for joint pain. Skin: Positive for rash and wound. Negative for nail changes. Neurological: Positive for numbness. Negative for tingling, weakness, headaches and paresthesias. OBJECTIVE:     Physical Exam:    Physical Exam  Constitutional:       Appearance: He is obese. HENT:      Head: Normocephalic. Right Ear: Tympanic membrane normal.      Left Ear: Tympanic membrane normal.      Nose: Nose normal.      Mouth/Throat:      Mouth: Mucous membranes are moist.   Eyes:      Pupils: Pupils are equal, round, and reactive to light. Cardiovascular:      Rate and Rhythm: Normal rate. Pulses: Normal pulses. Abdominal:      General: Abdomen is flat.    Musculoskeletal: Lumbar back: He exhibits decreased range of motion, pain and spasm. He exhibits no tenderness, no bony tenderness, no swelling and no edema. Skin:     Findings: Abscess present. Comments: L ring finger , in wound stump   Neurological:      General: No focal deficit present. Mental Status: He is alert. Orders Only on 06/23/2020   Component Date Value Ref Range Status    TSH 06/23/2020 3.010  0.270 - 4.200 uIU/mL Final    Color, UA 06/23/2020 YELLOW  Straw/Yellow Final    Clarity, UA 06/23/2020 Clear  Clear Final    Glucose, Ur 06/23/2020 >=1000* Negative mg/dL Final    Bilirubin Urine 06/23/2020 Negative  Negative Final    Ketones, Urine 06/23/2020 Negative  Negative mg/dL Final    Specific [place], UA 06/23/2020 1.029  1.005 - 1.030 Final    Blood, Urine 06/23/2020 MODERATE* Negative Final    pH, UA 06/23/2020 5.5  5.0 - 8.0 Final    Protein, UA 06/23/2020 Negative  Negative mg/dL Final    Urobilinogen, Urine 06/23/2020 0.2  <2.0 E.U./dL Final    Nitrite, Urine 06/23/2020 Negative  Negative Final    Leukocyte Esterase, Urine 06/23/2020 Negative  Negative Final    Vit D, 25-Hydroxy 06/23/2020 16.3* >=30 ng/mL Final    Comment: <=20 ng/mL. ........... Geralene Jp Deficient  21-29 ng/mL. ......... Geralene Jp Insufficient  >=30 ng/mL. ........ Geralene Jp Sufficient      Hemoglobin A1C 06/23/2020 7.3* 4.0 - 6.0 % Final    Comment: HbA1c levels >6% are an indication of hyperglycemia during the preceding 2  to 3 months or longer. HbA1c levels may reach 20% or higher in poorly controlled diabetes. Therapeutic action is suggested at levels above 8%. Diabetes patients with HbA1c levels below 7% meet the goal of the American  Diabetes Association.     HbA1c levels below the established reference range may indicate recent  episodes of hypoglycemia, the presence of Hb variants, or shortened lifetime  of erythrocytes.       Sodium 06/23/2020 139  136 - 145 mmol/L Final    Potassium 06/23/2020 4.3  3.5 - 5.0 mmol/L Final    Chloride 06/23/2020 98  98 - 111 mmol/L Final    CO2 06/23/2020 26  22 - 29 mmol/L Final    Anion Gap 06/23/2020 15  7 - 19 mmol/L Final    Glucose 06/23/2020 256* 74 - 109 mg/dL Final    BUN 06/23/2020 15  8 - 23 mg/dL Final    CREATININE 06/23/2020 1.1  0.5 - 1.2 mg/dL Final    GFR Non- 06/23/2020 >60  >60 Final    Comment: This calculation may be inaccurate for patients under the age of 25 years. For ages 25 and older, a GFR >60 mL/min/1.73m2 (not corrected for weight) is  valid for stable renal function.       Calcium 06/23/2020 10.3* 8.8 - 10.2 mg/dL Final    Total Protein 06/23/2020 7.0  6.6 - 8.7 g/dL Final    Alb 06/23/2020 4.4  3.5 - 5.2 g/dL Final    Total Bilirubin 06/23/2020 0.3  0.2 - 1.2 mg/dL Final    Alkaline Phosphatase 06/23/2020 65  40 - 130 U/L Final    ALT 06/23/2020 11  5 - 41 U/L Final    AST 06/23/2020 15  5 - 40 U/L Final    WBC 06/23/2020 4.0* 4.8 - 10.8 K/uL Final    RBC 06/23/2020 3.80* 4.70 - 6.10 M/uL Final    Hemoglobin 06/23/2020 11.1* 14.0 - 18.0 g/dL Final    Hematocrit 06/23/2020 34.3* 42.0 - 52.0 % Final    MCV 06/23/2020 90.3  80.0 - 94.0 fL Final    MCH 06/23/2020 29.2  27.0 - 31.0 pg Final    MCHC 06/23/2020 32.4* 33.0 - 37.0 g/dL Final    RDW 06/23/2020 14.5  11.5 - 14.5 % Final    Platelets 93/39/5285 132  130 - 400 K/uL Final    MPV 06/23/2020 10.8  9.4 - 12.4 fL Final    Neutrophils % 06/23/2020 68.1* 50.0 - 65.0 % Final    Lymphocytes % 06/23/2020 16.7* 20.0 - 40.0 % Final    Monocytes % 06/23/2020 10.0  0.0 - 10.0 % Final    Eosinophils % 06/23/2020 3.5  0.0 - 5.0 % Final    Basophils % 06/23/2020 0.5  0.0 - 1.0 % Final    Neutrophils Absolute 06/23/2020 2.7  1.5 - 7.5 K/uL Final    Immature Granulocytes # 06/23/2020 0.1  K/uL Final    Lymphocytes Absolute 06/23/2020 0.7* 1.1 - 4.5 K/uL Final    Monocytes Absolute 06/23/2020 0.40  0.00 - 0.90 K/uL Final    Eosinophils Absolute 06/23/2020 0.10  0.00 - 0.60 K/uL Final    with his PCP           Electronically signed by [place] Valero MD on 7/8/2020 at 3:14 PM

## 2020-07-20 ENCOUNTER — OFFICE VISIT (OUTPATIENT)
Dept: CARDIOLOGY | Age: 70
End: 2020-07-20
Payer: MEDICARE

## 2020-07-20 ENCOUNTER — OFFICE VISIT (OUTPATIENT)
Dept: INTERNAL MEDICINE | Age: 70
End: 2020-07-20
Payer: MEDICARE

## 2020-07-20 VITALS
WEIGHT: 181 LBS | HEIGHT: 69 IN | DIASTOLIC BLOOD PRESSURE: 72 MMHG | OXYGEN SATURATION: 97 % | HEART RATE: 92 BPM | BODY MASS INDEX: 26.81 KG/M2 | SYSTOLIC BLOOD PRESSURE: 150 MMHG

## 2020-07-20 VITALS
BODY MASS INDEX: 27.11 KG/M2 | HEIGHT: 69 IN | HEART RATE: 82 BPM | DIASTOLIC BLOOD PRESSURE: 78 MMHG | SYSTOLIC BLOOD PRESSURE: 156 MMHG | WEIGHT: 183 LBS

## 2020-07-20 PROCEDURE — 4040F PNEUMOC VAC/ADMIN/RCVD: CPT | Performed by: NURSE PRACTITIONER

## 2020-07-20 PROCEDURE — 1036F TOBACCO NON-USER: CPT | Performed by: NURSE PRACTITIONER

## 2020-07-20 PROCEDURE — 1123F ACP DISCUSS/DSCN MKR DOCD: CPT | Performed by: NURSE PRACTITIONER

## 2020-07-20 PROCEDURE — 99213 OFFICE O/P EST LOW 20 MIN: CPT | Performed by: NURSE PRACTITIONER

## 2020-07-20 PROCEDURE — G8427 DOCREV CUR MEDS BY ELIG CLIN: HCPCS | Performed by: NURSE PRACTITIONER

## 2020-07-20 PROCEDURE — G8417 CALC BMI ABV UP PARAM F/U: HCPCS | Performed by: NURSE PRACTITIONER

## 2020-07-20 PROCEDURE — 96372 THER/PROPH/DIAG INJ SC/IM: CPT | Performed by: NURSE PRACTITIONER

## 2020-07-20 PROCEDURE — 93000 ELECTROCARDIOGRAM COMPLETE: CPT | Performed by: NURSE PRACTITIONER

## 2020-07-20 PROCEDURE — 3017F COLORECTAL CA SCREEN DOC REV: CPT | Performed by: NURSE PRACTITIONER

## 2020-07-20 RX ORDER — HYDROCODONE BITARTRATE AND ACETAMINOPHEN 5; 325 MG/1; MG/1
TABLET ORAL
COMMUNITY
Start: 2020-07-08 | End: 2020-07-20

## 2020-07-20 RX ORDER — DOXYCYCLINE 100 MG/1
100 TABLET ORAL EVERY 12 HOURS SCHEDULED
COMMUNITY
Start: 2020-07-16 | End: 2020-07-24

## 2020-07-20 RX ORDER — METHYLPREDNISOLONE ACETATE 80 MG/ML
80 INJECTION, SUSPENSION INTRA-ARTICULAR; INTRALESIONAL; INTRAMUSCULAR; SOFT TISSUE ONCE
Status: COMPLETED | OUTPATIENT
Start: 2020-07-20 | End: 2020-07-20

## 2020-07-20 RX ORDER — HYDROCODONE BITARTRATE AND ACETAMINOPHEN 10; 325 MG/1; MG/1
1 TABLET ORAL EVERY 8 HOURS PRN
Qty: 90 TABLET | Refills: 0 | Status: SHIPPED | OUTPATIENT
Start: 2020-07-20 | End: 2020-08-19

## 2020-07-20 RX ORDER — TIZANIDINE 4 MG/1
4 TABLET ORAL EVERY 8 HOURS PRN
Qty: 90 TABLET | Refills: 1 | Status: SHIPPED | OUTPATIENT
Start: 2020-07-20 | End: 2020-12-29

## 2020-07-20 RX ORDER — CLINDAMYCIN HYDROCHLORIDE 300 MG/1
CAPSULE ORAL
COMMUNITY
Start: 2020-07-08 | End: 2020-12-29

## 2020-07-20 RX ADMIN — METHYLPREDNISOLONE ACETATE 80 MG: 80 INJECTION, SUSPENSION INTRA-ARTICULAR; INTRALESIONAL; INTRAMUSCULAR; SOFT TISSUE at 16:33

## 2020-07-20 ASSESSMENT — ENCOUNTER SYMPTOMS
TROUBLE SWALLOWING: 0
SHORTNESS OF BREATH: 0
NAUSEA: 0
EYES NEGATIVE: 1
BLOOD IN STOOL: 0
CHOKING: 0
DIARRHEA: 0
SORE THROAT: 0
ABDOMINAL DISTENTION: 0
COUGH: 0
SHORTNESS OF BREATH: 0
EYE DISCHARGE: 0
GASTROINTESTINAL NEGATIVE: 1
EYE ITCHING: 0
STRIDOR: 0
VOMITING: 0
SORE THROAT: 0
WHEEZING: 0
CHEST TIGHTNESS: 0
CONSTIPATION: 0
BACK PAIN: 1
COLOR CHANGE: 0
BACK PAIN: 1
WHEEZING: 0
COUGH: 0
ABDOMINAL PAIN: 0

## 2020-07-20 NOTE — PROGRESS NOTES
[facility]   Established Patient Office Visit  [address]  [address]  [phone]        OFFICE VISIT:  2020    Asha Carter - : 1950    Reason For Visit:  Froy Luna is a 78 y.o. male who is here for 6 Month Follow-Up (pt is not having any cardiac issues today.)    1. Essential hypertension    2. Hyperlipidemia, unspecified hyperlipidemia type    3. Paroxysmal atrial fibrillation (HCC)      Patient with a history of hypertension, paroxysmal atrial fib, and hyperlipidemia. He is a patient of Dr. Destiny Baxter. Patient presents to clinic today for 6-month follow-up. Patient denies any complaints of chest pain, pressure or tightness. There is no shortness of breath, orthopnea or PND. Patient denies any lightheadedness, dizziness or syncope. DATA:   2D echo 10/25/2019  Normal left ventricular size and function.   Mild concentric left ventricular hypertrophy.   Left ventricular ejection fraction is estimated at 64%. Trace mitral regurg.     Stress echocardiogram 2018  Stress echocardiogram without clinical, electrocardiographic, or echocardiographic evidence of myocardial ischemia. Brewster Treadmill Score was 3.0 .  Test was supervised by Dr. Garcia Angela is a 78 y.o. male with the following history as recorded in [facility]:    Patient Active Problem List    Diagnosis Date Noted    Nocturnal hypoxia 2020    Achalasia 2020    Sarcoidosis 10/21/2019    Nephrolithiasis 2019    Vitamin D deficiency 2019    Chronic kidney disease 2019    Pneumonia of both upper lobes due to infectious organism ([address] 2019    Gross hematuria     Other emphysema ([address] 2019    Anemia 10/09/2018    Other male erectile dysfunction 2018    Ureterolithiasis 2018    Hydronephrosis, left 2018    History of histoplasmosis 2018    Lymphadenopathy 2018    Splenomegaly 2018  Asthma 06/08/2018    Depression 06/08/2018    Pancreatitis 06/08/2018    Peripheral nerve disease 06/08/2018    Polyp of colon 06/08/2018    Hypercalcemia 04/26/2018    GARNETT (dyspnea on exertion) 11/27/2017    Type 2 diabetes mellitus without complication, with long-term current use of insulin (HCC)     Sarcoidosis, lung (HCC)     S/P ablation of atrial fibrillation 05/16/2017    [facility]ox[facility]     Hypertension     Hyperlipidemia      Current Outpatient Medications   Medication Sig Dispense Refill    HYDROcodone-acetaminophen (NORCO) 5-325 MG per tablet TAKE 1 TABLET BY MOUTH EVERY 8 HOURS AS NEEDED FOR PAIN FOR UP TO 7 DAYS TAKE LOWEST DOSE POSSIBLE TO MANAGE PAIN      doxycycline monohydrate (ADOXA) 100 MG tablet Take 100 mg by mouth every 12 hours      clindamycin (CLEOCIN) 300 MG capsule TAKE 1 CAPSULE BY MOUTH THREE TIMES DAILY FOR 10 DAYS      losartan (COZAAR) 100 MG tablet Take 1 tablet by mouth once daily 90 tablet 3    pregabalin (LYRICA) 75 MG capsule Take 1 capsule by mouth 3 times daily for 60 days. 90 capsule 1    pantoprazole (PROTONIX) 40 MG tablet Take 1 tablet by mouth 2 times daily (before meals) 180 tablet 1    sucralfate (CARAFATE) 1 GM tablet Take 1 tablet by mouth 4 times daily 120 tablet 3    JANUVIA 50 MG tablet TAKE 1 TABLET BY MOUTH ONCE DAILY 30 tablet 2    spironolactone (ALDACTONE) 25 MG tablet 3 days a week (Patient taking differently: Prn) 9 tablet 1    XARELTO 20 MG TABS tablet TAKE 1 TABLET BY MOUTH ONCE DAILY WITH  EVENING  MEAL 90 tablet 1    albuterol sulfate  (90 Base) MCG/ACT inhaler INHALE 2 PUFFS BY MOUTH EVERY 6 HOURS AS NEEDED FOR WHEEZING 1 Inhaler 5    predniSONE (DELTASONE) 5 MG tablet Take 5 mg by mouth daily  6    traMADol (ULTRAM) 50 MG tablet Take 50 mg by mouth nightly.       levalbuterol (XOPENEX) 1.25 MG/3ML nebulizer solution Take 1 ampule by nebulization every 6 hours as needed for Wheezing      furosemide (LASIX) 20 MG tablet Take 1 tablet by mouth daily (Patient taking differently: Take 10 mg by mouth daily as needed (if greater than 3 pound weight gain daily) ) 30 tablet 3    EPINEPHrine (EPIPEN) 0.3 MG/0.3ML SOAJ injection Inject 0.3 mLs into the muscle as needed (Allergic Reaction) 2 each 1    Ascorbic Acid (VITAMIN C) 1000 MG tablet Take 1,000 mg by mouth daily      nitroGLYCERIN (NITROSTAT) 0.4 MG SL tablet Place 1 tablet under the tongue every 5 minutes as needed for Chest pain 25 tablet 3    donepezil (ARICEPT) 5 MG tablet TAKE 1 TABLET BY MOUTH NIGHTLY 90 tablet 3    azaTHIOprine (IMURAN) 50 MG tablet Take 50 mg by mouth daily       aspirin 81 MG tablet Take 81 mg by mouth daily. No current facility-administered medications for this visit.       Allergies: Bee venom; Gabapentin; Penicillins; and Toprol xl [metoprolol]  Past Medical History:   Diagnosis Date    Achalasia     going to [facility] for [facility] in March    Acute pancreatitis     Anemia     Asthma     Atrial fibrillation (HCC)     h/o paroxysmal a-fib ? anesthsia induced    Benign colonic polyp     cscope 12/07 Dr Seamus Gandhi adenomatous: 12/14 adenoma    Chest pain     Chronic kidney disease     Chronic pain syndrome     Depression     Diabetic peripheral neuropathy (HCC)     Extrinsic asthma     Hyperlipidemia     Hypertension     Idiopathic peripheral neuropathy     Lyme disease     Mediastinal lymphadenopathy     Microalbuminuria     Mixed hyperlipidemia     Paroxysmal A-fib (HCC)     S/P ablation of atrial fibrillation     4/16 A fib ablation , Dr Beverly Lechuga, [place]     Sarc[facility]osis, lung ([facility]     restrictive lung impairment    Tick bite     Type 2 diabetes, controlled, with neuropathy [facility])      Past Surgical History:   Procedure Laterality Date    ANKLE FRACTURE SURGERY  april 14, 2015    ATRIAL ABLATION SURGERY  2015    Dr. Edna Harrison in the mid line clavicular position, Normal S1 and S2 with no systolic murmur. No S3 or S4    PULMONARY - no respiratory distress. No wheezes or rales. Lungs are clear to ausculation, normal respiratory effort. ABDOMEN  - soft, non tender, no rebound  MUSCULOSKELETAL  - range of motion of the upper and lower extermites appears normal and equal and is without pain   EXTREMITIES - no significant edema   NEUROLOGIC - gait and station are normal  SKIN - turgor is normal, can warm and dry. PSYCHIATRIC - normal mood and affect, alert and orientated x 3,      ASSESSMENT:    ALL THE CARDIOLOGY PROBLEMS ARE LISTED ABOVE; HOWEVER, THE FOLLOWING SPECIFIC CARDIAC PROBLEMS / CONDITIONS WERE ADDRESSED AND TREATED DURING THE OFFICE VISIT TODAY:                                                                                            MEDICAL DECISION MAKING             Cardiac Specific Problem / Diagnosis  Discussion and Data Reviewed Diagnostic Procedures Ordered Management Options Selected           1. Hypertension  Stable   Review and summation of old records:    Blood pressure in the office today 150/72. O2 sat 97%. Patient with back pain today. Takes Norco.  Has not taken yet this morning. No Continue current medications:     Yes           2. Hyperlipidemia  Stable   Managed by primary care provider. No Continue current medications:    Yes           3. PAF Stable  no recurrence. EKG in the office showing sinus rhythm with a heart rate of 92 bpm.  Patient is on Xarelto 20 mg daily. No Continue current medications: Yes                     Orders Placed This Encounter   Procedures    EKG 12 lead     Order Specific Question:   Reason for Exam?     Answer:   Irregular heart rate     No orders of the defined types were placed in this encounter. Discussed with patient. Return in about 6 months (around 1/20/2021) for Dr Gael Carbajal .     I greatly appreciate the opportunity to meet Dana Packer and your

## 2020-07-20 NOTE — PATIENT INSTRUCTIONS
1.  Low back pain with left radiculopathy   Alternate ibuprofen 800 mg every 4 hours with tizandine 4 mg  fop 48 hours around the clock; Then use every 8 hours as needed   Massage therapy is highly recommended   Warm moist compresses as tolerated   Medrol 80 IM   Hydrocodone 7.5 every 8 hours as needed for pain   #2 sarcoid is stable  3. 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   Followed by orthopedic doctor in [facility]

## 2020-07-20 NOTE — PROGRESS NOTES
[address] [facility]  [address] [address]  [address] [address]  Dept: [phone]  Dept Fax: [phone]: [phone]    Asha Carter is a 78 y.o. male who presents today for his medical conditions/complaints as noted below. Asha Carter is c/alexys Hip Pain (Patient states left hip and leg pain getting worse.)        HPI:     HPI   1. Left hip pain with pain in the left thigh; it start in the left cheek and around to the fount; Stops at the knee;  ;thisi started about 5-6 weeks ago; He was seen by Dr Dewayne Valero given hydrocodone 5 TID and robaxin 750 TID; He has been going to PT  Getting ice and massage with biofreeze; They just want him to have studies before any further treatment; [facility]   2. Orthopedic surgeon; He saw surgeon in [facility]; At the time of the initial partial amputation of the 3rd and 4th digits;  ; he now thinks he has infection in the distal aspect of the 4thd digit; Is on abx and not draining now; He will go back there on Thursday   #3 sarcoid of the lung this is been stable he is doing really well through all this he was initially sick back months ago before the [place] pandemic even started and I think he probably had cold at that time he was so sick with fever cough that was unable to have a definitive diagnosis  Chief Complaint   Patient presents with    Hip Pain     Patient states left hip and leg pain getting worse.        Past Medical History:   Diagnosis Date    Achalasia     going to [facility] for surgery in March    Acute pancreatitis     Anemia     Asthma     Atrial fibrillation ([facility]     h/o paroxysmal a-fib ? anesthsia induced    Benign colonic polyp     cscope 12/07 Dr Rochell Angelucci adenomatous: 12/14 adenoma    Chest pain     Chronic kidney disease     Chronic pain syndrome     Depression     Diabetic peripheral neuropathy ([address]     Extrinsic asthma     Hyperlipidemia     Hypertension  Idiopathic peripheral neuropathy     Lyme disease     Mediastinal lymphadenopathy     Microalbuminuria     Mixed hyperlipidemia     Paroxysmal A-fib (HCC)     S/P ablation of atrial fibrillation     4/16 A fib ablation , Dr Candy Yin, [address] Sarcoidosis, lung [facility])     restrictive lung impairment    Tick bite     Type 2 diabetes, controlled, with neuropathy [facility])       Past Surgical History:   Procedure Laterality Date    ANKLE FRACTURE SURGERY  april 14, 2015    ATRIAL ABLATION SURGERY  2015    Dr. Alberta Gaona COLONOSCOPY  12/02/2014    [place]    CYSTOSCOPY Left 8/16/2019    CYSTOSCOPY; LEFT RETROGRADE PYELOGRAM; INSERTION LEFT URETERAL STENT performed by Jenaro Velásquez MD at [address] [place] [address] Left 6/27/2018    LASER LITHOTRIPSY STONE MANIPULATION WITH DOUBLE J STENT PLACEMENT performed by Jenaro Velásquez MD at [facility] 6/27/2018    CYSTOSCOPY URETEROSCOPY RETROGRADE PYELOGRAMS performed by Jenaro Velásquez MD at [facility] 7/20/2020 7/20/2020 7/8/2020 6/23/2020 5/22/2020 6/01/2054   SYSTOLIC 150 804 951 168 - 089   DIASTOLIC 78 72 70 56 - 62   Site - - Right Upper Arm Right Upper Arm - -   Position - - Sitting - - -   Cuff Size - - Medium Adult - - -   Pulse 82 92 100 82 - 73   Temp - - - - - 98.6   Resp - - - - - 18   SpO2 - 97 97 97 - 99   Weight 183 lb 181 lb 184 lb 12.8 oz 183 lb - -   Height 5' 9\" 5' 9\" 5' 7\" 5' 7\" - -   BMI (wt*703/ht~2) 27.02 kg/m2 26.73 kg/m2 28.94 kg/m2 28.66 kg/m2 - -   Pain Level - - - - 8 -   Some recent data might be hidden       Family History   Problem Relation Age of Onset    Coronary Art Dis Father         AICD pacer age 68    Heart Attack Father     Cancer Sister         childhood chest tumor    Heart Failure Mother        Social History     Tobacco Use    Smoking status: Never Smoker    Smokeless tobacco: Never Used   Substance Use Topics    Alcohol use: No      Current Outpatient Medications   Medication Sig Dispense Refill    doxycycline monohydrate (ADOXA) 100 MG tablet Take 100 mg by mouth every 12 hours      clindamycin (CLEOCIN) 300 MG capsule TAKE 1 CAPSULE BY MOUTH THREE TIMES DAILY FOR 10 DAYS      tiZANidine (ZANAFLEX) 4 MG tablet Take 1 tablet by mouth every 8 hours as needed (back pain) 90 tablet 1    HYDROcodone-acetaminophen (LORCET HD)  MG per tablet Take 1 tablet by mouth every 8 hours as needed for Pain for up to 30 days. Intended supply: 30 days 90 tablet 0    losartan (COZAAR) 100 MG tablet Take 1 tablet by mouth once daily 90 tablet 3    pregabalin (LYRICA) 75 MG capsule Take 1 capsule by mouth 3 times daily for 60 days. 90 capsule 1    pantoprazole (PROTONIX) 40 MG tablet Take 1 tablet by mouth 2 times daily (before meals) 180 tablet 1    sucralfate (CARAFATE) 1 GM tablet Take 1 tablet by mouth 4 times daily 120 tablet 3    JANUVIA 50 MG tablet TAKE 1 TABLET BY MOUTH ONCE DAILY 30 tablet 2    spironolactone (ALDACTONE) 25 MG tablet 3 days a week (Patient taking differently: Prn) 9 tablet 1    XARELTO 20 MG TABS tablet TAKE 1 TABLET BY MOUTH ONCE DAILY WITH  EVENING  MEAL 90 tablet 1    albuterol sulfate  (90 Base) MCG/ACT inhaler INHALE 2 PUFFS BY MOUTH EVERY 6 HOURS AS NEEDED FOR WHEEZING 1 Inhaler 5    predniSONE (DELTASONE) 5 MG tablet Take 5 mg by mouth daily  6    traMADol (ULTRAM) 50 MG tablet Take 50 mg by mouth nightly.       levalbuterol (XOPENEX) 1.25 MG/3ML nebulizer solution Take 1 ampule by nebulization every 6 hours as needed for Wheezing      furosemide (LASIX) 20 MG tablet Take 1 tablet by mouth daily (Patient taking differently: Take 10 mg by mouth daily as needed (if greater than 3 pound weight gain daily) ) 30 tablet 3    EPINEPHrine (EPIPEN) 0.3 MG/0.3ML SOAJ injection Inject 0.3 mLs into the muscle as needed (Allergic Reaction) 2 each 1    Ascorbic Acid (VITAMIN C) 1000 MG tablet Take 1,000 mg by mouth daily      nitroGLYCERIN (NITROSTAT) 0.4 MG SL tablet Place 1 tablet under the tongue every 5 minutes as needed for Chest pain 25 tablet 3    donepezil (ARICEPT) 5 MG tablet TAKE 1 TABLET BY MOUTH NIGHTLY 90 tablet 3    azaTHIOprine (IMURAN) 50 MG tablet Take 50 mg by mouth daily       aspirin 81 MG tablet Take 81 mg by mouth daily. No current facility-administered medications for this visit. Allergies   Allergen Reactions    Bee Venom     Gabapentin      Causes blisters to head.      Penicillins     Toprol Xl [Metoprolol]      Leg pain        Health Maintenance   Topic Date Due    Annual Wellness Visit (AWV)  07/20/2020    Shingles Vaccine (1 of 2) 09/09/2020 (Originally 9/15/1999)    Diabetic retinal exam  09/18/2020 (Originally 5/9/2019)    Diabetic foot exam  03/09/2021 (Originally 9/15/1960)    Flu vaccine (1) 09/01/2020    Lipid screen  12/09/2020    A1C test (Diabetic or Prediabetic)  06/23/2021    Potassium monitoring  06/23/2021    Creatinine monitoring  06/23/2021    Colon cancer screen colonoscopy  08/01/2024    DTaP/Tdap/Td vaccine (2 - Td) 05/22/2030    Pneumococcal 65+ years Vaccine  Completed    Hepatitis A vaccine  Aged Out    Hib vaccine  Aged Out    Meningococcal (ACWY) vaccine  Aged Out    Hepatitis C screen  Discontinued       Lab Results   Component Value Date    LABA1C 7.3 (H) 06/23/2020     Lab Results   Component Value Date    PSA 3.6 03/17/2020    PSA 5.61 (H) 12/09/2019    PSA 5.3 (H) 12/09/2019     TSH   Date Value Ref Range Status   06/23/2020 3.010 0.270 - 4.200 uIU/mL Final   ]  Lab Results   Component Value Date     06/23/2020    K 4.3 06/23/2020    CL 98 06/23/2020    CO2 26 06/23/2020    BUN 15 06/23/2020    CREATININE 1.1 06/23/2020    GLUCOSE 256 (H) 06/23/2020    CALCIUM 10.3 (H) 06/23/2020    PROT 7.0 06/23/2020    LABALBU 4.4 06/23/2020    BILITOT 0.3 06/23/2020    ALKPHOS 65 06/23/2020    AST 15 06/23/2020    ALT 11 06/23/2020    LABGLOM >60 06/23/2020     Lab Results   Component Value Date    CHOL 191 12/09/2019    CHOL 137 (L) 08/27/2019    CHOL 178 02/25/2019     Lab Results   Component Value Date    TRIG 242 (H) 12/09/2019    TRIG 184 (H) 08/27/2019    TRIG 166 (H) 02/25/2019     Lab Results   Component Value Date    HDL 47 (L) 12/09/2019    HDL 30 (L) 08/27/2019    HDL 41 (L) 02/25/2019     Lab Results   Component Value Date    LDLCALC 96 12/09/2019    LDLCALC 70 08/27/2019    LDLCALC 104 02/25/2019     Lab Results   Component Value Date     06/23/2020    K 4.3 06/23/2020    K 4.2 02/07/2020    CL 98 06/23/2020    CO2 26 06/23/2020    BUN 15 06/23/2020    CREATININE 1.1 06/23/2020    GLUCOSE 256 06/23/2020    CALCIUM 10.3 06/23/2020      Lab Results   Component Value Date    WBC 4.0 (L) 06/23/2020    HGB 11.1 (L) 06/23/2020    HCT 34.3 (L) 06/23/2020    MCV 90.3 06/23/2020     06/23/2020    LABLYMP 1.58 09/20/2015    LYMPHOPCT 16.7 (L) 06/23/2020    RBC 3.80 (L) 06/23/2020    MCH 29.2 06/23/2020    MCHC 32.4 (L) 06/23/2020    RDW 14.5 06/23/2020     Lab Results   Component Value Date    VITD25 16.3 (L) 06/23/2020       Subjective:      Review of Systems   Constitutional: Negative for fatigue, fever and unexpected weight change. HENT: Negative for ear discharge, ear pain, mouth sores, sore throat and trouble swallowing. Eyes: Negative for discharge, itching and visual disturbance. Respiratory: Negative for cough, choking, shortness of breath, wheezing and stridor. Cardiovascular: Negative for chest pain, palpitations and leg swelling. Gastrointestinal: Negative for abdominal distention, abdominal pain, blood in stool, constipation, diarrhea, nausea and vomiting. Endocrine: Negative for cold intolerance, polydipsia and polyuria.    Genitourinary: Negative for difficulty urinating, dysuria, frequency and place, and time. Cranial Nerves: No cranial nerve deficit. Coordination: Coordination normal.      Deep Tendon Reflexes: Reflexes are normal and symmetric. Reflexes normal.   Psychiatric:         Mood and Affect: Mood is not depressed. Behavior: Behavior normal.         Thought Content: Thought content normal.         Judgment: Judgment normal.       BP (!) 156/78   Pulse 82   Ht 5' 9\" (1.753 m)   Wt 183 lb (83 kg)   BMI 27.02 kg/m²     Assessment:       Diagnosis Orders   1. Acute left-sided low back pain with left-sided sciatica  HYDROcodone-acetaminophen (LORCET HD)  MG per tablet   2. 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 Sarcoidosis, lung ([facility] [address]           Plan:        Patient given educational materials - see patient instructions. Discussed use, benefit, and side effects of prescribed medications. Allpatient questions answered. Pt voiced understanding. Reviewed health maintenance. Instructed to continue current medications, diet and exercise. Patient agreed with treatment plan. Follow up as directed. MEDICATIONS:  Orders Placed This Encounter   Medications    tiZANidine (ZANAFLEX) 4 MG tablet     Sig: Take 1 tablet by mouth every 8 hours as needed (back pain)     Dispense:  90 tablet     Refill:  1    methylPREDNISolone acetate (DEPO-MEDROL) injection 80 mg    HYDROcodone-acetaminophen (LORCET HD)  MG per tablet     Sig: Take 1 tablet by mouth every 8 hours as needed for Pain for up to 30 days. Intended supply: 30 days     Dispense:  90 tablet     Refill:  0     Reduce doses taken as pain becomes manageable         ORDERS:  No orders of the defined types were placed in this encounter. Follow-up:  No follow-ups on file. PATIENT INSTRUCTIONS:  Patient Instructions   1. Low back pain with left radiculopathy   Alternate ibuprofen 800 mg every 4 hours with tizandine 4 mg  fop 48 hours around the clock;     Then use every 8

## 2020-08-06 RX ORDER — DIAZEPAM 5 MG/1
TABLET ORAL
Qty: 3 TABLET | Refills: 0 | Status: SHIPPED | OUTPATIENT
Start: 2020-08-06 | End: 2020-08-07

## 2020-08-08 ENCOUNTER — LAB (OUTPATIENT)
Dept: LAB | Facility: HOSPITAL | Age: 70
End: 2020-08-08

## 2020-08-08 PROCEDURE — C9803 HOPD COVID-19 SPEC COLLECT: HCPCS | Performed by: INTERNAL MEDICINE

## 2020-08-08 PROCEDURE — U0003 INFECTIOUS AGENT DETECTION BY NUCLEIC ACID (DNA OR RNA); SEVERE ACUTE RESPIRATORY SYNDROME CORONAVIRUS 2 (SARS-COV-2) (CORONAVIRUS DISEASE [COVID-19]), AMPLIFIED PROBE TECHNIQUE, MAKING USE OF HIGH THROUGHPUT TECHNOLOGIES AS DESCRIBED BY CMS-2020-01-R: HCPCS | Performed by: INTERNAL MEDICINE

## 2020-08-09 LAB
COVID LABCORP PRIORITY: NORMAL
SARS-COV-2 RNA RESP QL NAA+PROBE: NOT DETECTED

## 2020-08-11 ENCOUNTER — HOSPITAL ENCOUNTER (OUTPATIENT)
Dept: PULMONOLOGY | Facility: HOSPITAL | Age: 70
Discharge: HOME | End: 2020-08-11
Admitting: INTERNAL MEDICINE

## 2020-08-11 DIAGNOSIS — D86.2 SARCOIDOSIS OF LUNG WITH SARCOIDOSIS OF LYMPH NODES (HCC): ICD-10-CM

## 2020-08-11 DIAGNOSIS — J98.4 RESTRICTIVE LUNG DISEASE: ICD-10-CM

## 2020-08-11 PROCEDURE — 94060 EVALUATION OF WHEEZING: CPT

## 2020-08-11 PROCEDURE — 94729 DIFFUSING CAPACITY: CPT

## 2020-08-11 PROCEDURE — 94060 EVALUATION OF WHEEZING: CPT | Performed by: INTERNAL MEDICINE

## 2020-08-11 PROCEDURE — 94726 PLETHYSMOGRAPHY LUNG VOLUMES: CPT | Performed by: INTERNAL MEDICINE

## 2020-08-11 PROCEDURE — 94726 PLETHYSMOGRAPHY LUNG VOLUMES: CPT

## 2020-08-11 PROCEDURE — 94729 DIFFUSING CAPACITY: CPT | Performed by: INTERNAL MEDICINE

## 2020-08-11 RX ORDER — ALBUTEROL SULFATE 2.5 MG/3ML
2.5 SOLUTION RESPIRATORY (INHALATION) ONCE
Status: COMPLETED | OUTPATIENT
Start: 2020-08-11 | End: 2020-08-11

## 2020-08-11 RX ADMIN — ALBUTEROL SULFATE 2.5 MG: 2.5 SOLUTION RESPIRATORY (INHALATION) at 12:03

## 2020-08-14 ENCOUNTER — HOSPITAL ENCOUNTER (OUTPATIENT)
Dept: MRI IMAGING | Age: 70
Discharge: HOME | End: 2020-08-14
Payer: MEDICARE

## 2020-08-14 PROCEDURE — 72148 MRI LUMBAR SPINE W/O DYE: CPT

## 2020-08-14 PROCEDURE — 73721 MRI JNT OF LWR EXTRE W/O DYE: CPT

## 2020-08-18 RX ORDER — DONEPEZIL HYDROCHLORIDE 5 MG/1
TABLET, FILM COATED ORAL
Qty: 90 TABLET | Refills: 1 | Status: SHIPPED | OUTPATIENT
Start: 2020-08-18 | End: 2021-06-30

## 2020-08-19 ENCOUNTER — OFFICE VISIT (OUTPATIENT)
Dept: PULMONOLOGY | Facility: CLINIC | Age: 70
End: 2020-08-19

## 2020-08-19 VITALS
SYSTOLIC BLOOD PRESSURE: 158 MMHG | DIASTOLIC BLOOD PRESSURE: 72 MMHG | HEART RATE: 85 BPM | BODY MASS INDEX: 29.41 KG/M2 | HEIGHT: 66 IN | OXYGEN SATURATION: 97 % | TEMPERATURE: 98.1 F | WEIGHT: 183 LBS

## 2020-08-19 DIAGNOSIS — D86.2 SARCOIDOSIS OF LUNG WITH SARCOIDOSIS OF LYMPH NODES (HCC): ICD-10-CM

## 2020-08-19 DIAGNOSIS — J98.4 RESTRICTIVE LUNG DISEASE: ICD-10-CM

## 2020-08-19 DIAGNOSIS — Z87.891 FORMER SMOKER: ICD-10-CM

## 2020-08-19 DIAGNOSIS — J44.9 COPD, MODERATE (HCC): Primary | ICD-10-CM

## 2020-08-19 DIAGNOSIS — Z99.81 HYPOXEMIA REQUIRING SUPPLEMENTAL OXYGEN: ICD-10-CM

## 2020-08-19 DIAGNOSIS — R09.02 HYPOXEMIA REQUIRING SUPPLEMENTAL OXYGEN: ICD-10-CM

## 2020-08-19 PROCEDURE — 99213 OFFICE O/P EST LOW 20 MIN: CPT | Performed by: INTERNAL MEDICINE

## 2020-08-19 RX ORDER — FUROSEMIDE 10 MG/ML
10 SOLUTION ORAL DAILY PRN
COMMUNITY
End: 2022-06-17

## 2020-08-19 RX ORDER — TIZANIDINE 4 MG/1
4 TABLET ORAL NIGHTLY PRN
COMMUNITY
End: 2022-06-17

## 2020-08-19 NOTE — PROGRESS NOTES
[facility] OUTPATIENT PROGRESS NOTE    Patient: Caio Riley  : 1950  Age: 69 y.o.  Date of Service: 2020    REASON FOR CLINIC VISIT:  Chief Complaint   Patient presents with   • COPD     hospitalized 5/6 weeks ago in [place]        Subjective:    History of Present Illness:  Caio Riley is a 69 y.o. male who presents to the office today to be seen for    Diagnosis Plan   1. COPD, moderate (CMS/HCC)  Pulmonary Function Test   2. 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 Restrictive lung disease     4. Former smoker     5. Hypoxemia requiring supplemental oxygen     .  Other problems per record.    History:    Patient is a very pleasant 69 years old  gentleman who attends the pulmonary clinic for a follow-up visit with his wife who is a retired RN from the [employer].  He was seen and followed by Dr. Miller in the past.    The patient has sarcoidosis involving the lung and is currently on Imuran and prednisone.  He states his breathing is doing better with less problems with dyspnea particularly exertional although he still has such episodes periodically.  He does have oxygen.  He does use oxygen 2 L at night and as needed for exertion. He is on Advair and unable to tolerate Albuterol neb but tolerating Xopenex neb .  However he admits that he is using Advair only once a day.  Regarding his albuterol inhaler or Xopenex nebulizer he rarely uses this only once or twice per day but could use it up to 3-4 times per day if need be.     He had his last pulmonary function test done a few days ago.  He had no recent hospitalizations and ER visit or urgent care visit.  He did not have any COVID 19 exposure any recent sick contact or foreign travel.  Is a former smoker and quit smoking many years ago.  He has no recent pulmonary function test or CT scan of the chest done.    PFT done today:  Not done today    Recent  pulmonary function test done showed  1. Spirometry shows moderate restrictive physiology  2. Following bronchodilator there is modest improvement in FEV1 but moderate restrictive physiology remains  3. Mid flow is reduced  4. Lung volume measurements show reduced total lung capacity but normal residual volume reduced slow vital capacity and expiratory reserve volume  5. Diffusion capacity is reduced  6. Airway resistance is increased and conductance is decreased        Bronchodilator therapy: Advair, Xopenx neb prn    Smoking Status:   Social History     Tobacco Use   Smoking Status Former Smoker   • Packs/day: 0.50   • Years: 2.00   • Pack years: 1.00   • Types: Cigarettes   • Last attempt to quit:    • Years since quittin.6   Smokeless Tobacco Never Used     Pulm Rehab: no  Sleep: yes    Support System: lives with their spouse    Code Status:   There are no questions and answers to display.        Review of Systems:  A complete review of systems is performed and all other systems were reviewed and negative as note above in the HPI.  Review of Systems    CAT/ACT Score:  Not done today    Medications:  Outpatient Encounter Medications as of 2020   Medication Sig Dispense Refill   • albuterol sulfate  (90 Base) MCG/ACT inhaler Inhale 2 puffs Every 4 (Four) Hours As Needed for Wheezing or Shortness of Air. 1 inhaler 11   • aspirin 81 MG chewable tablet Chew 81 mg Daily.     • donepezil (ARICEPT) 5 MG tablet   0   • EPINEPHrine (EPIPEN) 0.3 MG/0.3ML solution auto-injector injection Inject 0.3 mg into the appropriate muscle as directed by prescriber.     • fluticasone-salmeterol (ADVAIR) 500-50 MCG/DOSE DISKUS Inhale 2 (Two) Times a Day.     • furosemide (LASIX) 10 MG/ML ORAL solution Take 10 mg by mouth Daily As Needed (for weight gain 3lbs in 24 hours).     • Insulin Pen Needle (BD PEN NEEDLE DAGMAR U/F) 32G X 4 MM misc 1 each.     • ipratropium (ATROVENT) 0.02 % nebulizer solution Take 2.5 mL by  nebulization 4 (Four) Times a Day. 300 mL 11   • ipratropium-albuterol (DUO-NEB) 0.5-2.5 mg/3 ml nebulizer Take 3 mL by nebulization 4 (Four) Times a Day As Needed for Wheezing. 120 mL 11   • IRON PO Take 27 mg by mouth 2 (Two) Times a Day.     • JANUVIA 50 MG tablet      • LANTUS SOLOSTAR 100 UNIT/ML injection pen 14 Units.     • levalbuterol (XOPENEX) 1.25 MG/3ML nebulizer solution Take 1 ampule by nebulization 4 (Four) Times a Day. 120 ampule 11   • levalbuterol (XOPENEX) 1.25 MG/3ML nebulizer solution Take 1 ampule by nebulization 4 (Four) Times a Day As Needed for Wheezing. 360 mL 5   • losartan (COZAAR) 100 MG tablet      • LYRICA 50 MG capsule 75 mg.     • predniSONE (DELTASONE) 5 MG tablet Take 5 mg by mouth Daily.     • RELION PEN NEEDLES 32G X 4 MM misc      • rivaroxaban (XARELTO) 20 MG tablet Take 20 mg by mouth Daily. STOPPED 8/1/18     • sulfamethoxazole-trimethoprim (BACTRIM,SEPTRA) 400-80 MG tablet Take 1 tablet by mouth 3 (Three) Times a Week.  6   • tiZANidine (ZANAFLEX) 4 MG tablet Take 4 mg by mouth At Night As Needed for Muscle Spasms.     • traMADol (ULTRAM) 50 MG tablet   0   • ascorbic acid (VITAMIN C) 1000 MG tablet Take 1,000 mg by mouth Daily.     • azaTHIOprine (IMURAN) 50 MG tablet   5   • ibandronate (BONIVA) 150 MG tablet Take 150 mg by mouth Every 30 (Thirty) Days.       No facility-administered encounter medications on file as of 8/19/2020.        Allergies:  Allergies   Allergen Reactions   • Bee Venom Anaphylaxis   • Penicillins Swelling and Unknown (See Comments)     Reaction: SWELLING/RASH    Reaction: SWELLING/RASH   • Acetaminophen Unknown (See Comments)     Other reaction(s): NAUSEA   Start Date: ADULT    Other reaction(s): NAUSEA   Start Date: ADULT   • Gabapentin Unknown (See Comments)     Causes blisters to head.   Causes blisters to head.     Causes blisters to head.    • Metoprolol Unknown (See Comments)     Leg pain   Leg pain     Leg pain   "      Immunizations:  Immunization History   Administered Date(s) Administered   • FLUAD TRI 65YR+ 11/20/2019   • Flu Vaccine Quad PF >18YRS 10/01/2018   • Fluzone High Dose =>65 Years ([employer] ONLY) 12/27/2017, 11/05/2018   • Pneumococcal Conjugate 13-Valent (PCV13) 10/05/2016   • Pneumococcal Polysaccharide (PPSV23) 04/24/2008, 10/01/2015   • Tdap 05/22/2020       Objective:    Vitals:  /72 Comment: pt states to be in a lot of pain  Pulse 85   Temp 98.1 °F (36.7 °C)   Ht 167.6 cm (66\")   Wt 83 kg (183 lb)   SpO2 97% Comment: RA  BMI 29.54 kg/m²     Physical Exam:  General: Patient is a 69 y.o. pleasant elderly  male. Looks stated age. Appears to be in no acute distress.  Eyes: EOMI. PERRLA. Vision intact. No scleral icterus.  Ear, Nose, Mouth and Throat: Hearing is grossly intact. No Leukoplakia, pharyngitis, stomatitis or thrush. Swollen nasal mucosa with post nasal drop.  Neck: Range of motion of neck normal. No thyromegaly or masses. Mallampati Class 3, no JVD  Respiratory: Clear to auscultation bilaterally. No use of accessory muscles. Decreased breath sounds.  Cardiovascular: Normal heart sounds. Regularly regular rhythm without murmur.  Gastrointestinal: Non tender, non distended, soft. Bowel sounds positive in all four quadrants. No organomegaly.  Skin: No obvious rashes, lesions, ulcers or large amount of bruising. No edema.   Neurological: No new motor deficits. Cranial nerves appear intact.  Psychiatric: Patient is alert and oriented to person, place and time.    Chest Imaging:  No recent chest x-ray done.  Assessment:  1. COPD, moderate (CMS/HCC)    2. 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 Restrictive lung disease    4. Former smoker    5. Hypoxemia requiring supplemental oxygen        Plan/recommendations:    1.  Chronic obstructive pulmonary disease he should continue using Advair and Xopenex nebulizer as before.  I advised him to use Advair twice a " day instead of once a day which is the right dose.  2.  Sarcoidosis is currently under good control and he is on Imuran.  He also uses prednisone.  He is to have stable pulmonary sarcoidosis and did not have any worsening symptoms in the last year.  3.  Continue supplemental oxygen 2 L at night and during the day as needed.  4.  He will to follow-up with a primary care provider and get his influenza and pneumonia vaccines in the fall..  5.  He was advised to return to the pulmonary clinic in time or earlier if needed      Follow up:  12 Months    Time Spent:  35 minutes    I appreciate the opportunity of participating in this patient's care. I would like to thank the PCP for the referral.  Please feel free to contact me with any other questions.    Kalyani Mitchell MD   Pulmonologist/Intensivist     Electronically signed by: Kalyani Mitchell MD, 8/19/2020 14:29

## 2020-09-08 ENCOUNTER — TELEPHONE (OUTPATIENT)
Dept: CARDIOLOGY | Age: 70
End: 2020-09-08

## 2020-09-08 NOTE — TELEPHONE ENCOUNTER
Date: TBD    Cardiologist: MAYLIN    Procedure: low back surgery    Surgeon: Christen Wilcox    Last Office Visit: 7/20/20  Reason for office visit and medical concerns addressed at this office visit: afib, ckd, dm, htn, hyperlipidemia    Testing Performed and Date of Service:  10/25/19 Echo  Normal left ventricular size and function. Mild concentric left ventricular hypertrophy. Left ventricular ejection fraction is estimated at 64%. RCRI = 0.4   METs 4    Current Medications: aricept, clindamycin, tizanidine, losartan, pregabalin, pantoprazole, carafate, januvia, spironolactone, xarelto, presdnisone, tramadol, lasix, imuran, aspirin    Is the patient currently taking an anticoagulant?  If so, what is the diagnosis the patient has been given to warrant the need for the anticoagulant? xarelto for afib    Additional Notes: requesting cardiac clearance, requesting to hold xarelto 2 days and asa 10 days

## 2020-09-09 RX ORDER — SITAGLIPTIN 50 MG/1
TABLET, FILM COATED ORAL
Qty: 30 TABLET | Refills: 5 | Status: SHIPPED | OUTPATIENT
Start: 2020-09-09 | End: 2021-04-26

## 2020-09-09 NOTE — TELEPHONE ENCOUNTER
Dewayne Wallace called requesting a refill of the below medication which has been pended for you:     Requested Prescriptions     Pending Prescriptions Disp Refills    JANUVIA 50 MG tablet [Pharmacy Med Name: Januvia 50 MG Oral Tablet] 30 tablet 5     Sig: Take 1 tablet by mouth once daily       Last Appointment Date: 7/20/2020  Next Appointment Date: 9/29/2020    Allergies   Allergen Reactions    Bee Venom     Gabapentin      Causes blisters to head.      Penicillins     Toprol Xl [Metoprolol]      Leg pain

## 2020-09-15 ENCOUNTER — HOSPITAL ENCOUNTER (OUTPATIENT)
Dept: PAIN MANAGEMENT | Age: 70
Discharge: HOME | End: 2020-09-15
Payer: MEDICARE

## 2020-09-15 VITALS
DIASTOLIC BLOOD PRESSURE: 83 MMHG | HEART RATE: 97 BPM | TEMPERATURE: 96.9 F | RESPIRATION RATE: 18 BRPM | SYSTOLIC BLOOD PRESSURE: 160 MMHG | OXYGEN SATURATION: 99 %

## 2020-09-15 PROCEDURE — 3209999900 FLUORO FOR SURGICAL PROCEDURES

## 2020-09-15 PROCEDURE — 2500000003 HC RX 250 WO HCPCS

## 2020-09-15 PROCEDURE — 2580000003 HC RX 258

## 2020-09-15 PROCEDURE — 62323 NJX INTERLAMINAR LMBR/SAC: CPT

## 2020-09-15 PROCEDURE — 6360000002 HC RX W HCPCS

## 2020-09-15 RX ORDER — METHYLPREDNISOLONE ACETATE 80 MG/ML
INJECTION, SUSPENSION INTRA-ARTICULAR; INTRALESIONAL; INTRAMUSCULAR; SOFT TISSUE
Status: COMPLETED | OUTPATIENT
Start: 2020-09-15 | End: 2020-09-15

## 2020-09-15 RX ORDER — LIDOCAINE HYDROCHLORIDE 10 MG/ML
INJECTION, SOLUTION EPIDURAL; INFILTRATION; INTRACAUDAL; PERINEURAL
Status: COMPLETED | OUTPATIENT
Start: 2020-09-15 | End: 2020-09-15

## 2020-09-15 RX ORDER — SODIUM CHLORIDE 9 MG/ML
INJECTION INTRAVENOUS
Status: COMPLETED | OUTPATIENT
Start: 2020-09-15 | End: 2020-09-15

## 2020-09-15 RX ADMIN — SODIUM CHLORIDE 5 ML: 9 INJECTION INTRAVENOUS at 08:15

## 2020-09-15 RX ADMIN — LIDOCAINE HYDROCHLORIDE 5 ML: 10 INJECTION, SOLUTION EPIDURAL; INFILTRATION; INTRACAUDAL; PERINEURAL at 08:14

## 2020-09-15 RX ADMIN — METHYLPREDNISOLONE ACETATE 80 MG: 80 INJECTION, SUSPENSION INTRA-ARTICULAR; INTRALESIONAL; INTRAMUSCULAR; SOFT TISSUE at 08:15

## 2020-09-15 ASSESSMENT — PAIN - FUNCTIONAL ASSESSMENT: PAIN_FUNCTIONAL_ASSESSMENT: PREVENTS OR INTERFERES SOME ACTIVE ACTIVITIES AND ADLS

## 2020-09-15 ASSESSMENT — PAIN DESCRIPTION - PAIN TYPE: TYPE: CHRONIC PAIN

## 2020-09-15 ASSESSMENT — PAIN DESCRIPTION - LOCATION: LOCATION: BACK;LEG

## 2020-09-15 ASSESSMENT — PAIN DESCRIPTION - FREQUENCY: FREQUENCY: INTERMITTENT

## 2020-09-15 ASSESSMENT — PAIN DESCRIPTION - DESCRIPTORS: DESCRIPTORS: ACHING;BURNING

## 2020-09-15 ASSESSMENT — PAIN DESCRIPTION - ORIENTATION: ORIENTATION: RIGHT;LEFT

## 2020-09-15 ASSESSMENT — PAIN SCALES - GENERAL: PAINLEVEL_OUTOF10: 7

## 2020-09-15 ASSESSMENT — PAIN DESCRIPTION - PROGRESSION: CLINICAL_PROGRESSION: NOT CHANGED

## 2020-09-15 ASSESSMENT — PAIN DESCRIPTION - ONSET: ONSET: AWAKENED FROM SLEEP

## 2020-09-15 NOTE — INTERVAL H&P NOTE
Update History & Physical    The patient's History and Physical  was reviewed with the patient and I examined the patient. There was NO CHANGE:[id number]}. The surgical site was confirmed by the patient and me. Plan: The risks, benefits, expected outcome, and alternative to the recommended procedure have been discussed with the patient. Patient understands and wants to proceed with the procedure.      Electronically signed by Kevin Lal MD on 9/15/2020 at 8:27 AM

## 2020-09-23 ENCOUNTER — OFFICE VISIT (OUTPATIENT)
Dept: INTERNAL MEDICINE | Age: 70
End: 2020-09-23
Payer: MEDICARE

## 2020-09-23 ENCOUNTER — HOSPITAL ENCOUNTER (EMERGENCY)
Age: 70
Discharge: HOME | End: 2020-09-23
Attending: EMERGENCY MEDICINE
Payer: MEDICARE

## 2020-09-23 VITALS
SYSTOLIC BLOOD PRESSURE: 136 MMHG | DIASTOLIC BLOOD PRESSURE: 69 MMHG | RESPIRATION RATE: 18 BRPM | TEMPERATURE: 96.4 F | OXYGEN SATURATION: 95 % | HEART RATE: 100 BPM

## 2020-09-23 VITALS
DIASTOLIC BLOOD PRESSURE: 70 MMHG | BODY MASS INDEX: 25.99 KG/M2 | WEIGHT: 176 LBS | SYSTOLIC BLOOD PRESSURE: 130 MMHG | HEART RATE: 95 BPM

## 2020-09-23 DIAGNOSIS — Z79.4 TYPE 2 DIABETES MELLITUS WITHOUT COMPLICATION, WITH LONG-TERM CURRENT USE OF INSULIN (HCC): ICD-10-CM

## 2020-09-23 DIAGNOSIS — E11.9 TYPE 2 DIABETES MELLITUS WITHOUT COMPLICATION, WITH LONG-TERM CURRENT USE OF INSULIN (HCC): ICD-10-CM

## 2020-09-23 DIAGNOSIS — I10 ESSENTIAL HYPERTENSION: ICD-10-CM

## 2020-09-23 DIAGNOSIS — E55.9 VITAMIN D DEFICIENCY: ICD-10-CM

## 2020-09-23 DIAGNOSIS — E78.2 MIXED HYPERLIPIDEMIA: ICD-10-CM

## 2020-09-23 LAB
ACETONE BLOOD: NEGATIVE
ALBUMIN SERPL-MCNC: 4.3 G/DL (ref 3.5–5.2)
ALBUMIN SERPL-MCNC: 4.8 G/DL (ref 3.5–5.2)
ALP BLD-CCNC: 82 U/L (ref 40–130)
ALP BLD-CCNC: 92 U/L (ref 40–130)
ALT SERPL-CCNC: 12 U/L (ref 5–41)
ALT SERPL-CCNC: 13 U/L (ref 5–41)
ANION GAP SERPL CALCULATED.3IONS-SCNC: 16 MMOL/L (ref 7–19)
ANION GAP SERPL CALCULATED.3IONS-SCNC: 17 MMOL/L (ref 7–19)
AST SERPL-CCNC: 11 U/L (ref 5–40)
AST SERPL-CCNC: 16 U/L (ref 5–40)
BASE EXCESS VENOUS: -2 MMOL/L
BASOPHILS ABSOLUTE: 0.1 K/UL (ref 0–0.2)
BASOPHILS RELATIVE PERCENT: 0.4 % (ref 0–1)
BILIRUB SERPL-MCNC: 1.1 MG/DL (ref 0.2–1.2)
BILIRUB SERPL-MCNC: 1.1 MG/DL (ref 0.2–1.2)
BILIRUBIN URINE: NEGATIVE
BILIRUBIN URINE: NEGATIVE
BLOOD, URINE: NEGATIVE
BLOOD, URINE: NEGATIVE
BUN BLDV-MCNC: 43 MG/DL (ref 8–23)
BUN BLDV-MCNC: 44 MG/DL (ref 8–23)
CALCIUM SERPL-MCNC: 10.2 MG/DL (ref 8.8–10.2)
CALCIUM SERPL-MCNC: 9.8 MG/DL (ref 8.8–10.2)
CARBOXYHEMOGLOBIN: 3.4 %
CHLORIDE BLD-SCNC: 86 MMOL/L (ref 98–111)
CHLORIDE BLD-SCNC: 86 MMOL/L (ref 98–111)
CHOLESTEROL, FASTING: 210 MG/DL (ref 160–199)
CLARITY: CLEAR
CLARITY: CLEAR
CO2: 20 MMOL/L (ref 22–29)
CO2: 25 MMOL/L (ref 22–29)
COLOR: YELLOW
COLOR: YELLOW
CREAT SERPL-MCNC: 1.4 MG/DL (ref 0.5–1.2)
CREAT SERPL-MCNC: 1.5 MG/DL (ref 0.5–1.2)
EOSINOPHILS ABSOLUTE: 0 K/UL (ref 0–0.6)
EOSINOPHILS RELATIVE PERCENT: 0.4 % (ref 0–5)
GFR AFRICAN AMERICAN: 56
GFR AFRICAN AMERICAN: >59
GFR NON-AFRICAN AMERICAN: 46
GFR NON-AFRICAN AMERICAN: 50
GLUCOSE BLD-MCNC: 358 MG/DL (ref 70–99)
GLUCOSE BLD-MCNC: 418 MG/DL (ref 70–99)
GLUCOSE BLD-MCNC: 500 MG/DL (ref 74–109)
GLUCOSE BLD-MCNC: 633 MG/DL (ref 74–109)
GLUCOSE URINE: >=1000 MG/DL
GLUCOSE URINE: >=1000 MG/DL
HBA1C MFR BLD: 10.8 % (ref 4–6)
HCO3 VENOUS: 24 MMOL/L (ref 23–29)
HCT VFR BLD CALC: 41.4 % (ref 42–52)
HCT VFR BLD CALC: 44.1 % (ref 42–52)
HDLC SERPL-MCNC: 47 MG/DL (ref 55–121)
HEMOGLOBIN: 14.7 G/DL (ref 14–18)
HEMOGLOBIN: 15.5 G/DL (ref 14–18)
IMMATURE GRANULOCYTES #: 0.2 K/UL
KETONES, URINE: NEGATIVE MG/DL
KETONES, URINE: NEGATIVE MG/DL
LDL CHOLESTEROL CALCULATED: 97 MG/DL
LEUKOCYTE ESTERASE, URINE: NEGATIVE
LEUKOCYTE ESTERASE, URINE: NEGATIVE
LYMPHOCYTES ABSOLUTE: 1.5 K/UL (ref 1.1–4.5)
LYMPHOCYTES RELATIVE PERCENT: 13.7 % (ref 20–40)
MCH RBC QN AUTO: 28.4 PG (ref 27–31)
MCH RBC QN AUTO: 28.5 PG (ref 27–31)
MCHC RBC AUTO-ENTMCNC: 35.1 G/DL (ref 33–37)
MCHC RBC AUTO-ENTMCNC: 35.5 G/DL (ref 33–37)
MCV RBC AUTO: 80.1 FL (ref 80–94)
MCV RBC AUTO: 81.1 FL (ref 80–94)
MONOCYTES ABSOLUTE: 0.9 K/UL (ref 0–0.9)
MONOCYTES RELATIVE PERCENT: 8 % (ref 0–10)
NEUTROPHILS ABSOLUTE: 8.6 K/UL (ref 1.5–7.5)
NEUTROPHILS RELATIVE PERCENT: 76 % (ref 50–65)
NITRITE, URINE: NEGATIVE
NITRITE, URINE: NEGATIVE
O2 CONTENT, VEN: 15 ML/DL
O2 SAT, VEN: 76 %
PCO2, VEN: 46 MMHG (ref 40–50)
PDW BLD-RTO: 14 % (ref 11.5–14.5)
PDW BLD-RTO: 14.2 % (ref 11.5–14.5)
PERFORMED ON: ABNORMAL
PERFORMED ON: ABNORMAL
PH UA: 5.5 (ref 5–8)
PH UA: 5.5 (ref 5–8)
PH VENOUS: 7.33 (ref 7.35–7.45)
PLATELET # BLD: 201 K/UL (ref 130–400)
PLATELET # BLD: 227 K/UL (ref 130–400)
PMV BLD AUTO: 11 FL (ref 9.4–12.4)
PMV BLD AUTO: 11.3 FL (ref 9.4–12.4)
PO2, VEN: 38 MMHG
POTASSIUM REFLEX MAGNESIUM: 4.6 MMOL/L (ref 3.5–5)
POTASSIUM SERPL-SCNC: 4.5 MMOL/L (ref 3.5–5)
PROTEIN UA: ABNORMAL MG/DL
PROTEIN UA: NEGATIVE MG/DL
RBC # BLD: 5.17 M/UL (ref 4.7–6.1)
RBC # BLD: 5.44 M/UL (ref 4.7–6.1)
SODIUM BLD-SCNC: 123 MMOL/L (ref 136–145)
SODIUM BLD-SCNC: 127 MMOL/L (ref 136–145)
SPECIFIC GRAVITY UA: 1.03 (ref 1–1.03)
SPECIFIC GRAVITY UA: 1.03 (ref 1–1.03)
TOTAL PROTEIN: 7.6 G/DL (ref 6.6–8.7)
TOTAL PROTEIN: 8 G/DL (ref 6.6–8.7)
TRIGLYCERIDE, FASTING: 331 MG/DL (ref 0–149)
TSH SERPL DL<=0.05 MIU/L-ACNC: 5.19 UIU/ML (ref 0.27–4.2)
UROBILINOGEN, URINE: 0.2 E.U./DL
UROBILINOGEN, URINE: 0.2 E.U./DL
VITAMIN D 25-HYDROXY: 16 NG/ML
WBC # BLD: 11.3 K/UL (ref 4.8–10.8)
WBC # BLD: 12 K/UL (ref 4.8–10.8)

## 2020-09-23 PROCEDURE — 3017F COLORECTAL CA SCREEN DOC REV: CPT | Performed by: NURSE PRACTITIONER

## 2020-09-23 PROCEDURE — 1036F TOBACCO NON-USER: CPT | Performed by: NURSE PRACTITIONER

## 2020-09-23 PROCEDURE — 82009 KETONE BODYS QUAL: CPT

## 2020-09-23 PROCEDURE — 96374 THER/PROPH/DIAG INJ IV PUSH: CPT

## 2020-09-23 PROCEDURE — 4040F PNEUMOC VAC/ADMIN/RCVD: CPT | Performed by: NURSE PRACTITIONER

## 2020-09-23 PROCEDURE — 82803 BLOOD GASES ANY COMBINATION: CPT

## 2020-09-23 PROCEDURE — 6370000000 HC RX 637 (ALT 250 FOR IP): Performed by: EMERGENCY MEDICINE

## 2020-09-23 PROCEDURE — 36600 WITHDRAWAL OF ARTERIAL BLOOD: CPT

## 2020-09-23 PROCEDURE — 99999 PR OFFICE/OUTPT VISIT,PROCEDURE ONLY: CPT | Performed by: EMERGENCY MEDICINE

## 2020-09-23 PROCEDURE — 85025 COMPLETE CBC W/AUTO DIFF WBC: CPT

## 2020-09-23 PROCEDURE — G8417 CALC BMI ABV UP PARAM F/U: HCPCS | Performed by: NURSE PRACTITIONER

## 2020-09-23 PROCEDURE — 82947 ASSAY GLUCOSE BLOOD QUANT: CPT

## 2020-09-23 PROCEDURE — 36415 COLL VENOUS BLD VENIPUNCTURE: CPT

## 2020-09-23 PROCEDURE — G8427 DOCREV CUR MEDS BY ELIG CLIN: HCPCS | Performed by: NURSE PRACTITIONER

## 2020-09-23 PROCEDURE — 81003 URINALYSIS AUTO W/O SCOPE: CPT

## 2020-09-23 PROCEDURE — 2580000003 HC RX 258: Performed by: PHYSICIAN ASSISTANT

## 2020-09-23 PROCEDURE — 99284 EMERGENCY DEPT VISIT MOD MDM: CPT

## 2020-09-23 PROCEDURE — 99214 OFFICE O/P EST MOD 30 MIN: CPT | Performed by: NURSE PRACTITIONER

## 2020-09-23 PROCEDURE — 80053 COMPREHEN METABOLIC PANEL: CPT

## 2020-09-23 PROCEDURE — 1123F ACP DISCUSS/DSCN MKR DOCD: CPT | Performed by: NURSE PRACTITIONER

## 2020-09-23 RX ORDER — 0.9 % SODIUM CHLORIDE 0.9 %
1000 INTRAVENOUS SOLUTION INTRAVENOUS ONCE
Status: COMPLETED | OUTPATIENT
Start: 2020-09-23 | End: 2020-09-23

## 2020-09-23 RX ORDER — 0.9 % SODIUM CHLORIDE 0.9 %
500 INTRAVENOUS SOLUTION INTRAVENOUS ONCE
Status: COMPLETED | OUTPATIENT
Start: 2020-09-23 | End: 2020-09-23

## 2020-09-23 RX ADMIN — INSULIN HUMAN 10 UNITS: 100 INJECTION, SOLUTION PARENTERAL at 16:50

## 2020-09-23 RX ADMIN — SODIUM CHLORIDE 500 ML: 9 INJECTION, SOLUTION INTRAVENOUS at 17:58

## 2020-09-23 RX ADMIN — SODIUM CHLORIDE 1000 ML: 9 INJECTION, SOLUTION INTRAVENOUS at 16:10

## 2020-09-23 ASSESSMENT — ENCOUNTER SYMPTOMS
COLOR CHANGE: 0
COUGH: 0
ABDOMINAL DISTENTION: 0
CHOKING: 0
BLOOD IN STOOL: 0
EYE DISCHARGE: 0
NAUSEA: 0
SHORTNESS OF BREATH: 0
DIARRHEA: 0
BACK PAIN: 1
EYE ITCHING: 0
BACK PAIN: 0
COUGH: 0
SORE THROAT: 0
EYE DISCHARGE: 0
VOMITING: 0
CONSTIPATION: 0
STRIDOR: 0
ABDOMINAL PAIN: 0
SHORTNESS OF BREATH: 0
COLOR CHANGE: 0
PHOTOPHOBIA: 0
WHEEZING: 0
APNEA: 0
TROUBLE SWALLOWING: 0
EYE ITCHING: 0

## 2020-09-23 ASSESSMENT — PAIN SCALES - GENERAL: PAINLEVEL_OUTOF10: 3

## 2020-09-23 NOTE — ED PROVIDER NOTES
Attending Supervisory Note/Shared Visit   I have personally performed a face to face diagnostic evaluation on this patient. I have reviewed the mid-levels findings and agree. Mr. Zenon Ferreira is a pleasant 68 yo M here for concern of low Na and hyperglycemia. Chronically on steroids for sarcoid. Known diabetic on Januvia. VSS, pt non toxic appearing     Correct Na is 134, blood sugar has dramatically improved, no DKA, will need close PCP follow up for adjustment of DM meds, understands return precautions      FINAL IMPRESSION      1.  Hyperglycemia          Niurka Hatfield MD  Attending Emergency Physician        Milena Terrell MD  09/23/20 0592

## 2020-09-23 NOTE — PATIENT INSTRUCTIONS
1.  Fatigue I think this is related to his renal function  2. Hyperglycemia critical at over 600  ER  3. Acute kidney injury his GFR is 146, normal at 60  4. Hyponatremia with 127 I do not think this is something you should continue to the outpatient setting  5. Back pain we will just see how you do over the next few days before canceling her surgery. With the above findings blood sugar over 100 sodium 127 and GFR 46 as well as chlorides been down to 86 I advised Alvaro to go to the ER also called and talked to Parish Cherry in the ER given him the above information.   Delia Ortiz agrees to go to the ER

## 2020-09-23 NOTE — ED PROVIDER NOTES
[facility]  eMERGENCYdEPARTMENT eNCOUnter      Pt Name: Sha Shepard  MRN: [id number]  [place] 1950  Date of evaluation: 9/23/2020  Provider:RANJANA Hickey    CHIEF COMPLAINT       Chief Complaint   Patient presents with    Abnormal Lab         HISTORY OF PRESENT ILLNESS  (Location/Symptom, Timing/Onset, Context/Setting, Quality, Duration, Modifying Factors, Severity.)   Sha Shepard is a 69 y.o. male who presents to the emergency department with complaints low sodium poor kidney function on steroids for sarcoid hx with planned back surgery with mariann in October. Dr Katrin Ponce gives him injections sent over here by NP pre diabetic hx runs around 6 a1c.     HPI    Nursing Notes were reviewed and I agree. REVIEW OF SYSTEMS    (2-9 systems for level 4, 10 or more for level 5)     Review of Systems   Constitutional: Positive for fatigue. Negative for activity change, appetite change, chills and fever. HENT: Negative for congestion and dental problem. Eyes: Negative for photophobia, discharge and itching. Respiratory: Negative for apnea, cough and shortness of breath. Cardiovascular: Negative for chest pain. Endocrine: Positive for polydipsia and polyuria. Musculoskeletal: Negative for arthralgias, back pain, gait problem, myalgias and neck pain. Skin: Negative for color change, pallor and rash. Neurological: Negative for dizziness, seizures and syncope. Psychiatric/Behavioral: Negative for agitation. The patient is not nervous/anxious. Except as noted above the remainder of the review of systems was reviewed and negative.        PAST MEDICAL HISTORY     Past Medical History:   Diagnosis Date    Achalasia     [facility] in March    Acute pancreatitis     Anemia     Asthma     Atrial fibrillation ([facility] [address]     h/o paroxysmal a-fib ? anesthsia induced    Benign colonic polyp     cscope 12/07 Dr Merlin Denton adenomatous: 12/14 adenoma    Chest pain     Chronic kidney disease     Chronic pain syndrome     Depression     Diabetic peripheral neuropathy (HCC)     Extrinsic asthma     Hyperlipidemia     Hypertension     Idiopathic peripheral neuropathy     Lyme disease     Mediastinal lymphadenopathy     Microalbuminuria     Mixed hyperlipidemia     Paroxysmal A-fib (HCC)     S/P ablation of atrial fibrillation     4/16 A fib ablation , Dr Sander [facility]     Sarcoidosis, lung ([facility] [address]     restrictive lung impairment    Tick bite     Type 2 diabetes, controlled, with neuropathy ([facility] [address]          SURGICAL HISTORY       Past Surgical History:   Procedure Laterality Date    ANKLE FRACTURE SURGERY  april 14, 2015    ATRIAL ABLATION SURGERY  2015    Dr. Enedina Bullock      Catheter Ablation A-fib    CHOLECYSTECTOMY      COLONOSCOPY  12/02/2014    [facility]    CYSTOSCOPY Left 8/16/2019    CYSTOSCOPY; LEFT RETROGRADE PYELOGRAM; INSERTION LEFT URETERAL STENT performed by Lelo Vences MD at [address] [place] [address] Left 6/27/2018    LASER LITHOTRIPSY STONE MANIPULATION WITH DOUBLE J [address] [address] performed by Lelo Vences MD at [facility] Left 6/27/2018    CYSTOSCOPY URETEROSCOPY RETROGRADE PYELOGRAMS performed by Lelo Vences MD at [address] [place] [address]       Discharge Medication List as of 9/23/2020  6:48 PM      CONTINUE these medications which have NOT CHANGED    Details   JANUVIA 50 MG tablet Take 1 tablet by mouth once daily, Disp-30 tablet,R-5Normal      donepezil (ARICEPT) 5 MG tablet TAKE 1 TABLET BY MOUTH ONCE DAILY AT NIGHT, Disp-90 tablet,R-1Normal      clindamycin (CLEOCIN) 300 MG capsule TAKE 1 CAPSULE BY MOUTH THREE TIMES DAILY FOR 10 DAYSHistorical Med      tiZANidine (ZANAFLEX) 4 MG tablet Take 1 tablet by mouth every 8 hours as needed (back pain), Disp-90 tablet,R-1Normal      losartan (COZAAR) 100 MG tablet Take 1 tablet by mouth once daily, Disp-90 tablet, R-3Normal      pregabalin (LYRICA) 75 MG capsule Take 1 capsule by mouth 3 times daily for 60 days. , Disp-90 capsule,R-1Normal      pantoprazole (PROTONIX) 40 MG tablet Take 1 tablet by mouth 2 times daily (before meals), Disp-180 tablet, R-1Normal      sucralfate (CARAFATE) 1 GM tablet Take 1 tablet by mouth 4 times daily, Disp-120 tablet, R-3Normal      spironolactone (ALDACTONE) 25 MG tablet 3 days a week, Disp-9 tablet, R-1Normal      XARELTO 20 MG TABS tablet TAKE 1 TABLET BY MOUTH ONCE DAILY WITH  EVENING  MEAL, Disp-90 tablet, R-1Normal      albuterol sulfate  (90 Base) MCG/ACT inhaler INHALE 2 PUFFS BY MOUTH EVERY 6 HOURS AS NEEDED FOR WHEEZING, Disp-1 Inhaler, R-5Please consider 90 day supplies to promote better adherenceNormal      predniSONE (DELTASONE) 5 MG tablet Take 5 mg by mouth daily, R-6Historical Med      traMADol (ULTRAM) 50 MG tablet Take 50 mg by mouth nightly. Historical Med      levalbuterol (XOPENEX) 1.25 MG/3ML nebulizer solution Take 1 ampule by nebulization every 6 hours as needed for WheezingHistorical Med      furosemide (LASIX) 20 MG tablet Take 1 tablet by mouth daily, Disp-30 tablet, R-3Normal      EPINEPHrine (EPIPEN) 0.3 MG/0.3ML SOAJ injection Inject 0.3 mLs into the muscle as needed (Allergic Reaction), Disp-2 each, R-1Normal      Ascorbic Acid (VITAMIN C) 1000 MG tablet Take 1,000 mg by mouth dailyHistorical Med      nitroGLYCERIN (NITROSTAT) 0.4 MG SL tablet Place 1 tablet under the tongue every 5 minutes as needed for Chest pain, Disp-25 tablet, R-3Normal      azaTHIOprine (IMURAN) 50 MG tablet Take 50 mg by mouth daily Historical Med      aspirin 81 MG tablet Take 81 mg by mouth daily.              ALLERGIES     Bee venom; Gabapentin; Penicillins; and Toprol xl [metoprolol]    FAMILY HISTORY       Family History   Problem Relation Age of Onset    Coronary Art Dis Father         AICD pacer age 68    Heart Attack Father     Cancer Sister         childhood chest tumor    Heart Failure Mother           SOCIAL HISTORY       Social History     Socioeconomic History    Marital status:      Spouse name: None    Number of children: None    Years of education: None    Highest education level: None   Occupational History    None   Social Needs    Financial resource strain: None    Food insecurity     Worry: None     Inability: None    Transportation needs     Medical: None     Non-medical: None   Tobacco Use    Smoking status: Never Smoker    Smokeless tobacco: Never Used   Substance and Sexual Activity    Alcohol use: No    Drug use: No    Sexual activity: None   Lifestyle    Physical activity     Days per week: None     Minutes per session: None    Stress: None   Relationships    Social connections     Talks on phone: None     Gets together: None     Attends Yazidism service: None     Active member of club or organization: None     Attends meetings of clubs or organizations: None     Relationship status: None    Intimate partner violence     Fear of current or ex partner: None     Emotionally abused: None     Physically abused: None     Forced sexual activity: None   Other Topics Concern    None   Social History Narrative    None       SCREENINGS    Morris Coma Scale  Eye Opening: Spontaneous  Best Verbal Response: Oriented  Best Motor Response: Obeys commands  Morris Coma Scale Score: 15      PHYSICAL EXAM    (up to 7 forlevel 4, 8 or more for level 5)     ED Triage Vitals [09/23/20 1553]   BP Temp Temp src Pulse Resp SpO2 Height Weight   134/74 96.4 °F (35.8 °C) -- 100 18 97 % -- --       Physical Exam  Vitals signs and nursing note reviewed. Constitutional:       General: He is not in acute distress. Appearance: He is well-developed. He is not diaphoretic. HENT:      Head: Normocephalic and atraumatic.       Right Ear: External ear normal.      Left Ear: External ear normal.   Eyes: Pupils: Pupils are equal, round, and reactive to light. Neck:      Musculoskeletal: Normal range of motion and neck supple. Trachea: No tracheal deviation. Cardiovascular:      Rate and Rhythm: Normal rate and regular rhythm. Pulses: Normal pulses. Heart sounds: Normal heart sounds. No murmur. Pulmonary:      Effort: Pulmonary effort is normal.      Breath sounds: Normal breath sounds. No stridor. No wheezing. Chest:      Chest wall: No tenderness. Abdominal:      General: Bowel sounds are normal. There is no distension. Palpations: Abdomen is soft. Tenderness: There is no abdominal tenderness. Musculoskeletal: Normal range of motion. Skin:     General: Skin is warm and dry. Capillary Refill: Capillary refill takes less than 2 seconds. Neurological:      General: No focal deficit present. Mental Status: He is alert and oriented to person, place, and time. Mental status is at baseline. Psychiatric:         Mood and Affect: Mood normal.         Behavior: Behavior normal.         Thought Content:  Thought content normal.         Judgment: Judgment normal.           DIAGNOSTIC RESULTS     RADIOLOGY:   Non-plain film images such as CT, Ultrasound and MRI are read by the radiologist. Plain radiographic images are visualized and preliminarilyinterpreted by No att. providers found with the below findings:      Interpretation per the Radiologist below, if available at the time of this note:    No orders to display       LABS:  Labs Reviewed   CBC WITH AUTO DIFFERENTIAL - Abnormal; Notable for the following components:       Result Value    WBC 11.3 (*)     Hematocrit 41.4 (*)     Neutrophils % 76.0 (*)     Lymphocytes % 13.7 (*)     Neutrophils Absolute 8.6 (*)     All other components within normal limits   COMPREHENSIVE METABOLIC PANEL W/ REFLEX TO MG FOR LOW K - Abnormal; Notable for the following components:    Sodium 123 (*)     Chloride 86 (*)     CO2 20 (*) 9/25/2020        DISCHARGE MEDICATIONS:  Discharge Medication List as of 9/23/2020  6:48 PM          (Please note that portions of this note were completed with a voice recognition program.  Efforts were made to edit the dictations but occasionallywords are mis-transcribed.)    Rula Roach [phone], [phone]  09/23/20 4136

## 2020-09-23 NOTE — PROGRESS NOTES
[address] [facility]  [address] [address]  [address] [address]  Dept: [phone]  Dept Fax: [phone]: [phone]    Scar Cedillo is a 69 y.o. male who presents today for his medical conditions/complaints as noted below. Scar Cedillo is c/alexys Fatigue (Patient is here for follow up labs. Patient states increased fatigue the last 3-4 days.)        HPI:     HPI   1. Fatigue this has worsened the last few days  2. Hyperglycemia; he sugars have been running high since he received a steroid injection with Dr. Dewayne Simmons yesterday for his back. On his labs today his blood sugar was over 500 but he knew is going to be high it was over 500 last night at home. He is also noticed over the last few days that his vision had changed but then after getting the steroid shot it went back to like he had had previously we can not read anything at all. He does have diabetes is on Januvia. But his A1c is around 6.6 his A1c today is 10.8 but is been running extremely high lately with steroids  3.   santosh his kidney function had dropped on his most recent BMP. 4. hyponatremia and low chloride are found on his routine labs   #6 back pain he is planning surgery in [address] [place] [address] for the back pain he is having in the interim he is getting injections of steroid. He had the surgery planned for October 6. Chief Complaint   Patient presents with    Fatigue     Patient is here for follow up labs. Patient states increased fatigue the last 3-4 days.        Past Medical History:   Diagnosis Date    Achalasia     going to [address] [place] [address] for surgery in March    Acute pancreatitis     Anemia     Asthma     Atrial fibrillation ([facility] [address]     h/o paroxysmal a-fib ? anesthsia induced    Benign colonic polyp     cscope 12/07 Dr Isaura Mark adenomatous: 12/14 adenoma    Chest pain     Chronic kidney disease     Chronic pain syndrome     Depression     Diabetic peripheral neuropathy ([facility] [address]     Never Used   Substance Use Topics    Alcohol use: No      No current facility-administered medications for this visit. Current Outpatient Medications   Medication Sig Dispense Refill    JANUVIA 50 MG tablet Take 1 tablet by mouth once daily 30 tablet 5    donepezil (ARICEPT) 5 MG tablet TAKE 1 TABLET BY MOUTH ONCE DAILY AT NIGHT 90 tablet 1    clindamycin (CLEOCIN) 300 MG capsule TAKE 1 CAPSULE BY MOUTH THREE TIMES DAILY FOR 10 DAYS      tiZANidine (ZANAFLEX) 4 MG tablet Take 1 tablet by mouth every 8 hours as needed (back pain) 90 tablet 1    losartan (COZAAR) 100 MG tablet Take 1 tablet by mouth once daily 90 tablet 3    pregabalin (LYRICA) 75 MG capsule Take 1 capsule by mouth 3 times daily for 60 days. 90 capsule 1    pantoprazole (PROTONIX) 40 MG tablet Take 1 tablet by mouth 2 times daily (before meals) 180 tablet 1    sucralfate (CARAFATE) 1 GM tablet Take 1 tablet by mouth 4 times daily 120 tablet 3    spironolactone (ALDACTONE) 25 MG tablet 3 days a week (Patient taking differently: Prn) 9 tablet 1    XARELTO 20 MG TABS tablet TAKE 1 TABLET BY MOUTH ONCE DAILY WITH  EVENING  MEAL 90 tablet 1    albuterol sulfate  (90 Base) MCG/ACT inhaler INHALE 2 PUFFS BY MOUTH EVERY 6 HOURS AS NEEDED FOR WHEEZING 1 Inhaler 5    predniSONE (DELTASONE) 5 MG tablet Take 5 mg by mouth daily  6    traMADol (ULTRAM) 50 MG tablet Take 50 mg by mouth nightly.       levalbuterol (XOPENEX) 1.25 MG/3ML nebulizer solution Take 1 ampule by nebulization every 6 hours as needed for Wheezing      furosemide (LASIX) 20 MG tablet Take 1 tablet by mouth daily (Patient taking differently: Take 10 mg by mouth daily as needed (if greater than 3 pound weight gain daily) ) 30 tablet 3    EPINEPHrine (EPIPEN) 0.3 MG/0.3ML SOAJ injection Inject 0.3 mLs into the muscle as needed (Allergic Reaction) 2 each 1    Ascorbic Acid (VITAMIN C) 1000 MG tablet Take 1,000 mg by mouth daily      nitroGLYCERIN (NITROSTAT) 0.4 MG SL tablet Place 1 tablet under the tongue every 5 minutes as needed for Chest pain 25 tablet 3    azaTHIOprine (IMURAN) 50 MG tablet Take 50 mg by mouth daily       aspirin 81 MG tablet Take 81 mg by mouth daily. Facility-Administered Medications Ordered in Other Visits   Medication Dose Route Frequency Provider Last Rate Last Dose    0.9 % sodium chloride bolus  1,000 mL Intravenous Once RANJANA Calloway 1,000 mL/hr at 09/23/20 1610 1,000 mL at 09/23/20 1610    insulin regular (HUMULIN R;NOVOLIN R) injection 10 Units  10 Units Intravenous Once Beatrice Olivo MD         Allergies   Allergen Reactions    Bee Venom     Gabapentin      Causes blisters to head.      Penicillins     Toprol Xl [Metoprolol]      Leg pain        Health Maintenance   Topic Date Due    Statin Therapy  1950    Shingles Vaccine (1 of 2) 09/15/1999    Diabetic retinal exam  05/09/2019    Annual Wellness Visit (AWV)  07/20/2020    Flu vaccine (1) 09/01/2020    Diabetic foot exam  03/09/2021 (Originally 9/15/1960)    A1C test (Diabetic or Prediabetic)  12/23/2020    Lipid screen  09/23/2021    Potassium monitoring  09/23/2021    Creatinine monitoring  09/23/2021    Colon cancer screen colonoscopy  08/01/2024    DTaP/Tdap/Td vaccine (2 - Td) 05/22/2030    Pneumococcal 65+ years Vaccine  Completed    Hepatitis A vaccine  Aged Out    Hib vaccine  Aged Out    Meningococcal (ACWY) vaccine  Aged Out    Hepatitis C screen  Discontinued       Lab Results   Component Value Date    LABA1C 10.8 (H) 09/23/2020     Lab Results   Component Value Date    PSA 3.6 03/17/2020    PSA 5.61 (H) 12/09/2019    PSA 5.3 (H) 12/09/2019     TSH   Date Value Ref Range Status   09/23/2020 5.190 (H) 0.270 - 4.200 uIU/mL Final   ]  Lab Results   Component Value Date     (L) 09/23/2020    K 4.6 09/23/2020    CL 86 (L) 09/23/2020    CO2 20 (L) 09/23/2020    BUN 44 (H) 09/23/2020    CREATININE 1.4 (H) 09/23/2020    GLUCOSE 633 (HH) 09/23/2020 CALCIUM 9.8 09/23/2020    PROT 7.6 09/23/2020    LABALBU 4.3 09/23/2020    BILITOT 1.1 09/23/2020    ALKPHOS 82 09/23/2020    AST 16 09/23/2020    ALT 13 09/23/2020    LABGLOM 50 (A) 09/23/2020    GFRAA >59 09/23/2020     Lab Results   Component Value Date    CHOL 191 12/09/2019    CHOL 137 (L) 08/27/2019    CHOL 178 02/25/2019     Lab Results   Component Value Date    TRIG 242 (H) 12/09/2019    TRIG 184 (H) 08/27/2019    TRIG 166 (H) 02/25/2019     Lab Results   Component Value Date    HDL 47 (L) 09/23/2020    HDL 47 (L) 12/09/2019    HDL 30 (L) 08/27/2019     Lab Results   Component Value Date    LDLCALC 97 09/23/2020    LDLCALC 96 12/09/2019    LDLCALC 70 08/27/2019     Lab Results   Component Value Date     09/23/2020    K 4.6 09/23/2020    CL 86 09/23/2020    CO2 20 09/23/2020    BUN 44 09/23/2020    CREATININE 1.4 09/23/2020    GLUCOSE 633 09/23/2020    CALCIUM 9.8 09/23/2020      Lab Results   Component Value Date    WBC 11.3 (H) 09/23/2020    HGB 14.7 09/23/2020    HCT 41.4 (L) 09/23/2020    MCV 80.1 09/23/2020     09/23/2020    LABLYMP 1.58 09/20/2015    LYMPHOPCT 13.7 (L) 09/23/2020    RBC 5.17 09/23/2020    MCH 28.4 09/23/2020    MCHC 35.5 09/23/2020    RDW 14.0 09/23/2020     Lab Results   Component Value Date    VITD25 16.0 (L) 09/23/2020       Subjective:      Review of Systems   Constitutional: Positive for fatigue. Negative for fever and unexpected weight change. HENT: Negative for ear discharge, ear pain, mouth sores, sore throat and trouble swallowing. Eyes: Positive for visual disturbance. Negative for discharge and itching. Respiratory: Negative for cough, choking, shortness of breath, wheezing and stridor. Cardiovascular: Negative for chest pain, palpitations and leg swelling. Gastrointestinal: Negative for abdominal distention, abdominal pain, blood in stool, constipation, diarrhea, nausea and vomiting.    Endocrine: Negative for cold intolerance, polydipsia and polyuria. Genitourinary: Negative for difficulty urinating, dysuria, frequency and urgency. Musculoskeletal: Positive for back pain. Negative for arthralgias and gait problem. Skin: Negative for color change and rash. Allergic/Immunologic: Negative for food allergies and immunocompromised state. Neurological: Negative for dizziness, tremors, syncope, speech difficulty, weakness and headaches. Hematological: Negative for adenopathy. Does not bruise/bleed easily. Psychiatric/Behavioral: Negative for confusion and hallucinations. Objective:     Physical Exam  Constitutional:       General: He is not in acute distress. Appearance: He is well-developed. HENT:      Head: Normocephalic and atraumatic. Eyes:      General: No scleral icterus. Right eye: No discharge. Left eye: No discharge. Pupils: Pupils are equal, round, and reactive to light. Neck:      Musculoskeletal: Normal range of motion and neck supple. Thyroid: No thyromegaly. Vascular: No JVD. Cardiovascular:      Rate and Rhythm: Normal rate and regular rhythm. Heart sounds: Normal heart sounds. No murmur. Pulmonary:      Effort: Pulmonary effort is normal. No respiratory distress. Breath sounds: Normal breath sounds. No wheezing or rales. Abdominal:      General: Bowel sounds are normal. There is no distension. Palpations: Abdomen is soft. There is no mass. Tenderness: There is no abdominal tenderness. There is no guarding or rebound. Musculoskeletal: Normal range of motion. General: No tenderness. Skin:     General: Skin is warm and dry. Findings: No erythema or rash. Neurological:      Mental Status: He is alert and oriented to person, place, and time. Cranial Nerves: No cranial nerve deficit. Coordination: Coordination normal.      Deep Tendon Reflexes: Reflexes are normal and symmetric.  Reflexes normal.   Psychiatric:         Mood and Affect: or at times,nonsensical words or phrases may be inadvertently transcribed.   Although I have reviewed the note for such errors, some may still exist.

## 2020-09-24 ENCOUNTER — CARE COORDINATION (OUTPATIENT)
Dept: CARE COORDINATION | Age: 70
End: 2020-09-24

## 2020-09-24 ENCOUNTER — VIRTUAL VISIT (OUTPATIENT)
Dept: INTERNAL MEDICINE | Age: 70
End: 2020-09-24
Payer: MEDICARE

## 2020-09-24 DIAGNOSIS — E87.1 HYPONATREMIA: ICD-10-CM

## 2020-09-24 LAB
ALBUMIN SERPL-MCNC: 4.5 G/DL (ref 3.5–5.2)
ALP BLD-CCNC: 84 U/L (ref 40–130)
ALT SERPL-CCNC: 13 U/L (ref 5–41)
ANION GAP SERPL CALCULATED.3IONS-SCNC: 18 MMOL/L (ref 7–19)
AST SERPL-CCNC: 14 U/L (ref 5–40)
BILIRUB SERPL-MCNC: 1 MG/DL (ref 0.2–1.2)
BUN BLDV-MCNC: 29 MG/DL (ref 8–23)
CALCIUM SERPL-MCNC: 9.6 MG/DL (ref 8.8–10.2)
CHLORIDE BLD-SCNC: 96 MMOL/L (ref 98–111)
CO2: 23 MMOL/L (ref 22–29)
CREAT SERPL-MCNC: 1 MG/DL (ref 0.5–1.2)
GFR AFRICAN AMERICAN: >59
GFR NON-AFRICAN AMERICAN: >60
GLUCOSE BLD-MCNC: 366 MG/DL (ref 74–109)
POTASSIUM SERPL-SCNC: 4.6 MMOL/L (ref 3.5–5)
SODIUM BLD-SCNC: 137 MMOL/L (ref 136–145)
TOTAL PROTEIN: 7.6 G/DL (ref 6.6–8.7)

## 2020-09-24 PROCEDURE — 99443 PR PHYS/QHP TELEPHONE EVALUATION 21-30 MIN: CPT | Performed by: NURSE PRACTITIONER

## 2020-09-24 SDOH — HEALTH STABILITY: PHYSICAL HEALTH: ON AVERAGE, HOW MANY DAYS PER WEEK DO YOU ENGAGE IN MODERATE TO STRENUOUS EXERCISE (LIKE A BRISK WALK)?: 0 DAYS

## 2020-09-24 SDOH — HEALTH STABILITY: PHYSICAL HEALTH: ON AVERAGE, HOW MANY MINUTES DO YOU ENGAGE IN EXERCISE AT THIS LEVEL?: 0 MIN

## 2020-09-24 ASSESSMENT — ENCOUNTER SYMPTOMS
DIARRHEA: 0
EYE ITCHING: 0
ABDOMINAL DISTENTION: 0
CHOKING: 0
COUGH: 0
SORE THROAT: 0
BLOOD IN STOOL: 0
ABDOMINAL PAIN: 0
COLOR CHANGE: 0
SHORTNESS OF BREATH: 0
TROUBLE SWALLOWING: 0
VOMITING: 0
CONSTIPATION: 0
NAUSEA: 0
STRIDOR: 0
EYE DISCHARGE: 0
WHEEZING: 0

## 2020-09-24 NOTE — PROGRESS NOTES
[facility]  [address] [facility] [address] [address]  Dept: [phone]  Dept Fax: [phone]: [phone]    Chriss Almaguer is a 69 y.o. male who were are performing tele health communication  for his medical conditions/complaints as noted below. Currently, our state is under umbrella of national state of emergency and we are using alternative methods of taking care of the needs of our patients so they are not exposed in our office; The patient has consented to this form of communication  Chriss Almaguer is c/alexys   Diabetes    THE patient is at home  Satish Donaldson is at office  Others in attendance his wife Jamie Hallman     HPI:     HPI   Mary Naima had his blood drawn drawn yesterday for routine visit next week. He had just received a steroid injection from Dr. Gabriel Marroquin a couple days before on his labs yesterday his blood sugar was over 500 his sodium was 127 his kidney function was also declined. I had him come to the office we had a discussion went over the labs and then referred him to the ER which I thought he would be admitted to correct his sugar and his sodium. He was discharged through the ER. I do not see a repeat sodium level in his chart but his blood sugar was about 350 when they let go home. He does have Lantus at home. I did call him this morning to discuss the ER visit and develop a plan. 1.  TYPE II DM; Blood sugar this am 296;  Fasting; Yesterday was over 500  Yesterday vision was really blurred;   But today is little better;    #2 acute kidney injury we will recheck that blood work today as well he is feeling a little stronger today    Chief Complaint   Patient presents with    Diabetes       Past Medical History:   Diagnosis Date    Achalasia     going to [facility] for surgery in March    Acute pancreatitis     Anemia     Asthma     Atrial fibrillation ([facility] [address]     h/o paroxysmal a-fib ? anesthsia induced    Benign colonic polyp     cscope 12/07 Dr Ramila Blanco adenomatous: 12/14 adenoma    Chest pain     Chronic kidney disease     Chronic pain syndrome     Depression     Diabetic peripheral neuropathy (HCC)     Extrinsic asthma     Hyperlipidemia     Hypertension     Idiopathic peripheral neuropathy     Lyme disease     Mediastinal lymphadenopathy     Microalbuminuria     Mixed hyperlipidemia     Paroxysmal A-fib (HCC)     S/P ablation of atrial fibrillation     4/16 A fib ablation , Dr Angelina Serra, [place]     Sarcoidosis, lung ([facility] [address]     restrictive lung impairment    Tick bite     Type 2 diabetes, controlled, with neuropathy ([facility] [address]       Past Surgical History:   Procedure Laterality Date    ANKLE FRACTURE SURGERY  april 14, 2015    ATRIAL ABLATION SURGERY  2015    Dr. Tito Bravo COLONOSCOPY  12/02/2014    [place]    CYSTOSCOPY Left 8/16/2019    CYSTOSCOPY; LEFT RETROGRADE PYELOGRAM; INSERTION LEFT URETERAL STENT performed by Javier Zapata MD at [address] [place] [address] Left 6/27/2018    LASER LITHOTRIPSY STONE MANIPULATION WITH DOUBLE J STENT PLACEMENT performed by Javier Zapata MD at [facility] 6/27/2018    CYSTOSCOPY URETEROSCOPY RETROGRADE PYELOGRAMS performed by Javier Zapata MD at [facility] 9/23/2020 9/23/2020 9/23/2020 9/23/2020 9/23/2020 4/95/6814   SYSTOLIC [phone]   DIASTOLIC 69 66 75 68 69 69   Site - - - - - -   Position - - - - - -   Cuff Size - - - - - -   Pulse - - - - - -   Temp - - - - - -   Resp - - - - - -   SpO2 95 96 94 94 97 92   Weight - - - - - -   Height - - - - - -   BMI (wt*703/ht~2) - - - - - -   Pain Level - - - - - -   Some recent data might be hidden       Family History   Problem Relation Age of Onset    Coronary Art Dis Father         AICD pacer age 68    Heart Attack Father     Cancer Sister         childhood chest tumor    Heart Failure Mother        Social History     Tobacco Use    Smoking status: Never Smoker    Smokeless tobacco: Never Used   Substance Use Topics    Alcohol use: No      Current Outpatient Medications   Medication Sig Dispense Refill    JANUVIA 50 MG tablet Take 1 tablet by mouth once daily 30 tablet 5    donepezil (ARICEPT) 5 MG tablet TAKE 1 TABLET BY MOUTH ONCE DAILY AT NIGHT 90 tablet 1    clindamycin (CLEOCIN) 300 MG capsule TAKE 1 CAPSULE BY MOUTH THREE TIMES DAILY FOR 10 DAYS      tiZANidine (ZANAFLEX) 4 MG tablet Take 1 tablet by mouth every 8 hours as needed (back pain) 90 tablet 1    losartan (COZAAR) 100 MG tablet Take 1 tablet by mouth once daily 90 tablet 3    pregabalin (LYRICA) 75 MG capsule Take 1 capsule by mouth 3 times daily for 60 days. 90 capsule 1    pantoprazole (PROTONIX) 40 MG tablet Take 1 tablet by mouth 2 times daily (before meals) 180 tablet 1    sucralfate (CARAFATE) 1 GM tablet Take 1 tablet by mouth 4 times daily 120 tablet 3    spironolactone (ALDACTONE) 25 MG tablet 3 days a week (Patient taking differently: Prn) 9 tablet 1    XARELTO 20 MG TABS tablet TAKE 1 TABLET BY MOUTH ONCE DAILY WITH  EVENING  MEAL 90 tablet 1    albuterol sulfate  (90 Base) MCG/ACT inhaler INHALE 2 PUFFS BY MOUTH EVERY 6 HOURS AS NEEDED FOR WHEEZING 1 Inhaler 5    predniSONE (DELTASONE) 5 MG tablet Take 5 mg by mouth daily  6    traMADol (ULTRAM) 50 MG tablet Take 50 mg by mouth nightly.       levalbuterol (XOPENEX) 1.25 MG/3ML nebulizer solution Take 1 ampule by nebulization every 6 hours as needed for Wheezing      furosemide (LASIX) 20 MG tablet Take 1 tablet by mouth daily (Patient taking differently: Take 10 mg by mouth daily as needed (if greater than 3 pound weight gain daily) ) 30 tablet 3    EPINEPHrine (EPIPEN) 0.3 MG/0.3ML SOAJ injection Inject 0.3 mLs into the muscle as needed (Allergic Reaction) 2 each 1  Ascorbic Acid (VITAMIN C) 1000 MG tablet Take 1,000 mg by mouth daily      nitroGLYCERIN (NITROSTAT) 0.4 MG SL tablet Place 1 tablet under the tongue every 5 minutes as needed for Chest pain 25 tablet 3    azaTHIOprine (IMURAN) 50 MG tablet Take 50 mg by mouth daily       aspirin 81 MG tablet Take 81 mg by mouth daily. No current facility-administered medications for this visit. Allergies   Allergen Reactions    Bee Venom     Gabapentin      Causes blisters to head.      Penicillins     Toprol Xl [Metoprolol]      Leg pain        Health Maintenance   Topic Date Due    Statin Therapy  1950    Shingles Vaccine (1 of 2) 09/15/1999    Diabetic retinal exam  05/09/2019    Annual Wellness Visit (AWV)  07/20/2020    Flu vaccine (1) 09/01/2020    Diabetic foot exam  03/09/2021 (Originally 9/15/1960)    A1C test (Diabetic or Prediabetic)  12/23/2020    Lipid screen  09/23/2021    Potassium monitoring  09/23/2021    Creatinine monitoring  09/23/2021    Colon cancer screen colonoscopy  08/01/2024    DTaP/Tdap/Td vaccine (2 - Td) 05/22/2030    Pneumococcal 65+ years Vaccine  Completed    Hepatitis A vaccine  Aged Out    Hib vaccine  Aged Out    Meningococcal (ACWY) vaccine  Aged Out    Hepatitis C screen  Discontinued       Lab Results   Component Value Date    LABA1C 10.8 (H) 09/23/2020     Lab Results   Component Value Date    PSA 3.6 03/17/2020    PSA 5.61 (H) 12/09/2019    PSA 5.3 (H) 12/09/2019     TSH   Date Value Ref Range Status   09/23/2020 5.190 (H) 0.270 - 4.200 uIU/mL Final   ]  Lab Results   Component Value Date     (L) 09/23/2020    K 4.6 09/23/2020    CL 86 (L) 09/23/2020    CO2 20 (L) 09/23/2020    BUN 44 (H) 09/23/2020    CREATININE 1.4 (H) 09/23/2020    GLUCOSE 633 (HH) 09/23/2020    CALCIUM 9.8 09/23/2020    PROT 7.6 09/23/2020    LABALBU 4.3 09/23/2020    BILITOT 1.1 09/23/2020    ALKPHOS 82 09/23/2020    AST 16 09/23/2020    ALT 13 09/23/2020    LABGLOM 50 (A) 09/23/2020    GFRAA >59 09/23/2020     Lab Results   Component Value Date    CHOL 191 12/09/2019    CHOL 137 (L) 08/27/2019    CHOL 178 02/25/2019     Lab Results   Component Value Date    TRIG 242 (H) 12/09/2019    TRIG 184 (H) 08/27/2019    TRIG 166 (H) 02/25/2019     Lab Results   Component Value Date    HDL 47 (L) 09/23/2020    HDL 47 (L) 12/09/2019    HDL 30 (L) 08/27/2019     Lab Results   Component Value Date    LDLCALC 97 09/23/2020    LDLCALC 96 12/09/2019    LDLCALC 70 08/27/2019     Lab Results   Component Value Date     09/23/2020    K 4.6 09/23/2020    CL 86 09/23/2020    CO2 20 09/23/2020    BUN 44 09/23/2020    CREATININE 1.4 09/23/2020    GLUCOSE 633 09/23/2020    CALCIUM 9.8 09/23/2020      Lab Results   Component Value Date    WBC 11.3 (H) 09/23/2020    HGB 14.7 09/23/2020    HCT 41.4 (L) 09/23/2020    MCV 80.1 09/23/2020     09/23/2020    LABLYMP 1.58 09/20/2015    LYMPHOPCT 13.7 (L) 09/23/2020    RBC 5.17 09/23/2020    MCH 28.4 09/23/2020    MCHC 35.5 09/23/2020    RDW 14.0 09/23/2020     Lab Results   Component Value Date    VITD25 16.0 (L) 09/23/2020       Subjective:      Review of Systems   Constitutional: Negative for fatigue, fever and unexpected weight change. HENT: Negative for ear discharge, ear pain, mouth sores, sore throat and trouble swallowing. Eyes: Positive for visual disturbance. Negative for discharge and itching. Respiratory: Negative for cough, choking, shortness of breath, wheezing and stridor. Cardiovascular: Negative for chest pain, palpitations and leg swelling. Gastrointestinal: Negative for abdominal distention, abdominal pain, blood in stool, constipation, diarrhea, nausea and vomiting. Endocrine: Negative for cold intolerance, polydipsia and polyuria. Genitourinary: Negative for difficulty urinating, dysuria, frequency and urgency. Musculoskeletal: Negative for arthralgias and gait problem. Skin: Negative for color change and rash. Allergic/Immunologic: Negative for food allergies and immunocompromised state. Neurological: Negative for dizziness, tremors, syncope, speech difficulty, weakness and headaches. Hematological: Negative for adenopathy. Does not bruise/bleed easily. Psychiatric/Behavioral: Negative for confusion and hallucinations. Objective:     Physical Exam\DGEVISITPE      GENERAL:   Afebrile alert no acute distress  Respiratory no use of accessory muscles no acute distress no audible wheezing  Mental status alert oriented adequate thought processes        Vital signs were not taken at this visit as no equipment was available; There were no vitals taken for this visit. Assessment:       Diagnosis Orders   1. Type 2 diabetes mellitus without complication, with long-term current use of insulin ([facility])     2. Sarcoidosis, lung ([facility] [address]     3. Acute kidney injury ([facility] [address]     4. Hyponatremia       Labs reviewed from yesterday in the office in the ER and today in the office    Plan:        Patient given educational materials - see patient instructions. Discussed use, benefit, and side effects of prescribed medications. Allpatient questions answered. Pt voiced understanding. Reviewed health maintenance. Instructed to continue current medications, diet and exercise. Patient agreed with treatment plan. Follow up as directed. MEDICATIONS:  No orders of the defined types were placed in this encounter. ORDERS:  No orders of the defined types were placed in this encounter. Follow-up:  No follow-ups on file. Following the remote visit today we will call you when we are available to reschedule your follow up appt      PATIENT INSTRUCTIONS:  Patient Instructions   1. Type 2 diabetes mellitus we will go ahead and start using 10 of Lantus at bedtime.   When he comes to the office today we will give him some short acting to use a sliding scale which I described his wife blood sugar -100÷20 goals numbers of units

## 2020-09-24 NOTE — CARE COORDINATION
winter months. They own a portable pulse oximeter; his O2 sat is 95-96% at baseline. Baylee Spears was checking it each morning with his VS, but currently only checks it if he is having problems or seems SOB since the onset of his back problems. He has a hx of CKD Stage 3. She states Dr. Rama Nieves told them this was d/t his sarcoidosis not being treated aggressively. He currently has no issues with this; he states well nourished and well hydrated. He makes adequate urine. He has had a few renal stents placed per Dr. Shagufta Mon. He has a hx of afib. He has had an ablation and has been in NSR since that time. He takes Xarelto daily. Reviewed safety information with Baylee Spears regarding this medication. Plan:  Baylee Spears will continue to perform QID accuchecks and cover elevated BG levels as indicated. Jasmyne/Alvaro will continue to monitor Alvaro's resp status for COPD exacerbations. Galvin Babinski will continue to practice safe mobility/ADLs with the assist of Jasmyne/ambulatory aids as needed. Galvin Babinski will continue to keep doctor appts, and will have sx on 10/6/2020 as (tentatively) scheduled. Baylee Spears will call tomorrow to verify this date/time. ACM will perform second outreach in 1 week to assess for any patient stated goals from Galvin Babinski and/or Baylee Spears. ACM will add any goals PCP requests for the pt. Ambulatory Care Coordination Assessment    Care Coordination Protocol  Program Enrollment:  Complex Care  Referral from Primary Care Provider:  No  Week 1 - Initial Assessment     Do you have all of your prescriptions and are they filled?:  Yes  Are you able to afford your medications?:  Yes  How often do you have trouble taking your medications the way you have been told to take them?:  I always take them as prescribed. Do you have Home O2 Therapy?:  No      Ability to seek help/take action for Emergent Urgent situations i.e. fire, crime, inclement weather or health crisis. :  Independent  Ability to ambulate to restroom: Independent  Ability handle personal hygeine needs (bathing/dressing/grooming):  Needs Assistance  Ability to manage Medications: Independent  Ability to prepare Food Preparation:  Needs Assistance  Ability to maintain home (clean home, laundry):  Needs Assistance  Ability to drive and/or has transportation:  Independent  Ability to do shopping:  Needs Assistance  Ability to manage finances: Independent  Is patient able to live independently?:  Yes     Current Housing:  Private Residence        Per the Fall Risk Screening, did the patient have 2 or more falls or 1 fall with injury in the past year?:  No        Are you experiencing loss of meaning?:  No  Are you experiencing loss of hope and peace?:  No     Suggested Interventions and Community Resources  Diabetes Education: In Process (Comment: 9/24/2020: New rx for SSI r/t steroid injections, education provided, Accuchecks QID)   Fall Risk Prevention: In Process Zone Management Tools: In Process                  Prior to Admission medications    Medication Sig Start Date End Date Taking? Authorizing Provider   JANUVIA 50 MG tablet Take 1 tablet by mouth once daily 9/9/20   NUNO Montiel   donepezil (ARICEPT) 5 MG tablet TAKE 1 TABLET BY MOUTH ONCE DAILY AT NIGHT 8/18/20   NUNO Montiel   clindamycin (CLEOCIN) 300 MG capsule TAKE 1 CAPSULE BY MOUTH THREE TIMES DAILY FOR 10 DAYS 7/8/20   Historical Provider, MD   tiZANidine (ZANAFLEX) 4 MG tablet Take 1 tablet by mouth every 8 hours as needed (back pain) 7/20/20   NUNO Montiel   losartan (COZAAR) 100 MG tablet Take 1 tablet by mouth once daily 5/19/20   NUNO Montiel   pregabalin (LYRICA) 75 MG capsule Take 1 capsule by mouth 3 times daily for 60 days.  4/14/20 9/23/20  NUNO Montiel   pantoprazole (PROTONIX) 40 MG tablet Take 1 tablet by mouth 2 times daily (before meals) 3/17/20   NUNO Montiel   sucralfate (CARAFATE) 1 GM tablet Take 1 tablet by mouth 4 times daily 3/17/20   NUNO Guzman   spironolactone (ALDACTONE) 25 MG tablet 3 days a week  Patient taking differently: Prn 2/4/20   NUNO Guzman   XARELTO 20 MG TABS tablet TAKE 1 TABLET BY MOUTH ONCE DAILY WITH  EVENING  MEAL 1/22/20   NUNO Guzman   albuterol sulfate  (90 Base) MCG/ACT inhaler INHALE 2 PUFFS BY MOUTH EVERY 6 HOURS AS NEEDED FOR WHEEZING 11/25/19   NUNO Guzman   predniSONE (DELTASONE) 5 MG tablet Take 5 mg by mouth daily 9/10/19   Historical Provider, MD   traMADol (ULTRAM) 50 MG tablet Take 50 mg by mouth nightly. Historical Provider, MD   levalbuterol [facility]) 1.25 MG/3ML nebulizer solution Take 1 ampule by nebulization every 6 hours as needed for Wheezing    Historical Provider, MD   furosemide (LASIX) 20 MG tablet Take 1 tablet by mouth daily  Patient taking differently: Take 10 mg by mouth daily as needed (if greater than 3 pound weight gain daily)  8/22/19   NUNO Luna   EPINEPHrine (EPIPEN) 0.3 MG/0.3ML SOAJ injection Inject 0.3 mLs into the muscle as needed (Allergic Reaction) 6/3/19   NUNO Guzman   Ascorbic Acid (VITAMIN C) 1000 MG tablet Take 1,000 mg by mouth daily    Historical Provider, MD   nitroGLYCERIN (NITROSTAT) 0.4 MG SL tablet Place 1 tablet under the tongue every 5 minutes as needed for Chest pain 5/21/19   Ophelia Harris MD   azaTHIOprine (IMURAN) 50 MG tablet Take 50 mg by mouth daily  2/15/19   Historical Provider, MD   aspirin 81 MG tablet Take 81 mg by mouth daily. Historical Provider, MD       Future Appointments   Date Time Provider [facility] Carter   9/29/2020 11:30 AM NUNO Guzman [facility]   10/9/2020 10:30 AM Sigmund Fothergill, MD [facility]   1/26/2021 10:15 AM Ophelia Harris MD [facility]     ,   Diabetes Assessment    Meal Planning:  None   How often do you test your blood sugar?:  Meals, Bedtime   Do you have barriers with adherence to non-pharmacologic self-management interventions? (Nutrition/Exercise/Self-Monitoring):  Yes   Have you ever had to go to the ED for symptoms of low blood sugar?:  No       Increase or Decrease trend in Blood Sugars   Do you have hyperglycemia symptoms?:  Yes   Frequency of Episodes:  1 Per:  Day   Do you have hypoglycemia symptoms?:  No   Blood Sugar Monitoring Regimen:  Morning Fasting, Before Meals, At Bedtime   Blood Sugar Trends:  Fluctuating (Comment: Currently running high r/t steroid injection)      ,   COPD Assessment    Does the patient understand envrionmental exposure?:  Yes  Is the patient able to verbalize Rescue vs. Long Acting medications?:  Yes  Does the patient have a nebulizer?:  Yes  Does the patient use a space with inhaled medications?:  No     No patient-reported symptoms         Symptoms:      Have you had a recent diagnosis of pneumonia either by PCP or at a hospital?:  No      and   General Assessment    Do you have any symptoms that are causing concern?:  Yes  Progression since Onset:  Unchanged  Reported Symptoms:  Pain, Other (Comment: Limited mobility, Scheduled for sx on 10/6/2020)

## 2020-09-24 NOTE — PATIENT INSTRUCTIONS
1.  Type 2 diabetes mellitus we will go ahead and start using 10 of Lantus at bedtime. When he comes to the office today we will give him some short acting to use a sliding scale which I described his wife blood sugar -100÷20 goals numbers of units to use 4 times a day  2. Sarcoid is stable  3. Acute injury we will recheck labs today  4. Hyponatremia recheck labs today  5.   Back pain hopefully we will be able to get your surgery done next week

## 2020-09-29 ENCOUNTER — OFFICE VISIT (OUTPATIENT)
Dept: INTERNAL MEDICINE | Age: 70
End: 2020-09-29
Payer: MEDICARE

## 2020-09-29 VITALS
SYSTOLIC BLOOD PRESSURE: 124 MMHG | WEIGHT: 189 LBS | BODY MASS INDEX: 27.99 KG/M2 | HEIGHT: 69 IN | DIASTOLIC BLOOD PRESSURE: 70 MMHG

## 2020-09-29 PROBLEM — J18.9 PNEUMONIA OF BOTH UPPER LOBES DUE TO INFECTIOUS ORGANISM: Status: RESOLVED | Noted: 2019-08-23 | Resolved: 2020-09-29

## 2020-09-29 PROBLEM — G89.4 CHRONIC PAIN SYNDROME: Status: ACTIVE | Noted: 2020-09-29

## 2020-09-29 PROCEDURE — 4040F PNEUMOC VAC/ADMIN/RCVD: CPT | Performed by: NURSE PRACTITIONER

## 2020-09-29 PROCEDURE — G8427 DOCREV CUR MEDS BY ELIG CLIN: HCPCS | Performed by: NURSE PRACTITIONER

## 2020-09-29 PROCEDURE — 2022F DILAT RTA XM EVC RTNOPTHY: CPT | Performed by: NURSE PRACTITIONER

## 2020-09-29 PROCEDURE — 1123F ACP DISCUSS/DSCN MKR DOCD: CPT | Performed by: NURSE PRACTITIONER

## 2020-09-29 PROCEDURE — 3017F COLORECTAL CA SCREEN DOC REV: CPT | Performed by: NURSE PRACTITIONER

## 2020-09-29 PROCEDURE — 90694 VACC AIIV4 NO PRSRV 0.5ML IM: CPT | Performed by: NURSE PRACTITIONER

## 2020-09-29 PROCEDURE — G8417 CALC BMI ABV UP PARAM F/U: HCPCS | Performed by: NURSE PRACTITIONER

## 2020-09-29 PROCEDURE — G0008 ADMIN INFLUENZA VIRUS VAC: HCPCS | Performed by: NURSE PRACTITIONER

## 2020-09-29 PROCEDURE — G0439 PPPS, SUBSEQ VISIT: HCPCS | Performed by: NURSE PRACTITIONER

## 2020-09-29 PROCEDURE — 3046F HEMOGLOBIN A1C LEVEL >9.0%: CPT | Performed by: NURSE PRACTITIONER

## 2020-09-29 PROCEDURE — 99214 OFFICE O/P EST MOD 30 MIN: CPT | Performed by: NURSE PRACTITIONER

## 2020-09-29 PROCEDURE — 1036F TOBACCO NON-USER: CPT | Performed by: NURSE PRACTITIONER

## 2020-09-29 ASSESSMENT — ENCOUNTER SYMPTOMS
CHOKING: 0
BLOOD IN STOOL: 0
SHORTNESS OF BREATH: 0
TROUBLE SWALLOWING: 0
STRIDOR: 0
DIARRHEA: 0
BACK PAIN: 1
NAUSEA: 0
EYE DISCHARGE: 0
ABDOMINAL PAIN: 0
WHEEZING: 0
EYE ITCHING: 0
ABDOMINAL DISTENTION: 0
COLOR CHANGE: 0
SORE THROAT: 0
CONSTIPATION: 0
VOMITING: 0
COUGH: 0

## 2020-09-29 ASSESSMENT — PATIENT HEALTH QUESTIONNAIRE - PHQ9
SUM OF ALL RESPONSES TO PHQ9 QUESTIONS 1 & 2: 0
SUM OF ALL RESPONSES TO PHQ QUESTIONS 1-9: 0
1. LITTLE INTEREST OR PLEASURE IN DOING THINGS: 0
SUM OF ALL RESPONSES TO PHQ QUESTIONS 1-9: 0
2. FEELING DOWN, DEPRESSED OR HOPELESS: 0

## 2020-09-29 ASSESSMENT — LIFESTYLE VARIABLES: HOW OFTEN DO YOU HAVE A DRINK CONTAINING ALCOHOL: 0

## 2020-09-29 NOTE — PATIENT INSTRUCTIONS
1.  Hypertension no med changes are necessary  2 sarcoid followed by pulmonology  3. Type 2 diabetes mellitus increase Lantus to 18 units and continue sliding scale. As the Accu-Cheks get closer to 100 you can start decreasing the Lantus by about 4 units every few days  4. Peripheral nerve disease stable with Lyrica  5. Depression stable on current meds  6. Chronic pain syndrome hopefully things will be able to get her surgery next week but me know if you need anything at all  Personalized Preventive Plan for Sean Rush - 9/29/2020  Medicare offers a range of preventive health benefits. Some of the tests and screenings are paid in full while other may be subject to a deductible, co-insurance, and/or copay. Some of these benefits include a comprehensive review of your medical history including lifestyle, illnesses that may run in your family, and various assessments and screenings as appropriate. After reviewing your medical record and screening and assessments performed today your provider may have ordered immunizations, labs, imaging, and/or referrals for you. A list of these orders (if applicable) as well as your Preventive Care list are included within your After Visit Summary for your review. Other Preventive Recommendations:    · A preventive eye exam performed by an eye specialist is recommended every 1-2 years to screen for glaucoma; cataracts, macular degeneration, and other eye disorders. · A preventive dental visit is recommended every 6 months. · Try to get at least 150 minutes of exercise per week or 10,000 steps per day on a pedometer . · Order or download the FREE \"Exercise & Physical Activity: Your Everyday Guide\" from [employer]. Call 9-548.484.9400 or search [employer] online. · You need 4457-8389 mg of calcium and 6151-7349 IU of vitamin D per day.  It is possible to meet your calcium requirement with diet alone, but a vitamin D supplement is usually necessary to meet this goal.  · When exposed to the sun, use a sunscreen that protects against both UVA and UVB radiation with an SPF of 30 or greater. Reapply every 2 to 3 hours or after sweating, drying off with a towel, or swimming. · Always wear a seat belt when traveling in a car. Always wear a helmet when riding a bicycle or motorcycle.

## 2020-09-29 NOTE — PROGRESS NOTES
Medicare Annual Wellness Visit  Name: Hussein Godinez Date: 2020   MRN: [id number] Sex: Male   Age: 69 y.o. Ethnicity: Non-/Non    : 1950 Race: Allyson Carpio is here for Medicare AWV    Screenings for behavioral, psychosocial and functional/safety risks, and cognitive dysfunction are all negative except as indicated below. These results, as well as other patient data from the [address] form, are documented in Flowsheets linked to this Encounter. Allergies   Allergen Reactions    Bee Venom     Gabapentin      Causes blisters to head.  Penicillins     Toprol Xl [Metoprolol]      Leg pain          Prior to Visit Medications    Medication Sig Taking? Authorizing Provider   JANUVIA 50 MG tablet Take 1 tablet by mouth once daily Yes NUNO Zhang   donepezil (ARICEPT) 5 MG tablet TAKE 1 TABLET BY MOUTH ONCE DAILY AT NIGHT Yes NUNO Zhang   clindamycin (CLEOCIN) 300 MG capsule TAKE 1 CAPSULE BY MOUTH THREE TIMES DAILY FOR 10 DAYS Yes Historical Provider, MD   tiZANidine (ZANAFLEX) 4 MG tablet Take 1 tablet by mouth every 8 hours as needed (back pain) Yes NUNO Zhang   losartan (COZAAR) 100 MG tablet Take 1 tablet by mouth once daily Yes NUNO Zhang   pregabalin (LYRICA) 75 MG capsule Take 1 capsule by mouth 3 times daily for 60 days.  Yes NUNO Zhang   pantoprazole (PROTONIX) 40 MG tablet Take 1 tablet by mouth 2 times daily (before meals) Yes NUNO Zhang   sucralfate (CARAFATE) 1 GM tablet Take 1 tablet by mouth 4 times daily Yes NUNO Zhang   spironolactone (ALDACTONE) 25 MG tablet 3 days a week  Patient taking differently: Prn Yes NUNO Zhang   XARELTO 20 MG TABS tablet TAKE 1 TABLET BY MOUTH ONCE DAILY WITH  EVENING  MEAL Yes NUNO Zhang   albuterol sulfate  (90 Base) MCG/ACT inhaler INHALE 2 PUFFS BY MOUTH EVERY 6 HOURS AS NEEDED FOR WHEEZING Yes NUNO Zhang predniSONE (DELTASONE) 5 MG tablet Take 5 mg by mouth daily Yes Historical Provider, MD   traMADol (ULTRAM) 50 MG tablet Take 50 mg by mouth nightly. Yes Historical Provider, MD   levalbuterol (XOPENEX) 1.25 MG/3ML nebulizer solution Take 1 ampule by nebulization every 6 hours as needed for Wheezing Yes Historical Provider, MD   furosemide (LASIX) 20 MG tablet Take 1 tablet by mouth daily  Patient taking differently: Take 10 mg by mouth daily as needed (if greater than 3 pound weight gain daily)  Yes NUNO Siddiqui   EPINEPHrine (EPIPEN) 0.3 MG/0.3ML SOAJ injection Inject 0.3 mLs into the muscle as needed (Allergic Reaction) Yes NUNO Navarro   Ascorbic Acid (VITAMIN C) 1000 MG tablet Take 1,000 mg by mouth daily Yes Historical Provider, MD   nitroGLYCERIN (NITROSTAT) 0.4 MG SL tablet Place 1 tablet under the tongue every 5 minutes as needed for Chest pain Yes Germaine Crowder MD   azaTHIOprine (IMURAN) 50 MG tablet Take 50 mg by mouth daily  Yes Historical Provider, MD   aspirin 81 MG tablet Take 81 mg by mouth daily.  Yes Historical Provider, MD         Past Medical History:   Diagnosis Date    Achalasia     going to [facility] in March    Acute pancreatitis     Anemia     Asthma     Atrial fibrillation ([facility]     h/o paroxysmal a-fib ? anesthsia induced    Benign colonic polyp     cscope 12/07 Dr Enrique Perdomo adenomatous: 12/14 adenoma    Chest pain     Chronic kidney disease     Chronic pain syndrome     Depression     Diabetic peripheral neuropathy (HCC)     Extrinsic asthma     Hyperlipidemia     Hypertension     Idiopathic peripheral neuropathy     Lyme disease     Mediastinal lymphadenopathy     Microalbuminuria     Mixed hyperlipidemia     Paroxysmal A-fib (HCC)     S/P ablation of atrial fibrillation     4/16 A fib ablation , Dr Lazarus Cradle, [place]     Sarcoidosis, lung ([facility]     restrictive lung impairment    Tick bite     Type 2 diabetes, controlled, with neuropathy [facility])        Past Surgical History:   Procedure Laterality Date    ANKLE FRACTURE SURGERY  april 14, 2015    ATRIAL ABLATION SURGERY  2015    Dr. Aaliyah Salazar      Catheter Ablation A-fib    CHOLECYSTECTOMY      COLONOSCOPY  12/02/2014    Melecio    CYSTOSCOPY Left 8/16/2019    CYSTOSCOPY; LEFT RETROGRADE PYELOGRAM; INSERTION LEFT URETERAL STENT performed by Gilberto Dee MD at [facility] Left 6/27/2018    LASER LITHOTRIPSY STONE MANIPULATION WITH DOUBLE J STENT PLACEMENT performed by Gilberto Dee MD at [facility] Left 6/27/2018    CYSTOSCOPY URETEROSCOPY RETROGRADE PYELOGRAMS performed by Gilberto Dee MD at [address] OR    SKIN CANCER EXCISION           Family History   Problem Relation Age of Onset    Coronary Art Dis Father         AICD pacer age 68    Heart Attack Father     Cancer Sister         childhood chest tumor    Heart Failure Mother        CareTeam (Including outside providers/suppliers regularly involved in providing care):   Patient Care Team:  NUNO Diaz as PCP - General (Nurse Practitioner Acute Care)  NUNO Diaz as PCP - [facility]  Ck Martinez MD as Consulting Physician (Pulmonology)  Ebony Argueta MD as Consulting Physician (Cardiology)  Guillermo Rodney RN as Registered Nurse    Wt Readings from Last 3 Encounters:   09/29/20 189 lb (85.7 kg)   09/23/20 176 lb (79.8 kg)   07/20/20 183 lb (83 kg)     Vitals:    09/29/20 1125   BP: 124/70   Weight: 189 lb (85.7 kg)   Height: 5' 9\" (1.753 m)     Body mass index is 27.91 kg/m². Based upon direct observation of the patient, evaluation of cognition reveals recent and remote memory intact.     Review of Systems -   General no fever chills no malaise fatigue  Diet no dietary restrictions  Skin no rash eruption pigment changes  HEENT no headache dizziness difficulty swallowing foods or medications   Neck no complaint of masses or pain  Chest  Has  cough shortness of breath exertional dyspnea with sarcoid   Cardiovascular no chest pain palpitations  Hematology no history of anemia or bruising    no urgency frequency nocturia hematuria  GI no indigestion, constipation or diarrhea    Musculoskeletal has low back pain   Neuro no numbness tingling coordination problems   Mental status no problems with  anxiety depression    General; alert, oriented, no acute distress. Skin no rashes or worrisome lesions. HEENT head is atraumatic. Pupils equal, reactive light and accommodation. Neck is supple without masses. Chest is clear without rales, rhonchi. Cardiovascular S1, S2 without murmur. Blood vessels no cyanosis, clubbing peripheral edema. Abdomen is soft. Bowel sounds  present   Musculoskeletal adequate tone range of motion and strength   lymph there is no tenderness enlargement   Neuro cranial nerves II through XII are grossly intact with facial reflexes are intact psych   Blood vessels distal cyanosis clubbing or peripheral edema  Psych alert and oriented x3 appropriate mood and affect          Patient's complete Health Risk Assessment and screening values have been reviewed and are found in Flowsheets. The following problems were reviewed today and where indicated follow up appointments were made and/or referrals ordered. Positive Risk Factor Screenings with Interventions:     General Health:  General  In general, how would you say your health is?: Fair  In the past 7 days, have you experienced any of the following?  New or Increased Pain, New or Increased Fatigue, Loneliness, Social Isolation, Stress or Anger?: None of These  Do you get the social and emotional support that you need?: Yes  Do you have a Living Will?: (!) No  General Health Risk Interventions:  · No Living Will: patient declined    Health Habits/Nutrition:  Health Habits/Nutrition  Do you exercise for at least 20 minutes 2-3 times per week?: (!) No  Have you lost any weight without trying in the past 3 months?: (!) Yes  Do you eat fewer than 2 meals per day?: No  Have you seen a dentist within the past year?: Yes  Body mass index is 27.91 kg/m².   Health Habits/Nutrition Interventions:  · Inadequate physical activity:  physical limitations    Hearing/Vision:  No exam data present  Hearing/Vision  Do you or your family notice any trouble with your hearing?: (!) Yes  Do you have difficulty driving, watching TV, or doing any of your daily activities because of your eyesight?: (!) Yes  Have you had an eye exam within the past year?: Yes  Hearing/Vision Interventions:  · Hearing concerns:  patient declines any further evaluation/treatment for hearing issues    Personalized Preventive Plan   Current Health Maintenance Status  Immunization History   Administered Date(s) Administered    Influenza, High Dose (Fluzone 65 yrs and older) 12/21/2016, 12/27/2017, 11/05/2018    Influenza, Triv, inactivated, subunit, adjuvanted, IM (Fluad 65 yrs and older) 11/20/2019    Pneumococcal Conjugate 13-valent ([id number]) 10/05/2016    Pneumococcal Polysaccharide ([id number]) 04/24/2008, 10/01/2015    Tdap (Boostrix, Adacel) 05/22/2020        Health Maintenance   Topic Date Due    Statin Therapy  1950    Shingles Vaccine (1 of 2) 09/15/1999    Diabetic retinal exam  05/09/2019    Annual Wellness Visit (AWV)  07/20/2020    Flu vaccine (1) 09/01/2020    Diabetic foot exam  03/09/2021 (Originally 9/15/1960)    A1C test (Diabetic or Prediabetic)  12/23/2020    Lipid screen  09/23/2021    Potassium monitoring  09/24/2021    Creatinine monitoring  09/24/2021    Colon cancer screen colonoscopy  08/01/2024    DTaP/Tdap/Td vaccine (2 - Td) 05/22/2030    Pneumococcal 65+ years Vaccine  Completed    Hepatitis A vaccine  Aged Out    Hib vaccine  Aged Out    Meningococcal (ACWY) vaccine  Aged Out    Hepatitis C screen  Discontinued Recommendations for Preventive Services Due: see orders and patient instructions/AVS.  . Recommended screening schedule for the next 5-10 years is provided to the patient in written form: see Patient Jose De La Cruz was seen today for medicare awv. Diagnoses and all orders for this visit:    Essential hypertension  -     CBC Auto Differential; Future  -     Comprehensive Metabolic Panel; Future  -     Urinalysis Reflex to Culture; Future  -     TSH without Reflex; Future    Sarcoidosis, lung (HCC)    Type 2 diabetes mellitus without complication, with long-term current use of insulin (HCC)  -     Hemoglobin A1C; Future    Peripheral nerve disease    Recurrent major depressive disorder, in partial remission (HCC)    Chronic pain syndrome    Vitamin D deficiency  -     Vitamin D 25 Hydroxy; Future    Mixed hyperlipidemia  -     Lipid Panel; Future              [address] [facility]  [address] [place] [id number]  Dept: [phone]  Dept Fax: [phone]: [phone]    Velma Falk is a 69 y.o. male who presents today for his medical conditions/complaints as noted below. Velma Falk is c/alexys Medicare AWV        HPI:     HPI   #1 type 2 diabetes with his recent steroid injection we seen him a couple times last week with blood sugar over 500 and sodium. He was sent to the ER given meds for correction and IV fluids and was able to go home. He had not come back to recheck his chemistry was back to normal.  We had given him Lantus to take at home 10 units and sliding scale insulin. He is blood sugars are still running in the upper 200s to sometimes 400. #2 chronic back pain he is scheduled for surgery October 16 [place]. He is on pain medication, tramadol and tizanidine  And as above he just received a steroid injection for the same  3.   Hypertension he has been on spironolactone 25 mg as needed Cozaar 200 mg daily  #4 peripheral neuropathy he is on Lyrica 75 mg 3 times a day this seems to be given similarly  #5 sarcoid he is on Imuran and prednisone for sarcoid  #6 depression this seems to be getting better he is anxious to get his surgery when he wakes up he will feel better  Chief Complaint   Patient presents with    Medicare AWV       Past Medical History:   Diagnosis Date    [facility]asia     [facility]    Acute pancreatitis     Anemia     Asthma     Atrial fibrillation ([facility]     h/o paroxysmal a-fib ? anesthsia induced    Benign colonic polyp     cscope 12/07 Dr Qasim Mendoza adenomatous: 12/14 adenoma    Chest pain     Chronic kidney disease     Chronic pain syndrome     Depression     Diabetic peripheral neuropathy ([address]     Extrinsic asthma     Hyperlipidemia     Hypertension     Idiopathic peripheral neuropathy     Lyme disease     Mediastinal lymphadenopathy     Microalbuminuria     Mixed hyperlipidemia     Paroxysmal A-fib ([address]     S/P ablation of atrial fibrillation     4[facility]     Sarcoidosis, lung ([facility] [address] [facility]     restrictive lung impairment    Tick bite     Type 2 diabetes, controlled, with neuropathy [facility])       Past Surgical History:   Procedure Laterality Date    ANKLE FRACTURE SURGERY  april 14, 2015    ATRIAL ABLATION SURGERY  2015    Dr. Xuan Martin COLONOSCOPY  12/02/2014    [place]    CYSTOSCOPY Left 8/16/2019    CYSTOSCOPY; LEFT RETROGRADE PYELOGRAM; INSERTION LEFT URETERAL STENT performed by Tony Gentile MD at [address] [place] [address] Left 6/27/2018    LASER LITHOTRIPSY STONE MANIPULATION WITH DOUBLE J STENT PLACEMENT performed by Tony Gentile MD at [facility] Left 6/27/2018    CYSTOSCOPY URETEROSCOPY RETROGRADE PYELOGRAMS performed by Tony Gentile MD at [address] [place] [address] SKIN CANCER EXCISION         Vitals 9/29/2020 9/23/2020 9/23/2020 9/23/2020 9/23/2020 4/88/1039   SYSTOLIC 063 [phone] 137   DIASTOLIC 70 69 66 75 68 69   Site - - - - - -   Position - - - - - -   Cuff Size - - - - - -   Pulse - - - - - -   Temp - - - - - -   Resp - - - - - -   SpO2 - 95 96 94 94 97   Weight 189 lb - - - - -   Height 5' 9\" - - - - -   BMI (wt*703/ht~2) 27.91 kg/m2 - - - - -   Pain Level - - - - - -   Some recent data might be hidden       Family History   Problem Relation Age of Onset    Coronary Art Dis Father         AICD pacer age 68    Heart Attack Father     Cancer Sister         childhood chest tumor    Heart Failure Mother        Social History     Tobacco Use    Smoking status: Never Smoker    Smokeless tobacco: Never Used   Substance Use Topics    Alcohol use: No      Current Outpatient Medications   Medication Sig Dispense Refill    JANUVIA 50 MG tablet Take 1 tablet by mouth once daily 30 tablet 5    donepezil (ARICEPT) 5 MG tablet TAKE 1 TABLET BY MOUTH ONCE DAILY AT NIGHT 90 tablet 1    clindamycin (CLEOCIN) 300 MG capsule TAKE 1 CAPSULE BY MOUTH THREE TIMES DAILY FOR 10 DAYS      tiZANidine (ZANAFLEX) 4 MG tablet Take 1 tablet by mouth every 8 hours as needed (back pain) 90 tablet 1    losartan (COZAAR) 100 MG tablet Take 1 tablet by mouth once daily 90 tablet 3    pregabalin (LYRICA) 75 MG capsule Take 1 capsule by mouth 3 times daily for 60 days.  90 capsule 1    pantoprazole (PROTONIX) 40 MG tablet Take 1 tablet by mouth 2 times daily (before meals) 180 tablet 1    sucralfate (CARAFATE) 1 GM tablet Take 1 tablet by mouth 4 times daily 120 tablet 3    spironolactone (ALDACTONE) 25 MG tablet 3 days a week (Patient taking differently: Prn) 9 tablet 1    XARELTO 20 MG TABS tablet TAKE 1 TABLET BY MOUTH ONCE DAILY WITH  EVENING  MEAL 90 tablet 1    albuterol sulfate  (90 Base) MCG/ACT inhaler INHALE 2 PUFFS BY MOUTH EVERY 6 HOURS AS NEEDED FOR WHEEZING 1 Inhaler 5    predniSONE (DELTASONE) 5 MG tablet Take 5 mg by mouth daily  6    traMADol (ULTRAM) 50 MG tablet Take 50 mg by mouth nightly.  levalbuterol (XOPENEX) 1.25 MG/3ML nebulizer solution Take 1 ampule by nebulization every 6 hours as needed for Wheezing      furosemide (LASIX) 20 MG tablet Take 1 tablet by mouth daily (Patient taking differently: Take 10 mg by mouth daily as needed (if greater than 3 pound weight gain daily) ) 30 tablet 3    EPINEPHrine (EPIPEN) 0.3 MG/0.3ML SOAJ injection Inject 0.3 mLs into the muscle as needed (Allergic Reaction) 2 each 1    Ascorbic Acid (VITAMIN C) 1000 MG tablet Take 1,000 mg by mouth daily      nitroGLYCERIN (NITROSTAT) 0.4 MG SL tablet Place 1 tablet under the tongue every 5 minutes as needed for Chest pain 25 tablet 3    azaTHIOprine (IMURAN) 50 MG tablet Take 50 mg by mouth daily       aspirin 81 MG tablet Take 81 mg by mouth daily. No current facility-administered medications for this visit. Allergies   Allergen Reactions    Bee Venom     Gabapentin      Causes blisters to head.      Penicillins     Toprol Xl [Metoprolol]      Leg pain        Health Maintenance   Topic Date Due    Statin Therapy  1950    Shingles Vaccine (1 of 2) 09/15/1999    Diabetic retinal exam  05/09/2019    Annual Wellness Visit (AWV)  07/20/2020    Flu vaccine (1) 09/01/2020    Diabetic foot exam  03/09/2021 (Originally 9/15/1960)    A1C test (Diabetic or Prediabetic)  12/23/2020    Lipid screen  09/23/2021    Potassium monitoring  09/24/2021    Creatinine monitoring  09/24/2021    Colon cancer screen colonoscopy  08/01/2024    DTaP/Tdap/Td vaccine (2 - Td) 05/22/2030    Pneumococcal 65+ years Vaccine  Completed    Hepatitis A vaccine  Aged Out    Hib vaccine  Aged Out    Meningococcal (ACWY) vaccine  Aged Out    Hepatitis C screen  Discontinued       Lab Results   Component Value Date    LABA1C 10.8 (H) 09/23/2020     Lab Results   Component Value Date    PSA 3.6 03/17/2020    PSA 5.61 (H) 12/09/2019    PSA 5.3 (H) 12/09/2019     TSH   Date Value Ref Range Status   09/23/2020 5.190 (H) 0.270 - 4.200 uIU/mL Final   ]  Lab Results   Component Value Date     09/24/2020    K 4.6 09/24/2020    CL 96 (L) 09/24/2020    CO2 23 09/24/2020    BUN 29 (H) 09/24/2020    CREATININE 1.0 09/24/2020    GLUCOSE 366 (H) 09/24/2020    CALCIUM 9.6 09/24/2020    PROT 7.6 09/24/2020    LABALBU 4.5 09/24/2020    BILITOT 1.0 09/24/2020    ALKPHOS 84 09/24/2020    AST 14 09/24/2020    ALT 13 09/24/2020    LABGLOM >60 09/24/2020    GFRAA >59 09/24/2020     Lab Results   Component Value Date    CHOL 191 12/09/2019    CHOL 137 (L) 08/27/2019    CHOL 178 02/25/2019     Lab Results   Component Value Date    TRIG 242 (H) 12/09/2019    TRIG 184 (H) 08/27/2019    TRIG 166 (H) 02/25/2019     Lab Results   Component Value Date    HDL 47 (L) 09/23/2020    HDL 47 (L) 12/09/2019    HDL 30 (L) 08/27/2019     Lab Results   Component Value Date    LDLCALC 97 09/23/2020    LDLCALC 96 12/09/2019    LDLCALC 70 08/27/2019     Lab Results   Component Value Date     09/24/2020    K 4.6 09/24/2020    K 4.6 09/23/2020    CL 96 09/24/2020    CO2 23 09/24/2020    BUN 29 09/24/2020    CREATININE 1.0 09/24/2020    GLUCOSE 366 09/24/2020    CALCIUM 9.6 09/24/2020      Lab Results   Component Value Date    WBC 11.3 (H) 09/23/2020    HGB 14.7 09/23/2020    HCT 41.4 (L) 09/23/2020    MCV 80.1 09/23/2020     09/23/2020    LABLYMP 1.58 09/20/2015    LYMPHOPCT 13.7 (L) 09/23/2020    RBC 5.17 09/23/2020    MCH 28.4 09/23/2020    MCHC 35.5 09/23/2020    RDW 14.0 09/23/2020     Lab Results   Component Value Date    VITD25 16.0 (L) 09/23/2020       Subjective:      Review of Systems   Constitutional: Negative for fatigue, fever and unexpected weight change. HENT: Negative for ear discharge, ear pain, mouth sores, sore throat and trouble swallowing.     Eyes: Negative for discharge, itching and visual disturbance. Respiratory: Negative for cough, choking, shortness of breath, wheezing and stridor. Cardiovascular: Negative for chest pain, palpitations and leg swelling. Gastrointestinal: Negative for abdominal distention, abdominal pain, blood in stool, constipation, diarrhea, nausea and vomiting. Endocrine: Negative for cold intolerance, polydipsia and polyuria. Genitourinary: Negative for difficulty urinating, dysuria, frequency and urgency. Musculoskeletal: Positive for back pain. Negative for arthralgias and gait problem. Skin: Negative for color change and rash. Allergic/Immunologic: Negative for food allergies and immunocompromised state. Neurological: Negative for dizziness, tremors, syncope, speech difficulty, weakness and headaches. Hematological: Negative for adenopathy. Does not bruise/bleed easily. Psychiatric/Behavioral: Negative for confusion and hallucinations. Depression       Objective:     Physical Exam  Constitutional:       General: He is not in acute distress. Appearance: He is well-developed. HENT:      Head: Normocephalic and atraumatic. Eyes:      General: No scleral icterus. Right eye: No discharge. Left eye: No discharge. Pupils: Pupils are equal, round, and reactive to light. Neck:      Musculoskeletal: Normal range of motion and neck supple. Thyroid: No thyromegaly. Vascular: No JVD. Cardiovascular:      Rate and Rhythm: Normal rate and regular rhythm. Heart sounds: Normal heart sounds. No murmur. Pulmonary:      Effort: Pulmonary effort is normal. No respiratory distress. Breath sounds: Rhonchi (right base ) present. No wheezing or rales. Abdominal:      General: Bowel sounds are normal. There is no distension. Palpations: Abdomen is soft. There is no mass. Tenderness: There is no abdominal tenderness. There is no guarding or rebound. Musculoskeletal: Normal range of motion. General: No tenderness. Skin:     General: Skin is warm and dry. Findings: No erythema or rash. Neurological:      Mental Status: He is alert and oriented to person, place, and time. Cranial Nerves: No cranial nerve deficit. Coordination: Coordination normal.      Deep Tendon Reflexes: Reflexes are normal and symmetric. Reflexes normal.   Psychiatric:         Mood and Affect: Mood is not depressed. Behavior: Behavior normal.         Thought Content: Thought content normal.         Judgment: Judgment normal.       /70   Ht 5' 9\" (1.753 m)   Wt 189 lb (85.7 kg)   BMI 27.91 kg/m²     Assessment:       Diagnosis Orders   1. Essential hypertension  CBC Auto Differential    Comprehensive Metabolic Panel    Urinalysis Reflex to Culture    TSH without Reflex   2. Sarcoidosis, lung ([facility]     3. Type 2 diabetes mellitus without complication, with long-term current use of insulin (HCC)  Hemoglobin A1C   4. Peripheral nerve disease     5. Recurrent major depressive disorder, in partial remission ([facility]     6. Chronic pain syndrome     7. Vitamin D deficiency  Vitamin D 25 Hydroxy   8. Mixed hyperlipidemia  Lipid Panel     Labs reviewedfrom 9/23/2020    Plan:        Patient given educational materials - see patient instructions. Discussed use, benefit, and side effects of prescribed medications. Allpatient questions answered. Pt voiced understanding. Reviewed health maintenance. Instructed to continue current medications, diet and exercise. Patient agreed with treatment plan. Follow up as directed. MEDICATIONS:  No orders of the defined types were placed in this encounter. ORDERS:  Orders Placed This Encounter   Procedures    CBC Auto Differential    Comprehensive Metabolic Panel    Hemoglobin A1C    Lipid Panel    Vitamin D 25 Hydroxy    Urinalysis Reflex to Culture    TSH without Reflex       Follow-up:  Return in about 3 months (around 12/29/2020).     PATIENT INSTRUCTIONS:  Patient Instructions   1. Hypertension no med changes are necessary  2 sarcoid followed by pulmonology  3. Type 2 diabetes mellitus increase Lantus to 18 units and continue sliding scale. As the Accu-Cheks get closer to 100 you can start decreasing the Lantus by about 4 units every few days  4. Peripheral nerve disease stable with Lyrica  5. Depression stable on current meds  6. Chronic pain syndrome hopefully things will be able to get her surgery next week but me know if you need anything at all    Electronically signed by NUNO Guzman on 9/29/2020 at 12:00 PM    EMRDragon/transcription disclaimer:  Much of this encounter note is electronic transcription/translation of spoken language to printed texts. The electronic translation of spoken language may be erroneous, or at times,nonsensical words or phrases may be inadvertently transcribed.   Although I have reviewed the note for such errors, some may still exist.

## 2020-10-19 ENCOUNTER — CARE COORDINATION (OUTPATIENT)
Dept: CARE COORDINATION | Age: 70
End: 2020-10-19

## 2020-10-21 ENCOUNTER — OFFICE VISIT (OUTPATIENT)
Age: 70
End: 2020-10-21

## 2020-10-21 ENCOUNTER — VIRTUAL VISIT (OUTPATIENT)
Dept: INTERNAL MEDICINE | Age: 70
End: 2020-10-21
Payer: MEDICARE

## 2020-10-21 VITALS — TEMPERATURE: 96.8 F | HEART RATE: 82 BPM | OXYGEN SATURATION: 94 %

## 2020-10-21 PROCEDURE — 99442 PR PHYS/QHP TELEPHONE EVALUATION 11-20 MIN: CPT | Performed by: NURSE PRACTITIONER

## 2020-10-21 PROCEDURE — 99999 PR OFFICE/OUTPT VISIT,PROCEDURE ONLY: CPT | Performed by: NURSE PRACTITIONER

## 2020-10-21 RX ORDER — CIPROFLOXACIN 500 MG/1
500 TABLET, FILM COATED ORAL 2 TIMES DAILY
Qty: 14 TABLET | Refills: 0 | Status: SHIPPED | OUTPATIENT
Start: 2020-10-21 | End: 2020-10-28

## 2020-10-21 ASSESSMENT — ENCOUNTER SYMPTOMS
CHOKING: 0
SORE THROAT: 1
CONSTIPATION: 0
WHEEZING: 0
STRIDOR: 0
VOMITING: 0
NAUSEA: 0
DIARRHEA: 0
COLOR CHANGE: 0
ABDOMINAL DISTENTION: 0
COUGH: 1
EYE ITCHING: 0
SHORTNESS OF BREATH: 1
TROUBLE SWALLOWING: 0
ABDOMINAL PAIN: 0
BLOOD IN STOOL: 0
EYE DISCHARGE: 0

## 2020-10-21 NOTE — PROGRESS NOTES
[address] [facility]  [address] [place] [address] [place] [address]  Dept: [phone]  Dept Fax: [phone]: [phone]    Steve Moore is a 69 y.o. male who were are performing tele health communication  for his medical conditions/complaints as noted below. Currently, our state is under umbrella of national state of emergency and we are using alternative methods of taking care of the needs of our patients so they are not exposed in our office; The patient has consented to this form of communication  Steve Moore is c/alexys   Cough    THE patient is at home  Reba Salazar is at office   Others in attendance are wife     HPI:     HPI   1/  Cough and sore throat; He is really congested; his wife is sick also ; He is post op lumbar surgery; He has had no fever   2. Sarcoid; Oxygen dependent ;   He needs covid testing   Chief Complaint   Patient presents with    Cough       Past Medical History:   Diagnosis Date    Achalasia     going to [facility] for surgery in March    Acute pancreatitis     Anemia     Asthma     Atrial fibrillation ([facility]     h/o paroxysmal a-fib ? anesthsia induced    Benign colonic polyp     cscope 12/07 Dr Hailey Hudson adenomatous: 12/14 adenoma    Chest pain     Chronic kidney disease     Chronic pain syndrome     Depression     Diabetic peripheral neuropathy (HCC)     Extrinsic asthma     Hyperlipidemia     Hypertension     Idiopathic peripheral neuropathy     Lyme disease     Mediastinal lymphadenopathy     Microalbuminuria     Mixed hyperlipidemia     Paroxysmal A-fib (HCC)     S/P ablation of atrial fibrillation     4/16 A fib ablation , Dr Kayleen Richmond, [place]     Sarcoidosis, lung ([facility] [address] [facility]     restrictive lung impairment    Tick bite     Type 2 diabetes, controlled, with neuropathy ([facility] [address] [facility]       Past Surgical History:   Procedure Laterality Date    ANKLE FRACTURE SURGERY  april 14, 2015    ATRIAL ABLATION SURGERY  2015    Dr. Deepak Champion      Catheter Ablation A-fib    CHOLECYSTECTOMY      COLONOSCOPY  12/02/2014    [place]    CYSTOSCOPY Left 8/16/2019    CYSTOSCOPY; LEFT RETROGRADE PYELOGRAM; INSERTION LEFT URETERAL STENT performed by Lawrence Maguire MD at [address] [place] [address] Left 6/27/2018    LASER LITHOTRIPSY STONE MANIPULATION WITH DOUBLE J STENT PLACEMENT performed by Lawrence Maguire MD at [facility] 6/27/2018    CYSTOSCOPY URETEROSCOPY RETROGRADE PYELOGRAMS performed by Lawrence Maguire MD at [facility] 9/29/2020 9/23/2020 9/23/2020 9/23/2020 9/23/2020 1/33/0837   SYSTOLIC 323 [phone]   DIASTOLIC 70 69 66 75 68 69   Site - - - - - -   Position - - - - - -   Cuff Size - - - - - -   Pulse - - - - - -   Temp - - - - - -   Resp - - - - - -   SpO2 - 95 96 94 94 97   Weight 189 lb - - - - -   Height 5' 9\" - - - - -   Body mass index 27.91 kg/m2 - - - - -   Pain Level - - - - - -   Some recent data might be hidden       Family History   Problem Relation Age of Onset    Coronary Art Dis Father         AICD pacer age 68    Heart Attack Father     Cancer Sister         childhood chest tumor    Heart Failure Mother        Social History     Tobacco Use    Smoking status: Never Smoker    Smokeless tobacco: Never Used   Substance Use Topics    Alcohol use: No      Current Outpatient Medications   Medication Sig Dispense Refill    ciprofloxacin (CIPRO) 500 MG tablet Take 1 tablet by mouth 2 times daily for 7 days 14 tablet 0    JANUVIA 50 MG tablet Take 1 tablet by mouth once daily 30 tablet 5    donepezil (ARICEPT) 5 MG tablet TAKE 1 TABLET BY MOUTH ONCE DAILY AT NIGHT 90 tablet 1    clindamycin (CLEOCIN) 300 MG capsule TAKE 1 CAPSULE BY MOUTH THREE TIMES DAILY FOR 10 DAYS      tiZANidine (ZANAFLEX) 4 MG tablet Take 1 tablet by mouth every 8 hours as needed (back pain) 90 tablet 1    losartan (COZAAR) 100 MG tablet Take 1 tablet by mouth once daily 90 tablet 3    pregabalin (LYRICA) 75 MG capsule Take 1 capsule by mouth 3 times daily for 60 days. 90 capsule 1    pantoprazole (PROTONIX) 40 MG tablet Take 1 tablet by mouth 2 times daily (before meals) 180 tablet 1    sucralfate (CARAFATE) 1 GM tablet Take 1 tablet by mouth 4 times daily 120 tablet 3    spironolactone (ALDACTONE) 25 MG tablet 3 days a week (Patient taking differently: Prn) 9 tablet 1    XARELTO 20 MG TABS tablet TAKE 1 TABLET BY MOUTH ONCE DAILY WITH  EVENING  MEAL 90 tablet 1    albuterol sulfate  (90 Base) MCG/ACT inhaler INHALE 2 PUFFS BY MOUTH EVERY 6 HOURS AS NEEDED FOR WHEEZING 1 Inhaler 5    predniSONE (DELTASONE) 5 MG tablet Take 5 mg by mouth daily  6    traMADol (ULTRAM) 50 MG tablet Take 50 mg by mouth nightly.  levalbuterol (XOPENEX) 1.25 MG/3ML nebulizer solution Take 1 ampule by nebulization every 6 hours as needed for Wheezing      furosemide (LASIX) 20 MG tablet Take 1 tablet by mouth daily (Patient taking differently: Take 10 mg by mouth daily as needed (if greater than 3 pound weight gain daily) ) 30 tablet 3    EPINEPHrine (EPIPEN) 0.3 MG/0.3ML SOAJ injection Inject 0.3 mLs into the muscle as needed (Allergic Reaction) 2 each 1    Ascorbic Acid (VITAMIN C) 1000 MG tablet Take 1,000 mg by mouth daily      nitroGLYCERIN (NITROSTAT) 0.4 MG SL tablet Place 1 tablet under the tongue every 5 minutes as needed for Chest pain 25 tablet 3    azaTHIOprine (IMURAN) 50 MG tablet Take 50 mg by mouth daily       aspirin 81 MG tablet Take 81 mg by mouth daily. No current facility-administered medications for this visit. Allergies   Allergen Reactions    Bee Venom     Gabapentin      Causes blisters to head.      Penicillins     Toprol Xl [Metoprolol]      Leg pain        Health Maintenance   Topic Date Due    Shingles Vaccine (1 of 2) 09/15/1999    Diabetic retinal exam  05/09/2019    Diabetic foot exam  03/09/2021 (Originally 9/15/1960)    A1C test (Diabetic or Prediabetic)  12/23/2020    Lipid screen  09/23/2021    Potassium monitoring  09/24/2021    Creatinine monitoring  09/24/2021    Annual Wellness Visit (AWV)  09/30/2021    Colon cancer screen colonoscopy  08/01/2024    DTaP/Tdap/Td vaccine (2 - Td) 05/22/2030    Flu vaccine  Completed    Pneumococcal 65+ years Vaccine  Completed    Hepatitis A vaccine  Aged Out    Hib vaccine  Aged Out    Meningococcal (ACWY) vaccine  Aged Out    Hepatitis C screen  Discontinued       Lab Results   Component Value Date    LABA1C 10.8 (H) 09/23/2020     Lab Results   Component Value Date    PSA 3.6 03/17/2020    PSA 5.61 (H) 12/09/2019    PSA 5.3 (H) 12/09/2019     TSH   Date Value Ref Range Status   09/23/2020 5.190 (H) 0.270 - 4.200 uIU/mL Final   ]  Lab Results   Component Value Date     09/24/2020    K 4.6 09/24/2020    CL 96 (L) 09/24/2020    CO2 23 09/24/2020    BUN 29 (H) 09/24/2020    CREATININE 1.0 09/24/2020    GLUCOSE 366 (H) 09/24/2020    CALCIUM 9.6 09/24/2020    PROT 7.6 09/24/2020    LABALBU 4.5 09/24/2020    BILITOT 1.0 09/24/2020    ALKPHOS 84 09/24/2020    AST 14 09/24/2020    ALT 13 09/24/2020    LABGLOM >60 09/24/2020    GFRAA >59 09/24/2020     Lab Results   Component Value Date    CHOL 191 12/09/2019    CHOL 137 (L) 08/27/2019    CHOL 178 02/25/2019     Lab Results   Component Value Date    TRIG 242 (H) 12/09/2019    TRIG 184 (H) 08/27/2019    TRIG 166 (H) 02/25/2019     Lab Results   Component Value Date    HDL 47 (L) 09/23/2020    HDL 47 (L) 12/09/2019    HDL 30 (L) 08/27/2019     Lab Results   Component Value Date    LDLCALC 97 09/23/2020    LDLCALC 96 12/09/2019    LDLCALC 70 08/27/2019     Lab Results   Component Value Date     09/24/2020    K 4.6 09/24/2020    K 4.6 09/23/2020    CL 96 09/24/2020    CO2 23 09/24/2020    BUN 29 09/24/2020    CREATININE 1.0 09/24/2020 GLUCOSE 366 09/24/2020    CALCIUM 9.6 09/24/2020      Lab Results   Component Value Date    WBC 11.3 (H) 09/23/2020    HGB 14.7 09/23/2020    HCT 41.4 (L) 09/23/2020    MCV 80.1 09/23/2020     09/23/2020    LABLYMP 1.58 09/20/2015    LYMPHOPCT 13.7 (L) 09/23/2020    RBC 5.17 09/23/2020    MCH 28.4 09/23/2020    MCHC 35.5 09/23/2020    RDW 14.0 09/23/2020     Lab Results   Component Value Date    VITD25 16.0 (L) 09/23/2020       Subjective:      Review of Systems   Constitutional: Negative for fatigue, fever and unexpected weight change. HENT: Positive for sore throat. Negative for ear discharge, ear pain, mouth sores and trouble swallowing. Eyes: Negative for discharge, itching and visual disturbance. Respiratory: Positive for cough and shortness of breath. Negative for choking, wheezing and stridor. Cardiovascular: Negative for chest pain, palpitations and leg swelling. Gastrointestinal: Negative for abdominal distention, abdominal pain, blood in stool, constipation, diarrhea, nausea and vomiting. Endocrine: Negative for cold intolerance, polydipsia and polyuria. Genitourinary: Negative for difficulty urinating, dysuria, frequency and urgency. Musculoskeletal: Negative for arthralgias and gait problem. Skin: Negative for color change and rash. Allergic/Immunologic: Negative for food allergies and immunocompromised state. Neurological: Negative for dizziness, tremors, syncope, speech difficulty, weakness and headaches. Hematological: Negative for adenopathy. Does not bruise/bleed easily. Psychiatric/Behavioral: Negative for confusion and hallucinations. Objective:     Physical Exam   DGEVISITPE      GENERAL:   Afebrile alert no acute distress  Respiratory no use of accessory muscles no acute distress no audible wheezing  Mental status alert oriented adequate thought processes        Vital signs were not taken at this visit as no equipment was available;       There were no vitals taken for this visit. Assessment:       Diagnosis Orders   1. Pharyngitis, unspecified etiology         Plan:        Patient given educational materials - see patient instructions. Discussed use, benefit, and side effects of prescribed medications. Allpatient questions answered. Pt voiced understanding. Reviewed health maintenance. Instructed to continue current medications, diet and exercise. Patient agreed with treatment plan. Follow up as directed. MEDICATIONS:  Orders Placed This Encounter   Medications    ciprofloxacin (CIPRO) 500 MG tablet     Sig: Take 1 tablet by mouth 2 times daily for 7 days     Dispense:  14 tablet     Refill:  0         ORDERS:  No orders of the defined types were placed in this encounter. Follow-up:  Return for keep fu appt. Following the remote visit today we will call you when we are available to reschedule your follow up appt      PATIENT INSTRUCTIONS:  Patient Instructions   1. Cough   2. Sarcoid;    3.  Recent back surgery   We certainly dont want him coughing post op;   cipro 500 bid for 7 days   COVID testing     Electronically signed by NUNO Yanez on 10/21/2020 at 12:44 PM  televisit  minutes  We are communicating with telehealth for the 3 month fu for controlled substance      Pursuant to the emergency declaration under the [facility] waiver authority and the [employer] Act, this Virtual Visit was conducted, with patient's consent, to reduce the patient's risk of exposure to COVID-19 and provide continuity of care for an established patient.        EMRDragon/transcription disclaimer:  Much of this encounter note is electronic

## 2020-10-23 LAB — SARS-COV-2, NAA: NOT DETECTED

## 2020-11-09 ENCOUNTER — OFFICE VISIT (OUTPATIENT)
Dept: UROLOGY | Age: 70
End: 2020-11-09
Payer: MEDICARE

## 2020-11-09 DIAGNOSIS — R97.20 ELEVATED PSA: ICD-10-CM

## 2020-11-09 PROCEDURE — 1036F TOBACCO NON-USER: CPT | Performed by: UROLOGY

## 2020-11-09 PROCEDURE — 3017F COLORECTAL CA SCREEN DOC REV: CPT | Performed by: UROLOGY

## 2020-11-09 PROCEDURE — 99213 OFFICE O/P EST LOW 20 MIN: CPT | Performed by: UROLOGY

## 2020-11-09 PROCEDURE — 1123F ACP DISCUSS/DSCN MKR DOCD: CPT | Performed by: UROLOGY

## 2020-11-09 PROCEDURE — G8417 CALC BMI ABV UP PARAM F/U: HCPCS | Performed by: UROLOGY

## 2020-11-09 PROCEDURE — G8484 FLU IMMUNIZE NO ADMIN: HCPCS | Performed by: UROLOGY

## 2020-11-09 PROCEDURE — 4040F PNEUMOC VAC/ADMIN/RCVD: CPT | Performed by: UROLOGY

## 2020-11-09 PROCEDURE — G8427 DOCREV CUR MEDS BY ELIG CLIN: HCPCS | Performed by: UROLOGY

## 2020-11-09 RX ORDER — SULFAMETHOXAZOLE AND TRIMETHOPRIM 400; 80 MG/1; MG/1
TABLET ORAL
COMMUNITY
Start: 2020-11-08 | End: 2021-10-06 | Stop reason: ALTCHOICE

## 2020-11-09 RX ORDER — OXYCODONE AND ACETAMINOPHEN 10; 325 MG/1; MG/1
TABLET ORAL
COMMUNITY
Start: 2020-10-31 | End: 2020-12-29

## 2020-11-09 ASSESSMENT — ENCOUNTER SYMPTOMS
COUGH: 0
NAUSEA: 0
WHEEZING: 0
VOMITING: 0
BACK PAIN: 0
EYE PAIN: 0
SORE THROAT: 0

## 2020-11-09 NOTE — PROGRESS NOTES
performed by Edis Morrow MD at [address] [facility] Left 6/27/2018    LASER LITHOTRIPSY STONE MANIPULATION WITH DOUBLE J STENT PLACEMENT performed by Edis Morrow MD at [facility] Left 6/27/2018    CYSTOSCOPY URETEROSCOPY RETROGRADE PYELOGRAMS performed by Edis Morrow MD at [facility] SKIN CANCER EXCISION         Current Outpatient Medications   Medication Sig Dispense Refill    sulfamethoxazole-trimethoprim (BACTRIM;SEPTRA) 400-80 MG per tablet       oxyCODONE-acetaminophen (PERCOCET)  MG per tablet TAKE 1 TABLET BY MOUTH 4 TIMES DAILY      JANUVIA 50 MG tablet Take 1 tablet by mouth once daily 30 tablet 5    donepezil (ARICEPT) 5 MG tablet TAKE 1 TABLET BY MOUTH ONCE DAILY AT NIGHT 90 tablet 1    clindamycin (CLEOCIN) 300 MG capsule TAKE 1 CAPSULE BY MOUTH THREE TIMES DAILY FOR 10 DAYS      tiZANidine (ZANAFLEX) 4 MG tablet Take 1 tablet by mouth every 8 hours as needed (back pain) 90 tablet 1    losartan (COZAAR) 100 MG tablet Take 1 tablet by mouth once daily 90 tablet 3    pantoprazole (PROTONIX) 40 MG tablet Take 1 tablet by mouth 2 times daily (before meals) 180 tablet 1    sucralfate (CARAFATE) 1 GM tablet Take 1 tablet by mouth 4 times daily 120 tablet 3    spironolactone (ALDACTONE) 25 MG tablet 3 days a week (Patient taking differently: Prn) 9 tablet 1    XARELTO 20 MG TABS tablet TAKE 1 TABLET BY MOUTH ONCE DAILY WITH  EVENING  MEAL 90 tablet 1    albuterol sulfate  (90 Base) MCG/ACT inhaler INHALE 2 PUFFS BY MOUTH EVERY 6 HOURS AS NEEDED FOR WHEEZING 1 Inhaler 5    predniSONE (DELTASONE) 5 MG tablet Take 5 mg by mouth daily  6    traMADol (ULTRAM) 50 MG tablet Take 50 mg by mouth nightly.       levalbuterol (XOPENEX) 1.25 MG/3ML nebulizer solution Take 1 ampule by nebulization every 6 hours as needed for Wheezing      furosemide (LASIX) 20 MG tablet Take 1 tablet by mouth daily (Patient taking differently: Take 10 mg by mouth daily as needed (if greater than 3 pound weight gain daily) ) 30 tablet 3    EPINEPHrine (EPIPEN) 0.3 MG/0.3ML SOAJ injection Inject 0.3 mLs into the muscle as needed (Allergic Reaction) 2 each 1    Ascorbic Acid (VITAMIN C) 1000 MG tablet Take 1,000 mg by mouth daily      nitroGLYCERIN (NITROSTAT) 0.4 MG SL tablet Place 1 tablet under the tongue every 5 minutes as needed for Chest pain 25 tablet 3    azaTHIOprine (IMURAN) 50 MG tablet Take 50 mg by mouth daily       aspirin 81 MG tablet Take 81 mg by mouth daily.  pregabalin (LYRICA) 75 MG capsule Take 1 capsule by mouth 3 times daily for 60 days. 90 capsule 1     No current facility-administered medications for this visit. Allergies   Allergen Reactions    Bee Venom Anaphylaxis    Gabapentin Other (See Comments)     Causes blisters to head.      Penicillins Hives     \"Causes blisters to break out everywhere\"    Toprol Xl [Metoprolol]      Leg pain        Social History     Socioeconomic History    Marital status:      Spouse name: Ivan Urban    Number of children: None    Years of education: None    Highest education level: None   Occupational History    None   Social Needs    Financial resource strain: None    Food insecurity     Worry: None     Inability: None    Transportation needs     Medical: None     Non-medical: None   Tobacco Use    Smoking status: Never Smoker    Smokeless tobacco: Never Used   Substance and Sexual Activity    Alcohol use: No    Drug use: No    Sexual activity: None   Lifestyle    Physical activity     Days per week: 0 days     Minutes per session: 0 min    Stress: None   Relationships    Social connections     Talks on phone: None     Gets together: None     Attends [employer] service: None     Active member of club or organization: None     Attends meetings of clubs or organizations: None     Relationship status: None    Intimate partner violence Fear of current or ex partner: None     Emotionally abused: None     Physically abused: None     Forced sexual activity: None   Other Topics Concern    None   Social History Narrative    None       Family History   Problem Relation Age of Onset    Coronary Art Dis Father         AICD pacer age 68    Heart Attack Father     Cancer Sister         childhood chest tumor    Heart Failure Mother        REVIEW OF SYSTEMS:  Review of Systems   Constitutional: Negative for chills and fever. HENT: Negative for congestion and sore throat. Eyes: Negative for pain and visual disturbance. Respiratory: Negative for cough and wheezing. Cardiovascular: Negative for chest pain and palpitations. Gastrointestinal: Negative for nausea and vomiting. Endocrine: Negative for polyphagia and polyuria. Genitourinary: Negative for decreased urine volume, difficulty urinating, discharge, dysuria, enuresis, flank pain, frequency, genital sores, hematuria, penile pain, penile swelling, scrotal swelling, testicular pain and urgency. Musculoskeletal: Negative for back pain and neck pain. Skin: Negative for rash and wound. Allergic/Immunologic: Negative for environmental allergies and food allergies. Neurological: Negative for dizziness and headaches. Hematological: Negative for adenopathy. Does not bruise/bleed easily. Psychiatric/Behavioral: Negative for confusion and hallucinations. All other systems reviewed and are negative. PHYSICAL EXAM:  There were no vitals taken for this visit. Physical Exam  Constitutional:       General: He is not in acute distress. Appearance: Normal appearance. He is well-developed. HENT:      Head: Normocephalic and atraumatic. Nose: Nose normal.   Eyes:      General: No scleral icterus. Conjunctiva/sclera: Conjunctivae normal.      Pupils: Pupils are equal, round, and reactive to light. Neck:      Musculoskeletal: Normal range of motion and neck supple. Trachea: No tracheal deviation. Cardiovascular:      Rate and Rhythm: Normal rate and regular rhythm. Pulmonary:      Effort: Pulmonary effort is normal. No respiratory distress. Breath sounds: No stridor. Abdominal:      General: There is no distension. Palpations: Abdomen is soft. There is no mass. Tenderness: There is no abdominal tenderness. Musculoskeletal: Normal range of motion. General: No tenderness. Lymphadenopathy:      Cervical: No cervical adenopathy. Skin:     General: Skin is warm and dry. Findings: No erythema. Neurological:      Mental Status: He is alert and oriented to person, place, and time. Psychiatric:         Behavior: Behavior normal.         Judgment: Judgment normal.             DATA:    No results found for this visit on 11/09/20. Lab Results   Component Value Date    PSA 3.6 03/17/2020    PSA 5.61 (H) 12/09/2019    PSA 5.3 (H) 12/09/2019     Lab Results   Component Value Date    PSAFREEPCT 17 03/17/2020    PSAFREEPCT 13 12/09/2019         1. Elevated PSA  We will contact him with the results of the PSA done today. Should this be stable we will continue to monitor. His PSA was to be higher than it was back last year at 5.6 we may need to see him sooner with a PSA or schedule biopsy.  - PSA, Total and Free; Future      Orders Placed This Encounter   Procedures    PSA, Total and Free     Prior to next visit in 6 months     Standing Status:   Future     Standing Expiration Date:   11/9/2021        Return in about 6 months (around 5/9/2021) for PSA prior to vext visit. All information inputted into the note by the MA to include chief complaint, past medical history, past surgical history, medications, allergies, social and family history and review of systems has been reviewed and updated as needed by me.     EMR Dragon/transcription disclaimer: Much of this documentt is electronic  transcription/translation of spoken language to printed text. The  electronic translation of spoken language may be erroneous, or at times,  nonsensical words or phrases may be inadvertently transcribed.  Although I  have reviewed the document for such errors, some may still exist.

## 2020-11-12 LAB
PROSTATE SPECIFIC ANTIGEN FREE: 0.5 NG/ML
PROSTATE SPECIFIC ANTIGEN PERCENT FREE: 20 %
PROSTATE SPECIFIC ANTIGEN: 2.5 NG/ML (ref 0–4)

## 2020-11-19 RX ORDER — RIVAROXABAN 20 MG/1
TABLET, FILM COATED ORAL
Qty: 90 TABLET | Refills: 0 | Status: SHIPPED | OUTPATIENT
Start: 2020-11-19 | End: 2021-03-09

## 2020-11-19 NOTE — TELEPHONE ENCOUNTER
Moiz Muñiz called requesting a refill of the below medication which has been pended for you:     Requested Prescriptions     Pending Prescriptions Disp Refills    XARELTO 20 MG TABS tablet [Pharmacy Med Name: Xarelto 20 MG Oral Tablet] 90 tablet 0     Sig: TAKE 1 TABLET BY MOUTH ONCE DAILY WITH EVENING MEAL       Last Appointment Date: 10/21/2020  Next Appointment Date: 12/29/2020    Allergies   Allergen Reactions    Bee Venom Anaphylaxis    Gabapentin Other (See Comments)     Causes blisters to head.      Penicillins Hives     \"Causes blisters to break out everywhere\"    Toprol Xl [Metoprolol]      Leg pain

## 2020-12-03 ENCOUNTER — CLINICAL DOCUMENTATION (OUTPATIENT)
Dept: UROLOGY | Age: 70
End: 2020-12-03

## 2020-12-03 NOTE — PROGRESS NOTES
Pt came into office today and requesting we send his Office Notes and Summary of Treatment. Pt wants his records to be sent to [facility].

## 2020-12-23 DIAGNOSIS — J44.9 COPD, MODERATE (HCC): ICD-10-CM

## 2020-12-23 RX ORDER — ALBUTEROL SULFATE 90 UG/1
AEROSOL, METERED RESPIRATORY (INHALATION)
Qty: 18 G | Refills: 11 | Status: SHIPPED | OUTPATIENT
Start: 2020-12-23 | End: 2021-08-11

## 2020-12-23 NOTE — TELEPHONE ENCOUNTER
Pharmacy sent a request for refills on Ventolin. He was seen on 8/19/20 and has a return appointment scheduled for 8/25/21.  Request sent to Dr. Mitchell.

## 2020-12-28 DIAGNOSIS — E11.9 TYPE 2 DIABETES MELLITUS WITHOUT COMPLICATION, WITH LONG-TERM CURRENT USE OF INSULIN (HCC): ICD-10-CM

## 2020-12-28 DIAGNOSIS — E55.9 VITAMIN D DEFICIENCY: ICD-10-CM

## 2020-12-28 DIAGNOSIS — I10 ESSENTIAL HYPERTENSION: ICD-10-CM

## 2020-12-28 DIAGNOSIS — Z79.4 TYPE 2 DIABETES MELLITUS WITHOUT COMPLICATION, WITH LONG-TERM CURRENT USE OF INSULIN (HCC): ICD-10-CM

## 2020-12-28 DIAGNOSIS — E78.2 MIXED HYPERLIPIDEMIA: ICD-10-CM

## 2020-12-28 LAB
ALBUMIN SERPL-MCNC: 4.4 G/DL (ref 3.5–5.2)
ALP BLD-CCNC: 83 U/L (ref 40–130)
ALT SERPL-CCNC: 9 U/L (ref 5–41)
ANION GAP SERPL CALCULATED.3IONS-SCNC: 12 MMOL/L (ref 7–19)
AST SERPL-CCNC: 15 U/L (ref 5–40)
BACTERIA: NEGATIVE /HPF
BASOPHILS ABSOLUTE: 0 K/UL (ref 0–0.2)
BASOPHILS RELATIVE PERCENT: 0.5 % (ref 0–1)
BILIRUB SERPL-MCNC: 0.7 MG/DL (ref 0.2–1.2)
BILIRUBIN URINE: NEGATIVE
BLOOD, URINE: NEGATIVE
BUN BLDV-MCNC: 15 MG/DL (ref 8–23)
CALCIUM SERPL-MCNC: 9.9 MG/DL (ref 8.8–10.2)
CHLORIDE BLD-SCNC: 99 MMOL/L (ref 98–111)
CHOLESTEROL, TOTAL: 185 MG/DL (ref 160–199)
CLARITY: CLEAR
CO2: 28 MMOL/L (ref 22–29)
COLOR: ABNORMAL
CREAT SERPL-MCNC: 0.9 MG/DL (ref 0.5–1.2)
CRYSTALS, UA: ABNORMAL /HPF
EOSINOPHILS ABSOLUTE: 0.3 K/UL (ref 0–0.6)
EOSINOPHILS RELATIVE PERCENT: 5.2 % (ref 0–5)
EPITHELIAL CELLS, UA: 1 /HPF (ref 0–5)
GFR AFRICAN AMERICAN: >59
GFR NON-AFRICAN AMERICAN: >60
GLUCOSE BLD-MCNC: 186 MG/DL (ref 74–109)
GLUCOSE URINE: 250 MG/DL
HBA1C MFR BLD: 7.2 % (ref 4–6)
HCT VFR BLD CALC: 39.5 % (ref 42–52)
HDLC SERPL-MCNC: 49 MG/DL (ref 55–121)
HEMOGLOBIN: 13.3 G/DL (ref 14–18)
HYALINE CASTS: 3 /HPF (ref 0–8)
IMMATURE GRANULOCYTES #: 0 K/UL
KETONES, URINE: ABNORMAL MG/DL
LDL CHOLESTEROL CALCULATED: 108 MG/DL
LEUKOCYTE ESTERASE, URINE: ABNORMAL
LYMPHOCYTES ABSOLUTE: 1.4 K/UL (ref 1.1–4.5)
LYMPHOCYTES RELATIVE PERCENT: 23.7 % (ref 20–40)
MCH RBC QN AUTO: 28.1 PG (ref 27–31)
MCHC RBC AUTO-ENTMCNC: 33.7 G/DL (ref 33–37)
MCV RBC AUTO: 83.3 FL (ref 80–94)
MONOCYTES ABSOLUTE: 0.5 K/UL (ref 0–0.9)
MONOCYTES RELATIVE PERCENT: 9.2 % (ref 0–10)
NEUTROPHILS ABSOLUTE: 3.5 K/UL (ref 1.5–7.5)
NEUTROPHILS RELATIVE PERCENT: 60.7 % (ref 50–65)
NITRITE, URINE: NEGATIVE
PDW BLD-RTO: 14 % (ref 11.5–14.5)
PH UA: 5 (ref 5–8)
PLATELET # BLD: 166 K/UL (ref 130–400)
PMV BLD AUTO: 10.7 FL (ref 9.4–12.4)
POTASSIUM SERPL-SCNC: 4.1 MMOL/L (ref 3.5–5)
PROTEIN UA: 30 MG/DL
RBC # BLD: 4.74 M/UL (ref 4.7–6.1)
RBC UA: 2 /HPF (ref 0–4)
SODIUM BLD-SCNC: 139 MMOL/L (ref 136–145)
SPECIFIC GRAVITY UA: 1.02 (ref 1–1.03)
TOTAL PROTEIN: 7.8 G/DL (ref 6.6–8.7)
TRIGL SERPL-MCNC: 138 MG/DL (ref 0–149)
TSH SERPL DL<=0.05 MIU/L-ACNC: 3.44 UIU/ML (ref 0.27–4.2)
UROBILINOGEN, URINE: 1 E.U./DL
VITAMIN D 25-HYDROXY: 17.3 NG/ML
WBC # BLD: 5.7 K/UL (ref 4.8–10.8)
WBC UA: 5 /HPF (ref 0–5)

## 2020-12-29 ENCOUNTER — OFFICE VISIT (OUTPATIENT)
Dept: INTERNAL MEDICINE | Age: 70
End: 2020-12-29
Payer: COMMERCIAL

## 2020-12-29 ENCOUNTER — HOSPITAL ENCOUNTER (OUTPATIENT)
Dept: GENERAL RADIOLOGY | Age: 70
Discharge: HOME | End: 2020-12-29
Payer: MEDICARE

## 2020-12-29 VITALS
WEIGHT: 188 LBS | HEART RATE: 87 BPM | BODY MASS INDEX: 27.85 KG/M2 | SYSTOLIC BLOOD PRESSURE: 138 MMHG | DIASTOLIC BLOOD PRESSURE: 75 MMHG | HEIGHT: 69 IN

## 2020-12-29 PROCEDURE — G8427 DOCREV CUR MEDS BY ELIG CLIN: HCPCS | Performed by: NURSE PRACTITIONER

## 2020-12-29 PROCEDURE — 2022F DILAT RTA XM EVC RTNOPTHY: CPT | Performed by: NURSE PRACTITIONER

## 2020-12-29 PROCEDURE — 73140 X-RAY EXAM OF FINGER(S): CPT

## 2020-12-29 PROCEDURE — G8417 CALC BMI ABV UP PARAM F/U: HCPCS | Performed by: NURSE PRACTITIONER

## 2020-12-29 PROCEDURE — G8484 FLU IMMUNIZE NO ADMIN: HCPCS | Performed by: NURSE PRACTITIONER

## 2020-12-29 PROCEDURE — 1123F ACP DISCUSS/DSCN MKR DOCD: CPT | Performed by: NURSE PRACTITIONER

## 2020-12-29 PROCEDURE — 99214 OFFICE O/P EST MOD 30 MIN: CPT | Performed by: NURSE PRACTITIONER

## 2020-12-29 PROCEDURE — 3051F HG A1C>EQUAL 7.0%<8.0%: CPT | Performed by: NURSE PRACTITIONER

## 2020-12-29 PROCEDURE — 3017F COLORECTAL CA SCREEN DOC REV: CPT | Performed by: NURSE PRACTITIONER

## 2020-12-29 PROCEDURE — 4040F PNEUMOC VAC/ADMIN/RCVD: CPT | Performed by: NURSE PRACTITIONER

## 2020-12-29 PROCEDURE — 1036F TOBACCO NON-USER: CPT | Performed by: NURSE PRACTITIONER

## 2020-12-29 ASSESSMENT — ENCOUNTER SYMPTOMS
EYE DISCHARGE: 0
COUGH: 0
ABDOMINAL DISTENTION: 0
WHEEZING: 0
COLOR CHANGE: 0
STRIDOR: 0
TROUBLE SWALLOWING: 0
VOMITING: 0
BLOOD IN STOOL: 0
ABDOMINAL PAIN: 0
NAUSEA: 0
EYE ITCHING: 0
CONSTIPATION: 0
DIARRHEA: 0
SORE THROAT: 0
CHOKING: 0
SHORTNESS OF BREATH: 1

## 2020-12-29 ASSESSMENT — PATIENT HEALTH QUESTIONNAIRE - PHQ9
2. FEELING DOWN, DEPRESSED OR HOPELESS: 0
SUM OF ALL RESPONSES TO PHQ QUESTIONS 1-9: 0
SUM OF ALL RESPONSES TO PHQ9 QUESTIONS 1 & 2: 0
SUM OF ALL RESPONSES TO PHQ QUESTIONS 1-9: 0
1. LITTLE INTEREST OR PLEASURE IN DOING THINGS: 0
SUM OF ALL RESPONSES TO PHQ QUESTIONS 1-9: 0

## 2020-12-29 NOTE — PROGRESS NOTES
[facility] INTERNAL MEDICINE  [id number] [facility] [phone] [address]  Dept: [phone]  Dept Fax: [phone]: [phone]    Hortensia Mullen is a 69 y.o. male who presents today for his medical conditions/complaints as noted below. Hortensia Mullen is c/alexys Hypertension (Patient is here for routine follow up visit and review of labs done 12/28/2020.) and Finger Injury (Patient states having discharge from finger that was cut off. He has been released from doctor.)        HPI:     HPI   1. Hypertension reading is good today he is on losartan 100 mg daily he only takes Lasix occasionally  2. Face to face for his GARNETT   3. Peripheral neuropathy has been taking lyrica only at bedtime but it bothers him 24/7; He has pain burning feels like he is walking on nails/needles; This goes up to his calf; He also has this in hands and wrists ; t seems that this is worsening he is on Lyrica but only taking 75 mg at night. There is a concern kidney with increased dose his energy is just that his kidney function parameters are only we will see you anything that would worsen     4. CKD:  Sees Dr Chelsea Cevallos, he has turned around renal function from stage 3 to normal with steroids and imuran   5. Sarcoidosis ; Stable with Dr Ronnie Mojiac;  Much improved essentially looks better today than of seeing him along. 6.  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 Almost every morning when he gets out of the shower. He has been on some antibiotics intermittently but it does not seem to be helping. He said that he and Dr. Cintron did tell him that  At some point began.   7.  Type 2 diabetes mellitus his blood A1c is 7.3  He is still on prednisone  So that does bump  His blood sugar   Chief Complaint   Patient presents with    Hypertension  EPINEPHrine (EPIPEN) 0.3 MG/0.3ML SOAJ injection Inject 0.3 mLs into the muscle as needed (Allergic Reaction) 2 each 1    Ascorbic Acid (VITAMIN C) 1000 MG tablet Take 1,000 mg by mouth daily      nitroGLYCERIN (NITROSTAT) 0.4 MG SL tablet Place 1 tablet under the tongue every 5 minutes as needed for Chest pain 25 tablet 3    azaTHIOprine (IMURAN) 50 MG tablet Take 50 mg by mouth daily       aspirin 81 MG tablet Take 81 mg by mouth daily. No current facility-administered medications for this visit. Allergies   Allergen Reactions    Bee Venom Anaphylaxis    Gabapentin Other (See Comments)     Causes blisters to head.      Penicillins Hives     \"Causes blisters to break out everywhere\"    Toprol Xl [Metoprolol]      Leg pain        Health Maintenance   Topic Date Due    Diabetic foot exam  03/09/2021 (Originally 9/15/1960)    Diabetic retinal exam  12/29/2021 (Originally 5/9/2019)    Shingles Vaccine (1 of 2) 12/29/2021 (Originally 9/15/1999)    Annual Wellness Visit (AWV)  09/30/2021    A1C test (Diabetic or Prediabetic)  12/28/2021    Lipid screen  12/28/2021    Potassium monitoring  12/28/2021    Creatinine monitoring  12/28/2021    Colon cancer screen colonoscopy  08/01/2024    DTaP/Tdap/Td vaccine (2 - Td) 05/22/2030    Flu vaccine  Completed    Pneumococcal 65+ years Vaccine  Completed    Hepatitis A vaccine  Aged Out    Hib vaccine  Aged Out    Meningococcal (ACWY) vaccine  Aged Out    Hepatitis C screen  Discontinued       Lab Results   Component Value Date    LABA1C 7.2 (H) 12/28/2020     Lab Results   Component Value Date    PSA 2.5 11/09/2020    PSA 3.6 03/17/2020    PSA 5.61 (H) 12/09/2019    PSA 5.3 (H) 12/09/2019     TSH   Date Value Ref Range Status   12/28/2020 3.440 0.270 - 4.200 uIU/mL Final   ]  Lab Results   Component Value Date     12/28/2020    K 4.1 12/28/2020    CL 99 12/28/2020    CO2 28 12/28/2020    BUN 15 12/28/2020    CREATININE 0.9 12/28/2020 GLUCOSE 186 (H) 12/28/2020    CALCIUM 9.9 12/28/2020    PROT 7.8 12/28/2020    LABALBU 4.4 12/28/2020    BILITOT 0.7 12/28/2020    ALKPHOS 83 12/28/2020    AST 15 12/28/2020    ALT 9 12/28/2020    LABGLOM >60 12/28/2020    GFRAA >59 12/28/2020     Lab Results   Component Value Date    CHOL 185 12/28/2020    CHOL 191 12/09/2019    CHOL 137 (L) 08/27/2019     Lab Results   Component Value Date    TRIG 138 12/28/2020    TRIG 242 (H) 12/09/2019    TRIG 184 (H) 08/27/2019     Lab Results   Component Value Date    HDL 49 (L) 12/28/2020    HDL 47 (L) 09/23/2020    HDL 47 (L) 12/09/2019     Lab Results   Component Value Date    LDLCALC 108 12/28/2020    LDLCALC 97 09/23/2020    LDLCALC 96 12/09/2019     Lab Results   Component Value Date     12/28/2020    K 4.1 12/28/2020    K 4.6 09/23/2020    CL 99 12/28/2020    CO2 28 12/28/2020    BUN 15 12/28/2020    CREATININE 0.9 12/28/2020    GLUCOSE 186 12/28/2020    CALCIUM 9.9 12/28/2020      Lab Results   Component Value Date    WBC 5.7 12/28/2020    HGB 13.3 (L) 12/28/2020    HCT 39.5 (L) 12/28/2020    MCV 83.3 12/28/2020     12/28/2020    LABLYMP 1.58 09/20/2015    LYMPHOPCT 23.7 12/28/2020    RBC 4.74 12/28/2020    MCH 28.1 12/28/2020    MCHC 33.7 12/28/2020    RDW 14.0 12/28/2020     Lab Results   Component Value Date    VITD25 17.3 (L) 12/28/2020       Subjective:      Review of Systems   Constitutional: Negative for fatigue, fever and unexpected weight change. HENT: Negative for ear discharge, ear pain, mouth sores, sore throat and trouble swallowing. Eyes: Negative for discharge, itching and visual disturbance. Respiratory: Positive for shortness of breath. Negative for cough, choking, wheezing and stridor. Cardiovascular: Negative for chest pain, palpitations and leg swelling. Gastrointestinal: Negative for abdominal distention, abdominal pain, blood in stool, constipation, diarrhea, nausea and vomiting. Mental Status: He is alert and oriented to person, place, and time. Cranial Nerves: No cranial nerve deficit. Coordination: Coordination normal.      Deep Tendon Reflexes: Reflexes are normal and symmetric. Reflexes normal.   Psychiatric:         Mood and Affect: Mood is not depressed. Behavior: Behavior normal.         Thought Content: Thought content normal.         Judgment: Judgment normal.       /75   Pulse 87   Ht 5' 9\" (1.753 m)   Wt 188 lb (85.3 kg)   BMI 27.76 kg/m²     Assessment:       Diagnosis Orders   1. Swelling of left hand  Culture, Wound    XR FINGER LEFT (MIN 2 VIEWS)   2. Drainage from wound  Culture, Wound    XR FINGER LEFT (MIN 2 VIEWS)   3. Traumatic amputation of left ring finger  Culture, Wound    XR FINGER LEFT (MIN 2 VIEWS)   4. Essential hypertension  CBC Auto Differential    Comprehensive Metabolic Panel    Urinalysis Reflex to Culture    TSH without Reflex   5. Sarcoidosis, lung ([facility] [address]     6. Type 2 diabetes mellitus without complication, with long-term current use of insulin ([facility])  Hemoglobin A1C   7. Mixed hyperlipidemia  Lipid Panel   8. Vitamin D deficiency  Vitamin D 25 Hydroxy     Labs reviewedfrom 12/28/2020    Plan:        Patient given educational materials - see patient instructions. Discussed use, benefit, and side effects of prescribed medications. Allpatient questions answered. Pt voiced understanding. Reviewed health maintenance. Instructed to continue current medications, diet and exercise. Patient agreed with treatment plan. Follow up as directed. MEDICATIONS:  No orders of the defined types were placed in this encounter.         ORDERS:  Orders Placed This Encounter   Procedures    Culture, Wound    XR FINGER LEFT (MIN 2 VIEWS)    CBC Auto Differential    Comprehensive Metabolic Panel    Hemoglobin A1C    Lipid Panel    Vitamin D 25 Hydroxy    Urinalysis Reflex to Culture    TSH without Reflex       Follow-up: Return in about 3 months (around 3/29/2021) for have labs done prior to appt. PATIENT INSTRUCTIONS:  Patient Instructions   1. Hypertension; The current medical regimen is effective;  continue present plan and medications. 2.  Peripheral neuropathy  We will talk to DR Kam Kimble regarding renal function   3. CKD:  Stable   4. Sarcoidosis; Stable for now;    5. Type II DM; Stable for now;    6. Swelling and drainage left hand ring finger      Electronically signed by NUNO Betancur on 12/29/2020 at 12:38 PM    EMRDragon/transcription disclaimer:  Much of this encounter note is electronic transcription/translation of spoken language to printed texts. The electronic translation of spoken language may be erroneous, or at times,nonsensical words or phrases may be inadvertently transcribed.   Although I have reviewed the note for such errors, some may still exist.

## 2020-12-29 NOTE — PATIENT INSTRUCTIONS
1.  Hypertension; The current medical regimen is effective;  continue present plan and medications. 2.  Peripheral neuropathy  We will talk to DR Veronica Gamboa regarding renal function   3. CKD:  Stable   4. Sarcoidosis; Stable for now;    5. Type II DM; Stable for now;    6.   Swelling and drainage left hand ring finger

## 2020-12-30 DIAGNOSIS — M79.89 SWELLING OF LEFT HAND: ICD-10-CM

## 2020-12-30 DIAGNOSIS — L24.A9 DRAINAGE FROM WOUND: ICD-10-CM

## 2020-12-30 DIAGNOSIS — S68.115A TRAUMATIC AMPUTATION OF LEFT RING FINGER: ICD-10-CM

## 2021-01-03 LAB
ANAEROBIC CULTURE: ABNORMAL
GRAM STAIN RESULT: ABNORMAL
ORGANISM: ABNORMAL
ORGANISM: ABNORMAL
WOUND/ABSCESS: ABNORMAL
WOUND/ABSCESS: ABNORMAL

## 2021-01-04 LAB
ALBUMIN SERPL-MCNC: 4.4 G/DL (ref 3.5–5.2)
ALP BLD-CCNC: 83 U/L (ref 40–130)
ALT SERPL-CCNC: 9 U/L (ref 5–41)
ANION GAP SERPL CALCULATED.3IONS-SCNC: 12 MMOL/L (ref 7–19)
AST SERPL-CCNC: 14 U/L (ref 5–40)
BASOPHILS ABSOLUTE: 0 K/UL (ref 0–0.2)
BASOPHILS RELATIVE PERCENT: 0.6 % (ref 0–1)
BILIRUB SERPL-MCNC: 0.4 MG/DL (ref 0.2–1.2)
BUN BLDV-MCNC: 13 MG/DL (ref 8–23)
CALCIUM SERPL-MCNC: 10.2 MG/DL (ref 8.8–10.2)
CHLORIDE BLD-SCNC: 99 MMOL/L (ref 98–111)
CO2: 28 MMOL/L (ref 22–29)
CREAT SERPL-MCNC: 0.9 MG/DL (ref 0.5–1.2)
CREATININE URINE: 62.3 MG/DL (ref 4.2–622)
EOSINOPHILS ABSOLUTE: 0.1 K/UL (ref 0–0.6)
EOSINOPHILS RELATIVE PERCENT: 1.8 % (ref 0–5)
GFR AFRICAN AMERICAN: >59
GFR NON-AFRICAN AMERICAN: >60
GLUCOSE BLD-MCNC: 344 MG/DL (ref 74–109)
HCT VFR BLD CALC: 37.9 % (ref 42–52)
HEMOGLOBIN: 12.5 G/DL (ref 14–18)
IMMATURE GRANULOCYTES #: 0.1 K/UL
LYMPHOCYTES ABSOLUTE: 1.2 K/UL (ref 1.1–4.5)
LYMPHOCYTES RELATIVE PERCENT: 17.6 % (ref 20–40)
MCH RBC QN AUTO: 27.5 PG (ref 27–31)
MCHC RBC AUTO-ENTMCNC: 33 G/DL (ref 33–37)
MCV RBC AUTO: 83.3 FL (ref 80–94)
MONOCYTES ABSOLUTE: 0.5 K/UL (ref 0–0.9)
MONOCYTES RELATIVE PERCENT: 7.2 % (ref 0–10)
NEUTROPHILS ABSOLUTE: 4.8 K/UL (ref 1.5–7.5)
NEUTROPHILS RELATIVE PERCENT: 72.1 % (ref 50–65)
PDW BLD-RTO: 13.8 % (ref 11.5–14.5)
PLATELET # BLD: 181 K/UL (ref 130–400)
PMV BLD AUTO: 10.5 FL (ref 9.4–12.4)
POTASSIUM SERPL-SCNC: 4.6 MMOL/L (ref 3.5–5)
PROTEIN PROTEIN: 7 MG/DL (ref 15–45)
RBC # BLD: 4.55 M/UL (ref 4.7–6.1)
SODIUM BLD-SCNC: 139 MMOL/L (ref 136–145)
TOTAL PROTEIN: 7.8 G/DL (ref 6.6–8.7)
WBC # BLD: 6.7 K/UL (ref 4.8–10.8)

## 2021-01-04 RX ORDER — SULFAMETHOXAZOLE AND TRIMETHOPRIM 800; 160 MG/1; MG/1
1 TABLET ORAL 2 TIMES DAILY
Qty: 14 TABLET | Refills: 0 | Status: SHIPPED | OUTPATIENT
Start: 2021-01-04 | End: 2021-03-04 | Stop reason: SDUPTHER

## 2021-01-18 DIAGNOSIS — G62.9 PERIPHERAL NERVE DISEASE: ICD-10-CM

## 2021-01-18 RX ORDER — PREGABALIN 75 MG/1
75 CAPSULE ORAL 3 TIMES DAILY
Qty: 90 CAPSULE | Refills: 0 | Status: SHIPPED | OUTPATIENT
Start: 2021-01-18 | End: 2021-03-15

## 2021-01-26 ENCOUNTER — OFFICE VISIT (OUTPATIENT)
Dept: CARDIOLOGY CLINIC | Age: 71
End: 2021-01-26
Payer: MEDICARE

## 2021-01-26 VITALS
DIASTOLIC BLOOD PRESSURE: 76 MMHG | BODY MASS INDEX: 28.44 KG/M2 | WEIGHT: 192 LBS | HEART RATE: 92 BPM | SYSTOLIC BLOOD PRESSURE: 122 MMHG | HEIGHT: 69 IN

## 2021-01-26 DIAGNOSIS — I48.0 PAROXYSMAL ATRIAL FIBRILLATION (HCC): Primary | ICD-10-CM

## 2021-01-26 PROCEDURE — 93242 EXT ECG>48HR<7D RECORDING: CPT | Performed by: INTERNAL MEDICINE

## 2021-01-26 PROCEDURE — 4040F PNEUMOC VAC/ADMIN/RCVD: CPT | Performed by: INTERNAL MEDICINE

## 2021-01-26 PROCEDURE — G8484 FLU IMMUNIZE NO ADMIN: HCPCS | Performed by: INTERNAL MEDICINE

## 2021-01-26 PROCEDURE — G8417 CALC BMI ABV UP PARAM F/U: HCPCS | Performed by: INTERNAL MEDICINE

## 2021-01-26 PROCEDURE — 1036F TOBACCO NON-USER: CPT | Performed by: INTERNAL MEDICINE

## 2021-01-26 PROCEDURE — 1123F ACP DISCUSS/DSCN MKR DOCD: CPT | Performed by: INTERNAL MEDICINE

## 2021-01-26 PROCEDURE — G8427 DOCREV CUR MEDS BY ELIG CLIN: HCPCS | Performed by: INTERNAL MEDICINE

## 2021-01-26 PROCEDURE — 3017F COLORECTAL CA SCREEN DOC REV: CPT | Performed by: INTERNAL MEDICINE

## 2021-01-26 PROCEDURE — 99213 OFFICE O/P EST LOW 20 MIN: CPT | Performed by: INTERNAL MEDICINE

## 2021-01-26 PROCEDURE — 93000 ELECTROCARDIOGRAM COMPLETE: CPT | Performed by: INTERNAL MEDICINE

## 2021-01-26 NOTE — PROGRESS NOTES
HISTORY  50-year-old gentleman with history of dyslipidemia, type 2 diabetes, sarcoidosis, histoplasmosis, hypertension, and paroxysmal atrial fibrillation returns for routine follow-up. History of remote ablation with subsequent coverage with Xarelto without symptomatic or documented recurrence of atrial fibrillation. Blood pressure and lipids have been addressed in ongoing fashion and reasonably controlled with most recent lipid profile from December revealing , HDL 49, triglycerides 138. Exercises intermittently without change in exercise tolerance, unusual dyspnea, or chest discomfort. Has been very careful in his response to the pandemic. PHYSICAL EXAM  On exam he carries 192 pounds in a 5 foot 9 inch frame. Pressure is 122/76 a pulse of 92. Dany complected middle-aged gentleman with a normal male balding pattern. EOMs full, sclerae and conjunctiva normal. PERRLA. Mask in place. Trachea midline with no neck masses. Assessment of internal jugular veins reveals no elevation of central venous pressure at 45 degrees. Carotid pulses normal without delay or bruit. Thyroid normal to palpation. Chest exam reveals normal respiratory effort, with scattered dry rales. No skin lesions seen. PMI normal. S1, S2 normal with frequent extrasystoles. Normal bowel sounds without palpable mass or bruit. No clubbing or acrocyanosis. No significant lower extremity edema or signs of venous insufficiency. General motor strength appears to be within normal limits. Normal range of motion with normal gait. Alert, oriented x 3, memory and cognition normal as reflected by history and conversation. EKG reveals a sinus rhythm with frequent PACs. ASSESSMENT/PLAN:   1. Atrial irritability -we will place monitor for a week to exclude the possibility of recurrent atrial fibrillation which would justify suppressive therapy.   2.  Dyslipidemia -values acceptable in the absence of demonstrable disease

## 2021-02-18 ENCOUNTER — CARE COORDINATION (OUTPATIENT)
Dept: CARE COORDINATION | Age: 71
End: 2021-02-18

## 2021-02-18 ASSESSMENT — ENCOUNTER SYMPTOMS: DYSPNEA ASSOCIATED WITH: EXERTION

## 2021-02-18 NOTE — CARE COORDINATION
Ambulatory Care Coordination Note  2/18/2021  CM Risk Score: 12  Charlson 10 Year Mortality Risk Score: 100%     ACC: Heriberto Alamo RN    Summary Note: ACM spoke with patient's wife via telephone for [employer] outreach. Wife has been working with Benny Cao RN for Case Management services. ACM introduced myself to wife and advised her that Mrs. Diaz has moved and I will be taking over her 's Case Management needs. Wife states that patient is doing well. Patient is currently taking Prednisone which has been giving him higher glucose readings. Patient checks his glucose once a day and his glucose today is 160 fasting. Wife denies any glucose readings below 70 and denies any symptoms of hypoglycemia or hyperglycemia. Wife states that patient's appetite has been good. She states that patient has not been watching what he eats like he should. ACM encouraged a well balanced diet, lowering carbohydrates, and watching concentrated sweets. Wife states patient has been staying active and sometimes becomes short of breath which resolves with rest.  Wife states that patient has not had an increase in shortness of breath, increased cough, or changes in sputum. Patient uses his nebulizer and inhaler once daily. Wife states no recent diagnosis of pneumonia. Wife states that patient has no symptoms causing him concern at this time. ACM provided contact information in the event that any new needs arise. PLAN:  Patient will continue to monitor glucose levels daily and will review with ACM during next call. Wife/patient will monitor symptoms and notify PCP for any new or worsening symptoms. ACM will follow up with patient in 2-3 weeks to assess any needs. Harsh García RN  Ambulatory Care Manager            Care Coordination Interventions    Program Enrollment: Complex Care  Referral from Primary Care Provider: No  Suggested Interventions and Community Resources  Diabetes Education:  In Process (Comment: 2/18/2021 Encouraged healthy diet choices, staying active, and monitoring glucose daily.)  Zone Management Tools: In Process (Comment: 2/18/2021 Wife states no increase in cough, shortness of breath, or changes in sputum.   )         Goals      Conditions and Symptoms      I will schedule office visits, as directed by my provider. I will keep my appointment or reschedule if I have to cancel. I will notify my provider of any barriers to my plan of care. I will follow my Zone Management tool to seek urgent or emergent care. I will notify my provider of any symptoms that indicate a worsening of my condition. Barriers: lack of support and lack of education  Plan for overcoming my barriers: Regular follow-up from [facility], education regarding Zone mgmt  Confidence: 10/10  Anticipated Goal Completion Date: 03/18/2021         Self Monitoring      Self-Monitored Blood Glucose - I will check my blood sugar Fasting blood sugar and blood sugars ac & HS  I will notify my provider of any trends of increasing or decreasing blood sugars over a 1 month period. Other Self-Monitoring - I will monitor my O2 sat - Daily and Other: PRN   will report O2 sat <90% to my provider. Patient Reported Blood Glucose No flowsheet data found. Barriers: stress  Plan for overcoming my barriers: Support from AC, Regular f/u  Confidence: 10/10  Anticipated Goal Completion Date: 3/18/2021                Prior to Admission medications    Medication Sig Start Date End Date Taking? Authorizing Provider   pregabalin (LYRICA) 75 MG capsule Take 1 capsule by mouth 3 times daily for 30 days. Patient taking differently: Take 75 mg by mouth daily.   1/18/21 2/17/21  NUNO Smith   XARELTO 20 MG TABS tablet TAKE 1 TABLET BY MOUTH ONCE DAILY WITH EVENING MEAL 11/19/20   NUNO Smith   sulfamethoxazole-trimethoprim (BACTRIM;SEPTRA) 400-80 MG per tablet three times a week  11/8/20   Historical Provider, MD HERMOSILLO 50 MG tablet Take 1 tablet by mouth once daily 9/9/20   Laurie Hollow, APRN   donepezil (ARICEPT) 5 MG tablet TAKE 1 TABLET BY MOUTH ONCE DAILY AT NIGHT 8/18/20   [facility]   losartan (COZAAR) 100 MG tablet Take 1 tablet by mouth once daily 5/19/20   Laurie Hollow, APRN   spironolactone (ALDACTONE) 25 MG tablet 3 days a week  Patient taking differently: Prn 2/4/20   Laurie Hollow, APRN   albuterol sulfate  (90 Base) MCG/ACT inhaler INHALE 2 PUFFS BY MOUTH EVERY 6 HOURS AS NEEDED FOR WHEEZING 11/25/19   [facility], APRMARCEL   predniSONE (DELTASONE) 5 MG tablet Take 5 mg by mouth daily 9/10/19   Historical Provider, MD   levalbuterol Orville Fernandez) 1.25 MG/3ML nebulizer solution Take 1 ampule by nebulization every 6 hours as needed for Wheezing    Historical Provider, MD   furosemide (LASIX) 20 MG tablet Take 1 tablet by mouth daily  Patient taking differently: Take 10 mg by mouth daily as needed (if greater than 3 pound weight gain daily)  8/22/19   NUNO Hager   EPINEPHrine (EPIPEN) 0.3 MG/0.3ML SOAJ injection Inject 0.3 mLs into the muscle as needed (Allergic Reaction) 6/3/19   [facility], APRMARCEL   nitroGLYCERIN (NITROSTAT) 0.4 MG SL tablet Place 1 tablet under the tongue every 5 minutes as needed for Chest pain 5/21/19   Lexi Moralez MD   azaTHIOprine (IMURAN) 50 MG tablet Take 50 mg by mouth daily  2/15/19   Historical Provider, MD   aspirin 81 MG tablet Take 81 mg by mouth daily. Historical Provider, MD       Future Appointments   Date Time Provider Sanford Carter   2/23/2021  9:15 AM NUNO Williamson NP [facility]   3/29/2021 10:30 AM NUNO Easton [facility]   5/20/2021 10:15 AM MD MARCEL Gastelum [facility]     ,   Diabetes Assessment    Meal Planning: None   How often do you test your blood sugar?: Meals, Bedtime   Do you have barriers with adherence to non-pharmacologic self-management interventions?  (Nutrition/Exercise/Self-Monitoring): Yes   Have you ever had to go to the ED for symptoms of low blood sugar?: No       Do you have hyperglycemia symptoms?: No   Do you have hypoglycemia symptoms?: No   Last Blood Sugar Value: 160   Blood Sugar Monitoring Regimen: Once a Day   Blood Sugar Trends: No Change       and   COPD Assessment    Does the patient understand envrionmental exposure?: Yes  Is the patient able to verbalize Rescue vs. Long Acting medications?: Yes  Does the patient have a nebulizer?: Yes  Does the patient use a space with inhaled medications?: No            Symptoms:  None: Yes      Symptom course: stable  Breathlessness: exertion  Increase use of rapid acting/rescue inhaled medications?: No  Change in chronic cough?: No/At Baseline  Change in sputum?: No/At Baseline  Sputum characteristics: White  Have you had a recent diagnosis of pneumonia either by PCP or at a hospital?: No

## 2021-02-23 ENCOUNTER — OFFICE VISIT (OUTPATIENT)
Dept: CARDIOLOGY CLINIC | Age: 71
End: 2021-02-23
Payer: MEDICARE

## 2021-02-23 VITALS
DIASTOLIC BLOOD PRESSURE: 70 MMHG | HEIGHT: 69 IN | SYSTOLIC BLOOD PRESSURE: 122 MMHG | HEART RATE: 85 BPM | BODY MASS INDEX: 28.44 KG/M2 | OXYGEN SATURATION: 98 % | WEIGHT: 192 LBS

## 2021-02-23 DIAGNOSIS — E78.5 HYPERLIPIDEMIA, UNSPECIFIED HYPERLIPIDEMIA TYPE: ICD-10-CM

## 2021-02-23 DIAGNOSIS — I10 ESSENTIAL HYPERTENSION: ICD-10-CM

## 2021-02-23 DIAGNOSIS — I48.0 PAF (PAROXYSMAL ATRIAL FIBRILLATION) (HCC): Primary | ICD-10-CM

## 2021-02-23 PROCEDURE — 3017F COLORECTAL CA SCREEN DOC REV: CPT | Performed by: NURSE PRACTITIONER

## 2021-02-23 PROCEDURE — G8417 CALC BMI ABV UP PARAM F/U: HCPCS | Performed by: NURSE PRACTITIONER

## 2021-02-23 PROCEDURE — G8484 FLU IMMUNIZE NO ADMIN: HCPCS | Performed by: NURSE PRACTITIONER

## 2021-02-23 PROCEDURE — 99214 OFFICE O/P EST MOD 30 MIN: CPT | Performed by: NURSE PRACTITIONER

## 2021-02-23 PROCEDURE — 4040F PNEUMOC VAC/ADMIN/RCVD: CPT | Performed by: NURSE PRACTITIONER

## 2021-02-23 PROCEDURE — G8427 DOCREV CUR MEDS BY ELIG CLIN: HCPCS | Performed by: NURSE PRACTITIONER

## 2021-02-23 PROCEDURE — 1123F ACP DISCUSS/DSCN MKR DOCD: CPT | Performed by: NURSE PRACTITIONER

## 2021-02-23 PROCEDURE — 1036F TOBACCO NON-USER: CPT | Performed by: NURSE PRACTITIONER

## 2021-02-23 ASSESSMENT — ENCOUNTER SYMPTOMS
SHORTNESS OF BREATH: 0
GASTROINTESTINAL NEGATIVE: 1
WHEEZING: 0
CHEST TIGHTNESS: 0
COUGH: 0
SORE THROAT: 0

## 2021-02-23 NOTE — PROGRESS NOTES
[id number] [facility] Cardiology   Established Patient Office Visit  [address]  [address] [address] [facility]  [phone]        OFFICE VISIT:  2021    Bob Anderson - : 1950    Reason For Visit:  Bobby Gonzalez is a 69 y.o. male who is here for Follow-up (Zio results. ), Hypertension, and Atrial Fibrillation    1. PAF (paroxysmal atrial fibrillation) ([address]    2. Essential hypertension    3. Hyperlipidemia, unspecified hyperlipidemia type        Patient with a history of dyslipidemia, diabetes, sarcoidosis, histoplasmosis, hypertension and paroxysmal atrial fib. He is a patient of Dr. Shaun Franklin. Patient was last seen in the office on 2021. ZIO Patch was placed to evaluate for any recurrence of atrial fib. ZIO Patch showed: This rhythm with a minimum heart rate of 57 bpm, maximum 127 bpm.  2 SVT runs fastest being 12 beats at a maximum rate of 158 bpm.  Frequent SVE's 5.4%. Patient presents to clinic today for follow-up of the aforementioned ZIO Patch. Denies any chest pain, pressure or tightness. There is no shortness of breath, orthopnea or PND. Patient denies any lightheadedness, dizziness or syncope.       Subjective    Bob Anderson is a 69 y.o. male with the following history as recorded in [facility]:    Patient Active Problem List    Diagnosis Date Noted    Chronic pain syndrome 2020    Nocturnal hypoxia 2020    Achalasia 2020    Sarcoidosis 10/21/2019    Nephrolithiasis 2019    Vitamin D deficiency 2019    Chronic kidney disease 2019    Gross hematuria     Other emphysema ([facility] 2019    Anemia 10/09/2018    Other male erectile dysfunction 2018    Ureterolithiasis 2018    Hydronephrosis, left 2018    History of histoplasmosis 2018    Lymphadenopathy 2018    Splenomegaly 2018    Asthma 2018    Depression 2018    Pancreatitis 2018    Peripheral nerve disease 2018    Polyp of colon 06/08/2018    Hypercalcemia 04/26/2018    GARNETT (dyspnea on exertion) 11/27/2017    Type 2 diabetes mellitus without complication, with long-term current use of insulin (HCC)     Sarcoidosis[facility] lung ([facility])     S/P ablation of atrial fibrillation 05/16/2017    PAF (paroxysmal atrial fibrillation) ([facility])     Hypertension     Hyperlipidemia      Current Outpatient Medications   Medication Sig Dispense Refill    XARELTO 20 MG TABS tablet TAKE 1 TABLET BY MOUTH ONCE DAILY WITH EVENING MEAL 90 tablet 0    sulfamethoxazole-trimethoprim (BACTRIM;SEPTRA) 400-80 MG per tablet three times a week       JANUVIA 50 MG tablet Take 1 tablet by mouth once daily 30 tablet 5    donepezil (ARICEPT) 5 MG tablet TAKE 1 TABLET BY MOUTH ONCE DAILY AT NIGHT 90 tablet 1    losartan (COZAAR) 100 MG tablet Take 1 tablet by mouth once daily 90 tablet 3    spironolactone (ALDACTONE) 25 MG tablet 3 days a week (Patient taking differently: Prn) 9 tablet 1    albuterol sulfate  (90 Base) MCG/ACT inhaler INHALE 2 PUFFS BY MOUTH EVERY 6 HOURS AS NEEDED FOR WHEEZING 1 Inhaler 5    predniSONE (DELTASONE) 5 MG tablet Take 5 mg by mouth daily  6    levalbuterol (XOPENEX) 1.25 MG/3ML nebulizer solution Take 1 ampule by nebulization every 6 hours as needed for Wheezing      furosemide (LASIX) 20 MG tablet Take 1 tablet by mouth daily (Patient taking differently: Take 10 mg by mouth daily as needed (if greater than 3 pound weight gain daily) ) 30 tablet 3    EPINEPHrine (EPIPEN) 0.3 MG/0.3ML SOAJ injection Inject 0.3 mLs into the muscle as needed (Allergic Reaction) 2 each 1    nitroGLYCERIN (NITROSTAT) 0.4 MG SL tablet Place 1 tablet under the tongue every 5 minutes as needed for Chest pain 25 tablet 3    azaTHIOprine (IMURAN) 50 MG tablet Take 50 mg by mouth daily       aspirin 81 MG tablet Take 81 mg by mouth daily.  pregabalin (LYRICA) 75 MG capsule Take 1 capsule by mouth 3 times daily for 30 days.  (Patient taking differently: Take 75 mg by mouth daily. ) 90 capsule 0     No current facility-administered medications for this visit.       Allergies: Bee venom, Gabapentin, Penicillins, and Toprol xl [metoprolol]  Past Medical History:   Diagnosis Date    Achalasia     going to [facility] for surgery in March    Acute pancreatitis     Anemia     Asthma     Atrial fibrillation (HCC)     h/o paroxysmal a-fib ? anesthsia induced    Benign colonic polyp     cscope 12/07 Dr Hernandez Coma adenomatous: 12/14 adenoma    Chest pain     Chronic kidney disease     Chronic pain syndrome     Depression     Diabetic peripheral neuropathy (HCC)     Extrinsic asthma     Hyperlipidemia     Hypertension     Idiopathic peripheral neuropathy     Lyme disease     Mediastinal lymphadenopathy     Microalbuminuria     Mixed hyperlipidemia     Paroxysmal A-fib (HCC)     S/P ablation of atrial fibrillation     4/16 A fib ablation , Dr Shawna Velez, [facility]     Sarcoidosis, lung ([address]     restrictive lung impairment    Tick bite     Type 2 diabetes, controlled, with neuropathy [facility])      Past Surgical History:   Procedure Laterality Date    ANKLE FRACTURE SURGERY  april 14, 2015   Robert Fleming ATRIAL ABLATION SURGERY  2015    Dr. Brina Troncoso  10/06/2020    CARDIAC SURGERY      Catheter Ablation A-fib    CHOLECYSTECTOMY      COLONOSCOPY  12/02/2014    [place]    CYSTOSCOPY Left 8/16/2019    CYSTOSCOPY; LEFT RETROGRADE PYELOGRAM; INSERTION LEFT URETERAL STENT performed by Chase Wilson MD at [address] [place] [address] Left 6/27/2018    LASER LITHOTRIPSY STONE MANIPULATION WITH DOUBLE J STENT PLACEMENT performed by Chase Wilson MD at [facility] Left 6/27/2018    CYSTOSCOPY URETEROSCOPY RETROGRADE PYELOGRAMS performed by Chase Wilson MD at [address] [place] [address]       Family History   Problem Relation Age of Onset    Coronary Art Dis Father         AICD pacer age 68    Heart Attack Father     Cancer Sister         childhood chest tumor    Heart Failure Mother      Social History     Tobacco Use    Smoking status: Never Smoker    Smokeless tobacco: Never Used   Substance Use Topics    Alcohol use: No          Review of Systems:    Review of Systems   Constitutional: Negative for activity change, chills, diaphoresis, fatigue and fever. HENT: Negative for congestion and sore throat. Respiratory: Negative for cough, chest tightness, shortness of breath and wheezing. Cardiovascular: Negative for chest pain, palpitations and leg swelling. Gastrointestinal: Negative. Musculoskeletal: Negative. Neurological: Negative for dizziness, syncope, light-headedness and headaches. Psychiatric/Behavioral: Negative for confusion. The patient is not nervous/anxious. Objective:    /70   Pulse 85   Ht 5' 9\" (1.753 m)   Wt 192 lb (87.1 kg)   SpO2 98%   BMI 28.35 kg/m²     GENERAL - well developed and well nourished, conversant  HEENT -  PERRLA, Hearing appears normal, gentleman lids are normal.  External inspection of ears and nose appear normal.  NECK - no thyromegaly, no JVD, trachea is in the midline  CARDIOVASCULAR - PMI is in the mid line clavicular position, Normal S1 and S2 with no systolic murmur. No S3 or S4    PULMONARY - no respiratory distress. No wheezes or rales. Lungs are clear to ausculation, normal respiratory effort. ABDOMEN  - soft, non tender, no rebound  MUSCULOSKELETAL  - range of motion of the upper and lower extermites appears normal and equal and is without pain   EXTREMITIES - no significant edema   NEUROLOGIC - gait and station are normal  SKIN - turgor is normal, can warm and dry.   PSYCHIATRIC - normal mood and affect, alert and orientated x 3,      ASSESSMENT:    ALL THE CARDIOLOGY PROBLEMS ARE LISTED ABOVE; HOWEVER, THE FOLLOWING SPECIFIC CARDIAC PROBLEMS / CONDITIONS WERE ADDRESSED AND TREATED DURING THE OFFICE VISIT TODAY:                                                                                            MEDICAL DECISION MAKING             Cardiac Specific Problem / Diagnosis  Discussion and Data Reviewed Diagnostic Procedures Ordered Management Options Selected           1. Hypertension  Well Controlled   Review and summation of old records:    Pressure in the office today 122/70. O2 sat 98%. No Continue current medications:     Yes           2. Hyperlipidemia  Stable   Managed by primary care provider. 12/28/2020 lipid profile total cholesterol 185. Triglycerides 138. HDL 49. . No Continue current medications:    Yes           3. PAF Stable  3 of ablation. Patient is on Xarelto. No recurrence. No events of atrial fib seen on ZIO Patch. No Continue current medications: Yes                     No orders of the defined types were placed in this encounter. No orders of the defined types were placed in this encounter. Discussed with patient. Return in about 6 months (around 8/23/2021) for Dr Florian . I greatly appreciate the opportunity to meet Vini Parker and your confidence in allowing me to participate in his cardiovascular care. NUNO Rider NP  2/23/2021 9:14 AM CST                    This dictation was generated by voice recognition computer software. Although all attempts are made to edit dictation for accuracy, there may be errors in the transcription that are not intended.

## 2021-03-04 RX ORDER — SULFAMETHOXAZOLE AND TRIMETHOPRIM 800; 160 MG/1; MG/1
1 TABLET ORAL 2 TIMES DAILY
Qty: 14 TABLET | Refills: 0 | Status: SHIPPED | OUTPATIENT
Start: 2021-03-04 | End: 2021-03-11

## 2021-03-09 DIAGNOSIS — I48.20 CHRONIC ATRIAL FIBRILLATION (HCC): ICD-10-CM

## 2021-03-09 RX ORDER — RIVAROXABAN 20 MG/1
TABLET, FILM COATED ORAL
Qty: 90 TABLET | Refills: 1 | Status: SHIPPED | OUTPATIENT
Start: 2021-03-09 | End: 2021-11-08

## 2021-03-09 NOTE — TELEPHONE ENCOUNTER
Amna Mehta called requesting a refill of the below medication which has been pended for you:     Requested Prescriptions     Pending Prescriptions Disp Refills    XARELTO 20 MG TABS tablet [Pharmacy Med Name: Xarelto 20 MG Oral Tablet] 90 tablet 1     Sig: TAKE 1 TABLET BY MOUTH ONCE DAILY WITH EVENING MEAL       Last Appointment Date: 12/29/2020  Next Appointment Date: 3/29/2021    Allergies   Allergen Reactions    Bee Venom Anaphylaxis    Gabapentin Other (See Comments)     Causes blisters to head.      Penicillins Hives     \"Causes blisters to break out everywhere\"    Toprol Xl [Metoprolol]      Leg pain

## 2021-03-15 DIAGNOSIS — G62.9 PERIPHERAL NERVE DISEASE: ICD-10-CM

## 2021-03-15 RX ORDER — PREGABALIN 75 MG/1
CAPSULE ORAL
Qty: 90 CAPSULE | Refills: 0 | Status: SHIPPED | OUTPATIENT
Start: 2021-03-15 | End: 2021-05-20

## 2021-03-22 DIAGNOSIS — E55.9 VITAMIN D DEFICIENCY: ICD-10-CM

## 2021-03-22 DIAGNOSIS — Z79.4 TYPE 2 DIABETES MELLITUS WITHOUT COMPLICATION, WITH LONG-TERM CURRENT USE OF INSULIN (HCC): ICD-10-CM

## 2021-03-22 DIAGNOSIS — E11.9 TYPE 2 DIABETES MELLITUS WITHOUT COMPLICATION, WITH LONG-TERM CURRENT USE OF INSULIN (HCC): ICD-10-CM

## 2021-03-22 DIAGNOSIS — E78.2 MIXED HYPERLIPIDEMIA: ICD-10-CM

## 2021-03-22 DIAGNOSIS — I10 ESSENTIAL HYPERTENSION: ICD-10-CM

## 2021-03-22 LAB
ALBUMIN SERPL-MCNC: 4.4 G/DL (ref 3.5–5.2)
ALP BLD-CCNC: 90 U/L (ref 40–130)
ALT SERPL-CCNC: 10 U/L (ref 5–41)
ANION GAP SERPL CALCULATED.3IONS-SCNC: 14 MMOL/L (ref 7–19)
AST SERPL-CCNC: 15 U/L (ref 5–40)
BASOPHILS ABSOLUTE: 0 K/UL (ref 0–0.2)
BASOPHILS RELATIVE PERCENT: 0.6 % (ref 0–1)
BILIRUB SERPL-MCNC: 0.7 MG/DL (ref 0.2–1.2)
BILIRUBIN URINE: NEGATIVE
BLOOD, URINE: NEGATIVE
BUN BLDV-MCNC: 17 MG/DL (ref 8–23)
CALCIUM SERPL-MCNC: 9.9 MG/DL (ref 8.8–10.2)
CHLORIDE BLD-SCNC: 97 MMOL/L (ref 98–111)
CHOLESTEROL, TOTAL: 170 MG/DL (ref 160–199)
CLARITY: CLEAR
CO2: 28 MMOL/L (ref 22–29)
COLOR: YELLOW
CREAT SERPL-MCNC: 1 MG/DL (ref 0.5–1.2)
EOSINOPHILS ABSOLUTE: 0.2 K/UL (ref 0–0.6)
EOSINOPHILS RELATIVE PERCENT: 3.7 % (ref 0–5)
GFR AFRICAN AMERICAN: >59
GFR NON-AFRICAN AMERICAN: >60
GLUCOSE BLD-MCNC: 201 MG/DL (ref 74–109)
GLUCOSE URINE: 500 MG/DL
HBA1C MFR BLD: 9.1 % (ref 4–6)
HCT VFR BLD CALC: 38.3 % (ref 42–52)
HDLC SERPL-MCNC: 47 MG/DL (ref 55–121)
HEMOGLOBIN: 12.8 G/DL (ref 14–18)
IMMATURE GRANULOCYTES #: 0.1 K/UL
KETONES, URINE: NEGATIVE MG/DL
LDL CHOLESTEROL CALCULATED: 72 MG/DL
LEUKOCYTE ESTERASE, URINE: NEGATIVE
LYMPHOCYTES ABSOLUTE: 1.7 K/UL (ref 1.1–4.5)
LYMPHOCYTES RELATIVE PERCENT: 34.1 % (ref 20–40)
MCH RBC QN AUTO: 27.8 PG (ref 27–31)
MCHC RBC AUTO-ENTMCNC: 33.4 G/DL (ref 33–37)
MCV RBC AUTO: 83.3 FL (ref 80–94)
MONOCYTES ABSOLUTE: 0.6 K/UL (ref 0–0.9)
MONOCYTES RELATIVE PERCENT: 11 % (ref 0–10)
NEUTROPHILS ABSOLUTE: 2.5 K/UL (ref 1.5–7.5)
NEUTROPHILS RELATIVE PERCENT: 49.6 % (ref 50–65)
NITRITE, URINE: NEGATIVE
PDW BLD-RTO: 13.9 % (ref 11.5–14.5)
PH UA: 5 (ref 5–8)
PLATELET # BLD: 122 K/UL (ref 130–400)
PMV BLD AUTO: 11.1 FL (ref 9.4–12.4)
POTASSIUM SERPL-SCNC: 4 MMOL/L (ref 3.5–5)
PROTEIN UA: ABNORMAL MG/DL
RBC # BLD: 4.6 M/UL (ref 4.7–6.1)
SODIUM BLD-SCNC: 139 MMOL/L (ref 136–145)
SPECIFIC GRAVITY UA: 1.02 (ref 1–1.03)
TOTAL PROTEIN: 7.5 G/DL (ref 6.6–8.7)
TRIGL SERPL-MCNC: 257 MG/DL (ref 0–149)
TSH SERPL DL<=0.05 MIU/L-ACNC: 4.47 UIU/ML (ref 0.27–4.2)
UROBILINOGEN, URINE: 0.2 E.U./DL
VITAMIN D 25-HYDROXY: 14.2 NG/ML
WBC # BLD: 5.1 K/UL (ref 4.8–10.8)

## 2021-03-29 ENCOUNTER — OFFICE VISIT (OUTPATIENT)
Dept: INTERNAL MEDICINE | Age: 71
End: 2021-03-29
Payer: MEDICARE

## 2021-03-29 VITALS
BODY MASS INDEX: 35.15 KG/M2 | HEIGHT: 62 IN | DIASTOLIC BLOOD PRESSURE: 84 MMHG | WEIGHT: 191 LBS | HEART RATE: 76 BPM | SYSTOLIC BLOOD PRESSURE: 136 MMHG

## 2021-03-29 DIAGNOSIS — I10 ESSENTIAL HYPERTENSION: Primary | ICD-10-CM

## 2021-03-29 DIAGNOSIS — I48.0 PAF (PAROXYSMAL ATRIAL FIBRILLATION) (HCC): ICD-10-CM

## 2021-03-29 DIAGNOSIS — E55.9 VITAMIN D DEFICIENCY: ICD-10-CM

## 2021-03-29 DIAGNOSIS — G62.9 PERIPHERAL NERVE DISEASE: ICD-10-CM

## 2021-03-29 DIAGNOSIS — Z79.4 TYPE 2 DIABETES MELLITUS WITHOUT COMPLICATION, WITH LONG-TERM CURRENT USE OF INSULIN (HCC): ICD-10-CM

## 2021-03-29 DIAGNOSIS — E78.2 MIXED HYPERLIPIDEMIA: ICD-10-CM

## 2021-03-29 DIAGNOSIS — E11.9 TYPE 2 DIABETES MELLITUS WITHOUT COMPLICATION, WITH LONG-TERM CURRENT USE OF INSULIN (HCC): ICD-10-CM

## 2021-03-29 DIAGNOSIS — S68.625S: ICD-10-CM

## 2021-03-29 PROCEDURE — 3017F COLORECTAL CA SCREEN DOC REV: CPT | Performed by: NURSE PRACTITIONER

## 2021-03-29 PROCEDURE — 4040F PNEUMOC VAC/ADMIN/RCVD: CPT | Performed by: NURSE PRACTITIONER

## 2021-03-29 PROCEDURE — 2022F DILAT RTA XM EVC RTNOPTHY: CPT | Performed by: NURSE PRACTITIONER

## 2021-03-29 PROCEDURE — G8427 DOCREV CUR MEDS BY ELIG CLIN: HCPCS | Performed by: NURSE PRACTITIONER

## 2021-03-29 PROCEDURE — 1036F TOBACCO NON-USER: CPT | Performed by: NURSE PRACTITIONER

## 2021-03-29 PROCEDURE — 1123F ACP DISCUSS/DSCN MKR DOCD: CPT | Performed by: NURSE PRACTITIONER

## 2021-03-29 PROCEDURE — G8417 CALC BMI ABV UP PARAM F/U: HCPCS | Performed by: NURSE PRACTITIONER

## 2021-03-29 PROCEDURE — G8484 FLU IMMUNIZE NO ADMIN: HCPCS | Performed by: NURSE PRACTITIONER

## 2021-03-29 PROCEDURE — 99214 OFFICE O/P EST MOD 30 MIN: CPT | Performed by: NURSE PRACTITIONER

## 2021-03-29 PROCEDURE — 3046F HEMOGLOBIN A1C LEVEL >9.0%: CPT | Performed by: NURSE PRACTITIONER

## 2021-03-29 ASSESSMENT — PATIENT HEALTH QUESTIONNAIRE - PHQ9
1. LITTLE INTEREST OR PLEASURE IN DOING THINGS: 0
2. FEELING DOWN, DEPRESSED OR HOPELESS: 0
SUM OF ALL RESPONSES TO PHQ QUESTIONS 1-9: 0

## 2021-03-29 ASSESSMENT — ENCOUNTER SYMPTOMS
ABDOMINAL PAIN: 0
COLOR CHANGE: 0
COUGH: 0
DIARRHEA: 0
WHEEZING: 0
STRIDOR: 0
SHORTNESS OF BREATH: 1
EYE DISCHARGE: 0
ABDOMINAL DISTENTION: 0
BLOOD IN STOOL: 0
CHOKING: 0
TROUBLE SWALLOWING: 0
SORE THROAT: 0
VOMITING: 0
EYE ITCHING: 0
NAUSEA: 0
CONSTIPATION: 0

## 2021-03-29 NOTE — PROGRESS NOTES
[address] [facility]  [address] [place] [place] [address]  [address] [address]  Dept: [phone]  Dept Fax: [phone]: [phone]    Noe Tidwell (:  1950) is a 69 y.o. male,Established patient, here for evaluation of the following chief complaint(s): Hypertension (patient is here for routine follow up visit and review of labs done 3/22/2021)      oNe Tidwell is a 69 y.o. male who presents today for his medical conditions/complaints as noted below. Noe Tidwell is c/alexys Hypertension (patient is here for routine follow up visit and review of labs done 3/22/2021)        HPI:     HPI   1. Left 4th digit still draining every day; He is dressing every day; we had put him on a couple rounds of antibiotics that did seem to help periodically. He had to go to [address] [place] [address] on the night of his amputation due to no 1 on-call improvement. He really does not want to continue go down there. But he is having to continue to express pus out of the area clean and dress and treated as an open wound. I am concerned about this with his diabetes. I really think he needs to see a hand surgeon he has agreed to see Dr. Terra Hicks. 2.  Hypertension he is on spironalactone 25 daily losartan 100 mg daily lasix 10 mg dialy   130/74  At home   3. Type II DM  januvia a1c is up to 9.1 from 7.6   this is still most likely related to all the steroids he had to take as he was sick with respiratory illness for almost a year. He most likely had Covid  before we either even know what it was. And then he was sick most of the year. 4.  PAF;  xarelto 20 mg daily he is stable on this dose today who is in sinus rhythm  5. Peripheral neuropathy;  ;lyrica 75 MG he is taking twice a day would like to take it 3 times a day which is absolutely appropriate  6. Hyperlipidemia he is currently not on any medicines for this. controlled.   Triglycerides are up but is related to his blood 12/29/2020 50/04/1446   SYSTOLIC 071 233 [phone] -   DIASTOLIC 84 77 70 76 75 -   Site - - - - - -   Position - - - - - -   Cuff Size - - - - - -   Pulse - 76 85 92 87 82   Temp - - - - - 96.8   Resp - - - - - -   SpO2 - - 98 - - 94   Weight - 191 lb 192 lb 192 lb 188 lb -   Height - 5' 2\" 5' 9\" 5' 9\" 5' 9\" -   Body mass index - 34.93 kg/m2 28.35 kg/m2 28.35 kg/m2 27.76 kg/m2 -   Pain Level - - - - - -   Some recent data might be hidden       Family History   Problem Relation Age of Onset    Coronary Art Dis Father         AICD pacer age 68    Heart Attack Father     Cancer Sister         childhood chest tumor    Heart Failure Mother        Social History     Tobacco Use    Smoking status: Never Smoker    Smokeless tobacco: Never Used   Substance Use Topics    Alcohol use: No      Current Outpatient Medications   Medication Sig Dispense Refill    pregabalin (LYRICA) 75 MG capsule TAKE 1 CAPSULE BY MOUTH THREE TIMES DAILY FOR 30 DAYS 90 capsule 0    XARELTO 20 MG TABS tablet TAKE 1 TABLET BY MOUTH ONCE DAILY WITH EVENING MEAL 90 tablet 1    sulfamethoxazole-trimethoprim (BACTRIM;SEPTRA) 400-80 MG per tablet three times a week       JANUVIA 50 MG tablet Take 1 tablet by mouth once daily 30 tablet 5    donepezil (ARICEPT) 5 MG tablet TAKE 1 TABLET BY MOUTH ONCE DAILY AT NIGHT 90 tablet 1    losartan (COZAAR) 100 MG tablet Take 1 tablet by mouth once daily 90 tablet 3    spironolactone (ALDACTONE) 25 MG tablet 3 days a week (Patient taking differently: Prn) 9 tablet 1    albuterol sulfate  (90 Base) MCG/ACT inhaler INHALE 2 PUFFS BY MOUTH EVERY 6 HOURS AS NEEDED FOR WHEEZING 1 Inhaler 5    predniSONE (DELTASONE) 5 MG tablet Take 5 mg by mouth daily  6    levalbuterol (XOPENEX) 1.25 MG/3ML nebulizer solution Take 1 ampule by nebulization every 6 hours as needed for Wheezing      furosemide (LASIX) 20 MG tablet Take 1 tablet by mouth daily (Patient taking differently: Take 10 mg by mouth daily as needed (if greater than 3 pound weight gain daily) ) 30 tablet 3    EPINEPHrine (EPIPEN) 0.3 MG/0.3ML SOAJ injection Inject 0.3 mLs into the muscle as needed (Allergic Reaction) 2 each 1    nitroGLYCERIN (NITROSTAT) 0.4 MG SL tablet Place 1 tablet under the tongue every 5 minutes as needed for Chest pain 25 tablet 3    azaTHIOprine (IMURAN) 50 MG tablet Take 50 mg by mouth daily       aspirin 81 MG tablet Take 81 mg by mouth daily. No current facility-administered medications for this visit. Allergies   Allergen Reactions    Bee Venom Anaphylaxis    Gabapentin Other (See Comments)     Causes blisters to head.      Penicillins Hives     \"Causes blisters to break out everywhere\"    Toprol Xl [Metoprolol]      Leg pain        Health Maintenance   Topic Date Due    Diabetic foot exam  Never done    Diabetic retinal exam  12/29/2021 (Originally 5/9/2019)    Shingles Vaccine (1 of 2) 12/29/2021 (Originally 9/15/1999)    COVID-19 Vaccine (1) 03/29/2025 (Originally 9/15/1966)    A1C test (Diabetic or Prediabetic)  06/22/2021    Lipid screen  03/22/2022    Potassium monitoring  03/22/2022    Creatinine monitoring  03/22/2022    Colon cancer screen colonoscopy  08/01/2024    DTaP/Tdap/Td vaccine (2 - Td) 05/22/2030    Flu vaccine  Completed    Pneumococcal 65+ years Vaccine  Completed    Hepatitis A vaccine  Aged Out    Hib vaccine  Aged Out    Meningococcal (ACWY) vaccine  Aged Out    Hepatitis C screen  Discontinued       Lab Results   Component Value Date    LABA1C 9.1 (H) 03/22/2021     Lab Results   Component Value Date    PSA 2.5 11/09/2020    PSA 3.6 03/17/2020    PSA 5.61 (H) 12/09/2019    PSA 5.3 (H) 12/09/2019     TSH   Date Value Ref Range Status   03/22/2021 4.470 (H) 0.270 - 4.200 uIU/mL Final   ]  Lab Results   Component Value Date     03/22/2021    K 4.0 03/22/2021    CL 97 (L) 03/22/2021    CO2 28 03/22/2021    BUN 17 03/22/2021    CREATININE 1.0 03/22/2021    GLUCOSE 201 (H) 03/22/2021    CALCIUM 9.9 03/22/2021    PROT 7.5 03/22/2021    LABALBU 4.4 03/22/2021    BILITOT 0.7 03/22/2021    ALKPHOS 90 03/22/2021    AST 15 03/22/2021    ALT 10 03/22/2021    LABGLOM >60 03/22/2021    GFRAA >59 03/22/2021     Lab Results   Component Value Date    CHOL 170 03/22/2021    CHOL 185 12/28/2020    CHOL 191 12/09/2019     Lab Results   Component Value Date    TRIG 257 (H) 03/22/2021    TRIG 138 12/28/2020    TRIG 242 (H) 12/09/2019     Lab Results   Component Value Date    HDL 47 (L) 03/22/2021    HDL 49 (L) 12/28/2020    HDL 47 (L) 09/23/2020     Lab Results   Component Value Date    LDLCALC 72 03/22/2021    LDLCALC 108 12/28/2020    LDLCALC 97 09/23/2020     Lab Results   Component Value Date     03/22/2021    K 4.0 03/22/2021    K 4.6 09/23/2020    CL 97 03/22/2021    CO2 28 03/22/2021    BUN 17 03/22/2021    CREATININE 1.0 03/22/2021    GLUCOSE 201 03/22/2021    CALCIUM 9.9 03/22/2021      Lab Results   Component Value Date    WBC 5.1 03/22/2021    HGB 12.8 (L) 03/22/2021    HCT 38.3 (L) 03/22/2021    MCV 83.3 03/22/2021     (L) 03/22/2021    LABLYMP 1.58 09/20/2015    LYMPHOPCT 34.1 03/22/2021    RBC 4.60 (L) 03/22/2021    MCH 27.8 03/22/2021    MCHC 33.4 03/22/2021    RDW 13.9 03/22/2021     Lab Results   Component Value Date    VITD25 14.2 (L) 03/22/2021       Subjective:      Review of Systems   Constitutional: Negative for fatigue, fever and unexpected weight change. HENT: Negative for ear discharge, ear pain, mouth sores, sore throat and trouble swallowing. Eyes: Negative for discharge, itching and visual disturbance. Respiratory: Positive for shortness of breath. Negative for cough, choking, wheezing and stridor. Cardiovascular: Negative for chest pain, palpitations and leg swelling. Gastrointestinal: Negative for abdominal distention, abdominal pain, blood in stool, constipation, diarrhea, nausea and vomiting.    Endocrine: Negative for cold intolerance, polydipsia and polyuria. Genitourinary: Negative for difficulty urinating, dysuria, frequency and urgency. Musculoskeletal: Negative for arthralgias and gait problem. Partial amputation tip of left ring finger    Skin: Negative for color change and rash. Allergic/Immunologic: Negative for food allergies and immunocompromised state. Neurological: Negative for dizziness, tremors, syncope, speech difficulty, weakness and headaches. Peripheral neurpathty   Hematological: Negative for adenopathy. Does not bruise/bleed easily. Psychiatric/Behavioral: Negative for confusion and hallucinations. Objective:     Physical Exam  Constitutional:       General: He is not in acute distress. Appearance: He is well-developed. HENT:      Head: Normocephalic and atraumatic. Eyes:      General: No scleral icterus. Right eye: No discharge. Left eye: No discharge. Pupils: Pupils are equal, round, and reactive to light. Neck:      Musculoskeletal: Normal range of motion and neck supple. Thyroid: No thyromegaly. Vascular: No JVD. Cardiovascular:      Rate and Rhythm: Normal rate. Rhythm irregular. Heart sounds: Normal heart sounds. No murmur. Pulmonary:      Effort: Pulmonary effort is normal. No respiratory distress. Breath sounds: Normal breath sounds. No wheezing or rales. Abdominal:      General: Bowel sounds are normal. There is no distension. Palpations: Abdomen is soft. There is no mass. Tenderness: There is no abdominal tenderness. There is no guarding or rebound. Musculoskeletal: Normal range of motion. General: No tenderness. Comments: Amputation of left ring finger at distal MP joint. The area still puffy and red and somewhat tender   Skin:     General: Skin is warm and dry. Findings: No erythema or rash. Neurological:      Mental Status: He is alert and oriented to person, place, and time.       Cranial Nerves: No cranial nerve deficit. Coordination: Coordination normal.      Deep Tendon Reflexes: Reflexes are normal and symmetric. Reflexes normal.   Psychiatric:         Mood and Affect: Mood is not depressed. Behavior: Behavior normal.         Thought Content: Thought content normal.         Judgment: Judgment normal.       /84   Pulse 76   Ht 5' 2\" (1.575 m)   Wt 191 lb (86.6 kg)   BMI 34.93 kg/m²     Assessment:       Diagnosis Orders   1. Essential hypertension     2. PAF (paroxysmal atrial fibrillation) ([address]     3. Type 2 diabetes mellitus without complication, with long-term current use of insulin ([address]     4. Peripheral nerve disease     5. 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 Mixed hyperlipidemia     7. Vitamin D deficiency       Labs reviewed from 3/22/21    Plan:        Patient given educational materials - see patient instructions. Discussed use, benefit, and side effects of prescribed medications. Allpatient questions answered. Pt voiced understanding. Reviewed health maintenance. Instructed to continue current medications, diet and exercise. Patient agreed with treatment plan. Follow up as directed. MEDICATIONS:  No orders of the defined types were placed in this encounter. ORDERS:  Orders Placed This Encounter   Procedures   [facility] Rojas [facility]       Follow-up:  Return in about 3 months (around 6/29/2021) for have labs done prior to appt. PATIENT INSTRUCTIONS:  Patient Instructions   1 partial amputation left ring finger; refer to Dr Arabella Mcarthur   2. Hypertension   3. TYPE II DM; Add rybellsus;  3 mg daily;  Call me in 3 weeks and see what sugars are doing we will decide then #1 continue to 3 or if you want to go up to 7  4. PAF:  The current medical regimen is effective;  continue present plan and medications.   5.  Peripheral neuropathy; Increase lyrica 75 TID   6. Hyperlipidemia;  Trigs are up but we are aiming for better blood sugar control       Electronically signed by NUNO Brandon on 3/29/2021 at 12:27 PM    @    [employer]/transcription disclaimer:  Much of this encounter note is electronic transcription/translation of spoken language to printed texts. The electronic translation of spoken language may be erroneous, or at times,nonsensical words or phrases may be inadvertently transcribed.   Although I have reviewed the note for such errors, some may still exist.

## 2021-03-29 NOTE — PATIENT INSTRUCTIONS
1 partial amputation left ring finger; refer to Dr Santina Moritz   2. Hypertension   3. TYPE II DM; Add rybellsus;  3 mg daily;  Call me in 3 weeks and see what sugars are doing we will decide then #1 continue to 3 or if you want to go up to 7  4. PAF:  The current medical regimen is effective;  continue present plan and medications. 5.  Peripheral neuropathy; Increase lyrica 75 TID   6.   Hyperlipidemia;  Trigs are up but we are aiming for better blood sugar control

## 2021-04-06 ENCOUNTER — CARE COORDINATION (OUTPATIENT)
Dept: CARE COORDINATION | Age: 71
End: 2021-04-06

## 2021-04-06 RX ORDER — ORAL SEMAGLUTIDE 3 MG/1
1 TABLET ORAL DAILY
COMMUNITY
End: 2021-06-30

## 2021-04-06 ASSESSMENT — ENCOUNTER SYMPTOMS: DYSPNEA ASSOCIATED WITH: EXERTION

## 2021-04-06 NOTE — CARE COORDINATION
Suggested Interventions and Community Resources  Diabetes Education: In Process (Comment: 4/6/2021 Encouraged healthy diet choices, staying active, and monitoring glucose daily.)  Zone Management Tools: In Process (Comment: 4/6/2021 Wife states no increase in cough, swelling, shortness of breath, or changes in sputum.   )         Goals Addressed                 This Visit's Progress     Conditions and Symptoms   On track     I will schedule office visits, as directed by my provider. I will keep my appointment or reschedule if I have to cancel. I will notify my provider of any barriers to my plan of care. I will follow my Zone Management tool to seek urgent or emergent care. I will notify my provider of any symptoms that indicate a worsening of my condition. Barriers: lack of support and lack of education  Plan for overcoming my barriers: Regular follow-up from [facility], education regarding Zone mgmt  Confidence: 10/10  Anticipated Goal Completion Date: 03/18/2021         Self Monitoring   On track     Self-Monitored Blood Glucose - I will check my blood sugar Fasting blood sugar and blood sugars ac & HS  I will notify my provider of any trends of increasing or decreasing blood sugars over a 1 month period. Other Self-Monitoring - I will monitor my O2 sat - Daily and Other: PRN   will report O2 sat <90% to my provider. Patient Reported Blood Glucose No flowsheet data found. Barriers: stress  Plan for overcoming my barriers: Support from [facility], Regular f/u  Confidence: 10/10  Anticipated Goal Completion Date: 3/18/2021              Prior to Admission medications    Medication Sig Start Date End Date Taking?  Authorizing Provider   pregabalin (LYRICA) 75 MG capsule TAKE 1 CAPSULE BY MOUTH THREE TIMES DAILY FOR 30 DAYS 3/15/21 4/14/21  NUNO Chaparro   XARELTO 20 MG TABS tablet TAKE 1 TABLET BY MOUTH ONCE DAILY WITH EVENING MEAL 3/9/21   NUNO Chaparro   sulfamethoxazole-trimethoprim (BACTRIM;SEPTRA) non-pharmacologic self-management interventions?  (Nutrition/Exercise/Self-Monitoring): Yes   Have you ever had to go to the ED for symptoms of low blood sugar?: No       Do you have hyperglycemia symptoms?: No   Do you have hypoglycemia symptoms?: No   Blood Sugar Monitoring Regimen: Once a Day   Blood Sugar Trends: Steady Decrease (Comment: 4/6/2021  Wife states glucose levels have improved since starting [employer].)       and   COPD Assessment    Does the patient understand envrionmental exposure?: Yes  Is the patient able to verbalize Rescue vs. Long Acting medications?: Yes  Does the patient have a nebulizer?: Yes  Does the patient use a space with inhaled medications?: No            Symptoms:  None: Yes      Symptom course: stable  Breathlessness: exertion (Comment: 4/6/2021  Resolves with rest and O2 per NC PRN.)  Increase use of rapid acting/rescue inhaled medications?: No  Change in chronic cough?: No/At Baseline  Change in sputum?: No/At Baseline (Comment: 4/6/2021  Non productive cough)

## 2021-04-26 RX ORDER — SITAGLIPTIN 50 MG/1
TABLET, FILM COATED ORAL
Qty: 30 TABLET | Refills: 0 | Status: SHIPPED | OUTPATIENT
Start: 2021-04-26 | End: 2021-06-01

## 2021-04-30 ENCOUNTER — TELEPHONE (OUTPATIENT)
Dept: INTERNAL MEDICINE | Age: 71
End: 2021-04-30

## 2021-04-30 NOTE — TELEPHONE ENCOUNTER
PINO called stating all of the doctors reviewed pt's referral and none of them will accept him; he will need to be referred elsewhere.

## 2021-05-03 NOTE — TELEPHONE ENCOUNTER
So please let them know he will have to go back to [facility] see due to no t doing the surgery;     They might call Dr Kaitlyn Garay office in [place] to see if he will see him;   Just let them know most surgeons, as we discussed, victoriano do f;u from another surgeon;

## 2021-05-05 ENCOUNTER — OFFICE VISIT (OUTPATIENT)
Dept: OTOLARYNGOLOGY | Facility: CLINIC | Age: 71
End: 2021-05-05

## 2021-05-05 VITALS
DIASTOLIC BLOOD PRESSURE: 78 MMHG | SYSTOLIC BLOOD PRESSURE: 145 MMHG | WEIGHT: 186 LBS | BODY MASS INDEX: 27.55 KG/M2 | RESPIRATION RATE: 20 BRPM | HEART RATE: 76 BPM | TEMPERATURE: 98 F | HEIGHT: 69 IN

## 2021-05-05 DIAGNOSIS — Z79.02 ANTIPLATELET OR ANTITHROMBOTIC LONG-TERM USE: ICD-10-CM

## 2021-05-05 DIAGNOSIS — C44.212 BASAL CELL CARCINOMA, EAR, RIGHT: Primary | ICD-10-CM

## 2021-05-05 PROCEDURE — 99203 OFFICE O/P NEW LOW 30 MIN: CPT | Performed by: OTOLARYNGOLOGY

## 2021-05-05 RX ORDER — PREGABALIN 75 MG/1
75 CAPSULE ORAL 2 TIMES DAILY
COMMUNITY
Start: 2021-03-15

## 2021-05-05 NOTE — PROGRESS NOTES
PRIMARY CARE PROVIDER: Iris Valdovinos APRN  REFERRING PROVIDER: No ref. provider found    Chief Complaint   Patient presents with   • Skin Lesion     bcc of right ear       Subjective   History of Present Illness:  Caio Riley is a  70 y.o. male who presents for surgical consultation regarding a biopsy-proven basal cell carcinoma of the right ear.  Prior to the biopsy, the lesion had the following characteristics:    Quality: growing, scabbing, bleeding lesion  Severity: moderate  Duration: 1 year  Timing: constant  Modifying Factors: none  Associated Signs & Symptoms: no lymph node enlargement.    On Aspirin and Xarelto - a-fib.  Dr. García's PA - Bela Sterling/Amada. No longer in a-fib since ablation.    Review of Systems:  Review of Systems   Constitutional: Negative for chills, fatigue, fever and unexpected weight change.   HENT: Negative for facial swelling.    Respiratory: Negative for cough, chest tightness and shortness of breath.    Cardiovascular: Negative for chest pain.   Musculoskeletal: Negative for neck pain.   Skin: Negative for color change.   Neurological: Negative for facial asymmetry.   Hematological: Negative for adenopathy. Bruises/bleeds easily.       Past History:  Past Medical History:   Diagnosis Date   • A-fib (CMS/HCC)    • Arthritis    • Asthma    • BCC (basal cell carcinoma)     right ear   • COPD (chronic obstructive pulmonary disease) (CMS/HCC)    • Diabetes mellitus (CMS/HCC)    • Hypertension    • Kidney stones    • Neuropathy    • Pancreatitis    • Sarcoidosis      Past Surgical History:   Procedure Laterality Date   • ABLATION OF DYSRHYTHMIC FOCUS     • COLONOSCOPY N/A 8/1/2019    4 Tubular adenomas transverse colon and cecum, Diverticulosis repeat exam in 3 years   • COLONOSCOPY W/ POLYPECTOMY  12/02/2014    Tubular adenoma ascending colon    • ENDOSCOPY  01/08/2002    Mild chronic gastritis   • ENDOSCOPY N/A 8/1/2019    Procedure: ESOPHAGOGASTRODUODENOSCOPY WITH  ANESTHESIA;  Surgeon: Iris Duke MD;  Location: [facility] ENDOSCOPY;  Service: Gastroenterology   • ENDOSCOPY  2019    Dilation in the entire esophagus   • FRACTURE SURGERY Right     ARM   • FRACTURE SURGERY Left     LEG   • INGUINAL NODE BIOPSY Right 2018    Procedure: RIGHT GROIN EXCISIONAL BIOPSY;  Surgeon: eCd Aguirre MD;  Location: [facility] OR;  Service: General   • LAPAROSCOPIC CHOLECYSTECTOMY     • LASIK Bilateral    • SQUAMOUS CELL CARCINOMA EXCISION      back and left arm     Family History   Problem Relation Age of Onset   • Colon cancer Neg Hx    • Colon polyps Neg Hx      Social History     Tobacco Use   • Smoking status: Former Smoker     Packs/day: 0.50     Years: 2.00     Pack years: 1.00     Types: Cigarettes     Quit date:      Years since quittin.3   • Smokeless tobacco: Never Used   Substance Use Topics   • Alcohol use: No   • Drug use: No     Allergies:  Bee venom, Penicillins, Acetaminophen, Gabapentin, and Metoprolol    Current Outpatient Medications:   •  ascorbic acid (VITAMIN C) 1000 MG tablet, Take 1,000 mg by mouth Daily., Disp: , Rfl:   •  aspirin 81 MG chewable tablet, Chew 81 mg Daily., Disp: , Rfl:   •  azaTHIOprine (IMURAN) 50 MG tablet, , Disp: , Rfl: 5  •  donepezil (ARICEPT) 5 MG tablet, , Disp: , Rfl: 0  •  EPINEPHrine (EPIPEN) 0.3 MG/0.3ML solution auto-injector injection, Inject 0.3 mg into the appropriate muscle as directed by prescriber., Disp: , Rfl:   •  fluticasone-salmeterol (ADVAIR) 500-50 MCG/DOSE DISKUS, Inhale 2 (Two) Times a Day., Disp: , Rfl:   •  furosemide (LASIX) 10 MG/ML ORAL solution, Take 10 mg by mouth Daily As Needed (for weight gain 3lbs in 24 hours)., Disp: , Rfl:   •  ibandronate (BONIVA) 150 MG tablet, Take 150 mg by mouth Every 30 (Thirty) Days., Disp: , Rfl:   •  Insulin Pen Needle (BD PEN NEEDLE DAGMAR U/F) 32G X 4 MM misc, 1 each., Disp: , Rfl:   •  ipratropium (ATROVENT) 0.02 % nebulizer solution, Take 2.5 mL by nebulization 4  (Four) Times a Day., Disp: 300 mL, Rfl: 11  •  ipratropium-albuterol (DUO-NEB) 0.5-2.5 mg/3 ml nebulizer, Take 3 mL by nebulization 4 (Four) Times a Day As Needed for Wheezing., Disp: 120 mL, Rfl: 11  •  IRON PO, Take 27 mg by mouth 2 (Two) Times a Day., Disp: , Rfl:   •  JANUVIA 50 MG tablet, , Disp: , Rfl:   •  LANTUS SOLOSTAR 100 UNIT/ML injection pen, 14 Units., Disp: , Rfl:   •  levalbuterol (XOPENEX) 1.25 MG/3ML nebulizer solution, Take 1 ampule by nebulization 4 (Four) Times a Day., Disp: 120 ampule, Rfl: 11  •  levalbuterol (XOPENEX) 1.25 MG/3ML nebulizer solution, Take 1 ampule by nebulization 4 (Four) Times a Day As Needed for Wheezing., Disp: 360 mL, Rfl: 5  •  losartan (COZAAR) 100 MG tablet, , Disp: , Rfl:   •  predniSONE (DELTASONE) 5 MG tablet, Take 5 mg by mouth Daily., Disp: , Rfl:   •  pregabalin (LYRICA) 75 MG capsule, Take 75 mg by mouth 3 (Three) Times a Day., Disp: , Rfl:   •  RELION PEN NEEDLES 32G X 4 MM misc, , Disp: , Rfl:   •  rivaroxaban (XARELTO) 20 MG tablet, Take 20 mg by mouth Daily. STOPPED 8/1/18, Disp: , Rfl:   •  sulfamethoxazole-trimethoprim (BACTRIM,SEPTRA) 400-80 MG tablet, Take 1 tablet by mouth 3 (Three) Times a Week., Disp: , Rfl: 6  •  tiZANidine (ZANAFLEX) 4 MG tablet, Take 4 mg by mouth At Night As Needed for Muscle Spasms., Disp: , Rfl:   •  Ventolin  (90 Base) MCG/ACT inhaler, INHALE 2 PUFFS BY MOUTH EVERY 4 HOURS AS NEEDED FOR WHEEZING FOR SHORTNESS OF BREATH, Disp: 18 g, Rfl: 11    Objective     Vital Signs:  Temp:  [98 °F (36.7 °C)] 98 °F (36.7 °C)  Heart Rate:  [76] 76  Resp:  [20] 20  BP: (145)/(78) 145/78    Physical Exam:  Physical Exam  Vitals and nursing note reviewed.   Constitutional:       General: He is not in acute distress.     Appearance: He is well-developed. He is not diaphoretic.   HENT:      Head: Normocephalic and atraumatic.      Right Ear: External ear normal.      Left Ear: External ear normal.      Ears:        Nose: Nose normal.   Eyes:       General: No scleral icterus.        Right eye: No discharge.         Left eye: No discharge.      Conjunctiva/sclera: Conjunctivae normal.      Pupils: Pupils are equal, round, and reactive to light.   Neck:      Thyroid: No thyromegaly.      Vascular: No JVD.      Trachea: No tracheal deviation.   Pulmonary:      Effort: Pulmonary effort is normal.      Breath sounds: No stridor.   Musculoskeletal:         General: No deformity. Normal range of motion.      Cervical back: Normal range of motion and neck supple.   Lymphadenopathy:      Cervical: No cervical adenopathy.   Skin:     General: Skin is warm and dry.      Coloration: Skin is not pale.      Findings: No erythema or rash.   Neurological:      Mental Status: He is alert and oriented to person, place, and time.      Cranial Nerves: No cranial nerve deficit.      Coordination: Coordination normal.   Psychiatric:         Speech: Speech normal.         Behavior: Behavior normal. Behavior is cooperative.         Thought Content: Thought content normal.         Judgment: Judgment normal.         Data Review:  I have personally reviewed the pathology report demonstrating a basal cell carcinoma of the right ear:      Operative plan:    Excision of the basal cell carcinoma of the right ear with transposition flap closure    Assessment   Assessment:  1. Basal cell carcinoma, ear, right    2. Antiplatelet or antithrombotic long-term use        Plan   Plan:    I have offered excision of the basal cell carcinoma of the right ear with  frozen section analysis and likely  transposition flap closure closure in the office under local anesthesia.    We will check with his cardiologist to see if he can come off his Xarelto.    Discussion of skin lesion. Discussed risks, benefits, alternatives, and possible complications of excision of the skin lesion with reconstruction utilizing local tissue rearrangement, full-thickness skin grafting, or local interpolated flaps. Risks  include, but are not limited too: bleeding, infection, hematoma, recurrence, need for additional procedures, flap failure, cosmetic deformity. Patient understands risks and would like to proceed with surgery.     My findings and recommendations were discussed and questions were answered.     Ian Curtis MD  05/05/21  15:22 CDT

## 2021-05-10 ENCOUNTER — TELEPHONE (OUTPATIENT)
Dept: CARDIOLOGY CLINIC | Age: 71
End: 2021-05-10

## 2021-05-11 ENCOUNTER — TELEPHONE (OUTPATIENT)
Dept: OTOLARYNGOLOGY | Facility: CLINIC | Age: 71
End: 2021-05-11

## 2021-05-11 NOTE — TELEPHONE ENCOUNTER
Patient has been contacted with clearance to hold his blood thinner 2 days before surgery and start right after surgery.

## 2021-05-20 ENCOUNTER — TRANSCRIBE ORDERS (OUTPATIENT)
Dept: LAB | Facility: HOSPITAL | Age: 71
End: 2021-05-20

## 2021-05-20 DIAGNOSIS — Z01.818 PREOPERATIVE TESTING: Primary | ICD-10-CM

## 2021-05-20 DIAGNOSIS — G62.9 PERIPHERAL NERVE DISEASE: ICD-10-CM

## 2021-05-20 RX ORDER — PREGABALIN 75 MG/1
CAPSULE ORAL
Qty: 90 CAPSULE | Refills: 0 | Status: SHIPPED | OUTPATIENT
Start: 2021-05-20 | End: 2021-07-19

## 2021-05-20 NOTE — TELEPHONE ENCOUNTER
Alvaro called requesting a refill of the below medication which has been pended for you:     Requested Prescriptions     Pending Prescriptions Disp Refills    pregabalin (LYRICA) 75 MG capsule [Pharmacy Med Name: Pregabalin 75 MG Oral Capsule] 90 capsule 0     Sig: TAKE 1 CAPSULE BY MOUTH THREE TIMES DAILY       Last Appointment Date: Visit date not found  Next Appointment Date: Visit date not found    Allergies   Allergen Reactions    Bee Venom Anaphylaxis    Gabapentin Other (See Comments)     Causes blisters to head.      Penicillins Hives     \"Causes blisters to break out everywhere\"    Toprol Xl [Metoprolol]      Leg pain

## 2021-05-22 ENCOUNTER — LAB (OUTPATIENT)
Dept: LAB | Facility: HOSPITAL | Age: 71
End: 2021-05-22

## 2021-05-22 DIAGNOSIS — Z01.818 PREOPERATIVE TESTING: ICD-10-CM

## 2021-05-22 LAB — SARS-COV-2 ORF1AB RESP QL NAA+PROBE: NOT DETECTED

## 2021-05-22 PROCEDURE — U0005 INFEC AGEN DETEC AMPLI PROBE: HCPCS

## 2021-05-22 PROCEDURE — U0004 COV-19 TEST NON-CDC HGH THRU: HCPCS

## 2021-05-22 PROCEDURE — C9803 HOPD COVID-19 SPEC COLLECT: HCPCS

## 2021-06-01 RX ORDER — SITAGLIPTIN 50 MG/1
TABLET, FILM COATED ORAL
Qty: 30 TABLET | Refills: 2 | Status: SHIPPED | OUTPATIENT
Start: 2021-06-01 | End: 2021-10-06

## 2021-06-01 NOTE — TELEPHONE ENCOUNTER
Gita Michelle called requesting a refill of the below medication which has been pended for you:     Requested Prescriptions     Pending Prescriptions Disp Refills    JANUVIA 50 MG tablet [Pharmacy Med Name: Januvia 50 MG Oral Tablet] 30 tablet 2     Sig: Take 1 tablet by mouth once daily       Last Appointment Date: 3/29/2021  Next Appointment Date: 6/30/2021    Allergies   Allergen Reactions    Bee Venom Anaphylaxis    Gabapentin Other (See Comments)     Causes blisters to head.      Penicillins Hives     \"Causes blisters to break out everywhere\"    Toprol Xl [Metoprolol]      Leg pain

## 2021-06-04 ENCOUNTER — TRANSCRIBE ORDERS (OUTPATIENT)
Dept: LAB | Facility: HOSPITAL | Age: 71
End: 2021-06-04

## 2021-06-04 DIAGNOSIS — Z01.818 PREOP TESTING: Primary | ICD-10-CM

## 2021-06-12 ENCOUNTER — LAB (OUTPATIENT)
Dept: LAB | Facility: HOSPITAL | Age: 71
End: 2021-06-12

## 2021-06-12 LAB — SARS-COV-2 ORF1AB RESP QL NAA+PROBE: NOT DETECTED

## 2021-06-12 PROCEDURE — C9803 HOPD COVID-19 SPEC COLLECT: HCPCS | Performed by: OTOLARYNGOLOGY

## 2021-06-12 PROCEDURE — U0004 COV-19 TEST NON-CDC HGH THRU: HCPCS | Performed by: OTOLARYNGOLOGY

## 2021-06-12 PROCEDURE — U0005 INFEC AGEN DETEC AMPLI PROBE: HCPCS | Performed by: OTOLARYNGOLOGY

## 2021-06-16 ENCOUNTER — PROCEDURE VISIT (OUTPATIENT)
Dept: OTOLARYNGOLOGY | Facility: CLINIC | Age: 71
End: 2021-06-16

## 2021-06-16 ENCOUNTER — TRANSCRIBE ORDERS (OUTPATIENT)
Dept: LAB | Facility: HOSPITAL | Age: 71
End: 2021-06-16

## 2021-06-16 VITALS
RESPIRATION RATE: 20 BRPM | TEMPERATURE: 98 F | BODY MASS INDEX: 27.55 KG/M2 | HEIGHT: 69 IN | SYSTOLIC BLOOD PRESSURE: 132 MMHG | HEART RATE: 65 BPM | WEIGHT: 186 LBS | DIASTOLIC BLOOD PRESSURE: 74 MMHG

## 2021-06-16 DIAGNOSIS — C44.212 BASAL CELL CARCINOMA, EAR, RIGHT: Primary | ICD-10-CM

## 2021-06-16 DIAGNOSIS — Z01.818 PREOPERATIVE TESTING: Primary | ICD-10-CM

## 2021-06-16 PROCEDURE — 14061 TIS TRNFR E/N/E/L10.1-30SQCM: CPT | Performed by: OTOLARYNGOLOGY

## 2021-06-16 PROCEDURE — 88332 PATH CONSLTJ SURG EA ADD BLK: CPT | Performed by: PATHOLOGY

## 2021-06-16 PROCEDURE — 88305 TISSUE EXAM BY PATHOLOGIST: CPT | Performed by: OTOLARYNGOLOGY

## 2021-06-16 PROCEDURE — 88331 PATH CONSLTJ SURG 1 BLK 1SPC: CPT | Performed by: PATHOLOGY

## 2021-06-16 NOTE — PROGRESS NOTES
PATIENT NAME: Caio Riley    DATE:  /today    PREOPERATIVE DIAGNOSIS: Basal cell carcinoma of right ear    POSTOPERATIVE DIAGNOSIS: Basal cell carcinoma of right ear    PROCEDURE:  1) excision of malignant neoplasm of skin of right ear, 1.3 cm x 3.0 cm   2) local tissue rearrangement (transposition flap flap ) skin of right ear, 4.0 cm x 3.0 cm    SURGEON:  Ian Curtis MD, FACS    FACILITY: [facility] Office Procedure Room    ANESTHESIA:  Local with 5 cc 1% lidocaine and 1:100,000 epinephrine    DICTATED BY:  Ian Curtis MD, FACS    IVF: None    IMPLANTS: None    DRAINS: None    SPECIMENS: 1) basal cell carcinoma of the ear, stitch at 12:00-frozen   2) additional 9:00 margin, blue is the new margin-permanent    EBL: 20 cc    COMPLICATIONS: None apparent    INDICATIONS FOR SURGERY: Mr. Riley presented with a biopsy-proven basal cell carcinoma ear.  He opted for surgical excision.    OPERATIVE FINDINGS:     Lesion: 7 mm of 7 mm  Margins: 3 mm, followed by an additional 5 mm  Defect: 1.3 cm x 3.0 cm  Flap and Defect: 4.0 cm x 3.0 cm  Depth: Through dermis    OPERATIVE DETAILS:     After patient verification consent material was reviewed, the patient was taken to the procedure room and laid supine on the procedure table.  The skin at the area was cleansed with alcohol, the area marked, the patient was then handed a mirror where we reviewed the intended excision, and verified the consent.  This served as the formal timeout procedure.  The skin was  infiltrated with 1% lidocaine with 1-100,000 epinephrine.    After approximately 15 minutes, the skin was tested for anesthesia and deemed appropriate.  The skin was cleansed with Hibiclense and the patient was draped with sterile towels.    With the patient in the supine position, the lesion was measured, appropriate margins (as listed above) were drawn, and this was converted into a transposition advancement flap design.    The skin of the area was  then infiltrated with 1% lidocaine with 1-100,000 epinephrine.    After approximately 15 minutes, the skin was tested for anesthesia and deemed appropriate.    The patient was sterilely prepped with Hibiclens, and sterilely draped.    The skin surrounding the lesion that was previously marked  was incised utilizing a fresh 15 blade.  This was undermined in the immediate subcutaneous plane.  I placed a stitch at 12:00, and sent this for frozen pathology.  Hemostasis was obtained with bipolar cautery set at 15.    Frozen section analysis demonstrated a second basal cell carcinoma at the 9:00 margin.  As a result, an additional 5 mm wedge excision was taken from the 9:00 margin.  Hemostasis was obtained with bipolar cautery at 15.    Transposition flap was then incised at the preauricular sulcus.  Was undermined in subcutaneous plane and transposed.  The distal tip was removed and the flap was secured utilizing buried 5-0 undyed Vicryl suture followed by running, locking 5-0 fast-absorbing gut.  The transposition donor site was undermined anteriorly for 1 cm and hemostasis obtained with bipolar cautery set at 15.  The defect was closed using buried 5-0 undyed Vicryl suture followed by running, locking 5-0 fast-absorbing gut.    Antibiotic ointment was placed to the incisions and the flaps.      DISPOSITION:  The procedures were completed without complication and tolerated well.  The patient was released in the company of his wife to return home in satisfactory condition.  A follow-up appointment has been scheduled, routine post-op medications prescribed (if required), and post-op instructions were given to the responsible party.           Ian Curtis MD, FACS  Board Certified Facial Plastic and Reconstructive Surgery  Board Certified Otolaryngology -- Head and Neck Surgery    Electronically signed by Ian Curtis MD, 06/16/21, 10:39 AM CDT.

## 2021-06-16 NOTE — PATIENT INSTRUCTIONS
[facility], Post-Procedure Instructions:    Protect the incisions from sunlight. Sunlight to the incisions will cause permanent pigmentation to the incision line and make the incision more noticeable. After the incision has reepithelialized (typically 2-3 weeks after the procedure), you may begin to use sunscreen with an SPF of 15 or greater    For the first week after your procedure, apply a thin coat of Vaseline to the incision 3-4 times daily.  Starting the second week, use a silicone-based wound cream to the incisions to optimize the end result. Apply topically twice daily, or as directed, to help optimize wound healing and decrease redness.    Due to COVID-19, we have decreased the amount of postoperative checks to help prevent added exposure to the virus.  If you have any questions or concerns following her procedure, please give us a call.  We have the ability to schedule a video visit, phone visit, or follow-up visit to address any concerns, while decreasing your exposure to the hospital.  Many of your questions and concerns may be able to be answered over the phone.  Our direct line is [phone].    It is very important to continue routine skin checks with a dermatologist or your PCP every 6-12 months.       CONTACT INFORMATION:  The main office phone number is [phone]. For emergencies after hours and on weekends, this number will convert over to our answering service and the on call provider will answer. Please try to keep non emergent phone calls/ questions to office hours 9am-5pm Monday through Friday.     [employer]  As an alternative, you can sign up and use the Epic MyChart system for more direct and quicker access for non emergent questions/ problems.  [facility] [employer] allows you to send messages to your doctor, view your test results, renew your prescriptions, schedule appointments, and more. To sign up, go to [employer] and click on the Sign Up Now link  in the New User? box. Enter your [employer] Activation Code exactly as it appears below along with the last four digits of your Social Security Number and your Date of Birth () to complete the sign-up process. If you do not sign up before the expiration date, you must request a new code.    [employer] Activation Code: Activation code not generated  Current Online-OR Status: Active    If you have questions, you can email [email or web address] or call [phone] to talk to our Online-OR staff. Remember, Online-OR is NOT to be used for urgent needs. For medical emergencies, dial 911.

## 2021-06-17 LAB
LAB AP CASE REPORT: NORMAL
LAB AP CLINICAL INFORMATION: NORMAL
Lab: NORMAL
PATH REPORT.FINAL DX SPEC: NORMAL
PATH REPORT.GROSS SPEC: NORMAL

## 2021-06-18 ENCOUNTER — LAB (OUTPATIENT)
Dept: LAB | Facility: HOSPITAL | Age: 71
End: 2021-06-18

## 2021-06-18 DIAGNOSIS — Z01.818 PREOPERATIVE TESTING: ICD-10-CM

## 2021-06-18 LAB
CYTO UR: NORMAL
LAB AP CASE REPORT: NORMAL
LAB AP CLINICAL INFORMATION: NORMAL
PATH REPORT.FINAL DX SPEC: NORMAL
PATH REPORT.GROSS SPEC: NORMAL
SARS-COV-2 ORF1AB RESP QL NAA+PROBE: NOT DETECTED

## 2021-06-18 PROCEDURE — U0005 INFEC AGEN DETEC AMPLI PROBE: HCPCS

## 2021-06-18 PROCEDURE — C9803 HOPD COVID-19 SPEC COLLECT: HCPCS

## 2021-06-18 PROCEDURE — U0004 COV-19 TEST NON-CDC HGH THRU: HCPCS

## 2021-06-21 DIAGNOSIS — R97.20 ELEVATED PSA: ICD-10-CM

## 2021-06-24 LAB
PROSTATE SPECIFIC ANTIGEN FREE: 0.6 NG/ML
PROSTATE SPECIFIC ANTIGEN PERCENT FREE: 21 %
PROSTATE SPECIFIC ANTIGEN: 2.9 NG/ML (ref 0–4)

## 2021-06-28 ENCOUNTER — OFFICE VISIT (OUTPATIENT)
Dept: UROLOGY | Age: 71
End: 2021-06-28
Payer: MEDICARE

## 2021-06-28 DIAGNOSIS — N40.1 HYPERTROPHY OF PROSTATE WITH URINARY OBSTRUCTION: ICD-10-CM

## 2021-06-28 DIAGNOSIS — N13.8 HYPERTROPHY OF PROSTATE WITH URINARY OBSTRUCTION: ICD-10-CM

## 2021-06-28 DIAGNOSIS — R97.20 ELEVATED PSA: Primary | ICD-10-CM

## 2021-06-28 PROCEDURE — 1036F TOBACCO NON-USER: CPT | Performed by: UROLOGY

## 2021-06-28 PROCEDURE — G8417 CALC BMI ABV UP PARAM F/U: HCPCS | Performed by: UROLOGY

## 2021-06-28 PROCEDURE — G8427 DOCREV CUR MEDS BY ELIG CLIN: HCPCS | Performed by: UROLOGY

## 2021-06-28 PROCEDURE — 1123F ACP DISCUSS/DSCN MKR DOCD: CPT | Performed by: UROLOGY

## 2021-06-28 PROCEDURE — 3017F COLORECTAL CA SCREEN DOC REV: CPT | Performed by: UROLOGY

## 2021-06-28 PROCEDURE — 4040F PNEUMOC VAC/ADMIN/RCVD: CPT | Performed by: UROLOGY

## 2021-06-28 PROCEDURE — 99214 OFFICE O/P EST MOD 30 MIN: CPT | Performed by: UROLOGY

## 2021-06-28 ASSESSMENT — ENCOUNTER SYMPTOMS
COUGH: 0
EYE PAIN: 0
NAUSEA: 0
SORE THROAT: 0
WHEEZING: 0
BACK PAIN: 0
VOMITING: 0

## 2021-06-28 NOTE — PROGRESS NOTES
Joel Gonzalez is a 69 y.o. male who presents today   Chief Complaint   Patient presents with    Follow-up     I am here for my 6 month follow up for elevated psa. I had my psa done prior to this visit today. Elevated PSA:  Patient is here today for history of an elevated PSA. His last several PSA values are as follows:  Lab Results   Component Value Date    PSA 2.9 06/21/2021    PSA 2.5 11/09/2020    PSA 3.6 03/17/2020     Overall the PSA level is: not significantly changed  Recent history of urinary tract infection/prostatitis? no  Previous prostate biopsy? no  Lower urinary tract symptoms: frequency and nocturia, 1 times per night  He reports minimal lower urinary tract symptoms as a subscores 2 but he states he is always been told he had enlarged prostate.   He does not take any medicines    Past Medical History:   Diagnosis Date    Achalasia     going to [facility] in March    Acute pancreatitis     Anemia     Asthma     Atrial fibrillation ([facility] [address]     h/o paroxysmal a-fib ? anesthsia induced    Benign colonic polyp     cscope 12/07 Dr Garret Real adenomatous: 12/14 adenoma    Chest pain     Chronic kidney disease     Chronic pain syndrome     Depression     Diabetic peripheral neuropathy (HCC)     Extrinsic asthma     Hyperlipidemia     Hypertension     Idiopathic peripheral neuropathy     Lyme disease     Mediastinal lymphadenopathy     Microalbuminuria     Mixed hyperlipidemia     Paroxysmal A-fib (HCC)     S/P ablation of atrial fibrillation     4/16 A fib ablation , Dr Anupam Gallegos, [place]     Sarcoidosis, lung ([facility] [address]     restrictive lung impairment    Tick bite     Type 2 diabetes, controlled, with neuropathy [facility])        Past Surgical History:   Procedure Laterality Date    ANKLE FRACTURE SURGERY  april 14, 2015   Isis Richter ATRIAL ABLATION SURGERY  2015    Dr. Prema Hernandez  10/06/2020    CARDIAC SURGERY      Catheter Ablation A-fib    CHOLECYSTECTOMY  COLONOSCOPY  12/02/2014    Mele[place]io    CYSTOSCOPY Left 8/16/2019    CYSTOSCOPY; LEFT RETROGRADE PYELOGRAM; INSERTION LEFT URETERAL STENT performed by Rebeca Pierson MD at [id number] [facility] Left 6/27/2018    LASER LITHOTRIPSY STONE MANIPULATION WITH DOUBLE J STENT PLACEMENT performed by Rebeca Pierson MD at [facility] Left 6/27/2018    CYSTOSCOPY URETEROSCOPY RETROGRADE PYELOGRAMS performed by Rebeca Pierson MD at [address]      SKIN CANCER EXCISION      ear       Current Outpatient Medications   Medication Sig Dispense Refill    JANUVIA 50 MG tablet Take 1 tablet by mouth once daily 30 tablet 2    Semaglutide (RYBELSUS) 3 MG TABS Take 1 tablet by mouth daily       XARELTO 20 MG TABS tablet TAKE 1 TABLET BY MOUTH ONCE DAILY WITH EVENING MEAL 90 tablet 1    sulfamethoxazole-trimethoprim (BACTRIM;SEPTRA) 400-80 MG per tablet three times a week       donepezil (ARICEPT) 5 MG tablet TAKE 1 TABLET BY MOUTH ONCE DAILY AT NIGHT 90 tablet 1    losartan (COZAAR) 100 MG tablet Take 1 tablet by mouth once daily 90 tablet 3    spironolactone (ALDACTONE) 25 MG tablet 3 days a week (Patient taking differently: Prn) 9 tablet 1    albuterol sulfate  (90 Base) MCG/ACT inhaler INHALE 2 PUFFS BY MOUTH EVERY 6 HOURS AS NEEDED FOR WHEEZING 1 Inhaler 5    predniSONE (DELTASONE) 5 MG tablet Take 5 mg by mouth daily  6    levalbuterol (XOPENEX) 1.25 MG/3ML nebulizer solution Take 1 ampule by nebulization every 6 hours as needed for Wheezing      furosemide (LASIX) 20 MG tablet Take 1 tablet by mouth daily (Patient taking differently: Take 10 mg by mouth daily as needed (if greater than 3 pound weight gain daily) ) 30 tablet 3    EPINEPHrine (EPIPEN) 0.3 MG/0.3ML SOAJ injection Inject 0.3 mLs into the muscle as needed (Allergic Reaction) 2 each 1    nitroGLYCERIN (NITROSTAT) 0.4 MG SL tablet Place 1 tablet under the tongue every 5 minutes as needed for Chest pain 25 tablet 3    azaTHIOprine (IMURAN) 50 MG tablet Take 50 mg by mouth daily       aspirin 81 MG tablet Take 81 mg by mouth daily.  pregabalin (LYRICA) 75 MG capsule TAKE 1 CAPSULE BY MOUTH THREE TIMES DAILY 90 capsule 0     No current facility-administered medications for this visit. Allergies   Allergen Reactions    Bee Venom Anaphylaxis    Gabapentin Other (See Comments)     Causes blisters to head.  Penicillins Hives     \"Causes blisters to break out everywhere\"    Toprol Xl [Metoprolol]      Leg pain        Social History     Socioeconomic History    Marital status:      Spouse name: Cyrena Severin Number of children: None    Years of education: None    Highest education level: None   Occupational History    None   Tobacco Use    Smoking status: Never Smoker    Smokeless tobacco: Never Used   Vaping Use    Vaping Use: Never used   Substance and Sexual Activity    Alcohol use: No    Drug use: No    Sexual activity: None   Other Topics Concern    None   Social History Narrative    None     Social Determinants of Health     Financial Resource Strain:     Difficulty of Paying Living Expenses:    Food Insecurity:     Worried About Running Out of Food in the Last Year:     Ran Out of Food in the Last Year:    Transportation Needs:     Lack of Transportation (Medical):      Lack of Transportation (Non-Medical):    Physical Activity: Inactive    Days of Exercise per Week: 0 days    Minutes of Exercise per Session: 0 min   Stress:     Feeling of Stress :    Social Connections:     Frequency of Communication with Friends and Family:     Frequency of Social Gatherings with Friends and Family:     Attends Baptism Services:     Active Member of Clubs or Organizations:     Attends Club or Organization Meetings:     Marital Status:    Intimate Partner Violence:     Fear of Current or Ex-Partner:  Emotionally Abused:     Physically Abused:     Sexually Abused:        Family History   Problem Relation Age of Onset    Coronary Art Dis Father         AICD pacer age 68    Heart Attack Father     Cancer Sister         childhood chest tumor    Heart Failure Mother        REVIEW OF SYSTEMS:  Review of Systems   Constitutional: Negative for chills and fever. HENT: Negative for congestion and sore throat. Eyes: Negative for pain and visual disturbance. Respiratory: Negative for cough and wheezing. Cardiovascular: Negative for chest pain and palpitations. Gastrointestinal: Negative for nausea and vomiting. Endocrine: Negative for polyphagia and polyuria. Genitourinary: Negative for decreased urine volume, difficulty urinating, discharge, dysuria, enuresis, flank pain, frequency, genital sores, hematuria, penile pain, penile swelling, scrotal swelling, testicular pain and urgency. Musculoskeletal: Negative for back pain and neck pain. Skin: Negative for rash and wound. Allergic/Immunologic: Negative for environmental allergies and food allergies. Neurological: Negative for dizziness and headaches. Hematological: Negative for adenopathy. Does not bruise/bleed easily. Psychiatric/Behavioral: Negative for confusion and hallucinations. All other systems reviewed and are negative. PHYSICAL EXAM:  There were no vitals taken for this visit. Physical Exam  Constitutional:       General: He is not in acute distress. Appearance: Normal appearance. He is well-developed. HENT:      Head: Normocephalic and atraumatic. Nose: Nose normal.   Eyes:      General: No scleral icterus. Conjunctiva/sclera: Conjunctivae normal.      Pupils: Pupils are equal, round, and reactive to light. Neck:      Trachea: No tracheal deviation. Cardiovascular:      Rate and Rhythm: Normal rate and regular rhythm. Pulmonary:      Effort: Pulmonary effort is normal. No respiratory distress. Breath sounds: No stridor. Abdominal:      General: There is no distension. Palpations: Abdomen is soft. There is no mass. Tenderness: There is no abdominal tenderness. Genitourinary:     Prostate: Enlarged (60 g). No nodules present. Musculoskeletal:         General: No tenderness. Normal range of motion. Cervical back: Normal range of motion and neck supple. Lymphadenopathy:      Cervical: No cervical adenopathy. Skin:     General: Skin is warm and dry. Findings: No erythema. Neurological:      Mental Status: He is alert and oriented to person, place, and time. Psychiatric:         Behavior: Behavior normal.         Judgment: Judgment normal.             DATA:  CMP:    Lab Results   Component Value Date     03/22/2021    K 4.0 03/22/2021    K 4.6 09/23/2020    CL 97 03/22/2021    CO2 28 03/22/2021    BUN 17 03/22/2021    CREATININE 1.0 03/22/2021    GFRAA >59 03/22/2021    LABGLOM >60 03/22/2021    GLUCOSE 201 03/22/2021    PROT 7.5 03/22/2021    LABALBU 4.4 03/22/2021    CALCIUM 9.9 03/22/2021    BILITOT 0.7 03/22/2021    ALKPHOS 90 03/22/2021    AST 15 03/22/2021    ALT 10 03/22/2021     No results found for this visit on 06/28/21. Lab Results   Component Value Date    PSA 2.9 06/21/2021    PSA 2.5 11/09/2020    PSA 3.6 03/17/2020     Lab Results   Component Value Date    PSAFREEPCT 21 06/21/2021    PSAFREEPCT 20 11/09/2020    PSAFREEPCT 17 03/17/2020       1. Elevated PSA  PSA remains down. Nonsuspicious BING he can return in 1 year for recheck  - PSA, Total and Free; Future    2. Hypertrophy of prostate with urinary obstruction  He describes minimal symptoms though he does have prostate enlargement on BING. We discussed briefly some treatment options for BPH have asked him to monitor his symptoms.   We will check a bladder scan for residual at the next visit      Orders Placed This Encounter   Procedures    PSA, Total and Free     Prior to next visit in 1 year Standing Status:   Future     Standing Expiration Date:   12/28/2022        Return in about 1 year (around 6/28/2022) for PSA prior to vext visit. All information inputted into the note by the MA to include chief complaint, past medical history, past surgical history, medications, allergies, social and family history and review of systems has been reviewed and updated as needed by me. EMR Dragon/transcription disclaimer: Much of this documentt is electronic  transcription/translation of spoken language to printed text. The  electronic translation of spoken language may be erroneous, or at times,  nonsensical words or phrases may be inadvertently transcribed.  Although I  have reviewed the document for such errors, some may still exist.

## 2021-06-29 PROBLEM — K22.0 ACHALASIA: Status: ACTIVE | Noted: 2019-12-11

## 2021-06-29 PROBLEM — J44.9 CHRONIC OBSTRUCTIVE LUNG DISEASE (HCC): Status: ACTIVE | Noted: 2019-02-25

## 2021-06-29 PROBLEM — F32.A DEPRESSION: Status: RESOLVED | Noted: 2018-06-08 | Resolved: 2021-06-29

## 2021-06-30 ENCOUNTER — OFFICE VISIT (OUTPATIENT)
Dept: OTOLARYNGOLOGY | Facility: CLINIC | Age: 71
End: 2021-06-30

## 2021-06-30 ENCOUNTER — OFFICE VISIT (OUTPATIENT)
Dept: INTERNAL MEDICINE | Age: 71
End: 2021-06-30
Payer: MEDICARE

## 2021-06-30 VITALS
DIASTOLIC BLOOD PRESSURE: 68 MMHG | OXYGEN SATURATION: 97 % | RESPIRATION RATE: 20 BRPM | SYSTOLIC BLOOD PRESSURE: 128 MMHG | WEIGHT: 184.1 LBS | HEIGHT: 69 IN | BODY MASS INDEX: 27.27 KG/M2 | HEART RATE: 93 BPM

## 2021-06-30 DIAGNOSIS — E11.42 TYPE 2 DIABETES MELLITUS WITH PERIPHERAL NEUROPATHY (HCC): ICD-10-CM

## 2021-06-30 DIAGNOSIS — E78.2 MIXED HYPERLIPIDEMIA: ICD-10-CM

## 2021-06-30 DIAGNOSIS — E55.9 VITAMIN D DEFICIENCY: ICD-10-CM

## 2021-06-30 DIAGNOSIS — D86.0 SARCOIDOSIS, LUNG (HCC): ICD-10-CM

## 2021-06-30 DIAGNOSIS — F33.42 RECURRENT MAJOR DEPRESSIVE DISORDER, IN FULL REMISSION (HCC): ICD-10-CM

## 2021-06-30 DIAGNOSIS — Z79.899 HIGH RISK MEDICATION USE: ICD-10-CM

## 2021-06-30 DIAGNOSIS — J42 CHRONIC BRONCHITIS, UNSPECIFIED CHRONIC BRONCHITIS TYPE (HCC): ICD-10-CM

## 2021-06-30 DIAGNOSIS — I48.0 PAF (PAROXYSMAL ATRIAL FIBRILLATION) (HCC): Primary | ICD-10-CM

## 2021-06-30 DIAGNOSIS — I10 ESSENTIAL HYPERTENSION: ICD-10-CM

## 2021-06-30 DIAGNOSIS — D68.9 COAGULATION DEFECT (HCC): ICD-10-CM

## 2021-06-30 DIAGNOSIS — C44.212 BASAL CELL CARCINOMA (BCC) OF SKIN OF RIGHT EAR: Primary | ICD-10-CM

## 2021-06-30 PROBLEM — F33.41 RECURRENT MAJOR DEPRESSIVE DISORDER, IN PARTIAL REMISSION (HCC): Status: ACTIVE | Noted: 2021-06-30

## 2021-06-30 LAB
ALCOHOL URINE: NORMAL
AMPHETAMINE SCREEN, URINE: NORMAL
BARBITURATE SCREEN, URINE: NORMAL
BENZODIAZEPINE SCREEN, URINE: NORMAL
BUPRENORPHINE URINE: NORMAL
COCAINE METABOLITE SCREEN URINE: NORMAL
FENTANYL SCREEN, URINE: NORMAL
GABAPENTIN SCREEN, URINE: NORMAL
HBA1C MFR BLD: 11.4 %
MDMA URINE: NORMAL
METHADONE SCREEN, URINE: NORMAL
METHAMPHETAMINE, URINE: NORMAL
OPIATE SCREEN URINE: NORMAL
OXYCODONE SCREEN URINE: NORMAL
PHENCYCLIDINE SCREEN URINE: NORMAL
PROPOXYPHENE SCREEN, URINE: NORMAL
SYNTHETIC CANNABINOIDS(K2) SCREEN, URINE: NORMAL
THC SCREEN, URINE: NORMAL
TRAMADOL SCREEN URINE: NORMAL
TRICYCLIC ANTIDEPRESSANTS, UR: NORMAL

## 2021-06-30 PROCEDURE — 3017F COLORECTAL CA SCREEN DOC REV: CPT | Performed by: NURSE PRACTITIONER

## 2021-06-30 PROCEDURE — 83036 HEMOGLOBIN GLYCOSYLATED A1C: CPT | Performed by: NURSE PRACTITIONER

## 2021-06-30 PROCEDURE — 3046F HEMOGLOBIN A1C LEVEL >9.0%: CPT | Performed by: NURSE PRACTITIONER

## 2021-06-30 PROCEDURE — 1123F ACP DISCUSS/DSCN MKR DOCD: CPT | Performed by: NURSE PRACTITIONER

## 2021-06-30 PROCEDURE — 99024 POSTOP FOLLOW-UP VISIT: CPT | Performed by: OTOLARYNGOLOGY

## 2021-06-30 PROCEDURE — 2022F DILAT RTA XM EVC RTNOPTHY: CPT | Performed by: NURSE PRACTITIONER

## 2021-06-30 PROCEDURE — G8427 DOCREV CUR MEDS BY ELIG CLIN: HCPCS | Performed by: NURSE PRACTITIONER

## 2021-06-30 PROCEDURE — 4040F PNEUMOC VAC/ADMIN/RCVD: CPT | Performed by: NURSE PRACTITIONER

## 2021-06-30 PROCEDURE — 1036F TOBACCO NON-USER: CPT | Performed by: NURSE PRACTITIONER

## 2021-06-30 PROCEDURE — 3023F SPIROM DOC REV: CPT | Performed by: NURSE PRACTITIONER

## 2021-06-30 PROCEDURE — 80305 DRUG TEST PRSMV DIR OPT OBS: CPT | Performed by: NURSE PRACTITIONER

## 2021-06-30 PROCEDURE — 99214 OFFICE O/P EST MOD 30 MIN: CPT | Performed by: NURSE PRACTITIONER

## 2021-06-30 PROCEDURE — G8417 CALC BMI ABV UP PARAM F/U: HCPCS | Performed by: NURSE PRACTITIONER

## 2021-06-30 PROCEDURE — G8926 SPIRO NO PERF OR DOC: HCPCS | Performed by: NURSE PRACTITIONER

## 2021-06-30 RX ORDER — DONEPEZIL HYDROCHLORIDE 5 MG/1
10 TABLET, FILM COATED ORAL NIGHTLY
Qty: 90 TABLET | Refills: 1 | Status: SHIPPED | OUTPATIENT
Start: 2021-06-30 | End: 2022-03-10

## 2021-06-30 SDOH — ECONOMIC STABILITY: FOOD INSECURITY: WITHIN THE PAST 12 MONTHS, YOU WORRIED THAT YOUR FOOD WOULD RUN OUT BEFORE YOU GOT MONEY TO BUY MORE.: NEVER TRUE

## 2021-06-30 SDOH — ECONOMIC STABILITY: FOOD INSECURITY: WITHIN THE PAST 12 MONTHS, THE FOOD YOU BOUGHT JUST DIDN'T LAST AND YOU DIDN'T HAVE MONEY TO GET MORE.: NEVER TRUE

## 2021-06-30 ASSESSMENT — ENCOUNTER SYMPTOMS
ABDOMINAL DISTENTION: 0
SORE THROAT: 0
TROUBLE SWALLOWING: 0
SHORTNESS OF BREATH: 1
CHOKING: 0
EYE DISCHARGE: 0
COLOR CHANGE: 0
ABDOMINAL PAIN: 0
STRIDOR: 0
EYE ITCHING: 0
NAUSEA: 0
COUGH: 0
DIARRHEA: 0
VOMITING: 0
BLOOD IN STOOL: 0
CONSTIPATION: 0
WHEEZING: 0

## 2021-06-30 ASSESSMENT — SOCIAL DETERMINANTS OF HEALTH (SDOH): HOW HARD IS IT FOR YOU TO PAY FOR THE VERY BASICS LIKE FOOD, HOUSING, MEDICAL CARE, AND HEATING?: NOT HARD AT ALL

## 2021-06-30 NOTE — PROGRESS NOTES
[facility] PHYSICIAN SERVICES  [facility] INTERNAL MEDICINE  [address] [facility] [address]  [facility] [place] [address]  Dept: [phone]  Dept Fax: [phone]: [phone]    Vick Connor (:  1950) is a 69 y.o. male,Established patient, here for evaluation of the following chief complaint(s): 3 Month 59 Ney Blake is a 69 y.o. male who presents today for his medical conditions/complaints as noted below. Vick Connor is c/alexys 3 Month Follow-Up        HPI:     Chief Complaint   Patient presents with    3 Month Follow-Up     HPI   1. Paroxysmal atrial fib he is stable on Xarelto he is in sinus rhythm most of the time  2. Depression he is stable off his medications  3.  coag deficit he is on Xarelto for PAF  4. COPD he is followed by pulmonology he has been stable and doing extremely well. He has his inhalers and is on from 5 mg of prednisone routinely  5. Type 2 diabetes mellitus with peripheral neuropathy is stable with Lyrica 75 2-3 times daily as needed as far as diabetes he is on Januvia 50 his A1c is over 11. We had a lot of difficulty with medications he tried Rybelsus but just use a 3 mg for 1 month and did not call us back to let us refill it or let us know if it helped. We did discuss Rybelsus correctly taking it to add but we are going to do the Ozempic for a weekly injection. They are getting let us know after 4 to 6 weeks if he is doing okay and if we need to refill the prescription. I did give him a sample today. 6.  Hypertension blood pressure is really good today 128/68 and is on losartan 100 mg daily with Lasix 10 to 20 mg as needed  7. Sarcoidosis of the lungs he has been stable for some time he is followed by pulmonology  8. Right ear basal cell with Ballert and he has fu today   9.   Bilateral cataract surgery recently his vision is greatly improved he is completely recovered  10  Peripheral neuropathy  75 mg lyrica BID to TIS   11 memory he is on Aricept 5 mg he is concerned about memory slipping and being very forgetful.   We will increase to 10 mg    Past Medical History:   Diagnosis Date    Achalasia     going to [facility] for surgery in March    Acute pancreatitis     Anemia     Asthma     Atrial fibrillation ([facility]     h/o paroxysmal a-fib ? anesthsia induced    Benign colonic polyp     cscope 12/07 Dr Jada Chambers adenomatous: 12/14 adenoma    Chest pain     Chronic kidney disease     Chronic pain syndrome     Depression     Diabetic peripheral neuropathy (HCC)     Extrinsic asthma     Hyperlipidemia     Hypertension     Idiopathic peripheral neuropathy     Lyme disease     Mediastinal lymphadenopathy     Microalbuminuria     Mixed hyperlipidemia     Paroxysmal A-fib (HCC)     S/P ablation of atrial fibrillation     4/16 A fib ablation , Dr Julian Arellano, [facility]     Sarcoidosis, lung ([facility]     restrictive lung impairment    Tick bite     Type 2 diabetes, controlled, with neuropathy [facility])       Past Surgical History:   Procedure Laterality Date    ANKLE FRACTURE SURGERY  april 14, 2015   Jhon Magana ATRIAL ABLATION SURGERY  2015    Dr. Rubina Frazier  10/06/2020    CARDIAC SURGERY      Catheter Ablation A-fib    CHOLECYSTECTOMY      COLONOSCOPY  12/02/2014    Melecio    CYSTOSCOPY Left 8/16/2019    CYSTOSCOPY; LEFT RETROGRADE PYELOGRAM; INSERTION LEFT URETERAL STENT performed by Delaney Harris MD at [address] [place] [address] Left 6/27/2018    LASER LITHOTRIPSY STONE MANIPULATION WITH DOUBLE J STENT PLACEMENT performed by Delaney Harris MD at [facility] Left 6/27/2018    CYSTOSCOPY URETEROSCOPY RETROGRADE PYELOGRAMS performed by Delaney Harris MD at [place] [address]      ear       Vitals 6/30/2021 3/29/2021 3/29/2021 2/23/2021 1/26/2021 26/06/6936   SYSTOLIC 687 400 609 699 391 138 DIASTOLIC 68 84 77 70 76 75   Site - - - - - -   Position - - - - - -   Cuff Size - - - - - -   Pulse 93 - 76 85 92 87   Temp - - - - - -   Resp 20 - - - - -   SpO2 97 - - 98 - -   Weight 184 lb 1.6 oz - 191 lb 192 lb 192 lb 188 lb   Height 5' 9\" - 5' 2\" 5' 9\" 5' 9\" 5' 9\"   Body mass index 27.18 kg/m2 - 34.93 kg/m2 28.35 kg/m2 28.35 kg/m2 27.76 kg/m2   Pain Level - - - - - -   Some recent data might be hidden       Family History   Problem Relation Age of Onset    Coronary Art Dis Father         AICD pacer age 68    Heart Attack Father     Cancer Sister         childhood chest tumor    Heart Failure Mother        Social History     Tobacco Use    Smoking status: Never Smoker    Smokeless tobacco: Never Used   Substance Use Topics    Alcohol use: No      Current Outpatient Medications   Medication Sig Dispense Refill    donepezil (ARICEPT) 5 MG tablet Take 2 tablets by mouth nightly 90 tablet 1    JANUVIA 50 MG tablet Take 1 tablet by mouth once daily 30 tablet 2    pregabalin (LYRICA) 75 MG capsule TAKE 1 CAPSULE BY MOUTH THREE TIMES DAILY (Patient taking differently: 2 times daily. ) 90 capsule 0    XARELTO 20 MG TABS tablet TAKE 1 TABLET BY MOUTH ONCE DAILY WITH EVENING MEAL 90 tablet 1    sulfamethoxazole-trimethoprim (BACTRIM;SEPTRA) 400-80 MG per tablet three times a week       losartan (COZAAR) 100 MG tablet Take 1 tablet by mouth once daily 90 tablet 3    spironolactone (ALDACTONE) 25 MG tablet 3 days a week (Patient taking differently: Prn) 9 tablet 1    albuterol sulfate  (90 Base) MCG/ACT inhaler INHALE 2 PUFFS BY MOUTH EVERY 6 HOURS AS NEEDED FOR WHEEZING 1 Inhaler 5    predniSONE (DELTASONE) 5 MG tablet Take 5 mg by mouth daily  6    levalbuterol (XOPENEX) 1.25 MG/3ML nebulizer solution Take 1 ampule by nebulization every 6 hours as needed for Wheezing      furosemide (LASIX) 20 MG tablet Take 1 tablet by mouth daily (Patient taking differently: Take 10 mg by mouth daily as needed (if greater than 3 pound weight gain daily) ) 30 tablet 3    EPINEPHrine (EPIPEN) 0.3 MG/0.3ML SOAJ injection Inject 0.3 mLs into the muscle as needed (Allergic Reaction) 2 each 1    nitroGLYCERIN (NITROSTAT) 0.4 MG SL tablet Place 1 tablet under the tongue every 5 minutes as needed for Chest pain 25 tablet 3    azaTHIOprine (IMURAN) 50 MG tablet Take 50 mg by mouth daily       aspirin 81 MG tablet Take 81 mg by mouth daily. No current facility-administered medications for this visit. Allergies   Allergen Reactions    Bee Venom Anaphylaxis    Gabapentin Other (See Comments)     Causes blisters to head.      Penicillins Hives     \"Causes blisters to break out everywhere\"    Toprol Xl [Metoprolol]      Leg pain        Health Maintenance   Topic Date Due    Diabetic foot exam  Never done    Diabetic retinal exam  12/29/2021 (Originally 5/9/2019)    Shingles Vaccine (1 of 2) 12/29/2021 (Originally 9/15/1999)    COVID-19 Vaccine (1) 03/29/2025 (Originally 9/15/1962)    A1C test (Diabetic or Prediabetic)  09/30/2021    Annual Wellness Visit (AWV)  09/30/2021    Lipid screen  03/22/2022    Potassium monitoring  03/22/2022    Creatinine monitoring  03/22/2022    Colon cancer screen colonoscopy  08/01/2024    DTaP/Tdap/Td vaccine (2 - Td or Tdap) 05/22/2030    Flu vaccine  Completed    Pneumococcal 65+ years Vaccine  Completed    Hepatitis A vaccine  Aged Out    Hib vaccine  Aged Out    Meningococcal (ACWY) vaccine  Aged Out    Hepatitis C screen  Discontinued       Lab Results   Component Value Date    LABA1C 11.4 06/30/2021     Lab Results   Component Value Date    PSA 2.9 06/21/2021    PSA 2.5 11/09/2020    PSA 3.6 03/17/2020     TSH   Date Value Ref Range Status   03/22/2021 4.470 (H) 0.270 - 4.200 uIU/mL Final   ]  Lab Results   Component Value Date     03/22/2021    K 4.0 03/22/2021    CL 97 (L) 03/22/2021    CO2 28 03/22/2021    BUN 17 03/22/2021    CREATININE 1.0 03/22/2021    GLUCOSE 201 (H) 03/22/2021    CALCIUM 9.9 03/22/2021    PROT 7.5 03/22/2021    LABALBU 4.4 03/22/2021    BILITOT 0.7 03/22/2021    ALKPHOS 90 03/22/2021    AST 15 03/22/2021    ALT 10 03/22/2021    LABGLOM >60 03/22/2021    GFRAA >59 03/22/2021     Lab Results   Component Value Date    CHOL 170 03/22/2021    CHOL 185 12/28/2020    CHOL 191 12/09/2019     Lab Results   Component Value Date    TRIG 257 (H) 03/22/2021    TRIG 138 12/28/2020    TRIG 242 (H) 12/09/2019     Lab Results   Component Value Date    HDL 47 (L) 03/22/2021    HDL 49 (L) 12/28/2020    HDL 47 (L) 09/23/2020     Lab Results   Component Value Date    LDLCALC 72 03/22/2021    LDLCALC 108 12/28/2020    LDLCALC 97 09/23/2020     Lab Results   Component Value Date     03/22/2021    K 4.0 03/22/2021    K 4.6 09/23/2020    CL 97 03/22/2021    CO2 28 03/22/2021    BUN 17 03/22/2021    CREATININE 1.0 03/22/2021    GLUCOSE 201 03/22/2021    CALCIUM 9.9 03/22/2021      Lab Results   Component Value Date    WBC 5.1 03/22/2021    HGB 12.8 (L) 03/22/2021    HCT 38.3 (L) 03/22/2021    MCV 83.3 03/22/2021     (L) 03/22/2021    LABLYMP 1.58 09/20/2015    LYMPHOPCT 34.1 03/22/2021    RBC 4.60 (L) 03/22/2021    MCH 27.8 03/22/2021    MCHC 33.4 03/22/2021    RDW 13.9 03/22/2021     Lab Results   Component Value Date    VITD25 14.2 (L) 03/22/2021     Labs reviewed from today's A1c    Subjective:      Review of Systems   Constitutional: Negative for fatigue, fever and unexpected weight change. HENT: Negative for ear discharge, ear pain, mouth sores, sore throat and trouble swallowing. Eyes: Negative for discharge, itching and visual disturbance. Respiratory: Positive for shortness of breath. Negative for cough, choking, wheezing and stridor. Cardiovascular: Negative for chest pain, palpitations and leg swelling.    Gastrointestinal: Negative for abdominal distention, abdominal pain, blood in stool, constipation, diarrhea, nausea and vomiting. Endocrine: Negative for cold intolerance, polydipsia and polyuria. Genitourinary: Negative for difficulty urinating, dysuria, frequency and urgency. Musculoskeletal: Negative for arthralgias and gait problem. Skin: Negative for color change and rash. Allergic/Immunologic: Negative for food allergies and immunocompromised state. Neurological: Negative for dizziness, tremors, syncope, speech difficulty, weakness and headaches. Peripheral neuropathy   Hematological: Negative for adenopathy. Does not bruise/bleed easily. Psychiatric/Behavioral: Negative for confusion and hallucinations. Memory issues       Objective:     Physical Exam  Constitutional:       General: He is not in acute distress. Appearance: He is well-developed. HENT:      Head: Normocephalic and atraumatic. Eyes:      General: No scleral icterus. Right eye: No discharge. Left eye: No discharge. Pupils: Pupils are equal, round, and reactive to light. Neck:      Thyroid: No thyromegaly. Vascular: No JVD. Cardiovascular:      Rate and Rhythm: Normal rate and regular rhythm. Heart sounds: Normal heart sounds. No murmur heard. Pulmonary:      Effort: Pulmonary effort is normal. No respiratory distress. Breath sounds: Normal breath sounds. No wheezing or rales. Abdominal:      General: Bowel sounds are normal. There is no distension. Palpations: Abdomen is soft. There is no mass. Tenderness: There is no abdominal tenderness. There is no guarding or rebound. Musculoskeletal:         General: No tenderness. Normal range of motion. Cervical back: Normal range of motion and neck supple. Skin:     General: Skin is warm and dry. Findings: No erythema or rash. Neurological:      Mental Status: He is alert and oriented to person, place, and time. Cranial Nerves: No cranial nerve deficit.       Coordination: Coordination normal.      Deep Tendon Reflexes: Reflexes are normal and symmetric. Reflexes normal.   Psychiatric:         Mood and Affect: Mood is not depressed. Behavior: Behavior normal.         Thought Content: Thought content normal.         Judgment: Judgment normal.       /68   Pulse 93   Resp 20   Ht 5' 9\" (1.753 m)   Wt 184 lb 1.6 oz (83.5 kg)   SpO2 97%   BMI 27.19 kg/m²           Assessment:      Problem List     Chronic obstructive lung disease ([facility])    Relevant Medications    levalbuterol (XOPENEX) 1.25 MG/3ML nebulizer solution    predniSONE (DELTASONE) 5 MG tablet    albuterol sulfate  (90 Base) MCG/ACT inhaler    Coagulation defect ([facility])               Hypertension     Spironolactone 25 as needed losartan 100 daily Lasix 20 daily         PAF (paroxysmal atrial fibrillation) ([facility]) - Primary     Stable with Xarelto 20 mg daily sotalol          Relevant Medications    XARELTO 20 MG TABS tablet    Recurrent major depressive disorder, in partial remission ([facility]    Sarcoidosis, lung ([facility]     He is followed by Andres Hernandez and Jadon parekh is doing exceptionally well          Type 2 diabetes mellitus with peripheral neuropathy ([facility]    Relevant Medications    add Ozempic    pregabalin (LYRICA) 75 MG capsule    JANUVIA 50 MG tablet    Other Relevant Orders    POCT glycosylated hemoglobin (Hb A1C) (Completed)          Plan:        Patient given educational materials - see patient instructions. Discussed use, benefit, and side effects of prescribed medications. Allpatient questions answered. Pt voiced understanding. Reviewed health maintenance. Instructed to continue current medications, diet and exercise. Patient agreed with treatment plan. Follow up as directed.    MEDICATIONS:  Orders Placed This Encounter   Medications    donepezil (ARICEPT) 5 MG tablet     Sig: Take 2 tablets by mouth nightly     Dispense:  90 tablet     Refill:  1         ORDERS:  Orders Placed This Encounter   Procedures    CBC Auto Differential    Comprehensive Metabolic Panel    Hemoglobin A1C    Lipid Panel    Vitamin D 25 Hydroxy    Urinalysis Reflex to Culture    TSH without Reflex    POCT Rapid Drug Screen    POCT glycosylated hemoglobin (Hb A1C)       Follow-up:  Return in about 3 months (around 9/30/2021) for have labs done prior to appt. PATIENT INSTRUCTIONS:  Patient Instructions   1. Paroxysmal atrial fib sinus today well controlled  2. Depression off medications  #3 coag deficit on Xarelto with PAF  4. COPD stable with pulmonology  5. Type 2 diabetes mellitus with peripheral neuropathy add Ozempic  6. Hypertension stable with current meds  7. Sarcoidosis of the lungs followed by pulmonology on chronic dose of prednisone 5  8. Peripheral neuropathy diabetes related stable with Lyrica. Electronically signed by NUNO Corrales on 6/30/2021 at 12:22 PM        EMRDragon/transcription disclaimer:  Much of this encounter note is electronic transcription/translation of spoken language to printed texts. The electronic translation of spoken language may be erroneous, or at times,nonsensical words or phrases may be inadvertently transcribed.   Although I have reviewed the note for such errors, some may still exist.

## 2021-06-30 NOTE — PROGRESS NOTES
Procedure   Suture Removal    Date/Time: 6/30/2021 2:39 PM  Performed by: Alba Ball RN  Authorized by: Ian Curtis MD   Body area: head/neck  Location details: right ear  Comments: Patient presents for suture removal. The sutures have all dissolved and patient's ear is well-healed. He has been instructed on wound care and given path results.

## 2021-06-30 NOTE — PATIENT INSTRUCTIONS
1.  Paroxysmal atrial fib sinus today well controlled  2. Depression off medications  #3 coag deficit on Xarelto with PAF  4. COPD stable with pulmonology  5. Type 2 diabetes mellitus with peripheral neuropathy add Ozempic  6. Hypertension stable with current meds  7. Sarcoidosis of the lungs followed by pulmonology on chronic dose of prednisone 5  8. Peripheral neuropathy diabetes related stable with Lyrica.

## 2021-07-17 DIAGNOSIS — G62.9 PERIPHERAL NERVE DISEASE: ICD-10-CM

## 2021-07-19 RX ORDER — PREGABALIN 75 MG/1
75 CAPSULE ORAL 2 TIMES DAILY
Qty: 60 CAPSULE | Refills: 2 | Status: SHIPPED | OUTPATIENT
Start: 2021-07-19 | End: 2021-10-06 | Stop reason: SDUPTHER

## 2021-08-09 ENCOUNTER — TELEPHONE (OUTPATIENT)
Dept: INTERNAL MEDICINE | Age: 71
End: 2021-08-09

## 2021-08-09 NOTE — TELEPHONE ENCOUNTER
Alvaro requests that a nurse return their call. The best time to reach him is Anytime. Thank you. *Patient's wife, lucero, called to schedule a DOL appointment with Dr Rachel Tay. Maynor Sargent said patient saw Gerry Canseco and wasn't sure if Dr Phyllis Encarnacion signed off on the papers or not. Please call lucero at [phone]. Thanks.

## 2021-08-11 DIAGNOSIS — J44.9 COPD, MODERATE (HCC): ICD-10-CM

## 2021-08-11 RX ORDER — ALBUTEROL SULFATE 90 UG/1
AEROSOL, METERED RESPIRATORY (INHALATION)
Qty: 18 G | Refills: 11 | Status: SHIPPED | OUTPATIENT
Start: 2021-08-11 | End: 2023-01-23

## 2021-08-11 NOTE — TELEPHONE ENCOUNTER
Rx Refill Note  Requested Prescriptions     Pending Prescriptions Disp Refills   • Ventolin  (90 Base) MCG/ACT inhaler [Pharmacy Med Name: Ventolin  (90 Base) MCG/ACT Inhalation Aerosol Solution] 18 g 0     Sig: INHALE 2 PUFFS BY MOUTH EVERY 4 HOURS AS NEEDED FOR WHEEZING FOR SHORTNESS OF BREATH      Last office visit with prescribing clinician: 1/14/2020      Next office visit with prescribing clinician: 8/25/2021            Machelle Griffith CMA  08/11/21, 10:29 CDT       Please refill

## 2021-08-16 ENCOUNTER — TELEPHONE (OUTPATIENT)
Dept: PULMONOLOGY | Facility: CLINIC | Age: 71
End: 2021-08-16

## 2021-08-16 NOTE — TELEPHONE ENCOUNTER
Mica, please call the patient and let him know that I do not see any definite reason why he would need a follow-up CT.  I think we can cancel it for now.  I can discuss that with the patient and his wife further at his follow-up visit but again based on his old CTs and his known diagnosis of sarcoidosis, I do not see any reason he should have a follow-up scan.

## 2021-08-19 ENCOUNTER — APPOINTMENT (OUTPATIENT)
Dept: CT IMAGING | Facility: HOSPITAL | Age: 71
End: 2021-08-19

## 2021-08-19 ENCOUNTER — OFFICE VISIT (OUTPATIENT)
Dept: INTERNAL MEDICINE | Age: 71
End: 2021-08-19
Payer: COMMERCIAL

## 2021-08-19 VITALS
BODY MASS INDEX: 26.51 KG/M2 | SYSTOLIC BLOOD PRESSURE: 122 MMHG | HEIGHT: 69 IN | HEART RATE: 70 BPM | WEIGHT: 179 LBS | OXYGEN SATURATION: 99 % | DIASTOLIC BLOOD PRESSURE: 62 MMHG

## 2021-08-19 DIAGNOSIS — D86.9 SARCOIDOSIS: Primary | ICD-10-CM

## 2021-08-19 DIAGNOSIS — I10 ESSENTIAL HYPERTENSION: ICD-10-CM

## 2021-08-19 DIAGNOSIS — E11.42 TYPE 2 DIABETES MELLITUS WITH PERIPHERAL NEUROPATHY (HCC): ICD-10-CM

## 2021-08-19 DIAGNOSIS — I48.0 PAF (PAROXYSMAL ATRIAL FIBRILLATION) (HCC): ICD-10-CM

## 2021-08-19 DIAGNOSIS — J42 CHRONIC BRONCHITIS, UNSPECIFIED CHRONIC BRONCHITIS TYPE (HCC): ICD-10-CM

## 2021-08-19 DIAGNOSIS — J45.20 MILD INTERMITTENT ASTHMA, UNSPECIFIED WHETHER COMPLICATED: ICD-10-CM

## 2021-08-19 PROCEDURE — 99213 OFFICE O/P EST LOW 20 MIN: CPT | Performed by: INTERNAL MEDICINE

## 2021-08-19 NOTE — PROGRESS NOTES
Chief Complaint   Patient presents with    Follow-up     DOL office visit face to face       HPI: This is a Department of [employer] face-to-face visit patient is here to follow-up on his medical issues which include sarcoid asthma other emphysema and other medical problems. Chronic shortness of breath arthralgias fatigue but overall he feels okay no specific new complaints today.     Past Medical History:   Diagnosis Date    Achalasia     going to [facility] for surgery in March    Acute pancreatitis     Anemia     Asthma     Atrial fibrillation ([facility] [address]     h/o paroxysmal a-fib ? anesthsia induced    Benign colonic polyp     cscope 12/07 Dr Farrukh Ravi adenomatous: 12/14 adenoma    Chest pain     Chronic kidney disease     Chronic pain syndrome     Depression     Diabetic peripheral neuropathy (HCC)     Extrinsic asthma     Hyperlipidemia     Hypertension     Idiopathic peripheral neuropathy     Lyme disease     Mediastinal lymphadenopathy     Microalbuminuria     Mixed hyperlipidemia     Paroxysmal A-fib (HCC)     S/P ablation of atrial fibrillation     4/16 A fib ablation , Dr Reggie Bernal, [facility]     Sarcoidosis, lung ([facility] [address]     restrictive lung impairment    Tick bite     Type 2 diabetes, controlled, with neuropathy [facility])        Past Surgical History:   Procedure Laterality Date    ANKLE FRACTURE SURGERY  april 14, 2015   Tia See ATRIAL ABLATION SURGERY  2015    Dr. Kerline Gutierrez  10/06/2020    CARDIAC SURGERY      Catheter Ablation A-fib    CHOLECYSTECTOMY      COLONOSCOPY  12/02/2014    Melecio    CYSTOSCOPY Left 8/16/2019    CYSTOSCOPY; LEFT RETROGRADE PYELOGRAM; INSERTION LEFT URETERAL STENT performed by Axel Hairston MD at [address] [place] [address] Left 6/27/2018    LASER LITHOTRIPSY STONE MANIPULATION WITH DOUBLE J STENT PLACEMENT performed by Axel Hairston MD at [facility] 6/27/2018    CYSTOSCOPY URETEROSCOPY RETROGRADE PYELOGRAMS performed by Gudelia Bender MD at [address]      SKIN CANCER EXCISION      ear       Family History   Problem Relation Age of Onset    Coronary Art Dis Father         AICD pacer age 68    Heart Attack Father     Cancer Sister         childhood chest tumor    Heart Failure Mother        Social History     Socioeconomic History    Marital status:      Spouse name: Latricia Rendon    Number of children: Not on file    Years of education: Not on file    Highest education level: Not on file   Occupational History    Not on file   Tobacco Use    Smoking status: Never Smoker    Smokeless tobacco: Never Used   Vaping Use    Vaping Use: Never used   Substance and Sexual Activity    Alcohol use: No    Drug use: No    Sexual activity: Not on file   Other Topics Concern    Not on file   Social History Narrative    Not on file     Social Determinants of Health     Financial Resource Strain: Low Risk     Difficulty of Paying Living Expenses: Not hard at all   Food Insecurity: No Food Insecurity    Worried About [address] in the Last Year: Never true    [address] [email or web address] in the Last Year: Never true   Transportation Needs:     Lack of Transportation (Medical):  Lack of Transportation (Non-Medical):    Physical Activity: Inactive    Days of Exercise per Week: 0 days    Minutes of Exercise per Session: 0 min   Stress:     Feeling of Stress :    Social Connections:     Frequency of Communication with Friends and Family:     Frequency of Social Gatherings with Friends and Family:     Attends [employer] Services:     Active Member of Clubs or Organizations:     Attends Club or Organization Meetings:     Marital Status:    Intimate Partner Violence:     Fear of Current or Ex-Partner:     Emotionally Abused:     Physically Abused:     Sexually Abused:         Allergies   Allergen Reactions    Bee Venom Anaphylaxis    Gabapentin Other (See Comments)     Causes blisters to head.  Penicillins Hives     \"Causes blisters to break out everywhere\"    Toprol Xl [Metoprolol]      Leg pain        Current Outpatient Medications   Medication Sig Dispense Refill    fluticasone-salmeterol (ADVAIR) 500-50 MCG/DOSE diskus inhaler Inhale 1 puff into the lungs every 12 hours 1 puff in the am, prn in afternoon      pregabalin (LYRICA) 75 MG capsule Take 1 capsule by mouth 2 times daily for 30 days. 60 capsule 2    donepezil (ARICEPT) 5 MG tablet Take 2 tablets by mouth nightly 90 tablet 1    JANUVIA 50 MG tablet Take 1 tablet by mouth once daily 30 tablet 2    XARELTO 20 MG TABS tablet TAKE 1 TABLET BY MOUTH ONCE DAILY WITH EVENING MEAL 90 tablet 1    sulfamethoxazole-trimethoprim (BACTRIM;SEPTRA) 400-80 MG per tablet three times a week       losartan (COZAAR) 100 MG tablet Take 1 tablet by mouth once daily 90 tablet 3    spironolactone (ALDACTONE) 25 MG tablet 3 days a week (Patient taking differently: Prn) 9 tablet 1    albuterol sulfate  (90 Base) MCG/ACT inhaler INHALE 2 PUFFS BY MOUTH EVERY 6 HOURS AS NEEDED FOR WHEEZING 1 Inhaler 5    predniSONE (DELTASONE) 5 MG tablet Take 5 mg by mouth daily  6    levalbuterol (XOPENEX) 1.25 MG/3ML nebulizer solution Take 1 ampule by nebulization every 6 hours as needed for Wheezing      furosemide (LASIX) 20 MG tablet Take 1 tablet by mouth daily (Patient taking differently: Take 10 mg by mouth daily as needed (if greater than 3 pound weight gain daily) ) 30 tablet 3    EPINEPHrine (EPIPEN) 0.3 MG/0.3ML SOAJ injection Inject 0.3 mLs into the muscle as needed (Allergic Reaction) 2 each 1    nitroGLYCERIN (NITROSTAT) 0.4 MG SL tablet Place 1 tablet under the tongue every 5 minutes as needed for Chest pain 25 tablet 3    azaTHIOprine (IMURAN) 50 MG tablet Take 50 mg by mouth daily       aspirin 81 MG tablet Take 81 mg by mouth daily.        No current facility-administered medications for this visit. Review of Systems    /62   Pulse 70   Ht 5' 9\" (1.753 m)   Wt 179 lb (81.2 kg)   SpO2 99%   BMI 26.43 kg/m²   BP Readings from Last 7 Encounters:   08/26/21 138/74   08/19/21 122/62   06/30/21 128/68   03/29/21 136/84   02/23/21 122/70   01/26/21 122/76   12/29/20 138/75     Wt Readings from Last 7 Encounters:   08/26/21 180 lb (81.6 kg)   08/19/21 179 lb (81.2 kg)   06/30/21 184 lb 1.6 oz (83.5 kg)   03/29/21 191 lb (86.6 kg)   02/23/21 192 lb (87.1 kg)   01/26/21 192 lb (87.1 kg)   12/29/20 188 lb (85.3 kg)     BMI Readings from Last 7 Encounters:   08/26/21 26.58 kg/m²   08/19/21 26.43 kg/m²   06/30/21 27.19 kg/m²   03/29/21 34.93 kg/m²   02/23/21 28.35 kg/m²   01/26/21 28.35 kg/m²   12/29/20 27.76 kg/m²     Resp Readings from Last 7 Encounters:   06/30/21 20   09/23/20 18   09/15/20 18   05/22/20 18   02/12/20 15   02/11/20 20   02/07/20 21       Physical Exam  Constitutional:       General: He is not in acute distress. Eyes:      General: No scleral icterus. Cardiovascular:      Heart sounds: Normal heart sounds. Pulmonary:      Comments: Decreased breath sounds  Musculoskeletal:      Cervical back: Neck supple. Lymphadenopathy:      Cervical: No cervical adenopathy. Skin:     Findings: No rash.    Psychiatric:         Mood and Affect: Mood normal.         Results for orders placed or performed in visit on 06/30/21   POCT Rapid Drug Screen   Result Value Ref Range    Alcohol, Urine neg     Amphetamine Screen, Urine neg     Barbiturate Screen, Urine neg     Benzodiazepine Screen, Urine neg     Buprenorphine Urine neg     Cocaine Metabolite Screen, Urine neg     FENTANYL SCREEN, URINE neg     Gabapentin Screen, Urine neg     MDMA, Urine neg     Methadone Screen, Urine neg     Methamphetamine, Urine neg     Opiate Scrn, Ur neg     Oxycodone Screen, Ur neg     PCP Screen, Urine neg     Propoxyphene Screen, Urine neg     Synthetic Cannabinoids (K2) Screen, Urine neg     THC Screen, Urine neg     Tramadol Scrn, Ur neg     Tricyclic Antidepressants, Urine neg    POCT glycosylated hemoglobin (Hb A1C)   Result Value Ref Range    Hemoglobin A1C 11.4 %       ASSESSMENT/ PLAN:  1. Sarcoidosis  Continue present medical management and present plan of care following with pulmonary continue with department of energy home health assistance    2. Mild intermittent asthma, unspecified whether complicated  As above continue follow-up with pulmonary continue with [facility] home health assistance    3. Essential hypertension  Pressure is stable continue current care and follow    4. PAF (paroxysmal atrial fibrillation) ([facility] [address]  Continue with current medical care    5. Type 2 diabetes mellitus with peripheral neuropathy (HCC)  Diabetes is not in good control last lab will be checked again    6. Chronic bronchitis, unspecified chronic bronchitis type ([facility] [address]  As above he has ongoing issues benefits from nursing assistance from the department of energy    The most important thing that this patient could do at this point in time is get a Covid vaccine he and his wife both declined. This patient has multiple lung problems that caused him to get chronic nursing care and we emphasize he said high risk for very poor outcome from COVID-19.   Again we discussed and highly recommend the vaccine

## 2021-08-20 ENCOUNTER — TRANSCRIBE ORDERS (OUTPATIENT)
Dept: LAB | Facility: HOSPITAL | Age: 71
End: 2021-08-20

## 2021-08-20 DIAGNOSIS — Z11.59 SCREENING FOR VIRAL DISEASE: Primary | ICD-10-CM

## 2021-08-23 ENCOUNTER — TELEPHONE (OUTPATIENT)
Dept: INTERNAL MEDICINE | Age: 71
End: 2021-08-23

## 2021-08-23 ENCOUNTER — LAB (OUTPATIENT)
Dept: LAB | Facility: HOSPITAL | Age: 71
End: 2021-08-23

## 2021-08-23 LAB — SARS-COV-2 ORF1AB RESP QL NAA+PROBE: NOT DETECTED

## 2021-08-23 PROCEDURE — C9803 HOPD COVID-19 SPEC COLLECT: HCPCS | Performed by: INTERNAL MEDICINE

## 2021-08-23 PROCEDURE — U0004 COV-19 TEST NON-CDC HGH THRU: HCPCS | Performed by: INTERNAL MEDICINE

## 2021-08-23 NOTE — TELEPHONE ENCOUNTER
Judi Peoples from [facility]  Is calling to have the note faxed to her at [phone]

## 2021-08-25 ENCOUNTER — OFFICE VISIT (OUTPATIENT)
Dept: PULMONOLOGY | Facility: CLINIC | Age: 71
End: 2021-08-25

## 2021-08-25 VITALS
BODY MASS INDEX: 26.63 KG/M2 | DIASTOLIC BLOOD PRESSURE: 84 MMHG | WEIGHT: 179.8 LBS | SYSTOLIC BLOOD PRESSURE: 142 MMHG | HEART RATE: 75 BPM | HEIGHT: 69 IN | OXYGEN SATURATION: 99 %

## 2021-08-25 DIAGNOSIS — Z87.891 FORMER SMOKER: ICD-10-CM

## 2021-08-25 DIAGNOSIS — E66.3 OVERWEIGHT: ICD-10-CM

## 2021-08-25 DIAGNOSIS — J44.9 COPD, MODERATE (HCC): Primary | ICD-10-CM

## 2021-08-25 DIAGNOSIS — J44.9 COPD, MODERATE (HCC): ICD-10-CM

## 2021-08-25 DIAGNOSIS — D86.2 SARCOIDOSIS OF LUNG WITH SARCOIDOSIS OF LYMPH NODES (HCC): Primary | ICD-10-CM

## 2021-08-25 DIAGNOSIS — J98.4 RESTRICTIVE LUNG DISEASE: ICD-10-CM

## 2021-08-25 PROCEDURE — 94010 BREATHING CAPACITY TEST: CPT | Performed by: INTERNAL MEDICINE

## 2021-08-25 PROCEDURE — 94729 DIFFUSING CAPACITY: CPT | Performed by: INTERNAL MEDICINE

## 2021-08-25 PROCEDURE — 99214 OFFICE O/P EST MOD 30 MIN: CPT | Performed by: INTERNAL MEDICINE

## 2021-08-25 PROCEDURE — 94727 GAS DIL/WSHOT DETER LNG VOL: CPT | Performed by: INTERNAL MEDICINE

## 2021-08-25 NOTE — PATIENT INSTRUCTIONS
The patient is quite stable clinically and pulmonary functions are also stable to slightly improved.  I told him we will plan on yearly visits with PFTs.  If he has anything to suggest a flare of his sarcoid he can call the office and we can call him in antibiotics and increase his steroids and also get a chest x-ray and bring him in for reevaluation as well.  Otherwise again he will return in 1 year with pulmonary functions.

## 2021-08-25 NOTE — PROGRESS NOTES
Chief Complaint  Sarcoidosis and COPD    Subjective    History of Present Illness {CC  Problem List  Visit Diagnosis   Encounters  Notes  Medications  Labs  Result Review Imaging  Media     Caio Riley presents to [facility] for sarcoidosis and COPD.    History of Present Illness   The patient returns today for pulmonary functions in particular.  They do show a moderate obstructive defect but actually some improvement in his FVC compared to his most recent studies.  His FEV1 is essentially unchanged compared to previous prebronchodilator studies.  His total lung capacity has dropped slightly but not significantly and when corrected for alveolar volume he actually has had improvement in his diffusion capacity compared to previous.  I went over these with findings with the patient and his wife.  He is quite stable clinically and at present I do not think he needs any routine follow-up imaging studies.  If he were to have symptoms suggesting a flare of his sarcoidosis then he can certainly increase his steroid therapy and can also contact the office regarding antibiotics and also chest x-ray and then office follow-up shortly thereafter.  If you were to have any new changes on chest x-ray or had acute symptoms that did not resolve with an increased dose of steroids and a course of antibiotics then I would consider a follow-up chest CT.  Also if on serial pulmonary functions he had significant changes would consider a follow-up chest CT.  Otherwise we will hold off on this and we will plan on yearly follow-ups at this point with pulmonary function studies here in the office.  Prior to Admission medications    Medication Sig Start Date End Date Taking? Authorizing Provider   ascorbic acid (VITAMIN C) 1000 MG tablet Take 1,000 mg by mouth Daily.    Provider, MD Fredrick   aspirin 81 MG chewable tablet Chew 81 mg Daily.    Emergency, Nurse Alessandro, RN    azaTHIOprine (IMURAN) 50 MG tablet  3/22/19   Fredrick Denis MD   donepezil (ARICEPT) 5 MG tablet  3/26/19   Fredrick Denis MD   EPINEPHrine (EPIPEN) 0.3 MG/0.3ML solution auto-injector injection Inject 0.3 mg into the appropriate muscle as directed by prescriber.    Fredrick Denis MD   fluticasone-salmeterol (ADVAIR) 500-50 MCG/DOSE DISKUS Inhale 2 (Two) Times a Day.    Fredrick Denis MD   furosemide (LASIX) 10 MG/ML ORAL solution Take 10 mg by mouth Daily As Needed (for weight gain 3lbs in 24 hours).    Fredrick Denis MD   ibandronate (BONIVA) 150 MG tablet Take 150 mg by mouth Every 30 (Thirty) Days.    Fredrick Denis MD   Insulin Pen Needle (BD PEN NEEDLE DAGMAR U/F) 32G X 4 MM misc 1 each. 2/25/19   Fredrick Denis MD   ipratropium (ATROVENT) 0.02 % nebulizer solution Take 2.5 mL by nebulization 4 (Four) Times a Day. 7/30/19   Salvatore Shah MD   ipratropium-albuterol (DUO-NEB) 0.5-2.5 mg/3 ml nebulizer Take 3 mL by nebulization 4 (Four) Times a Day As Needed for Wheezing. 7/12/19   Salvatore Shah MD   IRON PO Take 27 mg by mouth 2 (Two) Times a Day.    Fredrick Denis MD   JANUVIA 50 MG tablet  3/5/19   Fredrick Denis MD   LANTUS SOLOSTAR 100 UNIT/ML injection pen 14 Units. 2/25/19   Fredrick Denis MD   levalbuterol (XOPENEX) 1.25 MG/3ML nebulizer solution Take 1 ampule by nebulization 4 (Four) Times a Day. 7/30/19   Salvatore Shah MD   levalbuterol (XOPENEX) 1.25 MG/3ML nebulizer solution Take 1 ampule by nebulization 4 (Four) Times a Day As Needed for Wheezing. 4/27/20   Salvatore Shah MD   losartan (COZAAR) 100 MG tablet  3/18/19   Fredrick Denis MD   predniSONE (DELTASONE) 5 MG tablet Take 5 mg by mouth Daily.    Provider, MD Fredrick   pregabalin (LYRICA) 75 MG capsule Take 75 mg by mouth 3 (Three) Times a Day. 3/15/21   Provider, MD Fredrick   RELION PEN NEEDLES 32G X 4 MM misc  2/25/19    "Fredrick Denis MD   rivaroxaban (XARELTO) 20 MG tablet Take 20 mg by mouth Daily. STOPPED 18    Fredrick Denis MD   sulfamethoxazole-trimethoprim (BACTRIM,SEPTRA) 400-80 MG tablet Take 1 tablet by mouth 3 (Three) Times a Week. 3/23/19   Fredrick Denis MD   tiZANidine (ZANAFLEX) 4 MG tablet Take 4 mg by mouth At Night As Needed for Muscle Spasms.    Fredrick Denis MD   Ventolin  (90 Base) MCG/ACT inhaler INHALE 2 PUFFS BY MOUTH EVERY 4 HOURS AS NEEDED FOR WHEEZING FOR SHORTNESS OF BREATH 21   Salvatore Shah MD       Social History     Socioeconomic History   • Marital status:      Spouse name: Not on file   • Number of children: Not on file   • Years of education: Not on file   • Highest education level: Not on file   Tobacco Use   • Smoking status: Former Smoker     Packs/day: 0.50     Years: 2.00     Pack years: 1.00     Types: Cigarettes     Quit date:      Years since quittin.6   • Smokeless tobacco: Never Used   Substance and Sexual Activity   • Alcohol use: No   • Drug use: No   • Sexual activity: Defer       Objective   Vital Signs:   /84   Pulse 75   Ht 175.3 cm (69\")   Wt 81.6 kg (179 lb 12.8 oz)   SpO2 99% Comment: ra  BMI 26.55 kg/m²     Physical Exam  Vitals and nursing note reviewed.   HENT:      Head: Normocephalic.      Comments: He is wearing a mask.  Eyes:      Pupils: Pupils are equal, round, and reactive to light.   Cardiovascular:      Rate and Rhythm: Normal rate and regular rhythm.   Pulmonary:      Effort: Pulmonary effort is normal.      Comments: He has reasonable air movement bilaterally with no adventitious sounds heard.  Musculoskeletal:         General: Normal range of motion.   Skin:     General: Skin is warm and dry.   Neurological:      General: No focal deficit present.      Mental Status: He is alert and oriented to person, place, and time.   Psychiatric:         Mood and Affect: Mood normal.         " Behavior: Behavior normal.        Result Review :{ Labs  Result Review  Imaging  Med Tab  Media :    PFT Values        Some values may be hidden. Unless noted otherwise, only the newest values recorded on each date are displayed.         Old Values PFT Results 20   No data to display.      Pre Drug PFT Results 20   FVC 57 75   FEV1 43 62   FEF 25-75% 17 29   FEV1/FVC 59.48 65.14      Post Drug PFT Results 20   No data to display.      Other Tests PFT Results 20   TLC 61 78   RV 75 89   DLCO 47 55   D/VAsb 97 95               Results for orders placed in visit on 21    Pulmonary Function Test    Narrative  Pulmonary Function Test  Performed by: Machelle Elliott, RRT  Authorized by: Salvatore Shah MD    Pre Drug % Predicted  FVC: 75%  FEV1: 62%  FEF 25-75%: 29%  FEV1/FVC: 65.14%  T%  RV: 89%  DLCO: 55%  D/VAsb: 95%    Interpretation  Spirometry  Spirometry shows moderate obstruction.  Lung Volume Measurements  Measurements show reduced lung volumes consistent with restriction.  Diffusion Capacity  The patient's diffusion capacity is moderately reduced.  Diffusion capacity is normal when corrected for alveolar volume.  Overall comments: The patient's spirometry is consistent with a moderate obstructive ventilatory defect.  Lung volumes reveal a coexisting restrictive ventilatory defect.  There is a moderate diffusion impairment which when corrected for alveolar volume is normalized.  When current studies are compared to studies done at [facility] on , his FVC has improved compared to previous pre and postbronchodilator studies.  His current FEV1 is unchanged compared to his previous prebronchodilator FEV1 but is dropped slightly compared to his previous postbronchodilator value.  His total lung capacity has dropped slightly but not significantly.  When corrected for alveolar volume he has had a slight  improvement in his diffusion capacity compared to previous.      Results for orders placed during the hospital encounter of 20    Pulmonary Function Test    Narrative  [facility] - Pulmonary Function Test    [address] [facility]  [id number]  [phone]    Patient : Caio Riley  MRN : [id number]  YOB: 1950  :  Sergio Philip MD  Date : 2020    ______________________________________________________________________    Interpretation :  1. Spirometry shows moderate restrictive physiology  2. Following bronchodilator there is modest improvement in FEV1 but moderate restrictive physiology remains  3. Mid flow is reduced  4. Lung volume measurements show reduced total lung capacity but normal residual volume reduced slow vital capacity and expiratory reserve volume  5. Diffusion capacity is reduced  6. Airway resistance is increased and conductance is decreased      Sergio Philip MD      Results for orders placed in visit on 20    Pulmonary Function Test    Narrative  Pulmonary Function Test  Performed by: Salvatore Shah MD  Authorized by: Salvatore Shah MD    Pre Drug  FVC: 57%  FEV1: 43%  FEF 25-75%: 17%  FEV1/FVC: 59.48%  T%  RV: 75%  DLCO: 47%  D/VAsb: 97%                   My interpretation of labs:   COVID PRE-OP / PRE-PROCEDURE SCREENING ORDER (NO ISOLATION) - Swab, Nasal Cavity (2021 10:38)      Assessment and Plan {CC Problem List  Visit Diagnosis  ROS  Review (Popup)  Health Maintenance  Quality  BestPractice  Medications  SmartSets  SnapShot Encounters  Media      Diagnoses and all orders for this visit:    1. Sarcoidosis of lung with sarcoidosis of lymph nodes (CMS/HCC) (Primary)  Assessment & Plan:  He is quite stable clinically.  He will continue his low-dose prednisone therapy and can increase the dose if need be for flares of his sarcoidosis.  Also we can contact the office if he has such  issues and we can call him in antibiotics and perform imaging studies and bring him into the office for evaluation as well.      2. Restrictive lung disease  Assessment & Plan:  This is noted on his current pulmonary functions.  His vital capacity has not changed significantly from previous.  He has had a very minimal drop in his total lung capacity compared to previous.      3. COPD, moderate (CMS/HCC)  Assessment & Plan:  I do think his airflow obstruction is not due to COPD associated with smoking his smoking history is very minimal but rather he has airflow obstruction associated with his sarcoidosis.  He may continue his Atrovent and Xopenex neb treatments, his Ventolin HFA as needed, and his Advair Diskus.          4. Overweight  Assessment & Plan:  His BMI is slightly elevated and diet and exercise are encouraged otherwise he will follow-up with his primary healthcare provider regarding his elevated BMI.       5. Former smoker  Assessment & Plan:  He has a minimal smoking history having quit in 1968.        Patient's Body mass index is 26.55 kg/m². indicating that he is overweight (BMI 25-29.9). Obesity-related health conditions include the following: hypertension. Obesity is unchanged.  Diet and exercise encouraged otherwise he will follow-up with his primary healthcare provider regarding his elevated BMI.          Salvatore Shah MD  8/25/2021  16:45 CDT    Follow Up {Instructions Charge Capture  Follow-up Communications   Return in about 1 year (around 8/25/2022) for Complete PFT, To see me specifically.    Patient was given instructions and counseling regarding his condition or for health maintenance advice. Please see specific information pulled into the AVS if appropriate.

## 2021-08-25 NOTE — ASSESSMENT & PLAN NOTE
His BMI is slightly elevated and diet and exercise are encouraged otherwise he will follow-up with his primary healthcare provider regarding his elevated BMI.

## 2021-08-25 NOTE — PROCEDURES
Pulmonary Function Test  Performed by: Machelle Elliott, RRT  Authorized by: Salvatore Shah MD      Pre Drug % Predicted    FVC: 75%   FEV1: 62%   FEF 25-75%: 29%   FEV1/FVC: 65.14%   T%   RV: 89%   DLCO: 55%   D/VAsb: 95%    Interpretation   Spirometry   Spirometry shows moderate obstruction.   Lung Volume Measurements  Measurements show reduced lung volumes consistent with restriction.   Diffusion Capacity  The patient's diffusion capacity is moderately reduced.  Diffusion capacity is normal when corrected for alveolar volume.   Overall comments: The patient's spirometry is consistent with a moderate obstructive ventilatory defect.  Lung volumes reveal a coexisting restrictive ventilatory defect.  There is a moderate diffusion impairment which when corrected for alveolar volume is normalized.  When current studies are compared to studies done at [facility] on , his FVC has improved compared to previous pre and postbronchodilator studies.  His current FEV1 is unchanged compared to his previous prebronchodilator FEV1 but is dropped slightly compared to his previous postbronchodilator value.  His total lung capacity has dropped slightly but not significantly.  When corrected for alveolar volume he has had a slight improvement in his diffusion capacity compared to previous.

## 2021-08-25 NOTE — ASSESSMENT & PLAN NOTE
He is quite stable clinically.  He will continue his low-dose prednisone therapy and can increase the dose if need be for flares of his sarcoidosis.  Also we can contact the office if he has such issues and we can call him in antibiotics and perform imaging studies and bring him into the office for evaluation as well.

## 2021-08-25 NOTE — ASSESSMENT & PLAN NOTE
This is noted on his current pulmonary functions.  His vital capacity has not changed significantly from previous.  He has had a very minimal drop in his total lung capacity compared to previous.

## 2021-08-25 NOTE — ASSESSMENT & PLAN NOTE
I do think his airflow obstruction is not due to COPD associated with smoking his smoking history is very minimal but rather he has airflow obstruction associated with his sarcoidosis.  He may continue his Atrovent and Xopenex neb treatments, his Ventolin HFA as needed, and his Advair Diskus.

## 2021-08-26 ENCOUNTER — OFFICE VISIT (OUTPATIENT)
Dept: CARDIOLOGY CLINIC | Age: 71
End: 2021-08-26
Payer: MEDICARE

## 2021-08-26 VITALS
BODY MASS INDEX: 26.66 KG/M2 | HEART RATE: 72 BPM | OXYGEN SATURATION: 98 % | HEIGHT: 69 IN | WEIGHT: 180 LBS | DIASTOLIC BLOOD PRESSURE: 74 MMHG | SYSTOLIC BLOOD PRESSURE: 138 MMHG

## 2021-08-26 DIAGNOSIS — I10 ESSENTIAL HYPERTENSION: ICD-10-CM

## 2021-08-26 DIAGNOSIS — I48.0 PAF (PAROXYSMAL ATRIAL FIBRILLATION) (HCC): Primary | ICD-10-CM

## 2021-08-26 DIAGNOSIS — E78.5 HYPERLIPIDEMIA, UNSPECIFIED HYPERLIPIDEMIA TYPE: ICD-10-CM

## 2021-08-26 PROCEDURE — 93000 ELECTROCARDIOGRAM COMPLETE: CPT | Performed by: NURSE PRACTITIONER

## 2021-08-26 PROCEDURE — 4040F PNEUMOC VAC/ADMIN/RCVD: CPT | Performed by: NURSE PRACTITIONER

## 2021-08-26 PROCEDURE — G8428 CUR MEDS NOT DOCUMENT: HCPCS | Performed by: NURSE PRACTITIONER

## 2021-08-26 PROCEDURE — 1036F TOBACCO NON-USER: CPT | Performed by: NURSE PRACTITIONER

## 2021-08-26 PROCEDURE — 99214 OFFICE O/P EST MOD 30 MIN: CPT | Performed by: NURSE PRACTITIONER

## 2021-08-26 PROCEDURE — G8417 CALC BMI ABV UP PARAM F/U: HCPCS | Performed by: NURSE PRACTITIONER

## 2021-08-26 PROCEDURE — 1123F ACP DISCUSS/DSCN MKR DOCD: CPT | Performed by: NURSE PRACTITIONER

## 2021-08-26 PROCEDURE — 3017F COLORECTAL CA SCREEN DOC REV: CPT | Performed by: NURSE PRACTITIONER

## 2021-08-26 ASSESSMENT — ENCOUNTER SYMPTOMS
SORE THROAT: 0
COUGH: 0
WHEEZING: 0
SHORTNESS OF BREATH: 0
CHEST TIGHTNESS: 0

## 2021-08-26 NOTE — PROGRESS NOTES
[facility]   Established Patient Office Visit   [facility]. [address] [facility]  [phone]        OFFICE VISIT:  2021    Declan Yanez - : 1950    Reason For Visit:  Edy Rivera is a 69 y.o. male who is here for 6 Month Follow-Up (no cardiac symptoms )    1. PAF (paroxysmal atrial fibrillation) ([address]    2. Essential hypertension    3. Hyperlipidemia, unspecified hyperlipidemia type        Patient with a history of dyslipidemia, diabetes, sarcoidosis, histoplasmosis, hypertension and paroxysmal atrial fib.     He is a patient of Dr. Vanna Ballard.     Patient was seen in the office on 2021. Gearld Chol was placed to evaluate for any recurrence of atrial fib.     ZIO Patch showed: This rhythm with a minimum heart rate of 57 bpm, maximum 127 bpm.  2 SVT runs fastest being 12 beats at a maximum rate of 158 bpm.  Frequent SVE's 5.4%.     Presents to clinic today for routine follow-up. Denies any chest pain, pressure or tightness. There is no shortness of breath, orthopnea or PND. Patient denies any lightheadedness, dizziness or syncope.       Subjective    Declan Yanez is a 69 y.o. male with the following history as recorded in [facility]:    Patient Active Problem List    Diagnosis Date Noted    Recurrent major depressive disorder, in partial remission ([address] 2021    Coagulation defect ([address] 2021    Chronic pain syndrome 2020    Nocturnal hypoxia 2020    Achalasia 2019    Sarcoidosis 10/21/2019    Nephrolithiasis 2019    Vitamin D deficiency 2019    Chronic kidney disease 2019    Gross hematuria     Chronic obstructive lung disease ([address] 2019    Anemia 10/09/2018    Other male erectile dysfunction 2018    Ureterolithiasis 2018    Hydronephrosis, left 2018    History of histoplasmosis 2018    Lymphadenopathy 2018    Splenomegaly 2018    Asthma 2018    Pancreatitis 06/08/2018    Peripheral nerve disease 06/08/2018    Polyp of colon 06/08/2018    Hypercalcemia 04/26/2018    GARNETT (dyspnea on exertion) 11/27/2017    Type 2 diabetes mellitus with peripheral neuropathy [facility]     Sarcoidosis, lung ([facility]     S/P ablation of atrial fibrillation 05/16/2017    PAF (paroxysmal atrial fibrillation) (HCC)     Hypertension     Hyperlipidemia      Current Outpatient Medications   Medication Sig Dispense Refill    fluticasone-salmeterol (ADVAIR) 500-50 MCG/DOSE diskus inhaler Inhale 1 puff into the lungs every 12 hours 1 puff in the am, prn in afternoon      donepezil (ARICEPT) 5 MG tablet Take 2 tablets by mouth nightly 90 tablet 1    JANUVIA 50 MG tablet Take 1 tablet by mouth once daily 30 tablet 2    XARELTO 20 MG TABS tablet TAKE 1 TABLET BY MOUTH ONCE DAILY WITH EVENING MEAL 90 tablet 1    sulfamethoxazole-trimethoprim (BACTRIM;SEPTRA) 400-80 MG per tablet three times a week       losartan (COZAAR) 100 MG tablet Take 1 tablet by mouth once daily 90 tablet 3    spironolactone (ALDACTONE) 25 MG tablet 3 days a week (Patient taking differently: Prn) 9 tablet 1    albuterol sulfate  (90 Base) MCG/ACT inhaler INHALE 2 PUFFS BY MOUTH EVERY 6 HOURS AS NEEDED FOR WHEEZING 1 Inhaler 5    predniSONE (DELTASONE) 5 MG tablet Take 5 mg by mouth daily  6    levalbuterol (XOPENEX) 1.25 MG/3ML nebulizer solution Take 1 ampule by nebulization every 6 hours as needed for Wheezing      furosemide (LASIX) 20 MG tablet Take 1 tablet by mouth daily (Patient taking differently: Take 10 mg by mouth daily as needed (if greater than 3 pound weight gain daily) ) 30 tablet 3    EPINEPHrine (EPIPEN) 0.3 MG/0.3ML SOAJ injection Inject 0.3 mLs into the muscle as needed (Allergic Reaction) 2 each 1    nitroGLYCERIN (NITROSTAT) 0.4 MG SL tablet Place 1 tablet under the tongue every 5 minutes as needed for Chest pain 25 tablet 3    azaTHIOprine (IMURAN) 50 MG tablet Take 50 mg by mouth daily       aspirin 81 MG tablet Take 81 mg by mouth daily.  pregabalin (LYRICA) 75 MG capsule Take 1 capsule by mouth 2 times daily for 30 days. 60 capsule 2     No current facility-administered medications for this visit.      Allergies: Bee venom, Gabapentin, Penicillins, and Toprol xl [metoprolol]  Past Medical History:   Diagnosis Date    Achalasia     going to [facility] for surgery in March    Acute pancreatitis     Anemia     Asthma     Atrial fibrillation (HCC)     h/o paroxysmal a-fib ? anesthsia induced    Benign colonic polyp     cscope 12/07 Dr Hanson Arrow adenomatous: 12/14 adenoma    Chest pain     Chronic kidney disease     Chronic pain syndrome     Depression     Diabetic peripheral neuropathy (HCC)     Extrinsic asthma     Hyperlipidemia     Hypertension     Idiopathic peripheral neuropathy     Lyme disease     Mediastinal lymphadenopathy     Microalbuminuria     Mixed hyperlipidemia     Paroxysmal A-fib (HCC)     S/P ablation of atrial fibrillation     4/16 A fib ablation , Dr Barber [place], [facility]     Sarcoidosis, lung ([address]     restrictive lung impairment    Tick bite     Type 2 diabetes, controlled, with neuropathy [facility])      Past Surgical History:   Procedure Laterality Date    ANKLE FRACTURE SURGERY  april 14, 2015   [facility] ATRIAL ABLATION SURGERY  2015    Dr. David Parrish  10/06/2020    CARDIAC SURGERY      Catheter Ablation A-fib    CHOLECYSTECTOMY      COLONOSCOPY  12/02/2014    [facility]    CYSTOSCOPY Left 8/16/2019    CYSTOSCOPY; LEFT RETROGRADE PYELOGRAM; INSERTION LEFT URETERAL STENT performed by Gudelia Bender MD at [address] [place] [address] Left 6/27/2018    LASER LITHOTRIPSY STONE MANIPULATION WITH DOUBLE J STENT PLACEMENT performed by Gudelia Bender MD at [facility] Left 6/27/2018    CYSTOSCOPY URETEROSCOPY RETROGRADE PYELOGRAMS performed by Mode Hernandez Arash Rolle MD at [address] OR    SKIN CANCER EXCISION      SKIN CANCER EXCISION      ear     Family History   Problem Relation Age of Onset    Coronary Art Dis Father         AICD pacer age 68    Heart Attack Father     Cancer Sister         childhood chest tumor    Heart Failure Mother      Social History     Tobacco Use    Smoking status: Never Smoker    Smokeless tobacco: Never Used   Substance Use Topics    Alcohol use: No          Review of Systems:    Review of Systems   Constitutional: Negative for activity change, chills, diaphoresis, fatigue and fever. HENT: Negative for congestion and sore throat. Respiratory: Negative for cough, chest tightness, shortness of breath and wheezing. Cardiovascular: Negative for chest pain, palpitations and leg swelling. Neurological: Negative for dizziness, syncope, light-headedness and headaches. Psychiatric/Behavioral: Negative for confusion. The patient is not nervous/anxious. Objective:    /74   Pulse 72   Ht 5' 9\" (1.753 m)   Wt 180 lb (81.6 kg)   SpO2 98%   BMI 26.58 kg/m²     GENERAL - well developed and well nourished, conversant  HEENT -  PERRLA, Hearing appears normal, gentleman lids are normal.  External inspection of ears and nose appear normal.  NECK - no thyromegaly, no JVD, trachea is in the midline  CARDIOVASCULAR - PMI is in the mid line clavicular position, Normal S1 and S2 with no systolic murmur. No S3 or S4    PULMONARY - no respiratory distress. No wheezes or rales. Lungs are clear to ausculation, normal respiratory effort. ABDOMEN  - soft, non tender, no rebound  MUSCULOSKELETAL  - range of motion of the upper and lower extermites appears normal and equal and is without pain   EXTREMITIES - no significant edema   NEUROLOGIC - gait and station are normal  SKIN - turgor is normal, can warm and dry.   PSYCHIATRIC - normal mood and affect, alert and orientated x 3,      ASSESSMENT:    ALL THE CARDIOLOGY PROBLEMS ARE LISTED ABOVE; HOWEVER, THE FOLLOWING SPECIFIC CARDIAC PROBLEMS / CONDITIONS WERE ADDRESSED AND TREATED DURING THE OFFICE VISIT TODAY:                                                                                            MEDICAL DECISION MAKING             Cardiac Specific Problem / Diagnosis  Discussion and Data Reviewed Diagnostic Procedures Ordered Management Options Selected           1. Paroxysmal atrial fib  Well Controlled   Review and summation of old records:    EKG in the office showing sinus rhythm with a heart rate of 70 bpm.  Patient is on Xarelto 20 mg daily for stroke prevention. Yes Continue current medications:     Yes           2. Hypertension  Stable   Blood pressure in the office today 138/74. O2 sat 98%. Patient does have whitecoat syndrome. Home blood pressures well controlled. Patient is on aspirin 81 mg daily losartan 100 mg daily. No Continue current medications: Yes                                     Orders Placed This Encounter   Procedures    EKG 12 lead     Order Specific Question:   Reason for Exam?     Answer:   Irregular heart rate     Comments:   A Fib     No orders of the defined types were placed in this encounter. Discussed with patient. Return in about 6 months (around 2/26/2022) for Routine with me . I greatly appreciate the opportunity to meet Steve Moore and your confidence in allowing me to participate in his cardiovascular care. NUNO Palencia NP  8/26/2021 1:32 PM CDT                    This dictation was generated by voice recognition computer software. Although all attempts are made to edit dictation for accuracy, there may be errors in the transcription that are not intended.

## 2021-09-13 ENCOUNTER — TELEPHONE (OUTPATIENT)
Dept: INTERNAL MEDICINE | Age: 71
End: 2021-09-13

## 2021-09-13 NOTE — TELEPHONE ENCOUNTER
----- Message from Tayyesenia Pal sent at 9/13/2021 12:40 PM CDT -----  Subject: Appointment Request    Reason for Call: Routine Physical Exam    QUESTIONS  Type of Appointment? Established Patient  Reason for appointment request? No appointments available during search  Additional Information for Provider? Please reach out to Haider Quinones (wife) to   schedule Alvaro an appointment for Dec 7th, 8th or 14th for a face to face   (physical) for [employer] Please make sure the   appointment is scheduled with Dr. Maris Jefferson.   ---------------------------------------------------------------------------  --------------  Kalpesh NOBLE  What is the best way for the office to contact you? OK to leave message on   voicemail  Preferred Call Back Phone Number? [phone]  ---------------------------------------------------------------------------  --------------  SCRIPT ANSWERS  Relationship to Patient? Other  Representative Name? Emiliana Dugan  Additional information verified (besides Name and Date of Birth)? Address  (If the patient has Medicare as their primary insurance coverage ask this   question) Are you requesting a Medicare Annual Wellness Visit? No  (Is the patient requesting a pap smear with their physical exam?)? No  (Is the patient requesting their annual physical and does not need PAP or   AWV per above?)? Yes   Have you been diagnosed with, awaiting test results for, or told that you   are suspected of having COVID-19 (Coronavirus)? (If patient has tested   negative or was tested as a requirement for work, school, or travel and   not based on symptoms, answer no)? No  Within the past two weeks have you developed any of the following symptoms   (answer no if symptoms have been present longer than 2 weeks or began   more than 2 weeks ago)?  Fever or Chills, Cough, Shortness of breath or   difficulty breathing, Loss of taste or smell, Sore throat, Nasal   congestion, Sneezing or runny nose, Fatigue or generalized body aches   (answer no if pain is specific to a body part e.g. back pain), Diarrhea,   Headache? No  Have you had close contact with someone with COVID-19 in the last 14 days? No  (Service Expert  click yes below to proceed with [employer] As Usual   Scheduling)?  Yes

## 2021-09-20 ENCOUNTER — TELEPHONE (OUTPATIENT)
Dept: INTERNAL MEDICINE | Age: 71
End: 2021-09-20

## 2021-10-04 RX ORDER — LOSARTAN POTASSIUM 100 MG/1
TABLET ORAL
Qty: 90 TABLET | Refills: 3 | Status: SHIPPED | OUTPATIENT
Start: 2021-10-04 | End: 2022-11-03

## 2021-10-06 ENCOUNTER — OFFICE VISIT (OUTPATIENT)
Dept: INTERNAL MEDICINE | Age: 71
End: 2021-10-06
Payer: MEDICARE

## 2021-10-06 VITALS
BODY MASS INDEX: 26.66 KG/M2 | OXYGEN SATURATION: 97 % | SYSTOLIC BLOOD PRESSURE: 124 MMHG | HEIGHT: 69 IN | HEART RATE: 70 BPM | DIASTOLIC BLOOD PRESSURE: 58 MMHG | WEIGHT: 180 LBS

## 2021-10-06 DIAGNOSIS — Z23 INFLUENZA VACCINE NEEDED: Primary | ICD-10-CM

## 2021-10-06 DIAGNOSIS — E11.42 TYPE 2 DIABETES MELLITUS WITH PERIPHERAL NEUROPATHY (HCC): ICD-10-CM

## 2021-10-06 DIAGNOSIS — Z00.00 ROUTINE GENERAL MEDICAL EXAMINATION AT A HEALTH CARE FACILITY: ICD-10-CM

## 2021-10-06 DIAGNOSIS — G62.9 PERIPHERAL NERVE DISEASE: ICD-10-CM

## 2021-10-06 DIAGNOSIS — I10 PRIMARY HYPERTENSION: ICD-10-CM

## 2021-10-06 DIAGNOSIS — D86.0 SARCOIDOSIS, LUNG (HCC): ICD-10-CM

## 2021-10-06 DIAGNOSIS — R41.3 MEMORY LOSS: ICD-10-CM

## 2021-10-06 PROCEDURE — 2022F DILAT RTA XM EVC RTNOPTHY: CPT | Performed by: NURSE PRACTITIONER

## 2021-10-06 PROCEDURE — G8484 FLU IMMUNIZE NO ADMIN: HCPCS | Performed by: NURSE PRACTITIONER

## 2021-10-06 PROCEDURE — 3017F COLORECTAL CA SCREEN DOC REV: CPT | Performed by: NURSE PRACTITIONER

## 2021-10-06 PROCEDURE — G0008 ADMIN INFLUENZA VIRUS VAC: HCPCS | Performed by: NURSE PRACTITIONER

## 2021-10-06 PROCEDURE — 4040F PNEUMOC VAC/ADMIN/RCVD: CPT | Performed by: NURSE PRACTITIONER

## 2021-10-06 PROCEDURE — 1123F ACP DISCUSS/DSCN MKR DOCD: CPT | Performed by: NURSE PRACTITIONER

## 2021-10-06 PROCEDURE — G8427 DOCREV CUR MEDS BY ELIG CLIN: HCPCS | Performed by: NURSE PRACTITIONER

## 2021-10-06 PROCEDURE — G0439 PPPS, SUBSEQ VISIT: HCPCS | Performed by: NURSE PRACTITIONER

## 2021-10-06 PROCEDURE — 1036F TOBACCO NON-USER: CPT | Performed by: NURSE PRACTITIONER

## 2021-10-06 PROCEDURE — 3051F HG A1C>EQUAL 7.0%<8.0%: CPT | Performed by: NURSE PRACTITIONER

## 2021-10-06 PROCEDURE — 99214 OFFICE O/P EST MOD 30 MIN: CPT | Performed by: NURSE PRACTITIONER

## 2021-10-06 PROCEDURE — 90694 VACC AIIV4 NO PRSRV 0.5ML IM: CPT | Performed by: NURSE PRACTITIONER

## 2021-10-06 PROCEDURE — G8417 CALC BMI ABV UP PARAM F/U: HCPCS | Performed by: NURSE PRACTITIONER

## 2021-10-06 RX ORDER — ASCORBIC ACID 500 MG
500 TABLET ORAL DAILY
COMMUNITY
End: 2022-02-28

## 2021-10-06 RX ORDER — PREGABALIN 75 MG/1
75 CAPSULE ORAL 2 TIMES DAILY
Qty: 180 CAPSULE | Refills: 1 | Status: SHIPPED | OUTPATIENT
Start: 2021-10-06 | End: 2022-05-23

## 2021-10-06 ASSESSMENT — ENCOUNTER SYMPTOMS
SHORTNESS OF BREATH: 0
EYE ITCHING: 0
CONSTIPATION: 0
VOMITING: 0
ABDOMINAL DISTENTION: 0
WHEEZING: 0
ABDOMINAL PAIN: 0
CHOKING: 0
COLOR CHANGE: 0
BLOOD IN STOOL: 0
TROUBLE SWALLOWING: 0
EYE DISCHARGE: 0
DIARRHEA: 0
NAUSEA: 0
SORE THROAT: 0
COUGH: 0
STRIDOR: 0

## 2021-10-06 ASSESSMENT — PATIENT HEALTH QUESTIONNAIRE - PHQ9
SUM OF ALL RESPONSES TO PHQ QUESTIONS 1-9: 0
SUM OF ALL RESPONSES TO PHQ QUESTIONS 1-9: 0
2. FEELING DOWN, DEPRESSED OR HOPELESS: 0
1. LITTLE INTEREST OR PLEASURE IN DOING THINGS: 0
SUM OF ALL RESPONSES TO PHQ QUESTIONS 1-9: 0
SUM OF ALL RESPONSES TO PHQ9 QUESTIONS 1 & 2: 0

## 2021-10-06 ASSESSMENT — LIFESTYLE VARIABLES: HOW OFTEN DO YOU HAVE A DRINK CONTAINING ALCOHOL: 0

## 2021-10-06 NOTE — ASSESSMENT & PLAN NOTE
He had been on Ozempic and we have his A1c down from over 11-7.7 but the Ozempic now is going to cost him over $600 so were going to have to change that which we will increase his Januvia to 100 and see how we do with that. His wife has changed his diet more toward diabetic diet and she feels that that has had a lot to do with the decrease in his A1c as well.

## 2021-10-06 NOTE — PATIENT INSTRUCTIONS
1.  Sarcoid of the lung on Imuran and followed by specialties  2. Type 2 diabetes mellitus we will try increasing the Januvia to 100 or if the fasting sugars are not coming down below 150 let me know and we will need to make another change before your 3-month visit  3. Diabetic peripheral neuropathy refill the Lyrica he is taking 75 twice daily with good results  4. Hypertension stable on losartan 100 no changes are needed  5. Memory impairment continue with the Aricept at bedtime  Personalized Preventive Plan for Karon Herbert - 10/6/2021  Medicare offers a range of preventive health benefits. Some of the tests and screenings are paid in full while other may be subject to a deductible, co-insurance, and/or copay. Some of these benefits include a comprehensive review of your medical history including lifestyle, illnesses that may run in your family, and various assessments and screenings as appropriate. After reviewing your medical record and screening and assessments performed today your provider may have ordered immunizations, labs, imaging, and/or referrals for you. A list of these orders (if applicable) as well as your Preventive Care list are included within your After Visit Summary for your review. Other Preventive Recommendations:    · A preventive eye exam performed by an eye specialist is recommended every 1-2 years to screen for glaucoma; cataracts, macular degeneration, and other eye disorders. · A preventive dental visit is recommended every 6 months. · Try to get at least 150 minutes of exercise per week or 10,000 steps per day on a pedometer . · Order or download the FREE \"Exercise & Physical Activity: Your Everyday Guide\" from [employer]. Call 4-331.863.4783 or search [employer] online. · You need 6400-5691 mg of calcium and 9873-1763 IU of vitamin D per day.  It is possible to meet your calcium requirement with diet alone, but a vitamin D

## 2021-10-06 NOTE — PROGRESS NOTES
[address] [facility]  [address] [place] [address]  [address] [address]  Dept: [phone]  Dept Fax: [phone]: [phone]    Adelina Montgomery (:  1950) is a 66 y.o. male,Established patient, here for evaluation of the following chief complaint(s): Medicare AWV (discuss ozempic - very costly)      Adelina Montgomery is a 66 y.o. male who presents today for his medical conditions/complaints as noted below. Adelina Montgomery is c/alexys Medicare AWV (discuss ozempic - very costly)        HPI:     Chief Complaint   Patient presents with    Medicare AWV     discuss ozempic - very costly     HPI   1.  COVID history  he felt better for several years on the addition of his sarcoid. Currently now he is feeling better than expected in several years he looks good he is active he works in his [address] again  2. Type II DM; This am 176  He ran of ozempic ; And it was over 600$$    [facility] is at 49 with A1c of 7.7 and his fasting on his labs 137 we will try to see what we can do without the Ozempic and increase his Januvia, in the past Metformin has decreased his GFR and he had hypoglycemia with glipizide. 3.  Diabetic peripheral neuropathy lyrica  BID he takes as directed no side effects of the meds  #4 hypertension his blood pressure is good today 124/58 he is on losartan 100 and he has as needed diuretics  5. Memory impairment he is on Aricept 10 mg at bedtime his wife said he is still very forgetful but to me he is a much broader than what he usually is.   Past Medical History:   Diagnosis Date    Achalasia     going to [facility] in March    Acute pancreatitis     Anemia     Asthma     Atrial fibrillation ([facility]     h/o paroxysmal a-fib ? anesthsia induced    Benign colonic polyp     cscope  Dr Cheryle Savant adenomatous:  adenoma    Chest pain     Chronic kidney disease     Chronic pain syndrome     Depression     Diabetic peripheral neuropathy ([facility]     Extrinsic asthma     Hyperlipidemia     Hypertension     Idiopathic peripheral neuropathy     Lyme disease     Mediastinal lymphadenopathy     Microalbuminuria     Mixed hyperlipidemia     [facility]oxysmal A-fib ([facility])     S/P ablation of atrial fibrillation     4/16 A fib ablation , Dr Flako Shelton, [address] Sarcoidosis, lung ([facility]     restrictive lung impairment    Tick bite     Type 2 diabetes, controlled, with neuropathy [facility])       Past Surgical History:   Procedure Laterality Date    ANKLE FRACTURE SURGERY  april 14, 2015   Nadia Arroyo ATRIAL ABLATION SURGERY  2015    Dr. Ulus Sever  10/06/2020    CARDIAC SURGERY      Catheter Ablation A-fib    CHOLECYSTECTOMY      COLONOSCOPY  12/02/2014    Melecio    CYSTOSCOPY Left 8/16/2019    CYSTOSCOPY; LEFT RETROGRADE PYELOGRAM; INSERTION LEFT URETERAL STENT performed by Fabi Page MD at [address] [place] [address] Left 6/27/2018    LASER LITHOTRIPSY STONE MANIPULATION WITH DOUBLE J STENT PLACEMENT performed by Fabi Page MD at [facility] 6/27/2018    CYSTOSCOPY URETEROSCOPY RETROGRADE PYELOGRAMS performed by Fabi Page MD at [place] [address]      ear       Vitals 10/6/2021 8/26/2021 8/19/2021 6/30/2021 3/29/2021 5/12/0610   SYSTOLIC 823 739 172 836 450 312   DIASTOLIC 58 74 62 68 84 77   Pulse 70 72 70 93 - 76   Temp - - - - - -   Resp - - - 20 - -   SpO2 97 98 99 97 - -   Weight 180 lb 180 lb 179 lb 184 lb 1.6 oz - 191 lb   Height 5' 9\" 5' 9\" 5' 9\" 5' 9\" - 5' 2\"   Body mass index 26.58 kg/m2 26.58 kg/m2 26.43 kg/m2 27.18 kg/m2 - 34.93 kg/m2   Pain Level - - - - - -   Some recent data might be hidden       Family History   Problem Relation Age of Onset    Coronary Art Dis Father         AICD pacer age 68    Heart Attack Father     Cancer Sister         childhood chest tumor    Heart Failure Mother        Social History     Tobacco Use    Smoking status: Never Smoker    Smokeless tobacco: Never Used   Substance Use Topics    Alcohol use: No      Current Outpatient Medications   Medication Sig Dispense Refill    ascorbic acid (VITAMIN C) 500 MG tablet Take 500 mg by mouth daily Vitamin C      pregabalin (LYRICA) 75 MG capsule Take 1 capsule by mouth 2 times daily for 180 days. 180 capsule 1    SITagliptin (JANUVIA) 100 MG tablet Take 1 tablet by mouth daily 90 tablet 1    losartan (COZAAR) 100 MG tablet Take 1 tablet by mouth once daily 90 tablet 3    fluticasone-salmeterol (ADVAIR) 500-50 MCG/DOSE diskus inhaler Inhale 1 puff into the lungs every 12 hours 1 puff in the am, prn in afternoon      donepezil (ARICEPT) 5 MG tablet Take 2 tablets by mouth nightly 90 tablet 1    XARELTO 20 MG TABS tablet TAKE 1 TABLET BY MOUTH ONCE DAILY WITH EVENING MEAL 90 tablet 1    spironolactone (ALDACTONE) 25 MG tablet 3 days a week (Patient taking differently: Prn) 9 tablet 1    albuterol sulfate  (90 Base) MCG/ACT inhaler INHALE 2 PUFFS BY MOUTH EVERY 6 HOURS AS NEEDED FOR WHEEZING 1 Inhaler 5    predniSONE (DELTASONE) 5 MG tablet Take 5 mg by mouth daily  6    levalbuterol (XOPENEX) 1.25 MG/3ML nebulizer solution Take 1 ampule by nebulization every 6 hours as needed for Wheezing      furosemide (LASIX) 20 MG tablet Take 1 tablet by mouth daily (Patient taking differently: Take 10 mg by mouth daily as needed (if greater than 3 pound weight gain daily) ) 30 tablet 3    EPINEPHrine (EPIPEN) 0.3 MG/0.3ML SOAJ injection Inject 0.3 mLs into the muscle as needed (Allergic Reaction) 2 each 1    nitroGLYCERIN (NITROSTAT) 0.4 MG SL tablet Place 1 tablet under the tongue every 5 minutes as needed for Chest pain 25 tablet 3    azaTHIOprine (IMURAN) 50 MG tablet Take 50 mg by mouth daily       aspirin 81 MG tablet Take 81 mg by mouth daily.        No current facility-administered medications for this visit. Allergies   Allergen Reactions    Bee Venom Anaphylaxis    Gabapentin Other (See Comments)     Causes blisters to head.      Penicillins Hives     \"Causes blisters to break out everywhere\"    Toprol Xl [Metoprolol]      Leg pain        Health Maintenance   Topic Date Due    Diabetic foot exam  Never done    Diabetic retinal exam  12/29/2021 (Originally 5/9/2019)    Shingles Vaccine (1 of 2) 12/29/2021 (Originally 9/15/1999)    COVID-19 Vaccine (1) 03/29/2025 (Originally 9/15/1962)    A1C test (Diabetic or Prediabetic)  09/27/2022    Lipid screen  09/27/2022    Potassium monitoring  09/27/2022    Creatinine monitoring  09/27/2022    Colon cancer screen colonoscopy  08/01/2024    DTaP/Tdap/Td vaccine (2 - Td or Tdap) 05/22/2030    Flu vaccine  Completed    Pneumococcal 65+ years Vaccine  Completed    Hepatitis A vaccine  Aged Out    Hib vaccine  Aged Out    Meningococcal (ACWY) vaccine  Aged Out    Hepatitis C screen  Discontinued       Lab Results   Component Value Date    LABA1C 7.7 (H) 09/27/2021     Lab Results   Component Value Date    PSA 2.9 06/21/2021    PSA 2.5 11/09/2020    PSA 3.6 03/17/2020     TSH   Date Value Ref Range Status   09/27/2021 3.610 0.270 - 4.200 uIU/mL Final   ]  Lab Results   Component Value Date     09/27/2021    K 4.7 09/27/2021     09/27/2021    CO2 25 09/27/2021    BUN 15 09/27/2021    CREATININE 1.0 09/27/2021    GLUCOSE 137 (H) 09/27/2021    CALCIUM 9.5 09/27/2021    PROT 6.7 09/27/2021    LABALBU 4.3 09/27/2021    BILITOT 0.6 09/27/2021    ALKPHOS 80 09/27/2021    AST 19 09/27/2021    ALT 14 09/27/2021    LABGLOM >60 09/27/2021    GFRAA >59 09/27/2021     Lab Results   Component Value Date    CHOL 136 (L) 09/27/2021    CHOL 170 03/22/2021    CHOL 185 12/28/2020     Lab Results   Component Value Date    TRIG 90 09/27/2021    TRIG 257 (H) 03/22/2021    TRIG 138 12/28/2020     Lab Results   Component Value Date    HDL 41 (L) 09/27/2021    HDL 47 (L) 03/22/2021    HDL 49 (L) 12/28/2020     Lab Results   Component Value Date    LDLCALC 77 09/27/2021    LDLCALC 72 03/22/2021    LDLCALC 108 12/28/2020     Lab Results   Component Value Date     09/27/2021    K 4.7 09/27/2021    K 4.6 09/23/2020     09/27/2021    CO2 25 09/27/2021    BUN 15 09/27/2021    CREATININE 1.0 09/27/2021    GLUCOSE 137 09/27/2021    CALCIUM 9.5 09/27/2021      Lab Results   Component Value Date    WBC 4.7 (L) 09/27/2021    HGB 13.1 (L) 09/27/2021    HCT 39.6 (L) 09/27/2021    MCV 88.6 09/27/2021     (L) 09/27/2021    LABLYMP 1.58 09/20/2015    LYMPHOPCT 23.0 09/27/2021    RBC 4.47 (L) 09/27/2021    MCH 29.3 09/27/2021    MCHC 33.1 09/27/2021    RDW 13.7 09/27/2021     Lab Results   Component Value Date    VITD25 21.3 (L) 09/27/2021     Labs reviewed from 9/27/2021    Subjective:      Review of Systems   Constitutional: Negative for fatigue, fever and unexpected weight change. HENT: Negative for ear discharge, ear pain, mouth sores, sore throat and trouble swallowing. Eyes: Negative for discharge, itching and visual disturbance. Respiratory: Negative for cough, choking, shortness of breath, wheezing and stridor. Cardiovascular: Negative for chest pain, palpitations and leg swelling. Gastrointestinal: Negative for abdominal distention, abdominal pain, blood in stool, constipation, diarrhea, nausea and vomiting. Endocrine: Negative for cold intolerance, polydipsia and polyuria. Genitourinary: Negative for difficulty urinating, dysuria, frequency and urgency. Musculoskeletal: Negative for arthralgias and gait problem. Skin: Negative for color change and rash. Allergic/Immunologic: Negative for food allergies and immunocompromised state. Neurological: Negative for dizziness, tremors, syncope, speech difficulty, weakness and headaches. Memory loss  Peripheral neuropathy   Hematological: Negative for adenopathy.  Does not bruise/bleed easily. Psychiatric/Behavioral: Negative for confusion and hallucinations. Objective:     Physical Exam  Constitutional:       General: He is not in acute distress. Appearance: He is well-developed. HENT:      Head: Normocephalic and atraumatic. Eyes:      General: No scleral icterus. Right eye: No discharge. Left eye: No discharge. Pupils: Pupils are equal, round, and reactive to light. Neck:      Thyroid: No thyromegaly. Vascular: No JVD. Cardiovascular:      Rate and Rhythm: Normal rate and regular rhythm. Heart sounds: Normal heart sounds. No murmur heard. Pulmonary:      Effort: Pulmonary effort is normal. No respiratory distress. Breath sounds: Normal breath sounds. No wheezing or rales. Abdominal:      General: Bowel sounds are normal. There is no distension. Palpations: Abdomen is soft. There is no mass. Tenderness: There is no abdominal tenderness. There is no guarding or rebound. Musculoskeletal:         General: No tenderness. Normal range of motion. Cervical back: Normal range of motion and neck supple. Skin:     General: Skin is warm and dry. Findings: No erythema or rash. Neurological:      Mental Status: He is alert and oriented to person, place, and time. Cranial Nerves: No cranial nerve deficit. Coordination: Coordination normal.      Deep Tendon Reflexes: Reflexes are normal and symmetric. Reflexes normal.   Psychiatric:         Mood and Affect: Mood is not depressed. Behavior: Behavior normal.         Thought Content:  Thought content normal.         Judgment: Judgment normal.       BP (!) 124/58   Pulse 70   Ht 5' 9\" (1.753 m)   Wt 180 lb (81.6 kg)   SpO2 97%   BMI 26.58 kg/m²           Assessment:      Problem List     Hypertension     He is currently taking losartan 100 mg with occasional diuretic blood pressure is good at 124/50          Relevant Orders    TSH without Reflex    Memory loss     He is taking Aricept 10 mg at bedtime no side effects of the meds takes as directed I can see some improvement          Peripheral nerve disease     He is on Lyrica 75 twice daily          Relevant Medications    donepezil (ARICEPT) 5 MG tablet    pregabalin (LYRICA) 75 MG capsule    Sarcoidosis, lung (HCC)    Type 2 diabetes mellitus with peripheral neuropathy ([address]     He had been on Ozempic and we have his A1c down from over 11-7.7 but the [address] now is going to cost him over $600 so were going to have to change that which we will increase his Januvia to 100 and see how we do with that. His wife has changed his diet more toward diabetic diet and she feels that that has had a lot to do with the decrease in his A1c as well. Relevant Medications    donepezil (ARICEPT) 5 MG tablet    pregabalin (LYRICA) 75 MG capsule    SITagliptin (JANUVIA) 100 MG tablet    Other Relevant Orders    Comprehensive Metabolic Panel    Hemoglobin A1C    CBC Auto Differential    Urinalysis Reflex to Culture          Plan:        Patient given educational materials - see patient instructions. Discussed use, benefit, and side effects of prescribed medications. Allpatient questions answered. Pt voiced understanding. Reviewed health maintenance. Instructed to continue current medications, diet and exercise. Patient agreed with treatment plan. Follow up as directed. MEDICATIONS:  Orders Placed This Encounter   Medications    pregabalin (LYRICA) 75 MG capsule     Sig: Take 1 capsule by mouth 2 times daily for 180 days.      Dispense:  180 capsule     Refill:  1    SITagliptin (JANUVIA) 100 MG tablet     Sig: Take 1 tablet by mouth daily     Dispense:  90 tablet     Refill:  1         ORDERS:  Orders Placed This Encounter   Procedures    INFLUENZA, QUADV, ADJUVANTED, 65 YRS =, IM, PF, PREFILL SYR, 0.5ML (FLUAD)    Comprehensive Metabolic Panel    Hemoglobin A1C    CBC Auto Differential    Urinalysis Reflex to Culture    TSH without Reflex       Follow-up:  Return in 3 months (on 1/6/2022) for Medicare Annual Wellness Visit in 1 year. PATIENT INSTRUCTIONS:  Patient Instructions   1. Sarcoid of the lung on Imuran and followed by specialties  2. Type 2 diabetes mellitus we will try increasing the Januvia to 100 or if the fasting sugars are not coming down below 150 let me know and we will need to make another change before your 3-month visit  3. Diabetic peripheral neuropathy refill the Lyrica he is taking 75 twice daily with good results  4. Hypertension stable on losartan 100 no changes are needed  5. Memory impairment continue with the Aricept at bedtime  Personalized Preventive Plan for Shane Dempsey - 10/6/2021  Medicare offers a range of preventive health benefits. Some of the tests and screenings are paid in full while other may be subject to a deductible, co-insurance, and/or copay. Some of these benefits include a comprehensive review of your medical history including lifestyle, illnesses that may run in your family, and various assessments and screenings as appropriate. After reviewing your medical record and screening and assessments performed today your provider may have ordered immunizations, labs, imaging, and/or referrals for you. A list of these orders (if applicable) as well as your Preventive Care list are included within your After Visit Summary for your review. Other Preventive Recommendations:    · A preventive eye exam performed by an eye specialist is recommended every 1-2 years to screen for glaucoma; cataracts, macular degeneration, and other eye disorders. · A preventive dental visit is recommended every 6 months. · Try to get at least 150 minutes of exercise per week or 10,000 steps per day on a pedometer . · Order or download the FREE \"Exercise & Physical Activity: Your Everyday Guide\" from [employer].  Call 9-482.824.8038 or search The [employer] online. · You need 2689-6300 mg of calcium and 1689-8699 IU of vitamin D per day. It is possible to meet your calcium requirement with diet alone, but a vitamin D supplement is usually necessary to meet this goal.  · When exposed to the sun, use a sunscreen that protects against both UVA and UVB radiation with an SPF of 30 or greater. Reapply every 2 to 3 hours or after sweating, drying off with a towel, or swimming. · Always wear a seat belt when traveling in a car. Always wear a helmet when riding a bicycle or motorcycle. Electronically signed by NUNO Dan on 10/6/2021 at 11:09 AM    @On this date 10/6/2021 I have spent 35 minutes reviewing previous notes, test results and face to face with the patient discussing the diagnosis and importance of compliance with the treatment plan as well as documenting on the day of the visit. EMR[employer]agon/transcription disclaimer:  Much of this encounter note is electronic transcription/translation of spoken language to printed texts. The electronic translation of spoken language may be erroneous, or at times,nonsensical words or phrases may be inadvertently transcribed. Although I have reviewed the note for such errors, some may still exist.  Medicare Annual Wellness Visit  Name: Darci Gold Date: 10/6/2021   MRN: [id number] Sex: Male   Age: 66 y.o. Ethnicity: Non- / Non    : 1950 Race: White (non-)      Trever Gardiner is here for Medicare AWV (discuss ozempic - very costly)    Screenings for behavioral, psychosocial and functional/safety risks, and cognitive dysfunction are all negative except as indicated below. These results, as well as other patient data from the [employer] [address] [address] form, are documented in Flowsheets linked to this Encounter. Allergies   Allergen Reactions    Bee Venom Anaphylaxis    Gabapentin Other (See Comments)     Causes blisters to head.      Penicillins Hives     \"Causes blisters to break out everywhere\"    Toprol Xl [Metoprolol]      Leg pain          Prior to Visit Medications    Medication Sig Taking? Authorizing Provider   ascorbic acid (VITAMIN C) 500 MG tablet Take 500 mg by mouth daily Vitamin C Yes Historical Provider, MD   pregabalin (LYRICA) 75 MG capsule Take 1 capsule by mouth 2 times daily for 180 days.  Yes NUNO Martinez   SITagliptin (JANUVIA) 100 MG tablet Take 1 tablet by mouth daily Yes NUNO Martinez   losartan (COZAAR) 100 MG tablet Take 1 tablet by mouth once daily  NUNO Martinez   fluticasone-salmeterol (ADVAIR) 500-50 MCG/DOSE diskus inhaler Inhale 1 puff into the lungs every 12 hours 1 puff in the am, prn in afternoon  Historical Provider, MD   donepezil (ARICEPT) 5 MG tablet Take 2 tablets by mouth nightly  NUNO Martinez   XARELTO 20 MG TABS tablet TAKE 1 TABLET BY MOUTH ONCE DAILY WITH EVENING MEAL  NUNO Martinez   spironolactone (ALDACTONE) 25 MG tablet 3 days a week  Patient taking differently: Prn  NUNO Martinez   albuterol sulfate  (90 Base) MCG/ACT inhaler INHALE 2 PUFFS BY MOUTH EVERY 6 HOURS AS NEEDED FOR WHEEZING  NUNO Martinez   predniSONE (DELTASONE) 5 MG tablet Take 5 mg by mouth daily  Historical Provider, MD   levalbuterol (XOPENEX) 1.25 MG/3ML nebulizer solution Take 1 ampule by nebulization every 6 hours as needed for Wheezing  Historical Provider, MD   furosemide (LASIX) 20 MG tablet Take 1 tablet by mouth daily  Patient taking differently: Take 10 mg by mouth daily as needed (if greater than 3 pound weight gain daily)   NUNO Neil   EPINEPHrine (EPIPEN) 0.3 MG/0.3ML SOAJ injection Inject 0.3 mLs into the muscle as needed (Allergic Reaction)  NUNO Martinez   nitroGLYCERIN (NITROSTAT) 0.4 MG SL tablet Place 1 tablet under the tongue every 5 minutes as needed for Chest pain  Cristy William MD   azaTHIOprine (IMURAN) 50 MG tablet Take 50 mg by mouth daily Historical Provider, MD   aspirin 81 MG tablet Take 81 mg by mouth daily.   Historical Provider, MD         Past Medical History:   Diagnosis Date    Achalasia     going to [address] [place] [address] for surgery in March    Acute pancreatitis     Anemia     Asthma     Atrial fibrillation ([facility]     h/o paroxysmal a-fib ? anesthsia induced    Benign colonic polyp     cscope 12/07 Dr Anastasiya Bo adenomatous: 12/14 adenoma    Chest pain     Chronic kidney disease     Chronic pain syndrome     Depression     Diabetic peripheral neuropathy (HCC)     Extrinsic asthma     Hyperlipidemia     Hypertension     Idiopathic peripheral neuropathy     Lyme disease     Mediastinal lymphadenopathy     Microalbuminuria     Mixed hyperlipidemia     Paroxysmal A-fib (HCC)     S/P ablation of atrial fibrillation     4/16 A fib ablation , Dr Gordo Bermudez, [address] [place] [address]     [facility]     restrictive lung impairment    Tick bite     Type 2 diabetes, controlled, with neuropathy [facility])        Past Surgical History:   Procedure Laterality Date    ANKLE FRACTURE SURGERY  april 14, 2015   [employer] ATRIAL ABLATION SURGERY  2015    Dr. Jana Stroud  10/06/2020    CARDIAC SURGERY      Catheter Ablation A-fib    CHOLECYSTECTOMY      COLONOSCOPY  12/02/2014    Melecio    CYSTOSCOPY Left 8/16/2019    CYSTOSCOPY; LEFT RETROGRADE PYELOGRAM; INSERTION LEFT URETERAL STENT performed by Ashley Michel MD at [address] [facility] Left 6/27/2018    LASER LITHOTRIPSY STONE MANIPULATION WITH DOUBLE J STENT PLACEMENT performed by Ashley Michel MD at [facility] Left 6/27/2018    CYSTOSCOPY URETEROSCOPY RETROGRADE PYELOGRAMS performed by Ashley Michel MD at [address] [place] [address] SKIN CANCER EXCISION      ear         Family History   Problem Relation Age of Onset    Coronary Art Dis Father         AICD pacer age 74    Heart Attack Father     Cancer Sister         childhood chest tumor    Heart Failure Mother        CareTeam (Including outside providers/suppliers regularly involved in providing care):   Patient Care Team:  NUNO Jett as PCP - General (Nurse Practitioner Acute Care)  NUNO Jett as PCP - [facility] Empaneled Provider  Corinne Medal, MD as Consulting Physician (Pulmonology)  Rene Pineda MD as Consulting Physician (Cardiology)    Wt Readings from Last 3 Encounters:   10/06/21 180 lb (81.6 kg)   08/26/21 180 lb (81.6 kg)   08/19/21 179 lb (81.2 kg)     Vitals:    10/06/21 0909   BP: (!) 124/58   Pulse: 70   SpO2: 97%   Weight: 180 lb (81.6 kg)   Height: 5' 9\" (1.753 m)     Body mass index is 26.58 kg/m². Based upon direct observation of the patient, evaluation of cognition reveals remote memory intact, recent memory impaired. Patient's complete Health Risk Assessment and screening values have been reviewed and are found in Flowsheets. The following problems were reviewed today and where indicated follow up appointments were made and/or referrals ordered. Positive Risk Factor Screenings with Interventions:            General Health and ACP:  General  In general, how would you say your health is?: Good  In the past 7 days, have you experienced any of the following?  New or Increased Pain, New or Increased Fatigue, Loneliness, Social Isolation, Stress or Anger?: None of These  Do you get the social and emotional support that you need?: Yes  Do you have a Living Will?: (!) No  Advance Directives     Power of [facility] Will ACP-Advance Directive ACP-Power of     Not on File Not on File Not on File Not on File      General Health Risk Interventions:  · No Living Will: Patient declines ACP discussion/assistance    Health Habits/Nutrition:  Health Habits/Nutrition  Do you exercise for at least 20 minutes 2-3 times per week?: (!) No  Have you lost any weight without trying in the past 3 months?: (!) Yes  Do you eat only one meal per day?: No  Have you seen the dentist within the past year?: Yes  Body mass index: (!) 26.58  Health Habits/Nutrition Interventions:  · na     Safety:  Safety  Do you have working smoke detectors?: Yes  Have all throw rugs been removed or fastened?: Yes  Do you have non-slip mats or surfaces in all bathtubs/showers?: (!) No  Do all of your stairways have a railing or banister?: Yes  Are your doorways, halls and stairs free of clutter?: Yes  Do you always fasten your seatbelt when you are in a car?: Yes  Safety Interventions:  · na     Personalized Preventive Plan   Current Health Maintenance Status  Immunization History   Administered Date(s) Administered    Influenza Virus Vaccine 10/01/2018    Influenza, High Dose (Fluzone 65 yrs and older) 12/21/2016, 12/27/2017, 11/05/2018, 11/05/2018    Influenza, Quadv, adjuvanted, 65 yrs +, IM, PF (Fluad) 09/29/2020, 10/06/2021    Influenza, Triv, inactivated, subunit, adjuvanted, IM (Fluad 65 yrs and older) 11/20/2019    Pneumococcal Conjugate 13-valent (Noreene Hoof) 10/05/2016, 10/05/2016    Pneumococcal Polysaccharide ([id number]) 04/24/2008, 04/24/2008, 10/01/2015, 10/01/2015    Td, (Adult) Not Adsorbed 06/01/2013    Tdap (Boostrix, Adacel) 05/22/2020        Health Maintenance   Topic Date Due    Diabetic foot exam  Never done    Diabetic retinal exam  12/29/2021 (Originally 5/9/2019)    Shingles Vaccine (1 of 2) 12/29/2021 (Originally 9/15/1999)    COVID-19 Vaccine (1) 03/29/2025 (Originally 9/15/1962)    A1C test (Diabetic or Prediabetic)  09/27/2022    Lipid screen  09/27/2022    Potassium monitoring  09/27/2022    Creatinine monitoring  09/27/2022    Colon cancer screen colonoscopy  08/01/2024    DTaP/Tdap/Td vaccine (2 - Td or Tdap) 05/22/2030    Flu vaccine  Completed    Pneumococcal 65+ years Vaccine  Completed    Hepatitis A vaccine  Aged Out    Hib vaccine  Aged Out    Meningococcal (ACWY) vaccine  Aged Out    Hepatitis C screen  Discontinued     Recommendations for Preventive Services Due: see orders and patient instructions/AVS.  . Recommended screening schedule for the next 5-10 years is provided to the patient in written form: see Patient Donna Conway was seen today for medicare aw. Diagnoses and all orders for this visit:    Influenza vaccine needed  -     INFLUENZA, QUADV, ADJUVANTED, 65 YRS =, IM, PF, PREFILL SYR, 0.5ML (FLUAD)    Peripheral nerve disease  -     pregabalin (LYRICA) 75 MG capsule; Take 1 capsule by mouth 2 times daily for 180 days. Sarcoidosis, lung ([facility] [address]    Type 2 diabetes mellitus with peripheral neuropathy ([facility] [address]  -     Comprehensive Metabolic Panel; Future  -     Hemoglobin A1C; Future  -     CBC Auto Differential; Future  -     Urinalysis Reflex to Culture; Future    Memory loss    Primary hypertension  -     TSH without Reflex; Future    Routine general medical examination at a health care facility    Other orders  -     SITagliptin (JANUVIA) 100 MG tablet;  Take 1 tablet by mouth daily

## 2021-10-06 NOTE — ASSESSMENT & PLAN NOTE
He is taking Aricept 10 mg at bedtime no side effects of the meds takes as directed I can see some improvement

## 2021-10-13 ENCOUNTER — OFFICE VISIT (OUTPATIENT)
Dept: OTOLARYNGOLOGY | Facility: CLINIC | Age: 71
End: 2021-10-13

## 2021-10-13 VITALS
HEIGHT: 69 IN | WEIGHT: 179 LBS | RESPIRATION RATE: 20 BRPM | HEART RATE: 62 BPM | BODY MASS INDEX: 26.51 KG/M2 | TEMPERATURE: 97.6 F | SYSTOLIC BLOOD PRESSURE: 145 MMHG | DIASTOLIC BLOOD PRESSURE: 80 MMHG

## 2021-10-13 DIAGNOSIS — C44.212 BASAL CELL CARCINOMA (BCC) OF SKIN OF RIGHT EAR: Primary | ICD-10-CM

## 2021-10-13 DIAGNOSIS — Z08 ENCOUNTER FOR FOLLOW-UP SURVEILLANCE OF SKIN CANCER: ICD-10-CM

## 2021-10-13 DIAGNOSIS — Z79.02 ANTIPLATELET OR ANTITHROMBOTIC LONG-TERM USE: ICD-10-CM

## 2021-10-13 DIAGNOSIS — L57.0 ACTINIC KERATOSIS: ICD-10-CM

## 2021-10-13 DIAGNOSIS — Z85.828 ENCOUNTER FOR FOLLOW-UP SURVEILLANCE OF SKIN CANCER: ICD-10-CM

## 2021-10-13 PROCEDURE — 99212 OFFICE O/P EST SF 10 MIN: CPT | Performed by: OTOLARYNGOLOGY

## 2021-10-13 NOTE — PROGRESS NOTES
PRIMARY CARE PROVIDER: Iris Valdovinos APRN  REFERRING PROVIDER: No ref. provider found    Chief Complaint   Patient presents with   • Follow-up     exc bcc of right ear follow up       Subjective   History of Present Illness:  Caio Riley is a  71 y.o. male who presents for skin cancer surveillance.  He underwent excision of a basal cell carcinoma of the right ear with transposition flap reconstruction on June 16, 2021.    He is doing well and denies any ear pain or recurrent lesion within the operative site on the right ear.  He did have a spitting stitch that was removed last week by Dr. Ramires's office.  They also performed a biopsy of the right cheek, and he is scheduled to follow-up with Dr. Ramires's office for cryotherapy.  There is an additional lesion of the scalp that was also biopsied, and recommendations have been made for cryotherapy.  He continues to follow-up with Dr. Ramires's office.    Review of Systems:  Review of Systems   Constitutional: Negative for chills, fatigue, fever and unexpected weight change.   HENT: Negative for facial swelling.    Respiratory: Negative for cough, chest tightness and shortness of breath.    Cardiovascular: Negative for chest pain.   Musculoskeletal: Negative for neck pain.   Skin: Positive for wound. Negative for color change.   Neurological: Negative for facial asymmetry.   Hematological: Negative for adenopathy. Does not bruise/bleed easily.       Past History:  Past Medical History:   Diagnosis Date   • A-fib (HCC)    • Arthritis    • Asthma    • BCC (basal cell carcinoma)     right ear   • COPD (chronic obstructive pulmonary disease) (HCC)    • Diabetes mellitus (HCC)    • Hypertension    • Kidney stones    • Neuropathy    • Pancreatitis    • Sarcoidosis      Past Surgical History:   Procedure Laterality Date   • ABLATION OF DYSRHYTHMIC FOCUS     • COLONOSCOPY N/A 8/1/2019    4 Tubular adenomas transverse colon and cecum, Diverticulosis repeat exam in 3 years    • COLONOSCOPY W/ POLYPECTOMY  2014    Tubular adenoma ascending colon    • ENDOSCOPY  2002    Mild chronic gastritis   • ENDOSCOPY N/A 2019    Procedure: ESOPHAGOGASTRODUODENOSCOPY WITH ANESTHESIA;  Surgeon: Iris Duke MD;  Location: [facility] ENDOSCOPY;  Service: Gastroenterology   • ENDOSCOPY  2019    Dilation in the entire esophagus   • FRACTURE SURGERY Right     ARM   • FRACTURE SURGERY Left     LEG   • INGUINAL NODE BIOPSY Right 2018    Procedure: RIGHT GROIN EXCISIONAL BIOPSY;  Surgeon: Ced Aguirre MD;  Location: [facility] OR;  Service: General   • LAPAROSCOPIC CHOLECYSTECTOMY     • LASIK Bilateral    • SKIN CANCER EXCISION      bcc of right helix  21   • SQUAMOUS CELL CARCINOMA EXCISION      back and left arm     Family History   Problem Relation Age of Onset   • Colon cancer Neg Hx    • Colon polyps Neg Hx      Social History     Tobacco Use   • Smoking status: Former Smoker     Packs/day: 0.50     Years: 2.00     Pack years: 1.00     Types: Cigarettes     Quit date:      Years since quittin.8   • Smokeless tobacco: Never Used   Substance Use Topics   • Alcohol use: No   • Drug use: No     Allergies:  Bee venom, Penicillins, Acetaminophen, Gabapentin, and Metoprolol    Current Outpatient Medications:   •  ascorbic acid (VITAMIN C) 1000 MG tablet, Take 1,000 mg by mouth Daily., Disp: , Rfl:   •  aspirin 81 MG chewable tablet, Chew 81 mg Daily., Disp: , Rfl:   •  azaTHIOprine (IMURAN) 50 MG tablet, , Disp: , Rfl: 5  •  donepezil (ARICEPT) 5 MG tablet, , Disp: , Rfl: 0  •  EPINEPHrine (EPIPEN) 0.3 MG/0.3ML solution auto-injector injection, Inject 0.3 mg into the appropriate muscle as directed by prescriber., Disp: , Rfl:   •  fluticasone-salmeterol (ADVAIR) 500-50 MCG/DOSE DISKUS, Inhale 2 (Two) Times a Day., Disp: , Rfl:   •  furosemide (LASIX) 10 MG/ML ORAL solution, Take 10 mg by mouth Daily As Needed (for weight gain 3lbs in 24 hours)., Disp: , Rfl:   •   ibandronate (BONIVA) 150 MG tablet, Take 150 mg by mouth Every 30 (Thirty) Days., Disp: , Rfl:   •  Insulin Pen Needle (BD PEN NEEDLE DAGMAR U/F) 32G X 4 MM misc, 1 each., Disp: , Rfl:   •  ipratropium (ATROVENT) 0.02 % nebulizer solution, Take 2.5 mL by nebulization 4 (Four) Times a Day., Disp: 300 mL, Rfl: 11  •  ipratropium-albuterol (DUO-NEB) 0.5-2.5 mg/3 ml nebulizer, Take 3 mL by nebulization 4 (Four) Times a Day As Needed for Wheezing., Disp: 120 mL, Rfl: 11  •  IRON PO, Take 27 mg by mouth 2 (Two) Times a Day., Disp: , Rfl:   •  JANUVIA 50 MG tablet, , Disp: , Rfl:   •  LANTUS SOLOSTAR 100 UNIT/ML injection pen, 14 Units., Disp: , Rfl:   •  levalbuterol (XOPENEX) 1.25 MG/3ML nebulizer solution, Take 1 ampule by nebulization 4 (Four) Times a Day., Disp: 120 ampule, Rfl: 11  •  levalbuterol (XOPENEX) 1.25 MG/3ML nebulizer solution, Take 1 ampule by nebulization 4 (Four) Times a Day As Needed for Wheezing., Disp: 360 mL, Rfl: 5  •  losartan (COZAAR) 100 MG tablet, , Disp: , Rfl:   •  predniSONE (DELTASONE) 5 MG tablet, Take 5 mg by mouth Daily., Disp: , Rfl:   •  pregabalin (LYRICA) 75 MG capsule, Take 75 mg by mouth 2 (Two) Times a Day., Disp: , Rfl:   •  RELION PEN NEEDLES 32G X 4 MM misc, , Disp: , Rfl:   •  rivaroxaban (XARELTO) 20 MG tablet, Take 20 mg by mouth Daily. STOPPED 8/1/18, Disp: , Rfl:   •  sulfamethoxazole-trimethoprim (BACTRIM,SEPTRA) 400-80 MG tablet, Take 1 tablet by mouth 3 (Three) Times a Week., Disp: , Rfl: 6  •  tiZANidine (ZANAFLEX) 4 MG tablet, Take 4 mg by mouth At Night As Needed for Muscle Spasms., Disp: , Rfl:   •  Ventolin  (90 Base) MCG/ACT inhaler, INHALE 2 PUFFS BY MOUTH EVERY 4 HOURS AS NEEDED FOR WHEEZING FOR SHORTNESS OF BREATH, Disp: 18 g, Rfl: 11      Objective     Vital Signs:  Temp:  [97.6 °F (36.4 °C)] 97.6 °F (36.4 °C)  Heart Rate:  [62] 62  Resp:  [20] 20  BP: (145)/(80) 145/80    Physical Exam:  Physical Exam  Vitals and nursing note reviewed.   Constitutional:        General: He is not in acute distress.     Appearance: He is well-developed. He is not diaphoretic.   HENT:      Head: Normocephalic and atraumatic.        Right Ear: External ear normal.      Left Ear: External ear normal.      Nose: Nose normal.   Eyes:      General: No scleral icterus.        Right eye: No discharge.         Left eye: No discharge.      Conjunctiva/sclera: Conjunctivae normal.      Pupils: Pupils are equal, round, and reactive to light.   Neck:      Thyroid: No thyromegaly.      Vascular: No JVD.      Trachea: No tracheal deviation.   Pulmonary:      Effort: Pulmonary effort is normal.      Breath sounds: No stridor.   Musculoskeletal:         General: No deformity. Normal range of motion.      Cervical back: Normal range of motion and neck supple.   Lymphadenopathy:      Cervical: No cervical adenopathy.   Skin:     General: Skin is warm and dry.      Coloration: Skin is not pale.      Findings: No erythema or rash.   Neurological:      Mental Status: He is alert and oriented to person, place, and time.      Cranial Nerves: No cranial nerve deficit.      Coordination: Coordination normal.   Psychiatric:         Speech: Speech normal.         Behavior: Behavior normal. Behavior is cooperative.         Thought Content: Thought content normal.         Judgment: Judgment normal.         Results Review:   I reviewed the prior pathology which demonstrates basal cell carcinoma which initially extended to the 9:00 tip, the additional surgical margin was free of tumor.          Assessment   Assessment:  1. Basal cell carcinoma (BCC) of skin of right ear    2. Antiplatelet or antithrombotic long-term use    3. Encounter for follow-up surveillance of skin cancer        Plan   Plan:  He is doing well with no evidence of recurrence.  He does have multiple new lesions presenting on the face and neck, that are being managed by Dr. Ramires's office.  We will see him back on an as-needed basis.    My findings  and recommendations were discussed and questions were answered.     Ian Curtis MD  10/13/21  11:45 CDT

## 2021-11-05 DIAGNOSIS — I48.20 CHRONIC ATRIAL FIBRILLATION (HCC): ICD-10-CM

## 2021-11-08 RX ORDER — RIVAROXABAN 20 MG/1
TABLET, FILM COATED ORAL
Qty: 90 TABLET | Refills: 0 | Status: SHIPPED | OUTPATIENT
Start: 2021-11-08 | End: 2022-03-03

## 2021-12-09 ENCOUNTER — TELEPHONE (OUTPATIENT)
Dept: INTERNAL MEDICINE | Age: 71
End: 2021-12-09

## 2021-12-09 NOTE — TELEPHONE ENCOUNTER
LVM to call ov to reschedule or he could send us a message through my chart what would be a good date and time.

## 2021-12-09 NOTE — TELEPHONE ENCOUNTER
----- Message from Erick Sndaxmanny sent at 12/9/2021  1:13 PM CST -----  Subject: Message to Provider    QUESTIONS  Information for Provider? She is calling in to reschedule his DOL appt   with Dr. Grace Lei. Due to appt type I tried to call over to office to see if   it was something at office level that needed to be done specifically to   reschedule this. I could not get an answer so I am sending a message to   please follow up to reschedule the appt that is on 12/14-for now I went in   an cancelled per their request. Thank you.   ---------------------------------------------------------------------------  --------------  CALL BACK INFO  What is the best way for the office to contact you? OK to leave message on   voicemail  Preferred Call Back Phone Number? [phone]  ---------------------------------------------------------------------------  --------------  SCRIPT ANSWERS  Relationship to Patient? Other  Representative Name? Lachelle Srinivasan  Is the Representative on the appropriate HIPAA document in Epic?  Yes

## 2022-02-07 DIAGNOSIS — I10 PRIMARY HYPERTENSION: ICD-10-CM

## 2022-02-07 DIAGNOSIS — E11.42 TYPE 2 DIABETES MELLITUS WITH PERIPHERAL NEUROPATHY (HCC): ICD-10-CM

## 2022-02-07 LAB
ALBUMIN SERPL-MCNC: 4.6 G/DL (ref 3.5–5.2)
ALP BLD-CCNC: 82 U/L (ref 40–130)
ALT SERPL-CCNC: 13 U/L (ref 5–41)
ANION GAP SERPL CALCULATED.3IONS-SCNC: 12 MMOL/L (ref 7–19)
AST SERPL-CCNC: 16 U/L (ref 5–40)
BASOPHILS ABSOLUTE: 0.1 K/UL (ref 0–0.2)
BASOPHILS RELATIVE PERCENT: 1 % (ref 0–1)
BILIRUB SERPL-MCNC: 0.6 MG/DL (ref 0.2–1.2)
BILIRUBIN URINE: NEGATIVE
BLOOD, URINE: NEGATIVE
BUN BLDV-MCNC: 22 MG/DL (ref 8–23)
CALCIUM SERPL-MCNC: 10.1 MG/DL (ref 8.8–10.2)
CHLORIDE BLD-SCNC: 99 MMOL/L (ref 98–111)
CLARITY: CLEAR
CO2: 29 MMOL/L (ref 22–29)
COLOR: YELLOW
CREAT SERPL-MCNC: 1.2 MG/DL (ref 0.5–1.2)
EOSINOPHILS ABSOLUTE: 0.2 K/UL (ref 0–0.6)
EOSINOPHILS RELATIVE PERCENT: 3.1 % (ref 0–5)
GFR AFRICAN AMERICAN: >59
GFR NON-AFRICAN AMERICAN: 59
GLUCOSE BLD-MCNC: 205 MG/DL (ref 74–109)
GLUCOSE URINE: =>1000 MG/DL
HBA1C MFR BLD: 10.1 % (ref 4–6)
HCT VFR BLD CALC: 42 % (ref 42–52)
HEMOGLOBIN: 13.6 G/DL (ref 14–18)
IMMATURE GRANULOCYTES #: 0.1 K/UL
KETONES, URINE: NEGATIVE MG/DL
LEUKOCYTE ESTERASE, URINE: NEGATIVE
LYMPHOCYTES ABSOLUTE: 1.5 K/UL (ref 1.1–4.5)
LYMPHOCYTES RELATIVE PERCENT: 29.5 % (ref 20–40)
MCH RBC QN AUTO: 28 PG (ref 27–31)
MCHC RBC AUTO-ENTMCNC: 32.4 G/DL (ref 33–37)
MCV RBC AUTO: 86.4 FL (ref 80–94)
MONOCYTES ABSOLUTE: 0.6 K/UL (ref 0–0.9)
MONOCYTES RELATIVE PERCENT: 10.9 % (ref 0–10)
NEUTROPHILS ABSOLUTE: 2.8 K/UL (ref 1.5–7.5)
NEUTROPHILS RELATIVE PERCENT: 53.8 % (ref 50–65)
NITRITE, URINE: NEGATIVE
PDW BLD-RTO: 14.6 % (ref 11.5–14.5)
PH UA: 5 (ref 5–8)
PLATELET # BLD: 139 K/UL (ref 130–400)
PMV BLD AUTO: 11.4 FL (ref 9.4–12.4)
POTASSIUM SERPL-SCNC: 4.5 MMOL/L (ref 3.5–5)
PROTEIN UA: NEGATIVE MG/DL
RBC # BLD: 4.86 M/UL (ref 4.7–6.1)
SODIUM BLD-SCNC: 140 MMOL/L (ref 136–145)
SPECIFIC GRAVITY UA: 1.03 (ref 1–1.03)
TOTAL PROTEIN: 7.3 G/DL (ref 6.6–8.7)
TSH SERPL DL<=0.05 MIU/L-ACNC: 6.49 UIU/ML (ref 0.27–4.2)
UROBILINOGEN, URINE: 0.2 E.U./DL
WBC # BLD: 5.2 K/UL (ref 4.8–10.8)

## 2022-02-08 ENCOUNTER — OFFICE VISIT (OUTPATIENT)
Dept: INTERNAL MEDICINE | Age: 72
End: 2022-02-08
Payer: MEDICARE

## 2022-02-08 VITALS
DIASTOLIC BLOOD PRESSURE: 58 MMHG | WEIGHT: 180 LBS | OXYGEN SATURATION: 98 % | HEIGHT: 69 IN | BODY MASS INDEX: 26.66 KG/M2 | HEART RATE: 84 BPM | SYSTOLIC BLOOD PRESSURE: 104 MMHG

## 2022-02-08 DIAGNOSIS — R41.3 MEMORY LOSS: ICD-10-CM

## 2022-02-08 DIAGNOSIS — I48.0 PAF (PAROXYSMAL ATRIAL FIBRILLATION) (HCC): ICD-10-CM

## 2022-02-08 DIAGNOSIS — J42 CHRONIC BRONCHITIS, UNSPECIFIED CHRONIC BRONCHITIS TYPE (HCC): ICD-10-CM

## 2022-02-08 DIAGNOSIS — E11.42 TYPE 2 DIABETES MELLITUS WITH PERIPHERAL NEUROPATHY (HCC): ICD-10-CM

## 2022-02-08 DIAGNOSIS — I10 PRIMARY HYPERTENSION: Primary | ICD-10-CM

## 2022-02-08 DIAGNOSIS — M54.42 ACUTE LEFT-SIDED LOW BACK PAIN WITH LEFT-SIDED SCIATICA: ICD-10-CM

## 2022-02-08 DIAGNOSIS — Z79.899 DRUG THERAPY: ICD-10-CM

## 2022-02-08 DIAGNOSIS — E78.2 MIXED HYPERLIPIDEMIA: ICD-10-CM

## 2022-02-08 PROBLEM — M54.40 ACUTE LEFT-SIDED LOW BACK PAIN WITH SCIATICA: Status: ACTIVE | Noted: 2022-02-08

## 2022-02-08 PROCEDURE — G8417 CALC BMI ABV UP PARAM F/U: HCPCS | Performed by: NURSE PRACTITIONER

## 2022-02-08 PROCEDURE — 3017F COLORECTAL CA SCREEN DOC REV: CPT | Performed by: NURSE PRACTITIONER

## 2022-02-08 PROCEDURE — 93000 ELECTROCARDIOGRAM COMPLETE: CPT | Performed by: NURSE PRACTITIONER

## 2022-02-08 PROCEDURE — 1123F ACP DISCUSS/DSCN MKR DOCD: CPT | Performed by: NURSE PRACTITIONER

## 2022-02-08 PROCEDURE — 80305 DRUG TEST PRSMV DIR OPT OBS: CPT | Performed by: NURSE PRACTITIONER

## 2022-02-08 PROCEDURE — G8427 DOCREV CUR MEDS BY ELIG CLIN: HCPCS | Performed by: NURSE PRACTITIONER

## 2022-02-08 PROCEDURE — 4040F PNEUMOC VAC/ADMIN/RCVD: CPT | Performed by: NURSE PRACTITIONER

## 2022-02-08 PROCEDURE — G8484 FLU IMMUNIZE NO ADMIN: HCPCS | Performed by: NURSE PRACTITIONER

## 2022-02-08 PROCEDURE — 1036F TOBACCO NON-USER: CPT | Performed by: NURSE PRACTITIONER

## 2022-02-08 PROCEDURE — 99214 OFFICE O/P EST MOD 30 MIN: CPT | Performed by: NURSE PRACTITIONER

## 2022-02-08 PROCEDURE — 3023F SPIROM DOC REV: CPT | Performed by: NURSE PRACTITIONER

## 2022-02-08 PROCEDURE — 2022F DILAT RTA XM EVC RTNOPTHY: CPT | Performed by: NURSE PRACTITIONER

## 2022-02-08 PROCEDURE — 96372 THER/PROPH/DIAG INJ SC/IM: CPT | Performed by: NURSE PRACTITIONER

## 2022-02-08 PROCEDURE — 3046F HEMOGLOBIN A1C LEVEL >9.0%: CPT | Performed by: NURSE PRACTITIONER

## 2022-02-08 RX ORDER — METHYLPREDNISOLONE ACETATE 80 MG/ML
80 INJECTION, SUSPENSION INTRA-ARTICULAR; INTRALESIONAL; INTRAMUSCULAR; SOFT TISSUE ONCE
Status: COMPLETED | OUTPATIENT
Start: 2022-02-08 | End: 2022-02-08

## 2022-02-08 RX ORDER — TIZANIDINE 4 MG/1
4 TABLET ORAL 3 TIMES DAILY PRN
Qty: 30 TABLET | Refills: 0 | Status: SHIPPED | OUTPATIENT
Start: 2022-02-08 | End: 2022-02-28

## 2022-02-08 RX ADMIN — METHYLPREDNISOLONE ACETATE 80 MG: 80 INJECTION, SUSPENSION INTRA-ARTICULAR; INTRALESIONAL; INTRAMUSCULAR; SOFT TISSUE at 13:06

## 2022-02-08 ASSESSMENT — ENCOUNTER SYMPTOMS
COLOR CHANGE: 0
BLOOD IN STOOL: 0
TROUBLE SWALLOWING: 0
EYE ITCHING: 0
COUGH: 0
EYE DISCHARGE: 0
VOMITING: 0
ABDOMINAL DISTENTION: 0
NAUSEA: 0
WHEEZING: 0
ABDOMINAL PAIN: 0
SORE THROAT: 0
SHORTNESS OF BREATH: 0
DIARRHEA: 0
STRIDOR: 0
CHOKING: 0
CONSTIPATION: 0

## 2022-02-08 NOTE — PROGRESS NOTES
[address] [facility]  [address] [address] [address] Jalyn Garcia [id number]  Dept: [phone]  Dept Fax: [phone]: [phone]    Meli Gongora (:  1950) is a 66 y.o. male,Established patient  with green, here for evaluation of the following chief complaint(s): 3 Month Follow-Up (Dr Lucia Falcon added Maryuri Commander 10 mg last wk) and Other (pain in bottom when sitting)      Meli Gongora is a 66 y.o. male who presents today for his medical conditions/complaints as noted below. Meli Gongora is c/alexys 3 Month Follow-Up (Dr Lucia Falcon added Maryuri Commander 10 mg last wk) and Other (pain in bottom when sitting)        HPI:     Chief Complaint   Patient presents with   Percy Reach     Dr Lucia Falcon added Maryuri Commander 10 mg last wk    Other     pain in bottom when sitting     HPI   #1 hypertension valsartan 100 mg daily 2.5t  #3 PAF he is on Xarelto 20 mg daily  4. COPD he has Xopenex nebulizer and Advair inhaler and takes prednisone 5 mg daily  5. Type 2 diabetes mellitus he is on Januvia 100 mg daily  a1c is up to 10.1; Fasting 180   6. Memory loss she is on Aricept 10 mg at bedtime  7 peripheral neuropathy he is on Lyrica 75 twice daily  8. Left buttock pain for over  A week; Now the pain is going down the left leg; Controlled Substance Monitoring:    Acute and Chronic Pain Monitoring:   RX Monitoring 3/28/2018   Attestation The Prescription Monitoring Report for this patient was reviewed today. Periodic Controlled Substance Monitoring Possible medication side effects, risk of tolerance/dependence & alternative treatments discussed. ;No signs of potential drug abuse or diversion identified.        CONTROLLED SUBSTANCE  Drug Lyrica  Diagnosis diabetic peripheral neuropathy  Last Saba Nicolas 2022  Last UDS today 22  Pain contract last date 10/30/2021  Effective dose   yes      Changes this visit   none     Past Medical History:   Diagnosis Date    Achalasia     going to [facility] 9\" 5' 9\" 5' 9\" -   Body mass index 26.58 kg/m2 26.58 kg/m2 26.58 kg/m2 26.43 kg/m2 27.18 kg/m2 -   Pain Level - - - - - -   Some recent data might be hidden       Family History   Problem Relation Age of Onset    Coronary Art Dis Father         AICD pacer age 68    Heart Attack Father     Cancer Sister         childhood chest tumor    Heart Failure Mother        Social History     Tobacco Use    Smoking status: Never Smoker    Smokeless tobacco: Never Used   Substance Use Topics    Alcohol use: No      Current Outpatient Medications   Medication Sig Dispense Refill    dapagliflozin (FARXIGA) 10 MG tablet Take 10 mg by mouth every morning      tiZANidine (ZANAFLEX) 4 MG tablet Take 1 tablet by mouth 3 times daily as needed (left radicu) 30 tablet 0    XARELTO 20 MG TABS tablet TAKE 1 TABLET BY MOUTH ONCE DAILY WITH EVENING MEAL 90 tablet 0    ascorbic acid (VITAMIN C) 500 MG tablet Take 500 mg by mouth daily Vitamin C      pregabalin (LYRICA) 75 MG capsule Take 1 capsule by mouth 2 times daily for 180 days.  180 capsule 1    SITagliptin (JANUVIA) 100 MG tablet Take 1 tablet by mouth daily 90 tablet 1    losartan (COZAAR) 100 MG tablet Take 1 tablet by mouth once daily 90 tablet 3    fluticasone-salmeterol (ADVAIR) 500-50 MCG/DOSE diskus inhaler Inhale 1 puff into the lungs every 12 hours 1 puff in the am, prn in afternoon      donepezil (ARICEPT) 5 MG tablet Take 2 tablets by mouth nightly 90 tablet 1    spironolactone (ALDACTONE) 25 MG tablet 3 days a week (Patient taking differently: Prn) 9 tablet 1    albuterol sulfate  (90 Base) MCG/ACT inhaler INHALE 2 PUFFS BY MOUTH EVERY 6 HOURS AS NEEDED FOR WHEEZING 1 Inhaler 5    predniSONE (DELTASONE) 5 MG tablet Take 5 mg by mouth daily  6    levalbuterol (XOPENEX) 1.25 MG/3ML nebulizer solution Take 1 ampule by nebulization every 6 hours as needed for Wheezing      furosemide (LASIX) 20 MG tablet Take 1 tablet by mouth daily (Patient taking differently: Take 10 mg by mouth daily as needed (if greater than 3 pound weight gain daily) ) 30 tablet 3    EPINEPHrine (EPIPEN) 0.3 MG/0.3ML SOAJ injection Inject 0.3 mLs into the muscle as needed (Allergic Reaction) 2 each 1    nitroGLYCERIN (NITROSTAT) 0.4 MG SL tablet Place 1 tablet under the tongue every 5 minutes as needed for Chest pain 25 tablet 3    azaTHIOprine (IMURAN) 50 MG tablet Take 50 mg by mouth daily       aspirin 81 MG tablet Take 81 mg by mouth daily. No current facility-administered medications for this visit. Allergies   Allergen Reactions    Bee Venom Anaphylaxis    Gabapentin Other (See Comments)     Causes blisters to head.      Penicillins Hives     \"Causes blisters to break out everywhere\"    Toprol Xl [Metoprolol]      Leg pain        Health Maintenance   Topic Date Due    Diabetic foot exam  Never done    Shingles Vaccine (1 of 2) Never done    COVID-19 Vaccine (1) 03/29/2025 (Originally 9/15/1962)    A1C test (Diabetic or Prediabetic)  05/07/2022    Lipid screen  09/27/2022    Depression Monitoring  10/06/2022    Diabetic retinal exam  01/14/2023    Potassium monitoring  02/07/2023    Creatinine monitoring  02/07/2023    Colon cancer screen colonoscopy  08/01/2024    DTaP/Tdap/Td vaccine (2 - Td or Tdap) 05/22/2030    Flu vaccine  Completed    Pneumococcal 65+ years Vaccine  Completed    Hepatitis A vaccine  Aged Out    Hib vaccine  Aged Out    Meningococcal (ACWY) vaccine  Aged Out    Hepatitis C screen  Discontinued       Lab Results   Component Value Date    LABA1C 10.1 (H) 02/07/2022     Lab Results   Component Value Date    PSA 2.9 06/21/2021    PSA 2.5 11/09/2020    PSA 3.6 03/17/2020     TSH   Date Value Ref Range Status   02/07/2022 6.490 (H) 0.270 - 4.200 uIU/mL Final   ]  Lab Results   Component Value Date     02/07/2022    K 4.5 02/07/2022    CL 99 02/07/2022    CO2 29 02/07/2022    BUN 22 02/07/2022    CREATININE 1.2 02/07/2022 GLUCOSE 205 (H) 02/07/2022    CALCIUM 10.1 02/07/2022    PROT 7.3 02/07/2022    LABALBU 4.6 02/07/2022    BILITOT 0.6 02/07/2022    ALKPHOS 82 02/07/2022    AST 16 02/07/2022    ALT 13 02/07/2022    LABGLOM 59 (A) 02/07/2022    GFRAA >59 02/07/2022     Lab Results   Component Value Date    CHOL 136 (L) 09/27/2021    CHOL 170 03/22/2021    CHOL 185 12/28/2020     Lab Results   Component Value Date    TRIG 90 09/27/2021    TRIG 257 (H) 03/22/2021    TRIG 138 12/28/2020     Lab Results   Component Value Date    HDL 41 (L) 09/27/2021    HDL 47 (L) 03/22/2021    HDL 49 (L) 12/28/2020     Lab Results   Component Value Date    LDLCALC 77 09/27/2021    LDLCALC 72 03/22/2021    LDLCALC 108 12/28/2020     Lab Results   Component Value Date     02/07/2022    K 4.5 02/07/2022    K 4.6 09/23/2020    CL 99 02/07/2022    CO2 29 02/07/2022    BUN 22 02/07/2022    CREATININE 1.2 02/07/2022    GLUCOSE 205 02/07/2022    CALCIUM 10.1 02/07/2022      Lab Results   Component Value Date    WBC 5.2 02/07/2022    HGB 13.6 (L) 02/07/2022    HCT 42.0 02/07/2022    MCV 86.4 02/07/2022     02/07/2022    LABLYMP 1.58 09/20/2015    LYMPHOPCT 29.5 02/07/2022    RBC 4.86 02/07/2022    MCH 28.0 02/07/2022    MCHC 32.4 (L) 02/07/2022    RDW 14.6 (H) 02/07/2022     Lab Results   Component Value Date    VITD25 21.3 (L) 09/27/2021     Labs reviewed from 2/7/2022  Diagnostic studies reviewed from today  RHYTHM: Sinus with occasional PACs trigeminal to bigeminal  RATE: 80  ME INTERVAL: 150  ECTOPY: PACs trigeminal to bigeminal  ISCHEMIA: None    Subjective:      Review of Systems   Constitutional: Negative for fatigue, fever and unexpected weight change. HENT: Negative for ear discharge, ear pain, mouth sores, sore throat and trouble swallowing. Eyes: Negative for discharge, itching and visual disturbance. Respiratory: Negative for cough, choking, shortness of breath, wheezing and stridor.     Cardiovascular: Negative for chest pain, palpitations and leg swelling. Gastrointestinal: Negative for abdominal distention, abdominal pain, blood in stool, constipation, diarrhea, nausea and vomiting. Endocrine: Negative for cold intolerance, polydipsia and polyuria. Genitourinary: Negative for difficulty urinating, dysuria, frequency and urgency. Musculoskeletal: Negative for arthralgias and gait problem. Skin: Negative for color change and rash. Allergic/Immunologic: Negative for food allergies and immunocompromised state. Neurological: Negative for dizziness, tremors, syncope, speech difficulty, weakness and headaches. Peripheral neuropathy   Hematological: Negative for adenopathy. Does not bruise/bleed easily. Psychiatric/Behavioral: Negative for confusion and hallucinations. Memory impairment       Objective:     Physical Exam  Constitutional:       General: He is not in acute distress. Appearance: He is well-developed. HENT:      Head: Normocephalic and atraumatic. Eyes:      General: No scleral icterus. Right eye: No discharge. Left eye: No discharge. Pupils: Pupils are equal, round, and reactive to light. Neck:      Thyroid: No thyromegaly. Vascular: No JVD. Cardiovascular:      Rate and Rhythm: Normal rate and regular rhythm. Heart sounds: Normal heart sounds. No murmur heard. Pulmonary:      Effort: Pulmonary effort is normal. No respiratory distress. Breath sounds: Normal breath sounds. No wheezing or rales. Abdominal:      General: Bowel sounds are normal. There is no distension. Palpations: Abdomen is soft. There is no mass. Tenderness: There is no abdominal tenderness. There is no guarding or rebound. Musculoskeletal:         General: No tenderness. Normal range of motion. Cervical back: Normal range of motion and neck supple. Skin:     General: Skin is warm and dry. Findings: No erythema or rash.    Neurological:      Mental Status: He is alert and oriented to person, place, and time. Cranial Nerves: No cranial nerve deficit. Coordination: Coordination normal.      Deep Tendon Reflexes: Reflexes are normal and symmetric. Reflexes normal.   Psychiatric:         Mood and Affect: Mood is not depressed. Behavior: Behavior normal.         Thought Content:  Thought content normal.         Judgment: Judgment normal.       BP (!) 104/58   Pulse 84   Ht 5' 9\" (1.753 m)   Wt 180 lb (81.6 kg)   SpO2 98%   BMI 26.58 kg/m²           Assessment:      Problem List     Acute left-sided low back pain with sciatica     We will give him a steroid shot muscle relaxant and Naprosyn which will be just for short-term related to his Xarelto         Relevant Medications    aspirin 81 MG tablet    predniSONE (DELTASONE) 5 MG tablet    donepezil (ARICEPT) 5 MG tablet    pregabalin (LYRICA) 75 MG capsule    tiZANidine (ZANAFLEX) 4 MG tablet    Chronic obstructive lung disease (HCC)     Stable with inhalers and nebulizers by recent exacerbation          Relevant Medications    levalbuterol (XOPENEX) 1.25 MG/3ML nebulizer solution    predniSONE (DELTASONE) 5 MG tablet    albuterol sulfate  (90 Base) MCG/ACT inhaler    fluticasone-salmeterol (ADVAIR) 500-50 MCG/DOSE diskus inhaler    Hyperlipidemia    Relevant Medications    aspirin 81 MG tablet    nitroGLYCERIN (NITROSTAT) 0.4 MG SL tablet    EPINEPHrine (EPIPEN) 0.3 MG/0.3ML SOAJ injection    furosemide (LASIX) 20 MG tablet    spironolactone (ALDACTONE) 25 MG tablet    losartan (COZAAR) 100 MG tablet    XARELTO 20 MG TABS tablet    Other Relevant Orders    Lipid Panel    Hypertension - Primary     Stable with losartan 100 daily          Relevant Orders    CBC Auto Differential    Comprehensive Metabolic Panel    Memory loss     -10          PAF (paroxysmal atrial fibrillation) (HCC)     Stable on Xarelto          Relevant Medications    XARELTO 20 MG TABS tablet    Other Relevant Orders    TSH without Reflex    EKG 12 Lead (Completed)    Type 2 diabetes mellitus with peripheral neuropathy (HCC)     Stable on Januvia 100 mg daily and Lyrica 75 twice daily         Relevant Medications    donepezil (ARICEPT) 5 MG tablet    pregabalin (LYRICA) 75 MG capsule    SITagliptin (JANUVIA) 100 MG tablet    dapagliflozin (FARXIGA) 10 MG tablet    tiZANidine (ZANAFLEX) 4 MG tablet    Other Relevant Orders    Hemoglobin A1C    Urinalysis Reflex to Culture          Plan:        Patient given educational materials - see patient instructions. Discussed use, benefit, and side effects of prescribed medications. Allpatient questions answered. Pt voiced understanding. Reviewed health maintenance. Instructed to continue current medications, diet and exercise. Patient agreed with treatment plan. Follow up as directed. MEDICATIONS:  Orders Placed This Encounter   Medications    methylPREDNISolone acetate (DEPO-MEDROL) injection 80 mg    tiZANidine (ZANAFLEX) 4 MG tablet     Sig: Take 1 tablet by mouth 3 times daily as needed (left radicu)     Dispense:  30 tablet     Refill:  0         ORDERS:  Orders Placed This Encounter   Procedures    CBC Auto Differential    Comprehensive Metabolic Panel    Hemoglobin A1C    Urinalysis Reflex to Culture    TSH without Reflex    Lipid Panel    POCT Rapid Drug Screen    EKG 12 Lead       Follow-up:  Return in about 3 months (around 5/8/2022) for have labs done prior to appt. PATIENT INSTRUCTIONS:  Patient Instructions   1. PAF  Has no arrythmia since the ablation  2016 on xarelto   2. Hypertension stable no changes  3. Chronic bronchitis  Stable    4. Type 2 diabetes mellitus The current medical regimen is effective;  continue present plan and medications. Ozempic take 5 clicks to start and increase by 5 clicks every week for 4 weeks until you get to 0,5  And stay on that until you return in 3 months   5. Memory loss stable with aricept  No changes  6.   Peripheral neuropathy; He is on Lyrica 75 3 times daily or 2 twice daily to help with the pain   Medrol 80 IM   Tizanidine 4 mg   taek 2-4 mg 3 times daily for 48 hours then as needed       Electronically signed by NUNO Puentes on 2/8/2022 at 1:18 PM    @On this date 2/8/2022 I have spent 35 minutes reviewing previous notes, test results and face to face with the patient discussing the diagnosis and importance of compliance with the treatment plan as well as documenting on the day of the visit. [employer]MR[employer]/transcription disclaimer:  Much of this encounter note is electronic transcription/translation of spoken language to printed texts. The electronic translation of spoken language may be erroneous, or at times,nonsensical words or phrases may be inadvertently transcribed.   Although I have reviewed the note for such errors, some may still exist.

## 2022-02-08 NOTE — ASSESSMENT & PLAN NOTE
We will give him a steroid shot muscle relaxant and Naprosyn which will be just for short-term related to his Xarelto

## 2022-02-08 NOTE — PATIENT INSTRUCTIONS
1.  PAF  Has no arrythmia since the ablation  2016 on xarelto   2. Hypertension stable no changes  3. Chronic bronchitis  Stable    4. Type 2 diabetes mellitus The current medical regimen is effective;  continue present plan and medications. Ozempic take 5 clicks to start and increase by 5 clicks every week for 4 weeks until you get to 0,5  And stay on that until you return in 3 months   5. Memory loss stable with aricept  No changes  6. Peripheral neuropathy;   He is on Lyrica 75 3 times daily or 2 twice daily to help with the pain   Medrol 80 IM   Tizanidine 4 mg   taek 2-4 mg 3 times daily for 48 hours then as needed
Stable.

## 2022-02-28 ENCOUNTER — OFFICE VISIT (OUTPATIENT)
Dept: CARDIOLOGY CLINIC | Age: 72
End: 2022-02-28
Payer: MEDICARE

## 2022-02-28 VITALS
BODY MASS INDEX: 26.96 KG/M2 | HEIGHT: 69 IN | DIASTOLIC BLOOD PRESSURE: 50 MMHG | SYSTOLIC BLOOD PRESSURE: 120 MMHG | OXYGEN SATURATION: 98 % | HEART RATE: 83 BPM | WEIGHT: 182 LBS

## 2022-02-28 DIAGNOSIS — E78.2 MIXED HYPERLIPIDEMIA: ICD-10-CM

## 2022-02-28 DIAGNOSIS — I10 ESSENTIAL HYPERTENSION: ICD-10-CM

## 2022-02-28 DIAGNOSIS — I48.0 PAF (PAROXYSMAL ATRIAL FIBRILLATION) (HCC): Primary | ICD-10-CM

## 2022-02-28 PROCEDURE — 4040F PNEUMOC VAC/ADMIN/RCVD: CPT | Performed by: NURSE PRACTITIONER

## 2022-02-28 PROCEDURE — G8427 DOCREV CUR MEDS BY ELIG CLIN: HCPCS | Performed by: NURSE PRACTITIONER

## 2022-02-28 PROCEDURE — G8417 CALC BMI ABV UP PARAM F/U: HCPCS | Performed by: NURSE PRACTITIONER

## 2022-02-28 PROCEDURE — G8484 FLU IMMUNIZE NO ADMIN: HCPCS | Performed by: NURSE PRACTITIONER

## 2022-02-28 PROCEDURE — 93000 ELECTROCARDIOGRAM COMPLETE: CPT | Performed by: NURSE PRACTITIONER

## 2022-02-28 PROCEDURE — 1123F ACP DISCUSS/DSCN MKR DOCD: CPT | Performed by: NURSE PRACTITIONER

## 2022-02-28 PROCEDURE — 99214 OFFICE O/P EST MOD 30 MIN: CPT | Performed by: NURSE PRACTITIONER

## 2022-02-28 PROCEDURE — 3017F COLORECTAL CA SCREEN DOC REV: CPT | Performed by: NURSE PRACTITIONER

## 2022-02-28 PROCEDURE — 1036F TOBACCO NON-USER: CPT | Performed by: NURSE PRACTITIONER

## 2022-02-28 RX ORDER — SULFAMETHOXAZOLE AND TRIMETHOPRIM 400; 80 MG/1; MG/1
1 TABLET ORAL DAILY
COMMUNITY
End: 2022-08-17

## 2022-02-28 RX ORDER — NITROGLYCERIN 0.4 MG/1
0.4 TABLET SUBLINGUAL EVERY 5 MIN PRN
Qty: 25 TABLET | Refills: 3 | Status: SHIPPED | OUTPATIENT
Start: 2022-02-28

## 2022-02-28 ASSESSMENT — ENCOUNTER SYMPTOMS
WHEEZING: 0
SHORTNESS OF BREATH: 0
SORE THROAT: 0
COUGH: 0
CHEST TIGHTNESS: 0

## 2022-02-28 NOTE — PROGRESS NOTES
[facility] Cardiology   Established Patient Office Visit  [address]  [address]  [phone]        OFFICE VISIT:  2022    Tram Yesenia - : 1950    Reason For Visit:  Jose Sneed is a 66 y.o. male who is here for 6 Month Follow-Up    1. PAF (paroxysmal atrial fibrillation) ([facility] [place] [address]    2. Essential hypertension    3. Mixed hyperlipidemia        Patient with a history of dyslipidemia, diabetes, sarcoidosis, histoplasmosis, hypertension and paroxysmal atrial fib.     He is a patient of Dr. Jaxson Posey. Presents to clinic today for 6-month follow-up. Patient denies any complaints of chest pain, pressure or tightness. There is no shortness of breath, orthopnea or PND. Patient denies any lightheadedness, dizziness or syncope. Patient states he felt he might of had an episode of atrial fib few weeks ago. He does have cardio net device. However, patient's wife former Cath Lab nurse saw a lot of artifact on the reading. Patient denies any further episodes since. During that time he did feel a little bit as if he could have been in A. fib. DATA:   Patient was seen in the office on 2021. Susy Aloe was placed to evaluate for any recurrence of atrial fib.     ZIO Patch showed: This rhythm with a minimum heart rate of 57 bpm, maximum 127 bpm.  2 SVT runs fastest being 12 beats at a maximum rate of 158 bpm.  Frequent SVE's 5.4%.         Liz Rizvi is a 66 y.o. male with the following history as recorded in [facility]:    Patient Active Problem List    Diagnosis Date Noted    Acute left-sided low back pain with sciatica 2022    Memory loss 10/06/2021    Recurrent major depressive disorder, in partial remission ([facility] [place] [address] 2021    Coagulation defect ([facility] [place] [address] 2021    Chronic pain syndrome 2020    Nocturnal hypoxia 2020    Achalasia 2019    Sarcoidosis 10/21/2019    Nephrolithiasis 2019    Vitamin D deficiency 2019    Chronic kidney disease 08/23/2019   Pedro Carlos hematuria     Chronic obstructive lung disease ([facility] [facility] 02/25/2019    Anemia 10/09/2018    Other male erectile dysfunction 08/22/2018    Ureterolithiasis 06/25/2018    Hydronephrosis, left 06/25/2018    History of histoplasmosis 06/22/2018    Lymphadenopathy 06/22/2018    Splenomegaly 06/22/2018    Asthma 06/08/2018    Pancreatitis 06/08/2018    Peripheral nerve disease 06/08/2018    Polyp of colon 06/08/2018    Hypercalcemia 04/26/2018    GARNETT (dyspnea on exertion) 11/27/2017    Type 2 diabetes mellitus with peripheral neuropathy (HCC)     Sarcoidosis, lung ([facility]     S/P ablation of atrial fibrillation 05/16/2017    [facility]     Hypertension     Hyperlipidemia      Current Outpatient Medications   Medication Sig Dispense Refill    sulfamethoxazole-trimethoprim (BACTRIM) 400-80 MG per tablet Take 1 tablet by mouth daily M-W-F only      nitroGLYCERIN (NITROSTAT) 0.4 MG SL tablet Place 1 tablet under the tongue every 5 minutes as needed for Chest pain 25 tablet 3    dapagliflozin (FARXIGA) 10 MG tablet Take 10 mg by mouth every morning      XARELTO 20 MG TABS tablet TAKE 1 TABLET BY MOUTH ONCE DAILY WITH EVENING MEAL 90 tablet 0    pregabalin (LYRICA) 75 MG capsule Take 1 capsule by mouth 2 times daily for 180 days.  180 capsule 1    SITagliptin (JANUVIA) 100 MG tablet Take 1 tablet by mouth daily 90 tablet 1    losartan (COZAAR) 100 MG tablet Take 1 tablet by mouth once daily 90 tablet 3    fluticasone-salmeterol (ADVAIR) 500-50 MCG/DOSE diskus inhaler Inhale 1 puff into the lungs every 12 hours 1 puff in the am, prn in afternoon      donepezil (ARICEPT) 5 MG tablet Take 2 tablets by mouth nightly 90 tablet 1    spironolactone (ALDACTONE) 25 MG tablet 3 days a week (Patient taking differently: Prn) 9 tablet 1    albuterol sulfate  (90 Base) MCG/ACT inhaler INHALE 2 PUFFS BY MOUTH EVERY 6 HOURS AS NEEDED FOR WHEEZING 1 Inhaler 5    predniSONE (DELTASONE) 5 MG tablet Take 5 mg by mouth daily  6    levalbuterol (XOPENEX) 1.25 MG/3ML nebulizer solution Take 1 ampule by nebulization every 6 hours as needed for Wheezing      furosemide (LASIX) 20 MG tablet Take 1 tablet by mouth daily (Patient taking differently: Take 10 mg by mouth daily as needed (if greater than 3 pound weight gain daily) ) 30 tablet 3    EPINEPHrine (EPIPEN) 0.3 MG/0.3ML SOAJ injection Inject 0.3 mLs into the muscle as needed (Allergic Reaction) 2 each 1    azaTHIOprine (IMURAN) 50 MG tablet Take 50 mg by mouth daily       aspirin 81 MG tablet Take 81 mg by mouth daily. No current facility-administered medications for this visit.      Allergies: Bee venom, Gabapentin, Penicillins, and Toprol xl [metoprolol]  Past Medical History:   Diagnosis Date    Achalasia     going to [facility] for surgery in March    Acute pancreatitis     Anemia     Asthma     Atrial fibrillation (HCC)     h/o paroxysmal a-fib ? anesthsia induced    Benign colonic polyp     cscope 12/07 Dr Sobia Woodward adenomatous: 12/14 adenoma    Chest pain     Chronic kidney disease     Chronic pain syndrome     Depression     Diabetic peripheral neuropathy (HCC)     Extrinsic asthma     Hyperlipidemia     Hypertension     Idiopathic peripheral neuropathy     Lyme disease     Mediastinal lymphadenopathy     Microalbuminuria     Mixed hyperlipidemia     Paroxysmal A-fib (HCC)     S/P ablation of atrial fibrillation     4/16 A fib ablation , Dr Ghulam Oconnell, [place]     Sarcoidosis, lung ([facility] [facility]     restrictive lung impairment    Tick bite     Type 2 diabetes, controlled, with neuropathy [facility])      Past Surgical History:   Procedure Laterality Date    ANKLE FRACTURE SURGERY  april 14, 2015   Kristie Reyes ATRIAL ABLATION SURGERY  2015    Dr. Ramirez Hem  10/06/2020    CARDIAC SURGERY      Catheter Ablation A-fib    CHOLECYSTECTOMY      COLONOSCOPY 12/02/2014    Melecio    CYSTOSCOPY Left 8/16/2019    CYSTOSCOPY; LEFT RETROGRADE PYELOGRAM; INSERTION LEFT URETERAL STENT performed by Rhonda Lucero MD at [address] [place] [address] CYSTOSCOPY,MANIPULATN Left 6/27/2018    LASER LITHOTRIPSY STONE MANIPULATION WITH DOUBLE J STENT PLACEMENT performed by [facility] MD at [facility] Left 6/27/2018    CYSTOSCOPY URETEROSCOPY RETROGRADE PYELOGRAMS performed by Rhonda Lucero MD at [address] [place] [address] OR    SKIN CANCER EXCISION      SKIN CANCER EXCISION      ear     Family History   Problem Relation Age of Onset    Coronary Art Dis Father         AICD pacer age 68    Heart Attack Father     Cancer Sister         childhood chest tumor    Heart Failure Mother      Social History     Tobacco Use    Smoking status: Never Smoker    Smokeless tobacco: Never Used   Substance Use Topics    Alcohol use: No          Review of Systems:    Review of Systems   Constitutional: Negative for activity change, chills, diaphoresis, fatigue and fever. HENT: Negative for congestion and sore throat. Respiratory: Negative for cough, chest tightness, shortness of breath and wheezing. Cardiovascular: Negative for chest pain, palpitations and leg swelling. Neurological: Negative for dizziness, syncope, light-headedness and headaches. Psychiatric/Behavioral: Negative for confusion. The patient is not nervous/anxious. Objective:    BP (!) 120/50   Pulse 83   Ht 5' 9\" (1.753 m)   Wt 182 lb (82.6 kg)   SpO2 98%   BMI 26.88 kg/m²     GENERAL - well developed and well nourished, conversant  HEENT -  PERRLA, Hearing appears normal, gentleman lids are normal.  External inspection of ears and nose appear normal.  NECK - no thyromegaly, no JVD, trachea is in the midline  CARDIOVASCULAR - PMI is in the mid line clavicular position, Normal S1 and S2 with no systolic murmur.   No S3 or S4    PULMONARY - no respiratory distress. No wheezes or rales. Lungs are clear to ausculation, normal respiratory effort. ABDOMEN  - soft, non tender, no rebound  MUSCULOSKELETAL  - range of motion of the upper and lower extermites appears normal and equal and is without pain   EXTREMITIES - no significant edema   NEUROLOGIC - gait and station are normal  SKIN - turgor is normal, can warm and dry. PSYCHIATRIC - normal mood and affect, alert and orientated x 3,      ASSESSMENT:    ALL THE CARDIOLOGY PROBLEMS ARE LISTED ABOVE; HOWEVER, THE FOLLOWING SPECIFIC CARDIAC PROBLEMS / CONDITIONS WERE ADDRESSED AND TREATED DURING THE OFFICE VISIT TODAY:                                                                                            MEDICAL DECISION MAKING             Cardiac Specific Problem / Diagnosis  Discussion and Data Reviewed Diagnostic Procedures Ordered Management Options Selected           1. PAF  Stable   Review and summation of old records:    EKG in the office today showing sinus rhythm with heart rate of 83 bpm frequent PACs. Patient is on Xarelto 20 mg daily for stroke prevention. Patient did have one episode of possible atrial fib. Did offer ZIO Patch. Patient declines at this point if episodes occur again he would consider putting a Zio patch back on Yes Continue current medications:     Yes           2. Hypertension  Well Controlled   Blood pressure 120/50. O2 sat 98%. Patient is on spironolactone 25 mg daily. Cozaar 100 mg daily. No Continue current medications: Yes                                     Orders Placed This Encounter   Procedures    EKG 12 lead     Order Specific Question:   Reason for Exam?     Answer:   Irregular heart rate     Orders Placed This Encounter   Medications    nitroGLYCERIN (NITROSTAT) 0.4 MG SL tablet     Sig: Place 1 tablet under the tongue every 5 minutes as needed for Chest pain     Dispense:  25 tablet     Refill:  3       Discussed with patient and spouse.     Return in about 6 months (around 8/28/2022) for Follow up with me . I greatly appreciate the opportunity to meet Bijal Frazier and your confidence in allowing me to participate in his cardiovascular care. NUNO Romero - NP  2/28/2022 9:55 AM CST                    This dictation was generated by voice recognition computer software. Although all attempts are made to edit dictation for accuracy, there may be errors in the transcription that are not intended.

## 2022-03-03 DIAGNOSIS — I48.20 CHRONIC ATRIAL FIBRILLATION (HCC): ICD-10-CM

## 2022-03-03 NOTE — TELEPHONE ENCOUNTER
Lauren Yañez called requesting a refill of the below medication which has been pended for you:     Requested Prescriptions     Pending Prescriptions Disp Refills    XARELTO 20 MG TABS tablet [Pharmacy Med Name: Xarelto 20 MG Oral Tablet] 90 tablet 1     Sig: TAKE 1 TABLET BY MOUTH ONCE DAILY WITH EVENING MEAL       Last Appointment Date: 2/8/2022  Next Appointment Date: 5/10/2022    Allergies   Allergen Reactions    Bee Venom Anaphylaxis    Gabapentin Other (See Comments)     Causes blisters to head.      Penicillins Hives     \"Causes blisters to break out everywhere\"    Toprol Xl [Metoprolol]      Leg pain

## 2022-03-07 RX ORDER — RIVAROXABAN 20 MG/1
TABLET, FILM COATED ORAL
Qty: 90 TABLET | Refills: 1 | Status: SHIPPED | OUTPATIENT
Start: 2022-03-07 | End: 2022-10-17

## 2022-03-10 RX ORDER — DONEPEZIL HYDROCHLORIDE 5 MG/1
10 TABLET, FILM COATED ORAL NIGHTLY
Qty: 90 TABLET | Refills: 0 | Status: SHIPPED | OUTPATIENT
Start: 2022-03-10 | End: 2022-03-21

## 2022-03-10 NOTE — TELEPHONE ENCOUNTER
Geri Gamboa called requesting a refill of the below medication which has been pended for you:     Requested Prescriptions     Pending Prescriptions Disp Refills    donepezil (ARICEPT) 5 MG tablet [Pharmacy Med Name: Donepezil HCl 5 MG Oral Tablet] 90 tablet 0     Sig: TAKE 2 TABLETS BY MOUTH NIGHTLY       Last Appointment Date: 2/8/2022  Next Appointment Date: 4/11/2022    Allergies   Allergen Reactions    Bee Venom Anaphylaxis    Gabapentin Other (See Comments)     Causes blisters to head.      Penicillins Hives     \"Causes blisters to break out everywhere\"    Toprol Xl [Metoprolol]      Leg pain

## 2022-03-16 ENCOUNTER — APPOINTMENT (OUTPATIENT)
Dept: GENERAL RADIOLOGY | Age: 72
End: 2022-03-16
Payer: MEDICARE

## 2022-03-16 ENCOUNTER — HOSPITAL ENCOUNTER (EMERGENCY)
Age: 72
Discharge: HOME | End: 2022-03-16
Payer: MEDICARE

## 2022-03-16 VITALS
TEMPERATURE: 97.2 F | OXYGEN SATURATION: 98 % | RESPIRATION RATE: 15 BRPM | BODY MASS INDEX: 25.92 KG/M2 | HEART RATE: 70 BPM | DIASTOLIC BLOOD PRESSURE: 78 MMHG | SYSTOLIC BLOOD PRESSURE: 139 MMHG | WEIGHT: 175 LBS | HEIGHT: 69 IN

## 2022-03-16 DIAGNOSIS — S20.212A CONTUSION OF RIB ON LEFT SIDE, INITIAL ENCOUNTER: Primary | ICD-10-CM

## 2022-03-16 PROCEDURE — 71101 X-RAY EXAM UNILAT RIBS/CHEST: CPT

## 2022-03-16 PROCEDURE — 99284 EMERGENCY DEPT VISIT MOD MDM: CPT

## 2022-03-16 PROCEDURE — 6370000000 HC RX 637 (ALT 250 FOR IP): Performed by: PHYSICIAN ASSISTANT

## 2022-03-16 RX ORDER — IBUPROFEN 200 MG
600 TABLET ORAL ONCE
Status: COMPLETED | OUTPATIENT
Start: 2022-03-16 | End: 2022-03-16

## 2022-03-16 RX ORDER — HYDROCODONE BITARTRATE AND ACETAMINOPHEN 5; 325 MG/1; MG/1
1 TABLET ORAL ONCE
Status: DISCONTINUED | OUTPATIENT
Start: 2022-03-16 | End: 2022-03-16

## 2022-03-16 RX ADMIN — IBUPROFEN 600 MG: 200 TABLET, FILM COATED ORAL at 14:10

## 2022-03-16 ASSESSMENT — ENCOUNTER SYMPTOMS
VOMITING: 0
SHORTNESS OF BREATH: 0
BACK PAIN: 0
NAUSEA: 0

## 2022-03-16 ASSESSMENT — PAIN DESCRIPTION - PAIN TYPE: TYPE: ACUTE PAIN

## 2022-03-16 ASSESSMENT — PAIN SCALES - GENERAL
PAINLEVEL_OUTOF10: 0
PAINLEVEL_OUTOF10: 6
PAINLEVEL_OUTOF10: 4

## 2022-03-16 ASSESSMENT — PAIN - FUNCTIONAL ASSESSMENT: PAIN_FUNCTIONAL_ASSESSMENT: 0-10

## 2022-03-16 ASSESSMENT — PAIN DESCRIPTION - ORIENTATION: ORIENTATION: LEFT;RIGHT

## 2022-03-16 ASSESSMENT — PAIN DESCRIPTION - LOCATION: LOCATION: RIB CAGE

## 2022-03-16 NOTE — ED PROVIDER NOTES
[facility] EMERGENCY DEPT  eMERGENCY dEPARTMENT eNCOUnter      Pt Name: Svetlana Davidson  MRN: [id number]  [place] 1950  Date of evaluation: 3/16/2022  Provider: Alejandra Salazar, [address] [place] [address]       Chief Complaint   Patient presents with    Rib Pain     left side across to right side    Fall     off ladder about 5 ft no loc did not hit head         HISTORY OF PRESENT ILLNESS   (Location/Symptom, Timing/Onset,Context/Setting, Quality, Duration, Modifying Factors, Severity)  Note limiting factors. Svetlana Davidson is a 66 y.o. male who presents to the emergency department after a fall onto his left side. The patient states he was getting down from a ladder and fell onto his left ribs. He denies any associated loss of consciousness and did not hit his head. He is currently denying any neck or back pain. He states he is only here to be seen for his left ribs. He states when he fell onto the left ribs, he has been having pain with deep breathing since that time. He states coughing and sneezing also exacerbate his pain. He notes the pain is on the left chest and he has been wearing his compression band to help with pain control. He denies any associated labored breathing or other chest pain. HPI    NursingNotes were reviewed. REVIEW OF SYSTEMS    (2-9 systems for level 4, 10 or more for level 5)     Review of Systems   Respiratory: Negative for shortness of breath. Cardiovascular: Positive for chest pain (left ribs). Gastrointestinal: Negative for nausea and vomiting. Musculoskeletal: Negative for arthralgias, back pain and neck pain. Neurological: Negative for dizziness and headaches. All other systems reviewed and are negative.            PAST MEDICALHISTORY     Past Medical History:   Diagnosis Date    Achalasia     going to [facility] in March    Acute pancreatitis     Anemia     Asthma     Atrial fibrillation (HCC)     h/o paroxysmal a-fib ? anesthsia induced    Benign colonic polyp     cscope 12/07 Dr Jessenia Whitehead adenomatous: 12/14 adenoma    Chest pain     Chronic kidney disease     Chronic pain syndrome     Depression     Diabetic peripheral neuropathy (HCC)     Extrinsic asthma     Hyperlipidemia     Hypertension     Idiopathic peripheral neuropathy     Lyme disease     Mediastinal lymphadenopathy     Microalbuminuria     Mixed hyperlipidemia     Paroxysmal A-fib (HCC)     S/P ablation of atrial fibrillation     4/16 A fib ablation , Dr Emanuel Huber, [facility]     [facility]     restrictive lung impairment    Tick bite     Type 2 diabetes, controlled, with neuropathy [facility])          SURGICAL HISTORY       Past Surgical History:   Procedure Laterality Date    ANKLE FRACTURE SURGERY  april 14, 2015   [facility]  [facility]    CARDIAC SURGERY      Catheter Ablation A-fib    CHOLECYSTECTOMY      COLONOSCOPY  12/02/2014    Melecio    CYSTOSCOPY Left 8/16/2019    CYSTOSCOPY; LEFT RETROGRADE PYELOGRAM; INSERTION LEFT URETERAL STENT performed by Jose Bass MD at [address] [place] [address] Left 6/27/2018    LASER LITHOTRIPSY STONE MANIPULATION WITH DOUBLE J STENT PLACEMENT performed by Jose Bass MD at [facility] Left 6/27/2018    CYSTOSCOPY URETEROSCOPY RETROGRADE PYELOGRAMS performed by Jose Bass MD at [address]         CURRENT MEDICATIONS     Discharge Medication List as of 3/16/2022  3:17 PM      CONTINUE these medications which have NOT CHANGED    Details   donepezil (ARICEPT) 5 MG tablet TAKE 2 TABLETS BY MOUTH NIGHTLY, Disp-90 tablet, R-0Normal      XARELTO 20 MG TABS tablet TAKE 1 TABLET BY MOUTH ONCE DAILY WITH EVENING MEAL, Disp-90 tablet, R-1Normal      sulfamethoxazole-trimethoprim (BACTRIM) 400-80 MG per tablet Take 1 tablet by mouth daily M-W-F onlyHistorical Med      nitroGLYCERIN (NITROSTAT) 0.4 MG SL tablet Place 1 tablet under the tongue every 5 minutes as needed for Chest pain, Disp-25 tablet, R-3Normal      dapagliflozin (FARXIGA) 10 MG tablet Take 10 mg by mouth every morningHistorical Med      pregabalin (LYRICA) 75 MG capsule Take 1 capsule by mouth 2 times daily for 180 days. , Disp-180 capsule, R-1Normal      SITagliptin (JANUVIA) 100 MG tablet Take 1 tablet by mouth daily, Disp-90 tablet, R-1Normal      losartan (COZAAR) 100 MG tablet Take 1 tablet by mouth once daily, Disp-90 tablet, R-3Normal      fluticasone-salmeterol (ADVAIR) 500-50 MCG/DOSE diskus inhaler Inhale 1 puff into the lungs every 12 hours 1 puff in the am, prn in afternoonHistorical Med      spironolactone (ALDACTONE) 25 MG tablet 3 days a week, Disp-9 tablet, R-1Normal      albuterol sulfate  (90 Base) MCG/ACT inhaler INHALE 2 PUFFS BY MOUTH EVERY 6 HOURS AS NEEDED FOR WHEEZING, Disp-1 Inhaler, R-5Please consider 90 day supplies to promote better adherenceNormal      predniSONE (DELTASONE) 5 MG tablet Take 5 mg by mouth daily, R-6Historical Med      levalbuterol (XOPENEX) 1.25 MG/3ML nebulizer solution Take 1 ampule by nebulization every 6 hours as needed for WheezingHistorical Med      furosemide (LASIX) 20 MG tablet Take 1 tablet by mouth daily, Disp-30 tablet, R-3Normal      EPINEPHrine (EPIPEN) 0.3 MG/0.3ML SOAJ injection Inject 0.3 mLs into the muscle as needed (Allergic Reaction), Disp-2 each, R-1Normal      azaTHIOprine (IMURAN) 50 MG tablet Take 50 mg by mouth daily Historical Med      aspirin 81 MG tablet Take 81 mg by mouth daily.              ALLERGIES     Bee venom, Gabapentin, Penicillins, and Toprol xl [metoprolol]    FAMILY HISTORY       Family History   Problem Relation Age of Onset    Coronary Art Dis Father         AICD pacer age 68    Heart Attack Father     Cancer Sister         childhood chest tumor    Heart Failure Mother           SOCIAL HISTORY       Social History     Socioeconomic History    Marital status:      Spouse name: Penny Good Number of children: None    Years of education: None    Highest education level: None   Occupational History    None   Tobacco Use    Smoking status: Never Smoker    Smokeless tobacco: Never Used   Vaping Use    Vaping Use: Never used   Substance and Sexual Activity    Alcohol use: No    Drug use: No    Sexual activity: None   Other Topics Concern    None   Social History Narrative    None     Social Determinants of Health     Financial Resource Strain: Low Risk     Difficulty of Paying Living Expenses: Not hard at all   Food Insecurity: No Food Insecurity    Worried About Running Out of Food in the Last Year: Never true    Rainer of Food in the Last Year: Never true   Transportation Needs:     Lack of Transportation (Medical): Not on file    Lack of Transportation (Non-Medical):  Not on file   Physical Activity:     Days of Exercise per Week: Not on file    Minutes of Exercise per Session: Not on file   Stress:     Feeling of Stress : Not on file   Social Connections:     Frequency of Communication with Friends and Family: Not on file    Frequency of Social Gatherings with Friends and Family: Not on file    Attends Sikhism Services: Not on file    Active Member of [address] [place] [address] or Organizations: Not on file    Attends Club or Organization Meetings: Not on file    Marital Status: Not on file   Intimate Partner Violence:     Fear of Current or Ex-Partner: Not on file    Emotionally Abused: Not on file    Physically Abused: Not on file    Sexually Abused: Not on file   Housing Stability:     Unable to Pay for Housing in the Last Year: Not on file    Number of [place] in the Last Year: Not on file    Unstable Housing in the Last Year: Not on file       SCREENINGS    Morris Coma Scale  Eye Opening: Spontaneous  Best Verbal Response: Oriented  Best Motor Response: Obeys commands  Morris Coma Scale Score: 15        PHYSICAL EXAM    (up to 7 for level 4, 8 or more for level 5)     ED Triage Vitals   BP Temp Temp Source Pulse Resp SpO2 Height Weight   03/16/22 1314 03/16/22 1314 03/16/22 1314 03/16/22 1314 03/16/22 1314 03/16/22 1314 03/16/22 1314 03/16/22 1315   139/78 97.2 °F (36.2 °C) Oral 70 15 98 % 5' 9\" (1.753 m) 175 lb (79.4 kg)       Physical Exam  Vitals and nursing note reviewed. Constitutional:       General: He is not in acute distress. Appearance: Normal appearance. He is normal weight. He is not ill-appearing or toxic-appearing. Eyes:      Extraocular Movements: Extraocular movements intact. Conjunctiva/sclera: Conjunctivae normal.      Pupils: Pupils are equal, round, and reactive to light. Cardiovascular:      Rate and Rhythm: Normal rate and regular rhythm. Pulses: Normal pulses. Pulmonary:      Effort: Pulmonary effort is normal. No respiratory distress. Comments: Left chest wall tenderness, mild small bruise  Chest:      Chest wall: Tenderness present. Abdominal:      General: There is no distension. Palpations: Abdomen is soft. Tenderness: There is no abdominal tenderness. Musculoskeletal:      Cervical back: Normal range of motion and neck supple. No swelling, deformity, rigidity, tenderness or bony tenderness. No pain with movement. Normal range of motion. Thoracic back: No swelling, deformity, tenderness or bony tenderness. Normal range of motion. Lumbar back: No swelling, deformity, tenderness or bony tenderness. Normal range of motion. Comments: Bilateral upper and lower extremities are nontender, no swelling or deformity, NV intact   Lymphadenopathy:      Cervical: No cervical adenopathy. Skin:     General: Skin is warm and dry. Neurological:      General: No focal deficit present. Mental Status: He is alert and oriented to person, place, and time. Motor: No weakness. Coordination: Coordination normal.      Gait: Gait normal.         DIAGNOSTIC RESULTS     RADIOLOGY:  Non-plain film images such as CT, Ultrasound and MRI are read by the radiologist. Plain radiographic images are visualized and preliminarily interpreted bythe emergency physician with the below findings:      XR RIBS LEFT INCLUDE CHEST (MIN 3 VIEWS)   Final Result   1. There is no evidence of a left rib fracture. 2. Fullness is present in the right hilum. This may be associated with   the patient's known sarcoidosis. Signed by Dr Chayo Andersen:  Labs Reviewed - No data to display    All other labs were within normal range or not returned as of this dictation. EMERGENCY DEPARTMENT COURSE and DIFFERENTIAL DIAGNOSIS/MDM:   Vitals:    Vitals:    03/16/22 1314 03/16/22 1315   BP: 139/78    Pulse: 70    Resp: 15    Temp: 97.2 °F (36.2 °C)    TempSrc: Oral    SpO2: 98%    Weight:  175 lb (79.4 kg)   Height: 5' 9\" (1.753 m)        MDM  Is a 26-year-old male presents for a left rib pain after a fall yesterday. The patient is adamant he did not hit his head, lose consciousness, or have any other pain that needs imaging at this time. Plain films of the left ribs were obtained and were negative for any signs of bony fracture or malalignment of the ribs. I plan to treat as a rib contusion. There was also no note of any damage to the lungs themselves including pneumothorax or pleural effusion. The patient was offered pain medication but prefers to use ibuprofen outpatient. We discussed other symptom control outpatient. return precautions were given to him and he verbalized understanding. All questions were answered. He is safe to be discharged at this time. I have encouraged follow-up with primary care to ensure improvement. FINAL IMPRESSION      1.  Contusion of rib on left side, initial encounter          DISPOSITION/PLAN   DISPOSITION Decision To Discharge 03/16/2022 03:13:52 PM      PATIENT REFERRED TO:  NUNO Moya [phone]            DISCHARGE MEDICATIONS:  Discharge Medication List as of 3/16/2022  3:17 PM             (Please note that portions of this note were completed with a voice recognition program.  Efforts were made to edit thedictations but occasionally words are mis-transcribed.)    RANJANA Mayes (electronically signed)     Amos Mayes  03/16/22 [id number]

## 2022-03-16 NOTE — ED TRIAGE NOTES
Pt fell off ladder approx 5 ft yesterday and landed on left side.  C/o of left sided rib pain today that goes across chest plate

## 2022-03-16 NOTE — Clinical Note
Dena Newman was seen and treated in our emergency department on 3/16/2022. He may return to work on 03/21/2022. If you have any questions or concerns, please don't hesitate to call.       Amos Grace

## 2022-03-20 ENCOUNTER — APPOINTMENT (OUTPATIENT)
Dept: GENERAL RADIOLOGY | Age: 72
End: 2022-03-20
Payer: MEDICARE

## 2022-03-20 ENCOUNTER — HOSPITAL ENCOUNTER (EMERGENCY)
Age: 72
Discharge: HOME | End: 2022-03-20
Payer: MEDICARE

## 2022-03-20 VITALS
TEMPERATURE: 98.2 F | BODY MASS INDEX: 25.92 KG/M2 | HEART RATE: 76 BPM | DIASTOLIC BLOOD PRESSURE: 92 MMHG | WEIGHT: 175 LBS | HEIGHT: 69 IN | OXYGEN SATURATION: 96 % | SYSTOLIC BLOOD PRESSURE: 128 MMHG | RESPIRATION RATE: 17 BRPM

## 2022-03-20 DIAGNOSIS — M79.671 RIGHT FOOT PAIN: Primary | ICD-10-CM

## 2022-03-20 PROCEDURE — 99283 EMERGENCY DEPT VISIT LOW MDM: CPT

## 2022-03-20 PROCEDURE — 73630 X-RAY EXAM OF FOOT: CPT

## 2022-03-20 PROCEDURE — 73610 X-RAY EXAM OF ANKLE: CPT

## 2022-03-20 ASSESSMENT — ENCOUNTER SYMPTOMS
COLOR CHANGE: 1
ABDOMINAL PAIN: 0

## 2022-03-20 ASSESSMENT — PAIN SCALES - GENERAL: PAINLEVEL_OUTOF10: 10

## 2022-03-20 ASSESSMENT — PAIN DESCRIPTION - LOCATION: LOCATION: ANKLE

## 2022-03-20 ASSESSMENT — PAIN - FUNCTIONAL ASSESSMENT: PAIN_FUNCTIONAL_ASSESSMENT: 0-10

## 2022-03-20 ASSESSMENT — PAIN DESCRIPTION - ORIENTATION: ORIENTATION: RIGHT

## 2022-03-20 NOTE — Clinical Note
Lo Hubbard was seen and treated in our emergency department on 3/20/2022. He may return to work on 03/25/2022. If you have any questions or concerns, please don't hesitate to call.       Cecilia Sierrama

## 2022-03-21 RX ORDER — DONEPEZIL HYDROCHLORIDE 5 MG/1
10 TABLET, FILM COATED ORAL NIGHTLY
Qty: 90 TABLET | Refills: 0 | Status: SHIPPED | OUTPATIENT
Start: 2022-03-21 | End: 2022-10-04

## 2022-03-21 NOTE — ED PROVIDER NOTES
[facility]  eMERGENCY dEPARTMENT eNCOUnter      Pt Name: Alfie Padilla  MRN: [id number]  [place] 1950  Date of evaluation: 3/20/2022  Provider: Krzysztof Wolf [place] Irvin       Chief Complaint   Patient presents with    Fall     Pt reports that he fell Wednesday and was seen here. Pt reports that now he is having right ankle pain. Pt is ambulatory to triage. HISTORY OF PRESENT ILLNESS   (Location/Symptom, Timing/Onset,Context/Setting, Quality, Duration, Modifying Factors, Severity)  Note limiting factors. Alfie Padilla is a 66 y.o. male who presents to the emergency department after a fall on Wednesday. He was seen here at that time but was only seen for his left rib pain. The patient states that yesterday, he began having pain in the right foot/ankle. He denies any new injury since Wednesday after his fall. He states that he has pain with ambulation as well as a bruise on the foot. He denies any associated swelling or decrease in range of motion. HPI    NursingNotes were reviewed. REVIEW OF SYSTEMS    (2-9 systems for level 4, 10 or more for level 5)     Review of Systems   Cardiovascular: Negative for chest pain. Gastrointestinal: Negative for abdominal pain. Musculoskeletal: Positive for arthralgias (right foot). Skin: Positive for color change (bruising). Neurological: Negative for numbness. All other systems reviewed and are negative.            PAST MEDICALHISTORY     Past Medical History:   Diagnosis Date    Achalasia     going to [facility] for surgery in March    Acute pancreatitis     Anemia     Asthma     Atrial fibrillation ([facility] [address]     h/o paroxysmal a-fib ? anesthsia induced    Benign colonic polyp     cscope 12/07 Dr Cordova Lowers adenomatous: 12/14 adenoma    Chest pain     Chronic kidney disease     Chronic pain syndrome     Depression     Diabetic peripheral neuropathy ([facility] [address]     Extrinsic asthma     Hyperlipidemia     Hypertension     Idiopathic peripheral neuropathy     Lyme disease     Mediastinal lymphadenopathy     Microalbuminuria     Mixed hyperlipidemia     [facility]ox[facility]mal [facility]fib ([facility])     S/P ablation of atrial fibrillation     4/16 A fib ablation , Dr Jennifer Corbin, [address] Sarcoidosis, lung [facility])     restrictive lung impairment    Tick bite     Type 2 diabetes, controlled, with neuropathy ([facility] [address]          SURGICAL HISTORY       Past Surgical History:   Procedure Laterality Date    ANKLE FRACTURE SURGERY  april 14, 2015   [facility] ATRIAL ABLATION SURGERY  2015    Dr. Jennifer Dao  10/06/2020    CARDIAC SURGERY      Catheter Ablation A-fib    CHOLECYSTECTOMY      COLONOSCOPY  12/02/2014    Melecio    CYSTOSCOPY Left 8/16/2019    CYSTOSCOPY; LEFT RETROGRADE PYELOGRAM; INSERTION LEFT URETERAL STENT performed by Judi Nguyen MD at [address] [place] [address] Left 6/27/2018    LASER LITHOTRIPSY STONE MANIPULATION WITH DOUBLE J STENT PLACEMENT performed by Judi Nguyen MD at [facility] Left 6/27/2018    CYSTOSCOPY URETEROSCOPY RETROGRADE PYELOGRAMS performed by Judi Nguyen MD at [address]         CURRENT MEDICATIONS     Discharge Medication List as of 3/20/2022  8:11 PM      CONTINUE these medications which have NOT CHANGED    Details   donepezil (ARICEPT) 5 MG tablet TAKE 2 TABLETS BY MOUTH NIGHTLY, Disp-90 tablet, R-0Normal      XARELTO 20 MG TABS tablet TAKE 1 TABLET BY MOUTH ONCE DAILY WITH EVENING MEAL, Disp-90 tablet, R-1Normal      sulfamethoxazole-trimethoprim (BACTRIM) 400-80 MG per tablet Take 1 tablet by mouth daily M-W-F onlyHistorical Med      nitroGLYCERIN (NITROSTAT) 0.4 MG SL tablet Place 1 tablet under the tongue every 5 minutes as needed for Chest pain, Disp-25 tablet, R-3Normal      dapagliflozin (FARXIGA) 10 MG tablet Take 10 mg by mouth every morningHistorical Med      pregabalin (LYRICA) 75 MG capsule Take 1 capsule by mouth 2 times daily for 180 days. , Disp-180 capsule, R-1Normal      SITagliptin (JANUVIA) 100 MG tablet Take 1 tablet by mouth daily, Disp-90 tablet, R-1Normal      losartan (COZAAR) 100 MG tablet Take 1 tablet by mouth once daily, Disp-90 tablet, R-3Normal      fluticasone-salmeterol (ADVAIR) 500-50 MCG/DOSE diskus inhaler Inhale 1 puff into the lungs every 12 hours 1 puff in the am, prn in afternoonHistorical Med      spironolactone (ALDACTONE) 25 MG tablet 3 days a week, Disp-9 tablet, R-1Normal      albuterol sulfate  (90 Base) MCG/ACT inhaler INHALE 2 PUFFS BY MOUTH EVERY 6 HOURS AS NEEDED FOR WHEEZING, Disp-1 Inhaler, R-5Please consider 90 day supplies to promote better adherenceNormal      predniSONE (DELTASONE) 5 MG tablet Take 5 mg by mouth daily, R-6Historical Med      levalbuterol (XOPENEX) 1.25 MG/3ML nebulizer solution Take 1 ampule by nebulization every 6 hours as needed for WheezingHistorical Med      furosemide (LASIX) 20 MG tablet Take 1 tablet by mouth daily, Disp-30 tablet, R-3Normal      EPINEPHrine (EPIPEN) 0.3 MG/0.3ML SOAJ injection Inject 0.3 mLs into the muscle as needed (Allergic Reaction), Disp-2 each, R-1Normal      azaTHIOprine (IMURAN) 50 MG tablet Take 50 mg by mouth daily Historical Med      aspirin 81 MG tablet Take 81 mg by mouth daily.              ALLERGIES     Bee venom, Gabapentin, Penicillins, and Toprol xl [metoprolol]    FAMILY HISTORY       Family History   Problem Relation Age of Onset    Coronary Art Dis Father         AICD pacer age 68    Heart Attack Father     Cancer Sister         childhood chest tumor    Heart Failure Mother           SOCIAL HISTORY       Social History     Socioeconomic History    Marital status:      Spouse name: Scar Wilson    Number of children: None    Years of education: None    Highest education level: None   Occupational History    None Tobacco Use    Smoking status: Never Smoker    Smokeless tobacco: Never Used   Vaping Use    Vaping Use: Never used   Substance and Sexual Activity    Alcohol use: No    Drug use: No    Sexual activity: None   Other Topics Concern    None   Social History Narrative    None     Social Determinants of Health     Financial Resource Strain: Low Risk     Difficulty of Paying Living Expenses: Not hard at all   Food Insecurity: No Food Insecurity    Worried About Running Out of Food in the Last Year: Never true    Rainer of Food in the Last Year: Never true   Transportation Needs:     Lack of Transportation (Medical): Not on file    Lack of Transportation (Non-Medical):  Not on file   Physical Activity:     Days of Exercise per Week: Not on file    Minutes of Exercise per Session: Not on file   Stress:     Feeling of Stress : Not on file   Social Connections:     Frequency of Communication with Friends and Family: Not on file    Frequency of Social Gatherings with Friends and Family: Not on file    Attends Adventism Services: Not on file    Active Member of [address] [place] [address] or Organizations: Not on file    Attends Club or Organization Meetings: Not on file    Marital Status: Not on file   Intimate Partner Violence:     Fear of Current or Ex-Partner: Not on file    Emotionally Abused: Not on file    Physically Abused: Not on file    Sexually Abused: Not on file   Housing Stability:     Unable to Pay for Housing in the Last Year: Not on file    Number of [place] in the Last Year: Not on file    Unstable Housing in the Last Year: Not on file       SCREENINGS    Morris Coma Scale  Eye Opening: Spontaneous  Best Verbal Response: Oriented  Best Motor Response: Obeys commands  Morris Coma Scale Score: 15        PHYSICAL EXAM    (up to 7 for level 4, 8 or more for level 5)     ED Triage Vitals [03/20/22 1749]   BP Temp Temp src Pulse Resp SpO2 Height Weight   (!) 132/110 98 °F (36.7 °C) -- 78 18 96 % 5' 9\" (1.753 m) 175 lb (79.4 kg)       Physical Exam  Vitals and nursing note reviewed. Constitutional:       General: He is not in acute distress. Appearance: Normal appearance. He is normal weight. He is not ill-appearing or toxic-appearing. Cardiovascular:      Rate and Rhythm: Normal rate and regular rhythm. Pulses: Normal pulses. Pulmonary:      Effort: Pulmonary effort is normal. No respiratory distress. Musculoskeletal:      Right knee: Normal. No swelling, deformity or bony tenderness. Normal range of motion. No tenderness. Normal pulse. Right lower leg: Normal. No swelling, deformity, tenderness or bony tenderness. No edema. Right ankle: Normal. No swelling or deformity. No tenderness. Normal range of motion. Normal pulse. Right Achilles Tendon: Normal. No tenderness or defects. Aragon's test negative. Right foot: Normal range of motion. Tenderness present. No swelling or deformity. Normal pulse. Feet:    Skin:     General: Skin is warm and dry. Neurological:      General: No focal deficit present. Mental Status: He is alert and oriented to person, place, and time. DIAGNOSTIC RESULTS     RADIOLOGY:  Non-plain film images such as CT, Ultrasound and MRI are read by the radiologist. Plain radiographic images are visualized and preliminarily interpreted bythe emergency physician with the below findings:    XR FOOT RIGHT (MIN 3 VIEWS)   Final Result   1. No acute bony abnormality is seen. Signed by Dr Jose Rafael Montano      XR ANKLE RIGHT (MIN 3 VIEWS)   Final Result   1. No acute bony abnormality. Signed by Dr Jiang Level:  Labs Reviewed - No data to display    All other labs were within normal range or not returned as of this dictation.     EMERGENCY DEPARTMENT COURSE and DIFFERENTIAL DIAGNOSIS/MDM:   Vitals:    Vitals:    03/20/22 1749 03/20/22 2018   BP: (!) 132/110 (!) 128/92   Pulse: 78 76   Resp: 18 17   Temp: 98 °F (36.7 °C) 98.2 °F (36.8 °C)   SpO2: 96%    Weight: 175 lb (79.4 kg)    Height: 5' 9\" (1.753 m)        MDM  Patient is a 76-year-old male presents with right foot pain after a fall on Wednesday. He was originally seen and only wanted to be seen for his chest pain at that time. He presents today after new onset right foot pain. He does have a small area of bruising and tenderness. He has no abnormal gait, swelling, or neurovascular changes otherwise. Plain films of the ankle and foot were negative for any acute bony abnormality including fracture or malalignment. I discussed rest, ice, gentle range of motion, elevation, and follow-up with orthopedics if he is not improving. He has no swelling, pitting edema, or neurovascular changes that are concerning for possible DVT that would warrant further ultrasound or work-up at this time. Return precautions were given to him and he verbalized understanding. All questions were answered. He is agreeable treatment plan. FINAL IMPRESSION      1.  Right foot pain          DISPOSITION/PLAN   DISPOSITION Decision To Discharge 03/20/2022 08:09:17 PM      PATIENT REFERRED TO:  Emilee Wasserman MD  [facility] [phone]            DISCHARGE MEDICATIONS:  Discharge Medication List as of 3/20/2022  8:11 PM             (Please note that portions of this note were completed with a voice recognition program.  Efforts were made to edit thedictations but occasionally words are mis-transcribed.)    RANJANA Sierra (electronically signed)     Akash Ruiz, [id number]  03/20/22 2043

## 2022-03-21 NOTE — TELEPHONE ENCOUNTER
Neha Aguilera called requesting a refill of the below medication which has been pended for you:     Requested Prescriptions     Pending Prescriptions Disp Refills    donepezil (ARICEPT) 5 MG tablet [Pharmacy Med Name: Donepezil HCl 5 MG Oral Tablet] 90 tablet 0     Sig: TAKE 2 TABLETS BY MOUTH NIGHTLY       Last Appointment Date: 2/8/2022  Next Appointment Date: 4/11/2022    Allergies   Allergen Reactions    Bee Venom Anaphylaxis    Gabapentin Other (See Comments)     Causes blisters to head.      Penicillins Hives     \"Causes blisters to break out everywhere\"    Toprol Xl [Metoprolol]      Leg pain

## 2022-04-11 ENCOUNTER — OFFICE VISIT (OUTPATIENT)
Dept: INTERNAL MEDICINE | Age: 72
End: 2022-04-11
Payer: COMMERCIAL

## 2022-04-11 VITALS
HEIGHT: 69 IN | HEART RATE: 76 BPM | BODY MASS INDEX: 26.36 KG/M2 | OXYGEN SATURATION: 99 % | SYSTOLIC BLOOD PRESSURE: 130 MMHG | WEIGHT: 178 LBS | DIASTOLIC BLOOD PRESSURE: 60 MMHG

## 2022-04-11 DIAGNOSIS — G62.9 PERIPHERAL NERVE DISEASE: ICD-10-CM

## 2022-04-11 DIAGNOSIS — E11.42 TYPE 2 DIABETES MELLITUS WITH PERIPHERAL NEUROPATHY (HCC): ICD-10-CM

## 2022-04-11 DIAGNOSIS — N18.31 STAGE 3A CHRONIC KIDNEY DISEASE (HCC): ICD-10-CM

## 2022-04-11 DIAGNOSIS — D86.9 SARCOIDOSIS: Primary | ICD-10-CM

## 2022-04-11 DIAGNOSIS — J42 CHRONIC BRONCHITIS, UNSPECIFIED CHRONIC BRONCHITIS TYPE (HCC): ICD-10-CM

## 2022-04-11 PROCEDURE — G8417 CALC BMI ABV UP PARAM F/U: HCPCS | Performed by: INTERNAL MEDICINE

## 2022-04-11 PROCEDURE — 1036F TOBACCO NON-USER: CPT | Performed by: INTERNAL MEDICINE

## 2022-04-11 PROCEDURE — 3046F HEMOGLOBIN A1C LEVEL >9.0%: CPT | Performed by: INTERNAL MEDICINE

## 2022-04-11 PROCEDURE — 1123F ACP DISCUSS/DSCN MKR DOCD: CPT | Performed by: INTERNAL MEDICINE

## 2022-04-11 PROCEDURE — 2022F DILAT RTA XM EVC RTNOPTHY: CPT | Performed by: INTERNAL MEDICINE

## 2022-04-11 PROCEDURE — 4040F PNEUMOC VAC/ADMIN/RCVD: CPT | Performed by: INTERNAL MEDICINE

## 2022-04-11 PROCEDURE — 3017F COLORECTAL CA SCREEN DOC REV: CPT | Performed by: INTERNAL MEDICINE

## 2022-04-11 PROCEDURE — 99213 OFFICE O/P EST LOW 20 MIN: CPT | Performed by: INTERNAL MEDICINE

## 2022-04-11 PROCEDURE — G8427 DOCREV CUR MEDS BY ELIG CLIN: HCPCS | Performed by: INTERNAL MEDICINE

## 2022-04-11 PROCEDURE — 3023F SPIROM DOC REV: CPT | Performed by: INTERNAL MEDICINE

## 2022-04-11 NOTE — PROGRESS NOTES
Chief Complaint   Patient presents with    Other     DOL re-cert. HPI: She is here today for [employer] recertification for sarcoidosis chronic bronchitis and other medical issues. Pt is having ongoing shortness of breath and mild chest heavyness. BS between 240-340. Ongoing shortness of breath. Wife is a nurse and gives care and nurse goes out weekly.   Additionally severe neuropathy symptoms    Past Medical History:   Diagnosis Date    Achalasia     going to [facility] for surgery in March    Acute pancreatitis     Anemia     Asthma     Atrial fibrillation ([facility] [address]     h/o paroxysmal a-fib ? anesthsia induced    Benign colonic polyp     cscope 12/07 Dr Meme Ramey adenomatous: 12/14 adenoma    Chest pain     Chronic kidney disease     Chronic pain syndrome     Depression     Diabetic peripheral neuropathy (HCC)     Extrinsic asthma     Hyperlipidemia     Hypertension     Idiopathic peripheral neuropathy     Lyme disease     Mediastinal lymphadenopathy     Microalbuminuria     Mixed hyperlipidemia     Paroxysmal A-fib (HCC)     S/P ablation of atrial fibrillation     4/16 A fib ablation , Dr Pierce Almaguer, [facility]     Sarcoidosis, lung ([facility] [address]     restrictive lung impairment    Tick bite     Type 2 diabetes, controlled, with neuropathy [facility])        Past Surgical History:   Procedure Laterality Date    ANKLE FRACTURE SURGERY  april 14, 2015   [facility] ATRIAL ABLATION SURGERY  2015    Dr. Edil Gastelum  10/06/2020    CARDIAC SURGERY      Catheter Ablation A-fib    CHOLECYSTECTOMY      COLONOSCOPY  12/02/2014    Melecio    CYSTOSCOPY Left 8/16/2019    CYSTOSCOPY; LEFT RETROGRADE PYELOGRAM; INSERTION LEFT URETERAL STENT performed by Nida Greene MD at [address] Left 6/27/2018    LASER LITHOTRIPSY STONE MANIPULATION WITH DOUBLE J STENT PLACEMENT performed by Nida Greene MD at [address] CYSTOURETHROSCOPY,URETER CATHETER Left 6/27/2018    CYSTOSCOPY URETEROSCOPY RETROGRADE PYELOGRAMS performed by Nathalia Chowdary MD at [address]      SKIN CANCER EXCISION      ear       Family History   Problem Relation Age of Onset    Coronary Art Dis Father         AICD pacer age 68    Heart Attack Father     Cancer Sister         childhood chest tumor    Heart Failure Mother        Social History     Socioeconomic History    Marital status:      Spouse name: Sherwin Foy    Number of children: Not on file    Years of education: Not on file    Highest education level: Not on file   Occupational History    Not on file   Tobacco Use    Smoking status: Never Smoker    Smokeless tobacco: Never Used   Vaping Use    Vaping Use: Never used   Substance and Sexual Activity    Alcohol use: No    Drug use: No    Sexual activity: Not on file   Other Topics Concern    Not on file   Social History Narrative    Not on file     Social Determinants of Health     Financial Resource Strain: Low Risk     Difficulty of Paying Living Expenses: Not hard at all   Food Insecurity: No Food Insecurity    Worried About [address] [employer] in the Last Year: Never true    [address] in the Last Year: Never true   Transportation Needs:     Lack of Transportation (Medical): Not on file    Lack of Transportation (Non-Medical):  Not on file   Physical Activity:     Days of Exercise per Week: Not on file    Minutes of Exercise per Session: Not on file   Stress:     Feeling of Stress : Not on file   Social Connections:     Frequency of Communication with Friends and Family: Not on file    Frequency of Social Gatherings with Friends and Family: Not on file    Attends Religion Services: Not on file    Active Member of Clubs or Organizations: Not on file    Attends Club or Organization Meetings: Not on file    Marital Status: Not on file   Intimate Partner Violence:     Fear of Current or Ex-Partner: Not on file    Emotionally Abused: Not on file    Physically Abused: Not on file    Sexually Abused: Not on file   Housing Stability:     Unable to Pay for Housing in the Last Year: Not on file    Number of Places Lived in the Last Year: Not on file    Unstable Housing in the Last Year: Not on file       Allergies   Allergen Reactions    Bee Venom Anaphylaxis    Gabapentin Other (See Comments)     Causes blisters to head.  Penicillins Hives     \"Causes blisters to break out everywhere\"    Toprol Xl [Metoprolol]      Leg pain        Current Outpatient Medications   Medication Sig Dispense Refill    donepezil (ARICEPT) 5 MG tablet TAKE 2 TABLETS BY MOUTH NIGHTLY 90 tablet 0    XARELTO 20 MG TABS tablet TAKE 1 TABLET BY MOUTH ONCE DAILY WITH EVENING MEAL 90 tablet 1    sulfamethoxazole-trimethoprim (BACTRIM) 400-80 MG per tablet Take 1 tablet by mouth daily M-W-F only      nitroGLYCERIN (NITROSTAT) 0.4 MG SL tablet Place 1 tablet under the tongue every 5 minutes as needed for Chest pain 25 tablet 3    dapagliflozin (FARXIGA) 10 MG tablet Take 10 mg by mouth every morning      pregabalin (LYRICA) 75 MG capsule Take 1 capsule by mouth 2 times daily for 180 days.  180 capsule 1    SITagliptin (JANUVIA) 100 MG tablet Take 1 tablet by mouth daily 90 tablet 1    losartan (COZAAR) 100 MG tablet Take 1 tablet by mouth once daily 90 tablet 3    fluticasone-salmeterol (ADVAIR) 500-50 MCG/DOSE diskus inhaler Inhale 1 puff into the lungs every 12 hours 1 puff in the am, prn in afternoon      spironolactone (ALDACTONE) 25 MG tablet 3 days a week (Patient taking differently: Prn) 9 tablet 1    albuterol sulfate  (90 Base) MCG/ACT inhaler INHALE 2 PUFFS BY MOUTH EVERY 6 HOURS AS NEEDED FOR WHEEZING 1 Inhaler 5    predniSONE (DELTASONE) 5 MG tablet Take 5 mg by mouth daily  6    levalbuterol (XOPENEX) 1.25 MG/3ML nebulizer solution Take 1 ampule by nebulization every 6 hours as needed for Wheezing      furosemide (LASIX) 20 MG tablet Take 1 tablet by mouth daily (Patient taking differently: Take 10 mg by mouth daily as needed (if greater than 3 pound weight gain daily) ) 30 tablet 3    EPINEPHrine (EPIPEN) 0.3 MG/0.3ML SOAJ injection Inject 0.3 mLs into the muscle as needed (Allergic Reaction) 2 each 1    azaTHIOprine (IMURAN) 50 MG tablet Take 50 mg by mouth daily       aspirin 81 MG tablet Take 81 mg by mouth daily. No current facility-administered medications for this visit. Review of Systems    /60   Pulse 76   Ht 5' 9\" (1.753 m)   Wt 178 lb (80.7 kg)   SpO2 99%   BMI 26.29 kg/m²   BP Readings from Last 7 Encounters:   04/11/22 130/60   03/20/22 (!) 128/92   03/16/22 139/78   02/28/22 (!) 120/50   02/08/22 (!) 104/58   10/06/21 (!) 124/58   08/26/21 138/74     Wt Readings from Last 7 Encounters:   04/11/22 178 lb (80.7 kg)   03/20/22 175 lb (79.4 kg)   03/16/22 175 lb (79.4 kg)   02/28/22 182 lb (82.6 kg)   02/08/22 180 lb (81.6 kg)   10/06/21 180 lb (81.6 kg)   08/26/21 180 lb (81.6 kg)     BMI Readings from Last 7 Encounters:   04/11/22 26.29 kg/m²   03/20/22 25.84 kg/m²   03/16/22 25.84 kg/m²   02/28/22 26.88 kg/m²   02/08/22 26.58 kg/m²   10/06/21 26.58 kg/m²   08/26/21 26.58 kg/m²     Resp Readings from Last 7 Encounters:   03/20/22 17   03/16/22 15   06/30/21 20   09/23/20 18   09/15/20 18   05/22/20 18   02/12/20 15       Physical Exam  Constitutional:       General: He is not in acute distress. Eyes:      General: No scleral icterus. Cardiovascular:      Heart sounds: Normal heart sounds. Pulmonary:      Breath sounds: Normal breath sounds. Musculoskeletal:      Cervical back: Neck supple. Lymphadenopathy:      Cervical: No cervical adenopathy. Skin:     Findings: No rash.          Results for orders placed or performed in visit on 02/08/22   POCT Rapid Drug Screen   Result Value Ref Range    Alcohol, Urine neg     Amphetamine Screen, Urine neg     Barbiturate Screen, Urine neg     Benzodiazepine Screen, Urine neg     Buprenorphine Urine neg     Cocaine Metabolite Screen, Urine neg     FENTANYL SCREEN, URINE neg     Gabapentin Screen, Urine neg     MDMA, Urine neg     Methadone Screen, Urine neg     Methamphetamine, Urine neg     Opiate Scrn, Ur neg     Oxycodone Screen, Ur neg     PCP Screen, Urine neg     Propoxyphene Screen, Urine neg     Synthetic Cannabinoids (K2) Screen, Urine neg     THC Screen, Urine neg     Tramadol Scrn, Ur neg     Tricyclic Antidepressants, Urine neg        ASSESSMENT/ PLAN:  1. Sarcoidosis  Patient still benefits from a nurse going out weekly to check on his cardiopulmonary status and general health. His wife is also a nurse and checks on him routinely he benefits from this care with his underlying sarcoidosis chronic bronchitis and other medical issues. 2. Chronic bronchitis, unspecified chronic bronchitis type ([facility] [address]  See above    3. Type 2 diabetes mellitus with peripheral neuropathy (HCC)  For control of diabetes we emphasized him the importance of diabetic control also in helping his neuropathy and other medical issues and for ongoing minimization of complications of diabetes    4. Peripheral nerve disease  Again tight diabetic control would be most beneficial    5. Stage 3a chronic kidney disease (HCC)  Follow    Chart, medications, labs, vaccines reviewed. Keep up to date with routine care and follow up. Call with any problems or complaints. Keep up to date with routine screening recomendations and vaccines.

## 2022-04-18 PROBLEM — N18.31 STAGE 3A CHRONIC KIDNEY DISEASE (HCC): Status: ACTIVE | Noted: 2022-04-18

## 2022-05-05 DIAGNOSIS — I48.0 PAF (PAROXYSMAL ATRIAL FIBRILLATION) (HCC): ICD-10-CM

## 2022-05-05 DIAGNOSIS — E78.2 MIXED HYPERLIPIDEMIA: ICD-10-CM

## 2022-05-05 DIAGNOSIS — I10 PRIMARY HYPERTENSION: ICD-10-CM

## 2022-05-05 DIAGNOSIS — E11.42 TYPE 2 DIABETES MELLITUS WITH PERIPHERAL NEUROPATHY (HCC): ICD-10-CM

## 2022-05-05 LAB
ALBUMIN SERPL-MCNC: 4.5 G/DL (ref 3.5–5.2)
ALP BLD-CCNC: 80 U/L (ref 40–130)
ALT SERPL-CCNC: 13 U/L (ref 5–41)
ANION GAP SERPL CALCULATED.3IONS-SCNC: 15 MMOL/L (ref 7–19)
AST SERPL-CCNC: 14 U/L (ref 5–40)
BASOPHILS ABSOLUTE: 0 K/UL (ref 0–0.2)
BASOPHILS RELATIVE PERCENT: 0.9 % (ref 0–1)
BILIRUB SERPL-MCNC: 0.5 MG/DL (ref 0.2–1.2)
BUN BLDV-MCNC: 17 MG/DL (ref 8–23)
CALCIUM SERPL-MCNC: 9.7 MG/DL (ref 8.8–10.2)
CHLORIDE BLD-SCNC: 99 MMOL/L (ref 98–111)
CHOLESTEROL, TOTAL: 161 MG/DL (ref 160–199)
CO2: 26 MMOL/L (ref 22–29)
CREAT SERPL-MCNC: 1.1 MG/DL (ref 0.5–1.2)
EOSINOPHILS ABSOLUTE: 0.1 K/UL (ref 0–0.6)
EOSINOPHILS RELATIVE PERCENT: 2.8 % (ref 0–5)
GFR AFRICAN AMERICAN: >59
GFR NON-AFRICAN AMERICAN: >60
GLUCOSE BLD-MCNC: 257 MG/DL (ref 74–109)
HBA1C MFR BLD: 10.8 % (ref 4–6)
HCT VFR BLD CALC: 39.4 % (ref 42–52)
HDLC SERPL-MCNC: 48 MG/DL (ref 55–121)
HEMOGLOBIN: 13.3 G/DL (ref 14–18)
IMMATURE GRANULOCYTES #: 0.1 K/UL
LDL CHOLESTEROL CALCULATED: 87 MG/DL
LYMPHOCYTES ABSOLUTE: 1.4 K/UL (ref 1.1–4.5)
LYMPHOCYTES RELATIVE PERCENT: 32.2 % (ref 20–40)
MCH RBC QN AUTO: 29.8 PG (ref 27–31)
MCHC RBC AUTO-ENTMCNC: 33.8 G/DL (ref 33–37)
MCV RBC AUTO: 88.1 FL (ref 80–94)
MONOCYTES ABSOLUTE: 0.5 K/UL (ref 0–0.9)
MONOCYTES RELATIVE PERCENT: 10.8 % (ref 0–10)
NEUTROPHILS ABSOLUTE: 2.2 K/UL (ref 1.5–7.5)
NEUTROPHILS RELATIVE PERCENT: 51.7 % (ref 50–65)
PDW BLD-RTO: 13.4 % (ref 11.5–14.5)
PLATELET # BLD: 130 K/UL (ref 130–400)
PMV BLD AUTO: 11.6 FL (ref 9.4–12.4)
POTASSIUM SERPL-SCNC: 4.1 MMOL/L (ref 3.5–5)
RBC # BLD: 4.47 M/UL (ref 4.7–6.1)
SODIUM BLD-SCNC: 140 MMOL/L (ref 136–145)
TOTAL PROTEIN: 6.6 G/DL (ref 6.6–8.7)
TRIGL SERPL-MCNC: 130 MG/DL (ref 0–149)
TSH SERPL DL<=0.05 MIU/L-ACNC: 4.2 UIU/ML (ref 0.27–4.2)
WBC # BLD: 4.3 K/UL (ref 4.8–10.8)

## 2022-05-06 LAB
BILIRUBIN URINE: NEGATIVE
BLOOD, URINE: NEGATIVE
CLARITY: CLEAR
COLOR: YELLOW
GLUCOSE URINE: =>1000 MG/DL
KETONES, URINE: NEGATIVE MG/DL
LEUKOCYTE ESTERASE, URINE: NEGATIVE
NITRITE, URINE: NEGATIVE
PH UA: 5 (ref 5–8)
PROTEIN UA: NEGATIVE MG/DL
SPECIFIC GRAVITY UA: 1.02 (ref 1–1.03)
UROBILINOGEN, URINE: 0.2 E.U./DL

## 2022-05-10 ENCOUNTER — OFFICE VISIT (OUTPATIENT)
Dept: INTERNAL MEDICINE | Age: 72
End: 2022-05-10
Payer: MEDICARE

## 2022-05-10 VITALS
HEIGHT: 69 IN | DIASTOLIC BLOOD PRESSURE: 70 MMHG | BODY MASS INDEX: 26.66 KG/M2 | OXYGEN SATURATION: 97 % | WEIGHT: 180 LBS | SYSTOLIC BLOOD PRESSURE: 132 MMHG

## 2022-05-10 DIAGNOSIS — I48.0 PAF (PAROXYSMAL ATRIAL FIBRILLATION) (HCC): ICD-10-CM

## 2022-05-10 DIAGNOSIS — F33.42 RECURRENT MAJOR DEPRESSIVE DISORDER, IN FULL REMISSION (HCC): ICD-10-CM

## 2022-05-10 DIAGNOSIS — D86.0 SARCOIDOSIS, LUNG (HCC): ICD-10-CM

## 2022-05-10 DIAGNOSIS — G47.34 NOCTURNAL HYPOXIA: ICD-10-CM

## 2022-05-10 DIAGNOSIS — D68.9 COAGULATION DEFECT (HCC): ICD-10-CM

## 2022-05-10 DIAGNOSIS — R41.3 MEMORY LOSS: ICD-10-CM

## 2022-05-10 DIAGNOSIS — E11.42 TYPE 2 DIABETES MELLITUS WITH PERIPHERAL NEUROPATHY (HCC): ICD-10-CM

## 2022-05-10 PROBLEM — R06.09 DOE (DYSPNEA ON EXERTION): Status: RESOLVED | Noted: 2017-11-27 | Resolved: 2022-05-10

## 2022-05-10 PROBLEM — M54.40 ACUTE LEFT-SIDED LOW BACK PAIN WITH SCIATICA: Status: RESOLVED | Noted: 2022-02-08 | Resolved: 2022-05-10

## 2022-05-10 PROCEDURE — 1036F TOBACCO NON-USER: CPT | Performed by: NURSE PRACTITIONER

## 2022-05-10 PROCEDURE — 99214 OFFICE O/P EST MOD 30 MIN: CPT | Performed by: NURSE PRACTITIONER

## 2022-05-10 PROCEDURE — 3046F HEMOGLOBIN A1C LEVEL >9.0%: CPT | Performed by: NURSE PRACTITIONER

## 2022-05-10 PROCEDURE — 1123F ACP DISCUSS/DSCN MKR DOCD: CPT | Performed by: NURSE PRACTITIONER

## 2022-05-10 PROCEDURE — 2028F FOOT EXAM PERFORMED: CPT | Performed by: NURSE PRACTITIONER

## 2022-05-10 PROCEDURE — 2022F DILAT RTA XM EVC RTNOPTHY: CPT | Performed by: NURSE PRACTITIONER

## 2022-05-10 PROCEDURE — 4040F PNEUMOC VAC/ADMIN/RCVD: CPT | Performed by: NURSE PRACTITIONER

## 2022-05-10 PROCEDURE — 3017F COLORECTAL CA SCREEN DOC REV: CPT | Performed by: NURSE PRACTITIONER

## 2022-05-10 PROCEDURE — G8427 DOCREV CUR MEDS BY ELIG CLIN: HCPCS | Performed by: NURSE PRACTITIONER

## 2022-05-10 PROCEDURE — G8417 CALC BMI ABV UP PARAM F/U: HCPCS | Performed by: NURSE PRACTITIONER

## 2022-05-10 RX ORDER — ATORVASTATIN CALCIUM 20 MG/1
10 TABLET, FILM COATED ORAL NIGHTLY
Qty: 90 TABLET | Refills: 3 | Status: SHIPPED | OUTPATIENT
Start: 2022-05-10 | End: 2022-08-29

## 2022-05-10 ASSESSMENT — ENCOUNTER SYMPTOMS
SORE THROAT: 0
EYE DISCHARGE: 0
ABDOMINAL DISTENTION: 0
SHORTNESS OF BREATH: 0
BLOOD IN STOOL: 0
NAUSEA: 0
VOMITING: 0
TROUBLE SWALLOWING: 0
CHOKING: 0
WHEEZING: 0
COUGH: 0
STRIDOR: 0
CONSTIPATION: 0
ABDOMINAL PAIN: 0
DIARRHEA: 0
COLOR CHANGE: 0
EYE ITCHING: 0

## 2022-05-10 ASSESSMENT — PATIENT HEALTH QUESTIONNAIRE - PHQ9
3. TROUBLE FALLING OR STAYING ASLEEP: 0
8. MOVING OR SPEAKING SO SLOWLY THAT OTHER PEOPLE COULD HAVE NOTICED. OR THE OPPOSITE, BEING SO FIGETY OR RESTLESS THAT YOU HAVE BEEN MOVING AROUND A LOT MORE THAN USUAL: 0
5. POOR APPETITE OR OVEREATING: 0
4. FEELING TIRED OR HAVING LITTLE ENERGY: 0
7. TROUBLE CONCENTRATING ON THINGS, SUCH AS READING THE NEWSPAPER OR WATCHING TELEVISION: 0
9. THOUGHTS THAT YOU WOULD BE BETTER OFF DEAD, OR OF HURTING YOURSELF: 0
2. FEELING DOWN, DEPRESSED OR HOPELESS: 0
SUM OF ALL RESPONSES TO PHQ9 QUESTIONS 1 & 2: 0
SUM OF ALL RESPONSES TO PHQ QUESTIONS 1-9: 0
SUM OF ALL RESPONSES TO PHQ QUESTIONS 1-9: 0
6. FEELING BAD ABOUT YOURSELF - OR THAT YOU ARE A FAILURE OR HAVE LET YOURSELF OR YOUR FAMILY DOWN: 0
SUM OF ALL RESPONSES TO PHQ QUESTIONS 1-9: 0
SUM OF ALL RESPONSES TO PHQ QUESTIONS 1-9: 0
1. LITTLE INTEREST OR PLEASURE IN DOING THINGS: 0
10. IF YOU CHECKED OFF ANY PROBLEMS, HOW DIFFICULT HAVE THESE PROBLEMS MADE IT FOR YOU TO DO YOUR WORK, TAKE CARE OF THINGS AT HOME, OR GET ALONG WITH OTHER PEOPLE: 0

## 2022-05-10 NOTE — PROGRESS NOTES
[address] [facility]  [address] [address] [place] [id number] [address] [address]  Dept: [phone]  Dept Fax: [phone]: [phone]    Shruthi Mi (:  1950) is a 66 y.o. male,Established patient  with green, here for evaluation of the following chief complaint(s): 3 Month Follow-Up (left hip pain returning)      Shruthi Mi is a 66 y.o. male who presents today for his medical conditions/complaints as noted below. Shruthi Mi is c/alexys 3 Month Follow-Up (left hip pain returning)        HPI:     Chief Complaint   Patient presents with    3 Month Follow-Up     left hip pain returning     HPI   1. Hypertension is on spironolactone 25 as needed gets Cozaar 100 mg daily Lasix 10 as needed  2. PAF with history of ablation and is on Xarelto  3. 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 Genet Jasmine; does get short of breath some during the daytime and requires supplemental oxygen. 4. nocturnal hypoxia on oxygen at night as needed   5. type 2 diabetes mellitus he is on Januvia 100 mg daily fasting sugars have been 257 A1c is above or 8  6. Chronic kidney disease  Back to normal   7. memory loss he is stable on Aricept 10 mg at bedtime  #8 diabetic peripheral neuropathy is on Lyrica 75 mg ID  #9 depression he is stable off meds  Controlled Substance Monitoring:    Acute and Chronic Pain Monitoring:   RX Monitoring 3/28/2018   Attestation The Prescription Monitoring Report for this patient was reviewed today. Periodic Controlled Substance Monitoring Possible medication side effects, risk of tolerance/dependence & alternative treatments discussed. ;No signs of potential drug abuse or diversion identified.        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 anesthsia induced    Benign colonic polyp     cscope 12/07 Dr Gabriella Hubbard adenomatous: 12/14 adenoma    Chest pain     Chronic kidney disease     Chronic pain syndrome     Depression     Diabetic peripheral neuropathy (HCC)     Extrinsic asthma     Hyperlipidemia     Hypertension     Idiopathic peripheral neuropathy     Lyme disease     Mediastinal lymphadenopathy     Microalbuminuria     Mixed hyperlipidemia     Paroxysmal A-fib (HCC)     S/P ablation of atrial fibrillation     4/16 A fib ablation , Dr Daniel Cardoza, [facility]     Sarcoidosis, lung ([facility] [address]     restrictive lung impairment    Tick bite     Type 2 diabetes, controlled, with neuropathy [facility])       Past Surgical History:   Procedure Laterality Date    ANKLE FRACTURE SURGERY  april 14, 2015   [facility] ATRIAL ABLATION SURGERY  2015    Dr. Gray Madrigal  10/06/2020    CARDIAC SURGERY      Catheter Ablation A-fib    CHOLECYSTECTOMY      COLONOSCOPY  12/02/2014    [facility]    CYSTOSCOPY Left 8/16/2019    CYSTOSCOPY; LEFT RETROGRADE PYELOGRAM; INSERTION LEFT URETERAL STENT performed by Ambrose Ledesma MD at [address] [place] [address] Left 6/27/2018    LASER LITHOTRIPSY STONE MANIPULATION WITH DOUBLE J STENT PLACEMENT performed by Ambrose Ledesma MD at [facility] Left 6/27/2018    CYSTOSCOPY URETEROSCOPY RETROGRADE PYELOGRAMS performed by Ambrose Ledesma MD at [place] [address]      ear       Vitals 5/10/2022 4/11/2022 3/20/2022 3/20/2022 3/16/2022 5/47/7499   SYSTOLIC 622 969 073 193 - -   DIASTOLIC 70 60 92 080 - -   Pulse - 76 76 78 - -   Temp - - 98.2 98 - -   Resp - - 17 18 - -   SpO2 97 99 - 96 - -   Weight 180 lb 178 lb - 175 lb - -   Height 5' 9\" 5' 9\" - 5' 9\" - -   Body mass index 26.58 kg/m2 26.28 kg/m2 - 25.84 kg/m2 - -   Pain Level - - - 10 0 6   Some recent data might be hidden       Family History   Problem Relation Age of Onset    Coronary Art Dis Father         AICD pacer age 68    Heart Attack Father     Cancer Sister         childhood chest tumor    Heart Failure Mother        Social History     Tobacco Use    Smoking status: Never Smoker    Smokeless tobacco: Never Used   Substance Use Topics    Alcohol use: No      Current Outpatient Medications   Medication Sig Dispense Refill    Semaglutide,0.25 or 0.5MG/DOS, 2 MG/1.5ML SOPN Inject 0.25 mg into the skin once a week 5 pen 2    atorvastatin (LIPITOR) 20 MG tablet Take 0.5 tablets by mouth nightly 90 tablet 3    donepezil (ARICEPT) 5 MG tablet TAKE 2 TABLETS BY MOUTH NIGHTLY 90 tablet 0    XARELTO 20 MG TABS tablet TAKE 1 TABLET BY MOUTH ONCE DAILY WITH EVENING MEAL 90 tablet 1    sulfamethoxazole-trimethoprim (BACTRIM) 400-80 MG per tablet Take 1 tablet by mouth daily M-W-F only      nitroGLYCERIN (NITROSTAT) 0.4 MG SL tablet Place 1 tablet under the tongue every 5 minutes as needed for Chest pain 25 tablet 3    pregabalin (LYRICA) 75 MG capsule Take 1 capsule by mouth 2 times daily for 180 days.  180 capsule 1    losartan (COZAAR) 100 MG tablet Take 1 tablet by mouth once daily 90 tablet 3    fluticasone-salmeterol (ADVAIR) 500-50 MCG/DOSE diskus inhaler Inhale 1 puff into the lungs every 12 hours 1 puff in the am, prn in afternoon      albuterol sulfate  (90 Base) MCG/ACT inhaler INHALE 2 PUFFS BY MOUTH EVERY 6 HOURS AS NEEDED FOR WHEEZING 1 Inhaler 5    predniSONE (DELTASONE) 5 MG tablet Take 5 mg by mouth daily  6    levalbuterol (XOPENEX) 1.25 MG/3ML nebulizer solution Take 1 ampule by nebulization every 6 hours as needed for Wheezing      EPINEPHrine (EPIPEN) 0.3 MG/0.3ML SOAJ injection Inject 0.3 mLs into the muscle as needed (Allergic Reaction) 2 each 1    azaTHIOprine (IMURAN) 50 MG tablet Take 50 mg by mouth daily       aspirin 81 MG tablet Take 81 mg by mouth daily. No current facility-administered medications for this visit. Allergies   Allergen Reactions    Bee Venom Anaphylaxis    Gabapentin Other (See Comments)     Causes blisters to head.      Penicillins Hives     \"Causes blisters to break out everywhere\"    Toprol Xl [Metoprolol]      Leg pain        Health Maintenance   Topic Date Due    Shingles vaccine (1 of 2) 05/10/2023 (Originally 9/15/1999)    COVID-19 Vaccine (1) 03/29/2025 (Originally 9/15/1962)    A1C test (Diabetic or Prediabetic)  08/05/2022    Annual Wellness Visit (AWV)  10/07/2022    Diabetic retinal exam  01/14/2023    Lipids  05/05/2023    Potassium  05/05/2023    Creatinine  05/05/2023    Diabetic foot exam  05/10/2023    Depression Monitoring  05/10/2023    Colorectal Cancer Screen  08/01/2024    DTaP/Tdap/Td vaccine (2 - Td or Tdap) 05/22/2030    Flu vaccine  Completed    Pneumococcal 65+ years Vaccine  Completed    Hepatitis A vaccine  Aged Out    Hib vaccine  Aged Out    Meningococcal (ACWY) vaccine  Aged Out    Hepatitis C screen  Discontinued       Lab Results   Component Value Date    LABA1C 10.8 (H) 05/05/2022     Lab Results   Component Value Date    PSA 2.9 06/21/2021    PSA 2.5 11/09/2020    PSA 3.6 03/17/2020     TSH   Date Value Ref Range Status   05/05/2022 4.200 0.270 - 4.200 uIU/mL Final   ]  Lab Results   Component Value Date     05/05/2022    K 4.1 05/05/2022    CL 99 05/05/2022    CO2 26 05/05/2022    BUN 17 05/05/2022    CREATININE 1.1 05/05/2022    GLUCOSE 257 (H) 05/05/2022    CALCIUM 9.7 05/05/2022    PROT 6.6 05/05/2022    LABALBU 4.5 05/05/2022    BILITOT 0.5 05/05/2022    ALKPHOS 80 05/05/2022    AST 14 05/05/2022    ALT 13 05/05/2022    LABGLOM >60 05/05/2022    GFRAA >59 05/05/2022     Lab Results   Component Value Date CHOL 161 05/05/2022    CHOL 136 (L) 09/27/2021    CHOL 170 03/22/2021     Lab Results   Component Value Date    TRIG 130 05/05/2022    TRIG 90 09/27/2021    TRIG 257 (H) 03/22/2021     Lab Results   Component Value Date    HDL 48 (L) 05/05/2022    HDL 41 (L) 09/27/2021    HDL 47 (L) 03/22/2021     Lab Results   Component Value Date    LDLCALC 87 05/05/2022    LDLCALC 77 09/27/2021    LDLCALC 72 03/22/2021     Lab Results   Component Value Date     05/05/2022    K 4.1 05/05/2022    K 4.6 09/23/2020    CL 99 05/05/2022    CO2 26 05/05/2022    BUN 17 05/05/2022    CREATININE 1.1 05/05/2022    GLUCOSE 257 05/05/2022    CALCIUM 9.7 05/05/2022      Lab Results   Component Value Date    WBC 4.3 (L) 05/05/2022    HGB 13.3 (L) 05/05/2022    HCT 39.4 (L) 05/05/2022    MCV 88.1 05/05/2022     05/05/2022    LABLYMP 1.58 09/20/2015    LYMPHOPCT 32.2 05/05/2022    RBC 4.47 (L) 05/05/2022    MCH 29.8 05/05/2022    MCHC 33.8 05/05/2022    RDW 13.4 05/05/2022     Lab Results   Component Value Date    VITD25 21.3 (L) 09/27/2021     Labs reviewed from 5/5/2022    Subjective:      Review of Systems   Constitutional: Negative for fatigue, fever and unexpected weight change. HENT: Negative for ear discharge, ear pain, mouth sores, sore throat and trouble swallowing. Eyes: Negative for discharge, itching and visual disturbance. Respiratory: Negative for cough, choking, shortness of breath, wheezing and stridor. Cardiovascular: Negative for chest pain, palpitations and leg swelling. Gastrointestinal: Negative for abdominal distention, abdominal pain, blood in stool, constipation, diarrhea, nausea and vomiting. Endocrine: Negative for cold intolerance, polydipsia and polyuria. Genitourinary: Negative for difficulty urinating, dysuria, frequency and urgency. Musculoskeletal: Negative for arthralgias and gait problem. Skin: Negative for color change and rash.    Allergic/Immunologic: Negative for food allergies and immunocompromised state. Neurological: Negative for dizziness, tremors, syncope, speech difficulty, weakness and headaches. Hematological: Negative for adenopathy. Does not bruise/bleed easily. Psychiatric/Behavioral: Negative for confusion and hallucinations. Objective:     Physical Exam  Constitutional:       General: He is not in acute distress. Appearance: He is well-developed. HENT:      Head: Normocephalic and atraumatic. Eyes:      General: No scleral icterus. Right eye: No discharge. Left eye: No discharge. Pupils: Pupils are equal, round, and reactive to light. Neck:      Thyroid: No thyromegaly. Vascular: No JVD. Cardiovascular:      Rate and Rhythm: Normal rate and regular rhythm. Heart sounds: Normal heart sounds. No murmur heard. Pulmonary:      Effort: Pulmonary effort is normal. No respiratory distress. Breath sounds: Normal breath sounds. No wheezing or rales. Abdominal:      General: Bowel sounds are normal. There is no distension. Palpations: Abdomen is soft. There is no mass. Tenderness: There is no abdominal tenderness. There is no guarding or rebound. Musculoskeletal:         General: No tenderness. Normal range of motion. Cervical back: Normal range of motion and neck supple. Skin:     General: Skin is warm and dry. Findings: No erythema or rash. Comments: He has wounds on second toe both feet where he clipped them with toenail clippers trying to cut his nails. Due to the neuropathy he did not know it was even cut until he saw the blood. Neurological:      Mental Status: He is alert and oriented to person, place, and time. Cranial Nerves: No cranial nerve deficit. Coordination: Coordination normal.      Deep Tendon Reflexes: Reflexes are normal and symmetric. Reflexes normal.   Psychiatric:         Mood and Affect: Mood is not depressed.          Behavior: Behavior normal. Thought Content: Thought content normal.         Judgment: Judgment normal.     Visual inspection:  Deformity/amputation: absent  Skin lesions/pre-ulcerative calluses: Calluses or preulcers but he does have wounds to second toe on both feet where he clipped the skin trying to trim his nails.   There is no sign of infection  Edema: right- negative, left- negative    Sensory exam:  Monofilament sensation: abnormal  Decreased   (minimum of 5 random plantar locations tested, avoiding callused areas - > 1 area with absence of sensation is + for neuropathy)    Plus at least one of the following:  Pulses: normal,   Pinprick: Impaired  Proprioception: Impaired  Vibration (128 Hz): N/A    /70   Ht 5' 9\" (1.753 m)   Wt 180 lb (81.6 kg)   SpO2 97%   BMI 26.58 kg/m²           Assessment:      Problem List     Coagulation defect ([facility] [address]     Is on Xarelto for PAF Well-controlled, continue current medications           Memory loss     He is taking 10 mg of Aricept at bedtime          Nocturnal hypoxia     He is on oxygen at night          PAF (paroxysmal atrial fibrillation) ([facility] [address]     He has had ablation but still is PAF he is on Xarelto          Relevant Medications    aspirin 81 MG tablet    losartan (COZAAR) 100 MG tablet    nitroGLYCERIN (NITROSTAT) 0.4 MG SL tablet    XARELTO 20 MG TABS tablet    atorvastatin (LIPITOR) 20 MG tablet    RESOLVED: Recurrent major depressive disorder, in full remission (HCC)    Relevant Medications    donepezil (ARICEPT) 5 MG tablet    Sarcoidosis, lung (HCC)     Doing well he is followed by Dr. Abelardo Chandra in [facility]          Type 2 diabetes mellitus with peripheral neuropathy [facility])     He is on [place] able          Relevant Medications    donepezil (ARICEPT) 5 MG tablet    Semaglutide,0.25 or 0.5MG/DOS, 2 MG/1.5ML SOPN    Other Relevant Orders    [facility] DIABETES FOOT EXAM (Completed)    CBC with Auto Differential    Comprehensive Metabolic Panel    Hemoglobin A1C    Urinalysis with Reflex to Culture          Plan:        Patient given educational materials - see patient instructions. Discussed use, benefit, and side effects of prescribed medications. Allpatient questions answered. Pt voiced understanding. Reviewed health maintenance. Instructed to continue current medications, diet and exercise. Patient agreed with treatment plan. Follow up as directed. MEDICATIONS:  Orders Placed This Encounter   Medications    Semaglutide,0.25 or 0.5MG/DOS, 2 MG/1.5ML SOPN     Sig: Inject 0.25 mg into the skin once a week     Dispense:  5 pen     Refill:  2    atorvastatin (LIPITOR) 20 MG tablet     Sig: Take 0.5 tablets by mouth nightly     Dispense:  90 tablet     Refill:  3         ORDERS:  Orders Placed This Encounter   Procedures    CBC with Auto Differential    Comprehensive Metabolic Panel    Hemoglobin A1C    Urinalysis with Reflex to Culture     DIABETES FOOT EXAM       Follow-up:  Return in about 3 months (around 8/10/2022). PATIENT INSTRUCTIONS:  Memory loss stable with Aricept 10 at bedtime  2. Depression stable off meds 60 #3 coagulation defect stable with Xarelto  4. Paroxysmal atrial fibs Xarelto with rate control  5. Sarcoid followed by pulmonology  6. Nocturnal hypoxia stable with oxygen followed by pulmonology  7. Type 2 diabetes mellitus   Patient Instructions   1. Hypertension stable with Cozaar  2 PAF on Xarelto he was intolerant of a beta-blocker he does have some rapid rate every now and then but tolerates it  3. Sarcoidosis followed by specialty supplemental oxygen  5. Nocturnal hypoxia oxygen as needed   #5 type 2 diabetes we will stop the Januvia go ahead and start him on Ozempic slowly ramping up there to message me with some blood sugars over the next few weeks I feel certain organ and needs some mealtime insulin as well especially to have when he requires supplemental steroids for his sarcoid  6. Chronic kidney disease is resolved  7.   Memory loss stable with Aricept  8. Diabetic peripheral neuropathy stable with the Lyrica no changes  9. Depression has resolved off meds    Electronically signed by NUNO Manriquez on 5/10/2022 at 11:34 AM      EMRDragon/transcription disclaimer:  Much of this encounter note is electronic transcription/translation of spoken language to printed texts. The electronic translation of spoken language may be erroneous, or at times,nonsensical words or phrases may be inadvertently transcribed.   Although I have reviewed the note for such errors, some may still exist.

## 2022-05-10 NOTE — PATIENT INSTRUCTIONS
1.  Hypertension stable with Cozaar  2 PAF on Xarelto he was intolerant of a beta-blocker he does have some rapid rate every now and then but tolerates it  3. Sarcoidosis followed by specialty supplemental oxygen  5. Nocturnal hypoxia oxygen as needed   #5 type 2 diabetes we will stop the Januvia go ahead and start him on Ozempic slowly ramping up there to message me with some blood sugars over the next few weeks I feel certain organ and needs some mealtime insulin as well especially to have when he requires supplemental steroids for his sarcoid  6. Chronic kidney disease is resolved  7. Memory loss stable with Aricept  8. Diabetic peripheral neuropathy stable with the Lyrica no changes  9.   Depression has resolved off meds

## 2022-05-22 DIAGNOSIS — G62.9 PERIPHERAL NERVE DISEASE: ICD-10-CM

## 2022-05-23 RX ORDER — PREGABALIN 75 MG/1
CAPSULE ORAL
Qty: 180 CAPSULE | Refills: 0 | Status: SHIPPED | OUTPATIENT
Start: 2022-05-23 | End: 2022-09-19

## 2022-05-23 NOTE — TELEPHONE ENCOUNTER
Tonnie Robinson called requesting a refill of the below medication which has been pended for you:     Requested Prescriptions     Pending Prescriptions Disp Refills    pregabalin (LYRICA) 75 MG capsule [Pharmacy Med Name: Pregabalin 75 MG Oral Capsule] 180 capsule 0     Sig: Take 1 capsule by mouth twice daily       Last Appointment Date: 5/10/2022  Next Appointment Date: 8/17/2022    Allergies   Allergen Reactions    Bee Venom Anaphylaxis    Gabapentin Other (See Comments)     Causes blisters to head.      Penicillins Hives     \"Causes blisters to break out everywhere\"    Toprol Xl [Metoprolol]      Leg pain

## 2022-06-07 RX ORDER — SITAGLIPTIN 100 MG/1
TABLET, FILM COATED ORAL
Qty: 90 TABLET | Refills: 1 | OUTPATIENT
Start: 2022-06-07

## 2022-06-15 RX ORDER — SITAGLIPTIN 100 MG/1
TABLET, FILM COATED ORAL
Qty: 90 TABLET | Refills: 0 | OUTPATIENT
Start: 2022-06-15

## 2022-06-17 ENCOUNTER — OFFICE VISIT (OUTPATIENT)
Dept: GASTROENTEROLOGY | Facility: CLINIC | Age: 72
End: 2022-06-17

## 2022-06-17 VITALS — HEIGHT: 69 IN | BODY MASS INDEX: 25.92 KG/M2 | WEIGHT: 175 LBS

## 2022-06-17 DIAGNOSIS — R13.19 ESOPHAGEAL DYSPHAGIA: ICD-10-CM

## 2022-06-17 DIAGNOSIS — I10 HTN (HYPERTENSION), BENIGN: ICD-10-CM

## 2022-06-17 DIAGNOSIS — K22.0 ACHALASIA: ICD-10-CM

## 2022-06-17 DIAGNOSIS — Z86.010 HX OF ADENOMATOUS COLONIC POLYPS: Primary | ICD-10-CM

## 2022-06-17 DIAGNOSIS — Z79.01 ANTICOAGULATED: ICD-10-CM

## 2022-06-17 PROBLEM — Z86.0101 HX OF ADENOMATOUS COLONIC POLYPS: Status: ACTIVE | Noted: 2022-06-17

## 2022-06-17 PROCEDURE — 99213 OFFICE O/P EST LOW 20 MIN: CPT | Performed by: CLINICAL NURSE SPECIALIST

## 2022-06-17 RX ORDER — SULFAMETHOXAZOLE AND TRIMETHOPRIM 400; 80 MG/1; MG/1
1 TABLET ORAL
COMMUNITY

## 2022-06-17 RX ORDER — SITAGLIPTIN 100 MG/1
TABLET, FILM COATED ORAL
Qty: 90 TABLET | Refills: 0 | OUTPATIENT
Start: 2022-06-17

## 2022-06-17 RX ORDER — SEMAGLUTIDE 1.34 MG/ML
INJECTION, SOLUTION SUBCUTANEOUS
COMMUNITY
Start: 2022-05-10

## 2022-06-17 NOTE — PROGRESS NOTES
Caio Riley  1950      6/17/2022  Chief Complaint   Patient presents with   • Colonoscopy     Subjective   HPI  Caio Riley is a 71 y.o. male who presents as a referral for preventative maintenance. He has no complaints of nausea or vomiting. No change in bowels. No wt loss. No BRBPR. No melena. There is no family hx for colon cancer. No abdominal pain.    Dysphagia. Persistent intermittent worse with solid foods like something is stuck in the upper esophageal region. Moderate for him. No nausea or vomiting. No wt loss. He has had an appointment with [facility] just prior to [facility] and did not complete workup. He did not have manometry testing as suggested by him due to the pandemic. I explained he would need to complete this workup as well. He desires possible dilatation if this will help him. He has had this in the past.   Past Medical History:   Diagnosis Date   • A-fib (HCC)    • Arthritis    • Asthma    • BCC (basal cell carcinoma)     right ear   • COPD (chronic obstructive pulmonary disease) (HCC)    • Diabetes mellitus (HCC)    • Hypertension    • Kidney stones    • Neuropathy    • Pancreatitis    • Sarcoidosis      Past Surgical History:   Procedure Laterality Date   • ABLATION OF DYSRHYTHMIC FOCUS     • COLONOSCOPY N/A 08/01/2019    4 Tubular adenomas transverse colon and cecum, Diverticulosis repeat exam in 3 years   • COLONOSCOPY W/ POLYPECTOMY  12/02/2014    Tubular adenoma ascending colon    • ENDOSCOPY  01/08/2002    Mild chronic gastritis   • ENDOSCOPY N/A 08/01/2019    Esophagus dilated normal exam   • ENDOSCOPY  08/01/2019    Dilation in the entire esophagus   • FRACTURE SURGERY Right     ARM   • FRACTURE SURGERY Left     LEG   • INGUINAL NODE BIOPSY Right 08/07/2018    Procedure: RIGHT GROIN EXCISIONAL BIOPSY;  Surgeon: Ced Aguirre MD;  Location: [facility] OR;  Service: General   • LAPAROSCOPIC CHOLECYSTECTOMY     • LASIK Bilateral    • SKIN CANCER EXCISION      bcc of right helix   06/16/21   • SQUAMOUS CELL CARCINOMA EXCISION      back and left arm     Outpatient Medications Marked as Taking for the 6/17/22 encounter (Office Visit) with Linda Crystal APRN   Medication Sig Dispense Refill   • ascorbic acid (VITAMIN C) 1000 MG tablet Take 1,000 mg by mouth Daily.     • aspirin 81 MG chewable tablet Chew 81 mg Daily.     • azaTHIOprine (IMURAN) 50 MG tablet   5   • donepezil (ARICEPT) 5 MG tablet   0   • EPINEPHrine (EPIPEN) 0.3 MG/0.3ML solution auto-injector injection Inject 0.3 mg into the appropriate muscle as directed by prescriber.     • fluticasone-salmeterol (ADVAIR) 500-50 MCG/DOSE DISKUS Inhale 2 (Two) Times a Day.     • Insulin Pen Needle 32G X 4 MM misc 1 each.     • ipratropium (ATROVENT) 0.02 % nebulizer solution Take 2.5 mL by nebulization 4 (Four) Times a Day. 300 mL 11   • ipratropium-albuterol (DUO-NEB) 0.5-2.5 mg/3 ml nebulizer Take 3 mL by nebulization 4 (Four) Times a Day As Needed for Wheezing. 120 mL 11   • JANUVIA 50 MG tablet      • levalbuterol (XOPENEX) 1.25 MG/3ML nebulizer solution Take 1 ampule by nebulization 4 (Four) Times a Day. 120 ampule 11   • levalbuterol (XOPENEX) 1.25 MG/3ML nebulizer solution Take 1 ampule by nebulization 4 (Four) Times a Day As Needed for Wheezing. 360 mL 5   • losartan (COZAAR) 100 MG tablet      • Ozempic, 0.25 or 0.5 MG/DOSE, 2 MG/1.5ML solution pen-injector INJECT 0.25MG INTO SKIN ONCE A WEEK     • predniSONE (DELTASONE) 5 MG tablet Take 5 mg by mouth Daily.     • pregabalin (LYRICA) 75 MG capsule Take 75 mg by mouth 2 (Two) Times a Day.     • RELION PEN NEEDLES 32G X 4 MM misc      • rivaroxaban (XARELTO) 20 MG tablet Take 20 mg by mouth Daily. STOPPED 8/1/18   Indications: Resume taking Xarelto on Thursday morning     • sulfamethoxazole-trimethoprim (BACTRIM,SEPTRA) 400-80 MG tablet Take 1 tablet by mouth. Three times a week     • Ventolin  (90 Base) MCG/ACT inhaler INHALE 2 PUFFS BY MOUTH EVERY 4 HOURS AS NEEDED FOR  WHEEZING FOR SHORTNESS OF BREATH 18 g 11     Allergies   Allergen Reactions   • Bee Venom Anaphylaxis   • Penicillins Swelling and Unknown (See Comments)     Reaction: SWELLING/RASH    Reaction: SWELLING/RASH   • Acetaminophen Unknown (See Comments)     Other reaction(s): NAUSEA   Start Date: ADULT    Other reaction(s): NAUSEA   Start Date: ADULT   • Gabapentin Unknown (See Comments)     Causes blisters to head.   Causes blisters to head.     Causes blisters to head.    • Metoprolol Unknown (See Comments)     Leg pain   Leg pain     Leg pain      Social History     Socioeconomic History   • Marital status:    Tobacco Use   • Smoking status: Former Smoker     Packs/day: 0.50     Years: 2.00     Pack years: 1.00     Types: Cigarettes     Quit date:      Years since quittin.4   • Smokeless tobacco: Never Used   Substance and Sexual Activity   • Alcohol use: No   • Drug use: No   • Sexual activity: Defer     Family History   Problem Relation Age of Onset   • Colon cancer Neg Hx    • Colon polyps Neg Hx      Health Maintenance   Topic Date Due   • COVID-19 Vaccine (1) Never done   • ZOSTER VACCINE (1 of 2) Never done   • ANNUAL WELLNESS VISIT  Never done   • DIABETIC FOOT EXAM  Never done   • DIABETIC EYE EXAM  Never done   • URINE MICROALBUMIN  2020   • COLORECTAL CANCER SCREENING  2022   • INFLUENZA VACCINE  10/01/2022   • HEMOGLOBIN A1C  2022   • TDAP/TD VACCINES (2 - Td or Tdap) 2030   • HEPATITIS C SCREENING  Completed   • Pneumococcal Vaccine 65+  Completed       REVIEW OF SYSTEMS  General: well appearing, no fever chills or sweats, no unexplained wt loss  HEENT: no acute visual or hearing disturbances  Cardiovascular: No chest pain or palpitations  Pulmonary: No shortness of breath, coughing, wheezing or hemoptysis  : No burning, urgency, hematuria, or dysuria  GI: DYSPHAGIA  Musculoskeletal: No joint pain or stiffness  Peripheral: no edema  Skin: No lesions or  "rashes  Neuro: No dizziness, headaches, stroke, syncope  Endocrine: No hot or cold intolerances  Hematological: No blood dyscrasias    Objective   Vitals:    06/17/22 0847   Weight: 79.4 kg (175 lb)   Height: 175.3 cm (69\")     Body mass index is 25.84 kg/m².  BMI is >= 25 and <30. (Overweight) The following options were offered after discussion;: weight loss educational material (shared in after visit summary)      PHYSICAL EXAM  Neuro-Non focal, converses appropriately, awake, alert, oriented    Assessment & Plan     Diagnoses and all orders for this visit:    1. Hx of adenomatous colonic polyps (Primary)  -     Cancel: Case Request; Standing  -     Cancel: Follow Anesthesia Guidelines / Standing Orders; Future  -     Cancel: Obtain Informed Consent; Future  -     Cancel: Case Request  -     Case Request; Standing  -     COVID PRE-OP / PRE-PROCEDURE SCREENING ORDER (NO ISOLATION) - Swab, Nasal Cavity; Future  -     Follow Anesthesia Guidelines / Standing Orders; Future  -     Obtain Informed Consent; Future    2. Achalasia  Comments:  to return to Dr Sequeira for follow up regarding his achalasia    3. HTN  Comments:  cont BP medication the day of procedure    4. Anticoagulated  Comments:  to hold per the heart group at [facility]    5. Esophageal dysphagia  Comments:  He is having some dysphagia. I did discuss with them he will likely need to return to Dr Sequeira for further evluation and complete workup.    Other orders  -     polyethylene glycol (GoLYTELY) 236 g solution; Take as directed by office instructions.  Dispense: 4000 mL; Refill: 0        ESOPHAGOGASTRODUODENOSCOPY WITH ANESTHESIA (N/A), COLONOSCOPY WITH ANESTHESIA (N/A)  Body mass index is 25.84 kg/m².  There are no Patient Instructions on file for this visit.  Patient instructions on prep prior to procedure provided to the patient.    All risks, benefits, alternatives, and indications of colonoscopy procedure have been discussed with the patient. Risks to " include perforation of the colon requiring possible surgery or colostomy, risk of bleeding from biopsies or removal of colon tissue, possibility of missing a colon polyp or cancer, or adverse drug reaction.  Benefits to include the diagnosis and management of disease of the colon and rectum. Alternatives to include barium enema, radiographic evaluation, lab testing or no intervention. Pt verbalizes understanding and agrees.     Linda Crystal, APRN  2022  09:30 CDT      IF YOU SMOKE OR USE TOBACCO PLEASE READ THE FOLLOWIN minutes reading provided    Why is smoking bad for me?  Smoking increases the risk of heart disease, lung disease, vascular disease, stroke, and cancer.     If you smoke, STOP!    If you would like more information on quitting smoking, please visit the [employer] website: [email or web address]   This link will provide additional resources including the QUIT line and the Beat the Pack support groups.     For more information:    [employer]uit Now [place]  1-800-QUIT-NOW  [email or web address]

## 2022-06-20 ENCOUNTER — TELEPHONE (OUTPATIENT)
Dept: CARDIOLOGY CLINIC | Age: 72
End: 2022-06-20

## 2022-06-20 NOTE — TELEPHONE ENCOUNTER
Date: 7-19-22    Cardiologist: Dr. Juan Patton    Procedure: EGD and colonoscopy     Surgeon: Dr. Vee Erazo Visit: 2-28-22    Reason for office visit and medical concerns addressed at this office visit: PAF, HTN, hyperlipidemia, DM, CKD    Testing Performed and Date of Service:  EKG 2-28-22    RCRI = 0 pt, low, 0.4%   METs 4    Current Medications: lyrica, lipitor, aricept, xarelto, nitro, cozaar, advair, albuterol, prednisone, xopenex, epi, imuran, ASA    Is the patient currently taking an anticoagulant?  If so, what is the diagnosis the patient has been given to warrant the need for the anticoagulant? xarelto and ASA    Additional Notes: Med hold on Xarelto and ASA and cardiac risk request

## 2022-07-15 ENCOUNTER — LAB (OUTPATIENT)
Dept: LAB | Facility: HOSPITAL | Age: 72
End: 2022-07-15

## 2022-07-15 DIAGNOSIS — Z86.010 HX OF ADENOMATOUS COLONIC POLYPS: ICD-10-CM

## 2022-07-15 LAB — SARS-COV-2 ORF1AB RESP QL NAA+PROBE: NOT DETECTED

## 2022-07-15 PROCEDURE — U0004 COV-19 TEST NON-CDC HGH THRU: HCPCS

## 2022-07-15 PROCEDURE — C9803 HOPD COVID-19 SPEC COLLECT: HCPCS

## 2022-07-15 PROCEDURE — U0005 INFEC AGEN DETEC AMPLI PROBE: HCPCS

## 2022-07-19 ENCOUNTER — HOSPITAL ENCOUNTER (OUTPATIENT)
Facility: HOSPITAL | Age: 72
Setting detail: HOSPITAL OUTPATIENT SURGERY
Discharge: HOME | End: 2022-07-19
Attending: INTERNAL MEDICINE | Admitting: INTERNAL MEDICINE

## 2022-07-19 ENCOUNTER — ANESTHESIA (OUTPATIENT)
Dept: GASTROENTEROLOGY | Facility: HOSPITAL | Age: 72
End: 2022-07-19

## 2022-07-19 ENCOUNTER — ANESTHESIA EVENT (OUTPATIENT)
Dept: GASTROENTEROLOGY | Facility: HOSPITAL | Age: 72
End: 2022-07-19

## 2022-07-19 VITALS
SYSTOLIC BLOOD PRESSURE: 123 MMHG | DIASTOLIC BLOOD PRESSURE: 72 MMHG | RESPIRATION RATE: 21 BRPM | BODY MASS INDEX: 25.62 KG/M2 | WEIGHT: 173 LBS | OXYGEN SATURATION: 100 % | HEIGHT: 69 IN | HEART RATE: 66 BPM | TEMPERATURE: 98.2 F

## 2022-07-19 DIAGNOSIS — Z86.010 HX OF ADENOMATOUS COLONIC POLYPS: ICD-10-CM

## 2022-07-19 PROCEDURE — 87081 CULTURE SCREEN ONLY: CPT | Performed by: INTERNAL MEDICINE

## 2022-07-19 PROCEDURE — 43239 EGD BIOPSY SINGLE/MULTIPLE: CPT | Performed by: INTERNAL MEDICINE

## 2022-07-19 PROCEDURE — 45385 COLONOSCOPY W/LESION REMOVAL: CPT | Performed by: INTERNAL MEDICINE

## 2022-07-19 PROCEDURE — 88305 TISSUE EXAM BY PATHOLOGIST: CPT | Performed by: INTERNAL MEDICINE

## 2022-07-19 PROCEDURE — 25010000002 PROPOFOL 10 MG/ML EMULSION: Performed by: NURSE ANESTHETIST, CERTIFIED REGISTERED

## 2022-07-19 RX ORDER — LIDOCAINE HYDROCHLORIDE 10 MG/ML
0.5 INJECTION, SOLUTION EPIDURAL; INFILTRATION; INTRACAUDAL; PERINEURAL ONCE AS NEEDED
Status: DISCONTINUED | OUTPATIENT
Start: 2022-07-19 | End: 2022-07-19 | Stop reason: HOSPADM

## 2022-07-19 RX ORDER — PROPOFOL 10 MG/ML
VIAL (ML) INTRAVENOUS AS NEEDED
Status: DISCONTINUED | OUTPATIENT
Start: 2022-07-19 | End: 2022-07-19 | Stop reason: SURG

## 2022-07-19 RX ORDER — SODIUM CHLORIDE 0.9 % (FLUSH) 0.9 %
10 SYRINGE (ML) INJECTION AS NEEDED
Status: DISCONTINUED | OUTPATIENT
Start: 2022-07-19 | End: 2022-07-19 | Stop reason: HOSPADM

## 2022-07-19 RX ORDER — LIDOCAINE HYDROCHLORIDE 20 MG/ML
INJECTION, SOLUTION EPIDURAL; INFILTRATION; INTRACAUDAL; PERINEURAL AS NEEDED
Status: DISCONTINUED | OUTPATIENT
Start: 2022-07-19 | End: 2022-07-19 | Stop reason: SURG

## 2022-07-19 RX ORDER — SODIUM CHLORIDE 9 MG/ML
500 INJECTION, SOLUTION INTRAVENOUS CONTINUOUS PRN
Status: DISCONTINUED | OUTPATIENT
Start: 2022-07-19 | End: 2022-07-19 | Stop reason: HOSPADM

## 2022-07-19 RX ADMIN — PROPOFOL 310 MG: 10 INJECTION, EMULSION INTRAVENOUS at 08:35

## 2022-07-19 RX ADMIN — SODIUM CHLORIDE 500 ML: 9 INJECTION, SOLUTION INTRAVENOUS at 07:25

## 2022-07-19 RX ADMIN — LIDOCAINE HYDROCHLORIDE 150 MG: 20 INJECTION, SOLUTION EPIDURAL; INFILTRATION; INTRACAUDAL; PERINEURAL at 08:35

## 2022-07-19 NOTE — ANESTHESIA POSTPROCEDURE EVALUATION
"Patient: Caio PARK    Procedure Summary     Date: 07/19/22 Room / Location: [facility] ENDOSCOPY 2 / [facility] ENDOSCOPY    Anesthesia Start: 0831 Anesthesia Stop: 0904    Procedures:       ESOPHAGOGASTRODUODENOSCOPY WITH ANESTHESIA (N/A )      COLONOSCOPY WITH ANESTHESIA (N/A ) Diagnosis:       Hx of adenomatous colonic polyps      (Hx of adenomatous colonic polyps [Z86.010])    Surgeons: Ino Starks MD Provider: Ian Duarte CRNA    Anesthesia Type: MAC ASA Status: 2          Anesthesia Type: MAC    Vitals  Vitals Value Taken Time   BP 93/59 07/19/22 0906   Temp     Pulse 62 07/19/22 0906   Resp 14 07/19/22 0900   SpO2 97 % 07/19/22 0906   Vitals shown include unvalidated device data.        Post Anesthesia Care and Evaluation    Patient location during evaluation: PACU  Patient participation: complete - patient participated  Level of consciousness: awake and alert  Pain management: adequate    Airway patency: patent  Anesthetic complications: No anesthetic complications    Cardiovascular status: acceptable  Respiratory status: acceptable  Hydration status: acceptable    Comments: Blood pressure (!) 86/57, pulse 64, temperature 98.2 °F (36.8 °C), temperature source Temporal, resp. rate 14, height 175.3 cm (69\"), weight 78.5 kg (173 lb), SpO2 98 %.    Pt discharged from PACU based on keyanna score >8      "

## 2022-07-19 NOTE — ANESTHESIA PREPROCEDURE EVALUATION
Anesthesia Evaluation     Patient summary reviewed   no history of anesthetic complications:  NPO Solid Status: > 8 hours             Airway   Mallampati: II  TM distance: >3 FB  Neck ROM: full  Dental      Pulmonary - negative pulmonary ROS   Cardiovascular   Exercise tolerance: good (4-7 METS)    (+) hypertension, dysrhythmias Atrial Fib,       Neuro/Psych- negative ROS  GI/Hepatic/Renal/Endo    (+)   diabetes mellitus,     Musculoskeletal     Abdominal    Substance History      OB/GYN          Other                        Anesthesia Plan    ASA 2     MAC       Anesthetic plan, risks, benefits, and alternatives have been provided, discussed and informed consent has been obtained with: patient.        CODE STATUS:

## 2022-07-19 NOTE — H&P
[facility] Gastroenterology  Pre Procedure History & Physical    Chief Complaint:   Dysphagia, probable achalasia, history of polyps    Subjective     HPI:   Here for endoscopy and colonoscopy.  Dysphagia.  Probable achalasia.  Was unable to follow-up at [place] for manometry.  Also with a history of polyps.    Past Medical History:   Past Medical History:   Diagnosis Date   • A-fib (HCC)    • Arthritis    • Asthma    • BCC (basal cell carcinoma)     right ear   • COPD (chronic obstructive pulmonary disease) (HCC)    • Diabetes mellitus (HCC)    • Hypertension    • Kidney stones    • Neuropathy    • Pancreatitis    • Sarcoidosis        Past Surgical History:  Past Surgical History:   Procedure Laterality Date   • ABLATION OF DYSRHYTHMIC FOCUS     • COLONOSCOPY N/A 08/01/2019    4 Tubular adenomas transverse colon and cecum, Diverticulosis repeat exam in 3 years   • COLONOSCOPY W/ POLYPECTOMY  12/02/2014    Tubular adenoma ascending colon    • ENDOSCOPY  01/08/2002    Mild chronic gastritis   • ENDOSCOPY N/A 08/01/2019    Esophagus dilated normal exam   • ENDOSCOPY  08/01/2019    Dilation in the entire esophagus   • FRACTURE SURGERY Right     ARM   • FRACTURE SURGERY Left     LEG   • INGUINAL NODE BIOPSY Right 08/07/2018    Procedure: RIGHT GROIN EXCISIONAL BIOPSY;  Surgeon: Ced Aguirre MD;  Location: [facility];  Service: General   • LAPAROSCOPIC CHOLECYSTECTOMY     • LASIK Bilateral    • SKIN CANCER EXCISION      bcc of right helix  06/16/21   • SQUAMOUS CELL CARCINOMA EXCISION      back and left arm       Family History:  Family History   Problem Relation Age of Onset   • Colon cancer Neg Hx    • Colon polyps Neg Hx        Social History:   reports that he quit smoking about 54 years ago. His smoking use included cigarettes. He has a 1.00 pack-year smoking history. He has never used smokeless tobacco. He reports that he does not drink alcohol and does not use drugs.    Medications:   Prior to Admission  medications    Medication Sig Start Date End Date Taking? Authorizing Provider   aspirin 81 MG chewable tablet Chew 81 mg Daily.   Yes Emergency, Nurse Alessandro, RN   azaTHIOprine (IMURAN) 50 MG tablet  3/22/19  Yes Fredrick Denis MD   donepezil (ARICEPT) 5 MG tablet  3/26/19  Yes Fredrick Denis MD   fluticasone-salmeterol (ADVAIR) 500-50 MCG/DOSE DISKUS Inhale 2 (Two) Times a Day.   Yes Fredrick Denis MD   ipratropium (ATROVENT) 0.02 % nebulizer solution Take 2.5 mL by nebulization 4 (Four) Times a Day. 7/30/19  Yes Salvatore Shah MD   losartan (COZAAR) 100 MG tablet  3/18/19  Yes Fredrick Denis MD   polyethylene glycol (GoLYTELY) 236 g solution Take as directed by office instructions. 6/17/22  Yes Linda Crystal APRN   predniSONE (DELTASONE) 5 MG tablet Take 5 mg by mouth Daily.   Yes Fredrick Denis MD   pregabalin (LYRICA) 75 MG capsule Take 75 mg by mouth 2 (Two) Times a Day. 3/15/21  Yes Fredrick Denis MD   sulfamethoxazole-trimethoprim (BACTRIM,SEPTRA) 400-80 MG tablet Take 1 tablet by mouth. Three times a week   Yes Fredrick Denis MD   Ventolin  (90 Base) MCG/ACT inhaler INHALE 2 PUFFS BY MOUTH EVERY 4 HOURS AS NEEDED FOR WHEEZING FOR SHORTNESS OF BREATH 8/11/21  Yes Salvatore Shah MD   ascorbic acid (VITAMIN C) 1000 MG tablet Take 1,000 mg by mouth Daily.    Fredrick Denis MD   EPINEPHrine (EPIPEN) 0.3 MG/0.3ML solution auto-injector injection Inject 0.3 mg into the appropriate muscle as directed by prescriber.    Fredrick Denis MD   Insulin Pen Needle 32G X 4 MM misc 1 each. 2/25/19   Fredrick Denis MD   ipratropium-albuterol (DUO-NEB) 0.5-2.5 mg/3 ml nebulizer Take 3 mL by nebulization 4 (Four) Times a Day As Needed for Wheezing. 7/12/19   Salvatore Shah MD   JANUVIA 50 MG tablet  3/5/19   Fredrick Denis MD   levalbuterol (XOPENEX) 1.25 MG/3ML nebulizer solution Take 1 ampule by nebulization 4  "(Four) Times a Day. 7/30/19   Salvatore Shah MD   levalbuterol (XOPENEX) 1.25 MG/3ML nebulizer solution Take 1 ampule by nebulization 4 (Four) Times a Day As Needed for Wheezing. 4/27/20   Salvatore Shah MD   Ozempic, 0.25 or 0.5 MG/DOSE, 2 MG/1.5ML solution pen-injector INJECT 0.25MG INTO SKIN ONCE A WEEK 5/10/22   Fredrick Denis MD   RELION PEN NEEDLES 32G X 4 MM misc  2/25/19   Fredrick Denis MD   rivaroxaban (XARELTO) 20 MG tablet Take 20 mg by mouth Daily. STOPPED 8/1/18   Indications: Resume taking Xarelto on Thursday morning    Fredrick Denis MD       Allergies:  Bee venom, Penicillins, Acetaminophen, Gabapentin, and Metoprolol    Objective     Blood pressure 148/76, pulse 82, temperature 98.2 °F (36.8 °C), temperature source Temporal, resp. rate 19, height 175.3 cm (69\"), weight 78.5 kg (173 lb), SpO2 97 %.    Physical Exam   Constitutional: Pt is oriented to person, place, and in no distress.   HENT: Mouth/Throat: Oropharynx is clear.   Cardiovascular: Normal rate, regular rhythm.    Pulmonary/Chest: Effort normal. No respiratory distress. No  wheezes.   Abdominal: Soft. Non-distended.  Skin: Skin is warm and dry.   Psychiatric: Mood, memory, affect and judgment appear normal.     Assessment & Plan     Diagnosis:  Dysphagia, history of polyps    Anticipated Surgical Procedure:    Proceed with endoscopy and colonoscopy as scheduled    The following major R/B/A were discussed with the patient, however the list is not all inclusive . Risk:  Bleeding (immediate and delayed), perforation (rupture or tear), reaction to medication, missed lesion/cancer, pain during the procedure, infection, need for surgery, need for ostomy, need for mechanical ventilation (breathing machine), death.  Benefits: removal of polyp/tissue, burn/clip/or inject to stop bleeding, removal of foreign body, dilate any stricture.  Alternatives: Xray or CT, surgery, do nothing with associated risk   The " patient was given time to ask question and received explanation, and agrees to proceed as per History and Physical.   No guarantee given or expressed.    EMR Dragon/transcription disclaimer: Much of this encounter note is an electronic transcription/translation of spoken language to printed text.  The electronic translation of spoken language may permit erroneous, or at times, nonsensical words or phrases to be inadvertently transcribed.  Although I have reviewed the note for such errors, some may still exist.    Ino Starks MD  08:32 CDT  7/19/2022

## 2022-07-20 LAB
CYTO UR: NORMAL
LAB AP CASE REPORT: NORMAL
Lab: NORMAL
PATH REPORT.FINAL DX SPEC: NORMAL
PATH REPORT.GROSS SPEC: NORMAL
UREASE TISS QL: NEGATIVE

## 2022-08-10 DIAGNOSIS — E11.42 TYPE 2 DIABETES MELLITUS WITH PERIPHERAL NEUROPATHY (HCC): ICD-10-CM

## 2022-08-10 DIAGNOSIS — R97.20 ELEVATED PSA: ICD-10-CM

## 2022-08-10 LAB
ALBUMIN SERPL-MCNC: 4.5 G/DL (ref 3.5–5.2)
ALP BLD-CCNC: 96 U/L (ref 40–130)
ALT SERPL-CCNC: 10 U/L (ref 5–41)
ANION GAP SERPL CALCULATED.3IONS-SCNC: 8 MMOL/L (ref 7–19)
AST SERPL-CCNC: 14 U/L (ref 5–40)
BASOPHILS ABSOLUTE: 0 K/UL (ref 0–0.2)
BASOPHILS RELATIVE PERCENT: 0.5 % (ref 0–1)
BILIRUB SERPL-MCNC: 0.7 MG/DL (ref 0.2–1.2)
BILIRUBIN URINE: NEGATIVE
BLOOD, URINE: NEGATIVE
BUN BLDV-MCNC: 27 MG/DL (ref 8–23)
CALCIUM SERPL-MCNC: 10.2 MG/DL (ref 8.8–10.2)
CHLORIDE BLD-SCNC: 100 MMOL/L (ref 98–111)
CLARITY: CLEAR
CO2: 30 MMOL/L (ref 22–29)
COLOR: YELLOW
CREAT SERPL-MCNC: 1.3 MG/DL (ref 0.5–1.2)
EOSINOPHILS ABSOLUTE: 0.2 K/UL (ref 0–0.6)
EOSINOPHILS RELATIVE PERCENT: 5 % (ref 0–5)
GFR AFRICAN AMERICAN: >59
GFR NON-AFRICAN AMERICAN: 54
GLUCOSE BLD-MCNC: 239 MG/DL (ref 74–109)
GLUCOSE URINE: =>1000 MG/DL
HBA1C MFR BLD: 9.5 % (ref 4–6)
HCT VFR BLD CALC: 40.6 % (ref 42–52)
HEMOGLOBIN: 13.4 G/DL (ref 14–18)
IMMATURE GRANULOCYTES #: 0 K/UL
KETONES, URINE: NEGATIVE MG/DL
LEUKOCYTE ESTERASE, URINE: NEGATIVE
LYMPHOCYTES ABSOLUTE: 1.3 K/UL (ref 1.1–4.5)
LYMPHOCYTES RELATIVE PERCENT: 31.4 % (ref 20–40)
MCH RBC QN AUTO: 28.5 PG (ref 27–31)
MCHC RBC AUTO-ENTMCNC: 33 G/DL (ref 33–37)
MCV RBC AUTO: 86.4 FL (ref 80–94)
MONOCYTES ABSOLUTE: 0.5 K/UL (ref 0–0.9)
MONOCYTES RELATIVE PERCENT: 11.3 % (ref 0–10)
NEUTROPHILS ABSOLUTE: 2.2 K/UL (ref 1.5–7.5)
NEUTROPHILS RELATIVE PERCENT: 51.3 % (ref 50–65)
NITRITE, URINE: NEGATIVE
PDW BLD-RTO: 13.6 % (ref 11.5–14.5)
PH UA: 5.5 (ref 5–8)
PLATELET # BLD: 140 K/UL (ref 130–400)
PMV BLD AUTO: 10.6 FL (ref 9.4–12.4)
POTASSIUM SERPL-SCNC: 4.4 MMOL/L (ref 3.5–5)
PROTEIN UA: NEGATIVE MG/DL
RBC # BLD: 4.7 M/UL (ref 4.7–6.1)
SODIUM BLD-SCNC: 138 MMOL/L (ref 136–145)
SPECIFIC GRAVITY UA: 1.03 (ref 1–1.03)
TOTAL PROTEIN: 7.3 G/DL (ref 6.6–8.7)
UROBILINOGEN, URINE: 0.2 E.U./DL
WBC # BLD: 4.2 K/UL (ref 4.8–10.8)

## 2022-08-11 LAB
PROSTATE SPECIFIC ANTIGEN FREE: 0.7 NG/ML
PROSTATE SPECIFIC ANTIGEN PERCENT FREE: 28 %
PROSTATE SPECIFIC ANTIGEN: 2.5 NG/ML (ref 0–4)

## 2022-08-15 ENCOUNTER — TELEPHONE (OUTPATIENT)
Dept: UROLOGY | Age: 72
End: 2022-08-15

## 2022-08-15 NOTE — TELEPHONE ENCOUNTER
CALLED PT TO GIVE HIM HIS YEARLY FU APPT.  LEFT VM STATING INFO AND TO CALL OFFICE BACK IF ANY ISSUES WITH THIS APPT

## 2022-08-17 ENCOUNTER — OFFICE VISIT (OUTPATIENT)
Dept: INTERNAL MEDICINE | Age: 72
End: 2022-08-17
Payer: MEDICARE

## 2022-08-17 VITALS
HEART RATE: 88 BPM | BODY MASS INDEX: 26.22 KG/M2 | OXYGEN SATURATION: 99 % | HEIGHT: 69 IN | DIASTOLIC BLOOD PRESSURE: 60 MMHG | SYSTOLIC BLOOD PRESSURE: 124 MMHG | WEIGHT: 177 LBS

## 2022-08-17 DIAGNOSIS — G62.9 PERIPHERAL NERVE DISEASE: ICD-10-CM

## 2022-08-17 DIAGNOSIS — N18.31 STAGE 3A CHRONIC KIDNEY DISEASE (HCC): ICD-10-CM

## 2022-08-17 DIAGNOSIS — E78.2 MIXED HYPERLIPIDEMIA: ICD-10-CM

## 2022-08-17 DIAGNOSIS — I48.0 PAF (PAROXYSMAL ATRIAL FIBRILLATION) (HCC): Primary | ICD-10-CM

## 2022-08-17 DIAGNOSIS — R41.3 MEMORY LOSS: ICD-10-CM

## 2022-08-17 DIAGNOSIS — D86.0 SARCOIDOSIS, LUNG (HCC): ICD-10-CM

## 2022-08-17 DIAGNOSIS — E11.42 TYPE 2 DIABETES MELLITUS WITH PERIPHERAL NEUROPATHY (HCC): ICD-10-CM

## 2022-08-17 DIAGNOSIS — E55.9 VITAMIN D DEFICIENCY: ICD-10-CM

## 2022-08-17 DIAGNOSIS — I10 PRIMARY HYPERTENSION: ICD-10-CM

## 2022-08-17 PROCEDURE — 99214 OFFICE O/P EST MOD 30 MIN: CPT | Performed by: NURSE PRACTITIONER

## 2022-08-17 PROCEDURE — G8417 CALC BMI ABV UP PARAM F/U: HCPCS | Performed by: NURSE PRACTITIONER

## 2022-08-17 PROCEDURE — 1123F ACP DISCUSS/DSCN MKR DOCD: CPT | Performed by: NURSE PRACTITIONER

## 2022-08-17 PROCEDURE — 2022F DILAT RTA XM EVC RTNOPTHY: CPT | Performed by: NURSE PRACTITIONER

## 2022-08-17 PROCEDURE — 1036F TOBACCO NON-USER: CPT | Performed by: NURSE PRACTITIONER

## 2022-08-17 PROCEDURE — 3017F COLORECTAL CA SCREEN DOC REV: CPT | Performed by: NURSE PRACTITIONER

## 2022-08-17 PROCEDURE — G8427 DOCREV CUR MEDS BY ELIG CLIN: HCPCS | Performed by: NURSE PRACTITIONER

## 2022-08-17 PROCEDURE — 3046F HEMOGLOBIN A1C LEVEL >9.0%: CPT | Performed by: NURSE PRACTITIONER

## 2022-08-17 SDOH — ECONOMIC STABILITY: FOOD INSECURITY: WITHIN THE PAST 12 MONTHS, YOU WORRIED THAT YOUR FOOD WOULD RUN OUT BEFORE YOU GOT MONEY TO BUY MORE.: NEVER TRUE

## 2022-08-17 SDOH — ECONOMIC STABILITY: FOOD INSECURITY: WITHIN THE PAST 12 MONTHS, THE FOOD YOU BOUGHT JUST DIDN'T LAST AND YOU DIDN'T HAVE MONEY TO GET MORE.: NEVER TRUE

## 2022-08-17 ASSESSMENT — ENCOUNTER SYMPTOMS
CONSTIPATION: 0
VOMITING: 0
NAUSEA: 0
DIARRHEA: 0
SHORTNESS OF BREATH: 0
ABDOMINAL DISTENTION: 0
SORE THROAT: 0
BLOOD IN STOOL: 0
CHOKING: 0
COLOR CHANGE: 0
EYE DISCHARGE: 0
COUGH: 0
STRIDOR: 0
TROUBLE SWALLOWING: 0
ABDOMINAL PAIN: 0
WHEEZING: 0
EYE ITCHING: 0

## 2022-08-17 ASSESSMENT — SOCIAL DETERMINANTS OF HEALTH (SDOH): HOW HARD IS IT FOR YOU TO PAY FOR THE VERY BASICS LIKE FOOD, HOUSING, MEDICAL CARE, AND HEATING?: NOT HARD AT ALL

## 2022-08-17 NOTE — PATIENT INSTRUCTIONS
PAF is stable with Xarelto post ablation  2. Sarcoid lung followed by [facility] pulmonology  3. Hypertension stable with current dose of losartan no changes  4. Type 2 diabetes mellitus we will increase Ozempic to 0.5  weekly   5. Hyperlipidemia stable on current dose of Lipitor no changes  6. Chronic peripheral neuropathy likely from diabetes stable with Lyrica 75 twice daily  7. Chronic kidney disease stable with GFR normal today  8.   Memory loss stable on current dose of Aricept 10 mg at bedtime

## 2022-08-17 NOTE — PROGRESS NOTES
Formerly [facility] PHYSICIAN SERVICES  [facility] INTERNAL MEDICINE  [address] [facility] [address]  [address] [address]  Dept: [phone]  Dept Fax: [phone]: [phone]    Juan C Spaulding (:  1950) is a 66 y.o. male,Established patient  with green, here for evaluation of the following chief complaint(s): 3 Month 59 Ney Blake is a 66 y.o. male who presents today for his medical conditions/complaints as noted below. 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 Hyperlipidemia he is on Lipitor 20  5. CKD gfr is stable at 47 followed by Dr CLARK    6. Peripheral neuropathy stable with current dose of Lyrica 75 mg twice daily  7. Hypertension is stable on losartan 100 mg daily  #8 memory impairment stable with current dose of Aricept 10 at bedtime  Controlled Substance Monitoring:    Acute and Chronic Pain Monitoring:   RX Monitoring 3/28/2018   Attestation The Prescription Monitoring Report for this patient was reviewed today. Periodic Controlled Substance Monitoring Possible medication side effects, risk of tolerance/dependence & alternative treatments discussed. ;No signs of potential drug abuse or diversion identified.        CONTROLLED SUBSTANCE  Drug gabapentin  Diagnosis diabetic peripheral neuropathy  Last Rony Moh 2022  Last UDS   Pain contract last date 2022  Effective dose   yes      Changes this visit   none    Past Medical History:   Diagnosis Date    Achalasia     [facility]    Acute pancreatitis     Anemia     Asthma     Atrial fibrillation ([address]     h/o paroxysmal a-fib ? anesthsia induced    Benign colonic polyp     cscope  Dr Keila Burciaga adenomatous: 12/14 adenoma    Chest pain     Chronic kidney disease     Chronic pain syndrome     Depression     Diabetic peripheral neuropathy (HCC)     Extrinsic asthma     Hyperlipidemia     Hypertension     Idiopathic peripheral neuropathy     Lyme disease     Mediastinal lymphadenopathy     Microalbuminuria     Mixed hyperlipidemia     Paroxysmal A-fib (HCC)     S/P ablation of atrial fibrillation     4/16 A fib ablation , Dr Flako Shelton, [place]     Sarcoidosis, lung [facility])     restrictive lung impairment    Tick bite     Type 2 diabetes, controlled, with neuropathy [facility])       Past Surgical History:   Procedure Laterality Date    ANKLE FRACTURE SURGERY  april 14, 2015    ATRIAL ABLATION SURGERY  2015    Dr. Angelo Sanchez  10/06/2020    CARDIAC SURGERY      Catheter Ablation A-fib    CHOLECYSTECTOMY      COLONOSCOPY  12/02/2014    Melecio    CYSTOSCOPY Left 8/16/2019    CYSTOSCOPY; LEFT RETROGRADE PYELOGRAM; INSERTION LEFT URETERAL STENT performed by Fabi Page MD at [address] [facility] [address] Left 6/27/2018    LASER LITHOTRIPSY STONE MANIPULATION WITH DOUBLE J STENT PLACEMENT performed by Fabi Page MD at [address] Left 6/27/2018    CYSTOSCOPY URETEROSCOPY RETROGRADE PYELOGRAMS performed by Fabi Page MD at [address] [place]      ear       Vitals 8/17/2022 5/10/2022 4/11/2022 3/20/2022 3/20/2022 8/74/3168   SYSTOLIC 835 805 124 478 200 -   DIASTOLIC 60 70 60 92 117 -   Pulse 88 - 76 76 78 -   Temp - - - 98.2 98 -   Resp - - - 17 18 -   SpO2 99 97 99 - 96 -   Weight 177 lb 180 lb 178 lb - 175 lb -   Height 5' 9\" 5' 9\" 5' 9\" - 5' 9\" -   Body mass index 26.14 kg/m2 26.58 kg/m2 26.28 kg/m2 - 25.84 kg/m2 -   Pain Level - - - - 10 0   Some recent data might be hidden       Family History   Problem Relation Age of Onset    Coronary Art Dis Father         AICD pacer age 68 Heart Attack Father     Cancer Sister         childhood chest tumor    Heart Failure Mother        Social History     Tobacco Use    Smoking status: Never    Smokeless tobacco: Never   Substance Use Topics    Alcohol use: No      Current Outpatient Medications   Medication Sig Dispense Refill    Semaglutide,0.25 or 0.5MG/DOS, 2 MG/1.5ML SOPN Inject 0.5 mg into the skin once a week 5 pen 2    pregabalin (LYRICA) 75 MG capsule Take 1 capsule by mouth twice daily 180 capsule 0    atorvastatin (LIPITOR) 20 MG tablet Take 0.5 tablets by mouth nightly 90 tablet 3    donepezil (ARICEPT) 5 MG tablet TAKE 2 TABLETS BY MOUTH NIGHTLY 90 tablet 0    XARELTO 20 MG TABS tablet TAKE 1 TABLET BY MOUTH ONCE DAILY WITH EVENING MEAL 90 tablet 1    nitroGLYCERIN (NITROSTAT) 0.4 MG SL tablet Place 1 tablet under the tongue every 5 minutes as needed for Chest pain 25 tablet 3    losartan (COZAAR) 100 MG tablet Take 1 tablet by mouth once daily 90 tablet 3    fluticasone-salmeterol (ADVAIR) 500-50 MCG/DOSE diskus inhaler Inhale 1 puff into the lungs every 12 hours 1 puff in the am, prn in afternoon      albuterol sulfate  (90 Base) MCG/ACT inhaler INHALE 2 PUFFS BY MOUTH EVERY 6 HOURS AS NEEDED FOR WHEEZING 1 Inhaler 5    predniSONE (DELTASONE) 5 MG tablet Take 5 mg by mouth daily  6    levalbuterol (XOPENEX) 1.25 MG/3ML nebulizer solution Take 1 ampule by nebulization every 6 hours as needed for Wheezing      EPINEPHrine (EPIPEN) 0.3 MG/0.3ML SOAJ injection Inject 0.3 mLs into the muscle as needed (Allergic Reaction) 2 each 1    azaTHIOprine (IMURAN) 50 MG tablet Take 50 mg by mouth daily       aspirin 81 MG tablet Take 81 mg by mouth daily. No current facility-administered medications for this visit. Allergies   Allergen Reactions    Bee Venom Anaphylaxis    Gabapentin Other (See Comments)     Causes blisters to head.      Penicillins Hives     \"Causes blisters to break out everywhere\"    Toprol Xl [Metoprolol] Leg pain        Health Maintenance   Topic Date Due    Shingles vaccine (1 of 2) 05/10/2023 (Originally 9/15/1969)    COVID-19 Vaccine (1) 03/29/2025 (Originally 3/15/1951)    Flu vaccine (1) 09/01/2022    A1C test (Diabetic or Prediabetic)  11/10/2022    Diabetic retinal exam  01/14/2023    Lipids  05/05/2023    Diabetic foot exam  05/10/2023    Depression Monitoring  05/10/2023    Colorectal Cancer Screen  07/19/2027    DTaP/Tdap/Td vaccine (2 - Td or Tdap) 05/22/2030    Pneumococcal 65+ years Vaccine  Completed    Hepatitis A vaccine  Aged Out    Hib vaccine  Aged Out    Meningococcal (ACWY) vaccine  Aged Out    Hepatitis C screen  Discontinued       Lab Results   Component Value Date    LABA1C 9.5 (H) 08/10/2022     Lab Results   Component Value Date    PSA 2.5 08/10/2022    PSA 2.9 06/21/2021    PSA 2.5 11/09/2020     TSH   Date Value Ref Range Status   05/05/2022 4.200 0.270 - 4.200 uIU/mL Final   ]  Lab Results   Component Value Date     08/10/2022    K 4.4 08/10/2022     08/10/2022    CO2 30 (H) 08/10/2022    BUN 27 (H) 08/10/2022    CREATININE 1.3 (H) 08/10/2022    GLUCOSE 239 (H) 08/10/2022    CALCIUM 10.2 08/10/2022    PROT 7.3 08/10/2022    LABALBU 4.5 08/10/2022    BILITOT 0.7 08/10/2022    ALKPHOS 96 08/10/2022    AST 14 08/10/2022    ALT 10 08/10/2022    LABGLOM 54 (A) 08/10/2022    GFRAA >59 08/10/2022     Lab Results   Component Value Date    CHOL 161 05/05/2022    CHOL 136 (L) 09/27/2021    CHOL 170 03/22/2021     Lab Results   Component Value Date    TRIG 130 05/05/2022    TRIG 90 09/27/2021    TRIG 257 (H) 03/22/2021     Lab Results   Component Value Date    HDL 48 (L) 05/05/2022    HDL 41 (L) 09/27/2021    HDL 47 (L) 03/22/2021     Lab Results   Component Value Date    LDLCALC 87 05/05/2022    LDLCALC 77 09/27/2021    LDLCALC 72 03/22/2021     Lab Results   Component Value Date/Time     08/10/2022 07:29 AM    K 4.4 08/10/2022 07:29 AM    K 4.6 09/23/2020 04:08 PM     08/10/2022 07:29 AM    CO2 30 08/10/2022 07:29 AM    BUN 27 08/10/2022 07:29 AM    CREATININE 1.3 08/10/2022 07:29 AM    GLUCOSE 239 08/10/2022 07:29 AM    CALCIUM 10.2 08/10/2022 07:29 AM      Lab Results   Component Value Date    WBC 4.2 (L) 08/10/2022    HGB 13.4 (L) 08/10/2022    HCT 40.6 (L) 08/10/2022    MCV 86.4 08/10/2022     08/10/2022    LABLYMP 1.58 09/20/2015    LYMPHOPCT 31.4 08/10/2022    RBC 4.70 08/10/2022    MCH 28.5 08/10/2022    MCHC 33.0 08/10/2022    RDW 13.6 08/10/2022     Lab Results   Component Value Date    VITD25 21.3 (L) 09/27/2021     Labs reviewed from 8/10/2022    Subjective:      Review of Systems   Constitutional:  Negative for fatigue, fever and unexpected weight change. HENT:  Negative for ear discharge, ear pain, mouth sores, sore throat and trouble swallowing. Eyes:  Negative for discharge, itching and visual disturbance. Respiratory:  Negative for cough, choking, shortness of breath, wheezing and stridor. Cardiovascular:  Positive for palpitations. Negative for chest pain and leg swelling. Gastrointestinal:  Negative for abdominal distention, abdominal pain, blood in stool, constipation, diarrhea, nausea and vomiting. Endocrine: Negative for cold intolerance, polydipsia and polyuria. Genitourinary:  Negative for difficulty urinating, dysuria, frequency and urgency. Musculoskeletal:  Negative for arthralgias and gait problem. Skin:  Negative for color change and rash. Allergic/Immunologic: Negative for food allergies and immunocompromised state. Neurological:  Negative for dizziness, tremors, syncope, speech difficulty, weakness and headaches. Hematological:  Negative for adenopathy. Does not bruise/bleed easily. Psychiatric/Behavioral:  Negative for confusion and hallucinations. Objective:     Physical Exam  Constitutional:       General: He is not in acute distress. Appearance: He is well-developed.    HENT:      Head: Normocephalic and atraumatic. Eyes:      General: No scleral icterus. Right eye: No discharge. Left eye: No discharge. Pupils: Pupils are equal, round, and reactive to light. Neck:      Thyroid: No thyromegaly. Vascular: No JVD. Cardiovascular:      Rate and Rhythm: Normal rate and regular rhythm. Heart sounds: Normal heart sounds. No murmur heard. Pulmonary:      Effort: Pulmonary effort is normal. No respiratory distress. Breath sounds: Normal breath sounds. No wheezing or rales. Abdominal:      General: Bowel sounds are normal. There is no distension. Palpations: Abdomen is soft. There is no mass. Tenderness: There is no abdominal tenderness. There is no guarding or rebound. Musculoskeletal:         General: No tenderness. Normal range of motion. Cervical back: Normal range of motion and neck supple. Skin:     General: Skin is warm and dry. Findings: No erythema or rash. Neurological:      Mental Status: He is alert and oriented to person, place, and time. Cranial Nerves: No cranial nerve deficit. Coordination: Coordination normal.      Deep Tendon Reflexes: Reflexes are normal and symmetric. Reflexes normal.   Psychiatric:         Mood and Affect: Mood is not depressed. Behavior: Behavior normal.         Thought Content:  Thought content normal.         Judgment: Judgment normal.     /60   Pulse 88   Ht 5' 9\" (1.753 m)   Wt 177 lb (80.3 kg)   SpO2 99%   BMI 26.14 kg/m²           Assessment:      Problem List       Hyperlipidemia     Stable with current dose of Lipitor 20 daily          Relevant Medications    aspirin 81 MG tablet    EPINEPHrine (EPIPEN) 0.3 MG/0.3ML SOAJ injection    losartan (COZAAR) 100 MG tablet    nitroGLYCERIN (NITROSTAT) 0.4 MG SL tablet    XARELTO 20 MG TABS tablet    Other Relevant Orders    Lipid Panel    Hypertension     Stable with losartan 100 daily          Relevant Orders    CBC with Auto Differential INSTRUCTIONS:  #1 PAF is stable with Xarelto post ablation  2. Sarcoid lung followed by [facility] pulmonology  3. Hypertension stable with current dose of losartan no changes  4. Type 2 diabetes mellitus we will increase Ozempic up to the 2 mg  5. Hyperlipidemia stable on current dose of Lipitor no changes  6. Chronic peripheral neuropathy likely from diabetes stable with Lyrica 75 twice daily  7. Chronic kidney disease stable with GFR normal today  8. Memory loss stable on current dose of Aricept 10 mg at bedtime  Patient Instructions   PAF is stable with Xarelto post ablation  2. Sarcoid lung followed by [facility] pulmonology  3. Hypertension stable with current dose of losartan no changes  4. Type 2 diabetes mellitus we will increase Ozempic to 0.5  weekly   5. Hyperlipidemia stable on current dose of Lipitor no changes  6. Chronic peripheral neuropathy likely from diabetes stable with Lyrica 75 twice daily  7. Chronic kidney disease stable with GFR normal today  8. Memory loss stable on current dose of Aricept 10 mg at bedtime  Electronically signed by Marylene Prudent, APRN on 8/17/2022 at 10:34 AM    @    Rachele/transcription disclaimer:  Much of this encounter note is electronic transcription/translation of spoken language to printed texts. The electronic translation of spoken language may be erroneous, or at times,nonsensical words or phrases may be inadvertently transcribed.   Although I have reviewed the note for such errors, some may still exist.

## 2022-08-22 ENCOUNTER — LAB (OUTPATIENT)
Dept: LAB | Facility: HOSPITAL | Age: 72
End: 2022-08-22

## 2022-08-22 DIAGNOSIS — Z01.812 ENCOUNTER FOR PREOPERATIVE SCREENING LABORATORY TESTING FOR COVID-19 VIRUS: Primary | ICD-10-CM

## 2022-08-22 DIAGNOSIS — Z20.822 ENCOUNTER FOR PREOPERATIVE SCREENING LABORATORY TESTING FOR COVID-19 VIRUS: Primary | ICD-10-CM

## 2022-08-22 LAB — SARS-COV-2 ORF1AB RESP QL NAA+PROBE: NOT DETECTED

## 2022-08-22 PROCEDURE — C9803 HOPD COVID-19 SPEC COLLECT: HCPCS

## 2022-08-22 PROCEDURE — U0004 COV-19 TEST NON-CDC HGH THRU: HCPCS

## 2022-08-25 ENCOUNTER — OFFICE VISIT (OUTPATIENT)
Dept: PULMONOLOGY | Facility: CLINIC | Age: 72
End: 2022-08-25

## 2022-08-25 VITALS
OXYGEN SATURATION: 99 % | DIASTOLIC BLOOD PRESSURE: 80 MMHG | HEIGHT: 67 IN | HEART RATE: 79 BPM | BODY MASS INDEX: 27.56 KG/M2 | SYSTOLIC BLOOD PRESSURE: 142 MMHG | WEIGHT: 175.6 LBS

## 2022-08-25 DIAGNOSIS — D86.2 SARCOIDOSIS OF LUNG WITH SARCOIDOSIS OF LYMPH NODES: Primary | ICD-10-CM

## 2022-08-25 DIAGNOSIS — E66.3 OVERWEIGHT: ICD-10-CM

## 2022-08-25 DIAGNOSIS — J44.9 COPD, MODERATE: Primary | ICD-10-CM

## 2022-08-25 DIAGNOSIS — Z01.812 ENCOUNTER FOR PREOPERATIVE SCREENING LABORATORY TESTING FOR COVID-19 VIRUS: ICD-10-CM

## 2022-08-25 DIAGNOSIS — Z20.822 ENCOUNTER FOR PREOPERATIVE SCREENING LABORATORY TESTING FOR COVID-19 VIRUS: ICD-10-CM

## 2022-08-25 DIAGNOSIS — J44.9 COPD, MODERATE: ICD-10-CM

## 2022-08-25 DIAGNOSIS — Z87.891 FORMER SMOKER: ICD-10-CM

## 2022-08-25 DIAGNOSIS — J98.4 RESTRICTIVE LUNG DISEASE: ICD-10-CM

## 2022-08-25 PROBLEM — Z11.52 ENCOUNTER FOR PREOPERATIVE SCREENING LABORATORY TESTING FOR COVID-19 VIRUS: Status: ACTIVE | Noted: 2022-08-25

## 2022-08-25 PROCEDURE — 99214 OFFICE O/P EST MOD 30 MIN: CPT | Performed by: INTERNAL MEDICINE

## 2022-08-25 PROCEDURE — 94010 BREATHING CAPACITY TEST: CPT | Performed by: INTERNAL MEDICINE

## 2022-08-25 PROCEDURE — 94729 DIFFUSING CAPACITY: CPT | Performed by: INTERNAL MEDICINE

## 2022-08-25 PROCEDURE — 94727 GAS DIL/WSHOT DETER LNG VOL: CPT | Performed by: INTERNAL MEDICINE

## 2022-08-25 RX ORDER — FLUTICASONE PROPIONATE AND SALMETEROL 500; 50 UG/1; UG/1
1 POWDER RESPIRATORY (INHALATION)
Qty: 60 EACH | Refills: 11 | Status: SHIPPED | OUTPATIENT
Start: 2022-08-25

## 2022-08-25 NOTE — PROCEDURES
Pulmonary Function Test  Performed by: Machelle Elliott, RRT  Authorized by: Salvatore Shah MD      Pre Drug % Predicted    FVC: 66%   FEV1: 60%   FEF 25-75%: 40%   FEV1/FVC: 68%   T%   RV: 78%   DLCO: 63%   D/VAsb: 92%    Interpretation   Spirometry   Spirometry shows moderate obstruction.   Lung Volume Measurements  Measurements show reduced lung volumes consistent with restriction.   Diffusion Capacity  The patient's diffusion capacity is mildly reduced.  Diffusion capacity is normal when corrected for alveolar volume.   Overall comments: The patient's spirometry is consistent with a moderate obstructive ventilatory defect.  Lung volumes reveal a coexisting restrictive ventilatory defect along with a decrease in inspiratory capacity.  There is a mild bordering on moderate diffusion impairment which when corrected for alveolar volume is normalized.  When current studies are compared to [facility], his FVC is unchanged, his FEV1 has improved slightly, his total lung capacity is essentially unchanged, and when corrected for alveolar volume there has been a slight but not significant drop in his diffusion capacity compared to previous.

## 2022-08-25 NOTE — PATIENT INSTRUCTIONS
Patient is doing well clinically and can discontinue his DuoNeb treatments and just use a Ventolin or Xopenex as needed.  Also is not using his Advair routinely and I will E prescribe refills but he could just use this when he has more symptoms.  Pulmonary functions today are stable.  I will get a chest CT at [facility] and call him with results in view of his chronic scarring but unless it shows any acute process we will just have him return in 1 year with pulmonary functions.

## 2022-08-26 NOTE — ASSESSMENT & PLAN NOTE
I do not think his sarcoid is active but I will get a follow-up chest CT and call him with results.

## 2022-08-26 NOTE — ASSESSMENT & PLAN NOTE
Patient's (Body mass index is 27.5 kg/m².) indicates that they are overweight with health conditions that include hypertension . Weight is unchanged.  Diet and exercise are encouraged and he will follow-up with his primary healthcare provider regarding his elevated BMI otherwise.

## 2022-08-26 NOTE — PROGRESS NOTES
Chief Complaint  Sarcoidosis of lung with sarcoidosis of lymph nodes    Subjective    History of Present Illness     Caio PARK presents to [facility] for sarcoidosis and obstructive airways disease.    History of Present Illness   The patient accompanied by his wife today.  He requested in order to discontinue his DuoNebs as they do tend to make him somewhat anxious.  He states he does well with just as needed Ventolin or his Xopenex treatments and I did write the order to discontinue the DuoNebs.  He has not been using his Advair discus routinely.  I told him was fine to use such maintenance therapy on a regular basis if he was stable symptomatically and he states that is the case.  I did still prescribe refills in case he has worsening of his respiratory symptoms.  Pulmonary functions today are stable to somewhat improved in terms of spirometry compared to studies from August 25 of last year.  His total lung capacity is unchanged.  Diffusion capacity is dropped minimally but not significantly.  I did tell the patient his wife I would like to get a follow-up chest CT but again clinically he appears to be doing well from pulmonary standpoint and pulmonary functions are reasonably stable.  He has not had a COVID-19 vaccine.  He did have the flu shot this past flu season and has had both a Prevnar 13 and a Pneumovax previously.  Prior to Admission medications    Medication Sig Start Date End Date Taking? Authorizing Provider   ascorbic acid (VITAMIN C) 1000 MG tablet Take 1,000 mg by mouth Daily.   Yes ProviderFredrick MD   aspirin 81 MG chewable tablet Chew 81 mg Daily.   Yes Emergency, Nurse Epic, RN   azaTHIOprine (IMURAN) 50 MG tablet  3/22/19  Yes ProviderFredrick MD   donepezil (ARICEPT) 5 MG tablet  3/26/19  Yes Provider, MD Fredrick   EPINEPHrine (EPIPEN) 0.3 MG/0.3ML solution auto-injector injection Inject 0.3 mg into the appropriate  muscle as directed by prescriber.   Yes Fredrick Denis MD   fluticasone-salmeterol (ADVAIR) 500-50 MCG/DOSE DISKUS Inhale 2 (Two) Times a Day.   Yes Fredrick Denis MD   Insulin Pen Needle 32G X 4 MM misc 1 each. 2/25/19  Yes Fredrick Denis MD   ipratropium (ATROVENT) 0.02 % nebulizer solution Take 2.5 mL by nebulization 4 (Four) Times a Day. 7/30/19  Yes Salvatore Shah MD   ipratropium-albuterol (DUO-NEB) 0.5-2.5 mg/3 ml nebulizer Take 3 mL by nebulization 4 (Four) Times a Day As Needed for Wheezing. 7/12/19  Yes Salvatore Shah MD   JANUVIA 50 MG tablet  3/5/19  Yes Fredrick Denis MD   levalbuterol (XOPENEX) 1.25 MG/3ML nebulizer solution Take 1 ampule by nebulization 4 (Four) Times a Day. 7/30/19  Yes Salvatore Shah MD   levalbuterol (XOPENEX) 1.25 MG/3ML nebulizer solution Take 1 ampule by nebulization 4 (Four) Times a Day As Needed for Wheezing. 4/27/20  Yes Salvatore Shah MD   losartan (COZAAR) 100 MG tablet  3/18/19  Yes Fredrick Denis MD   Ozempic, 0.25 or 0.5 MG/DOSE, 2 MG/1.5ML solution pen-injector INJECT 0.25MG INTO SKIN ONCE A WEEK 5/10/22  Yes Fredrick Denis MD   polyethylene glycol (GoLYTELY) 236 g solution Take as directed by office instructions. 6/17/22  Yes Linda Crystal APRN   predniSONE (DELTASONE) 5 MG tablet Take 5 mg by mouth Daily.   Yes Fredrick Denis MD   pregabalin (LYRICA) 75 MG capsule Take 75 mg by mouth 2 (Two) Times a Day. 3/15/21  Yes Fredrick Denis MD   RELION PEN NEEDLES 32G X 4 MM misc  2/25/19  Yes Fredrick Denis MD   rivaroxaban (XARELTO) 20 MG tablet Take 20 mg by mouth Daily. STOPPED 8/1/18   Indications: Resume taking Xarelto on Thursday morning   Yes Provider, MD Fredrick   sulfamethoxazole-trimethoprim (BACTRIM,SEPTRA) 400-80 MG tablet Take 1 tablet by mouth. Three times a week   Yes Provider, MD Fredrick   Ventolin  (90 Base) MCG/ACT inhaler INHALE 2 PUFFS BY  "MOUTH EVERY 4 HOURS AS NEEDED FOR WHEEZING FOR SHORTNESS OF BREATH 21  Yes Salvatore Shah MD   Fluticasone-Salmeterol (ADVAIR/WIXELA) 500-50 MCG/ACT DISKUS Inhale 1 puff 2 (Two) Times a Day. Rinse and spit after using. 22   Salvatore Shah MD       Social History     Socioeconomic History   • Marital status:    Tobacco Use   • Smoking status: Former Smoker     Packs/day: 0.50     Years: 2.00     Pack years: 1.00     Types: Cigarettes     Quit date:      Years since quittin.6   • Smokeless tobacco: Never Used   Substance and Sexual Activity   • Alcohol use: No   • Drug use: No   • Sexual activity: Defer       Objective   Vital Signs:   /80   Pulse 79   Ht 170.2 cm (67\")   Wt 79.7 kg (175 lb 9.6 oz)   SpO2 99% Comment: RA  BMI 27.50 kg/m²     Physical Exam  Vitals and nursing note reviewed.   Constitutional:       Comments: His BMI is somewhat elevated.   HENT:      Head: Normocephalic and atraumatic.   Eyes:      Extraocular Movements: Extraocular movements intact.      Pupils: Pupils are equal, round, and reactive to light.   Cardiovascular:      Rate and Rhythm: Normal rate and regular rhythm.   Pulmonary:      Effort: Pulmonary effort is normal.      Breath sounds: Normal breath sounds.   Musculoskeletal:         General: Normal range of motion.   Skin:     General: Skin is warm and dry.   Neurological:      General: No focal deficit present.      Mental Status: He is alert and oriented to person, place, and time.   Psychiatric:         Mood and Affect: Mood normal.         Behavior: Behavior normal.        Result Review :    PFT Values        Some values may be hidden. Unless noted otherwise, only the newest values recorded on each date are displayed.         Old Values PFT Results 21   No data to display.      Pre Drug PFT Results 21   FVC 75 66   FEV1 62 60   FEF 25-75% 29 40   FEV1/FVC 65.14 68      Post Drug PFT Results 21 " 22   No data to display.      Other Tests PFT Results 21   TLC 78 67   RV 89 78   DLCO 55 63   D/VAsb 95 92               Results for orders placed in visit on 22    Pulmonary Function Test    Narrative  Pulmonary Function Test  Performed by: Machelle Elliott RRT  Authorized by: Salvatore Shah MD    Pre Drug % Predicted  FVC: 66%  FEV1: 60%  FEF 25-75%: 40%  FEV1/FVC: 68%  T%  RV: 78%  DLCO: 63%  D/VAsb: 92%    Interpretation  Spirometry  Spirometry shows moderate obstruction.  Lung Volume Measurements  Measurements show reduced lung volumes consistent with restriction.  Diffusion Capacity  The patient's diffusion capacity is mildly reduced.  Diffusion capacity is normal when corrected for alveolar volume.  Overall comments: The patient's spirometry is consistent with a moderate obstructive ventilatory defect.  Lung volumes reveal a coexisting restrictive ventilatory defect along with a decrease in inspiratory capacity.  There is a mild bordering on moderate diffusion impairment which when corrected for alveolar volume is normalized.  When current studies are compared to [facility], his FVC is unchanged, his FEV1 has improved slightly, his total lung capacity is essentially unchanged, and when corrected for alveolar volume there has been a slight but not significant drop in his diffusion capacity compared to previous.      Results for orders placed in visit on 21    Pulmonary Function Test    Narrative  Pulmonary Function Test  Performed by: Machelle Elliott RRT  Authorized by: Salvatore Shah MD    Pre Drug % Predicted  FVC: 75%  FEV1: 62%  FEF 25-75%: 29%  FEV1/FVC: 65.14%  T%  RV: 89%  DLCO: 55%  D/VAsb: 95%    Interpretation  Spirometry  Spirometry shows moderate obstruction.  Lung Volume Measurements  Measurements show reduced lung volumes consistent with  restriction.  Diffusion Capacity  The patient's diffusion capacity is moderately reduced.  Diffusion capacity is normal when corrected for alveolar volume.  Overall comments: The patient's spirometry is consistent with a moderate obstructive ventilatory defect.  Lung volumes reveal a coexisting restrictive ventilatory defect.  There is a moderate diffusion impairment which when corrected for alveolar volume is normalized.  When current studies are compared to studies done at [facility] on August 11 of 2020, his FVC has improved compared to previous pre and postbronchodilator studies.  His current FEV1 is unchanged compared to his previous prebronchodilator FEV1 but is dropped slightly compared to his previous postbronchodilator value.  His total lung capacity has dropped slightly but not significantly.  When corrected for alveolar volume he has had a slight improvement in his diffusion capacity compared to previous.      Results for orders placed during the hospital encounter of 08/11/20    Pulmonary Function Test    Narrative  [facility] - Pulmonary Function Test    [address] [place] [address]  [place]  [address]  [phone]    Patient : Caio Riley  MRN : [id number]  YOB: 1950  :  Sergio Philip MD  Date : 8/13/2020    ______________________________________________________________________    Interpretation :  1. Spirometry shows moderate restrictive physiology  2. Following bronchodilator there is modest improvement in FEV1 but moderate restrictive physiology remains  3. Mid flow is reduced  4. Lung volume measurements show reduced total lung capacity but normal residual volume reduced slow vital capacity and expiratory reserve volume  5. Diffusion capacity is reduced  6. Airway resistance is increased and conductance is decreased      Sergio Philip MD                     My interpretation of labs:   COVID-19,APTIMA PANTHER,PAD IN-HOUSE,NP/OP/NASAL SWAB IN  UTM/VTM/SALINE/LIQUID AMIES TRANSPORT MEDIA/NP WASH OR ASPIRATE, 24 HR TAT - Swab, Nasal Cavity (08/22/2022 14:52)      Assessment and Plan     Diagnoses and all orders for this visit:    1. 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    Orders:  -     CT Chest Without Contrast Diagnostic; Future    2. Restrictive lung disease  Assessment & Plan:  He does by lung volumes have evidence of a moderate restrictive ventilatory defect but there is been no significant change in his total lung capacity compared to studies from last year.    Orders:  -     CT Chest Without Contrast Diagnostic; Future    3. COPD, moderate (HCC)  Assessment & Plan:  I do think his airflow obstruction relates predominately to his sarcoidosis based on his very minimal smoking history.  He can utilize his Advair Diskus on a more as-needed basis along with his Ventolin HFA and Xopenex treatments.        Orders:  -     Pulmonary Function Test  -     Fluticasone-Salmeterol (ADVAIR/WIXELA) 500-50 MCG/ACT DISKUS; Inhale 1 puff 2 (Two) Times a Day. Rinse and spit after using.  Dispense: 60 each; Refill: 11    4. Former smoker  Assessment & Plan:  He has a minimal smoking history and quit smoking in 1968.      5. Overweight  Assessment & Plan:  Patient's (Body mass index is 27.5 kg/m².) indicates that they are overweight with health conditions that include hypertension . Weight is unchanged.  Diet and exercise are encouraged and he will follow-up with his primary healthcare provider regarding his elevated BMI otherwise.      6. Encounter for preoperative screening laboratory testing for COVID-19 virus  Assessment & Plan:  His COVID test was negative.    Orders:  -     COVID PRE-OP / PRE-PROCEDURE SCREENING ORDER (NO ISOLATION) - Swab, Nasal Cavity; Future        Salvatore Shah MD  8/25/2022  22:01  CDT    Follow Up   Return in about 1 year (around 8/25/2023) for Complete PFT.    Patient was given instructions and counseling regarding his condition or for health maintenance advice. Please see specific information pulled into the AVS if appropriate.

## 2022-08-26 NOTE — ASSESSMENT & PLAN NOTE
I do think his airflow obstruction relates predominately to his sarcoidosis based on his very minimal smoking history.  He can utilize his Advair Diskus on a more as-needed basis along with his Ventolin HFA and Xopenex treatments.

## 2022-08-26 NOTE — ASSESSMENT & PLAN NOTE
He does by lung volumes have evidence of a moderate restrictive ventilatory defect but there is been no significant change in his total lung capacity compared to studies from last year.

## 2022-08-29 ENCOUNTER — OFFICE VISIT (OUTPATIENT)
Dept: CARDIOLOGY CLINIC | Age: 72
End: 2022-08-29
Payer: MEDICARE

## 2022-08-29 VITALS
WEIGHT: 175 LBS | DIASTOLIC BLOOD PRESSURE: 62 MMHG | HEIGHT: 69 IN | SYSTOLIC BLOOD PRESSURE: 132 MMHG | BODY MASS INDEX: 25.92 KG/M2 | HEART RATE: 71 BPM | OXYGEN SATURATION: 100 %

## 2022-08-29 DIAGNOSIS — I48.0 PAF (PAROXYSMAL ATRIAL FIBRILLATION) (HCC): Primary | ICD-10-CM

## 2022-08-29 DIAGNOSIS — I10 ESSENTIAL HYPERTENSION: ICD-10-CM

## 2022-08-29 DIAGNOSIS — E78.5 DYSLIPIDEMIA: ICD-10-CM

## 2022-08-29 PROCEDURE — 93000 ELECTROCARDIOGRAM COMPLETE: CPT | Performed by: NURSE PRACTITIONER

## 2022-08-29 PROCEDURE — G8417 CALC BMI ABV UP PARAM F/U: HCPCS | Performed by: NURSE PRACTITIONER

## 2022-08-29 PROCEDURE — 1123F ACP DISCUSS/DSCN MKR DOCD: CPT | Performed by: NURSE PRACTITIONER

## 2022-08-29 PROCEDURE — 99214 OFFICE O/P EST MOD 30 MIN: CPT | Performed by: NURSE PRACTITIONER

## 2022-08-29 PROCEDURE — G8427 DOCREV CUR MEDS BY ELIG CLIN: HCPCS | Performed by: NURSE PRACTITIONER

## 2022-08-29 PROCEDURE — 3017F COLORECTAL CA SCREEN DOC REV: CPT | Performed by: NURSE PRACTITIONER

## 2022-08-29 PROCEDURE — 1036F TOBACCO NON-USER: CPT | Performed by: NURSE PRACTITIONER

## 2022-08-29 RX ORDER — SULFAMETHOXAZOLE AND TRIMETHOPRIM 400; 80 MG/1; MG/1
1 TABLET ORAL 2 TIMES DAILY
COMMUNITY

## 2022-08-29 ASSESSMENT — ENCOUNTER SYMPTOMS
SHORTNESS OF BREATH: 0
CHEST TIGHTNESS: 0
WHEEZING: 0
SORE THROAT: 0
COUGH: 0

## 2022-08-29 NOTE — PROGRESS NOTES
[facility] Cardiology   Established Patient Office Visit  [address]  [address]  [phone]        OFFICE VISIT:  2022    Ursula Damian - : 1950    Reason For Visit:  Anthony Umaña is a 66 y.o. male who is here for 6 Month Follow-Up    1. PAF (paroxysmal atrial fibrillation) ([address]    2. Essential hypertension    3. Dyslipidemia      Patient with a history of dyslipidemia, diabetes, sarcoidosis, histoplasmosis, hypertension and paroxysmal atrial fib. He is a patient of Dr. Radha Machado. Presents to clinic today for 6-month follow-up. Patient denies any complaints of chest pain, pressure or tightness. There is no shortness of breath, orthopnea or PND. Patient denies any lightheadedness, dizziness or syncope. DATA:   Patient was seen in the office on 2021. ZIO Patch was placed to evaluate for any recurrence of atrial fib. ZIO Patch showed: This rhythm with a minimum heart rate of 57 bpm, maximum 127 bpm.  2 SVT runs fastest being 12 beats at a maximum rate of 158 bpm.  Frequent SVE's 5.4%.   Subjective    Ursula Damian is a 66 y.o. male with the following history as recorded in [facility]:    Patient Active Problem List    Diagnosis Date Noted    Stage 3a chronic kidney disease ([address] 2022    Memory loss 10/06/2021    Recurrent major depressive disorder, in partial remission ([address] 2021    Coagulation defect ([address] 2021    Chronic pain syndrome 2020    Nocturnal hypoxia 2020    Achalasia 2019    Sarcoidosis 10/21/2019    Nephrolithiasis 2019    Vitamin D deficiency 2019    Chronic obstructive lung disease ([address] 2019    Anemia 10/09/2018    Other male erectile dysfunction 2018    Ureterolithiasis 2018    Hydronephrosis, left 2018    History of histoplasmosis 2018    Lymphadenopathy 2018    Splenomegaly 2018    Asthma 2018    Pancreatitis 2018    Peripheral nerve disease 06/08/2018    Polyp of colon 06/08/2018    Hypercalcemia 04/26/2018    Type 2 diabetes mellitus with peripheral neuropathy ([facility]     Sarcoidosis, lung ([facility]     S/P ablation of atrial fibrillation 05/16/2017    PAF (paroxysmal atrial fibrillation) ([facility])     Hypertension     Hyperlipidemia      Current Outpatient Medications   Medication Sig Dispense Refill    sulfamethoxazole-trimethoprim (BACTRIM) 400-80 MG per tablet Take 1 tablet by mouth 2 times daily      Semaglutide,0.25 or 0.5MG/DOS, 2 MG/1.5ML SOPN Inject 0.5 mg into the skin once a week 5 pen 2    pregabalin (LYRICA) 75 MG capsule Take 1 capsule by mouth twice daily 180 capsule 0    atorvastatin (LIPITOR) 20 MG tablet Take 0.5 tablets by mouth nightly 90 tablet 3    donepezil (ARICEPT) 5 MG tablet TAKE 2 TABLETS BY MOUTH NIGHTLY 90 tablet 0    XARELTO 20 MG TABS tablet TAKE 1 TABLET BY MOUTH ONCE DAILY WITH EVENING MEAL 90 tablet 1    nitroGLYCERIN (NITROSTAT) 0.4 MG SL tablet Place 1 tablet under the tongue every 5 minutes as needed for Chest pain 25 tablet 3    losartan (COZAAR) 100 MG tablet Take 1 tablet by mouth once daily 90 tablet 3    fluticasone-salmeterol (ADVAIR) 500-50 MCG/DOSE diskus inhaler Inhale 1 puff into the lungs every 12 hours 1 puff in the am, prn in afternoon      albuterol sulfate  (90 Base) MCG/ACT inhaler INHALE 2 PUFFS BY MOUTH EVERY 6 HOURS AS NEEDED FOR WHEEZING 1 Inhaler 5    predniSONE (DELTASONE) 5 MG tablet Take 5 mg by mouth daily  6    levalbuterol (XOPENEX) 1.25 MG/3ML nebulizer solution Take 1 ampule by nebulization every 6 hours as needed for Wheezing      EPINEPHrine (EPIPEN) 0.3 MG/0.3ML SOAJ injection Inject 0.3 mLs into the muscle as needed (Allergic Reaction) 2 each 1    azaTHIOprine (IMURAN) 50 MG tablet Take 50 mg by mouth daily       aspirin 81 MG tablet Take 81 mg by mouth daily. No current facility-administered medications for this visit.      Allergies: Bee venom, Gabapentin, Penicillins, and Toprol xl [metoprolol]  Past Medical History:   Diagnosis Date    Achalasia     going to [facility] for surgery in March    Acute pancreatitis     Anemia     Asthma     Atrial fibrillation ([facility]     h/o paroxysmal a-fib ? anesthsia induced    Benign colonic polyp     cscope 12/07 Dr Natividad Michel adenomatous: 12/14 adenoma    Chest pain     Chronic kidney disease     Chronic pain syndrome     Depression     Diabetic peripheral neuropathy (HCC)     Extrinsic asthma     Hyperlipidemia     Hypertension     Idiopathic peripheral neuropathy     Lyme disease     Mediastinal lymphadenopathy     Microalbuminuria     Mixed hyperlipidemia     Paroxysmal A-fib (HCC)     S/P ablation of atrial fibrillation     4/16 A fib ablation , Dr Josiah Almonte, [place]     Sarcoidosis, lung ([facility]     restrictive lung impairment    Tick bite     Type 2 diabetes, controlled, with neuropathy [facility])      Past Surgical History:   Procedure Laterality Date    ANKLE FRACTURE SURGERY  april 14, 2015    [facility]  10/06/2020    CARDIAC SURGERY      Catheter Ablation A-fib    CHOLECYSTECTOMY      COLONOSCOPY  12/02/2014    Melecio    CYSTOSCOPY Left 8/16/2019    CYSTOSCOPY; LEFT RETROGRADE PYELOGRAM; INSERTION LEFT URETERAL STENT performed by Naresh Hernandez MD at [address] [facility] [address] Left 6/27/2018    LASER LITHOTRIPSY STONE MANIPULATION WITH DOUBLE J STENT PLACEMENT performed by Naresh Hernandez MD at [facility] [address] Left 6/27/2018    CYSTOSCOPY URETEROSCOPY RETROGRADE PYELOGRAMS performed by Naresh Hernandez MD at [address] [place] [address]     Family History   Problem Relation Age of Onset    Coronary Art Dis Father         AICD pacer age 68    Heart Attack Father     Cancer Sister         childhood chest tumor    Heart Failure Mother      Social History     Tobacco Use Smoking status: Never    Smokeless tobacco: Never   Substance Use Topics    Alcohol use: No          Review of Systems:    Review of Systems   Constitutional:  Negative for activity change, chills, diaphoresis, fatigue and fever. HENT:  Negative for congestion and sore throat. Respiratory:  Negative for cough, chest tightness, shortness of breath and wheezing. Cardiovascular:  Negative for chest pain, palpitations and leg swelling. Neurological:  Negative for dizziness, syncope, light-headedness and headaches. Psychiatric/Behavioral:  Negative for confusion. The patient is not nervous/anxious. Objective:    /62   Pulse 71   Ht 5' 9\" (1.753 m)   Wt 175 lb (79.4 kg)   SpO2 100%   BMI 25.84 kg/m²     GENERAL - well developed and well nourished, conversant  HEENT -  PERRLA, Hearing appears normal, gentleman lids are normal.  External inspection of ears and nose appear normal.  NECK - no thyromegaly, no JVD, trachea is in the midline  CARDIOVASCULAR - PMI is in the mid line clavicular position, Normal S1 and S2 with no systolic murmur. No S3 or S4    PULMONARY - no respiratory distress. No wheezes or rales. Lungs are clear to ausculation, normal respiratory effort. ABDOMEN  - soft, non tender, no rebound  MUSCULOSKELETAL  - range of motion of the upper and lower extermites appears normal and equal and is without pain   EXTREMITIES - no significant edema   NEUROLOGIC - gait and station are normal  SKIN - turgor is normal, can warm and dry.   PSYCHIATRIC - normal mood and affect, alert and orientated x 3,      ASSESSMENT:    ALL THE CARDIOLOGY PROBLEMS ARE LISTED ABOVE; HOWEVER, THE FOLLOWING SPECIFIC CARDIAC PROBLEMS / CONDITIONS WERE ADDRESSED AND TREATED DURING THE OFFICE VISIT TODAY:                                                                                            MEDICAL DECISION MAKING             Cardiac Specific Problem / Diagnosis  Discussion and Data Reviewed Diagnostic Procedures Ordered Management Options Selected           1. PAF s/p ablation   Stable   Review and summation of old records:    EKG in the office today showing sinus rhythm with a heart rate of 71 bpm.  Patient is on Xarelto 20 mg daily for stroke prevention. No bleeding issues. Yes Continue current medications:     Yes           2. Hypertension  Stable   Blood pressure in the office today 132/62. O2 sat 100%. Patient is on losartan 100 mg daily. No Continue current medications:    Yes           3. Dyslipidemia Stable Patient is on Lipitor 10 mg nightly. Lipid profile 5/5/2022 total cholesterol 161. Triglycerides 130. HDL 48. LDL 87. No Continue current medications: Yes                     Orders Placed This Encounter   Procedures    EKG 12 lead     Order Specific Question:   Reason for Exam?     Answer:   Irregular heart rate     No orders of the defined types were placed in this encounter. Discussed with patient. Return in about 6 months (around 2/28/2023) for Dr Ross Santoyo or me . I greatly appreciate the opportunity to meet Tayyesenia Spaulding and your confidence in allowing me to participate in his cardiovascular care. NUNO Junior NP  8/29/2022 9:33 AM CDT                    This dictation was generated by voice recognition computer software. Although all attempts are made to edit dictation for accuracy, there may be errors in the transcription that are not intended.

## 2022-09-01 ENCOUNTER — HOSPITAL ENCOUNTER (OUTPATIENT)
Dept: CT IMAGING | Age: 72
Discharge: HOME | End: 2022-09-01
Payer: COMMERCIAL

## 2022-09-01 DIAGNOSIS — J98.4 RESTRICTIVE LUNG DISEASE: ICD-10-CM

## 2022-09-01 DIAGNOSIS — D86.2 SARCOIDOSIS OF LUNG WITH SARCOIDOSIS OF LYMPH NODES (HCC): ICD-10-CM

## 2022-09-01 PROCEDURE — 71250 CT THORAX DX C-: CPT

## 2022-09-01 PROCEDURE — 71250 CT THORAX DX C-: CPT | Performed by: RADIOLOGY

## 2022-09-02 DIAGNOSIS — D86.2 SARCOIDOSIS OF LUNG WITH SARCOIDOSIS OF LYMPH NODES: ICD-10-CM

## 2022-09-02 DIAGNOSIS — J98.4 RESTRICTIVE LUNG DISEASE: ICD-10-CM

## 2022-09-14 ENCOUNTER — TELEPHONE (OUTPATIENT)
Dept: PULMONOLOGY | Facility: CLINIC | Age: 72
End: 2022-09-14

## 2022-09-14 ENCOUNTER — TELEPHONE (OUTPATIENT)
Dept: INTERNAL MEDICINE | Age: 72
End: 2022-09-14

## 2022-09-14 NOTE — TELEPHONE ENCOUNTER
Message relayed to wife, Carolina and she voiced understanding. She will inform the patient and have him call if he has any questions.

## 2022-09-14 NOTE — TELEPHONE ENCOUNTER
Please call the patient and let him know that I was finally able to get his actual chest CT from [facility] for review.  It does show some chronic scarring with calcification but definitely no new or suspicious abnormalities and no change from his previous scans.  I had called him to notify him of those results by phone about a week and a half ago but I just wanted to let him know that I actually was finally able to get the scan and review it myself.  I did try to call him earlier today but was transferred to voicemail so rather than leave a message tell him I wanted to have him called to let him know I had reviewed the scan.  If he has any questions please have him check back with the office and we will see him for his routine appointment as scheduled next August otherwise.

## 2022-09-14 NOTE — TELEPHONE ENCOUNTER
----- Message from Nelli Leanna sent at 9/14/2022 11:31 AM CDT -----  Subject: Appointment Request    Reason for Call: Established Patient Appointment needed: Routine (Patient   Request) No Script    QUESTIONS    Reason for appointment request? Requested Provider unavailable Graeme Sequeira or   any MD     Additional Information for Provider? Patient wife Kiana Rollins called regarding   patient needing a DOL recert. again Dr. Mari Nava did one for him on   4/11/2022. Now he needs one after Sept 29 - Oct 14 or he can get closet to   the 10/14 they would prefer. Please call to schedule.  TY  ---------------------------------------------------------------------------  --------------  Rafiq Doherty BGWQ  [phone]; OK to leave message on voicemail  ---------------------------------------------------------------------------  --------------  SCRIPT ANSWERS  COVID Screen: [employer]

## 2022-09-14 NOTE — TELEPHONE ENCOUNTER
----- Message from Zac Hughes sent at 9/14/2022 11:31 AM CDT -----  Subject: Appointment Request    Reason for Call: Established Patient Appointment needed: Routine (Patient   Request) No Script    QUESTIONS    Reason for appointment request? Requested Provider unavailable Rhonda Moreira or   any MD     Additional Information for Provider? Patient wife Ophelia Bobo called regarding   patient needing a DOL recert. again Dr. Sarah Boo did one for him on   4/11/2022. Now he needs one after Sept 29 - Oct 14 or he can get closet to   the 10/14 they would prefer. Please call to schedule.  TY  ---------------------------------------------------------------------------  --------------  Tisha Jessica [facility]  [phone]; OK to leave message on voicemail  ---------------------------------------------------------------------------  --------------  SCRIPT ANSWERS  COVID Screen: Farzana Haynes

## 2022-09-15 ENCOUNTER — OFFICE VISIT (OUTPATIENT)
Dept: INTERNAL MEDICINE | Age: 72
End: 2022-09-15
Payer: MEDICARE

## 2022-09-15 VITALS — DIASTOLIC BLOOD PRESSURE: 70 MMHG | SYSTOLIC BLOOD PRESSURE: 132 MMHG | OXYGEN SATURATION: 98 % | HEART RATE: 72 BPM

## 2022-09-15 DIAGNOSIS — E11.42 TYPE 2 DIABETES MELLITUS WITH PERIPHERAL NEUROPATHY (HCC): ICD-10-CM

## 2022-09-15 DIAGNOSIS — S90.426A BLISTER OF FOURTH TOE: ICD-10-CM

## 2022-09-15 PROCEDURE — 99212 OFFICE O/P EST SF 10 MIN: CPT | Performed by: NURSE PRACTITIONER

## 2022-09-15 PROCEDURE — 3046F HEMOGLOBIN A1C LEVEL >9.0%: CPT | Performed by: NURSE PRACTITIONER

## 2022-09-15 PROCEDURE — 1123F ACP DISCUSS/DSCN MKR DOCD: CPT | Performed by: NURSE PRACTITIONER

## 2022-09-15 ASSESSMENT — ENCOUNTER SYMPTOMS
COLOR CHANGE: 0
SORE THROAT: 0
CHOKING: 0
ABDOMINAL DISTENTION: 0
NAUSEA: 0
COUGH: 0
ABDOMINAL PAIN: 0
WHEEZING: 0
VOMITING: 0
STRIDOR: 0
BLOOD IN STOOL: 0
DIARRHEA: 0
EYE ITCHING: 0
TROUBLE SWALLOWING: 0
SHORTNESS OF BREATH: 0
EYE DISCHARGE: 0
CONSTIPATION: 0

## 2022-09-15 NOTE — PROGRESS NOTES
tablet by mouth once daily 90 tablet 3    fluticasone-salmeterol (ADVAIR) 500-50 MCG/DOSE diskus inhaler Inhale 1 puff into the lungs every 12 hours 1 puff in the am, prn in afternoon      albuterol sulfate  (90 Base) MCG/ACT inhaler INHALE 2 PUFFS BY MOUTH EVERY 6 HOURS AS NEEDED FOR WHEEZING 1 Inhaler 5    predniSONE (DELTASONE) 5 MG tablet Take 5 mg by mouth daily  6    levalbuterol (XOPENEX) 1.25 MG/3ML nebulizer solution Take 1 ampule by nebulization every 6 hours as needed for Wheezing      EPINEPHrine (EPIPEN) 0.3 MG/0.3ML SOAJ injection Inject 0.3 mLs into the muscle as needed (Allergic Reaction) 2 each 1    azaTHIOprine (IMURAN) 50 MG tablet Take 50 mg by mouth daily       aspirin 81 MG tablet Take 81 mg by mouth daily. No current facility-administered medications for this visit. Allergies   Allergen Reactions    Bee Venom Anaphylaxis    Gabapentin Other (See Comments)     Causes blisters to head.      Penicillins Hives     \"Causes blisters to break out everywhere\"    Toprol Xl [Metoprolol]      Leg pain        Health Maintenance   Topic Date Due    Flu vaccine (1) 09/01/2022    Annual Wellness Visit (AWV)  10/07/2022    Shingles vaccine (1 of 2) 05/10/2023 (Originally 9/15/1969)    COVID-19 Vaccine (1) [birth year]/29/2025 (Originally 3/15/1951)    A1C test (Diabetic or Prediabetic)  11/10/2022    Diabetic retinal exam  01/14/2023    Lipids  05/05/2023    Diabetic foot exam  05/10/2023    Depression Monitoring  05/10/2023    Colorectal Cancer Screen  07/19/2027    DTaP/Tdap/Td vaccine (2 - Td or Tdap) 05/22/2030    Pneumococcal 65+ years Vaccine  Completed    Hepatitis A vaccine  Aged Out    Hib vaccine  Aged Out    Meningococcal (ACWY) vaccine  Aged Out    Hepatitis C screen  Discontinued       Lab Results   Component Value Date    LABA1C 9.5 (H) 08/10/2022     Lab Results   Component Value Date    PSA 2.5 08/10/2022    PSA 2.9 06/21/2021    PSA 2.5 11/09/2020     TSH Date Value Ref Range Status   05/05/2022 4.200 0.270 - 4.200 uIU/mL Final   ]  Lab Results   Component Value Date     08/10/2022    K 4.4 08/10/2022     08/10/2022    CO2 30 (H) 08/10/2022    BUN 27 (H) 08/10/2022    CREATININE 1.3 (H) 08/10/2022    GLUCOSE 239 (H) 08/10/2022    CALCIUM 10.2 08/10/2022    PROT 7.3 08/10/2022    LABALBU 4.5 08/10/2022    BILITOT 0.7 08/10/2022    ALKPHOS 96 08/10/2022    AST 14 08/10/2022    ALT 10 08/10/2022    LABGLOM 54 (A) 08/10/2022    GFRAA >59 08/10/2022     Lab Results   Component Value Date    CHOL 161 05/05/2022    CHOL 136 (L) 09/27/2021    CHOL 170 03/22/2021     Lab Results   Component Value Date    TRIG 130 05/05/2022    TRIG 90 09/27/2021    TRIG 257 (H) 03/22/2021     Lab Results   Component Value Date    HDL 48 (L) 05/05/2022    HDL 41 (L) 09/27/2021    HDL 47 (L) 03/22/2021     Lab Results   Component Value Date    LDLCALC 87 05/05/2022    LDLCALC 77 09/27/2021    LDLCALC 72 03/22/2021     Lab Results   Component Value Date/Time     08/10/2022 07:29 AM    K 4.4 08/10/2022 07:29 AM    K 4.6 09/23/2020 04:08 PM     08/10/2022 07:29 AM    CO2 30 08/10/2022 07:29 AM    BUN 27 08/10/2022 07:29 AM    CREATININE 1.3 08/10/2022 07:29 AM    GLUCOSE 239 08/10/2022 07:29 AM    CALCIUM 10.2 08/10/2022 07:29 AM      Lab Results   Component Value Date    WBC 4.2 (L) 08/10/2022    HGB 13.4 (L) 08/10/2022    HCT 40.6 (L) 08/10/2022    MCV 86.4 08/10/2022     08/10/2022    LABLYMP 1.58 09/20/2015    LYMPHOPCT 31.4 08/10/2022    RBC 4.70 08/10/2022    MCH 28.5 08/10/2022    MCHC 33.0 08/10/2022    RDW 13.6 08/10/2022     Lab Results   Component Value Date    VITD25 21.3 (L) 09/27/2021       Subjective:      Review of Systems   Constitutional:  Negative for fatigue, fever and unexpected weight change. HENT:  Negative for ear discharge, ear pain, mouth sores, sore throat and trouble swallowing.     Eyes:  Negative for discharge, itching and visual disturbance. Respiratory:  Negative for cough, choking, shortness of breath, wheezing and stridor. Cardiovascular:  Negative for chest pain, palpitations and leg swelling. Gastrointestinal:  Negative for abdominal distention, abdominal pain, blood in stool, constipation, diarrhea, nausea and vomiting. Endocrine: Negative for cold intolerance, polydipsia and polyuria. Genitourinary:  Negative for difficulty urinating, dysuria, frequency and urgency. Musculoskeletal:  Negative for arthralgias and gait problem. Skin:  Positive for wound. Negative for color change and rash. Allergic/Immunologic: Negative for food allergies and immunocompromised state. Neurological:  Negative for dizziness, tremors, syncope, speech difficulty, weakness and headaches. Hematological:  Negative for adenopathy. Does not bruise/bleed easily. Psychiatric/Behavioral:  Negative for confusion and hallucinations. Objective:     Physical Exam  Constitutional:       General: He is not in acute distress. Appearance: He is well-developed. HENT:      Head: Normocephalic and atraumatic. Eyes:      General: No scleral icterus. Right eye: No discharge. Left eye: No discharge. Pupils: Pupils are equal, round, and reactive to light. Neck:      Thyroid: No thyromegaly. Vascular: No JVD. Cardiovascular:      Rate and Rhythm: Normal rate and regular rhythm. Heart sounds: Normal heart sounds. No murmur heard. Pulmonary:      Effort: Pulmonary effort is normal. No respiratory distress. Breath sounds: Normal breath sounds. No wheezing or rales. Abdominal:      General: Bowel sounds are normal. There is no distension. Palpations: Abdomen is soft. There is no mass. Tenderness: There is no abdominal tenderness. There is no guarding or rebound. Musculoskeletal:         General: No tenderness. Normal range of motion. Cervical back: Normal range of motion and neck supple. Feet:    Feet:      Comments: Appears to be a blister on the area of the fourth toe of the right foot. It seems to have been somewhat deep by the skin that is there there is no fluid in the blister right now but we do not want to remove the skin and have an open wound  Skin:     General: Skin is warm and dry. Findings: No erythema or rash. Neurological:      Mental Status: He is alert and oriented to person, place, and time. Cranial Nerves: No cranial nerve deficit. Coordination: Coordination normal.      Deep Tendon Reflexes: Reflexes are normal and symmetric. Reflexes normal.   Psychiatric:         Mood and Affect: Mood is not depressed. Behavior: Behavior normal.         Thought Content: Thought content normal.         Judgment: Judgment normal.     /70   Pulse 72   SpO2 98%           Assessment:      Problem List       Blister of fourth toe     Keep clean; abx ointment           Type 2 diabetes mellitus with peripheral neuropathy (HCC)     uncontrolled          Relevant Medications    donepezil (ARICEPT) 5 MG tablet    Semaglutide,0.25 or 0.5MG/DOS, 2 MG/1.5ML SOPN       Plan:        Patient given educational materials - see patient instructions. Discussed use, benefit, and side effects of prescribed medications. Allpatient questions answered. Pt voiced understanding. Reviewed health maintenance. Instructed to continue current medications, diet and exercise. Patient agreed with treatment plan. Follow up as directed. MEDICATIONS:  No orders of the defined types were placed in this encounter. ORDERS:  No orders of the defined types were placed in this encounter. Follow-up:  Return for keep fu appt. PATIENT INSTRUCTIONS:  Patient Instructions   1.   Blister fourth toe right foot antibiotic ointment and a Band-Aid is fine but you need to put cotton balls between your third and fourth and fourth and fifth toes until we get this healed also diabetic shoes would help to prevent this in the future. It looks like just a blister area but is actually pretty deep. We will recheck if needed  Electronically signed by NUNO Saucedo on 9/15/2022 at 1:24 PM    @    [employer]agon/transcription disclaimer:  Much of this encounter note is electronic transcription/translation of spoken language to printed texts. The electronic translation of spoken language may be erroneous, or at times,nonsensical words or phrases may be inadvertently transcribed.   Although I have reviewed the note for such errors, some may still exist.

## 2022-09-15 NOTE — PATIENT INSTRUCTIONS
1.  Blister fourth toe right foot antibiotic ointment and a Band-Aid is fine but you need to put cotton balls between your third and fourth and fourth and fifth toes until we get this healed also diabetic shoes would help to prevent this in the future. It looks like just a blister area but is actually pretty deep.   We will recheck if needed

## 2022-09-19 DIAGNOSIS — G62.9 PERIPHERAL NERVE DISEASE: ICD-10-CM

## 2022-09-19 RX ORDER — PREGABALIN 75 MG/1
CAPSULE ORAL
Qty: 180 CAPSULE | Refills: 0 | Status: SHIPPED | OUTPATIENT
Start: 2022-09-19 | End: 2022-12-18

## 2022-09-19 NOTE — TELEPHONE ENCOUNTER
Alvaro Pena called to request a refill on his medication. Last office visit : 9/15/2022   Next office visit : 10/11/2022     Last UDS:   Amphetamine Screen, Urine   Date Value Ref Range Status   02/08/2022 neg  Final     Barbiturate Screen, Urine   Date Value Ref Range Status   02/08/2022 neg  Final     Benzodiazepine Screen, Urine   Date Value Ref Range Status   02/08/2022 neg  Final     Buprenorphine Urine   Date Value Ref Range Status   02/08/2022 neg  Final     Cocaine Metabolite Screen, Urine   Date Value Ref Range Status   02/08/2022 neg  Final     Gabapentin Screen, Urine   Date Value Ref Range Status   02/08/2022 neg  Final     MDMA, Urine   Date Value Ref Range Status   02/08/2022 neg  Final     Methamphetamine, Urine   Date Value Ref Range Status   02/08/2022 neg  Final     Opiate Scrn, Ur   Date Value Ref Range Status   02/08/2022 neg  Final     Oxycodone Screen, Ur   Date Value Ref Range Status   02/08/2022 neg  Final     PCP Screen, Urine   Date Value Ref Range Status   02/08/2022 neg  Final     Propoxyphene Screen, Urine   Date Value Ref Range Status   02/08/2022 neg  Final     THC Screen, Urine   Date Value Ref Range Status   02/08/2022 neg  Final     Tricyclic Antidepressants, Urine   Date Value Ref Range Status   02/08/2022 neg  Final       Last Theta Avni: 9/19/22  Medication Contract:2/8/22    Requested Prescriptions     Pending Prescriptions Disp Refills    pregabalin (LYRICA) 75 MG capsule [Pharmacy Med Name: Pregabalin 75 MG Oral Capsule] 180 capsule 0     Sig: Take 1 capsule by mouth twice daily         Please approve or refuse this medication.    Gabriel Barnett MA

## 2022-10-04 RX ORDER — DONEPEZIL HYDROCHLORIDE 5 MG/1
10 TABLET, FILM COATED ORAL NIGHTLY
Qty: 90 TABLET | Refills: 0 | Status: SHIPPED | OUTPATIENT
Start: 2022-10-04

## 2022-10-10 DIAGNOSIS — E11.42 TYPE 2 DIABETES MELLITUS WITH PERIPHERAL NEUROPATHY (HCC): ICD-10-CM

## 2022-10-10 NOTE — TELEPHONE ENCOUNTER
Grace Zaidi called requesting a refill of the below medication which has been pended for you:     Requested Prescriptions     Pending Prescriptions Disp Refills    Semaglutide,0.25 or 0.5MG/DOS, 2 MG/1.5ML SOPN 9 Adjustable Dose Pre-filled Pen Syringe 5     Si mg weekly       Last Appointment Date: 9/15/2022  Next Appointment Date: 10/11/2022    Allergies   Allergen Reactions    Bee Venom Anaphylaxis    Gabapentin Other (See Comments)     Causes blisters to head.      Penicillins Hives     \"Causes blisters to break out everywhere\"    Toprol Xl [Metoprolol]      Leg pain

## 2022-10-10 NOTE — TELEPHONE ENCOUNTER
Percy Kerr called requesting a refill of the below medication which has been pended for you:     Requested Prescriptions     Pending Prescriptions Disp Refills    Semaglutide, 1 MG/DOSE, 4 MG/3ML SOPN 3 mL 1     Sig: Inject 1 mg into the skin once a week       Last Appointment Date: 9/15/2022  Next Appointment Date: 10/11/2022    Allergies   Allergen Reactions    Bee Venom Anaphylaxis    Gabapentin Other (See Comments)     Causes blisters to head.      Penicillins Hives     \"Causes blisters to break out everywhere\"    Toprol Xl [Metoprolol]      Leg pain

## 2022-10-11 ENCOUNTER — OFFICE VISIT (OUTPATIENT)
Dept: INTERNAL MEDICINE | Age: 72
End: 2022-10-11
Payer: MEDICARE

## 2022-10-11 VITALS
HEIGHT: 69 IN | BODY MASS INDEX: 25.33 KG/M2 | DIASTOLIC BLOOD PRESSURE: 54 MMHG | HEART RATE: 84 BPM | OXYGEN SATURATION: 97 % | WEIGHT: 171 LBS | SYSTOLIC BLOOD PRESSURE: 126 MMHG

## 2022-10-11 DIAGNOSIS — D86.9 SARCOIDOSIS: Primary | ICD-10-CM

## 2022-10-11 DIAGNOSIS — H90.3 SENSORY HEARING LOSS, BILATERAL: ICD-10-CM

## 2022-10-11 DIAGNOSIS — J63.6: ICD-10-CM

## 2022-10-11 DIAGNOSIS — Z57.5 OCCUPATIONAL EXPOSURE TO INDUSTRIAL TOXINS: ICD-10-CM

## 2022-10-11 DIAGNOSIS — J45.20 INTERMITTENT ASTHMA WITHOUT COMPLICATION, UNSPECIFIED ASTHMA SEVERITY: ICD-10-CM

## 2022-10-11 PROCEDURE — G8417 CALC BMI ABV UP PARAM F/U: HCPCS | Performed by: INTERNAL MEDICINE

## 2022-10-11 PROCEDURE — 3017F COLORECTAL CA SCREEN DOC REV: CPT | Performed by: INTERNAL MEDICINE

## 2022-10-11 PROCEDURE — G8484 FLU IMMUNIZE NO ADMIN: HCPCS | Performed by: INTERNAL MEDICINE

## 2022-10-11 PROCEDURE — G8427 DOCREV CUR MEDS BY ELIG CLIN: HCPCS | Performed by: INTERNAL MEDICINE

## 2022-10-11 PROCEDURE — 1123F ACP DISCUSS/DSCN MKR DOCD: CPT | Performed by: INTERNAL MEDICINE

## 2022-10-11 PROCEDURE — 1036F TOBACCO NON-USER: CPT | Performed by: INTERNAL MEDICINE

## 2022-10-11 PROCEDURE — 99213 OFFICE O/P EST LOW 20 MIN: CPT | Performed by: INTERNAL MEDICINE

## 2022-10-11 SDOH — HEALTH STABILITY - PHYSICAL HEALTH: OCCUPATIONAL EXPOSURE TO TOXIC AGENTS IN OTHER INDUSTRIES: Z57.5

## 2022-10-11 NOTE — PROGRESS NOTES
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 Ongoing shortness of breath and weakness related to sarcoid intermittent asthma and other medical issues. Pt has had a few episodes of shortness of breath yesterday. Felt like high altitude like could not get air. Pt was in dental chair 2 1/2 hours on back yesterday am and dental work.      Past Medical History:   Diagnosis Date    Achalasia     going to [facility] for surgery in March    Acute pancreatitis     Anemia     Asthma     Atrial fibrillation ([facility] [address]     h/o paroxysmal a-fib ? anesthsia induced    Benign colonic polyp     cscope 12/07 Dr Alfredo Dennis adenomatous: 12/14 adenoma    Chest pain     Chronic kidney disease     Chronic pain syndrome     Depression     Diabetic peripheral neuropathy (HCC)     Extrinsic asthma     Hyperlipidemia     Hypertension     Idiopathic peripheral neuropathy     Lyme disease     Mediastinal lymphadenopathy     Microalbuminuria     Mixed hyperlipidemia     Paroxysmal A-fib (HCC)     S/P ablation of atrial fibrillation     4/16 A fib ablation , Dr Geeta Hicks, [place]     Sarcoidosis, lung ([facility] [address]     restrictive lung impairment    Tick bite     Type 2 diabetes, controlled, with neuropathy [facility])        Past Surgical History:   Procedure Laterality Date    ANKLE FRACTURE SURGERY  april 14, 2015    ATRIAL ABLATION SURGERY  2015    Dr. Geeta Hicks- [facility]  10/06/2020    CARDIAC SURGERY      Catheter Ablation A-fib    CHOLECYSTECTOMY      COLONOSCOPY  12/02/2014    [place]    CYSTOSCOPY Left 8/16/2019    CYSTOSCOPY; LEFT RETROGRADE PYELOGRAM; INSERTION LEFT URETERAL STENT performed by Amandeep Turner MD at [address] [facility] [address] Left 6/27/2018    LASER LITHOTRIPSY STONE MANIPULATION WITH DOUBLE J STENT PLACEMENT performed by Amandeep Turner MD at [address] [facility] CYSTOURETHROSCOPY,URETER CATHETER Left 6/27/2018    CYSTOSCOPY URETEROSCOPY RETROGRADE PYELOGRAMS performed by Mic Apodaca MD at [address] [facility]       Family History   Problem Relation Age of Onset    Coronary Art Dis Father         AICD pacer age 68    Heart Attack Father     Cancer Sister         childhood chest tumor    Heart Failure Mother        Social History     Socioeconomic History    Marital status:      Spouse name: Nicole Masterson    Number of children: Not on file    Years of education: Not on file    Highest education level: Not on file   Occupational History    Not on file   Tobacco Use    Smoking status: Never    Smokeless tobacco: Never   Vaping Use    Vaping Use: Never used   Substance and Sexual Activity    Alcohol use: No    Drug use: No    Sexual activity: Not on file   Other Topics Concern    Not on file   Social History Narrative    Not on file     Social Determinants of Health     Financial Resource Strain: Low Risk     Difficulty of Paying Living Expenses: Not hard at all   Food Insecurity: No Food Insecurity    Worried About Running Out of Food in the Last Year: Never true    Ran Out of Food in the Last Year: Never true   Transportation Needs: Not on file   Physical Activity: Not on file   Stress: Not on file   Social Connections: Not on file   Intimate Partner Violence: Not on file   Housing Stability: Not on file       Allergies   Allergen Reactions    Bee Venom Anaphylaxis    Gabapentin Other (See Comments)     Causes blisters to head.      Penicillins Hives     \"Causes blisters to break out everywhere\"    Toprol Xl [Metoprolol]      Leg pain        Current Outpatient Medications   Medication Sig Dispense Refill    XARELTO 20 MG TABS tablet TAKE 1 TABLET BY MOUTH ONCE DAILY WITH EVENING MEAL 90 tablet 0    Semaglutide, 1 MG/DOSE, 4 MG/3ML SOPN Inject 1 mg into the skin once a week 3 mL 1    donepezil (ARICEPT) 5 MG tablet TAKE 2 TABLETS BY MOUTH NIGHTLY 90 tablet 0    pregabalin (LYRICA) 75 MG capsule Take 1 capsule by mouth twice daily 180 capsule 0    sulfamethoxazole-trimethoprim (BACTRIM) 400-80 MG per tablet Take 1 tablet by mouth 2 times daily      atorvastatin (LIPITOR) 20 MG tablet Take 0.5 tablets by mouth nightly 90 tablet 3    nitroGLYCERIN (NITROSTAT) 0.4 MG SL tablet Place 1 tablet under the tongue every 5 minutes as needed for Chest pain 25 tablet 3    losartan (COZAAR) 100 MG tablet Take 1 tablet by mouth once daily 90 tablet 3    fluticasone-salmeterol (ADVAIR) 500-50 MCG/DOSE diskus inhaler Inhale 1 puff into the lungs every 12 hours 1 puff in the am, prn in afternoon      albuterol sulfate  (90 Base) MCG/ACT inhaler INHALE 2 PUFFS BY MOUTH EVERY 6 HOURS AS NEEDED FOR WHEEZING 1 Inhaler 5    predniSONE (DELTASONE) 5 MG tablet Take 5 mg by mouth daily  6    levalbuterol (XOPENEX) 1.25 MG/3ML nebulizer solution Take 1 ampule by nebulization every 6 hours as needed for Wheezing      EPINEPHrine (EPIPEN) 0.3 MG/0.3ML SOAJ injection Inject 0.3 mLs into the muscle as needed (Allergic Reaction) 2 each 1    azaTHIOprine (IMURAN) 50 MG tablet Take 50 mg by mouth daily       aspirin 81 MG tablet Take 81 mg by mouth daily. No current facility-administered medications for this visit.        Review of Systems    BP (!) 126/54   Pulse 84   Ht 5' 9\" (1.753 m)   Wt 171 lb (77.6 kg)   SpO2 97%   BMI 25.25 kg/m²   BP Readings from Last 7 Encounters:   10/11/22 (!) 126/54   09/15/22 132/70   08/29/22 132/62   08/17/22 124/60   05/10/22 132/70   04/11/22 130/60   03/20/22 (!) 128/92     Wt Readings from Last 7 Encounters:   10/11/22 171 lb (77.6 kg)   08/29/22 175 lb (79.4 kg)   08/17/22 177 lb (80.3 kg)   05/10/22 180 lb (81.6 kg)   04/11/22 178 lb (80.7 kg)   03/20/22 175 lb (79.4 kg)   03/16/22 175 lb (79.4 kg)     BMI Readings from Last 7 Encounters:   10/11/22 25.25 kg/m²   08/29/22 25.84 kg/m²   08/17/22 26.14 kg/m² 05/10/22 26.58 kg/m²   04/11/22 26.29 kg/m²   03/20/22 25.84 kg/m²   03/16/22 25.84 kg/m²     Resp Readings from Last 7 Encounters:   03/20/22 17   03/16/22 15   06/30/21 20   09/23/20 18   09/15/20 18   05/22/20 18   02/12/20 15       Physical Exam  Constitutional:       General: He is not in acute distress. Eyes:      General: No scleral icterus. Cardiovascular:      Heart sounds: Normal heart sounds. Pulmonary:      Comments: Decreased breath sounds   Musculoskeletal:      Cervical back: Neck supple. Lymphadenopathy:      Cervical: No cervical adenopathy. Skin:     Findings: No rash. Results for orders placed or performed in visit on 08/10/22   PSA, Total and Free   Result Value Ref Range    PSA 2.5 0.0 - 4.0 ng/mL    PSA, Free 0.7 ng/mL    PSA, Free Pct 28 %       ASSESSMENT/ PLAN:  1. Sarcoidosis  Follow and monitor. Needs ongoing assistance from [place] [id number] and from nursing care several hours a week and help from nursing care    2. Sensory hearing loss, bilateral  stable    3. 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  Intermittent asthma without complication, unspecified asthma severity  Patient had some increased shortness of breath yesterday back to baseline today if this persists he will need further evaluation he feels like he is okay today but will let us know

## 2022-10-14 DIAGNOSIS — I48.20 CHRONIC ATRIAL FIBRILLATION (HCC): ICD-10-CM

## 2022-10-17 RX ORDER — RIVAROXABAN 20 MG/1
TABLET, FILM COATED ORAL
Qty: 90 TABLET | Refills: 0 | Status: SHIPPED | OUTPATIENT
Start: 2022-10-17

## 2022-10-20 ENCOUNTER — TELEPHONE (OUTPATIENT)
Dept: PULMONOLOGY | Facility: CLINIC | Age: 72
End: 2022-10-20

## 2022-10-20 NOTE — TELEPHONE ENCOUNTER
Patients wife called requesting refill on Advair to [place] [facility] [address]. It looks like the refill was sent in August 2022 with 11 refills so I told her to check there and let us know if there is any problems.

## 2022-11-03 RX ORDER — LOSARTAN POTASSIUM 100 MG/1
TABLET ORAL
Qty: 60 TABLET | Refills: 0 | Status: SHIPPED | OUTPATIENT
Start: 2022-11-03

## 2022-11-03 NOTE — TELEPHONE ENCOUNTER
Deepika Celeste called requesting a refill of the below medication which has been pended for you:     Requested Prescriptions     Pending Prescriptions Disp Refills    losartan (COZAAR) 100 MG tablet [Pharmacy Med Name: Losartan Potassium 100 MG Oral Tablet] 60 tablet 0     Sig: Take 1 tablet by mouth once daily       Last Appointment Date: 9/15/2022  Next Appointment Date: 11/21/2022    Allergies   Allergen Reactions    Bee Venom Anaphylaxis    Gabapentin Other (See Comments)     Causes blisters to head.      Penicillins Hives     \"Causes blisters to break out everywhere\"    Toprol Xl [Metoprolol]      Leg pain

## 2022-11-16 DIAGNOSIS — G62.9 PERIPHERAL NERVE DISEASE: ICD-10-CM

## 2022-11-16 RX ORDER — PREGABALIN 75 MG/1
CAPSULE ORAL
Qty: 180 CAPSULE | Refills: 0 | Status: SHIPPED | OUTPATIENT
Start: 2022-11-16 | End: 2023-02-14

## 2022-11-16 NOTE — TELEPHONE ENCOUNTER
Alvaro Pena called to request a refill on his medication. Last office visit : 10/11/2022   Next office visit : 11/22/2022     Last UDS:   Amphetamine Screen, Urine   Date Value Ref Range Status   02/08/2022 neg  Final     Barbiturate Screen, Urine   Date Value Ref Range Status   02/08/2022 neg  Final     Benzodiazepine Screen, Urine   Date Value Ref Range Status   02/08/2022 neg  Final     Buprenorphine Urine   Date Value Ref Range Status   02/08/2022 neg  Final     Cocaine Metabolite Screen, Urine   Date Value Ref Range Status   02/08/2022 neg  Final     Gabapentin Screen, Urine   Date Value Ref Range Status   02/08/2022 neg  Final     MDMA, Urine   Date Value Ref Range Status   02/08/2022 neg  Final     Methamphetamine, Urine   Date Value Ref Range Status   02/08/2022 neg  Final     Opiate Scrn, Ur   Date Value Ref Range Status   02/08/2022 neg  Final     Oxycodone Screen, Ur   Date Value Ref Range Status   02/08/2022 neg  Final     PCP Screen, Urine   Date Value Ref Range Status   02/08/2022 neg  Final     Propoxyphene Screen, Urine   Date Value Ref Range Status   02/08/2022 neg  Final     THC Screen, Urine   Date Value Ref Range Status   02/08/2022 neg  Final     Tricyclic Antidepressants, Urine   Date Value Ref Range Status   02/08/2022 neg  Final       Last Len Mcintyrehire: 09/20/2022  Medication Contract: 02/08/2022  Last Fill: 09/19/2022    Requested Prescriptions     Pending Prescriptions Disp Refills    pregabalin (LYRICA) 75 MG capsule [Pharmacy Med Name: Pregabalin 75 MG Oral Capsule] 180 capsule 0     Sig: Take 1 capsule by mouth twice daily         Please approve or refuse this medication.    Chelsey Pressley MA

## 2022-11-18 DIAGNOSIS — E78.2 MIXED HYPERLIPIDEMIA: ICD-10-CM

## 2022-11-18 DIAGNOSIS — E11.42 TYPE 2 DIABETES MELLITUS WITH PERIPHERAL NEUROPATHY (HCC): ICD-10-CM

## 2022-11-18 DIAGNOSIS — I10 PRIMARY HYPERTENSION: ICD-10-CM

## 2022-11-18 LAB
ALBUMIN SERPL-MCNC: 4.3 G/DL (ref 3.5–5.2)
ALP BLD-CCNC: 80 U/L (ref 40–130)
ALT SERPL-CCNC: 13 U/L (ref 5–41)
ANION GAP SERPL CALCULATED.3IONS-SCNC: 9 MMOL/L (ref 7–19)
AST SERPL-CCNC: 16 U/L (ref 5–40)
BACTERIA: NEGATIVE /HPF
BASOPHILS ABSOLUTE: 0 K/UL (ref 0–0.2)
BASOPHILS RELATIVE PERCENT: 0.2 % (ref 0–1)
BILIRUB SERPL-MCNC: 0.9 MG/DL (ref 0.2–1.2)
BILIRUBIN URINE: NEGATIVE
BLOOD, URINE: NEGATIVE
BUN BLDV-MCNC: 15 MG/DL (ref 8–23)
CALCIUM SERPL-MCNC: 9.7 MG/DL (ref 8.8–10.2)
CHLORIDE BLD-SCNC: 101 MMOL/L (ref 98–111)
CHOLESTEROL, TOTAL: 120 MG/DL (ref 160–199)
CLARITY: CLEAR
CO2: 31 MMOL/L (ref 22–29)
COLOR: ABNORMAL
CREAT SERPL-MCNC: 1.1 MG/DL (ref 0.5–1.2)
CRYSTALS, UA: ABNORMAL /HPF
EOSINOPHILS ABSOLUTE: 0.1 K/UL (ref 0–0.6)
EOSINOPHILS RELATIVE PERCENT: 2.9 % (ref 0–5)
EPITHELIAL CELLS, UA: 1 /HPF (ref 0–5)
GFR SERPL CREATININE-BSD FRML MDRD: >60 ML/MIN/{1.73_M2}
GLUCOSE BLD-MCNC: 199 MG/DL (ref 74–109)
GLUCOSE URINE: =>1000 MG/DL
HBA1C MFR BLD: 9.3 % (ref 4–6)
HCT VFR BLD CALC: 40.1 % (ref 42–52)
HDLC SERPL-MCNC: 43 MG/DL (ref 55–121)
HEMOGLOBIN: 13.6 G/DL (ref 14–18)
HYALINE CASTS: 2 /HPF (ref 0–8)
IMMATURE GRANULOCYTES #: 0 K/UL
KETONES, URINE: ABNORMAL MG/DL
LDL CHOLESTEROL CALCULATED: 54 MG/DL
LEUKOCYTE ESTERASE, URINE: NEGATIVE
LYMPHOCYTES ABSOLUTE: 1.3 K/UL (ref 1.1–4.5)
LYMPHOCYTES RELATIVE PERCENT: 28.1 % (ref 20–40)
MCH RBC QN AUTO: 29.4 PG (ref 27–31)
MCHC RBC AUTO-ENTMCNC: 33.9 G/DL (ref 33–37)
MCV RBC AUTO: 86.6 FL (ref 80–94)
MONOCYTES ABSOLUTE: 0.4 K/UL (ref 0–0.9)
MONOCYTES RELATIVE PERCENT: 9.4 % (ref 0–10)
NEUTROPHILS ABSOLUTE: 2.7 K/UL (ref 1.5–7.5)
NEUTROPHILS RELATIVE PERCENT: 59 % (ref 50–65)
NITRITE, URINE: NEGATIVE
PDW BLD-RTO: 13.4 % (ref 11.5–14.5)
PH UA: 5 (ref 5–8)
PLATELET # BLD: 131 K/UL (ref 130–400)
PMV BLD AUTO: 11.5 FL (ref 9.4–12.4)
POTASSIUM SERPL-SCNC: 4.3 MMOL/L (ref 3.5–5)
PROTEIN UA: 30 MG/DL
RBC # BLD: 4.63 M/UL (ref 4.7–6.1)
RBC UA: 1 /HPF (ref 0–4)
SODIUM BLD-SCNC: 141 MMOL/L (ref 136–145)
SPECIFIC GRAVITY UA: 1.03 (ref 1–1.03)
TOTAL PROTEIN: 6.6 G/DL (ref 6.6–8.7)
TRIGL SERPL-MCNC: 113 MG/DL (ref 0–149)
TSH SERPL DL<=0.05 MIU/L-ACNC: 4.56 UIU/ML (ref 0.27–4.2)
UROBILINOGEN, URINE: 1 E.U./DL
WBC # BLD: 4.6 K/UL (ref 4.8–10.8)
WBC UA: 1 /HPF (ref 0–5)

## 2022-11-22 ENCOUNTER — OFFICE VISIT (OUTPATIENT)
Dept: INTERNAL MEDICINE | Age: 72
End: 2022-11-22
Payer: MEDICARE

## 2022-11-22 VITALS
SYSTOLIC BLOOD PRESSURE: 138 MMHG | DIASTOLIC BLOOD PRESSURE: 62 MMHG | BODY MASS INDEX: 25.64 KG/M2 | WEIGHT: 173.1 LBS | OXYGEN SATURATION: 99 % | HEIGHT: 69 IN | HEART RATE: 74 BPM

## 2022-11-22 DIAGNOSIS — D86.0 SARCOIDOSIS, LUNG (HCC): ICD-10-CM

## 2022-11-22 DIAGNOSIS — E11.42 TYPE 2 DIABETES MELLITUS WITH PERIPHERAL NEUROPATHY (HCC): ICD-10-CM

## 2022-11-22 DIAGNOSIS — I48.0 PAF (PAROXYSMAL ATRIAL FIBRILLATION) (HCC): ICD-10-CM

## 2022-11-22 DIAGNOSIS — G62.9 PERIPHERAL NERVE DISEASE: ICD-10-CM

## 2022-11-22 DIAGNOSIS — I10 PRIMARY HYPERTENSION: ICD-10-CM

## 2022-11-22 DIAGNOSIS — Z00.00 MEDICARE ANNUAL WELLNESS VISIT, SUBSEQUENT: Primary | ICD-10-CM

## 2022-11-22 DIAGNOSIS — G47.34 NOCTURNAL HYPOXIA: ICD-10-CM

## 2022-11-22 DIAGNOSIS — J42 CHRONIC BRONCHITIS, UNSPECIFIED CHRONIC BRONCHITIS TYPE (HCC): ICD-10-CM

## 2022-11-22 PROBLEM — S90.426A: Status: RESOLVED | Noted: 2022-09-15 | Resolved: 2022-11-22

## 2022-11-22 PROCEDURE — 3023F SPIROM DOC REV: CPT | Performed by: NURSE PRACTITIONER

## 2022-11-22 PROCEDURE — 3074F SYST BP LT 130 MM HG: CPT | Performed by: NURSE PRACTITIONER

## 2022-11-22 PROCEDURE — G8484 FLU IMMUNIZE NO ADMIN: HCPCS | Performed by: NURSE PRACTITIONER

## 2022-11-22 PROCEDURE — G0439 PPPS, SUBSEQ VISIT: HCPCS | Performed by: NURSE PRACTITIONER

## 2022-11-22 PROCEDURE — 3017F COLORECTAL CA SCREEN DOC REV: CPT | Performed by: NURSE PRACTITIONER

## 2022-11-22 PROCEDURE — 99214 OFFICE O/P EST MOD 30 MIN: CPT | Performed by: NURSE PRACTITIONER

## 2022-11-22 PROCEDURE — 1123F ACP DISCUSS/DSCN MKR DOCD: CPT | Performed by: NURSE PRACTITIONER

## 2022-11-22 PROCEDURE — 3078F DIAST BP <80 MM HG: CPT | Performed by: NURSE PRACTITIONER

## 2022-11-22 PROCEDURE — G8417 CALC BMI ABV UP PARAM F/U: HCPCS | Performed by: NURSE PRACTITIONER

## 2022-11-22 PROCEDURE — 1036F TOBACCO NON-USER: CPT | Performed by: NURSE PRACTITIONER

## 2022-11-22 PROCEDURE — 3046F HEMOGLOBIN A1C LEVEL >9.0%: CPT | Performed by: NURSE PRACTITIONER

## 2022-11-22 PROCEDURE — 2022F DILAT RTA XM EVC RTNOPTHY: CPT | Performed by: NURSE PRACTITIONER

## 2022-11-22 PROCEDURE — G8427 DOCREV CUR MEDS BY ELIG CLIN: HCPCS | Performed by: NURSE PRACTITIONER

## 2022-11-22 ASSESSMENT — PATIENT HEALTH QUESTIONNAIRE - PHQ9
7. TROUBLE CONCENTRATING ON THINGS, SUCH AS READING THE NEWSPAPER OR WATCHING TELEVISION: 0
SUM OF ALL RESPONSES TO PHQ QUESTIONS 1-9: 0
1. LITTLE INTEREST OR PLEASURE IN DOING THINGS: 0
5. POOR APPETITE OR OVEREATING: 0
SUM OF ALL RESPONSES TO PHQ9 QUESTIONS 1 & 2: 0
9. THOUGHTS THAT YOU WOULD BE BETTER OFF DEAD, OR OF HURTING YOURSELF: 0
2. FEELING DOWN, DEPRESSED OR HOPELESS: 0
8. MOVING OR SPEAKING SO SLOWLY THAT OTHER PEOPLE COULD HAVE NOTICED. OR THE OPPOSITE, BEING SO FIGETY OR RESTLESS THAT YOU HAVE BEEN MOVING AROUND A LOT MORE THAN USUAL: 0
SUM OF ALL RESPONSES TO PHQ QUESTIONS 1-9: 0
6. FEELING BAD ABOUT YOURSELF - OR THAT YOU ARE A FAILURE OR HAVE LET YOURSELF OR YOUR FAMILY DOWN: 0
4. FEELING TIRED OR HAVING LITTLE ENERGY: 0
SUM OF ALL RESPONSES TO PHQ QUESTIONS 1-9: 0
SUM OF ALL RESPONSES TO PHQ QUESTIONS 1-9: 0
3. TROUBLE FALLING OR STAYING ASLEEP: 0

## 2022-11-22 ASSESSMENT — ENCOUNTER SYMPTOMS
NAUSEA: 0
EYE ITCHING: 0
SHORTNESS OF BREATH: 0
COUGH: 0
EYE DISCHARGE: 0
TROUBLE SWALLOWING: 0
BLOOD IN STOOL: 0
WHEEZING: 0
DIARRHEA: 0
VOMITING: 0
CHOKING: 0
STRIDOR: 0
SORE THROAT: 0
COLOR CHANGE: 0
ABDOMINAL PAIN: 0
ABDOMINAL DISTENTION: 0
CONSTIPATION: 0

## 2022-11-22 ASSESSMENT — LIFESTYLE VARIABLES
HOW MANY STANDARD DRINKS CONTAINING ALCOHOL DO YOU HAVE ON A TYPICAL DAY: PATIENT DOES NOT DRINK
HOW OFTEN DO YOU HAVE A DRINK CONTAINING ALCOHOL: NEVER

## 2022-11-22 NOTE — PATIENT INSTRUCTIONS
Type 2 diabetes mellitus continue with Ozempic will go up to 2 mg  2. Hypertension stable on current dose of meds no changes  3. Paroxysmal atrial fibs stable with Xarelto and Cardizem no side effects of the meds is #4 sarcoidosis stable with Imuran  5. COPD is stable no changes #6 nocturnal hypoxia stable with oxygen  7. Need for colonoscopy in the spring  9. Peripheral neuropathy stable with Lyrica  Personalized Preventive Plan for Alexy Nelson - 11/22/2022  Medicare offers a range of preventive health benefits. Some of the tests and screenings are paid in full while other may be subject to a deductible, co-insurance, and/or copay. Some of these benefits include a comprehensive review of your medical history including lifestyle, illnesses that may run in your family, and various assessments and screenings as appropriate. After reviewing your medical record and screening and assessments performed today your provider may have ordered immunizations, labs, imaging, and/or referrals for you. A list of these orders (if applicable) as well as your Preventive Care list are included within your After Visit Summary for your review. Other Preventive Recommendations:    A preventive eye exam performed by an eye specialist is recommended every 1-2 years to screen for glaucoma; cataracts, macular degeneration, and other eye disorders. A preventive dental visit is recommended every 6 months. Try to get at least 150 minutes of exercise per week or 10,000 steps per day on a pedometer . Order or download the FREE \"Exercise & Physical Activity: Your Everyday Guide\" from [employer]. Call 6-442.477.6432 or search [employer] online. You need 8833-1816 mg of calcium and 3621-4142 IU of vitamin D per day.  It is possible to meet your calcium requirement with diet alone, but a vitamin D supplement is usually necessary to meet this goal.  When exposed to the sun, use a sunscreen that protects against both UVA and UVB radiation with an SPF of 30 or greater. Reapply every 2 to 3 hours or after sweating, drying off with a towel, or swimming. Always wear a seat belt when traveling in a car. Always wear a helmet when riding a bicycle or motorcycle.

## 2022-11-22 NOTE — PROGRESS NOTES
[facility] PHYSICIAN SERVICES  [facility] INTERNAL MEDICINE  [address] [address]  Dept: [phone]  Dept Fax: [phone]: [phone]    Rod Yanez (:  1950) is a 67 y.o. male,Established patient  with green, here for evaluation of the following chief complaint(s): Diabetes      Rod Yanez is a 67 y.o. male who presents today for his medical conditions/complaints as noted below. Rod Yanez is c/alexys Diabetes        HPI:     Chief Complaint   Patient presents with    Diabetes     HPI \\   T2DM  a1c is don to 9.3 he is not really checking his blood sugars at home he is currently on Ozempic fasting sugar 199 A1c is down from 9.5-9.3  Hypertension stable blood pressure today is good he is on Cardizem 100  PAF he is on Xarelto 20  SARCOIDOSIS he is on Imuran  Copd is been a good winter season for him he is hardly been ill. He has had no recent exacerbations of his COPD   nocturnal hypoxia he is on 2 L at night  Need for colon . . 2 polyps in 2019    8. Peripheral neuropathy stable with current dose of Lyrica 75 twice daily  CONTROLLED SUBSTANCE echo  Drug Lyrica  Diagnosis peripheral neuropathy diabetic  Last Gely Alstr 2020  Last UDS   Pain contract last date 2022  Effective dose   yes      Changes this visit   none     I have explained to the patient the risks of chronic opioid therapy (including but not limited to, risk of accidental overdose/death, addiction, dependency/withdrawal, tolerance, hyperalgesia, constipation, dizziness/drowsiness, nausea/vomiting, falls, respiratory depression and constipation), as well as it's lack of evidence for safety and effectiveness in the treatment of chronic non-cancer pain. I have also explained to the patient the details of our opioid agreement, which they have signed. He was instructed to use as little of this medication as possible to allow adequate function.  Tulio Kasper was reviewed today and shows no aberrant behavior.        Past Medical History:   Diagnosis Date    Achalasia     going to [facility] for surgery in March    Acute pancreatitis     Anemia     Asthma     Atrial fibrillation ([facility] [address]     h/o paroxysmal a-fib ? anesthsia induced    Benign colonic polyp     cscope 12/07 Dr Alfredo Dennis adenomatous: 12/14 adenoma    Chest pain     Chronic kidney disease     Chronic pain syndrome     Depression     Diabetic peripheral neuropathy (HCC)     Extrinsic asthma     Hyperlipidemia     Hypertension     Idiopathic peripheral neuropathy     Lyme disease     Mediastinal lymphadenopathy     Microalbuminuria     Mixed hyperlipidemia     Paroxysmal A-fib (HCC)     S/P ablation of atrial fibrillation     4/16 A fib ablation , Dr Geeta Hicks, [place]     Sarcoidosis, lung ([facility] [address]     restrictive lung impairment    Tick bite     Type 2 diabetes, controlled, with neuropathy [facility])       Past Surgical History:   Procedure Laterality Date    ANKLE FRACTURE SURGERY  april 14, 2015    ATRIAL ABLATION SURGERY  2015    Dr. Alfred Simmons  10/06/2020    CARDIAC SURGERY      Catheter Ablation A-fib    CHOLECYSTECTOMY      COLONOSCOPY  12/02/2014    Melecio    CYSTOSCOPY Left 8/16/2019    CYSTOSCOPY; LEFT RETROGRADE PYELOGRAM; INSERTION LEFT URETERAL STENT performed by Amandeep Turner MD at [address] [facility] [address] Left 6/27/2018    LASER LITHOTRIPSY STONE MANIPULATION WITH DOUBLE J STENT PLACEMENT performed [facility] [address] Left 6/27/2018    CYSTOSCOPY URETEROSCOPY RETROGRADE PYELOGRAMS performed by Amandeep Turner MD at [address] [place] [address]      ear       Vitals 11/22/2022 10/11/2022 9/15/2022 8/29/2022 8/17/2022 8/69/4130   SYSTOLIC 574 053 [phone] 593   DIASTOLIC 62 54 70 62 60 70   Pulse 74 84 72 71 88 -   Temp - - - - - -   Resp - - - - - -   SpO2 99 97 98 100 99 97   Weight 173 lb 1.6 oz 171 lb - 175 lb 177 lb 180 lb   Height 5' 9\" 5' 9\" - 5' 9\" 5' 9\" 5' 9\"   Body mass index 25.56 kg/m2 25.25 kg/m2 - 25.84 kg/m2 26.14 kg/m2 26.58 kg/m2   Pain Level - - - - - -   Some recent data might be hidden       Family History   Problem Relation Age of Onset    Coronary Art Dis Father         AICD pacer age 68    Heart Attack Father     Cancer Sister         childhood chest tumor    Heart Failure Mother        Social History     Tobacco Use    Smoking status: Never    Smokeless tobacco: Never   Substance Use Topics    Alcohol use: No      Current Outpatient Medications   Medication Sig Dispense Refill    Semaglutide, 2 MG/DOSE, 8 MG/3ML SOPN Inject 2 mg into the skin once a week 5 mL 1    pregabalin (LYRICA) 75 MG capsule Take 1 capsule by mouth twice daily 180 capsule 0    losartan (COZAAR) 100 MG tablet Take 1 tablet by mouth once daily 60 tablet 0    XARELTO 20 MG TABS tablet TAKE 1 TABLET BY MOUTH ONCE DAILY WITH EVENING MEAL 90 tablet 0    Semaglutide, 1 MG/DOSE, 4 MG/3ML SOPN Inject 1 mg into the skin once a week 3 mL 1    donepezil (ARICEPT) 5 MG tablet TAKE 2 TABLETS BY MOUTH NIGHTLY 90 tablet 0    sulfamethoxazole-trimethoprim (BACTRIM;SEPTRA) 400-80 MG per tablet Take 1 tablet by mouth 2 times daily      atorvastatin (LIPITOR) 20 MG tablet Take 0.5 tablets by mouth nightly 90 tablet 3    nitroGLYCERIN (NITROSTAT) 0.4 MG SL tablet Place 1 tablet under the tongue every 5 minutes as needed for Chest pain 25 tablet 3    fluticasone-salmeterol (ADVAIR) 500-50 MCG/DOSE diskus inhaler Inhale 1 puff into the lungs every 12 hours 1 puff in the am, prn in afternoon      albuterol sulfate  (90 Base) MCG/ACT inhaler INHALE 2 PUFFS BY MOUTH EVERY 6 HOURS AS NEEDED FOR WHEEZING 1 Inhaler 5    predniSONE (DELTASONE) 5 MG tablet Take 5 mg by mouth daily  6    levalbuterol (XOPENEX) 1.25 MG/3ML nebulizer solution Take 1 ampule by nebulization every 6 hours as needed for Wheezing      EPINEPHrine (EPIPEN) 0.3 MG/0.3ML SOAJ injection Inject 0.3 mLs into the muscle as needed (Allergic Reaction) 2 each 1    azaTHIOprine (IMURAN) 50 MG tablet Take 50 mg by mouth daily       aspirin 81 MG tablet Take 81 mg by mouth daily. No current facility-administered medications for this visit. Allergies   Allergen Reactions    Bee Venom Anaphylaxis    Gabapentin Other (See Comments)     Causes blisters to head.      Penicillins Hives     \"Causes blisters to break out everywhere\"    Toprol Xl [Metoprolol]      Leg pain        Health Maintenance   Topic Date Due    Shingles vaccine (1 of 2) 05/10/2023 (Originally 9/15/1969)    Flu vaccine (1) 11/22/2023 (Originally 8/1/2022)    COVID-19 Vaccine (1) 03/29/2025 (Originally 3/15/1951)    Diabetic retinal exam  01/14/2023    A1C test (Diabetic or Prediabetic)  02/18/2023    Diabetic foot exam  05/10/2023    Depression Monitoring  05/10/2023    Lipids  11/18/2023    Annual Wellness Visit (AWV)  11/23/2023    Colorectal Cancer Screen  08/01/2024    DTaP/Tdap/Td vaccine (2 - Td or Tdap) 05/22/2030    Pneumococcal 65+ years Vaccine  Completed    Hepatitis A vaccine  Aged Out    Hib vaccine  Aged Out    Meningococcal (ACWY) vaccine  Aged Out    Hepatitis C screen  Discontinued       Lab Results   Component Value Date    LABA1C 9.3 (H) 11/18/2022     Lab Results   Component Value Date    PSA 2.5 08/10/2022    PSA 2.9 06/21/2021    PSA 2.5 11/09/2020     TSH   Date Value Ref Range Status   11/18/2022 4.560 (H) 0.270 - 4.200 uIU/mL Final   ]  Lab Results   Component Value Date     11/18/2022    K 4.3 11/18/2022     11/18/2022    CO2 31 (H) 11/18/2022    BUN 15 11/18/2022    CREATININE 1.1 11/18/2022    GLUCOSE 199 (H) 11/18/2022    CALCIUM 9.7 11/18/2022    PROT 6.6 11/18/2022    LABALBU 4.3 11/18/2022    BILITOT 0.9 11/18/2022    ALKPHOS 80 11/18/2022    AST 16 11/18/2022    ALT 13 11/18/2022    LABGLOM >60 11/18/2022    GFRAA >59 08/10/2022     Lab Results   Component Value Date    CHOL 120 (L) 11/18/2022    CHOL 161 05/05/2022    CHOL 136 (L) 09/27/2021     Lab Results   Component Value Date    TRIG 113 11/18/2022    TRIG 130 05/05/2022    TRIG 90 09/27/2021     Lab Results   Component Value Date    HDL 43 (L) 11/18/2022    HDL 48 (L) 05/05/2022    HDL 41 (L) 09/27/2021     Lab Results   Component Value Date    LDLCALC 54 11/18/2022    LDLCALC 87 05/05/2022    LDLCALC 77 09/27/2021     Lab Results   Component Value Date/Time     11/18/2022 09:28 AM    K 4.3 11/18/2022 09:28 AM    K 4.6 09/23/2020 04:08 PM     11/18/2022 09:28 AM    CO2 31 11/18/2022 09:28 AM    BUN 15 11/18/2022 09:28 AM    CREATININE 1.1 11/18/2022 09:28 AM    GLUCOSE 199 11/18/2022 09:28 AM    CALCIUM 9.7 11/18/2022 09:28 AM      Lab Results   Component Value Date    WBC 4.6 (L) 11/18/2022    HGB 13.6 (L) 11/18/2022    HCT 40.1 (L) 11/18/2022    MCV 86.6 11/18/2022     11/18/2022    LABLYMP 1.58 09/20/2015    LYMPHOPCT 28.1 11/18/2022    RBC 4.63 (L) 11/18/2022    MCH 29.4 11/18/2022    MCHC 33.9 11/18/2022    RDW 13.4 11/18/2022     Lab Results   Component Value Date    VITD25 21.3 (L) 09/27/2021     Labs reviewed from 11/18/2022    Subjective:      Review of Systems   Constitutional:  Negative for fatigue, fever and unexpected weight change. HENT:  Negative for ear discharge, ear pain, mouth sores, sore throat and trouble swallowing. Eyes:  Negative for discharge, itching and visual disturbance. Respiratory:  Negative for cough, choking, shortness of breath, wheezing and stridor. Cardiovascular:  Negative for chest pain, palpitations and leg swelling. Gastrointestinal:  Negative for abdominal distention, abdominal pain, blood in stool, constipation, diarrhea, nausea and vomiting. Endocrine: Negative for cold intolerance, polydipsia and polyuria. Genitourinary:  Negative for difficulty urinating, dysuria, frequency and urgency.    Musculoskeletal:  Negative for arthralgias and gait problem. Skin:  Negative for color change and rash. Allergic/Immunologic: Negative for food allergies and immunocompromised state. Neurological:  Negative for dizziness, tremors, syncope, speech difficulty, weakness and headaches. Hematological:  Negative for adenopathy. Does not bruise/bleed easily. Psychiatric/Behavioral:  Negative for confusion and hallucinations. Objective:     Physical Exam  Constitutional:       General: He is not in acute distress. Appearance: He is well-developed. HENT:      Head: Normocephalic and atraumatic. Eyes:      General: No scleral icterus. Right eye: No discharge. Left eye: No discharge. Pupils: Pupils are equal, round, and reactive to light. Neck:      Thyroid: No thyromegaly. Vascular: No JVD. Cardiovascular:      Rate and Rhythm: Normal rate and regular rhythm. Heart sounds: Normal heart sounds. No murmur heard. Pulmonary:      Effort: Pulmonary effort is normal. No respiratory distress. Breath sounds: Normal breath sounds. No wheezing or rales. Abdominal:      General: Bowel sounds are normal. There is no distension. Palpations: Abdomen is soft. There is no mass. Tenderness: There is no abdominal tenderness. There is no guarding or rebound. Musculoskeletal:         General: No tenderness. Normal range of motion. Cervical back: Normal range of motion and neck supple. Skin:     General: Skin is warm and dry. Findings: No erythema or rash. Neurological:      Mental Status: He is alert and oriented to person, place, and time. Cranial Nerves: No cranial nerve deficit. Coordination: Coordination normal.      Deep Tendon Reflexes: Reflexes are normal and symmetric. Reflexes normal.   Psychiatric:         Mood and Affect: Mood is not depressed. Behavior: Behavior normal.         Thought Content:  Thought content normal.         Judgment: Judgment normal.     /62   Pulse 74 Ht 5' 9\" (1.753 m)   Wt 173 lb 1.6 oz (78.5 kg)   SpO2 99%   BMI 25.56 kg/m²           Assessment:      Problem List       Chronic obstructive lung disease ([facility]     He is stable with no recent exacerbations          Relevant Medications    levalbuterol (XOPENEX) 1.25 MG/3ML nebulizer solution    predniSONE (DELTASONE) 5 MG tablet    albuterol sulfate  (90 Base) MCG/ACT inhaler    fluticasone-salmeterol (ADVAIR) 500-50 MCG/DOSE diskus inhaler    Hypertension     Stable with losartan 100          Relevant Orders    TSH    Nocturnal hypoxia     Stable with nocturnal hypoxia          PAF (paroxysmal atrial fibrillation) ([facility] [address]     He is stable on Cardizem and Xarelto          Relevant Medications    aspirin 81 MG tablet    nitroGLYCERIN (NITROSTAT) 0.4 MG SL tablet    atorvastatin (LIPITOR) 20 MG tablet    XARELTO 20 MG TABS tablet    losartan (COZAAR) 100 MG tablet    Peripheral nerve disease     Stable Lyrica 75 twice daily          Relevant Medications    donepezil (ARICEPT) 5 MG tablet    pregabalin (LYRICA) 75 MG capsule    Sarcoidosis, lung (HCC)     Stable with Imuran          Type 2 diabetes mellitus with peripheral neuropathy (HCC)     Currently taking Ozempic fairly stable so we can increase to 2          Relevant Medications    donepezil (ARICEPT) 5 MG tablet    Semaglutide, 1 MG/DOSE, 4 MG/3ML SOPN    pregabalin (LYRICA) 75 MG capsule    Semaglutide, 2 MG/DOSE, 8 MG/3ML SOPN    Other Relevant Orders    CBC with Auto Differential    Comprehensive Metabolic Panel    Hemoglobin A1C    TSH       Plan:        Patient given educational materials - see patient instructions. Discussed use, benefit, and side effects of prescribed medications. Allpatient questions answered. Pt voiced understanding. Reviewed health maintenance. Instructed to continue current medications, diet and exercise. Patient agreed with treatment plan. Follow up as directed.    MEDICATIONS:  Orders Placed This Encounter Plan:  He is stable with no recent exacerbations   Nocturnal hypoxia  Assessment & Plan:  Stable with nocturnal hypoxia   Peripheral nerve disease  Assessment & Plan:  Stable Lyrica 75 twice daily       Recommendations for Preventive Services Due: see orders and patient instructions/AVS.  Recommended screening schedule for the next 5-10 years is provided to the patient in written form: see Patient Instructions/AVS.     No follow-ups on file. Subjective       Patient's complete Health Risk Assessment and screening values have been reviewed and are found in Flowsheets. The following problems were reviewed today and where indicated follow up appointments were made and/or referrals ordered. Positive Risk Factor Screenings with Interventions:             General Health and ACP:  General  In general, how would you say your health is?: Good  In the past 7 days, have you experienced any of the following: New or Increased Pain, New or Increased Fatigue, Loneliness, Social Isolation, Stress or Anger?: No  Do you get the social and emotional support that you need?: Yes  Do you have a Living Will?: (!) No    Advance Directives       Power of  Living Will ACP-Advance Directive ACP-Power of     Not on File Not on File Not on File Not on File        General Health Risk Interventions:  No Living Will: Patient declines ACP discussion/assistance              Objective   Vitals:    11/22/22 1033   BP: 138/62   Pulse: 74   SpO2: 99%   Weight: 173 lb 1.6 oz (78.5 kg)   Height: 5' 9\" (1.753 m)      Body mass index is 25.56 kg/m². Allergies   Allergen Reactions    Bee Venom Anaphylaxis    Gabapentin Other (See Comments)     Causes blisters to head. Penicillins Hives     \"Causes blisters to break out everywhere\"    Toprol Xl [Metoprolol]      Leg pain      Prior to Visit Medications    Medication Sig Taking?  Authorizing Provider   Semaglutide, 2 MG/DOSE, 8 MG/3ML SOPN Inject 2 mg into the skin once a week Yes Magdaline Meigs, APRN   pregabalin (LYRICA) 75 MG capsule Take 1 capsule by mouth twice daily Yes Magdaline Meigs, APRN   losartan (COZAAR) 100 MG tablet Take 1 tablet by mouth once daily Yes Magdaline Meigs, APRN   XARELTO 20 MG TABS tablet TAKE 1 TABLET BY MOUTH ONCE DAILY WITH EVENING MEAL Yes Magdaline Meigs, APRN   Semaglutide, 1 MG/DOSE, 4 MG/3ML SOPN Inject 1 mg into the skin once a week Yes Magdaline Meigs, APRN   donepezil (ARICEPT) 5 MG tablet TAKE 2 TABLETS BY MOUTH NIGHTLY Yes Magdaline Meigs, APRN   sulfamethoxazole-trimethoprim (BACTRIM;SEPTRA) 400-80 MG per tablet Take 1 tablet by mouth 2 times daily Yes Historical Provider, MD   atorvastatin (LIPITOR) 20 MG tablet Take 0.5 tablets by mouth nightly Yes Magdaline Meigs, APRN   nitroGLYCERIN (NITROSTAT) 0.4 MG SL tablet Place 1 tablet under the tongue every 5 minutes as needed for Chest pain Yes NUNO Sexton - DARYL   fluticasone-salmeterol (ADVAIR) 500-50 MCG/DOSE diskus inhaler Inhale 1 puff into the lungs every 12 hours 1 puff in the am, prn in afternoon Yes Historical Provider, MD   albuterol sulfate  (90 Base) MCG/ACT inhaler INHALE 2 PUFFS BY MOUTH EVERY 6 HOURS AS NEEDED FOR WHEEZING Yes Magdaline Meigs, APRN   predniSONE (DELTASONE) 5 MG tablet Take 5 mg by mouth daily Yes Historical Provider, MD   levalbuterol (XOPENEX) 1.25 MG/3ML nebulizer solution Take 1 ampule by nebulization every 6 hours as needed for Wheezing Yes Historical Provider, MD   EPINEPHrine (EPIPEN) 0.3 MG/0.3ML SOAJ injection Inject 0.3 mLs into the muscle as needed (Allergic Reaction) Yes Magdaline Meigs, APRN   azaTHIOprine (IMURAN) 50 MG tablet Take 50 mg by mouth daily  Yes Historical Provider, MD   aspirin 81 MG tablet Take 81 mg by mouth daily.  Yes Historical Provider, MD Mcallister (Including outside providers/suppliers regularly involved in providing care):   Patient Care Team:  Magdaline Meigs, APRN as PCP - General (Nurse Practitioner Acute Care)  Armando Figueroa Barber Asher, APRN as PCP - [address] [place] Provider  Santana Celis MD as Consulting Physician (Pulmonology)  Pati Friend MD as Consulting Physician (Cardiology)     Reviewed and updated this visit:  Tobacco  Allergies  Meds  Med Hx  Surg Hx  Soc Hx  Fam Hx

## 2023-01-06 NOTE — TELEPHONE ENCOUNTER
Mark Page called requesting a refill of the below medication which has been pended for you:     Requested Prescriptions     Pending Prescriptions Disp Refills    losartan (COZAAR) 100 MG tablet [Pharmacy Med Name: Losartan Potassium 100 MG Oral Tablet] 60 tablet 0     Sig: Take 1 tablet by mouth once daily       Last Appointment Date: 11/22/2022  Next Appointment Date: 2/22/2023    Allergies   Allergen Reactions    Bee Venom Anaphylaxis    Gabapentin Other (See Comments)     Causes blisters to head.      Penicillins Hives     \"Causes blisters to break out everywhere\"    Toprol Xl [Metoprolol]      Leg pain

## 2023-01-09 RX ORDER — LOSARTAN POTASSIUM 100 MG/1
TABLET ORAL
Qty: 60 TABLET | Refills: 0 | Status: SHIPPED | OUTPATIENT
Start: 2023-01-09

## 2023-01-21 DIAGNOSIS — J44.9 COPD, MODERATE: ICD-10-CM

## 2023-01-23 RX ORDER — ALBUTEROL SULFATE 90 UG/1
AEROSOL, METERED RESPIRATORY (INHALATION)
Qty: 18 G | Refills: 11 | Status: SHIPPED | OUTPATIENT
Start: 2023-01-23

## 2023-01-23 NOTE — TELEPHONE ENCOUNTER
[facility] pharmacy [facility]  is requesting a refill on Ventolin HFA.    Rx Refill Note  Requested Prescriptions     Pending Prescriptions Disp Refills   • Ventolin  (90 Base) MCG/ACT inhaler [Pharmacy Med Name: Ventolin  (90 Base) MCG/ACT Inhalation Aerosol Solution] 18 g 0     Sig: INHALE 2 PUFFS BY MOUTH EVERY 4 HOURS AS NEEDED FOR WHEEZING FOR SHORTNESS OF BREATH      Last office visit with prescribing clinician: 8/25/2022   Last telemedicine visit with prescribing clinician: Visit date not found   Next office visit with prescribing clinician: 8/25/2023                         Would you like a call back once the refill request has been completed: [] Yes [] No    If the office needs to give you a call back, can they leave a voicemail: [] Yes [] No    Gabby Saravia MA  01/23/23, 09:21 CST       Protocol met to refill this.

## 2023-02-09 ENCOUNTER — OFFICE VISIT (OUTPATIENT)
Dept: CARDIOLOGY CLINIC | Age: 73
End: 2023-02-09

## 2023-02-09 VITALS
HEIGHT: 69 IN | HEART RATE: 83 BPM | WEIGHT: 170 LBS | SYSTOLIC BLOOD PRESSURE: 114 MMHG | DIASTOLIC BLOOD PRESSURE: 60 MMHG | OXYGEN SATURATION: 100 % | BODY MASS INDEX: 25.18 KG/M2

## 2023-02-09 DIAGNOSIS — I10 ESSENTIAL HYPERTENSION: ICD-10-CM

## 2023-02-09 DIAGNOSIS — E78.5 DYSLIPIDEMIA: ICD-10-CM

## 2023-02-09 DIAGNOSIS — I48.0 PAF (PAROXYSMAL ATRIAL FIBRILLATION) (HCC): Primary | ICD-10-CM

## 2023-02-09 RX ORDER — ATENOLOL 25 MG/1
25 TABLET ORAL DAILY
Qty: 30 TABLET | Refills: 3 | Status: SHIPPED | OUTPATIENT
Start: 2023-02-09

## 2023-02-09 ASSESSMENT — ENCOUNTER SYMPTOMS
WHEEZING: 0
COUGH: 0
SHORTNESS OF BREATH: 0
SORE THROAT: 0
CHEST TIGHTNESS: 0

## 2023-02-09 NOTE — PROGRESS NOTES
[address] [facility] Cardiology   Established Patient Office Visit  [address]  [address]  [phone]        OFFICE VISIT:  2023    Milind Miller - : 1950    Reason For Visit:  Susan Lao is a 67 y.o. male who is here for Follow-up    1. PAF (paroxysmal atrial fibrillation) ([facility]    2. Essential hypertension    3. Dyslipidemia      Patient with a history of dyslipidemia, diabetes, sarcoidosis, histoplasmosis, hypertension and paroxysmal atrial fib. A-fib ablation  Dr. Jefferson President in [facility]. He is a patient of Dr. Maranda Munoz. Patient presents to clinic today with concern he might be having some atrial fib. Patient accompanied by wife who is a former Cath Lab nurse. Patient states he is noted the last few weeks irregular heartbeat. Kardia device showing irregular rhythm. Patient can feel for irregularity and has some lightheadedness and dizziness. No syncope. Has felt very tired and fatigued with the irregular rhythm. No chest pain, pressure or tightness. DATA:    2021  ZIO Patch showed: This rhythm with a minimum heart rate of 57 bpm, maximum 127 bpm.  2 SVT runs fastest being 12 beats at a maximum rate of 158 bpm.  Frequent SVE's 5.4%.     Subjective    Milind Miller is a 67 y.o. male with the following history as recorded in [facility]:    Patient Active Problem List    Diagnosis Date Noted    Stage 3a chronic kidney disease ([facility] 2022    Memory loss 10/06/2021    Recurrent major depressive disorder, in partial remission ([address] 2021    Coagulation defect ([address] 2021    Chronic pain syndrome 2020    Nocturnal hypoxia 2020    Achalasia 2019    Sarcoidosis 10/21/2019    Nephrolithiasis 2019    Vitamin D deficiency 2019    Chronic obstructive lung disease ([address] 2019    Anemia 10/09/2018    Other male erectile dysfunction 2018    Ureterolithiasis 2018    Hydronephrosis, left 2018    History of histoplasmosis 06/22/2018    Lymphadenopathy 06/22/2018    Splenomegaly 06/22/2018    Asthma 06/08/2018    Pancreatitis 06/08/2018    Peripheral nerve disease 06/08/2018    Polyp of colon 06/08/2018    Hypercalcemia 04/26/2018    Type 2 diabetes mellitus with peripheral neuropathy ([facility]     Sarcoidosis, lung ([facility]     S/P ablation of atrial fibrillation 05/16/2017    PAF (paroxysmal atrial fibrillation) (HCC)     Hypertension     Hyperlipidemia      Current Outpatient Medications   Medication Sig Dispense Refill    atenolol (TENORMIN) 25 MG tablet Take 1 tablet by mouth daily 30 tablet 3    losartan (COZAAR) 100 MG tablet Take 1 tablet by mouth once daily 60 tablet 0    Semaglutide, 2 MG/DOSE, 8 MG/3ML SOPN Inject 2 mg into the skin once a week 5 mL 1    pregabalin (LYRICA) 75 MG capsule Take 1 capsule by mouth twice daily 180 capsule 0    XARELTO 20 MG TABS tablet TAKE 1 TABLET BY MOUTH ONCE DAILY WITH EVENING MEAL 90 tablet 0    donepezil (ARICEPT) 5 MG tablet TAKE 2 TABLETS BY MOUTH NIGHTLY 90 tablet 0    sulfamethoxazole-trimethoprim (BACTRIM;SEPTRA) 400-80 MG per tablet Take 1 tablet by mouth three times a week Mon, Wens, Fri      atorvastatin (LIPITOR) 20 MG tablet Take 0.5 tablets by mouth nightly 90 tablet 3    nitroGLYCERIN (NITROSTAT) 0.4 MG SL tablet Place 1 tablet under the tongue every 5 minutes as needed for Chest pain 25 tablet 3    fluticasone-salmeterol (ADVAIR) 500-50 MCG/DOSE diskus inhaler Inhale 1 puff into the lungs every 12 hours 1 puff in the am, prn in afternoon      albuterol sulfate  (90 Base) MCG/ACT inhaler INHALE 2 PUFFS BY MOUTH EVERY 6 HOURS AS NEEDED FOR WHEEZING 1 Inhaler 5    predniSONE (DELTASONE) 5 MG tablet Take 5 mg by mouth daily  6    levalbuterol (XOPENEX) 1.25 MG/3ML nebulizer solution Take 1 ampule by nebulization every 6 hours as needed for Wheezing      EPINEPHrine (EPIPEN) 0.3 MG/0.3ML SOAJ injection Inject 0.3 mLs into the muscle as needed (Allergic Reaction) 2 each 1    azaTHIOprine (IMURAN) 50 MG tablet Take 50 mg by mouth daily       aspirin 81 MG tablet Take 81 mg by mouth daily. Semaglutide, 1 MG/DOSE, 4 MG/3ML SOPN Inject 1 mg into the skin once a week (Patient not taking: Reported on 2/9/2023) 3 mL 1     No current facility-administered medications for this visit.      Allergies: Bee venom, Gabapentin, Penicillins, and Toprol xl [metoprolol]  Past Medical History:   Diagnosis Date    Achalasia     going to [facility] for surgery in March    Acute pancreatitis     Anemia     Asthma     Atrial fibrillation ([facility]     h/o paroxysmal a-fib ? anesthsia induced    Benign colonic polyp     cscope 12/07 Dr Tereza Ariza adenomatous: 12/14 adenoma    Chest pain     Chronic kidney disease     Chronic pain syndrome     Depression     Diabetic peripheral neuropathy (HCC)     Extrinsic asthma     Hyperlipidemia     Hypertension     Idiopathic peripheral neuropathy     Lyme disease     Mediastinal lymphadenopathy     Microalbuminuria     Mixed hyperlipidemia     Paroxysmal A-fib (HCC)     S/P ablation of atrial fibrillation     4/16 A fib ablation , Dr Whitney Denny, [place]     Sarcoidosis, lung ([facility]     restrictive lung impairment    Tick bite     Type 2 diabetes, controlled, with neuropathy [facility])      Past Surgical History:   Procedure Laterality Date    ANKLE FRACTURE SURGERY  april 14, 2015    ATRIAL ABLATION SURGERY  2015    Dr. Whitney Denny- [facility] Billing  10/06/2020    CARDIAC SURGERY      Catheter Ablation A-fib    CHOLECYSTECTOMY      COLONOSCOPY  12/02/2014    [facility]    CYSTOSCOPY Left 8/16/2019    CYSTOSCOPY; LEFT RETROGRADE PYELOGRAM; INSERTION LEFT URETERAL STENT performed by Dani Benz MD at [address] [place] [address] [address] Left 6/27/2018    LASER LITHOTRIPSY STONE MANIPULATION WITH DOUBLE J STENT PLACEMENT [facility] Left 6/27/2018    CYSTOSCOPY URETEROSCOPY RETROGRADE PYELOGRAMS performed by Warden Jeffy MD at [address] [place] [address]     Family History   Problem Relation Age of Onset    Coronary Art Dis Father         AICD pacer age 68    Heart Attack Father     Cancer Sister         childhood chest tumor    Heart Failure Mother      Social History     Tobacco Use    Smoking status: Never    Smokeless tobacco: Never   Substance Use Topics    Alcohol use: No          Review of Systems:    Review of Systems   Constitutional:  Positive for fatigue. Negative for activity change, chills, diaphoresis and fever. HENT:  Negative for congestion and sore throat. Respiratory:  Negative for cough, chest tightness, shortness of breath and wheezing. Cardiovascular:  Positive for palpitations. Negative for chest pain and leg swelling. Neurological:  Positive for dizziness and light-headedness. Negative for syncope and headaches. Psychiatric/Behavioral:  Negative for confusion. The patient is not nervous/anxious. Objective:    /60   Pulse 83   Ht 5' 9\" (1.753 m)   Wt 170 lb (77.1 kg)   SpO2 100%   BMI 25.10 kg/m²     GENERAL - well developed and well nourished, conversant  HEENT -  PERRLA, Hearing appears normal, gentleman lids are normal.  External inspection of ears and nose appear normal.  NECK - no thyromegaly, no JVD, trachea is in the midline  CARDIOVASCULAR - PMI is in the mid line clavicular position, Normal S1 and S2 with no  systolic murmur. No S3 or S4    PULMONARY - no respiratory distress. No wheezes or rales. Lungs are clear to ausculation, normal respiratory effort. ABDOMEN  - soft, non tender, no rebound  MUSCULOSKELETAL  - range of motion of the upper and lower extermites appears normal and equal and is without pain   EXTREMITIES - no significant edema   NEUROLOGIC - gait and station are normal  SKIN - turgor is normal, can warm and dry.   PSYCHIATRIC - normal mood and affect, alert and orientated x 3,      ASSESSMENT:    ALL THE CARDIOLOGY PROBLEMS ARE LISTED ABOVE; HOWEVER, THE FOLLOWING SPECIFIC CARDIAC PROBLEMS / CONDITIONS WERE ADDRESSED AND TREATED DURING THE OFFICE VISIT TODAY:                                                                                            MEDICAL DECISION MAKING             Cardiac Specific Problem / Diagnosis  Discussion and Data Reviewed Diagnostic Procedures Ordered Management Options Selected           1. PAF status post ablation 2015  Stable   Review and summation of old records:    EKG in the office today showing sinus rhythm but irregular. Heart rate 85 bpm.  He is on Xarelto 20 mg daily for stroke prevention no bleeding issues. Did discuss with Dr. Sherin Kerr will start patient on atenolol 25 mg daily. Place a 1 week ZIO patch and have patient follow-up with Dr. Sherin Kerr in 2 weeks. Both patient and wife verbalized understanding and agreed with plan. Yes Continue current medications:     Start atenolol 25 mg daily. Continue to take Xarelto 20 mg daily. 2. Hypertension  Well Controlled   Blood pressure in the office today 114/60. O2 sat 100%. Losartan 100 mg daily. No Continue current medications:    Yes           3. Dyslipidemia Stable Patient is on Lipitor 10 mg nightly. Lipid profile 11/18/2022 total cholesterol 120. HDL 43. LDL 54. No Continue current medications: Yes                     Orders Placed This Encounter   Procedures    Zio AT    EKG 12 lead     Order Specific Question:   Reason for Exam?     Answer:   Irregular heart rate     Orders Placed This Encounter   Medications    atenolol (TENORMIN) 25 MG tablet     Sig: Take 1 tablet by mouth daily     Dispense:  30 tablet     Refill:  3         Discussed with patient and spouse. Return in about 2 weeks (around 2/23/2023) for Dr Sherin Kerr (\[facility]").     I greatly appreciate the opportunity to meet Urvashi Martínez and your confidence in allowing me to participate in his cardiovascular care. NUNO Sands - NP  2/9/2023 9:02 AM CST                    This dictation was generated by voice recognition computer software. Although all attempts are made to edit dictation for accuracy, there may be errors in the transcription that are not intended.

## 2023-02-09 NOTE — PATIENT INSTRUCTIONS
14-day monitor (ZIO Patch)      ZIO Patch  The ZIO® Patch is a new form of ambulatory cardiac monitoring described as a wearable patch. The [employer] Stakes is unique compared to traditional Holter monitors as the monitoring device has no leads, no wires and no batteries. The [employer] Stakes weighs just a few ounces that is a peel and stick device that is worn for an extended monitoring period of up to 14 days. Even though the device is worn for a longer duration than a Holter monitor, the ZIO Patch is said to be preferred by nearly 80% of patients as compared to a traditional 24 Holter monitor. Please allow 8 to 10 days for results, once you have mailed off your device.     Features of the ZIO Patch:  Arguably the smallest ambulatory cardiac monitor   Light weight   No lead wires   Single channel ECG recording   No batteries   Superior patient compliance with a water resistant   Up to 14 days of continuous ECG recording

## 2023-02-17 DIAGNOSIS — I10 PRIMARY HYPERTENSION: ICD-10-CM

## 2023-02-17 DIAGNOSIS — E11.42 TYPE 2 DIABETES MELLITUS WITH PERIPHERAL NEUROPATHY (HCC): ICD-10-CM

## 2023-02-17 LAB
ALBUMIN SERPL-MCNC: 3.9 G/DL (ref 3.5–5.2)
ALP BLD-CCNC: 80 U/L (ref 40–130)
ALT SERPL-CCNC: 16 U/L (ref 5–41)
ANION GAP SERPL CALCULATED.3IONS-SCNC: 16 MMOL/L (ref 7–19)
AST SERPL-CCNC: 16 U/L (ref 5–40)
BASOPHILS ABSOLUTE: 0 K/UL (ref 0–0.2)
BASOPHILS RELATIVE PERCENT: 0.7 % (ref 0–1)
BILIRUB SERPL-MCNC: 0.6 MG/DL (ref 0.2–1.2)
BUN BLDV-MCNC: 20 MG/DL (ref 8–23)
CALCIUM SERPL-MCNC: 9.4 MG/DL (ref 8.8–10.2)
CHLORIDE BLD-SCNC: 98 MMOL/L (ref 98–111)
CO2: 26 MMOL/L (ref 22–29)
CREAT SERPL-MCNC: 1.2 MG/DL (ref 0.5–1.2)
EOSINOPHILS ABSOLUTE: 0.1 K/UL (ref 0–0.6)
EOSINOPHILS RELATIVE PERCENT: 3 % (ref 0–5)
GFR SERPL CREATININE-BSD FRML MDRD: >60 ML/MIN/{1.73_M2}
GLUCOSE BLD-MCNC: 299 MG/DL (ref 74–109)
HBA1C MFR BLD: 10.6 % (ref 4–6)
HCT VFR BLD CALC: 38.3 % (ref 42–52)
HEMOGLOBIN: 13.1 G/DL (ref 14–18)
IMMATURE GRANULOCYTES #: 0 K/UL
LYMPHOCYTES ABSOLUTE: 1.4 K/UL (ref 1.1–4.5)
LYMPHOCYTES RELATIVE PERCENT: 32.9 % (ref 20–40)
MCH RBC QN AUTO: 29.2 PG (ref 27–31)
MCHC RBC AUTO-ENTMCNC: 34.2 G/DL (ref 33–37)
MCV RBC AUTO: 85.5 FL (ref 80–94)
MONOCYTES ABSOLUTE: 0.4 K/UL (ref 0–0.9)
MONOCYTES RELATIVE PERCENT: 9.8 % (ref 0–10)
NEUTROPHILS ABSOLUTE: 2.3 K/UL (ref 1.5–7.5)
NEUTROPHILS RELATIVE PERCENT: 52.7 % (ref 50–65)
PDW BLD-RTO: 13.3 % (ref 11.5–14.5)
PLATELET # BLD: 123 K/UL (ref 130–400)
PMV BLD AUTO: 11.3 FL (ref 9.4–12.4)
POTASSIUM SERPL-SCNC: 4.5 MMOL/L (ref 3.5–5)
RBC # BLD: 4.48 M/UL (ref 4.7–6.1)
SODIUM BLD-SCNC: 140 MMOL/L (ref 136–145)
TOTAL PROTEIN: 6.5 G/DL (ref 6.6–8.7)
TSH SERPL DL<=0.05 MIU/L-ACNC: 4.61 UIU/ML (ref 0.27–4.2)
WBC # BLD: 4.4 K/UL (ref 4.8–10.8)

## 2023-02-21 DIAGNOSIS — I48.0 PAF (PAROXYSMAL ATRIAL FIBRILLATION) (HCC): Primary | ICD-10-CM

## 2023-02-22 ENCOUNTER — OFFICE VISIT (OUTPATIENT)
Dept: INTERNAL MEDICINE | Age: 73
End: 2023-02-22
Payer: MEDICARE

## 2023-02-22 VITALS
OXYGEN SATURATION: 97 % | WEIGHT: 172 LBS | BODY MASS INDEX: 25.48 KG/M2 | SYSTOLIC BLOOD PRESSURE: 120 MMHG | HEART RATE: 68 BPM | HEIGHT: 69 IN | DIASTOLIC BLOOD PRESSURE: 60 MMHG

## 2023-02-22 DIAGNOSIS — E55.9 VITAMIN D DEFICIENCY: ICD-10-CM

## 2023-02-22 DIAGNOSIS — G62.9 PERIPHERAL NERVE DISEASE: ICD-10-CM

## 2023-02-22 DIAGNOSIS — J42 CHRONIC BRONCHITIS, UNSPECIFIED CHRONIC BRONCHITIS TYPE (HCC): ICD-10-CM

## 2023-02-22 DIAGNOSIS — E11.42 TYPE 2 DIABETES MELLITUS WITH PERIPHERAL NEUROPATHY (HCC): ICD-10-CM

## 2023-02-22 DIAGNOSIS — R41.3 MEMORY LOSS: ICD-10-CM

## 2023-02-22 DIAGNOSIS — I10 PRIMARY HYPERTENSION: ICD-10-CM

## 2023-02-22 DIAGNOSIS — E78.2 MIXED HYPERLIPIDEMIA: ICD-10-CM

## 2023-02-22 DIAGNOSIS — I48.0 PAF (PAROXYSMAL ATRIAL FIBRILLATION) (HCC): ICD-10-CM

## 2023-02-22 PROBLEM — D68.9 COAGULATION DEFECT (HCC): Status: RESOLVED | Noted: 2021-06-30 | Resolved: 2023-02-22

## 2023-02-22 PROBLEM — N18.31 STAGE 3A CHRONIC KIDNEY DISEASE (HCC): Status: RESOLVED | Noted: 2022-04-18 | Resolved: 2023-02-22

## 2023-02-22 PROBLEM — F33.41 RECURRENT MAJOR DEPRESSIVE DISORDER, IN PARTIAL REMISSION (HCC): Status: RESOLVED | Noted: 2021-06-30 | Resolved: 2023-02-22

## 2023-02-22 PROCEDURE — 2022F DILAT RTA XM EVC RTNOPTHY: CPT | Performed by: NURSE PRACTITIONER

## 2023-02-22 PROCEDURE — 99214 OFFICE O/P EST MOD 30 MIN: CPT | Performed by: NURSE PRACTITIONER

## 2023-02-22 PROCEDURE — 3023F SPIROM DOC REV: CPT | Performed by: NURSE PRACTITIONER

## 2023-02-22 PROCEDURE — G8427 DOCREV CUR MEDS BY ELIG CLIN: HCPCS | Performed by: NURSE PRACTITIONER

## 2023-02-22 PROCEDURE — G8484 FLU IMMUNIZE NO ADMIN: HCPCS | Performed by: NURSE PRACTITIONER

## 2023-02-22 PROCEDURE — 3046F HEMOGLOBIN A1C LEVEL >9.0%: CPT | Performed by: NURSE PRACTITIONER

## 2023-02-22 PROCEDURE — 3074F SYST BP LT 130 MM HG: CPT | Performed by: NURSE PRACTITIONER

## 2023-02-22 PROCEDURE — 3017F COLORECTAL CA SCREEN DOC REV: CPT | Performed by: NURSE PRACTITIONER

## 2023-02-22 PROCEDURE — 1123F ACP DISCUSS/DSCN MKR DOCD: CPT | Performed by: NURSE PRACTITIONER

## 2023-02-22 PROCEDURE — 3078F DIAST BP <80 MM HG: CPT | Performed by: NURSE PRACTITIONER

## 2023-02-22 PROCEDURE — 1036F TOBACCO NON-USER: CPT | Performed by: NURSE PRACTITIONER

## 2023-02-22 PROCEDURE — G8417 CALC BMI ABV UP PARAM F/U: HCPCS | Performed by: NURSE PRACTITIONER

## 2023-02-22 RX ORDER — INSULIN GLARGINE 100 [IU]/ML
30 INJECTION, SOLUTION SUBCUTANEOUS NIGHTLY
Qty: 5 ADJUSTABLE DOSE PRE-FILLED PEN SYRINGE | Refills: 3 | Status: SHIPPED | OUTPATIENT
Start: 2023-02-22

## 2023-02-22 RX ORDER — PREGABALIN 75 MG/1
75 CAPSULE ORAL 3 TIMES DAILY
Qty: 270 CAPSULE | Refills: 1
Start: 2023-02-22 | End: 2023-05-23

## 2023-02-22 ASSESSMENT — PATIENT HEALTH QUESTIONNAIRE - PHQ9
SUM OF ALL RESPONSES TO PHQ9 QUESTIONS 1 & 2: 0
SUM OF ALL RESPONSES TO PHQ QUESTIONS 1-9: 0
10. IF YOU CHECKED OFF ANY PROBLEMS, HOW DIFFICULT HAVE THESE PROBLEMS MADE IT FOR YOU TO DO YOUR WORK, TAKE CARE OF THINGS AT HOME, OR GET ALONG WITH OTHER PEOPLE: 0
8. MOVING OR SPEAKING SO SLOWLY THAT OTHER PEOPLE COULD HAVE NOTICED. OR THE OPPOSITE, BEING SO FIGETY OR RESTLESS THAT YOU HAVE BEEN MOVING AROUND A LOT MORE THAN USUAL: 0
4. FEELING TIRED OR HAVING LITTLE ENERGY: 0
2. FEELING DOWN, DEPRESSED OR HOPELESS: 0
1. LITTLE INTEREST OR PLEASURE IN DOING THINGS: 0
9. THOUGHTS THAT YOU WOULD BE BETTER OFF DEAD, OR OF HURTING YOURSELF: 0
SUM OF ALL RESPONSES TO PHQ QUESTIONS 1-9: 0
7. TROUBLE CONCENTRATING ON THINGS, SUCH AS READING THE NEWSPAPER OR WATCHING TELEVISION: 0
SUM OF ALL RESPONSES TO PHQ QUESTIONS 1-9: 0
6. FEELING BAD ABOUT YOURSELF - OR THAT YOU ARE A FAILURE OR HAVE LET YOURSELF OR YOUR FAMILY DOWN: 0
SUM OF ALL RESPONSES TO PHQ QUESTIONS 1-9: 0
3. TROUBLE FALLING OR STAYING ASLEEP: 0
5. POOR APPETITE OR OVEREATING: 0

## 2023-02-22 ASSESSMENT — ENCOUNTER SYMPTOMS
COUGH: 0
ABDOMINAL DISTENTION: 0
COLOR CHANGE: 0
TROUBLE SWALLOWING: 0
EYE DISCHARGE: 0
SORE THROAT: 0
STRIDOR: 0
CONSTIPATION: 0
WHEEZING: 0
SHORTNESS OF BREATH: 0
VOMITING: 0
CHOKING: 0
NAUSEA: 0
DIARRHEA: 0
BLOOD IN STOOL: 0
EYE ITCHING: 0
ABDOMINAL PAIN: 0

## 2023-02-22 NOTE — ASSESSMENT & PLAN NOTE
Is fairly stable with prednisone 5 and suppressive therapy of Bactrim he has sarcoid involvement Dr. Filomena Kowalski

## 2023-02-22 NOTE — PATIENT INSTRUCTIONS
PATIENT INSTRUCTIONS:  Hypertension stable on current meds no changes  2 chronic bronchitis stable on suppressive therapy  3. Type 2 diabetes mellitus  add Lantus 14 units at bedtime  4. Paroxysmal atrial fibs stable with Xarelto  5. Memory loss stable with Aricept  6.   Hyperlipidemia stable on current meds no changes   #7 peripheral neuropathy increase to 3 times a day

## 2023-02-22 NOTE — ASSESSMENT & PLAN NOTE
Digital Medicine: Health  Introduction    Introduced Chad Marshallms to Digital Medicine. Discussed health  role and recommended lifestyle modifications.    The history is provided by the patient. No  was used.     HYPERTENSION  Our goal is to get BP to consistently below 130/80mmHg and make the process convenient so patient can avoid extra trips to the office. Getting your blood pressure below 130/80mmHg (definition of control) will reduce your risk for heart attack, kidney failure, stroke and death (as well as kidney failure, eye disease, & dementia)      Reviewed that the Digital Medicine care team - consisting of a clinician and a health  - will follow the most current evidence-based national guidelines for treating your condition.  The health  will focus on lifestyle modifications and motivation while the clinician will focus on medication therapy.  The care team will review all data on a regular basis and reach out as needed.      Explained that one of the key parts of the program is communication with the care team.  Asked patient to respond to outreach attempts and complete questionnaires.  Stressed importance of medication adherence.    Explained that we expect patient to obtain several blood pressures per week at random times of day.  Instructed patient not to allow anyone else to use phone and monitoring device.  Confirmed appropriate BP monitoring technique.      Explained to patient that the digital medicine team is not available for emergencies.  Patient will call [facility] on-call ([phone] or [phone]) or [phone] if needed.      Patient's BP goal is 130/80.Patient's BP average is 143/91 mmHg, which is above goal, per 2017 ACC/AHA Hypertension Guidelines.          Last 5 Patient Entered Readings                                      Current 30 Day Average: 143/91     Recent Readings 10/30/2019 10/30/2019 10/30/2019 10/30/2019 10/29/2019    SBP (mmHg) 135 137 141  History of ablation he is on Xarelto 20 daily 144 135    DBP (mmHg) 87 82 91 85 95    Pulse 57 57 65 57 58            Intervention/Plan    There are no preventive care reminders to display for this patient.    Reviewed the importance of self-monitoring, medication adherence, and that the health  can be used as a resource for lifestyle modifications to help reduce or maintain a healthy lifestyle.    Sent link to [facility]'s [facility] webpages and my contact information via [place] for future questions. Follow up scheduled.         Screenings    SDOH

## 2023-02-22 NOTE — PROGRESS NOTES
[facility] PHYSICIAN SERVICES  [facility] INTERNAL MEDICINE  [address] [facility] [address]  [address] [address]  Dept: [phone]  Dept Fax: [phone]: [phone]    Tricia Ritchie (:  1950) is a 67 y.o. male,Established patient  with green, here for evaluation of the following chief complaint(s): 3 Month 59 Ney Blake is a 67 y.o. male who presents today for his medical conditions/complaints as noted below. Tricia Ritchie is c/alexys 3 Month Follow-Up        HPI:     Chief Complaint   Patient presents with    3 Month Follow-Up       HPI    Hypertension stable with atenolol 25 daily and losartan 100  2. PAF  with history of ablation and he is on Xarelto 20 mg daily  3. COPD with sarcoidosis he is on prednisone 5 mg daily oppressive therapy of Bactrim 1 tablet 3 times a week  4. T2DM fasting sugars 299 A1c has gone up to 10.6 he is on Ozempic 2 mg weekly  5. CKD this has resolved  6. Memory loss; stable with Aricept 10 mg at bedtime  7. Hyperlipidemia stable Lipitor 10 at bedtime  8. Peripheral neuropathy he is stable with Lyrica 75 mg twice daily  9.   TSH is slightly elevated at 4.6  Continue to monitor this for now    Past Medical History:   Diagnosis Date    Achalasia     going to [facility] for surgery in March    Acute pancreatitis     Anemia     Asthma     Atrial fibrillation ([facility] [address]     h/o paroxysmal a-fib ? anesthsia induced    Benign colonic polyp     cscope  Dr Elliott Class adenomatous:  adenoma    Chest pain     Chronic kidney disease     Chronic pain syndrome     Depression     Diabetic peripheral neuropathy (HCC)     Extrinsic asthma     Hyperlipidemia     Hypertension     Idiopathic peripheral neuropathy     Lyme disease     Mediastinal lymphadenopathy     Microalbuminuria     Mixed hyperlipidemia     Paroxysmal A-fib (HCC)     S/P ablation of atrial fibrillation      A fib ablation , Dr Thuan Machuca, [place]     Sarcoidosis, lung ([facility] [address]     restrictive lung impairment    Tick bite     Type 2 diabetes, controlled, with neuropathy [facility])       Past Surgical History:   Procedure Laterality Date    ANKLE FRACTURE SURGERY  april 14, 2015    ATRIAL ABLATION SURGERY  2015    Dr. Anastasiya Dailey  10/06/2020    CARDIAC SURGERY      Catheter Ablation A-fib    CHOLECYSTECTOMY      COLONOSCOPY  12/02/2014    Melecio    CYSTOSCOPY Left 8/16/2019    CYSTOSCOPY; LEFT RETROGRADE PYELOGRAM; INSERTION LEFT URETERAL STENT performed by Arjun Shaw MD at [address] [facility] [address] [id number] W/URETERAL CATHETERIZATION Left 6/27/2018    CYSTOSCOPY URETEROSCOPY RETROGRADE PYELOGRAMS performed by Arjun Shaw MD at [address] [facility] W/O RMVL URETERAL STONE Left 6/27/2018    LASER LITHOTRIPSY STONE MANIPULATION WITH DOUBLE J STENT PLACEMENT performed by Arjun Shaw MD at [address] [place]      ear       Vitals 2/22/2023 2/9/2023 11/22/2022 10/11/2022 9/15/2022 8/24/3844   SYSTOLIC 384 611 114 301 294 598   DIASTOLIC 60 60 62 54 70 62   Pulse 68 83 74 84 72 71   Temp - - - - - -   Resp - - - - - -   SpO2 97 100 99 97 98 100   Weight 172 lb 170 lb 173 lb 1.6 oz 171 lb - 175 lb   Height 5' 9\" 5' 9\" 5' 9\" 5' 9\" - 5' 9\"   Body mass index 25.4 kg/m2 25.1 kg/m2 25.56 kg/m2 25.25 kg/m2 - 25.84 kg/m2   Pain Level - - - - - -   Some recent data might be hidden       Family History   Problem Relation Age of Onset    Coronary Art Dis Father         AICD pacer age 68    Heart Attack Father     Cancer Sister         childhood chest tumor    Heart Failure Mother        Social History     Tobacco Use    Smoking status: Never    Smokeless tobacco: Never   Substance Use Topics    Alcohol use: No      Current Outpatient Medications   Medication Sig Dispense Refill    atenolol (TENORMIN) 25 MG tablet Take 1 tablet by mouth daily 30 tablet 3    losartan (COZAAR) 100 MG tablet Take 1 tablet by mouth once daily 60 tablet 0    Semaglutide, 2 MG/DOSE, 8 MG/3ML SOPN Inject 2 mg into the skin once a week 5 mL 1    pregabalin (LYRICA) 75 MG capsule Take 1 capsule by mouth twice daily 180 capsule 0    XARELTO 20 MG TABS tablet TAKE 1 TABLET BY MOUTH ONCE DAILY WITH EVENING MEAL 90 tablet 0    donepezil (ARICEPT) 5 MG tablet TAKE 2 TABLETS BY MOUTH NIGHTLY 90 tablet 0    sulfamethoxazole-trimethoprim (BACTRIM;SEPTRA) 400-80 MG per tablet Take 1 tablet by mouth three times a week Mon, Wens, Fri      atorvastatin (LIPITOR) 20 MG tablet Take 0.5 tablets by mouth nightly 90 tablet 3    nitroGLYCERIN (NITROSTAT) 0.4 MG SL tablet Place 1 tablet under the tongue every 5 minutes as needed for Chest pain 25 tablet 3    fluticasone-salmeterol (ADVAIR) 500-50 MCG/DOSE diskus inhaler Inhale 1 puff into the lungs every 12 hours 1 puff in the am, prn in afternoon      albuterol sulfate  (90 Base) MCG/ACT inhaler INHALE 2 PUFFS BY MOUTH EVERY 6 HOURS AS NEEDED FOR WHEEZING 1 Inhaler 5    predniSONE (DELTASONE) 5 MG tablet Take 5 mg by mouth daily  6    levalbuterol (XOPENEX) 1.25 MG/3ML nebulizer solution Take 1 ampule by nebulization every 6 hours as needed for Wheezing      EPINEPHrine (EPIPEN) 0.3 MG/0.3ML SOAJ injection Inject 0.3 mLs into the muscle as needed (Allergic Reaction) 2 each 1    azaTHIOprine (IMURAN) 50 MG tablet Take 50 mg by mouth daily       aspirin 81 MG tablet Take 81 mg by mouth daily. No current facility-administered medications for this visit. Allergies   Allergen Reactions    Bee Venom Anaphylaxis    Gabapentin Other (See Comments)     Causes blisters to head.      Penicillins Hives     \"Causes blisters to break out everywhere\"    Toprol Xl [Metoprolol]      Leg pain        Health Maintenance   Topic Date Due    Diabetic Alb to Cr ratio (uACR) test  08/28/2020    Diabetic retinal exam  01/14/2023    Shingles vaccine (1 of 2) 05/10/2023 (Originally 9/15/1969)    Flu vaccine (1) 11/22/2023 (Originally 8/1/2022)    COVID-19 Vaccine (1) 03/29/2025 (Originally 3/15/1951)    Diabetic foot exam  05/10/2023    A1C test (Diabetic or Prediabetic)  05/17/2023    Lipids  11/18/2023    Annual Wellness Visit (AWV)  11/23/2023    GFR test (Diabetes, CKD 3-4, OR last GFR 15-59)  02/17/2024    Depression Monitoring  02/22/2024    Colorectal Cancer Screen  08/01/2024    DTaP/Tdap/Td vaccine (2 - Td or Tdap) 05/22/2030    Pneumococcal 65+ years Vaccine  Completed    Hepatitis A vaccine  Aged Out    Hib vaccine  Aged Out    Meningococcal (ACWY) vaccine  Aged Out    Hepatitis C screen  Discontinued       Lab Results   Component Value Date    LABA1C 10.6 (H) 02/17/2023     Lab Results   Component Value Date    PSA 2.5 08/10/2022    PSA 2.9 06/21/2021    PSA 2.5 11/09/2020     TSH   Date Value Ref Range Status   02/17/2023 4.610 (H) 0.270 - 4.200 uIU/mL Final   ]  Lab Results   Component Value Date     02/17/2023    K 4.5 02/17/2023    CL 98 02/17/2023    CO2 26 02/17/2023    BUN 20 02/17/2023    CREATININE 1.2 02/17/2023    GLUCOSE 299 (H) 02/17/2023    CALCIUM 9.4 02/17/2023    PROT 6.5 (L) 02/17/2023    LABALBU 3.9 02/17/2023    BILITOT 0.6 02/17/2023    ALKPHOS 80 02/17/2023    AST 16 02/17/2023    ALT 16 02/17/2023    LABGLOM >60 02/17/2023    GFRAA >59 08/10/2022     Lab Results   Component Value Date    CHOL 120 (L) 11/18/2022    CHOL 161 05/05/2022    CHOL 136 (L) 09/27/2021     Lab Results   Component Value Date    TRIG 113 11/18/2022    TRIG 130 05/05/2022    TRIG 90 09/27/2021     Lab Results   Component Value Date    HDL 43 (L) 11/18/2022    HDL 48 (L) 05/05/2022    HDL 41 (L) 09/27/2021     Lab Results   Component Value Date    LDLCALC 54 11/18/2022    LDLCALC 87 05/05/2022    LDLCALC 77 09/27/2021     Lab Results   Component Value Date/Time     02/17/2023 09:42 AM    K 4.5 02/17/2023 09:42 AM    K 4.6 09/23/2020 04:08 PM    CL 98 02/17/2023 09:42 AM    CO2 26 02/17/2023 09:42 AM    BUN 20 02/17/2023 09:42 AM CREATININE 1.2 02/17/2023 09:42 AM    GLUCOSE 299 02/17/2023 09:42 AM    CALCIUM 9.4 02/17/2023 09:42 AM      Lab Results   Component Value Date    WBC 4.4 (L) 02/17/2023    HGB 13.1 (L) 02/17/2023    HCT 38.3 (L) 02/17/2023    MCV 85.5 02/17/2023     (L) 02/17/2023    LABLYMP 1.58 09/20/2015    LYMPHOPCT 32.9 02/17/2023    RBC 4.48 (L) 02/17/2023    MCH 29.2 02/17/2023    MCHC 34.2 02/17/2023    RDW 13.3 02/17/2023     Lab Results   Component Value Date    VITD25 21.3 (L) 09/27/2021     Labs reviewed from 2/17/2023  Subjective:      Review of Systems   Constitutional:  Negative for fatigue, fever and unexpected weight change. HENT:  Negative for ear discharge, ear pain, mouth sores, sore throat and trouble swallowing. Eyes:  Negative for discharge, itching and visual disturbance. Respiratory:  Negative for cough, choking, shortness of breath, wheezing and stridor. Cardiovascular:  Negative for chest pain, palpitations and leg swelling. Gastrointestinal:  Negative for abdominal distention, abdominal pain, blood in stool, constipation, diarrhea, nausea and vomiting. Endocrine: Negative for cold intolerance, polydipsia and polyuria. Genitourinary:  Negative for difficulty urinating, dysuria, frequency and urgency. Musculoskeletal:  Negative for arthralgias and gait problem. Skin:  Negative for color change and rash. Allergic/Immunologic: Negative for food allergies and immunocompromised state. Neurological:  Negative for dizziness, tremors, syncope, speech difficulty, weakness and headaches. Memory loss    neuropathy   Hematological:  Negative for adenopathy. Does not bruise/bleed easily. Psychiatric/Behavioral:  Negative for confusion and hallucinations. Objective:     Physical Exam  Constitutional:       General: He is not in acute distress. Appearance: He is well-developed. HENT:      Head: Normocephalic and atraumatic. Eyes:      General: No scleral icterus. Right eye: No discharge. Left eye: No discharge. Pupils: Pupils are equal, round, and reactive to light. Neck:      Thyroid: No thyromegaly. Vascular: No JVD. Cardiovascular:      Rate and Rhythm: Normal rate and regular rhythm. Heart sounds: Normal heart sounds. No murmur heard. Pulmonary:      Effort: Pulmonary effort is normal. No respiratory distress. Breath sounds: Normal breath sounds. No wheezing or rales. Abdominal:      General: Bowel sounds are normal. There is no distension. Palpations: Abdomen is soft. There is no mass. Tenderness: There is no abdominal tenderness. There is no guarding or rebound. Musculoskeletal:         General: No tenderness. Normal range of motion. Cervical back: Normal range of motion and neck supple. Skin:     General: Skin is warm and dry. Findings: No erythema or rash. Neurological:      Mental Status: He is alert and oriented to person, place, and time. Cranial Nerves: No cranial nerve deficit. Coordination: Coordination normal.      Deep Tendon Reflexes: Reflexes are normal and symmetric. Reflexes normal.   Psychiatric:         Mood and Affect: Mood is not depressed. Behavior: Behavior normal.         Thought Content:  Thought content normal.         Judgment: Judgment normal.     /60   Pulse 68   Ht 5' 9\" (1.753 m)   Wt 172 lb (78 kg)   SpO2 97%   BMI 25.40 kg/m²           Assessment:      Problem List       Chronic obstructive lung disease (HCC)     Is fairly stable with prednisone 5 and suppressive therapy of Bactrim he has sarcoid involvement Dr. Naga Burciaga          Relevant Medications    levalbuterol (XOPENEX) 1.25 MG/3ML nebulizer solution    predniSONE (DELTASONE) 5 MG tablet    albuterol sulfate  (90 Base) MCG/ACT inhaler    fluticasone-salmeterol (ADVAIR) 500-50 MCG/DOSE diskus inhaler    Hyperlipidemia     Stable with Lipitor at bedtime 2 mg          Relevant Medications    aspirin 81 MG tablet    EPINEPHrine (EPIPEN) 0.3 MG/0.3ML SOAJ injection    nitroGLYCERIN (NITROSTAT) 0.4 MG SL tablet    XARELTO 20 MG TABS tablet    losartan (COZAAR) 100 MG tablet    atenolol (TENORMIN) 25 MG tablet    Hypertension     Stable with atenolol 25 losartan 100          Memory loss     He is stable with Aricept at bedtime          PAF (paroxysmal atrial fibrillation) ([facility])     History of ablation he is on Xarelto 20 daily          Relevant Medications    aspirin 81 MG tablet    nitroGLYCERIN (NITROSTAT) 0.4 MG SL tablet    XARELTO 20 MG TABS tablet    losartan (COZAAR) 100 MG tablet    atenolol (TENORMIN) 25 MG tablet    Peripheral nerve disease     He is taking 75 of Lyrica twice a day and has fairly good control          Relevant Medications    donepezil (ARICEPT) 5 MG tablet    Type 2 diabetes mellitus with peripheral neuropathy (HCC)     His blood sugar is 299 his A1c has gone up to 10.6 he has been taking Ozempic 2 mg weekly we can have to add some insulin and maybe mealtime even back          Relevant Medications    donepezil (ARICEPT) 5 MG tablet    Semaglutide, 2 MG/DOSE, 8 MG/3ML SOPN    Vitamin D deficiency     We wont supploement as It makes his calcium too high              Plan:        Patient given educational materials - see patient instructions. Discussed use, benefit, and side effects of prescribed medications. Allpatient questions answered. Pt voiced understanding. Reviewed health maintenance. Instructed to continue current medications, diet and exercise. Patient agreed with treatment plan. Follow up as directed. MEDICATIONS:  No orders of the defined types were placed in this encounter. ORDERS:  No orders of the defined types were placed in this encounter. Follow-up:  Return in about 3 months (around 5/22/2023) for have labs done prior to appt.     PATIENT INSTRUCTIONS:  Hypertension stable on current meds no changes  2 chronic bronchitis stable on suppressive therapy  3. Type 2 diabetes mellitus  4. Paroxysmal atrial fibs stable with Xarelto  5. Memory loss stable with Aricept  6. Hyperlipidemia stable on current meds no changes   #7 peripheral neuropathy stable with Lyrica  Patient Instructions   PATIENT INSTRUCTIONS:  Hypertension stable on current meds no changes  2 chronic bronchitis stable on suppressive therapy  3. Type 2 diabetes mellitus  add Lantus 14 units at bedtime  4. Paroxysmal atrial fibs stable with Xarelto  5. Memory loss stable with Aricept  6. Hyperlipidemia stable on current meds no changes   #7 peripheral neuropathy increase to 3 times a day   Electronically signed by NUNO Marquez on 2/22/2023 at 10:40 AM    @    Rachele/transcription disclaimer:  Much of this encounter note is electronic transcription/translation of spoken language to printed texts. The electronic translation of spoken language may be erroneous, or at times,nonsensical words or phrases may be inadvertently transcribed.   Although I have reviewed the note for such errors, some may still exist.

## 2023-02-22 NOTE — ASSESSMENT & PLAN NOTE
His blood sugar is 299 his A1c has gone up to 10.6 he has been taking Ozempic 2 mg weekly we can have to add some insulin and maybe mealtime even back

## 2023-02-28 ENCOUNTER — HOSPITAL ENCOUNTER (OUTPATIENT)
Dept: NON INVASIVE DIAGNOSTICS | Age: 73
Discharge: HOME | End: 2023-02-28
Payer: MEDICARE

## 2023-02-28 ENCOUNTER — OFFICE VISIT (OUTPATIENT)
Dept: CARDIOLOGY CLINIC | Age: 73
End: 2023-02-28
Payer: MEDICARE

## 2023-02-28 VITALS
BODY MASS INDEX: 26.07 KG/M2 | DIASTOLIC BLOOD PRESSURE: 64 MMHG | WEIGHT: 176 LBS | HEIGHT: 69 IN | HEART RATE: 65 BPM | SYSTOLIC BLOOD PRESSURE: 132 MMHG

## 2023-02-28 DIAGNOSIS — I48.0 PAF (PAROXYSMAL ATRIAL FIBRILLATION) (HCC): Primary | ICD-10-CM

## 2023-02-28 DIAGNOSIS — R09.89 BRUIT (ARTERIAL): ICD-10-CM

## 2023-02-28 PROCEDURE — 99205 OFFICE O/P NEW HI 60 MIN: CPT | Performed by: INTERNAL MEDICINE

## 2023-02-28 PROCEDURE — 3078F DIAST BP <80 MM HG: CPT | Performed by: INTERNAL MEDICINE

## 2023-02-28 PROCEDURE — 93880 EXTRACRANIAL BILAT STUDY: CPT

## 2023-02-28 PROCEDURE — G8484 FLU IMMUNIZE NO ADMIN: HCPCS | Performed by: INTERNAL MEDICINE

## 2023-02-28 PROCEDURE — 93000 ELECTROCARDIOGRAM COMPLETE: CPT | Performed by: INTERNAL MEDICINE

## 2023-02-28 PROCEDURE — G8428 CUR MEDS NOT DOCUMENT: HCPCS | Performed by: INTERNAL MEDICINE

## 2023-02-28 PROCEDURE — 3017F COLORECTAL CA SCREEN DOC REV: CPT | Performed by: INTERNAL MEDICINE

## 2023-02-28 PROCEDURE — 1123F ACP DISCUSS/DSCN MKR DOCD: CPT | Performed by: INTERNAL MEDICINE

## 2023-02-28 PROCEDURE — G8417 CALC BMI ABV UP PARAM F/U: HCPCS | Performed by: INTERNAL MEDICINE

## 2023-02-28 PROCEDURE — 3075F SYST BP GE 130 - 139MM HG: CPT | Performed by: INTERNAL MEDICINE

## 2023-02-28 PROCEDURE — 1036F TOBACCO NON-USER: CPT | Performed by: INTERNAL MEDICINE

## 2023-02-28 NOTE — PROGRESS NOTES
Freda Rockwell is a 67 y.o. male presents with history of atrial fibrillation. He is a new referral from the nurse practitioner clinic. He underwent pulmonary vein isolation a number of years ago. He has remained well controlled on Xarelto. He complained of palpitations recently and NP started atenolol. This has resolved his palpitations. He also wore a ZIO monitor for a week. Review of Systems   Constitutional: Negative for fever, chills, diaphoresis, activity change, appetite change, fatigue and unexpected weight change. Eyes: Negative for photophobia, pain, redness and visual disturbance. Respiratory: Negative for apnea, cough, chest tightness, shortness of breath, wheezing and stridor. Cardiovascular: Negative for chest pain, palpitations and leg swelling. Gastrointestinal: Negative for abdominal distention. Genitourinary: Negative for dysuria, urgency and frequency. Musculoskeletal: Negative for myalgias, arthralgias and gait problem. Skin: Negative for color change, pallor, rash and wound. Neurological: Negative for dizziness, tremors, speech difficulty, weakness and numbness. Hematological: Does not bruise/bleed easily. Psychiatric/Behavioral: Negative.         Past Medical History:   Diagnosis Date    Achalasia     going to [facility] for surgery in March    Acute pancreatitis     Anemia     Asthma     Atrial fibrillation ([facility] [address]     h/o paroxysmal a-fib ? anesthsia induced    Benign colonic polyp     cscope 12/07 Dr Leena Montelongo adenomatous: 12/14 adenoma    Chest pain     Chronic kidney disease     Chronic pain syndrome     Depression     Diabetic peripheral neuropathy (HCC)     Extrinsic asthma     Hyperlipidemia     Hypertension     Idiopathic peripheral neuropathy     Lyme disease     Mediastinal lymphadenopathy     Microalbuminuria     Mixed hyperlipidemia     Paroxysmal A-fib ([facility] [address]     S/P ablation of atrial fibrillation     4/16 A fib ablation , Dr Percy Ackerman Sarcoidosis, lung ([address]     restrictive lung impairment    Tick bite     Type 2 diabetes, controlled, with neuropathy [facility])        Past Surgical History:   Procedure Laterality Date    ANKLE FRACTURE SURGERY  april 14, 2015    Letitia Forde SURGERY  2015    Dr. Deo Rausch 77  10/06/2020    CARDIAC SURGERY      Catheter Ablation A-fib    CHOLECYSTECTOMY      COLONOSCOPY  12/02/2014    Melecio    CYSTOSCOPY Left 8/16/2019    CYSTOSCOPY; LEFT RETROGRADE PYELOGRAM; INSERTION LEFT URETERAL STENT performed by Angelo Otoole MD at [address] [facility] [address] [address] W/URETERAL CATHETERIZATION Left 6/27/2018    CYSTOSCOPY URETEROSCOPY RETROGRADE PYELOGRAMS performed by Angelo Otoole MD at [address] [facility] W/O RMVL URETERAL STONE Left 6/27/2018    LASER LITHOTRIPSY STONE MANIPULATION WITH DOUBLE J STENT PLACEMENT performed by Angelo Otoole MD at [address] [place] [address]       Family History   Problem Relation Age of Onset    Coronary Art Dis Father         AICD pacer age 68    Heart Attack Father     Cancer Sister         childhood chest tumor    Heart Failure Mother        Social History     Socioeconomic History    Marital status:      Spouse name: Tom Sharma    Number of children: Not on file    Years of education: Not on file    Highest education level: Not on file   Occupational History    Not on file   Tobacco Use    Smoking status: Never    Smokeless tobacco: Never   Vaping Use    Vaping Use: Never used   Substance and Sexual Activity    Alcohol use: No    Drug use: No    Sexual activity: Not on file   Other Topics Concern    Not on file   Social History Narrative    Not on file     Social Determinants of Health     Financial Resource Strain: Low Risk     Difficulty of Paying Living Expenses: Not hard at all   Food Insecurity: No Food Insecurity    Worried About [address] [facility] in the Last Year: Never true    Ran Out of Food in the Last Year: Never true   Transportation Needs: Not on file   Physical Activity: Insufficiently Active    Days of Exercise per Week: 1 day    Minutes of Exercise per Session: 30 min   Stress: Not on file   Social Connections: Not on file   Intimate Partner Violence: Not on file   Housing Stability: Not on file       Allergies   Allergen Reactions    Bee Venom Anaphylaxis    Gabapentin Other (See Comments)     Causes blisters to head. Penicillins Hives     \"Causes blisters to break out everywhere\"    Toprol Xl [Metoprolol]      Leg pain          Current Outpatient Medications:     pregabalin (LYRICA) 75 MG capsule, Take 1 capsule by mouth 3 times daily for 90 days.  Max Daily Amount: 225 mg, Disp: 270 capsule, Rfl: 1    insulin glargine (LANTUS SOLOSTAR) 100 UNIT/ML injection pen, Inject 30 Units into the skin nightly (Patient taking differently: Inject 14 Units into the skin nightly), Disp: 5 Adjustable Dose Pre-filled Pen Syringe, Rfl: 3    atenolol (TENORMIN) 25 MG tablet, Take 1 tablet by mouth daily, Disp: 30 tablet, Rfl: 3    losartan (COZAAR) 100 MG tablet, Take 1 tablet by mouth once daily, Disp: 60 tablet, Rfl: 0    Semaglutide, 2 MG/DOSE, 8 MG/3ML SOPN, Inject 2 mg into the skin once a week, Disp: 5 mL, Rfl: 1    XARELTO 20 MG TABS tablet, TAKE 1 TABLET BY MOUTH ONCE DAILY WITH EVENING MEAL, Disp: 90 tablet, Rfl: 0    donepezil (ARICEPT) 5 MG tablet, TAKE 2 TABLETS BY MOUTH NIGHTLY, Disp: 90 tablet, Rfl: 0    sulfamethoxazole-trimethoprim (BACTRIM;SEPTRA) 400-80 MG per tablet, Take 1 tablet by mouth three times a week Mon, Wens, Fri, Disp: , Rfl:     atorvastatin (LIPITOR) 20 MG tablet, Take 0.5 tablets by mouth nightly, Disp: 90 tablet, Rfl: 3    nitroGLYCERIN (NITROSTAT) 0.4 MG SL tablet, Place 1 tablet under the tongue every 5 minutes as needed for Chest pain, Disp: 25 tablet, Rfl: 3    fluticasone-salmeterol (ADVAIR) 500-50 MCG/DOSE diskus inhaler, Inhale 1 puff into the lungs every 12 hours 1 puff in the am, prn in afternoon, Disp: , Rfl:     albuterol sulfate  (90 Base) MCG/ACT inhaler, INHALE 2 PUFFS BY MOUTH EVERY 6 HOURS AS NEEDED FOR WHEEZING, Disp: 1 Inhaler, Rfl: 5    predniSONE (DELTASONE) 5 MG tablet, Take 5 mg by mouth daily, Disp: , Rfl: 6    levalbuterol (XOPENEX) 1.25 MG/3ML nebulizer solution, Take 1 ampule by nebulization every 6 hours as needed for Wheezing, Disp: , Rfl:     EPINEPHrine (EPIPEN) 0.3 MG/0.3ML SOAJ injection, Inject 0.3 mLs into the muscle as needed (Allergic Reaction), Disp: 2 each, Rfl: 1    azaTHIOprine (IMURAN) 50 MG tablet, Take 50 mg by mouth daily , Disp: , Rfl:     aspirin 81 MG tablet, Take 81 mg by mouth daily. , Disp: , Rfl:     PE:  Vitals:    02/28/23 1134   BP: 132/64   Pulse: 65   Weight: 176 lb (79.8 kg)   Height: 5' 9\" (1.753 m)       Estimated body mass index is 25.99 kg/m² as calculated from the following:    Height as of this encounter: 5' 9\" (1.753 m). Weight as of this encounter: 176 lb (79.8 kg). Constitutional: He is oriented to person, place, and time. He appears well-developed and well-nourished in no acute distress. Neck:  Neck supple without JVD present. No trachea deviation present. No thyromegaly present. Eyes:Conjunctivae and EOM are normal. Pupils equal and reactive to light. ENT:Hearing appears normal.conjunctiva and lids are normal, ears and nose appear normal.  Cardiovascular: Normal rate, S1-S2 regular rhythm, normal heart sounds. No murmur ascultated. No gallop and no friction rub. Left carotid bruits. No peripheral edema. Pulmonary/Chest:  Lungs clear to auscultation bilaterally without evidence of respiratory distress. He without wheezes. He without rales or ronchi. Musculoskeletal: Normal range of motion. Gait is normal no assitive device. Head is normocephalic and atraumatic. Skin: Skin is warm and dry without rash or pallor.    Psychiatric:He is alert and oriented to person, place, and time. He has a normal mood and affect. His behavior is normal. Thought content normal.       Lab Results   Component Value Date/Time    CREATININE 1.2 02/17/2023 09:42 AM    CREATININE 1.1 11/18/2022 09:28 AM    CREATININE 1.3 08/10/2022 07:29 AM    HGB 13.1 02/17/2023 09:42 AM    HGB 13.6 11/18/2022 09:28 AM    HGB 13.4 08/10/2022 07:29 AM    PROBNP 32 02/07/2020 09:38 AM           ECG 02/28/23    Sinus rhythm normal ECG    TTE EF 60 (2021)         Assessment, Recommendations, & Plan:  67 y.o. male with history of atrial fibrillation status post pulmonary vein isolation. He did not have any atrial fibrillation on his monitor but did have an irregular pulse in sinus rhythm. The atenolol seems to have done the trick. I would recommend Xarelto for life. His blood pressure and other risk factors are well controlled. Left carotid bruit. -Check bilateral ultrasound he is already on a statin. Hypertension-well-controlled      Disposition - RTC in 12 months or sooner if needed      Please do not hesitate to contact me for any questions or concerns. Dr. Mayford Seip.  [place]  Electrophysiology and Cardiology  [facility] [place], Cardiology  [phone]

## 2023-03-06 RX ORDER — DONEPEZIL HYDROCHLORIDE 5 MG/1
10 TABLET, FILM COATED ORAL NIGHTLY
Qty: 90 TABLET | Refills: 1 | Status: SHIPPED | OUTPATIENT
Start: 2023-03-06

## 2023-03-29 RX ORDER — LOSARTAN POTASSIUM 100 MG/1
TABLET ORAL
Qty: 90 TABLET | Refills: 1 | Status: SHIPPED | OUTPATIENT
Start: 2023-03-29

## 2023-03-29 NOTE — TELEPHONE ENCOUNTER
Bobo Perez called requesting a refill of the below medication which has been pended for you:     Requested Prescriptions     Pending Prescriptions Disp Refills    losartan (COZAAR) 100 MG tablet [Pharmacy Med Name: Losartan Potassium 100 MG Oral Tablet] 90 tablet 1     Sig: Take 1 tablet by mouth once daily       Last Appointment Date: 2/22/2023  Next Appointment Date: 4/18/2023    Allergies   Allergen Reactions    Bee Venom Anaphylaxis    Gabapentin Other (See Comments)     Causes blisters to head.      Penicillins Hives     \"Causes blisters to break out everywhere\"    Toprol Xl [Metoprolol]      Leg pain

## 2023-04-26 ENCOUNTER — TELEPHONE (OUTPATIENT)
Dept: PULMONOLOGY | Facility: CLINIC | Age: 73
End: 2023-04-26
Payer: OTHER MISCELLANEOUS

## 2023-04-26 NOTE — TELEPHONE ENCOUNTER
Wife requested refills on Advair. Informed wife that refills were sent last August with 11 refills and she should contact the pharmacy.

## 2023-04-27 ENCOUNTER — TELEPHONE (OUTPATIENT)
Dept: CARDIOLOGY CLINIC | Age: 73
End: 2023-04-27

## 2023-04-28 DIAGNOSIS — G62.9 PERIPHERAL NERVE DISEASE: ICD-10-CM

## 2023-05-01 RX ORDER — PREGABALIN 75 MG/1
CAPSULE ORAL
Qty: 180 CAPSULE | Refills: 0 | Status: SHIPPED | OUTPATIENT
Start: 2023-05-01 | End: 2023-07-30

## 2023-05-02 ENCOUNTER — OFFICE VISIT (OUTPATIENT)
Dept: CARDIOLOGY CLINIC | Age: 73
End: 2023-05-02
Payer: MEDICARE

## 2023-05-02 VITALS
SYSTOLIC BLOOD PRESSURE: 112 MMHG | DIASTOLIC BLOOD PRESSURE: 60 MMHG | HEIGHT: 69 IN | WEIGHT: 173 LBS | BODY MASS INDEX: 25.62 KG/M2 | HEART RATE: 61 BPM

## 2023-05-02 DIAGNOSIS — Z98.890 S/P ABLATION OF ATRIAL FIBRILLATION: ICD-10-CM

## 2023-05-02 DIAGNOSIS — Z86.79 S/P ABLATION OF ATRIAL FIBRILLATION: ICD-10-CM

## 2023-05-02 DIAGNOSIS — D86.0 SARCOIDOSIS, LUNG (HCC): ICD-10-CM

## 2023-05-02 DIAGNOSIS — I48.0 PAF (PAROXYSMAL ATRIAL FIBRILLATION) (HCC): Primary | ICD-10-CM

## 2023-05-02 PROCEDURE — 1036F TOBACCO NON-USER: CPT | Performed by: INTERNAL MEDICINE

## 2023-05-02 PROCEDURE — 3078F DIAST BP <80 MM HG: CPT | Performed by: INTERNAL MEDICINE

## 2023-05-02 PROCEDURE — 93000 ELECTROCARDIOGRAM COMPLETE: CPT | Performed by: INTERNAL MEDICINE

## 2023-05-02 PROCEDURE — 99214 OFFICE O/P EST MOD 30 MIN: CPT | Performed by: INTERNAL MEDICINE

## 2023-05-02 PROCEDURE — G8417 CALC BMI ABV UP PARAM F/U: HCPCS | Performed by: INTERNAL MEDICINE

## 2023-05-02 PROCEDURE — 1123F ACP DISCUSS/DSCN MKR DOCD: CPT | Performed by: INTERNAL MEDICINE

## 2023-05-02 PROCEDURE — G8427 DOCREV CUR MEDS BY ELIG CLIN: HCPCS | Performed by: INTERNAL MEDICINE

## 2023-05-02 PROCEDURE — 3074F SYST BP LT 130 MM HG: CPT | Performed by: INTERNAL MEDICINE

## 2023-05-02 PROCEDURE — 3017F COLORECTAL CA SCREEN DOC REV: CPT | Performed by: INTERNAL MEDICINE

## 2023-05-02 NOTE — PATIENT INSTRUCTIONS
[place] [facility] and [address] [facility] located on the first floor of [place] [address] through hospital main entrance and turn immediately to your left. Date/Time:     Patient's contact number:  [phone] (home)     Echocardiogram -  No prep. Takes approximately 30 min. An echocardiogram uses sound waves to produce images of your heart. This commonly used test allows your doctor to see how your heart is beating and pumping blood. Your doctor can use the images from an echocardiogram to identify various abnormalities in the heart muscle and valves. This test has 2 parts: You will be asked to disrobe from the waist up and given a gown to wear. The technologist will then hook up an EKG monitor to you for the entire exam.   You will then have an ultrasound of your heart (echocardiogram) to assess the heart muscle, heart valves and heart function. You may eat and take any medicines before the exam.     If you need to change your appointment, please call outpatient scheduling at [phone].

## 2023-05-02 NOTE — PROGRESS NOTES
HISTORY  76-year-old gentleman with a history of dyslipidemia, type 2 diabetes, sarcoidosis, histoplasmosis, hypertension, and paroxysmal atrial fibrillation returns for follow-up. He has previously undergone a ablation in the wake of which he was continued on Xarelto with no symptomatic or documented evidence of recurrence. His blood pressure and lipids have been well addressed over the years with most recent profile from November 2022 LDL 54, HDL 43, triglycerides 113. On return today he relates no symptoms that would suggest recurrent atrial fibrillation as well as any change in exercise capacity or chest discomfort. Takes Imuran and prednisone in address of his sarcoidosis. PHYSICAL EXAM  On exam carries 173 pounds in a 5 foot 9 inch frame. Pressure is 112/60 pulse is regular at 61. EOMs full, sclerae and conjunctiva normal. PERRLA. Trachea midline with no neck masses. Assessment of internal jugular veins reveals no elevation of central venous pressure at 45 degrees. Carotid pulses normal without delay or bruit. Thyroid normal to palpation. Chest exam reveals normal respiratory effort, no abnormal breath sounds and normal expiratory phase. No skin lesions seen. PMI normal. S1, S2 normal without murmur or francesco or click. Normal bowel sounds without palpable mass or bruit. No clubbing or acrocyanosis. No significant lower extremity edema or signs of venous insufficiency. General motor strength appears to be within normal limits. Normal range of motion with normal gait. Alert, oriented x 3, memory and cognition normal as reflected by history and conversation. EKG reveals a sinus rhythm without significant abnormality. ASSESSMENT/PLAN:   Sarcoidosis -suppressed by history. We will repeat an echocardiogram prior to return to screen for any cardiac involvement  Hypertension -well-controlled. Continue losartan and atenolol  Dyslipidemia -well-controlled as seen above.   Continue atorvastatin  Paroxysmal

## 2023-05-05 ENCOUNTER — HOSPITAL ENCOUNTER (OUTPATIENT)
Dept: NON INVASIVE DIAGNOSTICS | Age: 73
Discharge: HOME | End: 2023-05-05
Payer: MEDICARE

## 2023-05-05 DIAGNOSIS — Z86.79 S/P ABLATION OF ATRIAL FIBRILLATION: ICD-10-CM

## 2023-05-05 DIAGNOSIS — Z98.890 S/P ABLATION OF ATRIAL FIBRILLATION: ICD-10-CM

## 2023-05-05 DIAGNOSIS — I48.0 PAF (PAROXYSMAL ATRIAL FIBRILLATION) (HCC): ICD-10-CM

## 2023-05-05 DIAGNOSIS — D86.0 SARCOIDOSIS, LUNG (HCC): ICD-10-CM

## 2023-05-05 LAB
LV EF: 63 %
LVEF MODALITY: NORMAL

## 2023-05-05 PROCEDURE — C8929 TTE W OR WO FOL WCON,DOPPLER: HCPCS

## 2023-05-05 PROCEDURE — 6360000004 HC RX CONTRAST MEDICATION: Performed by: INTERNAL MEDICINE

## 2023-05-05 RX ADMIN — PERFLUTREN 1.5 ML: 6.52 INJECTION, SUSPENSION INTRAVENOUS at 10:39

## 2023-05-16 DIAGNOSIS — E11.42 TYPE 2 DIABETES MELLITUS WITH PERIPHERAL NEUROPATHY (HCC): ICD-10-CM

## 2023-05-16 DIAGNOSIS — E78.2 MIXED HYPERLIPIDEMIA: ICD-10-CM

## 2023-05-16 DIAGNOSIS — I10 PRIMARY HYPERTENSION: ICD-10-CM

## 2023-05-16 LAB
ALBUMIN SERPL-MCNC: 4.3 G/DL (ref 3.5–5.2)
ALP SERPL-CCNC: 75 U/L (ref 40–130)
ALT SERPL-CCNC: 12 U/L (ref 5–41)
ANION GAP SERPL CALCULATED.3IONS-SCNC: 10 MMOL/L (ref 7–19)
AST SERPL-CCNC: 17 U/L (ref 5–40)
BASOPHILS # BLD: 0 K/UL (ref 0–0.2)
BASOPHILS NFR BLD: 0.6 % (ref 0–1)
BILIRUB SERPL-MCNC: 0.7 MG/DL (ref 0.2–1.2)
BILIRUB UR QL STRIP: NEGATIVE
BUN SERPL-MCNC: 21 MG/DL (ref 8–23)
CALCIUM SERPL-MCNC: 9.9 MG/DL (ref 8.8–10.2)
CHLORIDE SERPL-SCNC: 104 MMOL/L (ref 98–111)
CHOLEST SERPL-MCNC: 141 MG/DL (ref 160–199)
CLARITY UR: CLEAR
CO2 SERPL-SCNC: 28 MMOL/L (ref 22–29)
COLOR UR: YELLOW
CREAT SERPL-MCNC: 1 MG/DL (ref 0.5–1.2)
CREAT UR-MCNC: 136.1 MG/DL (ref 4.2–622)
EOSINOPHIL # BLD: 0.2 K/UL (ref 0–0.6)
EOSINOPHIL NFR BLD: 3.6 % (ref 0–5)
ERYTHROCYTE [DISTWIDTH] IN BLOOD BY AUTOMATED COUNT: 13.6 % (ref 11.5–14.5)
GLUCOSE SERPL-MCNC: 132 MG/DL (ref 74–109)
GLUCOSE UR STRIP.AUTO-MCNC: 250 MG/DL
HBA1C MFR BLD: 8.4 % (ref 4–6)
HCT VFR BLD AUTO: 38 % (ref 42–52)
HDLC SERPL-MCNC: 50 MG/DL (ref 55–121)
HGB BLD-MCNC: 12.7 G/DL (ref 14–18)
HGB UR STRIP.AUTO-MCNC: NEGATIVE MG/L
IMM GRANULOCYTES # BLD: 0 K/UL
KETONES UR STRIP.AUTO-MCNC: NEGATIVE MG/DL
LDLC SERPL CALC-MCNC: 70 MG/DL
LEUKOCYTE ESTERASE UR QL STRIP.AUTO: NEGATIVE
LYMPHOCYTES # BLD: 1.4 K/UL (ref 1.1–4.5)
LYMPHOCYTES NFR BLD: 30.3 % (ref 20–40)
MCH RBC QN AUTO: 29.3 PG (ref 27–31)
MCHC RBC AUTO-ENTMCNC: 33.4 G/DL (ref 33–37)
MCV RBC AUTO: 87.6 FL (ref 80–94)
MICROALBUMIN UR-MCNC: 2 MG/DL (ref 0–19)
MICROALBUMIN/CREAT UR-RTO: 14.7 MG/G
MONOCYTES # BLD: 0.4 K/UL (ref 0–0.9)
MONOCYTES NFR BLD: 9 % (ref 0–10)
NEUTROPHILS # BLD: 2.6 K/UL (ref 1.5–7.5)
NEUTS SEG NFR BLD: 55.9 % (ref 50–65)
NITRITE UR QL STRIP.AUTO: NEGATIVE
PH UR STRIP.AUTO: 5.5 [PH] (ref 5–8)
PLATELET # BLD AUTO: 129 K/UL (ref 130–400)
PMV BLD AUTO: 11.3 FL (ref 9.4–12.4)
POTASSIUM SERPL-SCNC: 3.7 MMOL/L (ref 3.5–5)
PROT SERPL-MCNC: 6.9 G/DL (ref 6.6–8.7)
PROT UR STRIP.AUTO-MCNC: NEGATIVE MG/DL
RBC # BLD AUTO: 4.34 M/UL (ref 4.7–6.1)
SODIUM SERPL-SCNC: 142 MMOL/L (ref 136–145)
SP GR UR STRIP.AUTO: 1.02 (ref 1–1.03)
TRIGL SERPL-MCNC: 103 MG/DL (ref 0–149)
TSH SERPL DL<=0.005 MIU/L-ACNC: 4.72 UIU/ML (ref 0.27–4.2)
UROBILINOGEN UR STRIP.AUTO-MCNC: 0.2 E.U./DL
WBC # BLD AUTO: 4.7 K/UL (ref 4.8–10.8)

## 2023-05-22 ENCOUNTER — OFFICE VISIT (OUTPATIENT)
Dept: INTERNAL MEDICINE | Age: 73
End: 2023-05-22
Payer: MEDICARE

## 2023-05-22 VITALS
OXYGEN SATURATION: 99 % | DIASTOLIC BLOOD PRESSURE: 62 MMHG | BODY MASS INDEX: 26.6 KG/M2 | WEIGHT: 179.6 LBS | HEART RATE: 63 BPM | SYSTOLIC BLOOD PRESSURE: 138 MMHG | HEIGHT: 69 IN

## 2023-05-22 DIAGNOSIS — I10 PRIMARY HYPERTENSION: ICD-10-CM

## 2023-05-22 DIAGNOSIS — R41.3 MEMORY LOSS: ICD-10-CM

## 2023-05-22 DIAGNOSIS — E11.42 TYPE 2 DIABETES MELLITUS WITH PERIPHERAL NEUROPATHY (HCC): Primary | ICD-10-CM

## 2023-05-22 DIAGNOSIS — G62.9 PERIPHERAL NERVE DISEASE: ICD-10-CM

## 2023-05-22 DIAGNOSIS — E78.2 MIXED HYPERLIPIDEMIA: ICD-10-CM

## 2023-05-22 DIAGNOSIS — I48.0 PAF (PAROXYSMAL ATRIAL FIBRILLATION) (HCC): ICD-10-CM

## 2023-05-22 DIAGNOSIS — Z00.00 HEALTHCARE MAINTENANCE: ICD-10-CM

## 2023-05-22 PROCEDURE — G8427 DOCREV CUR MEDS BY ELIG CLIN: HCPCS | Performed by: NURSE PRACTITIONER

## 2023-05-22 PROCEDURE — 3075F SYST BP GE 130 - 139MM HG: CPT | Performed by: NURSE PRACTITIONER

## 2023-05-22 PROCEDURE — 1123F ACP DISCUSS/DSCN MKR DOCD: CPT | Performed by: NURSE PRACTITIONER

## 2023-05-22 PROCEDURE — 1036F TOBACCO NON-USER: CPT | Performed by: NURSE PRACTITIONER

## 2023-05-22 PROCEDURE — 2022F DILAT RTA XM EVC RTNOPTHY: CPT | Performed by: NURSE PRACTITIONER

## 2023-05-22 PROCEDURE — 3078F DIAST BP <80 MM HG: CPT | Performed by: NURSE PRACTITIONER

## 2023-05-22 PROCEDURE — 99214 OFFICE O/P EST MOD 30 MIN: CPT | Performed by: NURSE PRACTITIONER

## 2023-05-22 PROCEDURE — 3052F HG A1C>EQUAL 8.0%<EQUAL 9.0%: CPT | Performed by: NURSE PRACTITIONER

## 2023-05-22 PROCEDURE — 3017F COLORECTAL CA SCREEN DOC REV: CPT | Performed by: NURSE PRACTITIONER

## 2023-05-22 PROCEDURE — G8417 CALC BMI ABV UP PARAM F/U: HCPCS | Performed by: NURSE PRACTITIONER

## 2023-05-22 RX ORDER — EPINEPHRINE 0.3 MG/.3ML
0.3 INJECTION SUBCUTANEOUS PRN
Qty: 2 EACH | Refills: 1 | Status: SHIPPED | OUTPATIENT
Start: 2023-05-22

## 2023-05-22 RX ORDER — NITROGLYCERIN 0.4 MG/1
0.4 TABLET SUBLINGUAL EVERY 5 MIN PRN
Qty: 25 TABLET | Refills: 3 | Status: SHIPPED | OUTPATIENT
Start: 2023-05-22

## 2023-05-22 SDOH — ECONOMIC STABILITY: FOOD INSECURITY: WITHIN THE PAST 12 MONTHS, YOU WORRIED THAT YOUR FOOD WOULD RUN OUT BEFORE YOU GOT MONEY TO BUY MORE.: NEVER TRUE

## 2023-05-22 SDOH — ECONOMIC STABILITY: HOUSING INSECURITY
IN THE LAST 12 MONTHS, WAS THERE A TIME WHEN YOU DID NOT HAVE A STEADY PLACE TO SLEEP OR SLEPT IN A SHELTER (INCLUDING NOW)?: NO

## 2023-05-22 SDOH — ECONOMIC STABILITY: FOOD INSECURITY: WITHIN THE PAST 12 MONTHS, THE FOOD YOU BOUGHT JUST DIDN'T LAST AND YOU DIDN'T HAVE MONEY TO GET MORE.: NEVER TRUE

## 2023-05-22 SDOH — ECONOMIC STABILITY: INCOME INSECURITY: HOW HARD IS IT FOR YOU TO PAY FOR THE VERY BASICS LIKE FOOD, HOUSING, MEDICAL CARE, AND HEATING?: NOT HARD AT ALL

## 2023-05-22 ASSESSMENT — ENCOUNTER SYMPTOMS
SHORTNESS OF BREATH: 0
COLOR CHANGE: 0
NAUSEA: 0
ABDOMINAL PAIN: 0
EYE DISCHARGE: 0
BLOOD IN STOOL: 0
COUGH: 0
EYE ITCHING: 0
CHOKING: 0
STRIDOR: 0
WHEEZING: 0
DIARRHEA: 0
VOMITING: 0
CONSTIPATION: 0
SORE THROAT: 0
TROUBLE SWALLOWING: 0
ABDOMINAL DISTENTION: 0

## 2023-05-22 NOTE — PROGRESS NOTES
[facility] PHYSICIAN SERVICES  [facility] INTERNAL MEDICINE  [address]  [address] [address]  Dept: [phone]  Dept Fax: [phone]: [phone]    Kd Guevara (:  1950) is a 67 y.o. male,Established patient  with green , here for evaluation of the following chief complaint(s): 3 Month 59 Ney Blake is a 67 y.o. male who presents today for his medical conditions/complaints as noted below. Kd Guevara is c/alexys 3 Month Follow-Up        HPI:     Chief Complaint   Patient presents with    3 Month Follow-Up     @pt is alone      HPI      Hypertension stable with atenolol 25 and losartan 100  TYPE II DM   on lantus 14 units and ozempic 2 mg weekly   a1c is down to 8.6  and fasting is 122  he only eats 2 meals a day;   PAF sinus today with no symptoms on atenolol 25 and Xarelto 20 he is also on aspirin 81  Peripheral neuropathy  stable with lyrica 75 mg daily  Hyperlipidemia he is on Lipitor 10 mg at bedtime  Memory impairment he is stable with Aricept 10 mg at bedtime    He is feeling good ; he is back to usual activity;  he is mowing yard and workingin his shop     CONTROLLED SUBSTANCE  Drug Lyrica  Diagnosis peripheral neuropathy  Last [facility] 2023  Pain contract last date   Effective dose   yes      Changes this visit   none     I have explained to the patient the risks of chronic opioid therapy (including but not limited to, risk of accidental overdose/death, addiction, dependency/withdrawal, tolerance, hyperalgesia, constipation, dizziness/drowsiness, nausea/vomiting, falls, respiratory depression and constipation), as well as it's lack of evidence for safety and effectiveness in the treatment of chronic non-cancer pain. I have also explained to the patient the details of our opioid agreement, which they have signed. He was instructed to use as little of this medication as possible to allow adequate function.  Betina Silva was reviewed today and shows no

## 2023-05-22 NOTE — PATIENT INSTRUCTIONS
1  hypertension  The current medical regimen is effective;  continue present plan and medications. 2.Type II DM; increase lantus to 18 units at bedtime   3. PAF  stable and sinus today   4  peripheral neuropathy;  stable with lyrica  5. Hyperlipidemia; stable with current meds;   6.   Hypothyroidism;  we will check in 3 months; if still up , we will start meds;

## 2023-06-13 ENCOUNTER — OFFICE VISIT (OUTPATIENT)
Dept: INTERNAL MEDICINE | Age: 73
End: 2023-06-13
Payer: MEDICARE

## 2023-06-13 VITALS
DIASTOLIC BLOOD PRESSURE: 60 MMHG | HEART RATE: 61 BPM | OXYGEN SATURATION: 97 % | BODY MASS INDEX: 26.29 KG/M2 | SYSTOLIC BLOOD PRESSURE: 120 MMHG | WEIGHT: 178 LBS

## 2023-06-13 DIAGNOSIS — J41.1 MUCOPURULENT CHRONIC BRONCHITIS (HCC): ICD-10-CM

## 2023-06-13 DIAGNOSIS — E11.42 TYPE 2 DIABETES MELLITUS WITH PERIPHERAL NEUROPATHY (HCC): ICD-10-CM

## 2023-06-13 DIAGNOSIS — D86.0 SARCOIDOSIS, LUNG (HCC): Primary | ICD-10-CM

## 2023-06-13 PROCEDURE — G8427 DOCREV CUR MEDS BY ELIG CLIN: HCPCS | Performed by: INTERNAL MEDICINE

## 2023-06-13 PROCEDURE — 1123F ACP DISCUSS/DSCN MKR DOCD: CPT | Performed by: INTERNAL MEDICINE

## 2023-06-13 PROCEDURE — 3074F SYST BP LT 130 MM HG: CPT | Performed by: INTERNAL MEDICINE

## 2023-06-13 PROCEDURE — 99213 OFFICE O/P EST LOW 20 MIN: CPT | Performed by: INTERNAL MEDICINE

## 2023-06-13 PROCEDURE — 1036F TOBACCO NON-USER: CPT | Performed by: INTERNAL MEDICINE

## 2023-06-13 PROCEDURE — G8417 CALC BMI ABV UP PARAM F/U: HCPCS | Performed by: INTERNAL MEDICINE

## 2023-06-13 PROCEDURE — 3052F HG A1C>EQUAL 8.0%<EQUAL 9.0%: CPT | Performed by: INTERNAL MEDICINE

## 2023-06-13 PROCEDURE — 3017F COLORECTAL CA SCREEN DOC REV: CPT | Performed by: INTERNAL MEDICINE

## 2023-06-13 PROCEDURE — 3078F DIAST BP <80 MM HG: CPT | Performed by: INTERNAL MEDICINE

## 2023-06-13 PROCEDURE — 2022F DILAT RTA XM EVC RTNOPTHY: CPT | Performed by: INTERNAL MEDICINE

## 2023-06-13 PROCEDURE — 3023F SPIROM DOC REV: CPT | Performed by: INTERNAL MEDICINE

## 2023-06-22 RX ORDER — ATENOLOL 25 MG/1
TABLET ORAL
Qty: 90 TABLET | Refills: 3 | Status: SHIPPED | OUTPATIENT
Start: 2023-06-22

## 2023-06-30 ENCOUNTER — TELEPHONE (OUTPATIENT)
Dept: INTERNAL MEDICINE | Age: 73
End: 2023-06-30

## 2023-08-08 DIAGNOSIS — I48.20 CHRONIC ATRIAL FIBRILLATION (HCC): ICD-10-CM

## 2023-08-08 DIAGNOSIS — G62.9 PERIPHERAL NERVE DISEASE: ICD-10-CM

## 2023-08-08 RX ORDER — PREGABALIN 75 MG/1
CAPSULE ORAL
Qty: 180 CAPSULE | Refills: 0 | Status: SHIPPED | OUTPATIENT
Start: 2023-08-08 | End: 2023-11-06

## 2023-08-08 RX ORDER — RIVAROXABAN 20 MG/1
TABLET, FILM COATED ORAL
Qty: 90 TABLET | Refills: 0 | Status: SHIPPED | OUTPATIENT
Start: 2023-08-08

## 2023-08-08 NOTE — TELEPHONE ENCOUNTER
Jyoti Mills called requesting a refill of the below medication which has been pended for you:     Requested Prescriptions     Pending Prescriptions Disp Refills    pregabalin (LYRICA) 75 MG capsule [Pharmacy Med Name: Pregabalin 75 MG Oral Capsule] 180 capsule 0     Sig: Take 1 capsule by mouth twice daily    XARELTO 20 MG TABS tablet [Pharmacy Med Name: Xarelto 20 MG Oral Tablet] 90 tablet 0     Sig: TAKE 1 TABLET BY MOUTH ONCE DAILY WITH EVENING MEAL       Last Appointment Date: 5/22/2023  Next Appointment Date: 8/23/2023    Allergies   Allergen Reactions    Bee Venom Anaphylaxis    Gabapentin Other (See Comments)     Causes blisters to head.      Penicillins Hives     \"Causes blisters to break out everywhere\"    Toprol Xl [Metoprolol]      Leg pain

## 2023-08-09 ENCOUNTER — HOSPITAL ENCOUNTER (OUTPATIENT)
Dept: PULMONOLOGY | Facility: HOSPITAL | Age: 73
Discharge: HOME | End: 2023-08-09
Admitting: INTERNAL MEDICINE
Payer: MEDICARE

## 2023-08-09 DIAGNOSIS — J45.20 ASTHMA, MILD INTERMITTENT, POORLY CONTROLLED: ICD-10-CM

## 2023-08-09 PROCEDURE — 94729 DIFFUSING CAPACITY: CPT | Performed by: INTERNAL MEDICINE

## 2023-08-09 PROCEDURE — 94729 DIFFUSING CAPACITY: CPT

## 2023-08-09 PROCEDURE — 94726 PLETHYSMOGRAPHY LUNG VOLUMES: CPT

## 2023-08-09 PROCEDURE — 94726 PLETHYSMOGRAPHY LUNG VOLUMES: CPT | Performed by: INTERNAL MEDICINE

## 2023-08-09 PROCEDURE — 94060 EVALUATION OF WHEEZING: CPT | Performed by: INTERNAL MEDICINE

## 2023-08-09 PROCEDURE — 94060 EVALUATION OF WHEEZING: CPT

## 2023-08-09 RX ORDER — ALBUTEROL SULFATE 2.5 MG/3ML
2.5 SOLUTION RESPIRATORY (INHALATION) ONCE
Status: COMPLETED | OUTPATIENT
Start: 2023-08-09 | End: 2023-08-09

## 2023-08-09 RX ADMIN — ALBUTEROL SULFATE 2.5 MG: 2.5 SOLUTION RESPIRATORY (INHALATION) at 09:27

## 2023-08-10 DIAGNOSIS — G62.9 PERIPHERAL NERVE DISEASE: ICD-10-CM

## 2023-08-10 RX ORDER — PREGABALIN 75 MG/1
CAPSULE ORAL
Qty: 180 CAPSULE | Refills: 0 | OUTPATIENT
Start: 2023-08-10 | End: 2023-11-08

## 2023-08-25 ENCOUNTER — OFFICE VISIT (OUTPATIENT)
Dept: PULMONOLOGY | Facility: CLINIC | Age: 73
End: 2023-08-25
Payer: OTHER MISCELLANEOUS

## 2023-08-25 VITALS
HEART RATE: 65 BPM | BODY MASS INDEX: 27.91 KG/M2 | HEIGHT: 67 IN | DIASTOLIC BLOOD PRESSURE: 72 MMHG | WEIGHT: 177.8 LBS | SYSTOLIC BLOOD PRESSURE: 116 MMHG | OXYGEN SATURATION: 98 %

## 2023-08-25 DIAGNOSIS — J98.4 RESTRICTIVE LUNG DISEASE: ICD-10-CM

## 2023-08-25 DIAGNOSIS — Z87.891 FORMER SMOKER: ICD-10-CM

## 2023-08-25 DIAGNOSIS — E66.3 OVERWEIGHT: ICD-10-CM

## 2023-08-25 DIAGNOSIS — D86.2 SARCOIDOSIS OF LUNG WITH SARCOIDOSIS OF LYMPH NODES: Primary | ICD-10-CM

## 2023-08-25 DIAGNOSIS — J44.9 COPD, MODERATE: ICD-10-CM

## 2023-08-25 RX ORDER — ATORVASTATIN CALCIUM 10 MG/1
10 TABLET, FILM COATED ORAL DAILY
COMMUNITY

## 2023-08-25 RX ORDER — ATENOLOL 25 MG/1
25 TABLET ORAL DAILY
COMMUNITY

## 2023-08-25 RX ORDER — NITROGLYCERIN 0.4 MG/1
0.4 TABLET SUBLINGUAL
COMMUNITY
Start: 2023-05-22

## 2023-08-25 NOTE — ASSESSMENT & PLAN NOTE
He has recent pulmonary functions do show improvement from previous in terms of spirometry.  He did have improvement postbronchodilator in midflows and also to a lesser extent in his FEV1.  He will continue his Wixela Inhub and his as needed albuterol HFA, Xopenex neb treatments, and Atrovent neb treatments,

## 2023-08-25 NOTE — PATIENT INSTRUCTIONS
The patient is doing well from a pulmonary standpoint.  His pulmonary functions done recently [facility] showed improvement in both his spirometry and total lung capacity and I reviewed these with him.  I will see him back in 1 year and I will get repeat PFTs with and without bronchodilators at [facility] just prior to return.

## 2023-08-25 NOTE — PROGRESS NOTES
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    History of Present Illness   Patient is doing well from a pulmonary standpoint at this time.  He states he has more trouble in following the winter.  Pulmonary functions that were performed recently at [facility] showed improvement in his spirometry and total lung capacity.  When corrected for alveolar volume there was a slight decline in diffusion capacity compared to previous lower remain within normal limits.  I did tell him we will just plan on follow-up visit in 1 year with repeat PFTs.  He is in agreement with this plan.  He will continue his current inhaled medications otherwise.  He did have the Prevnar 13 and Pneumovax in the past.  Prior to Admission medications    Medication Sig Start Date End Date Taking? Authorizing Provider   aspirin 81 MG chewable tablet Chew 1 tablet Daily.   Yes Emergency, Nurse Alessandro, RN   atenolol (TENORMIN) 25 MG tablet Take 1 tablet by mouth Daily.   Yes ProviderFredrick MD   atorvastatin (LIPITOR) 10 MG tablet Take 1 tablet by mouth Daily.   Yes Provider, MD Fredrick   azaTHIOprine (IMURAN) 50 MG tablet Take 1 tablet by mouth Every Night. 3/22/19  Yes Fredrick Denis MD   donepezil (ARICEPT) 5 MG tablet Take 1 tablet by mouth Every Night. 3/26/19  Yes ProviderFredrick MD   EPINEPHrine (EPIPEN) 0.3 MG/0.3ML solution auto-injector injection Inject 0.3 mL into the appropriate muscle as directed by prescriber.   Yes Fredrick Denis MD   fluticasone-salmeterol (ADVAIR) 500-50 MCG/DOSE DISKUS Inhale 2 (Two) Times a Day.   Yes Fredrick Denis MD   Fluticasone-Salmeterol (ADVAIR/WIXELA) 500-50 MCG/ACT  DISKUS Inhale 1 puff 2 (Two) Times a Day. Rinse and spit after using. 8/25/22  Yes Salvatore Shah MD   Insulin Pen Needle 32G X 4 MM misc 1 each. 2/25/19  Yes Fredrick Denis MD   ipratropium (ATROVENT) 0.02 % nebulizer solution Take 2.5 mL by nebulization 4 (Four) Times a Day. 7/30/19  Yes Salvatore Shah MD   ipratropium-albuterol (DUO-NEB) 0.5-2.5 mg/3 ml nebulizer Take 3 mL by nebulization 4 (Four) Times a Day As Needed for Wheezing. 7/12/19  Yes Salvatore Shah MD   levalbuterol (XOPENEX) 1.25 MG/3ML nebulizer solution Take 1 ampule by nebulization 4 (Four) Times a Day. 7/30/19  Yes Salvatore Shah MD   levalbuterol (XOPENEX) 1.25 MG/3ML nebulizer solution Take 1 ampule by nebulization 4 (Four) Times a Day As Needed for Wheezing. 4/27/20  Yes Salvatore Shah MD   losartan (COZAAR) 100 MG tablet Take 1 tablet by mouth Daily. 3/18/19  Yes ProviderFredrick MD   nitroglycerin (NITROSTAT) 0.4 MG SL tablet Place 1 tablet under the tongue Every 5 (Five) Minutes As Needed for Chest Pain. 5/22/23  Yes Fredrick Denis MD   O2 (OXYGEN) Inhale 2 (Two) Times a Day As Needed. Unsure or what Liter he uses.   Yes ProviderFredrick MD   Ozempic, 0.25 or 0.5 MG/DOSE, 2 MG/1.5ML solution pen-injector INJECT 0.25MG INTO SKIN ONCE A WEEK 5/10/22  Yes Fredrick Denis MD   predniSONE (DELTASONE) 5 MG tablet Take 1 tablet by mouth Daily.   Yes Fredrick Denis MD   pregabalin (LYRICA) 75 MG capsule Take 1 capsule by mouth 2 (Two) Times a Day. 3/15/21  Yes Fredrick Denis MD   RELION PEN NEEDLES 32G X 4 MM misc  2/25/19  Yes Fredrick Denis MD   rivaroxaban (XARELTO) 20 MG tablet Take 1 tablet by mouth Daily. STOPPED 8/1/18   Indications: Resume taking Xarelto on Thursday morning   Yes Provider, MD Fredrick   sulfamethoxazole-trimethoprim (BACTRIM,SEPTRA) 400-80 MG tablet Take 1 tablet by mouth. Three times a week   Yes ProviderFredrick MD  "  Ventolin  (90 Base) MCG/ACT inhaler INHALE 2 PUFFS BY MOUTH EVERY 4 HOURS AS NEEDED FOR WHEEZING FOR SHORTNESS OF BREATH 23  Yes Salvatore Shah MD   ascorbic acid (VITAMIN C) 1000 MG tablet Take 1,000 mg by mouth Daily.  23  Provider, MD Fredrick   JANUVIA 50 MG tablet  3/5/19 8/25/23  Provider, MD Fredrick   polyethylene glycol (GoLYTELY) 236 g solution Take as directed by office instructions. 22  Linda Crystal, [place]       Social History     Socioeconomic History    Marital status:    Tobacco Use    Smoking status: Former     Packs/day: 0.50     Years: 2.00     Pack years: 1.00     Types: Cigarettes     Quit date:      Years since quittin.6    Smokeless tobacco: Never   Substance and Sexual Activity    Alcohol use: No    Drug use: No    Sexual activity: Defer       Objective   Vital Signs:   /72   Pulse 65   Ht 170.2 cm (67.01\")   Wt 80.6 kg (177 lb 12.8 oz)   SpO2 98% Comment: RA  BMI 27.84 kg/mý     Physical Exam  Vitals and nursing note reviewed.   Constitutional:       Comments: His BMI is somewhat elevated.   HENT:      Head: Normocephalic.   Eyes:      Extraocular Movements: Extraocular movements intact.      Pupils: Pupils are equal, round, and reactive to light.   Cardiovascular:      Rate and Rhythm: Normal rate and regular rhythm.   Pulmonary:      Effort: Pulmonary effort is normal.      Breath sounds: Normal breath sounds.   Musculoskeletal:         General: Normal range of motion.   Skin:     General: Skin is warm and dry.   Neurological:      General: No focal deficit present.      Mental Status: He is alert and oriented to person, place, and time.   Psychiatric:         Mood and Affect: Mood normal.         Behavior: Behavior normal.      Result Review :    PFT Values          2022    10:30   Pre Drug PFT Results   FVC 66   FEV1 60   FEF 25-75% 40   FEV1/FVC 68   Other Tests PFT Results   TLC 67   RV 78   DLCO 63 "   D/VAsb 92         Results for orders placed during the hospital encounter of 23    Spirometry - Pre & Post Bronchodilator with Lung Volumes    Narrative  [facility] - Pulmonary Function Test    [facility]  [address]  [phone]    Patient : Caio Riley  MRN : [id number]  CSN : [id number]  Pulmonologist : Salvatore Shah MD  Date : 2023    ______________________________________________________________________    Interpretation :  1.  Spirometry is consistent with a moderate obstructive ventilatory defect.  2.  There is improvement in spirometry postbronchodilator particularly in midflows but a moderate obstructive ventilatory defect is still present.  3.  Lung volumes reveal a coexisting mild restrictive ventilatory defect.  There is also a decrease in inspiratory capacity.  4.  There is a moderate diffusion impairment which when corrected for alveolar volume is a low normal diffusion capacity.  5.  When current studies are compared to studies performed on 2022, the patient's current pre and postbronchodilator spirometry reveals improvement both the FVC and FEV1 compared to previous baseline values.  There has also been significant improvement in the patient's total lung capacity compared to previous.  When corrected for alveolar volume there has been a decline in diffusion capacity compared to previous although it remains within normal limits.      Salvatore Shah MD      Results for orders placed in visit on 22    Pulmonary Function Test    Narrative  Pulmonary Function Test  Performed by: Machelle Elliott, RRT  Authorized by: Salvatore Shah MD    Pre Drug % Predicted  FVC: 66%  FEV1: 60%  FEF 25-75%: 40%  FEV1/FVC: 68%  T%  RV: 78%  DLCO: 63%  D/VAsb: 92%    Interpretation  Spirometry  Spirometry shows moderate obstruction.  Lung Volume Measurements  Measurements show reduced lung volumes consistent with  restriction.  Diffusion Capacity  The patient's diffusion capacity is mildly reduced.  Diffusion capacity is normal when corrected for alveolar volume.  Overall comments: The patient's spirometry is consistent with a moderate obstructive ventilatory defect.  Lung volumes reveal a coexisting restrictive ventilatory defect along with a decrease in inspiratory capacity.  There is a mild bordering on moderate diffusion impairment which when corrected for alveolar volume is normalized.  When current studies are compared to [facility]  of [facility], his FVC is unchanged, his FEV1 has improved slightly, his total lung capacity is essentially unchanged, and when corrected for alveolar volume there has been a slight but not significant drop in his diffusion capacity compared to previous.      Results for orders placed in visit on 21    Pulmonary Function Test    Narrative  Pulmonary Function Test  Performed by: Machelle Elliott, RRT  Authorized by: Salvatore Shah MD    Pre Drug % Predicted  FVC: 75%  FEV1: 62%  FEF 25-75%: 29%  FEV1/FVC: 65.14%  T%  RV: 89%  DLCO: 55%  D/VAsb: 95%    Interpretation  Spirometry  Spirometry shows moderate obstruction.  Lung Volume Measurements  Measurements show reduced lung volumes consistent with restriction.  Diffusion Capacity  The patient's diffusion capacity is moderately reduced.  Diffusion capacity is normal when corrected for alveolar volume.  Overall comments: The patient's spirometry is consistent with a moderate obstructive ventilatory defect.  Lung volumes reveal a coexisting restrictive ventilatory defect.  There is a moderate diffusion impairment which when corrected for alveolar volume is normalized.  When current studies are compared to studies done at [facility] on , his FVC has improved compared to previous pre and postbronchodilator studies.  His current FEV1 is  unchanged compared to his previous prebronchodilator FEV1 but is dropped slightly compared to his previous postbronchodilator value.  His total lung capacity has dropped slightly but not significantly.  When corrected for alveolar volume he has had a slight improvement in his diffusion capacity compared to previous.                 My interpretation of imaging:    CT CHEST WO CONTRAST (09/01/2022 12:03 EDT)         Assessment and Plan {CC Problem List  Visit Diagnosis  ROS  Review (Popup)  Health Maintenance  Quality  BestPractice  Medications  SmartSets  SnapShot Encounters  Media : 23}    Diagnoses and all orders for this visit:    1. 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  I reviewed the studies with him.  He will continue his low-dose prednisone.    Orders:  -     Complete PFT - Pre & Post Bronchodilator; Future    2. COPD, moderate  Assessment & Plan:  He has recent pulmonary functions do show improvement from previous in terms of spirometry.  He did have improvement postbronchodilator in midflows and also to a lesser extent in his FEV1.  He will continue his Wixela Inhub and his as needed albuterol HFA, Xopenex neb treatments, and Atrovent neb treatments,      Orders:  -     Complete PFT - Pre & Post Bronchodilator; Future    3. Restrictive lung disease  Assessment & Plan:  He still had a restrictive pattern on his recent PFTs but these are improved from previous.      4. Overweight  Assessment & Plan:  Patient's (Body mass index is 27.84 kg/mý.) indicates that they are overweight with health conditions that include hypertension . Weight is unchanged.  Diet and exercise are encouraged and he will follow-up with his primary healthcare provider regarding his elevated BMI otherwise.      5. Former smoker  Assessment & Plan:  He has a very minimal smoking history and quit smoking in  1968.            Salvatore Shah MD  8/25/2023  11:46 CDT    Follow Up   Return in about 1 year (around 8/25/2024) for To see me specifically.    Patient was given instructions and counseling regarding his condition or for health maintenance advice. Please see specific information pulled into the AVS if appropriate.

## 2023-08-25 NOTE — ASSESSMENT & PLAN NOTE
Again PFTs show improvement in terms of spirometry and lung volumes.  I reviewed the studies with him.  He will continue his low-dose prednisone.

## 2023-08-25 NOTE — ASSESSMENT & PLAN NOTE
Patient's (Body mass index is 27.84 kg/mý.) indicates that they are overweight with health conditions that include hypertension . Weight is unchanged.  Diet and exercise are encouraged and he will follow-up with his primary healthcare provider regarding his elevated BMI otherwise.

## 2023-09-11 RX ORDER — ATORVASTATIN CALCIUM 20 MG/1
TABLET, FILM COATED ORAL
Qty: 45 TABLET | Refills: 1 | Status: SHIPPED | OUTPATIENT
Start: 2023-09-11

## 2023-09-11 NOTE — TELEPHONE ENCOUNTER
Meg Crocker called requesting a refill of the below medication which has been pended for you:     Requested Prescriptions     Pending Prescriptions Disp Refills    atorvastatin (LIPITOR) 20 MG tablet [Pharmacy Med Name: Atorvastatin Calcium 20 MG Oral Tablet] 45 tablet 1     Sig: TAKE 1/2 (ONE-HALF) TABLET BY MOUTH NIGHTLY       Last Appointment Date: 5/22/2023  Next Appointment Date: 9/19/2023    Allergies   Allergen Reactions    Bee Venom Anaphylaxis    Gabapentin Other (See Comments)     Causes blisters to head.      Penicillins Hives     \"Causes blisters to break out everywhere\"    Toprol Xl [Metoprolol]      Leg pain

## 2023-09-13 DIAGNOSIS — I10 PRIMARY HYPERTENSION: ICD-10-CM

## 2023-09-13 DIAGNOSIS — E78.2 MIXED HYPERLIPIDEMIA: ICD-10-CM

## 2023-09-13 DIAGNOSIS — E11.42 TYPE 2 DIABETES MELLITUS WITH PERIPHERAL NEUROPATHY (HCC): ICD-10-CM

## 2023-09-13 LAB
ALBUMIN SERPL-MCNC: 4.2 G/DL (ref 3.5–5.2)
ALP SERPL-CCNC: 82 U/L (ref 40–130)
ALT SERPL-CCNC: 11 U/L (ref 5–41)
ANION GAP SERPL CALCULATED.3IONS-SCNC: 14 MMOL/L (ref 7–19)
AST SERPL-CCNC: 18 U/L (ref 5–40)
BACTERIA #/AREA URNS HPF: ABNORMAL /HPF
BASOPHILS # BLD: 0 K/UL (ref 0–0.2)
BASOPHILS NFR BLD: 0.9 % (ref 0–1)
BILIRUB SERPL-MCNC: 0.8 MG/DL (ref 0.2–1.2)
BILIRUB UR QL STRIP: ABNORMAL
BUN SERPL-MCNC: 18 MG/DL (ref 8–23)
CALCIUM SERPL-MCNC: 10.1 MG/DL (ref 8.8–10.2)
CHLORIDE SERPL-SCNC: 101 MMOL/L (ref 98–111)
CHOLEST SERPL-MCNC: 121 MG/DL (ref 160–199)
CLARITY UR: CLEAR
CO2 SERPL-SCNC: 26 MMOL/L (ref 22–29)
COLOR UR: ABNORMAL
CREAT SERPL-MCNC: 1.2 MG/DL (ref 0.5–1.2)
CRYSTALS URNS MICRO: ABNORMAL /HPF
EOSINOPHIL # BLD: 0.1 K/UL (ref 0–0.6)
EOSINOPHIL NFR BLD: 3.1 % (ref 0–5)
ERYTHROCYTE [DISTWIDTH] IN BLOOD BY AUTOMATED COUNT: 13.3 % (ref 11.5–14.5)
GLUCOSE SERPL-MCNC: 154 MG/DL (ref 74–109)
GLUCOSE UR STRIP.AUTO-MCNC: 250 MG/DL
HBA1C MFR BLD: 9.1 % (ref 4–6)
HCT VFR BLD AUTO: 36.6 % (ref 42–52)
HDLC SERPL-MCNC: 40 MG/DL (ref 55–121)
HGB BLD-MCNC: 12.6 G/DL (ref 14–18)
HGB UR STRIP.AUTO-MCNC: NEGATIVE MG/L
IMM GRANULOCYTES # BLD: 0 K/UL
KETONES UR STRIP.AUTO-MCNC: ABNORMAL MG/DL
LDLC SERPL CALC-MCNC: 60 MG/DL
LEUKOCYTE ESTERASE UR QL STRIP.AUTO: ABNORMAL
LYMPHOCYTES # BLD: 1.1 K/UL (ref 1.1–4.5)
LYMPHOCYTES NFR BLD: 25.8 % (ref 20–40)
MCH RBC QN AUTO: 30 PG (ref 27–31)
MCHC RBC AUTO-ENTMCNC: 34.4 G/DL (ref 33–37)
MCV RBC AUTO: 87.1 FL (ref 80–94)
MONOCYTES # BLD: 0.5 K/UL (ref 0–0.9)
MONOCYTES NFR BLD: 11.7 % (ref 0–10)
NEUTROPHILS # BLD: 2.5 K/UL (ref 1.5–7.5)
NEUTS SEG NFR BLD: 57.8 % (ref 50–65)
NITRITE UR QL STRIP.AUTO: NEGATIVE
PH UR STRIP.AUTO: 5.5 [PH] (ref 5–8)
PLATELET # BLD AUTO: 134 K/UL (ref 130–400)
PMV BLD AUTO: 10.5 FL (ref 9.4–12.4)
POTASSIUM SERPL-SCNC: 4.1 MMOL/L (ref 3.5–5)
PROT SERPL-MCNC: 7 G/DL (ref 6.6–8.7)
PROT UR STRIP.AUTO-MCNC: ABNORMAL MG/DL
RBC # BLD AUTO: 4.2 M/UL (ref 4.7–6.1)
RBC #/AREA URNS HPF: ABNORMAL /HPF (ref 0–2)
SODIUM SERPL-SCNC: 141 MMOL/L (ref 136–145)
SP GR UR STRIP.AUTO: 1.02 (ref 1–1.03)
SQUAMOUS #/AREA URNS HPF: ABNORMAL /HPF
TRIGL SERPL-MCNC: 103 MG/DL (ref 0–149)
TSH SERPL DL<=0.005 MIU/L-ACNC: 3.59 UIU/ML (ref 0.27–4.2)
UROBILINOGEN UR STRIP.AUTO-MCNC: 1 E.U./DL
WBC # BLD AUTO: 4.3 K/UL (ref 4.8–10.8)
WBC #/AREA URNS HPF: ABNORMAL /HPF (ref 0–5)

## 2023-09-19 ENCOUNTER — OFFICE VISIT (OUTPATIENT)
Dept: INTERNAL MEDICINE | Age: 73
End: 2023-09-19
Payer: MEDICARE

## 2023-09-19 VITALS
WEIGHT: 161 LBS | HEIGHT: 69 IN | HEART RATE: 68 BPM | SYSTOLIC BLOOD PRESSURE: 124 MMHG | DIASTOLIC BLOOD PRESSURE: 60 MMHG | BODY MASS INDEX: 23.85 KG/M2 | OXYGEN SATURATION: 97 %

## 2023-09-19 DIAGNOSIS — I10 PRIMARY HYPERTENSION: ICD-10-CM

## 2023-09-19 DIAGNOSIS — E78.2 MIXED HYPERLIPIDEMIA: ICD-10-CM

## 2023-09-19 DIAGNOSIS — G62.9 PERIPHERAL NERVE DISEASE: ICD-10-CM

## 2023-09-19 DIAGNOSIS — R41.3 MEMORY LOSS: ICD-10-CM

## 2023-09-19 DIAGNOSIS — E11.42 TYPE 2 DIABETES MELLITUS WITH PERIPHERAL NEUROPATHY (HCC): ICD-10-CM

## 2023-09-19 DIAGNOSIS — I48.0 PAF (PAROXYSMAL ATRIAL FIBRILLATION) (HCC): ICD-10-CM

## 2023-09-19 PROCEDURE — G0008 ADMIN INFLUENZA VIRUS VAC: HCPCS | Performed by: NURSE PRACTITIONER

## 2023-09-19 PROCEDURE — 2022F DILAT RTA XM EVC RTNOPTHY: CPT | Performed by: NURSE PRACTITIONER

## 2023-09-19 PROCEDURE — 90694 VACC AIIV4 NO PRSRV 0.5ML IM: CPT | Performed by: NURSE PRACTITIONER

## 2023-09-19 PROCEDURE — G8427 DOCREV CUR MEDS BY ELIG CLIN: HCPCS | Performed by: NURSE PRACTITIONER

## 2023-09-19 PROCEDURE — 1123F ACP DISCUSS/DSCN MKR DOCD: CPT | Performed by: NURSE PRACTITIONER

## 2023-09-19 PROCEDURE — 99214 OFFICE O/P EST MOD 30 MIN: CPT | Performed by: NURSE PRACTITIONER

## 2023-09-19 PROCEDURE — G8420 CALC BMI NORM PARAMETERS: HCPCS | Performed by: NURSE PRACTITIONER

## 2023-09-19 PROCEDURE — 3074F SYST BP LT 130 MM HG: CPT | Performed by: NURSE PRACTITIONER

## 2023-09-19 PROCEDURE — 3017F COLORECTAL CA SCREEN DOC REV: CPT | Performed by: NURSE PRACTITIONER

## 2023-09-19 PROCEDURE — 3046F HEMOGLOBIN A1C LEVEL >9.0%: CPT | Performed by: NURSE PRACTITIONER

## 2023-09-19 PROCEDURE — 1036F TOBACCO NON-USER: CPT | Performed by: NURSE PRACTITIONER

## 2023-09-19 PROCEDURE — 3078F DIAST BP <80 MM HG: CPT | Performed by: NURSE PRACTITIONER

## 2023-09-19 RX ORDER — ATORVASTATIN CALCIUM 20 MG/1
TABLET, FILM COATED ORAL
Qty: 45 TABLET | Refills: 1 | Status: SHIPPED | OUTPATIENT
Start: 2023-09-19

## 2023-09-19 RX ORDER — GLIPIZIDE 5 MG/1
5 TABLET ORAL 2 TIMES DAILY
Qty: 180 TABLET | Refills: 1 | Status: SHIPPED | OUTPATIENT
Start: 2023-09-19

## 2023-09-19 ASSESSMENT — ENCOUNTER SYMPTOMS
VOMITING: 0
BLOOD IN STOOL: 0
NAUSEA: 0
COUGH: 0
SHORTNESS OF BREATH: 0
STRIDOR: 0
SORE THROAT: 0
TROUBLE SWALLOWING: 0
ABDOMINAL PAIN: 0
ABDOMINAL DISTENTION: 0
CONSTIPATION: 0
DIARRHEA: 0
WHEEZING: 0
CHOKING: 0
COLOR CHANGE: 0
EYE ITCHING: 0
EYE DISCHARGE: 0

## 2023-09-19 NOTE — PROGRESS NOTES
[facility] PHYSICIAN SERVICES  [facility] INTERNAL MEDICINE  [address] [address]  [address] [address]  Dept: [phone]  Dept Fax: [phone]: [phone]    Jamshid Arreola (:  1950) is a 75 y.o. male,Established patient  with green, here for evaluation of the following chief complaint(s): 3 Month [address] is a 75 y.o. male who presents today for his medical conditions/complaints as noted below. Jamshid Arreola is c/alexys 3 Month Follow-Up        HPI:     Chief Complaint   Patient presents with    3 Month Follow-Up     @pt is alone   accompanied by \wife   HPI   Hypertension stable with atenolol 25 and losartan 100  TYPE II DM   on lantus 14 units and ozempic 2 mg weekly   a1c is up to 9.1 and fasting is 154  he only eats 2 meals a day;   all his meals are carbs;  h has frosted flakes for breakfast every day; he snacks on chips and crackers;   PAF sinus today with no symptoms on atenolol 25 and Xarelto 20 he is also on aspirin 81  Peripheral neuropathy  stable with lyrica 75 mg daily  Hyperlipidemia he is on Lipitor 10 mg at bedtime  cholesterol is 121 and trigs are 103   Memory impairment he is stable with Aricept 10 mg at bedtime    CONTROLLED SUBSTANCE  Drug Lyrica  Diagnosis peripheral neuropathy  Last Marvene Seen 2023   Pain contract last date 2023  UDS  2023  Effective dose   yes      Changes this visit   none     I have explained to the patient the risks of chronic opioid therapy (including but not limited to, risk of accidental overdose/death, addiction, dependency/withdrawal, tolerance, hyperalgesia, constipation, dizziness/drowsiness, nausea/vomiting, falls, respiratory depression and constipation), as well as it's lack of evidence for safety and effectiveness in the treatment of chronic non-cancer pain. I have also explained to the patient the details of our opioid agreement, which they have signed.   He was instructed to use as little of this

## 2023-10-09 RX ORDER — DONEPEZIL HYDROCHLORIDE 5 MG/1
10 TABLET, FILM COATED ORAL NIGHTLY
Qty: 90 TABLET | Refills: 1 | Status: SHIPPED | OUTPATIENT
Start: 2023-10-09

## 2023-10-09 RX ORDER — DONEPEZIL HYDROCHLORIDE 5 MG/1
10 TABLET, FILM COATED ORAL NIGHTLY
Qty: 90 TABLET | Refills: 0 | OUTPATIENT
Start: 2023-10-09

## 2023-10-09 NOTE — TELEPHONE ENCOUNTER
Juli Gray called requesting a refill of the below medication which has been pended for you:     Requested Prescriptions     Pending Prescriptions Disp Refills    donepezil (ARICEPT) 5 MG tablet [Pharmacy Med Name: Donepezil HCl 5 MG Oral Tablet] 90 tablet 1     Sig: TAKE 2 TABLETS BY MOUTH NIGHTLY       Last Appointment Date: 9/19/2023  Next Appointment Date: 12/12/2023    Allergies   Allergen Reactions    Bee Venom Anaphylaxis    Gabapentin Other (See Comments)     Causes blisters to head.      Penicillins Hives     \"Causes blisters to break out everywhere\"    Toprol Xl [Metoprolol]      Leg pain

## 2023-11-01 ENCOUNTER — TELEPHONE (OUTPATIENT)
Dept: CARDIOLOGY CLINIC | Age: 73
End: 2023-11-01

## 2023-11-01 RX ORDER — LOSARTAN POTASSIUM 100 MG/1
TABLET ORAL
Qty: 90 TABLET | Refills: 0 | Status: SHIPPED | OUTPATIENT
Start: 2023-11-01

## 2023-11-01 NOTE — TELEPHONE ENCOUNTER
Marlene Dinh called requesting a refill of the below medication which has been pended for you:     Requested Prescriptions     Pending Prescriptions Disp Refills    losartan (COZAAR) 100 MG tablet [Pharmacy Med Name: Losartan Potassium 100 MG Oral Tablet] 90 tablet 0     Sig: Take 1 tablet by mouth once daily       Last Appointment Date: 9/19/2023  Next Appointment Date: 12/12/2023    Allergies   Allergen Reactions    Bee Venom Anaphylaxis    Gabapentin Other (See Comments)     Causes blisters to head.      Penicillins Hives     \"Causes blisters to break out everywhere\"    Toprol Xl [Metoprolol]      Leg pain

## 2023-11-02 ENCOUNTER — OFFICE VISIT (OUTPATIENT)
Dept: CARDIOLOGY CLINIC | Age: 73
End: 2023-11-02
Payer: MEDICARE

## 2023-11-02 VITALS
SYSTOLIC BLOOD PRESSURE: 134 MMHG | WEIGHT: 160 LBS | HEIGHT: 69 IN | DIASTOLIC BLOOD PRESSURE: 66 MMHG | BODY MASS INDEX: 23.7 KG/M2 | HEART RATE: 67 BPM

## 2023-11-02 DIAGNOSIS — I48.0 PAROXYSMAL ATRIAL FIBRILLATION (HCC): Primary | ICD-10-CM

## 2023-11-02 PROCEDURE — 1123F ACP DISCUSS/DSCN MKR DOCD: CPT | Performed by: INTERNAL MEDICINE

## 2023-11-02 PROCEDURE — 1036F TOBACCO NON-USER: CPT | Performed by: INTERNAL MEDICINE

## 2023-11-02 PROCEDURE — 3075F SYST BP GE 130 - 139MM HG: CPT | Performed by: INTERNAL MEDICINE

## 2023-11-02 PROCEDURE — 3078F DIAST BP <80 MM HG: CPT | Performed by: INTERNAL MEDICINE

## 2023-11-02 PROCEDURE — 3017F COLORECTAL CA SCREEN DOC REV: CPT | Performed by: INTERNAL MEDICINE

## 2023-11-02 PROCEDURE — G8427 DOCREV CUR MEDS BY ELIG CLIN: HCPCS | Performed by: INTERNAL MEDICINE

## 2023-11-02 PROCEDURE — G8420 CALC BMI NORM PARAMETERS: HCPCS | Performed by: INTERNAL MEDICINE

## 2023-11-02 PROCEDURE — 99214 OFFICE O/P EST MOD 30 MIN: CPT | Performed by: INTERNAL MEDICINE

## 2023-11-02 PROCEDURE — G8484 FLU IMMUNIZE NO ADMIN: HCPCS | Performed by: INTERNAL MEDICINE

## 2023-11-02 NOTE — PROGRESS NOTES
Omar Mcgregor is a 75 y.o. male presents with atrial fibrillation. He had a pulmonary vein isolation and has not had any significant burden of atrial fibrillation since that time. He is maintained on atenolol and rivaroxaban. Overall he feels well with no chest pain shortness of breath or exercise intolerance. Review of Systems   Constitutional: Negative for fever, chills, diaphoresis, activity change, appetite change, fatigue and unexpected weight change. Eyes: Negative for photophobia, pain, redness and visual disturbance. Respiratory: Negative for apnea, cough, chest tightness, shortness of breath, wheezing and stridor. Cardiovascular: Negative for chest pain, palpitations and leg swelling. Gastrointestinal: Negative for abdominal distention. Genitourinary: Negative for dysuria, urgency and frequency. Musculoskeletal: Negative for myalgias, arthralgias and gait problem. Skin: Negative for color change, pallor, rash and wound. Neurological: Negative for dizziness, tremors, speech difficulty, weakness and numbness. Hematological: Does not bruise/bleed easily. Psychiatric/Behavioral: Negative.           Social History     Socioeconomic History    Marital status:      Spouse name: Concha Saldivar    Number of children: Not on file    Years of education: Not on file    Highest education level: Not on file   Occupational History    Not on file   Tobacco Use    Smoking status: Never    Smokeless tobacco: Never   Vaping Use    Vaping Use: Never used   Substance and Sexual Activity    Alcohol use: No    Drug use: No    Sexual activity: Not on file   Other Topics Concern    Not on file   Social History Narrative    Not on file     Social Determinants of Health     Financial Resource Strain: Low Risk  (5/22/2023)    Overall Financial Resource Strain (CARDIA)     Difficulty of Paying Living Expenses: Not hard at all   Food Insecurity: No Food Insecurity (5/22/2023)    Hunger Vital Sign

## 2023-11-09 DIAGNOSIS — I48.20 CHRONIC ATRIAL FIBRILLATION (HCC): ICD-10-CM

## 2023-11-09 DIAGNOSIS — G62.9 PERIPHERAL NERVE DISEASE: ICD-10-CM

## 2023-11-09 RX ORDER — RIVAROXABAN 20 MG/1
TABLET, FILM COATED ORAL
Qty: 90 TABLET | Refills: 1 | Status: SHIPPED | OUTPATIENT
Start: 2023-11-09

## 2023-11-09 RX ORDER — PREGABALIN 75 MG/1
CAPSULE ORAL
Qty: 180 CAPSULE | Refills: 0 | Status: SHIPPED | OUTPATIENT
Start: 2023-11-09 | End: 2024-02-07

## 2023-11-09 NOTE — TELEPHONE ENCOUNTER
Alvaro Pena called to request a refill on his medication. Last office visit : 9/19/2023   Next office visit : 12/12/2023     Last UDS:   Amphetamine Screen, Urine   Date Value Ref Range Status   02/08/2022 neg  Final     Barbiturate Screen, Urine   Date Value Ref Range Status   02/08/2022 neg  Final     Benzodiazepine Screen, Urine   Date Value Ref Range Status   02/08/2022 neg  Final     Buprenorphine Urine   Date Value Ref Range Status   02/08/2022 neg  Final     Cocaine Metabolite Screen, Urine   Date Value Ref Range Status   02/08/2022 neg  Final     Gabapentin Screen, Urine   Date Value Ref Range Status   02/08/2022 neg  Final     MDMA, Urine   Date Value Ref Range Status   02/08/2022 neg  Final     Methamphetamine, Urine   Date Value Ref Range Status   02/08/2022 neg  Final     Opiate Scrn, Ur   Date Value Ref Range Status   02/08/2022 neg  Final     Oxycodone Screen, Ur   Date Value Ref Range Status   02/08/2022 neg  Final     PCP Screen, Urine   Date Value Ref Range Status   02/08/2022 neg  Final     Propoxyphene Screen, Urine   Date Value Ref Range Status   02/08/2022 neg  Final     THC Screen, Urine   Date Value Ref Range Status   02/08/2022 neg  Final     Tricyclic Antidepressants, Urine   Date Value Ref Range Status   02/08/2022 neg  Final       Last Shazia Jose: 5/22/23  Medication Contract: 5/2023     Requested Prescriptions     Pending Prescriptions Disp Refills    pregabalin (LYRICA) 75 MG capsule [Pharmacy Med Name: Pregabalin 75 MG Oral Capsule] 180 capsule 0     Sig: Take 1 capsule by mouth twice daily    XARELTO 20 MG TABS tablet [Pharmacy Med Name: Xarelto 20 MG Oral Tablet] 90 tablet 1     Sig: TAKE 1 TABLET BY MOUTH ONCE DAILY WITH EVENING MEAL         Please approve or refuse this medication.    Frida Lowe MA

## 2023-11-20 ENCOUNTER — OFFICE VISIT (OUTPATIENT)
Dept: INTERNAL MEDICINE | Age: 73
End: 2023-11-20
Payer: MEDICARE

## 2023-11-20 ENCOUNTER — HOSPITAL ENCOUNTER (OUTPATIENT)
Dept: GENERAL RADIOLOGY | Age: 73
Discharge: HOME | End: 2023-11-20
Payer: COMMERCIAL

## 2023-11-20 VITALS
OXYGEN SATURATION: 91 % | SYSTOLIC BLOOD PRESSURE: 130 MMHG | DIASTOLIC BLOOD PRESSURE: 76 MMHG | BODY MASS INDEX: 27.76 KG/M2 | WEIGHT: 188 LBS | HEART RATE: 63 BPM

## 2023-11-20 DIAGNOSIS — J20.8 ACUTE BRONCHITIS DUE TO OTHER SPECIFIED ORGANISMS: ICD-10-CM

## 2023-11-20 DIAGNOSIS — D86.0 SARCOIDOSIS, LUNG (HCC): Primary | ICD-10-CM

## 2023-11-20 DIAGNOSIS — J18.9 PNEUMONIA DUE TO INFECTIOUS ORGANISM, UNSPECIFIED LATERALITY, UNSPECIFIED PART OF LUNG: Primary | ICD-10-CM

## 2023-11-20 PROCEDURE — G8484 FLU IMMUNIZE NO ADMIN: HCPCS | Performed by: NURSE PRACTITIONER

## 2023-11-20 PROCEDURE — G8427 DOCREV CUR MEDS BY ELIG CLIN: HCPCS | Performed by: NURSE PRACTITIONER

## 2023-11-20 PROCEDURE — 3017F COLORECTAL CA SCREEN DOC REV: CPT | Performed by: NURSE PRACTITIONER

## 2023-11-20 PROCEDURE — 1036F TOBACCO NON-USER: CPT | Performed by: NURSE PRACTITIONER

## 2023-11-20 PROCEDURE — 71046 X-RAY EXAM CHEST 2 VIEWS: CPT

## 2023-11-20 PROCEDURE — 3078F DIAST BP <80 MM HG: CPT | Performed by: NURSE PRACTITIONER

## 2023-11-20 PROCEDURE — G8417 CALC BMI ABV UP PARAM F/U: HCPCS | Performed by: NURSE PRACTITIONER

## 2023-11-20 PROCEDURE — 1123F ACP DISCUSS/DSCN MKR DOCD: CPT | Performed by: NURSE PRACTITIONER

## 2023-11-20 PROCEDURE — 99213 OFFICE O/P EST LOW 20 MIN: CPT | Performed by: NURSE PRACTITIONER

## 2023-11-20 PROCEDURE — 3075F SYST BP GE 130 - 139MM HG: CPT | Performed by: NURSE PRACTITIONER

## 2023-11-20 RX ORDER — LEVOFLOXACIN 500 MG/1
500 TABLET, FILM COATED ORAL DAILY
Qty: 7 TABLET | Refills: 0 | Status: SHIPPED | OUTPATIENT
Start: 2023-11-20 | End: 2023-11-27

## 2023-11-20 RX ORDER — LEVALBUTEROL 1.25 MG/.5ML
1.25 SOLUTION, CONCENTRATE RESPIRATORY (INHALATION) EVERY 4 HOURS PRN
Qty: 30 EACH | Refills: 2 | Status: SHIPPED | OUTPATIENT
Start: 2023-11-20

## 2023-11-20 RX ORDER — METHYLPREDNISOLONE ACETATE 80 MG/ML
80 INJECTION, SUSPENSION INTRA-ARTICULAR; INTRALESIONAL; INTRAMUSCULAR; SOFT TISSUE ONCE
Status: COMPLETED | OUTPATIENT
Start: 2023-11-20 | End: 2023-11-20

## 2023-11-20 RX ADMIN — METHYLPREDNISOLONE ACETATE 80 MG: 80 INJECTION, SUSPENSION INTRA-ARTICULAR; INTRALESIONAL; INTRAMUSCULAR; SOFT TISSUE at 15:36

## 2023-11-20 ASSESSMENT — ENCOUNTER SYMPTOMS
EYE DISCHARGE: 0
ABDOMINAL DISTENTION: 0
TROUBLE SWALLOWING: 0
SHORTNESS OF BREATH: 1
BLOOD IN STOOL: 0
STRIDOR: 0
EYE ITCHING: 0
ABDOMINAL PAIN: 0
NAUSEA: 0
COLOR CHANGE: 0
COUGH: 1
CONSTIPATION: 0
VOMITING: 0
DIARRHEA: 0
CHOKING: 0
WHEEZING: 0
SORE THROAT: 0

## 2023-11-20 NOTE — PROGRESS NOTES
coordinating care   EMRDragon/transcription disclaimer:  Much of this encounter note is electronic transcription/translation of spoken language to printed texts. The electronic translation of spoken language may be erroneous, or at times,nonsensical words or phrases may be inadvertently transcribed.   Although I have reviewed the note for such errors, some may still exist.

## 2023-11-20 NOTE — PATIENT INSTRUCTIONS
Cough and congestion;  levaquin 500 daily for 7 days     Acute sinusitis  Medrol    80  IM   Levaquin  500 daily  start today     mucinex 1200 mg twice daily for 7 days with plenty of water  Delsym cough syrup up to 3 times daily as needed for cough   Ricola cough drops, honey lemon in pink bag;  has echanesia to help build your immunity   Xopenex  nebulizer 3 times a days

## 2023-12-19 PROBLEM — I20.9 ANGINA PECTORIS, UNSPECIFIED (HCC): Status: ACTIVE | Noted: 2023-12-19

## 2023-12-19 PROBLEM — I25.119 ATHEROSCLEROTIC HEART DISEASE OF NATIVE CORONARY ARTERY WITH UNSPECIFIED ANGINA PECTORIS (HCC): Status: ACTIVE | Noted: 2023-12-19

## 2023-12-19 PROBLEM — G30.9 ALZHEIMER'S DISEASE, UNSPECIFIED (CODE) (HCC): Status: ACTIVE | Noted: 2023-12-19

## 2024-01-05 DIAGNOSIS — E11.42 TYPE 2 DIABETES MELLITUS WITH PERIPHERAL NEUROPATHY (HCC): ICD-10-CM

## 2024-01-05 DIAGNOSIS — I10 PRIMARY HYPERTENSION: ICD-10-CM

## 2024-01-05 LAB
ALBUMIN SERPL-MCNC: 3.9 G/DL (ref 3.5–5.2)
ALP SERPL-CCNC: 78 U/L (ref 40–130)
ALT SERPL-CCNC: 14 U/L (ref 5–41)
ANION GAP SERPL CALCULATED.3IONS-SCNC: 6 MMOL/L (ref 7–19)
AST SERPL-CCNC: 14 U/L (ref 5–40)
BASOPHILS # BLD: 0 K/UL (ref 0–0.2)
BASOPHILS NFR BLD: 0.4 % (ref 0–1)
BILIRUB SERPL-MCNC: 0.6 MG/DL (ref 0.2–1.2)
BILIRUB UR QL STRIP: NEGATIVE
BUN SERPL-MCNC: 19 MG/DL (ref 8–23)
CALCIUM SERPL-MCNC: 9.6 MG/DL (ref 8.8–10.2)
CHLORIDE SERPL-SCNC: 102 MMOL/L (ref 98–111)
CLARITY UR: CLEAR
CO2 SERPL-SCNC: 32 MMOL/L (ref 22–29)
COLOR UR: YELLOW
CREAT SERPL-MCNC: 1.1 MG/DL (ref 0.5–1.2)
EOSINOPHIL # BLD: 0.1 K/UL (ref 0–0.6)
EOSINOPHIL NFR BLD: 1.8 % (ref 0–5)
ERYTHROCYTE [DISTWIDTH] IN BLOOD BY AUTOMATED COUNT: 14.6 % (ref 11.5–14.5)
GLUCOSE SERPL-MCNC: 213 MG/DL (ref 74–109)
GLUCOSE UR STRIP.AUTO-MCNC: =>1000 MG/DL
HBA1C MFR BLD: 10.2 % (ref 4–6)
HCT VFR BLD AUTO: 36.3 % (ref 42–52)
HGB BLD-MCNC: 12.1 G/DL (ref 14–18)
HGB UR STRIP.AUTO-MCNC: NEGATIVE MG/L
IMM GRANULOCYTES # BLD: 0.1 K/UL
KETONES UR STRIP.AUTO-MCNC: ABNORMAL MG/DL
LEUKOCYTE ESTERASE UR QL STRIP.AUTO: NEGATIVE
LYMPHOCYTES # BLD: 1.3 K/UL (ref 1.1–4.5)
LYMPHOCYTES NFR BLD: 28 % (ref 20–40)
MCH RBC QN AUTO: 29 PG (ref 27–31)
MCHC RBC AUTO-ENTMCNC: 33.3 G/DL (ref 33–37)
MCV RBC AUTO: 87.1 FL (ref 80–94)
MONOCYTES # BLD: 0.5 K/UL (ref 0–0.9)
MONOCYTES NFR BLD: 10.4 % (ref 0–10)
NEUTROPHILS # BLD: 2.6 K/UL (ref 1.5–7.5)
NEUTS SEG NFR BLD: 58.3 % (ref 50–65)
NITRITE UR QL STRIP.AUTO: NEGATIVE
PH UR STRIP.AUTO: 5.5 [PH] (ref 5–8)
PLATELET # BLD AUTO: 152 K/UL (ref 130–400)
PMV BLD AUTO: 11 FL (ref 9.4–12.4)
POTASSIUM SERPL-SCNC: 4.3 MMOL/L (ref 3.5–5)
PROT SERPL-MCNC: 6.9 G/DL (ref 6.6–8.7)
PROT UR STRIP.AUTO-MCNC: ABNORMAL MG/DL
RBC # BLD AUTO: 4.17 M/UL (ref 4.7–6.1)
SODIUM SERPL-SCNC: 140 MMOL/L (ref 136–145)
SP GR UR STRIP.AUTO: 1.03 (ref 1–1.03)
UROBILINOGEN UR STRIP.AUTO-MCNC: 1 E.U./DL
WBC # BLD AUTO: 4.5 K/UL (ref 4.8–10.8)

## 2024-01-09 ENCOUNTER — OFFICE VISIT (OUTPATIENT)
Dept: INTERNAL MEDICINE | Age: 74
End: 2024-01-09
Payer: MEDICARE

## 2024-01-09 VITALS
SYSTOLIC BLOOD PRESSURE: 133 MMHG | HEIGHT: 69 IN | OXYGEN SATURATION: 97 % | BODY MASS INDEX: 27.55 KG/M2 | DIASTOLIC BLOOD PRESSURE: 66 MMHG | HEART RATE: 60 BPM | WEIGHT: 186 LBS

## 2024-01-09 DIAGNOSIS — I25.119 ATHEROSCLEROSIS OF NATIVE CORONARY ARTERY OF NATIVE HEART WITH ANGINA PECTORIS (HCC): ICD-10-CM

## 2024-01-09 DIAGNOSIS — I48.0 PAROXYSMAL ATRIAL FIBRILLATION (HCC): ICD-10-CM

## 2024-01-09 DIAGNOSIS — J44.9 CHRONIC OBSTRUCTIVE PULMONARY DISEASE, UNSPECIFIED COPD TYPE (HCC): ICD-10-CM

## 2024-01-09 DIAGNOSIS — I10 PRIMARY HYPERTENSION: Primary | ICD-10-CM

## 2024-01-09 DIAGNOSIS — G62.9 PERIPHERAL NERVE DISEASE: ICD-10-CM

## 2024-01-09 DIAGNOSIS — E11.42 TYPE 2 DIABETES MELLITUS WITH PERIPHERAL NEUROPATHY (HCC): ICD-10-CM

## 2024-01-09 DIAGNOSIS — Z00.00 MEDICARE ANNUAL WELLNESS VISIT, SUBSEQUENT: ICD-10-CM

## 2024-01-09 DIAGNOSIS — G30.9 ALZHEIMER'S DISEASE, UNSPECIFIED (CODE) (HCC): ICD-10-CM

## 2024-01-09 DIAGNOSIS — E78.2 MIXED HYPERLIPIDEMIA: ICD-10-CM

## 2024-01-09 PROCEDURE — 1123F ACP DISCUSS/DSCN MKR DOCD: CPT | Performed by: NURSE PRACTITIONER

## 2024-01-09 PROCEDURE — 3017F COLORECTAL CA SCREEN DOC REV: CPT | Performed by: NURSE PRACTITIONER

## 2024-01-09 PROCEDURE — G8484 FLU IMMUNIZE NO ADMIN: HCPCS | Performed by: NURSE PRACTITIONER

## 2024-01-09 PROCEDURE — 1036F TOBACCO NON-USER: CPT | Performed by: NURSE PRACTITIONER

## 2024-01-09 PROCEDURE — 99214 OFFICE O/P EST MOD 30 MIN: CPT | Performed by: NURSE PRACTITIONER

## 2024-01-09 PROCEDURE — 3023F SPIROM DOC REV: CPT | Performed by: NURSE PRACTITIONER

## 2024-01-09 PROCEDURE — 3046F HEMOGLOBIN A1C LEVEL >9.0%: CPT | Performed by: NURSE PRACTITIONER

## 2024-01-09 PROCEDURE — G8417 CALC BMI ABV UP PARAM F/U: HCPCS | Performed by: NURSE PRACTITIONER

## 2024-01-09 PROCEDURE — 3075F SYST BP GE 130 - 139MM HG: CPT | Performed by: NURSE PRACTITIONER

## 2024-01-09 PROCEDURE — G0439 PPPS, SUBSEQ VISIT: HCPCS | Performed by: NURSE PRACTITIONER

## 2024-01-09 PROCEDURE — G8427 DOCREV CUR MEDS BY ELIG CLIN: HCPCS | Performed by: NURSE PRACTITIONER

## 2024-01-09 PROCEDURE — 3078F DIAST BP <80 MM HG: CPT | Performed by: NURSE PRACTITIONER

## 2024-01-09 PROCEDURE — 2022F DILAT RTA XM EVC RTNOPTHY: CPT | Performed by: NURSE PRACTITIONER

## 2024-01-09 ASSESSMENT — ENCOUNTER SYMPTOMS
CHOKING: 0
BLOOD IN STOOL: 0
SORE THROAT: 0
SHORTNESS OF BREATH: 0
ABDOMINAL PAIN: 0
COUGH: 1
NAUSEA: 0
TROUBLE SWALLOWING: 0
VOMITING: 0
EYE DISCHARGE: 0
STRIDOR: 0
WHEEZING: 0
ABDOMINAL DISTENTION: 0
DIARRHEA: 0
CONSTIPATION: 0
EYE ITCHING: 0
COLOR CHANGE: 0

## 2024-01-09 ASSESSMENT — LIFESTYLE VARIABLES
HOW OFTEN DO YOU HAVE A DRINK CONTAINING ALCOHOL: NEVER
HOW MANY STANDARD DRINKS CONTAINING ALCOHOL DO YOU HAVE ON A TYPICAL DAY: PATIENT DOES NOT DRINK

## 2024-01-09 ASSESSMENT — PATIENT HEALTH QUESTIONNAIRE - PHQ9
6. FEELING BAD ABOUT YOURSELF - OR THAT YOU ARE A FAILURE OR HAVE LET YOURSELF OR YOUR FAMILY DOWN: 0
SUM OF ALL RESPONSES TO PHQ QUESTIONS 1-9: 0
SUM OF ALL RESPONSES TO PHQ QUESTIONS 1-9: 0
1. LITTLE INTEREST OR PLEASURE IN DOING THINGS: 0
4. FEELING TIRED OR HAVING LITTLE ENERGY: 0
9. THOUGHTS THAT YOU WOULD BE BETTER OFF DEAD, OR OF HURTING YOURSELF: 0
10. IF YOU CHECKED OFF ANY PROBLEMS, HOW DIFFICULT HAVE THESE PROBLEMS MADE IT FOR YOU TO DO YOUR WORK, TAKE CARE OF THINGS AT HOME, OR GET ALONG WITH OTHER PEOPLE: 0
SUM OF ALL RESPONSES TO PHQ QUESTIONS 1-9: 0
8. MOVING OR SPEAKING SO SLOWLY THAT OTHER PEOPLE COULD HAVE NOTICED. OR THE OPPOSITE, BEING SO FIGETY OR RESTLESS THAT YOU HAVE BEEN MOVING AROUND A LOT MORE THAN USUAL: 0
SUM OF ALL RESPONSES TO PHQ QUESTIONS 1-9: 0
5. POOR APPETITE OR OVEREATING: 0
SUM OF ALL RESPONSES TO PHQ9 QUESTIONS 1 & 2: 0
2. FEELING DOWN, DEPRESSED OR HOPELESS: 0
3. TROUBLE FALLING OR STAYING ASLEEP: 0
7. TROUBLE CONCENTRATING ON THINGS, SUCH AS READING THE NEWSPAPER OR WATCHING TELEVISION: 0

## 2024-01-09 NOTE — PROGRESS NOTES
[facility]  [facility] INTERNAL MEDICINE  [address] [place] [address] DRIVE  [address]  [place] [address]  Dept: [phone]  Dept Fax: [phone]  Loc: [phone]    Alvaro Pena (:  1950) is a 73 y.o. male,Established patient  with green , here for evaluation of the following chief complaint(s): Medicare AWV      Alvaro Pena is a 73 y.o. male who presents today for his medical conditions/complaints as noted below.  Alvaro Pena is c/alexys Medicare AWV        HPI:     Chief Complaint   Patient presents with    Medicare AWV     @pt is   accompanied by wife  HPI       Hypertension stable with atenolol 25 and losartan 100  TYPE II DM   on lantus 14 units and ozempic 2 mg weekly   a1c is up to 10.2 and fasting is 213 he only eats 2 meals a day;   all his meals are carbs;  ;  he had quit the lantus and ozempic as sugars were too low; on just glipizide    but now they have shot back  up with steroids shots that he got for pneumonia   PAF sinus today with no symptoms on atenolol 25 and Xarelto 20 he is also on aspirin 81  Peripheral neuropathy  stable with lyrica 75 mg daily  Hyperlipidemia he is on Lipitor 10 mg at bedtime  cholesterol is 121 and trigs are 103   Mild Alzheimer's with memory impairment he is stable with Aricept 10 mg at bedtime  7.  Coronary artery disease stable no recent chest pain  8.  COPD with sarcoid lung stable followed by pulmonology  CONTROLLED SUBSTANCE  Drug Lyrica  Diagnosis peripheral neuropathy  Last Ulises 2024   Pain contract last date 2023  UDS  2023  Effective dose   yes      Changes this visit   none     I have explained to the patient the risks of chronic opioid therapy (including but not limited to, risk of accidental overdose/death, addiction, dependency/withdrawal, tolerance, hyperalgesia, constipation, dizziness/drowsiness, nausea/vomiting, falls, respiratory depression and constipation), as well as it's lack of evidence for safety and

## 2024-02-06 ENCOUNTER — HOSPITAL ENCOUNTER (OUTPATIENT)
Dept: GENERAL RADIOLOGY | Age: 74
Discharge: HOME | End: 2024-02-06
Payer: COMMERCIAL

## 2024-02-06 ENCOUNTER — OFFICE VISIT (OUTPATIENT)
Dept: INTERNAL MEDICINE | Age: 74
End: 2024-02-06
Payer: MEDICARE

## 2024-02-06 VITALS
HEART RATE: 67 BPM | TEMPERATURE: 97.6 F | BODY MASS INDEX: 27.62 KG/M2 | DIASTOLIC BLOOD PRESSURE: 68 MMHG | OXYGEN SATURATION: 99 % | WEIGHT: 187 LBS | SYSTOLIC BLOOD PRESSURE: 146 MMHG

## 2024-02-06 DIAGNOSIS — R06.2 WHEEZING: ICD-10-CM

## 2024-02-06 DIAGNOSIS — D86.0 SARCOIDOSIS, LUNG (HCC): ICD-10-CM

## 2024-02-06 DIAGNOSIS — J20.8 ACUTE BRONCHITIS DUE TO OTHER SPECIFIED ORGANISMS: Primary | ICD-10-CM

## 2024-02-06 PROCEDURE — G8427 DOCREV CUR MEDS BY ELIG CLIN: HCPCS | Performed by: NURSE PRACTITIONER

## 2024-02-06 PROCEDURE — G8417 CALC BMI ABV UP PARAM F/U: HCPCS | Performed by: NURSE PRACTITIONER

## 2024-02-06 PROCEDURE — G8484 FLU IMMUNIZE NO ADMIN: HCPCS | Performed by: NURSE PRACTITIONER

## 2024-02-06 PROCEDURE — 71046 X-RAY EXAM CHEST 2 VIEWS: CPT

## 2024-02-06 PROCEDURE — 3017F COLORECTAL CA SCREEN DOC REV: CPT | Performed by: NURSE PRACTITIONER

## 2024-02-06 PROCEDURE — 99213 OFFICE O/P EST LOW 20 MIN: CPT | Performed by: NURSE PRACTITIONER

## 2024-02-06 PROCEDURE — 3078F DIAST BP <80 MM HG: CPT | Performed by: NURSE PRACTITIONER

## 2024-02-06 PROCEDURE — 1123F ACP DISCUSS/DSCN MKR DOCD: CPT | Performed by: NURSE PRACTITIONER

## 2024-02-06 PROCEDURE — 3077F SYST BP >= 140 MM HG: CPT | Performed by: NURSE PRACTITIONER

## 2024-02-06 PROCEDURE — 1036F TOBACCO NON-USER: CPT | Performed by: NURSE PRACTITIONER

## 2024-02-06 PROCEDURE — 96372 THER/PROPH/DIAG INJ SC/IM: CPT | Performed by: NURSE PRACTITIONER

## 2024-02-06 RX ORDER — METHYLPREDNISOLONE ACETATE 80 MG/ML
80 INJECTION, SUSPENSION INTRA-ARTICULAR; INTRALESIONAL; INTRAMUSCULAR; SOFT TISSUE ONCE
Status: COMPLETED | OUTPATIENT
Start: 2024-02-06 | End: 2024-02-06

## 2024-02-06 RX ORDER — LEVOFLOXACIN 500 MG/1
500 TABLET, FILM COATED ORAL DAILY
Qty: 7 TABLET | Refills: 0 | Status: SHIPPED | OUTPATIENT
Start: 2024-02-06 | End: 2024-02-13

## 2024-02-06 RX ADMIN — METHYLPREDNISOLONE ACETATE 80 MG: 80 INJECTION, SUSPENSION INTRA-ARTICULAR; INTRALESIONAL; INTRAMUSCULAR; SOFT TISSUE at 15:01

## 2024-02-06 ASSESSMENT — ENCOUNTER SYMPTOMS
COUGH: 1
BACK PAIN: 1
SHORTNESS OF BREATH: 1

## 2024-02-06 NOTE — PROGRESS NOTES
[facility]  [facility] INTERNAL MEDICINE  [address] [place] [address] DRIVE  [address]  [place] [address]  Dept: [phone]  Dept Fax: [phone]  Loc: [phone]    Alvaro Pena is a 73 y.o. male who presents today for his medical conditions/complaintsas noted below.  Alvaro Pena is c/o of Cough, Back Pain, and Shortness of Breath        HPI:     Alvaro presents today with cough, wheezing, left sided lung/back pain, and sob. He has a history of scarodosis of the lungs. He has history of pneumonia and bronchitis. Last pneumonia was in November. He feels similar. This round has been ongoing for a few days. He has been using his ventolin and advair inhalers. No fever. Started wearing his o2 at home.   Past Medical History:   Diagnosis Date    Achalasia     going to [facility] for surgery in March    Acute pancreatitis     Anemia     Asthma     Atrial fibrillation (HCC)     h/o paroxysmal a-fib ? anesthsia induced    Benign colonic polyp     cscope 12/07 Dr Majano adenomatous: 12/14 adenoma    Chest pain     Chronic kidney disease     Chronic pain syndrome     Depression     Diabetic peripheral neuropathy (HCC)     Extrinsic asthma     Hyperlipidemia     Hypertension     Idiopathic peripheral neuropathy     Lyme disease     Mediastinal lymphadenopathy     Microalbuminuria     Mixed hyperlipidemia     Paroxysmal A-fib (HCC)     S/P ablation of atrial fibrillation     4/16 A fib ablation , Dr Chaparro, [facility]     Sarcoidosis, lung (HCC)     restrictive lung impairment    Tick bite     Type 2 diabetes, controlled, with neuropathy (HCC)      Past Surgical History:   Procedure Laterality Date    ANKLE FRACTURE SURGERY  april 14, 2015    ATRIAL ABLATION SURGERY  2015    Dr. Chaparro- [facility]    BACK SURGERY  10/06/2020    CARDIAC SURGERY      Catheter Ablation A-fib    CHOLECYSTECTOMY      COLONOSCOPY  12/02/2014    [facility]    CYSTOSCOPY Left 8/16/2019    CYSTOSCOPY; LEFT RETROGRADE PYELOGRAM; INSERTION

## 2024-02-06 NOTE — PROGRESS NOTES
After obtaining consent, and per orders of Ly Gonzalez, injection of Depo Medrol 80 given in Dorsogluteal Right by Edilia Suiter, ELIEL. Patient tolerated injection well and left with no complaints.

## 2024-02-16 DIAGNOSIS — G62.9 PERIPHERAL NERVE DISEASE: ICD-10-CM

## 2024-02-16 NOTE — TELEPHONE ENCOUNTER
Alvaro Pena called to request a refill on his medication.      Last office visit : 2/6/2024   Next office visit : 4/9/2024     Last UDS:   Amphetamine Screen, Urine   Date Value Ref Range Status   02/08/2022 neg  Final     Barbiturate Screen, Urine   Date Value Ref Range Status   02/08/2022 neg  Final     Benzodiazepine Screen, Urine   Date Value Ref Range Status   02/08/2022 neg  Final     Buprenorphine Urine   Date Value Ref Range Status   02/08/2022 neg  Final     Cocaine Metabolite Screen, Urine   Date Value Ref Range Status   02/08/2022 neg  Final     Gabapentin Screen, Urine   Date Value Ref Range Status   02/08/2022 neg  Final     MDMA, Urine   Date Value Ref Range Status   02/08/2022 neg  Final     Methamphetamine, Urine   Date Value Ref Range Status   02/08/2022 neg  Final     Opiate Scrn, Ur   Date Value Ref Range Status   02/08/2022 neg  Final     Oxycodone Screen, Ur   Date Value Ref Range Status   02/08/2022 neg  Final     PCP Screen, Urine   Date Value Ref Range Status   02/08/2022 neg  Final     Propoxyphene Screen, Urine   Date Value Ref Range Status   02/08/2022 neg  Final     THC Screen, Urine   Date Value Ref Range Status   02/08/2022 neg  Final     Tricyclic Antidepressants, Urine   Date Value Ref Range Status   02/08/2022 neg  Final       Last Ulises: 12/18/23  Medication Contract: 5/22/23   Last Fill: 11/9/23    Requested Prescriptions     Pending Prescriptions Disp Refills    pregabalin (LYRICA) 75 MG capsule [Pharmacy Med Name: Pregabalin 75 MG Oral Capsule] 180 capsule 0     Sig: Take 1 capsule by mouth twice daily    losartan (COZAAR) 100 MG tablet [Pharmacy Med Name: Losartan Potassium 100 MG Oral Tablet] 90 tablet 0     Sig: Take 1 tablet by mouth once daily         Please approve or refuse this medication.   Benjamin Gomez MA

## 2024-02-19 RX ORDER — PREGABALIN 75 MG/1
CAPSULE ORAL
Qty: 180 CAPSULE | Refills: 0 | Status: SHIPPED | OUTPATIENT
Start: 2024-02-19 | End: 2024-05-19

## 2024-02-19 RX ORDER — LOSARTAN POTASSIUM 100 MG/1
TABLET ORAL
Qty: 90 TABLET | Refills: 0 | Status: SHIPPED | OUTPATIENT
Start: 2024-02-19

## 2024-02-28 ENCOUNTER — OFFICE VISIT (OUTPATIENT)
Dept: CARDIOLOGY CLINIC | Age: 74
End: 2024-02-28
Payer: MEDICARE

## 2024-02-28 VITALS
HEIGHT: 69 IN | HEART RATE: 53 BPM | BODY MASS INDEX: 27.4 KG/M2 | SYSTOLIC BLOOD PRESSURE: 116 MMHG | DIASTOLIC BLOOD PRESSURE: 74 MMHG | OXYGEN SATURATION: 98 % | WEIGHT: 185 LBS

## 2024-02-28 DIAGNOSIS — I48.0 PAROXYSMAL ATRIAL FIBRILLATION (HCC): Primary | ICD-10-CM

## 2024-02-28 PROCEDURE — 1036F TOBACCO NON-USER: CPT | Performed by: INTERNAL MEDICINE

## 2024-02-28 PROCEDURE — 93000 ELECTROCARDIOGRAM COMPLETE: CPT | Performed by: INTERNAL MEDICINE

## 2024-02-28 PROCEDURE — 3078F DIAST BP <80 MM HG: CPT | Performed by: INTERNAL MEDICINE

## 2024-02-28 PROCEDURE — G8427 DOCREV CUR MEDS BY ELIG CLIN: HCPCS | Performed by: INTERNAL MEDICINE

## 2024-02-28 PROCEDURE — 3017F COLORECTAL CA SCREEN DOC REV: CPT | Performed by: INTERNAL MEDICINE

## 2024-02-28 PROCEDURE — 99213 OFFICE O/P EST LOW 20 MIN: CPT | Performed by: INTERNAL MEDICINE

## 2024-02-28 PROCEDURE — 3074F SYST BP LT 130 MM HG: CPT | Performed by: INTERNAL MEDICINE

## 2024-02-28 PROCEDURE — 1123F ACP DISCUSS/DSCN MKR DOCD: CPT | Performed by: INTERNAL MEDICINE

## 2024-02-28 PROCEDURE — G8484 FLU IMMUNIZE NO ADMIN: HCPCS | Performed by: INTERNAL MEDICINE

## 2024-02-28 PROCEDURE — G8417 CALC BMI ABV UP PARAM F/U: HCPCS | Performed by: INTERNAL MEDICINE

## 2024-02-28 NOTE — PROGRESS NOTES
Alvaro Pena is a 73 y.o. male presents with history of atrial fibrillation status post pulmonary vein isolation.  He is on anticoagulation and having no major bleeding.  He has not had any arrhythmias since he has been on the atenolol.      Review of Systems   Constitutional: Negative for fever, chills, diaphoresis, activity change, appetite change, fatigue and unexpected weight change.   Eyes: Negative for photophobia, pain, redness and visual disturbance.   Respiratory: Negative for apnea, cough, chest tightness, shortness of breath, wheezing and stridor.    Cardiovascular: Negative for chest pain, palpitations and leg swelling.   Gastrointestinal: Negative for abdominal distention.   Genitourinary: Negative for dysuria, urgency and frequency.   Musculoskeletal: Negative for myalgias, arthralgias and gait problem.   Skin: Negative for color change, pallor, rash and wound.   Neurological: Negative for dizziness, tremors, speech difficulty, weakness and numbness.   Hematological: Does not bruise/bleed easily.   Psychiatric/Behavioral: Negative.          Social History     Socioeconomic History    Marital status:      Spouse name: Jasmyne Pena    Number of children: Not on file    Years of education: Not on file    Highest education level: Not on file   Occupational History    Not on file   Tobacco Use    Smoking status: Never    Smokeless tobacco: Never   Vaping Use    Vaping Use: Never used   Substance and Sexual Activity    Alcohol use: No    Drug use: No    Sexual activity: Not on file   Other Topics Concern    Not on file   Social History Narrative    Not on file     Social Determinants of Health     Financial Resource Strain: Low Risk  (5/22/2023)    Overall Financial Resource Strain (CARDIA)     Difficulty of Paying Living Expenses: Not hard at all   Food Insecurity: Not on file (5/22/2023)   Transportation Needs: Unknown (5/22/2023)    PRAPARE - Transportation     Lack of Transportation

## 2024-03-23 DIAGNOSIS — J44.9 COPD, MODERATE: ICD-10-CM

## 2024-03-25 RX ORDER — FLUTICASONE PROPIONATE AND SALMETEROL 500; 50 UG/1; UG/1
POWDER RESPIRATORY (INHALATION)
Qty: 60 EACH | Refills: 3 | Status: SHIPPED | OUTPATIENT
Start: 2024-03-25

## 2024-03-25 NOTE — TELEPHONE ENCOUNTER
Rx Refill Note  Requested Prescriptions     Pending Prescriptions Disp Refills    Fluticasone-Salmeterol (ADVAIR/WIXELA) 500-50 MCG/ACT DISKUS [Pharmacy Med Name: Fluticasone-Salmeterol 500-50 MCG/DOSE Inhalation Aerosol Powder Breath Activated] 60 each 0     Sig: INHALE 1 DOSE BY MOUTH TWICE DAILY RINSE  AND  SPIT  AFTER  USING      Last office visit with prescribing clinician: 8/25/2023   Last telemedicine visit with prescribing clinician: Visit date not found   Next office visit with prescribing clinician: 8/26/2024                         Would you like a call back once the refill request has been completed: [] Yes [] No    If the office needs to give you a call back, can they leave a voicemail: [] Yes [] No    Machelle Ta MA  03/25/24, 08:50 CDT

## 2024-04-01 RX ORDER — GLIPIZIDE 5 MG/1
5 TABLET ORAL 2 TIMES DAILY
Qty: 180 TABLET | Refills: 0 | Status: SHIPPED | OUTPATIENT
Start: 2024-04-01

## 2024-04-01 NOTE — TELEPHONE ENCOUNTER
Alvaro called requesting a refill of the below medication which has been pended for you:     Requested Prescriptions     Pending Prescriptions Disp Refills    glipiZIDE (GLUCOTROL) 5 MG tablet [Pharmacy Med Name: glipiZIDE 5 MG Oral Tablet] 180 tablet 0     Sig: Take 1 tablet by mouth twice daily       Last Appointment Date: 1/9/2024  Next Appointment Date: 4/9/2024    Allergies   Allergen Reactions    Bee Venom Anaphylaxis    Gabapentin Other (See Comments)     Causes blisters to head.     Penicillins Hives     \"Causes blisters to break out everywhere\"    Toprol Xl [Metoprolol]      Leg pain

## 2024-04-09 ENCOUNTER — OFFICE VISIT (OUTPATIENT)
Dept: INTERNAL MEDICINE | Age: 74
End: 2024-04-09
Payer: MEDICARE

## 2024-04-09 VITALS
SYSTOLIC BLOOD PRESSURE: 135 MMHG | BODY MASS INDEX: 26.96 KG/M2 | DIASTOLIC BLOOD PRESSURE: 68 MMHG | OXYGEN SATURATION: 98 % | HEIGHT: 69 IN | HEART RATE: 68 BPM | WEIGHT: 182 LBS

## 2024-04-09 DIAGNOSIS — I10 PRIMARY HYPERTENSION: Primary | ICD-10-CM

## 2024-04-09 DIAGNOSIS — E78.2 MIXED HYPERLIPIDEMIA: ICD-10-CM

## 2024-04-09 DIAGNOSIS — E11.42 TYPE 2 DIABETES MELLITUS WITH PERIPHERAL NEUROPATHY (HCC): ICD-10-CM

## 2024-04-09 DIAGNOSIS — I48.0 PAF (PAROXYSMAL ATRIAL FIBRILLATION) (HCC): ICD-10-CM

## 2024-04-09 DIAGNOSIS — G30.9 ALZHEIMER'S DISEASE, UNSPECIFIED (CODE) (HCC): ICD-10-CM

## 2024-04-09 DIAGNOSIS — R53.83 FATIGUE, UNSPECIFIED TYPE: ICD-10-CM

## 2024-04-09 DIAGNOSIS — Z12.5 ENCOUNTER FOR PROSTATE CANCER SCREENING: ICD-10-CM

## 2024-04-09 DIAGNOSIS — I25.119 ATHEROSCLEROSIS OF NATIVE CORONARY ARTERY OF NATIVE HEART WITH ANGINA PECTORIS (HCC): ICD-10-CM

## 2024-04-09 DIAGNOSIS — J41.1 MUCOPURULENT CHRONIC BRONCHITIS (HCC): ICD-10-CM

## 2024-04-09 DIAGNOSIS — E55.9 VITAMIN D DEFICIENCY: ICD-10-CM

## 2024-04-09 DIAGNOSIS — E11.42 DIABETIC POLYNEUROPATHY ASSOCIATED WITH TYPE 2 DIABETES MELLITUS (HCC): Primary | ICD-10-CM

## 2024-04-09 DIAGNOSIS — G62.9 PERIPHERAL NERVE DISEASE: ICD-10-CM

## 2024-04-09 DIAGNOSIS — I10 PRIMARY HYPERTENSION: ICD-10-CM

## 2024-04-09 PROBLEM — J20.8 ACUTE BRONCHITIS DUE TO OTHER SPECIFIED ORGANISMS: Status: RESOLVED | Noted: 2023-11-20 | Resolved: 2024-04-09

## 2024-04-09 PROBLEM — N13.30 HYDRONEPHROSIS, LEFT: Status: RESOLVED | Noted: 2018-06-25 | Resolved: 2024-04-09

## 2024-04-09 LAB
25(OH)D3 SERPL-MCNC: 16 NG/ML
ALBUMIN SERPL-MCNC: 4.1 G/DL (ref 3.5–5.2)
ALP SERPL-CCNC: 71 U/L (ref 40–130)
ALT SERPL-CCNC: 13 U/L (ref 5–41)
ANION GAP SERPL CALCULATED.3IONS-SCNC: 9 MMOL/L (ref 7–19)
AST SERPL-CCNC: 15 U/L (ref 5–40)
BASOPHILS # BLD: 0 K/UL (ref 0–0.2)
BASOPHILS NFR BLD: 0.4 % (ref 0–1)
BILIRUB SERPL-MCNC: 0.6 MG/DL (ref 0.2–1.2)
BILIRUB UR QL STRIP: NEGATIVE
BUN SERPL-MCNC: 19 MG/DL (ref 8–23)
CALCIUM SERPL-MCNC: 9.8 MG/DL (ref 8.8–10.2)
CHLORIDE SERPL-SCNC: 105 MMOL/L (ref 98–111)
CHOLEST SERPL-MCNC: 90 MG/DL (ref 160–199)
CLARITY UR: CLEAR
CO2 SERPL-SCNC: 30 MMOL/L (ref 22–29)
COLOR UR: YELLOW
CREAT SERPL-MCNC: 1.2 MG/DL (ref 0.5–1.2)
EOSINOPHIL # BLD: 0.1 K/UL (ref 0–0.6)
EOSINOPHIL NFR BLD: 1.8 % (ref 0–5)
ERYTHROCYTE [DISTWIDTH] IN BLOOD BY AUTOMATED COUNT: 13.9 % (ref 11.5–14.5)
GLUCOSE SERPL-MCNC: 89 MG/DL (ref 74–109)
GLUCOSE UR STRIP.AUTO-MCNC: =>1000 MG/DL
HBA1C MFR BLD: 10.4 %
HCT VFR BLD AUTO: 36.8 % (ref 42–52)
HDLC SERPL-MCNC: 42 MG/DL (ref 55–121)
HGB BLD-MCNC: 12 G/DL (ref 14–18)
HGB UR STRIP.AUTO-MCNC: NEGATIVE MG/L
IMM GRANULOCYTES # BLD: 0 K/UL
KETONES UR STRIP.AUTO-MCNC: NEGATIVE MG/DL
LDLC SERPL CALC-MCNC: 37 MG/DL
LEUKOCYTE ESTERASE UR QL STRIP.AUTO: NEGATIVE
LYMPHOCYTES # BLD: 1.4 K/UL (ref 1.1–4.5)
LYMPHOCYTES NFR BLD: 28.9 % (ref 20–40)
MCH RBC QN AUTO: 28.4 PG (ref 27–31)
MCHC RBC AUTO-ENTMCNC: 32.6 G/DL (ref 33–37)
MCV RBC AUTO: 87 FL (ref 80–94)
MONOCYTES # BLD: 0.5 K/UL (ref 0–0.9)
MONOCYTES NFR BLD: 10.9 % (ref 0–10)
NEUTROPHILS # BLD: 2.8 K/UL (ref 1.5–7.5)
NEUTS SEG NFR BLD: 57.4 % (ref 50–65)
NITRITE UR QL STRIP.AUTO: NEGATIVE
PH UR STRIP.AUTO: 5.5 [PH] (ref 5–8)
PLATELET # BLD AUTO: 141 K/UL (ref 130–400)
PMV BLD AUTO: 10.7 FL (ref 9.4–12.4)
POTASSIUM SERPL-SCNC: 4.9 MMOL/L (ref 3.5–5)
PROT SERPL-MCNC: 6.6 G/DL (ref 6.6–8.7)
PROT UR STRIP.AUTO-MCNC: ABNORMAL MG/DL
RBC # BLD AUTO: 4.23 M/UL (ref 4.7–6.1)
SODIUM SERPL-SCNC: 144 MMOL/L (ref 136–145)
SP GR UR STRIP.AUTO: 1.02 (ref 1–1.03)
TRIGL SERPL-MCNC: 55 MG/DL (ref 0–149)
TSH SERPL DL<=0.005 MIU/L-ACNC: 5.94 UIU/ML (ref 0.27–4.2)
UROBILINOGEN UR STRIP.AUTO-MCNC: 1 E.U./DL
WBC # BLD AUTO: 4.9 K/UL (ref 4.8–10.8)

## 2024-04-09 PROCEDURE — G8417 CALC BMI ABV UP PARAM F/U: HCPCS | Performed by: NURSE PRACTITIONER

## 2024-04-09 PROCEDURE — 3017F COLORECTAL CA SCREEN DOC REV: CPT | Performed by: NURSE PRACTITIONER

## 2024-04-09 PROCEDURE — 83036 HEMOGLOBIN GLYCOSYLATED A1C: CPT | Performed by: NURSE PRACTITIONER

## 2024-04-09 PROCEDURE — G8427 DOCREV CUR MEDS BY ELIG CLIN: HCPCS | Performed by: NURSE PRACTITIONER

## 2024-04-09 PROCEDURE — 3023F SPIROM DOC REV: CPT | Performed by: NURSE PRACTITIONER

## 2024-04-09 PROCEDURE — 3046F HEMOGLOBIN A1C LEVEL >9.0%: CPT | Performed by: NURSE PRACTITIONER

## 2024-04-09 PROCEDURE — 1123F ACP DISCUSS/DSCN MKR DOCD: CPT | Performed by: NURSE PRACTITIONER

## 2024-04-09 PROCEDURE — 1036F TOBACCO NON-USER: CPT | Performed by: NURSE PRACTITIONER

## 2024-04-09 PROCEDURE — 99214 OFFICE O/P EST MOD 30 MIN: CPT | Performed by: NURSE PRACTITIONER

## 2024-04-09 PROCEDURE — 3078F DIAST BP <80 MM HG: CPT | Performed by: NURSE PRACTITIONER

## 2024-04-09 PROCEDURE — 2022F DILAT RTA XM EVC RTNOPTHY: CPT | Performed by: NURSE PRACTITIONER

## 2024-04-09 PROCEDURE — 3075F SYST BP GE 130 - 139MM HG: CPT | Performed by: NURSE PRACTITIONER

## 2024-04-09 RX ORDER — INSULIN GLARGINE 100 [IU]/ML
18 INJECTION, SOLUTION SUBCUTANEOUS NIGHTLY
Qty: 5 ADJUSTABLE DOSE PRE-FILLED PEN SYRINGE | Refills: 3
Start: 2024-04-09

## 2024-04-09 RX ORDER — INSULIN ASPART 100 [IU]/ML
INJECTION, SOLUTION INTRAVENOUS; SUBCUTANEOUS
Qty: 5 ADJUSTABLE DOSE PRE-FILLED PEN SYRINGE | Refills: 3 | Status: SHIPPED | OUTPATIENT
Start: 2024-04-09

## 2024-04-09 RX ORDER — INSULIN LISPRO 100 [IU]/ML
INJECTION, SOLUTION INTRAVENOUS; SUBCUTANEOUS
Qty: 5 ADJUSTABLE DOSE PRE-FILLED PEN SYRINGE | Refills: 2 | Status: SHIPPED | OUTPATIENT
Start: 2024-04-09 | End: 2024-04-09

## 2024-04-09 RX ORDER — ACYCLOVIR 400 MG/1
1 TABLET ORAL ONCE
Qty: 1 EACH | Refills: 0 | Status: SHIPPED | OUTPATIENT
Start: 2024-04-09 | End: 2024-04-09

## 2024-04-09 RX ORDER — ACYCLOVIR 400 MG/1
1 TABLET ORAL
Qty: 1 EACH | Refills: 5 | Status: SHIPPED | OUTPATIENT
Start: 2024-04-09

## 2024-04-09 RX ORDER — DONEPEZIL HYDROCHLORIDE 5 MG/1
10 TABLET, FILM COATED ORAL NIGHTLY
Qty: 90 TABLET | Refills: 1 | Status: SHIPPED | OUTPATIENT
Start: 2024-04-09

## 2024-04-09 ASSESSMENT — ENCOUNTER SYMPTOMS
CONSTIPATION: 0
EYE DISCHARGE: 0
CHOKING: 0
SHORTNESS OF BREATH: 1
ABDOMINAL PAIN: 0
SORE THROAT: 0
STRIDOR: 0
WHEEZING: 0
DIARRHEA: 0
COLOR CHANGE: 0
COUGH: 0
EYE ITCHING: 0
NAUSEA: 0
BLOOD IN STOOL: 0
TROUBLE SWALLOWING: 0
ABDOMINAL DISTENTION: 0
VOMITING: 0

## 2024-04-09 NOTE — TELEPHONE ENCOUNTER
Alvaro called requesting a refill of the below medication which has been pended for you:     Requested Prescriptions     Pending Prescriptions Disp Refills    Continuous Blood Gluc  (DEXCOM G7 ) MAXIMO 1 each 0     Si Device by Does not apply route once for 1 dose    Continuous Blood Gluc Sensor (DEXCOM G7 SENSOR) MISC 1 each 5     Si Box by Does not apply route every 30 days       Last Appointment Date: Visit date not found  Next Appointment Date: 2024    Allergies   Allergen Reactions    Bee Venom Anaphylaxis    Gabapentin Other (See Comments)     Causes blisters to head.     Penicillins Hives     \"Causes blisters to break out everywhere\"    Toprol Xl [Metoprolol]      Leg pain

## 2024-04-09 NOTE — TELEPHONE ENCOUNTER
Alvaro called requesting a refill of the below medication which has been pended for you:     Requested Prescriptions     Pending Prescriptions Disp Refills    Continuous Blood Gluc  (DEXCOM G7 ) MAXIMO 1 each 0     Si Device by Does not apply route once for 1 dose    Continuous Blood Gluc Sensor (DEXCOM G7 SENSOR) MISC 1 each 5     Si Box by Does not apply route every 30 days    donepezil (ARICEPT) 5 MG tablet 90 tablet 1     Sig: Take 2 tablets by mouth nightly       Last Appointment Date: Visit date not found  Next Appointment Date: 2024    Allergies   Allergen Reactions    Bee Venom Anaphylaxis    Gabapentin Other (See Comments)     Causes blisters to head.     Penicillins Hives     \"Causes blisters to break out everywhere\"    Toprol Xl [Metoprolol]      Leg pain

## 2024-04-09 NOTE — ASSESSMENT & PLAN NOTE
This is very mild he is on Aricept 10 at bedtime and they feel that it has slow his progression.  He is still very high functioning

## 2024-04-09 NOTE — ASSESSMENT & PLAN NOTE
Stable no recent chest pain or palpitation he is followed by Dr. Argueta at [facility]

## 2024-04-09 NOTE — PATIENT INSTRUCTIONS
eat is turning into glucose or blood sugar that our body uses for energy.  Over time, high levels of blood sugar can damage your heart, kidneys, eyes and nerves.  4) Get active - living an active life is one of the most rewarding gifts you can give yourself and those you love.  Simply put, daily physical activity increases your length and quality of life. Strive to exercise 15 minutes most days of the week.  5)  Eat better - A healthy diet is one of your best weapons for fighting cardiovascular disease.  When you eat a heart healthy diet, you improve your chances for feeling good and staying healthy for life.  6)  Lose weight - when you shed extra fat an unnecessary pounds, you reduce the burden on your hear, lungs, blood vessels and skeleton.  You give yourself the gift of active living, you lower your blood pressure and help yourself feel better.  7) Stop smoking - cigarette smokers have a higher risk of developing cardiovascular disease.  If  You smoke, quitting is the best thing you can do for your health.

## 2024-04-09 NOTE — ASSESSMENT & PLAN NOTE
He has poor control he in the past has not done well with his diet he eats all carbs eat sweets he really does not change his diet to reflect diabetes diagnosis.  With his sarcoid and COPD he does have to take steroids quite frequently we tried to dose accordingly but he usually does not let me know he is on Lantus and Ozempic.  He did not get labs today his A1c is

## 2024-04-09 NOTE — ASSESSMENT & PLAN NOTE
He is stable with beta-blocker and is on Xarelto 20 as well as aspirin 81 mg daily he is followed by Dr. Argueta at [facility]

## 2024-04-10 RX ORDER — LANCETS 30 GAUGE
1 EACH MISCELLANEOUS DAILY
Qty: 100 EACH | Refills: 3 | Status: SHIPPED | OUTPATIENT
Start: 2024-04-10

## 2024-04-10 RX ORDER — DONEPEZIL HYDROCHLORIDE 10 MG/1
10 TABLET, FILM COATED ORAL NIGHTLY
Qty: 90 TABLET | Refills: 0 | Status: SHIPPED | OUTPATIENT
Start: 2024-04-10

## 2024-04-10 RX ORDER — BLOOD-GLUCOSE METER
1 KIT MISCELLANEOUS DAILY
Qty: 1 KIT | Refills: 0 | Status: SHIPPED | OUTPATIENT
Start: 2024-04-10

## 2024-04-10 RX ORDER — PEN NEEDLE, DIABETIC 31 G X1/4"
1 NEEDLE, DISPOSABLE MISCELLANEOUS DAILY
Qty: 100 EACH | Refills: 3 | Status: SHIPPED | OUTPATIENT
Start: 2024-04-10

## 2024-04-10 NOTE — TELEPHONE ENCOUNTER
Alvaro called requesting a refill of the below medication which has been pended for you:     Requested Prescriptions     Pending Prescriptions Disp Refills    glucose monitoring kit 1 kit 0     Si kit by Does not apply route daily    Lancets MISC 100 each 3     Si each by Does not apply route daily    Insulin Pen Needle ([employer] PEN NEEDLES) 31G X 6 MM MISC 100 each 3     Si each by Does not apply route daily    donepezil (ARICEPT) 10 MG tablet 90 tablet 0     Sig: Take 1 tablet by mouth nightly       Last Appointment Date: Visit date not found  Next Appointment Date: 2024    Allergies   Allergen Reactions    Bee Venom Anaphylaxis    Gabapentin Other (See Comments)     Causes blisters to head.     Penicillins Hives     \"Causes blisters to break out everywhere\"    Toprol Xl [Metoprolol]      Leg pain

## 2024-04-10 NOTE — TELEPHONE ENCOUNTER
Sw pt's wife she states that insurance is not wanting to pay for dexcom ,or the 2 5mg's of Aricept .  They will pay for 1 10mg daily would like to know what else they can do regarding blood sugar testing .

## 2024-04-15 DIAGNOSIS — E11.42 DIABETIC POLYNEUROPATHY ASSOCIATED WITH TYPE 2 DIABETES MELLITUS (HCC): Primary | ICD-10-CM

## 2024-04-15 RX ORDER — LANCETS 30 GAUGE
1 EACH MISCELLANEOUS DAILY
Qty: 100 EACH | Refills: 3 | Status: SHIPPED | OUTPATIENT
Start: 2024-04-15

## 2024-04-15 NOTE — TELEPHONE ENCOUNTER
Alvaro called requesting a refill of the below medication which has been pended for you:     Requested Prescriptions     Pending Prescriptions Disp Refills    Lancets MISC 100 each 3     Si each by Does not apply route daily       Last Appointment Date: Visit date not found  Next Appointment Date: 2024    Allergies   Allergen Reactions    Bee Venom Anaphylaxis    Gabapentin Other (See Comments)     Causes blisters to head.     Penicillins Hives     \"Causes blisters to break out everywhere\"    Toprol Xl [Metoprolol]      Leg pain

## 2024-04-19 DIAGNOSIS — J44.9 COPD, MODERATE: ICD-10-CM

## 2024-04-19 RX ORDER — ALBUTEROL SULFATE 90 UG/1
2 AEROSOL, METERED RESPIRATORY (INHALATION) EVERY 4 HOURS PRN
Qty: 18 G | Refills: 11 | Status: SHIPPED | OUTPATIENT
Start: 2024-04-19

## 2024-04-19 NOTE — TELEPHONE ENCOUNTER
Rx Refill Note  Requested Prescriptions     Pending Prescriptions Disp Refills    Ventolin  (90 Base) MCG/ACT inhaler [Pharmacy Med Name: Ventolin  (90 Base) MCG/ACT Inhalation Aerosol Solution] 18 g 0     Sig: INHALE 2 PUFFS BY MOUTH EVERY 4 HOURS AS NEEDED FOR WHEEZING OR SHORTNESS OF BREATH      Last office visit with prescribing clinician: 8/25/2023   Last telemedicine visit with prescribing clinician: Visit date not found   Next office visit with prescribing clinician: 8/26/2024                         Would you like a call back once the refill request has been completed: [] Yes [] No    If the office needs to give you a call back, can they leave a voicemail: [] Yes [] No    Machelle Ta MA  04/19/24, 14:31 CDT

## 2024-04-24 DIAGNOSIS — E11.42 TYPE 2 DIABETES MELLITUS WITH PERIPHERAL NEUROPATHY (HCC): Primary | ICD-10-CM

## 2024-04-24 RX ORDER — BLOOD-GLUCOSE SENSOR
1 EACH MISCELLANEOUS
Qty: 1 EACH | Refills: 5 | Status: SHIPPED | OUTPATIENT
Start: 2024-04-24

## 2024-05-07 ENCOUNTER — OFFICE VISIT (OUTPATIENT)
Dept: INTERNAL MEDICINE | Age: 74
End: 2024-05-07

## 2024-05-07 ENCOUNTER — HOSPITAL ENCOUNTER (OUTPATIENT)
Dept: GENERAL RADIOLOGY | Age: 74
Discharge: HOME | End: 2024-05-07
Payer: MEDICARE

## 2024-05-07 VITALS
HEIGHT: 69 IN | SYSTOLIC BLOOD PRESSURE: 128 MMHG | OXYGEN SATURATION: 98 % | DIASTOLIC BLOOD PRESSURE: 66 MMHG | HEART RATE: 53 BPM | BODY MASS INDEX: 27.85 KG/M2 | WEIGHT: 188 LBS

## 2024-05-07 DIAGNOSIS — E11.42 TYPE 2 DIABETES MELLITUS WITH PERIPHERAL NEUROPATHY (HCC): Primary | ICD-10-CM

## 2024-05-07 DIAGNOSIS — J44.1 ACUTE EXACERBATION OF COPD WITH ASTHMA (HCC): ICD-10-CM

## 2024-05-07 DIAGNOSIS — J45.901 ACUTE EXACERBATION OF COPD WITH ASTHMA (HCC): ICD-10-CM

## 2024-05-07 PROCEDURE — 71046 X-RAY EXAM CHEST 2 VIEWS: CPT

## 2024-05-07 RX ORDER — CEFDINIR 300 MG/1
300 CAPSULE ORAL 2 TIMES DAILY
Qty: 14 CAPSULE | Refills: 0 | Status: SHIPPED | OUTPATIENT
Start: 2024-05-07 | End: 2024-05-14

## 2024-05-07 RX ORDER — INSULIN GLARGINE 100 [IU]/ML
18 INJECTION, SOLUTION SUBCUTANEOUS
COMMUNITY
Start: 2024-04-09

## 2024-05-07 RX ORDER — INSULIN ASPART 100 [IU]/ML
INJECTION, SOLUTION INTRAVENOUS; SUBCUTANEOUS
COMMUNITY
Start: 2024-04-09

## 2024-05-07 RX ORDER — GLIPIZIDE 5 MG/1
1 TABLET ORAL EVERY 12 HOURS SCHEDULED
COMMUNITY
Start: 2024-04-01

## 2024-05-07 RX ORDER — INSULIN LISPRO 100 [IU]/ML
INJECTION, SOLUTION INTRAVENOUS; SUBCUTANEOUS
COMMUNITY
Start: 2024-04-09

## 2024-05-07 RX ORDER — METHYLPREDNISOLONE ACETATE 80 MG/ML
80 INJECTION, SUSPENSION INTRA-ARTICULAR; INTRALESIONAL; INTRAMUSCULAR; SOFT TISSUE ONCE
Status: COMPLETED | OUTPATIENT
Start: 2024-05-07 | End: 2024-05-07

## 2024-05-07 RX ORDER — TRIAMCINOLONE ACETONIDE 1 MG/G
CREAM TOPICAL
COMMUNITY
Start: 2024-02-21

## 2024-05-07 RX ORDER — CEFDINIR 300 MG/1
300 CAPSULE ORAL 2 TIMES DAILY
COMMUNITY
Start: 2024-05-07 | End: 2024-05-15

## 2024-05-07 RX ADMIN — METHYLPREDNISOLONE ACETATE 80 MG: 80 INJECTION, SUSPENSION INTRA-ARTICULAR; INTRALESIONAL; INTRAMUSCULAR; SOFT TISSUE at 11:19

## 2024-05-07 ASSESSMENT — ENCOUNTER SYMPTOMS
DIARRHEA: 0
ABDOMINAL PAIN: 0
CHOKING: 0
COUGH: 0
VOMITING: 0
TROUBLE SWALLOWING: 0
NAUSEA: 0
CONSTIPATION: 0
SHORTNESS OF BREATH: 1
BLOOD IN STOOL: 0
ABDOMINAL DISTENTION: 0
EYE ITCHING: 0
WHEEZING: 0
COLOR CHANGE: 0
STRIDOR: 0
EYE DISCHARGE: 0
SORE THROAT: 0

## 2024-05-07 NOTE — PATIENT INSTRUCTIONS
AECOPD;    Start nebulizer with xopenex;  3 times a day and when you are better wean yourself off the treatments;    Medrol 80 IM today ; mucinex 1200 mg twice a day with lots of water!!!!  Will start cefdinir 300 twice a day get 2 doses in today  Medrol 80 IM    PATIENT INSTRUCTIONS:      IT IS VERY IMPORTANT THAT YOU GET YOUR LABS AT LEAST 1 DAY BEFORE YOUR APPT.  WE MAY HAVE TO RESCHEDULE YOU IF YOU WE DONT HAVE THE RESULTS;      WE CAN DISCUSS THE LABS ON THE DAY OF YOUR OFFICE VISIT AND MAKE CHANGES TOGETHER WHILE YOU ARE HERE.  THEN YOU CAN LEAVE WITH A COPY OF THOSE CHANGES.     CALLING YOU BACK WITH ROUTINE RESULTS IS VERY TIME CONSUMING;  I HAVE TO REVIEW THE LABS, THEN YOUR PAST LABS AND OV, THEN MAKE A NOTE TO THE NURSES TO CALL YOU, OR I CALL MYSELF WITH EXTREME ABNORMALITIES WHICH CAN BE TIME CONSUMING AS WELL;  WHEN I HAVE TO TAKE TIME OUT OF ANOTHER DAY FOR YOUR RESULTS THAT IS TIME AWAY FROM OTHER PATIENTS AND DISCUSSING RESULTS OVER THE PHONE IS JUST NOT A GOOD IDEA;  WHILE HERE WE CAN WRITE DOWN THE CHANGES BEFORE YOU LEAVE THE OFFICE;      Life simple 7  1) Manage blood pressure - high blood pressure is a major risk factor for heart disease and stroke. Keeping blood pressure in health range reduces strain on your heart, arteries and kidneys.  Blood pressure goal is less than 130/80.   2) Control cholesterol - contributes to plaque, which can clog arteries and lead to heart disease and stroke. When you control your cholesterol you are giving your arteries their best chance to remain clear.  It is recommended that you get cholesterol lab work done once a year.  3) Reduce blood sugar - most of the food we eat is turning into glucose or blood sugar that our body uses for energy.  Over time, high levels of blood sugar can damage your heart, kidneys, eyes and nerves.  4) Get active - living an active life is one of the most rewarding gifts you can give yourself and those you love.  Simply put, daily

## 2024-05-07 NOTE — PROGRESS NOTES
Thyroid: No thyromegaly.      Vascular: No JVD.   Cardiovascular:      Rate and Rhythm: Normal rate and regular rhythm.      Heart sounds: Normal heart sounds. No murmur heard.  Pulmonary:      Effort: Pulmonary effort is normal. No respiratory distress.      Breath sounds: Wheezing, rhonchi and rales present.      Comments:   Sarcoidosis  Abdominal:      General: Bowel sounds are normal. There is no distension.      Palpations: Abdomen is soft. There is no mass.      Tenderness: There is no abdominal tenderness. There is no guarding or rebound.   Musculoskeletal:         General: No tenderness. Normal range of motion.      Cervical back: Normal range of motion and neck supple.   Skin:     General: Skin is warm and dry.      Findings: No erythema or rash.   Neurological:      Mental Status: He is alert and oriented to person, place, and time.      Cranial Nerves: No cranial nerve deficit.      Coordination: Coordination normal.      Deep Tendon Reflexes: Reflexes are normal and symmetric. Reflexes normal.   Psychiatric:         Mood and Affect: Mood is not depressed.         Behavior: Behavior normal.         Thought Content: Thought content normal.         Judgment: Judgment normal.       /66 (Position: Sitting, Cuff Size: Medium Adult)   Pulse 53   Ht 1.753 m (5' 9\")   Wt 85.3 kg (188 lb)   SpO2 98%   BMI 27.76 kg/m²           Assessment/Plan   Assessment & Plan   Problem List       Type 2 diabetes mellitus with peripheral neuropathy (HCC) - Primary     Will get him scheduled slowly increase Lantus continue Ozempic and then start mealtime insulin as well we also got him a Dexcom hopefully this will help with education as much is anything          Relevant Medications    pregabalin (LYRICA) 75 MG capsule    Semaglutide,0.25 or 0.5MG/DOS, 2 MG/1.5ML SOPN    insulin glargine (LANTUS SOLOSTAR) 100 UNIT/ML injection pen    donepezil (ARICEPT) 5 MG tablet    insulin aspart (NOVOLOG FLEXPEN) 100 UNIT/ML

## 2024-05-07 NOTE — ASSESSMENT & PLAN NOTE
Will get him scheduled slowly increase Lantus continue Ozempic and then start mealtime insulin as well we also got him a Dexcom hopefully this will help with education as much is anything

## 2024-05-15 ENCOUNTER — OFFICE VISIT (OUTPATIENT)
Dept: PULMONOLOGY | Facility: CLINIC | Age: 74
End: 2024-05-15
Payer: OTHER MISCELLANEOUS

## 2024-05-15 VITALS
SYSTOLIC BLOOD PRESSURE: 126 MMHG | OXYGEN SATURATION: 99 % | HEIGHT: 67 IN | WEIGHT: 181.4 LBS | HEART RATE: 55 BPM | BODY MASS INDEX: 28.47 KG/M2 | DIASTOLIC BLOOD PRESSURE: 78 MMHG

## 2024-05-15 DIAGNOSIS — Z87.891 FORMER SMOKER: ICD-10-CM

## 2024-05-15 DIAGNOSIS — J44.9 COPD, MODERATE: Chronic | ICD-10-CM

## 2024-05-15 DIAGNOSIS — E66.3 OVERWEIGHT: Chronic | ICD-10-CM

## 2024-05-15 DIAGNOSIS — J18.9 PNEUMONIA OF BOTH LOWER LOBES DUE TO INFECTIOUS ORGANISM: Primary | ICD-10-CM

## 2024-05-15 DIAGNOSIS — J98.4 RESTRICTIVE LUNG DISEASE: Chronic | ICD-10-CM

## 2024-05-15 DIAGNOSIS — D86.2 SARCOIDOSIS OF LUNG WITH SARCOIDOSIS OF LYMPH NODES: Chronic | ICD-10-CM

## 2024-05-15 NOTE — PATIENT INSTRUCTIONS
The patient had symptoms consistent with pneumonia recently but did improve with antibiotic therapy.  Chest x-ray from [facility] did reveal per report some basilar infiltrates.  I will plan on a follow-up x-ray in a few weeks and call him with results.  If that shows improvement we will just plan on seeing him as scheduled in August with pulmonary functions.  If there is any question of some infiltrates that are failing to improve we can then get a CAT scan.

## 2024-05-16 PROBLEM — J18.9 PNEUMONIA OF BOTH LOWER LOBES DUE TO INFECTIOUS ORGANISM: Status: ACTIVE | Noted: 2024-05-16

## 2024-05-16 NOTE — PROGRESS NOTES
Chief Complaint  Sarcoidosis of lung with sarcoidosis of lymph nodes, COPD, and Possible pneumonia    Subjective    History of Present Illness {CC  Problem List  Visit Diagnosis   Encounters  Notes  Medications  Labs  Result Review Imaging  Media: 23}    Caio Riley presents to [facility] for narcosis, COPD, and possible pneumonia.    History of Present Illness   Patient has had problems recently with productive cough and was treated for presumed pneumonia with antibiotics.  He did improve after a few days.  Recent x-ray done on the seventh of this month at [facility] did show some basilar infiltrates.  These may just represent changes associated with his sarcoidosis.  I did not have the actual films available for review at the time of his office visit but they were subsequently pushed to the [facility].  He has some chronic changes at the bases particularly on the right and this is been noted on previous imaging studies including a chest CT.  However these changes at the right base in particular.  A bit more prominent on his most recent x-ray and I will get a follow-up film at [facility] in a few weeks.  We may want to consider a follow-up chest CT if there is question of any worsening infiltrates compared to his previous baseline studies.  I will call him with these results when available and if need be we can order a follow-up CT.  Otherwise he will keep his appointment as scheduled in August with pulmonary functions.  Again he states he is feeling much better today.  He is accompanied by his wife today.  He had the flu shot this past fall and has had a Prevnar vaccine and a Pneumovax in the past as well.  Prior to Admission medications    Medication Sig Start Date End Date Taking? Authorizing Provider   aspirin 81 MG chewable tablet Chew 1 tablet Daily.   Yes Emergency, Nurse Alessandro RN   atenolol (TENORMIN) 25 MG tablet Take 1 tablet by mouth  Daily.   Yes Fredrick Denis MD   atorvastatin (LIPITOR) 10 MG tablet Take 1 tablet by mouth Daily.   Yes Fredrick Denis MD   azaTHIOprine (IMURAN) 50 MG tablet Take 1 tablet by mouth Every Night. 3/22/19  Yes Fredrick Denis MD   donepezil (ARICEPT) 5 MG tablet Take 1 tablet by mouth Every Night. 3/26/19  Yes Fredrick Denis MD   EPINEPHrine (EPIPEN) 0.3 MG/0.3ML solution auto-injector injection Inject 0.3 mL into the appropriate muscle as directed by prescriber.   Yes Fredrick Denis MD   fluticasone-salmeterol (ADVAIR) 500-50 MCG/DOSE DISKUS Inhale 2 (Two) Times a Day.   Yes Fredrick Denis MD   Fluticasone-Salmeterol (ADVAIR/WIXELA) 500-50 MCG/ACT DISKUS INHALE 1 DOSE BY MOUTH TWICE DAILY RINSE  AND  SPIT  AFTER  USING 3/25/24  Yes Salvatore Shah MD   insulin aspart (NovoLOG FlexPen) 100 UNIT/ML solution pen-injector sc pen Blood sugar minus 100/20 is number of units to take 3-4 times a day;  max is 200 units a day; 4/9/24  Yes Fredrick Denis MD   Insulin Glargine (Lantus SoloStar) 100 UNIT/ML injection pen Inject 18 Units under the skin into the appropriate area as directed. 4/9/24  Yes Fredrick Denis MD   Insulin Lispro, 1 Unit Dial, (HUMALOG) 100 UNIT/ML solution pen-injector BLOOD SUGAR MINUS 100/20 IS NUMBER OF UNITS TO TAKE 3 TIMES A DAY AND AT BEDTIME IF NEEDED MAX DOSE 100 UNITS/DAY 4/9/24  Yes Fredrick Denis MD   Insulin Pen Needle 32G X 4 MM misc 1 each. 2/25/19  Yes Fredrick Denis MD   losartan (COZAAR) 100 MG tablet Take 1 tablet by mouth Daily. 3/18/19  Yes Fredrick Denis MD   nitroglycerin (NITROSTAT) 0.4 MG SL tablet Place 1 tablet under the tongue Every 5 (Five) Minutes As Needed for Chest Pain. 5/22/23  Yes Fredrick Denis MD   O2 (OXYGEN) Inhale 2 (Two) Times a Day As Needed. Unsure or what Liter he uses.   Yes Fredrick Denis MD   Ozempic, 0.25 or 0.5 MG/DOSE, 2 MG/1.5ML solution pen-injector INJECT  0.25MG INTO SKIN ONCE A WEEK 5/10/22  Yes Fredrick Denis MD   predniSONE (DELTASONE) 5 MG tablet Take 1 tablet by mouth Daily.   Yes Fredrick Denis MD   pregabalin (LYRICA) 75 MG capsule Take 1 capsule by mouth 2 (Two) Times a Day. 3/15/21  Yes Fredrick Denis MD   RELION PEN NEEDLES 32G X 4 MM misc  2/25/19  Yes Fredrick Denis MD   rivaroxaban (XARELTO) 20 MG tablet Take 1 tablet by mouth Daily. STOPPED 8/1/18   Indications: Resume taking Xarelto on Thursday morning   Yes Fredrick Denis MD   sulfamethoxazole-trimethoprim (BACTRIM,SEPTRA) 400-80 MG tablet Take 1 tablet by mouth. Three times a week   Yes Fredrick Denis MD   Ventolin  (90 Base) MCG/ACT inhaler INHALE 2 PUFFS BY MOUTH EVERY 4 HOURS AS NEEDED FOR WHEEZING OR SHORTNESS OF BREATH 4/19/24  Yes Salvatore Shah MD   cefdinir (OMNICEF) 300 MG capsule Take 1 capsule by mouth 2 (Two) Times a Day.  Patient not taking: Reported on 5/15/2024 5/7/24 5/15/24  Fredrick Denis MD   glipizide (GLUCOTROL) 5 MG tablet Take 1 tablet by mouth Every 12 (Twelve) Hours.  Patient not taking: Reported on 5/15/2024 4/1/24   Fredrick Denis MD   ipratropium (ATROVENT) 0.02 % nebulizer solution Take 2.5 mL by nebulization 4 (Four) Times a Day.  Patient not taking: Reported on 5/15/2024 7/30/19   Salvatore Shah MD   ipratropium-albuterol (DUO-NEB) 0.5-2.5 mg/3 ml nebulizer Take 3 mL by nebulization 4 (Four) Times a Day As Needed for Wheezing.  Patient not taking: Reported on 5/15/2024 7/12/19   Salvatore Shah MD   levalbuterol (XOPENEX) 1.25 MG/3ML nebulizer solution Take 1 ampule by nebulization 4 (Four) Times a Day.  Patient not taking: Reported on 5/15/2024 7/30/19   Shah, Salvatore Elio, MD   levalbuterol (XOPENEX) 1.25 MG/3ML nebulizer solution Take 1 ampule by nebulization 4 (Four) Times a Day As Needed for Wheezing.  Patient not taking: Reported on 5/15/2024 4/27/20   Salvatore Shah,  "MD   SITagliptin (Januvia) 50 MG tablet Take 1 tablet by mouth Daily.  Patient not taking: Reported on 5/15/2024    Provider, Historical, MD       Social History     Socioeconomic History    Marital status:    Tobacco Use    Smoking status: Former     Current packs/day: 0.00     Average packs/day: 0.5 packs/day for 2.0 years (1.0 ttl pk-yrs)     Types: Cigarettes     Start date:      Quit date:      Years since quittin.4     Passive exposure: Past    Smokeless tobacco: Never   Vaping Use    Vaping status: Never Used   Substance and Sexual Activity    Alcohol use: No    Drug use: No    Sexual activity: Defer       Objective   Vital Signs:   /78   Pulse 55   Ht 170.2 cm (67.01\")   Wt 82.3 kg (181 lb 6.4 oz)   SpO2 99% Comment: RA  BMI 28.40 kg/m²     Physical Exam  Vitals and nursing note reviewed.   Constitutional:       Comments: He is mildly bradycardic with a pulse of 55.  His BMI is somewhat elevated.   HENT:      Head: Normocephalic.   Eyes:      Extraocular Movements: Extraocular movements intact.      Pupils: Pupils are equal, round, and reactive to light.   Cardiovascular:      Rate and Rhythm: Regular rhythm. Bradycardia present.      Comments: Again he was mildly bradycardic with a pulse of 55.  Pulmonary:      Effort: Pulmonary effort is normal.      Comments: Lung fields are clear with reasonable air movement bilaterally.  Musculoskeletal:         General: Normal range of motion.   Skin:     General: Skin is warm and dry.   Neurological:      General: No focal deficit present.      Mental Status: He is alert and oriented to person, place, and time.   Psychiatric:         Mood and Affect: Mood normal.         Behavior: Behavior normal.        Result Review :    PFT Values          2022    10:30   Pre Drug PFT Results   FVC 66   FEV1 60   FEF 25-75% 40   FEV1/FVC 68   Other Tests PFT Results   TLC 67   RV 78   DLCO 63   D/VAsb 92         Results for orders placed during " the hospital encounter of 23    Spirometry - Pre & Post Bronchodilator with Lung Volumes    Narrative  [facility] - Pulmonary Function Test    [facility] [place]  [place]  [address]  [phone]    Patient : Caio Riley  MRN : [id number]  CSN : [id number]  Pulmonologist : Salvatore Shah MD  Date : 2023    ______________________________________________________________________    Interpretation :  1.  Spirometry is consistent with a moderate obstructive ventilatory defect.  2.  There is improvement in spirometry postbronchodilator particularly in midflows but a moderate obstructive ventilatory defect is still present.  3.  Lung volumes reveal a coexisting mild restrictive ventilatory defect.  There is also a decrease in inspiratory capacity.  4.  There is a moderate diffusion impairment which when corrected for alveolar volume is a low normal diffusion capacity.  5.  When current studies are compared to studies performed on 2022, the patient's current pre and postbronchodilator spirometry reveals improvement both the FVC and FEV1 compared to previous baseline values.  There has also been significant improvement in the patient's total lung capacity compared to previous.  When corrected for alveolar volume there has been a decline in diffusion capacity compared to previous although it remains within normal limits.      Salvatore Shah MD      Results for orders placed in visit on 22    Pulmonary Function Test    Narrative  Pulmonary Function Test  Performed by: Machelle Elliott, RRT  Authorized by: Salvatore Shah MD    Pre Drug % Predicted  FVC: 66%  FEV1: 60%  FEF 25-75%: 40%  FEV1/FVC: 68%  T%  RV: 78%  DLCO: 63%  D/VAsb: 92%    Interpretation  Spirometry  Spirometry shows moderate obstruction.  Lung Volume Measurements  Measurements show reduced lung volumes consistent with restriction.  Diffusion Capacity  The patient's diffusion capacity is  mildly reduced.  Diffusion capacity is normal when corrected for alveolar volume.  Overall comments: The patient's spirometry is consistent with a moderate obstructive ventilatory defect.  Lung volumes reveal a coexisting restrictive ventilatory defect along with a decrease in inspiratory capacity.  There is a mild bordering on moderate diffusion impairment which when corrected for alveolar volume is normalized.  When current studies are compared [facility], his FVC is unchanged, his FEV1 has improved slightly, his total lung capacity is essentially unchanged, and when corrected for alveolar volume there has been a slight but not significant drop in his diffusion capacity compared to previous.      Results for orders placed in visit on 21    Pulmonary Function Test    Narrative  Pulmonary Function Test  Performed by: Machelle Elliott, RRT  Authorized by: Salvatore Shah MD    Pre Drug % Predicted  FVC: 75%  FEV1: 62%  FEF 25-75%: 29%  FEV1/FVC: 65.14%  T%  RV: 89%  DLCO: 55%  D/VAsb: 95%    Interpretation  Spirometry  Spirometry shows moderate obstruction.  Lung Volume Measurements  Measurements show reduced lung volumes consistent with restriction.  Diffusion Capacity  The patient's diffusion capacity is moderately reduced.  Diffusion capacity is normal when corrected for alveolar volume.  Overall comments: The patient's spirometry is consistent with a moderate obstructive ventilatory defect.  Lung volumes reveal a coexisting restrictive ventilatory defect.  There is a moderate diffusion impairment which when corrected for alveolar volume is normalized.  When current studies are compared to studies done at [facility] on , his FVC has improved compared to previous pre and postbronchodilator studies.  His current FEV1 is unchanged compared to his previous prebronchodilator FEV1 but is dropped  slightly compared to his previous postbronchodilator value.  His total lung capacity has dropped slightly but not significantly.  When corrected for alveolar volume he has had a slight improvement in his diffusion capacity compared to previous.                 My interpretation of imaging:    XR outside films (05/07/2024 10:37)   XR outside films (02/06/2024 09:55)   XR outside films (11/20/2023 09:55)   CT outside films (09/01/2022 09:55)    Assessment and Plan {CC Problem List  Visit Diagnosis  ROS  Review (Popup)  [facility] Maintenance  Quality  BestPractice  Medications  SmartSets  SnapShot Encounters  Media : 23}    Diagnoses and all orders for this visit:    1. Pneumonia of both lower lobes due to infectious organism (Primary)  Assessment & Plan:  He has chronic changes at the bases on prior imaging studies but the change of the right base in particular appeared a bit more pronounced on his most recent x-ray to my review and I will get a follow-up film in about 2 weeks.  If there is any concern about persistent infiltrates that are not improving we can get a follow-up CT as well.    Orders:  -     XR Chest 2 View; Future    2. Sarcoidosis of lung with sarcoidosis of lymph nodes  Assessment & Plan:  Again it is possible the infiltrates noted on his recent x-ray relate to his sarcoidosis but there is certainly could have been a component of superimposed acute pneumonia and he responded very well to antibiotics.  He will continue his low-dose prednisone and I will get a follow-up chest x-ray again in about 2 weeks.      3. COPD, moderate  Assessment & Plan:  He will continue his Wixela Inhub and his Ventolin HFA as needed.        4. Restrictive lung disease    5. Overweight  Assessment & Plan:  Patient's (Body mass index is 28.4 kg/m².) indicates that they are overweight with health conditions that include hypertension . Weight is unchanged.  Diet and exercise are encouraged and he will follow-up with  his primary healthcare provider regarding his elevated BMI otherwise.      6. Former smoker  Assessment & Plan:  He has a minimal smoking history and quit smoking in 1968.            Salvatore Shah MD  5/16/2024  18:27 CDT    Follow Up   Return in about 3 months (around 8/26/2024) for To see me specifically, Complete PFT.    Patient was given instructions and counseling regarding his condition or for health maintenance advice. Please see specific information pulled into the AVS if appropriate.

## 2024-05-16 NOTE — ASSESSMENT & PLAN NOTE
Patient's (Body mass index is 28.4 kg/m².) indicates that they are overweight with health conditions that include hypertension . Weight is unchanged.  Diet and exercise are encouraged and he will follow-up with his primary healthcare provider regarding his elevated BMI otherwise.

## 2024-05-16 NOTE — ASSESSMENT & PLAN NOTE
He has chronic changes at the bases on prior imaging studies but the change of the right base in particular appeared a bit more pronounced on his most recent x-ray to my review and I will get a follow-up film in about 2 weeks.  If there is any concern about persistent infiltrates that are not improving we can get a follow-up CT as well.

## 2024-05-16 NOTE — ASSESSMENT & PLAN NOTE
Again it is possible the infiltrates noted on his recent x-ray relate to his sarcoidosis but there is certainly could have been a component of superimposed acute pneumonia and he responded very well to antibiotics.  He will continue his low-dose prednisone and I will get a follow-up chest x-ray again in about 2 weeks.

## 2024-05-20 DIAGNOSIS — I48.20 CHRONIC ATRIAL FIBRILLATION (HCC): ICD-10-CM

## 2024-05-20 DIAGNOSIS — G62.9 PERIPHERAL NERVE DISEASE: ICD-10-CM

## 2024-05-20 RX ORDER — PREGABALIN 75 MG/1
CAPSULE ORAL
Qty: 180 CAPSULE | Refills: 0 | Status: SHIPPED | OUTPATIENT
Start: 2024-05-20 | End: 2024-08-18

## 2024-05-20 RX ORDER — LOSARTAN POTASSIUM 100 MG/1
TABLET ORAL
Qty: 90 TABLET | Refills: 0 | Status: SHIPPED | OUTPATIENT
Start: 2024-05-20

## 2024-05-20 RX ORDER — RIVAROXABAN 20 MG/1
TABLET, FILM COATED ORAL
Qty: 90 TABLET | Refills: 0 | Status: SHIPPED | OUTPATIENT
Start: 2024-05-20

## 2024-05-20 NOTE — TELEPHONE ENCOUNTER
Alvaro Pena called to request a refill on his medication.      Last office visit : 5/7/2024   Next office visit : 5/21/2024     Last UDS:   Benzodiazepine Screen, Urine   Date Value Ref Range Status   02/08/2022 neg  Final     Buprenorphine Urine   Date Value Ref Range Status   02/08/2022 neg  Final     Cocaine Metabolite Screen, Urine   Date Value Ref Range Status   02/08/2022 neg  Final     Gabapentin Screen, Urine   Date Value Ref Range Status   02/08/2022 neg  Final     MDMA, Urine   Date Value Ref Range Status   02/08/2022 neg  Final     Oxycodone Screen, Ur   Date Value Ref Range Status   02/08/2022 neg  Final     Propoxyphene Screen, Urine   Date Value Ref Range Status   02/08/2022 neg  Final     THC Screen, Urine   Date Value Ref Range Status   02/08/2022 neg  Final     Tricyclic Antidepressants, Urine   Date Value Ref Range Status   02/08/2022 neg  Final       Last Ulises: 5/15/2024  Medication Contract: 5/22/2023   Last Fill: 2/19/2024    Requested Prescriptions     Pending Prescriptions Disp Refills    pregabalin (LYRICA) 75 MG capsule [Pharmacy Med Name: Pregabalin 75 MG Oral Capsule] 180 capsule 0     Sig: Take 1 capsule by mouth twice daily    XARELTO 20 MG TABS tablet [Pharmacy Med Name: Xarelto 20 MG Oral Tablet] 90 tablet 0     Sig: TAKE 1 TABLET BY MOUTH ONCE DAILY WITH EVENING MEAL    losartan (COZAAR) 100 MG tablet [Pharmacy Med Name: Losartan Potassium 100 MG Oral Tablet] 90 tablet 0     Sig: Take 1 tablet by mouth once daily         Please approve or refuse this medication.   Benjamin Gomez MA

## 2024-05-21 ENCOUNTER — OFFICE VISIT (OUTPATIENT)
Dept: INTERNAL MEDICINE | Age: 74
End: 2024-05-21
Payer: MEDICARE

## 2024-05-21 VITALS
BODY MASS INDEX: 27.4 KG/M2 | DIASTOLIC BLOOD PRESSURE: 62 MMHG | HEART RATE: 66 BPM | SYSTOLIC BLOOD PRESSURE: 124 MMHG | WEIGHT: 185 LBS | OXYGEN SATURATION: 98 % | HEIGHT: 69 IN

## 2024-05-21 DIAGNOSIS — J63.6: ICD-10-CM

## 2024-05-21 DIAGNOSIS — Z57.5 OCCUPATIONAL EXPOSURE TO INDUSTRIAL TOXINS: ICD-10-CM

## 2024-05-21 DIAGNOSIS — G60.3 IDIOPATHIC PROGRESSIVE POLYNEUROPATHY: ICD-10-CM

## 2024-05-21 DIAGNOSIS — J45.20 MILD INTERMITTENT ASTHMA WITHOUT COMPLICATION: ICD-10-CM

## 2024-05-21 DIAGNOSIS — J44.9 CHRONIC OBSTRUCTIVE PULMONARY DISEASE, UNSPECIFIED COPD TYPE (HCC): ICD-10-CM

## 2024-05-21 DIAGNOSIS — D86.0 SARCOIDOSIS, LUNG (HCC): Primary | ICD-10-CM

## 2024-05-21 PROCEDURE — 99213 OFFICE O/P EST LOW 20 MIN: CPT | Performed by: INTERNAL MEDICINE

## 2024-05-21 PROCEDURE — 3078F DIAST BP <80 MM HG: CPT | Performed by: INTERNAL MEDICINE

## 2024-05-21 PROCEDURE — 3017F COLORECTAL CA SCREEN DOC REV: CPT | Performed by: INTERNAL MEDICINE

## 2024-05-21 PROCEDURE — 1036F TOBACCO NON-USER: CPT | Performed by: INTERNAL MEDICINE

## 2024-05-21 PROCEDURE — 1123F ACP DISCUSS/DSCN MKR DOCD: CPT | Performed by: INTERNAL MEDICINE

## 2024-05-21 PROCEDURE — 3023F SPIROM DOC REV: CPT | Performed by: INTERNAL MEDICINE

## 2024-05-21 PROCEDURE — 3074F SYST BP LT 130 MM HG: CPT | Performed by: INTERNAL MEDICINE

## 2024-05-21 PROCEDURE — G8417 CALC BMI ABV UP PARAM F/U: HCPCS | Performed by: INTERNAL MEDICINE

## 2024-05-21 PROCEDURE — G8427 DOCREV CUR MEDS BY ELIG CLIN: HCPCS | Performed by: INTERNAL MEDICINE

## 2024-05-21 SDOH — HEALTH STABILITY - PHYSICAL HEALTH: OCCUPATIONAL EXPOSURE TO TOXIC AGENTS IN OTHER INDUSTRIES: Z57.5

## 2024-05-21 NOTE — PROGRESS NOTES
April 10, 2020  EMPLOYEE INFORMATION: EMPLOYER INFORMATION:   NAME: Karon [employer]   : 2000 [phone]   DATE OF INJURY/EVENT: 2019           Location: [facility]   Treating Provider: Zeyad Pierson DO  Time In:  10:05 AM Time Out:  10:21 AM      DIAGNOSIS:   1. Strain of lumbar region, subsequent encounter    2. Chronic midline low back pain without sciatica      STATUS: This injury is determined to be WORK RELATED.    RETURN TO WORK:  Employee may return to work with restrictions.     Return Date: 4/10/2020            RESTRICTIONS:   Restrictions are to be followed at work and at home.  Restrictions are in effect until next follow-up visit.  Weight limit: No lifting, carrying, pushing or pulling more than 25 lbs.  Alternate sitting and standing as needed.  No climbing ladders or working at heights.  When lifting hold the object close to body.  Stay active. Movement keeps the back flexible and muscles strong.   Minimal stooping/bending/squatting.   For therapy purposes, no weight limit- may increase at therapist's discretion.        TREATMENT PLAN:  Medications for this injury/condition:   Tylenol    NEXT RETURN VISIT: 2020 at 10:30 video visit    Thank you for the privilege of providing medical care for this injury/condition.  If there are any questions, please call the occupational health clinic at Dept: [phone].      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  Overall he is stable he does tire easier than in the past recently had pneumonia and treatment for that by his PCP Michelle Ernandez.  He continues to have some activity intolerance.    Past Medical History:   Diagnosis Date    Achalasia     going to [facility] for surgery in March    Acute pancreatitis     Anemia     Asthma     Atrial fibrillation (HCC)     h/o paroxysmal a-fib ? anesthsia induced    Benign colonic polyp     cscope 12/07 Dr Majano adenomatous: 12/14 adenoma    Chest pain     Chronic kidney disease     Chronic pain syndrome     Depression     Diabetic peripheral neuropathy (HCC)     Extrinsic asthma     Hyperlipidemia     Hypertension     Idiopathic peripheral neuropathy     Lyme disease     Mediastinal lymphadenopathy     Microalbuminuria     Mixed hyperlipidemia     Paroxysmal A-fib (HCC)     S/P ablation of atrial fibrillation     4/16 A fib ablation , Dr Chaparro, [facility]     Sarcoidosis, lung (HCC)     restrictive lung impairment    Tick bite     Type 2 diabetes, controlled, with neuropathy ([facility])        Past Surgical History:   Procedure Laterality Date    ANKLE FRACTURE SURGERY  april 14, 2015    ATRIAL ABLATION SURGERY  2015    Dr. Chaparro- [facility]    BACK SURGERY  10/06/2020    CARDIAC SURGERY      Catheter Ablation A-fib    CHOLECYSTECTOMY      COLONOSCOPY  12/02/2014    [facility]    CYSTOSCOPY Left 8/16/2019    CYSTOSCOPY; LEFT RETROGRADE PYELOGRAM; INSERTION LEFT URETERAL STENT performed by Mario Barone MD at [facility] OR    EYE SURGERY      MOHS SURGERY      [place] CYSTO BLADDER W/URETERAL CATHETERIZATION Left 6/27/2018    CYSTOSCOPY URETEROSCOPY RETROGRADE PYELOGRAMS performed by Mario Barone MD at [facility]

## 2024-05-30 ENCOUNTER — HOSPITAL ENCOUNTER (OUTPATIENT)
Dept: GENERAL RADIOLOGY | Facility: HOSPITAL | Age: 74
Discharge: HOME | End: 2024-05-30
Admitting: INTERNAL MEDICINE
Payer: MEDICARE

## 2024-05-30 DIAGNOSIS — J18.9 PNEUMONIA OF BOTH LOWER LOBES DUE TO INFECTIOUS ORGANISM: ICD-10-CM

## 2024-05-30 PROCEDURE — 71046 X-RAY EXAM CHEST 2 VIEWS: CPT

## 2024-08-07 RX ORDER — INSULIN GLARGINE 100 [IU]/ML
18 INJECTION, SOLUTION SUBCUTANEOUS NIGHTLY
Qty: 5 ADJUSTABLE DOSE PRE-FILLED PEN SYRINGE | Refills: 1 | Status: SHIPPED | OUTPATIENT
Start: 2024-08-07

## 2024-08-07 NOTE — TELEPHONE ENCOUNTER
Patient calling 8/7/24 requesting a refill of their insulin glargine (LANTUS SOLOSTAR) 100 UNIT/ML injection pen  medication. Patient would like it sent to [facility] [address] [place] [address] - P [phone] -  [phone]  .

## 2024-08-07 NOTE — TELEPHONE ENCOUNTER
Alvaro Pena called to request a refill on his medication.      Last office visit : 5/21/2024   Next office visit : 11/21/2024     Requested Prescriptions     Pending Prescriptions Disp Refills    insulin glargine (LANTUS SOLOSTAR) 100 UNIT/ML injection pen 5 Adjustable Dose Pre-filled Pen Syringe 1     Sig: Inject 18 Units into the skin nightly            Nancy Olmedo MA

## 2024-08-16 RX ORDER — ATORVASTATIN CALCIUM 20 MG/1
TABLET, FILM COATED ORAL
Qty: 45 TABLET | Refills: 1 | Status: SHIPPED | OUTPATIENT
Start: 2024-08-16

## 2024-08-16 NOTE — TELEPHONE ENCOUNTER
Alvaro called requesting a refill of the below medication which has been pended for you:     Requested Prescriptions     Pending Prescriptions Disp Refills    atorvastatin (LIPITOR) 20 MG tablet 45 tablet 1     Sig: TAKE 1/2 (ONE-HALF) TABLET BY MOUTH NIGHTLY       Last Appointment Date: 5/21/2024  Next Appointment Date: 11/21/2024

## 2024-08-19 ENCOUNTER — OFFICE VISIT (OUTPATIENT)
Dept: PULMONOLOGY | Facility: CLINIC | Age: 74
End: 2024-08-19
Payer: OTHER MISCELLANEOUS

## 2024-08-19 DIAGNOSIS — G62.9 PERIPHERAL NERVE DISEASE: ICD-10-CM

## 2024-08-19 DIAGNOSIS — J44.9 COPD, MODERATE: Primary | ICD-10-CM

## 2024-08-19 DIAGNOSIS — D86.2 SARCOIDOSIS OF LUNG WITH SARCOIDOSIS OF LYMPH NODES: ICD-10-CM

## 2024-08-19 PROCEDURE — 94375 RESPIRATORY FLOW VOLUME LOOP: CPT | Performed by: INTERNAL MEDICINE

## 2024-08-19 PROCEDURE — 94727 GAS DIL/WSHOT DETER LNG VOL: CPT | Performed by: INTERNAL MEDICINE

## 2024-08-19 PROCEDURE — 94729 DIFFUSING CAPACITY: CPT | Performed by: INTERNAL MEDICINE

## 2024-08-19 NOTE — PROCEDURES
Complete PFT - Pre & Post Bronchodilator    Performed by: Machelle Elliott, RRT  Authorized by: Salvatore Shah MD     Pre Drug % Predicted    FVC: 62%   FEV1: 57%   FEF 25-75%: 46%   FEV1/FVC: 70%   T%   RV: 78%   DLCO: 56%   D/VAsb: 88%    Interpretation   Spirometry   Spirometry shows moderate restriction. There is reduced midflow suggesting small airway/airflow obstruction.   Lung Volume Measurements  Measurements show reduced lung volumes consistent with restriction.   Diffusion Capacity  The patient's diffusion capacity is moderately reduced.  Diffusion capacity is normal when corrected for alveolar volume.   Overall comments: The patient's spirometry is consistent with a moderate restrictive ventilatory defect with a coexisting decrease in mid flows.  The patient's FEV1 to FVC ratio is at the lower limits of normal at 70%.  Lung volumes confirm a restrictive ventilatory defect of moderate severity.  There is also a mild decrease in inspiratory capacity.  There is a moderate diffusion impairment which when corrected for alveolar volume is normalized.  When current studies are compared to studies performed on 2023, the patient's current baseline spirometry does reveal a decline both the FVC and FEV1 compared to previous pre and postbronchodilator values.  There has also been a decline in total lung capacity as well as slow vital capacity compared to previous.  When corrected for alveolar volume there has actually been some improvement in diffusion capacity compared to previous.

## 2024-08-22 DIAGNOSIS — I48.20 CHRONIC ATRIAL FIBRILLATION (HCC): ICD-10-CM

## 2024-08-22 RX ORDER — LOSARTAN POTASSIUM 100 MG/1
100 TABLET ORAL DAILY
Qty: 90 TABLET | Refills: 0 | Status: SHIPPED | OUTPATIENT
Start: 2024-08-22

## 2024-08-22 NOTE — TELEPHONE ENCOUNTER
Alvaro called requesting a refill of the below medication which has been pended for you:     Requested Prescriptions     Pending Prescriptions Disp Refills    losartan (COZAAR) 100 MG tablet 90 tablet 0     Sig: Take 1 tablet by mouth daily    rivaroxaban (XARELTO) 20 MG TABS tablet 90 tablet 0     Sig: Take 1 tablet by mouth Daily with supper       Last Appointment Date: 5/21/2024  Next Appointment Date: 11/21/2024

## 2024-08-23 RX ORDER — PREGABALIN 75 MG/1
75 CAPSULE ORAL 2 TIMES DAILY
Qty: 180 CAPSULE | Refills: 1 | Status: SHIPPED | OUTPATIENT
Start: 2024-08-23 | End: 2025-02-19

## 2024-08-26 ENCOUNTER — OFFICE VISIT (OUTPATIENT)
Dept: PULMONOLOGY | Facility: CLINIC | Age: 74
End: 2024-08-26
Payer: OTHER MISCELLANEOUS

## 2024-08-26 VITALS
HEIGHT: 69 IN | SYSTOLIC BLOOD PRESSURE: 124 MMHG | HEART RATE: 76 BPM | BODY MASS INDEX: 26.72 KG/M2 | WEIGHT: 180.4 LBS | DIASTOLIC BLOOD PRESSURE: 84 MMHG | OXYGEN SATURATION: 98 %

## 2024-08-26 DIAGNOSIS — D86.2 SARCOIDOSIS OF LUNG WITH SARCOIDOSIS OF LYMPH NODES: Primary | Chronic | ICD-10-CM

## 2024-08-26 DIAGNOSIS — E66.3 OVERWEIGHT: Chronic | ICD-10-CM

## 2024-08-26 DIAGNOSIS — J44.9 COPD, MODERATE: Chronic | ICD-10-CM

## 2024-08-26 DIAGNOSIS — J98.4 RESTRICTIVE LUNG DISEASE: Chronic | ICD-10-CM

## 2024-08-26 PROCEDURE — 99214 OFFICE O/P EST MOD 30 MIN: CPT | Performed by: INTERNAL MEDICINE

## 2024-08-26 RX ORDER — ATENOLOL 25 MG/1
TABLET ORAL
Qty: 90 TABLET | Refills: 1 | Status: SHIPPED | OUTPATIENT
Start: 2024-08-26

## 2024-08-26 NOTE — ASSESSMENT & PLAN NOTE
His most recent x-ray revealed no acute process and I reviewed it with him and his wife.  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  At this point I told him I would just recommend he continue his low-dose prednisone with his inhaled medications.

## 2024-08-26 NOTE — PROGRESS NOTES
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    Subjective    History of Present Illness {CC  Problem List  Visit Diagnosis   Encounters  Notes  Medications  Labs  Result Review Imaging  Media: 23}    Caio Riley presents to [facility] for Norco doses with mixed obstructive and restrictive lung disease.    History of Present Illness   The patient is accompanied by his wife today.  He had pulmonary functions at the office last week and they did show a decline in spirometry and total lung capacity compared to previous but actually some improvement in diffusion capacity when corrected for alveolar volume compared to previous I reviewed the studies with him.  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  I did tell him that the clinical features of sarcoidosis of berylliosis were extremely similar and sometimes very difficult to differentiate but the treatment would be the same.  In this case I think he just needs to continue his inhalers and low-dose prednisone.  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  It anyway at this point I do not think he needs any additional therapy just should continue his inhaled medications and low-dose prednisone.  I will see him back in several months.  I did review his most recent chest x-ray with him from August 6 with showed some chronic changes prickly in the hilar areas but no acute process.  He had the flu shot this past fall and is also had the Prevnar 13 and Pneumovax in the past as well.  Prior to Admission medications    Medication Sig Start Date End Date  Taking? Authorizing Provider   aspirin 81 MG chewable tablet Chew 1 tablet Daily.   Yes Emergency, Nurse Alessandro, RN   atenolol (TENORMIN) 25 MG tablet Take 1 tablet by mouth Daily.   Yes Fredrick Denis MD   atorvastatin (LIPITOR) 10 MG tablet Take 1 tablet by mouth Daily.   Yes Fredrick Denis MD   azaTHIOprine (IMURAN) 50 MG tablet Take 1 tablet by mouth Every Night. 3/22/19  Yes Fredrick Denis MD   donepezil (ARICEPT) 5 MG tablet Take 1 tablet by mouth Every Night. 3/26/19  Yes Fredrick Denis MD   EPINEPHrine (EPIPEN) 0.3 MG/0.3ML solution auto-injector injection Inject 0.3 mL into the appropriate muscle as directed by prescriber.   Yes Fredrick Denis MD   fluticasone-salmeterol (ADVAIR) 500-50 MCG/DOSE DISKUS Inhale 2 (Two) Times a Day.   Yes Fredrick Denis MD   Fluticasone-Salmeterol (ADVAIR/WIXELA) 500-50 MCG/ACT DISKUS INHALE 1 DOSE BY MOUTH TWICE DAILY RINSE  AND  SPIT  AFTER  USING 3/25/24  Yes Salvatore Shah MD   insulin aspart (NovoLOG FlexPen) 100 UNIT/ML solution pen-injector sc pen Blood sugar minus 100/20 is number of units to take 3-4 times a day;  max is 200 units a day; 4/9/24  Yes Fredrick Denis MD   Insulin Glargine (Lantus SoloStar) 100 UNIT/ML injection pen Inject 18 Units under the skin into the appropriate area as directed. 4/9/24  Yes Fredrick Denis MD   Insulin Lispro, 1 Unit Dial, (HUMALOG) 100 UNIT/ML solution pen-injector BLOOD SUGAR MINUS 100/20 IS NUMBER OF UNITS TO TAKE 3 TIMES A DAY AND AT BEDTIME IF NEEDED MAX DOSE 100 UNITS/DAY 4/9/24  Yes Fredrick Denis MD   Insulin Pen Needle 32G X 4 MM misc 1 each. 2/25/19  Yes Fredrick Denis MD   losartan (COZAAR) 100 MG tablet Take 1 tablet by mouth Daily. 3/18/19  Yes Fredrick Denis MD   nitroglycerin (NITROSTAT) 0.4 MG SL tablet Place 1 tablet under the tongue Every 5 (Five) Minutes As Needed for Chest Pain. 5/22/23  Yes Provider, Historical, MD   O2 (OXYGEN)  Inhale 2 (Two) Times a Day As Needed. Unsure or what Liter he uses.   Yes Fredrick Denis MD   Ozempic, 0.25 or 0.5 MG/DOSE, 2 MG/1.5ML solution pen-injector INJECT 0.25MG INTO SKIN ONCE A WEEK 5/10/22  Yes Fredrick Denis MD   predniSONE (DELTASONE) 5 MG tablet Take 1 tablet by mouth Daily.   Yes Fredrick Denis MD   pregabalin (LYRICA) 75 MG capsule Take 1 capsule by mouth 2 (Two) Times a Day. 3/15/21  Yes Fredrick Denis MD   RELION PEN NEEDLES 32G X 4 MM misc  2/25/19  Yes Fredrick Denis MD   rivaroxaban (XARELTO) 20 MG tablet Take 1 tablet by mouth Daily. STOPPED 8/1/18   Indications: Resume taking Xarelto on Thursday morning   Yes Fredrick Denis MD   sulfamethoxazole-trimethoprim (BACTRIM,SEPTRA) 400-80 MG tablet Take 1 tablet by mouth. Three times a week   Yes Fredrick Denis MD   Ventolin  (90 Base) MCG/ACT inhaler INHALE 2 PUFFS BY MOUTH EVERY 4 HOURS AS NEEDED FOR WHEEZING OR SHORTNESS OF BREATH 4/19/24  Yes Salvatore Shah MD   glipizide (GLUCOTROL) 5 MG tablet Take 1 tablet by mouth Every 12 (Twelve) Hours.  Patient not taking: Reported on 5/15/2024 4/1/24   Fredrick Denis MD   ipratropium (ATROVENT) 0.02 % nebulizer solution Take 2.5 mL by nebulization 4 (Four) Times a Day.  Patient not taking: Reported on 5/15/2024 7/30/19   Salvatore Shah MD   ipratropium-albuterol (DUO-NEB) 0.5-2.5 mg/3 ml nebulizer Take 3 mL by nebulization 4 (Four) Times a Day As Needed for Wheezing.  Patient not taking: Reported on 5/15/2024 7/12/19   Salvatore Shah MD   levalbuterol (XOPENEX) 1.25 MG/3ML nebulizer solution Take 1 ampule by nebulization 4 (Four) Times a Day.  Patient not taking: Reported on 5/15/2024 7/30/19   Salvatore Shah MD   levalbuterol (XOPENEX) 1.25 MG/3ML nebulizer solution Take 1 ampule by nebulization 4 (Four) Times a Day As Needed for Wheezing.  Patient not taking: Reported on 5/15/2024 4/27/20   Salvatore Shah  "MD Elio   SITagliptin (Januvia) 50 MG tablet Take 1 tablet by mouth Daily.  Patient not taking: Reported on 5/15/2024    Provider, MD Fredrick       Social History     Socioeconomic History    Marital status:    Tobacco Use    Smoking status: Former     Current packs/day: 0.00     Average packs/day: 0.5 packs/day for 2.0 years (1.0 ttl pk-yrs)     Types: Cigarettes     Start date:      Quit date:      Years since quittin.6     Passive exposure: Past    Smokeless tobacco: Never   Vaping Use    Vaping status: Never Used   Substance and Sexual Activity    Alcohol use: No    Drug use: No    Sexual activity: Defer       Objective   Vital Signs:   /84 (BP Location: Left arm, Patient Position: Sitting, Cuff Size: Adult) Comment (Cuff Size): Manual  Pulse 76   Ht 175.3 cm (69\")   Wt 81.8 kg (180 lb 6.4 oz)   SpO2 98% Comment: RA  BMI 26.64 kg/m²     Physical Exam  Vitals and nursing note reviewed.   Constitutional:       Comments: His BMI is minimally elevated.   HENT:      Head: Normocephalic.   Eyes:      Extraocular Movements: Extraocular movements intact.      Pupils: Pupils are equal, round, and reactive to light.   Cardiovascular:      Rate and Rhythm: Normal rate and regular rhythm.   Pulmonary:      Effort: Pulmonary effort is normal.      Comments: Lung fields are clear with reasonable air movement bilaterally.  Musculoskeletal:         General: Normal range of motion.   Skin:     General: Skin is warm and dry.   Neurological:      General: No focal deficit present.      Mental Status: He is alert and oriented to person, place, and time.   Psychiatric:         Mood and Affect: Mood normal.         Behavior: Behavior normal.        Result Review :    PFT Values          2024    11:30   Pre Drug PFT Results   FVC 62   FEV1 57   FEF 25-75% 46   FEV1/FVC 70   Other Tests PFT Results   TLC 65   RV 78   DLCO 56   D/VAsb 88         Results for orders placed in visit on " 24    Complete PFT - Pre & Post Bronchodilator    Narrative  Complete PFT - Pre & Post Bronchodilator    Performed by: Machelle Elliott, RRT  Authorized by: Salvatore Shah MD  Pre Drug % Predicted  FVC: 62%  FEV1: 57%  FEF 25-75%: 46%  FEV1/FVC: 70%  T%  RV: 78%  DLCO: 56%  D/VAsb: 88%    Interpretation  Spirometry  Spirometry shows moderate restriction. There is reduced midflow suggesting small airway/airflow obstruction.  Lung Volume Measurements  Measurements show reduced lung volumes consistent with restriction.  Diffusion Capacity  The patient's diffusion capacity is moderately reduced.  Diffusion capacity is normal when corrected for alveolar volume.  Overall comments: The patient's spirometry is consistent with a moderate restrictive ventilatory defect with a coexisting decrease in mid flows.  The patient's FEV1 to FVC ratio is at the lower limits of normal at 70%.  Lung volumes confirm a restrictive ventilatory defect of moderate severity.  There is also a mild decrease in inspiratory capacity.  There is a moderate diffusion impairment which when corrected for alveolar volume is normalized.  When current studies are compared to studies performed on 2023, the patient's current baseline spirometry does reveal a decline both the FVC and FEV1 compared to previous pre and postbronchodilator values.  There has also been a decline in total lung capacity as well as slow vital capacity compared to previous.  When corrected for alveolar volume there has actually been some improvement in diffusion capacity compared to previous.      Results for orders placed during the hospital encounter of 23    Spirometry - Pre & Post Bronchodilator with Lung Volumes    [facility] - Pulmonary Function Test    [address] [place] [address]  [id number]  [phone]    Patient : Caio Riley  MRN : [id number]  CSN : [id number]  Pulmonologist : Salvatore Shah MD  Date :  2023    ______________________________________________________________________    Interpretation :  1.  Spirometry is consistent with a moderate obstructive ventilatory defect.  2.  There is improvement in spirometry postbronchodilator particularly in midflows but a moderate obstructive ventilatory defect is still present.  3.  Lung volumes reveal a coexisting mild restrictive ventilatory defect.  There is also a decrease in inspiratory capacity.  4.  There is a moderate diffusion impairment which when corrected for alveolar volume is a low normal diffusion capacity.  5.  When current studies are compared to studies performed on 2022, the patient's current pre and postbronchodilator spirometry reveals improvement both the FVC and FEV1 compared to previous baseline values.  There has also been significant improvement in the patient's total lung capacity compared to previous.  When corrected for alveolar volume there has been a decline in diffusion capacity compared to previous although it remains within normal limits.      Salvatore Shah MD      Results for orders placed in visit on 22    Pulmonary Function Test    Narrative  Pulmonary Function Test  Performed by: Machelle Elliott, RRT  Authorized by: Salvatore Shah MD    Pre Drug % Predicted  FVC: 66%  FEV1: 60%  FEF 25-75%: 40%  FEV1/FVC: 68%  T%  RV: 78%  DLCO: 63%  D/VAsb: 92%    Interpretation  Spirometry  Spirometry shows moderate obstruction.  Lung Volume Measurements  Measurements show reduced lung volumes consistent with restriction.  Diffusion Capacity  The patient's diffusion capacity is mildly reduced.  Diffusion capacity is normal when corrected for alveolar volume.  Overall comments: The patient's spirometry is consistent with a moderate obstructive ventilatory defect.  Lung volumes reveal a coexisting restrictive ventilatory defect along with a decrease in inspiratory capacity.  There is a mild bordering  on moderate diffusion impairment which when corrected for alveolar volume is normalized.  When current studies are compared to [facility], his FVC is unchanged, his FEV1 has improved slightly, his total lung capacity is essentially unchanged, and when corrected for alveolar volume there has been a slight but not significant drop in his diffusion capacity compared to previous.                 My interpretation of imaging:    XR Chest 2 View (08/06/2024 09:58)         Assessment and Plan {CC Problem List  Visit Diagnosis  ROS  Review (Popup)  [facility] Maintenance  Quality  BestPractice  Medications  SmartSets  SnapShot Encounters  Media : 23}    Diagnoses and all orders for this visit:    1. 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  At this point I told him I would just recommend he continue his low-dose prednisone with his inhaled medications.      2. COPD, moderate  Assessment & Plan:  He will continue his Wixela Inhub and as needed albuterol HFA inhaler.        3. Restrictive lung disease  Assessment & Plan:  Again PFTs performed last week are consistent with a restrictive ventilatory defect.      4. Overweight  Assessment & Plan:  Patient's (Body mass index is 26.64 kg/m².) indicates that they are overweight with health conditions that include hypertension . Weight is unchanged.  Diet and exercise are encouraged and he will follow-up with his primary care physician  regarding his minimally elevated BMI otherwise.            Salvatore Shah MD  8/26/2024  13:26 CDT    Follow Up   Return in about 6 months (around 2/26/2025) for To see me specifically.    Patient was given instructions and counseling regarding his condition or for health maintenance advice. Please see specific information pulled into the AVS if appropriate.

## 2024-08-26 NOTE — PATIENT INSTRUCTIONS
The patient is doing well clinically from a pulmonary standpoint.  Pulmonary functions done last week did show some decline in spirometry compared to previous although it would still be considered to have a moderate decrease in his FEV1.  He actually improvement in his diffusion capacity when corrected for alveolar volume compared to previous.  At present I would just recommend he continue his current regimen and I will see him back in 6 months.

## 2024-08-26 NOTE — ASSESSMENT & PLAN NOTE
Patient's (Body mass index is 26.64 kg/m².) indicates that they are overweight with health conditions that include hypertension . Weight is unchanged.  Diet and exercise are encouraged and he will follow-up with his primary care physician regarding his minimally elevated BMI otherwise.

## 2024-09-03 RX ORDER — DONEPEZIL HYDROCHLORIDE 10 MG/1
10 TABLET, FILM COATED ORAL NIGHTLY
Qty: 90 TABLET | Refills: 2 | Status: SHIPPED | OUTPATIENT
Start: 2024-09-03

## 2024-09-08 ENCOUNTER — APPOINTMENT (OUTPATIENT)
Dept: GENERAL RADIOLOGY | Facility: HOSPITAL | Age: 74
End: 2024-09-08
Payer: MEDICARE

## 2024-09-08 ENCOUNTER — HOSPITAL ENCOUNTER (EMERGENCY)
Facility: HOSPITAL | Age: 74
Discharge: HOME | End: 2024-09-08
Attending: INTERNAL MEDICINE | Admitting: INTERNAL MEDICINE
Payer: MEDICARE

## 2024-09-08 VITALS
DIASTOLIC BLOOD PRESSURE: 57 MMHG | TEMPERATURE: 98.4 F | BODY MASS INDEX: 27.85 KG/M2 | WEIGHT: 188 LBS | OXYGEN SATURATION: 95 % | HEART RATE: 82 BPM | SYSTOLIC BLOOD PRESSURE: 125 MMHG | HEIGHT: 69 IN | RESPIRATION RATE: 21 BRPM

## 2024-09-08 DIAGNOSIS — R50.9 FEVER, UNSPECIFIED FEVER CAUSE: Primary | ICD-10-CM

## 2024-09-08 LAB
ALBUMIN SERPL-MCNC: 3.3 G/DL (ref 3.5–5.2)
ALBUMIN/GLOB SERPL: 0.8 G/DL
ALP SERPL-CCNC: 85 U/L (ref 39–117)
ALT SERPL W P-5'-P-CCNC: 14 U/L (ref 1–41)
ANION GAP SERPL CALCULATED.3IONS-SCNC: 13 MMOL/L (ref 5–15)
ARTERIAL PATENCY WRIST A: POSITIVE
AST SERPL-CCNC: 20 U/L (ref 1–40)
ATMOSPHERIC PRESS: 756 MMHG
B PARAPERT DNA SPEC QL NAA+PROBE: NOT DETECTED
B PERT DNA SPEC QL NAA+PROBE: NOT DETECTED
BASE EXCESS BLDA CALC-SCNC: 6.4 MMOL/L (ref 0–2)
BASOPHILS # BLD AUTO: 0.03 10*3/MM3 (ref 0–0.2)
BASOPHILS NFR BLD AUTO: 0.3 % (ref 0–1.5)
BDY SITE: ABNORMAL
BILIRUB SERPL-MCNC: 0.6 MG/DL (ref 0–1.2)
BODY TEMPERATURE: 37
BUN SERPL-MCNC: 20 MG/DL (ref 8–23)
BUN/CREAT SERPL: 23.5 (ref 7–25)
C PNEUM DNA NPH QL NAA+NON-PROBE: NOT DETECTED
CA-I BLD-MCNC: 4.74 MG/DL (ref 4.6–5.4)
CALCIUM SPEC-SCNC: 9.3 MG/DL (ref 8.6–10.5)
CHLORIDE SERPL-SCNC: 92 MMOL/L (ref 98–107)
CO2 SERPL-SCNC: 30 MMOL/L (ref 22–29)
COHGB MFR BLD: 1.6 % (ref 0–5)
CREAT SERPL-MCNC: 0.85 MG/DL (ref 0.76–1.27)
D-LACTATE SERPL-SCNC: 1.3 MMOL/L (ref 0.5–2)
DEPRECATED RDW RBC AUTO: 37.8 FL (ref 37–54)
EGFRCR SERPLBLD CKD-EPI 2021: 91.2 ML/MIN/1.73
EOSINOPHIL # BLD AUTO: 0.07 10*3/MM3 (ref 0–0.4)
EOSINOPHIL NFR BLD AUTO: 0.7 % (ref 0.3–6.2)
ERYTHROCYTE [DISTWIDTH] IN BLOOD BY AUTOMATED COUNT: 12.5 % (ref 12.3–15.4)
FLUAV SUBTYP SPEC NAA+PROBE: NOT DETECTED
FLUBV RNA ISLT QL NAA+PROBE: NOT DETECTED
GLOBULIN UR ELPH-MCNC: 4.1 GM/DL
GLUCOSE SERPL-MCNC: 227 MG/DL (ref 65–99)
HADV DNA SPEC NAA+PROBE: NOT DETECTED
HCO3 BLDA-SCNC: 31.3 MMOL/L (ref 20–26)
HCOV 229E RNA SPEC QL NAA+PROBE: NOT DETECTED
HCOV HKU1 RNA SPEC QL NAA+PROBE: NOT DETECTED
HCOV NL63 RNA SPEC QL NAA+PROBE: NOT DETECTED
HCOV OC43 RNA SPEC QL NAA+PROBE: NOT DETECTED
HCT VFR BLD AUTO: 33.9 % (ref 37.5–51)
HCT VFR BLD CALC: 34 % (ref 38–51)
HGB BLD-MCNC: 11.5 G/DL (ref 13–17.7)
HGB BLDA-MCNC: 11.1 G/DL (ref 14–18)
HMPV RNA NPH QL NAA+NON-PROBE: NOT DETECTED
HOLD SPECIMEN: NORMAL
HPIV1 RNA ISLT QL NAA+PROBE: NOT DETECTED
HPIV2 RNA SPEC QL NAA+PROBE: NOT DETECTED
HPIV3 RNA NPH QL NAA+PROBE: NOT DETECTED
HPIV4 P GENE NPH QL NAA+PROBE: NOT DETECTED
IMM GRANULOCYTES # BLD AUTO: 0.08 10*3/MM3 (ref 0–0.05)
IMM GRANULOCYTES NFR BLD AUTO: 0.8 % (ref 0–0.5)
LYMPHOCYTES # BLD AUTO: 0.59 10*3/MM3 (ref 0.7–3.1)
LYMPHOCYTES NFR BLD AUTO: 6.2 % (ref 19.6–45.3)
Lab: ABNORMAL
M PNEUMO IGG SER IA-ACNC: NOT DETECTED
MCH RBC QN AUTO: 28.6 PG (ref 26.6–33)
MCHC RBC AUTO-ENTMCNC: 33.9 G/DL (ref 31.5–35.7)
MCV RBC AUTO: 84.3 FL (ref 79–97)
METHGB BLD QL: 0 % (ref 0–3)
MODALITY: ABNORMAL
MONOCYTES # BLD AUTO: 0.78 10*3/MM3 (ref 0.1–0.9)
MONOCYTES NFR BLD AUTO: 8.2 % (ref 5–12)
NEUTROPHILS NFR BLD AUTO: 8.01 10*3/MM3 (ref 1.7–7)
NEUTROPHILS NFR BLD AUTO: 83.8 % (ref 42.7–76)
NRBC BLD AUTO-RTO: 0 /100 WBC (ref 0–0.2)
NT-PROBNP SERPL-MCNC: 279 PG/ML (ref 0–900)
OXYHGB MFR BLDV: 93.5 % (ref 94–99)
PCO2 BLDA: 45.7 MM HG (ref 35–45)
PCO2 TEMP ADJ BLD: 45.7 MM HG (ref 35–45)
PH BLDA: 7.44 PH UNITS (ref 7.35–7.45)
PH, TEMP CORRECTED: 7.44 PH UNITS (ref 7.35–7.45)
PLATELET # BLD AUTO: 204 10*3/MM3 (ref 140–450)
PMV BLD AUTO: 10.5 FL (ref 6–12)
PO2 BLDA: 65.3 MM HG (ref 83–108)
PO2 TEMP ADJ BLD: 65.3 MM HG (ref 83–108)
POTASSIUM BLDA-SCNC: 3.3 MMOL/L (ref 3.5–5.2)
POTASSIUM SERPL-SCNC: 3.5 MMOL/L (ref 3.5–5.2)
PROT SERPL-MCNC: 7.4 G/DL (ref 6–8.5)
RBC # BLD AUTO: 4.02 10*6/MM3 (ref 4.14–5.8)
RHINOVIRUS RNA SPEC NAA+PROBE: NOT DETECTED
RSV RNA NPH QL NAA+NON-PROBE: NOT DETECTED
S PYO AG THROAT QL: NEGATIVE
SAO2 % BLDCOA: 94.8 % (ref 94–99)
SARS-COV-2 RNA NPH QL NAA+NON-PROBE: NOT DETECTED
SODIUM BLDA-SCNC: 136 MMOL/L (ref 136–145)
SODIUM SERPL-SCNC: 135 MMOL/L (ref 136–145)
TROPONIN T SERPL HS-MCNC: 33 NG/L
VENTILATOR MODE: ABNORMAL
WBC NRBC COR # BLD AUTO: 9.56 10*3/MM3 (ref 3.4–10.8)
WHOLE BLOOD HOLD COAG: NORMAL
WHOLE BLOOD HOLD SPECIMEN: NORMAL

## 2024-09-08 PROCEDURE — 82375 ASSAY CARBOXYHB QUANT: CPT

## 2024-09-08 PROCEDURE — 0202U NFCT DS 22 TRGT SARS-COV-2: CPT | Performed by: INTERNAL MEDICINE

## 2024-09-08 PROCEDURE — 93005 ELECTROCARDIOGRAM TRACING: CPT | Performed by: INTERNAL MEDICINE

## 2024-09-08 PROCEDURE — 82805 BLOOD GASES W/O2 SATURATION: CPT

## 2024-09-08 PROCEDURE — 93010 ELECTROCARDIOGRAM REPORT: CPT | Performed by: EMERGENCY MEDICINE

## 2024-09-08 PROCEDURE — 83605 ASSAY OF LACTIC ACID: CPT | Performed by: INTERNAL MEDICINE

## 2024-09-08 PROCEDURE — 83880 ASSAY OF NATRIURETIC PEPTIDE: CPT | Performed by: INTERNAL MEDICINE

## 2024-09-08 PROCEDURE — 36415 COLL VENOUS BLD VENIPUNCTURE: CPT

## 2024-09-08 PROCEDURE — 87880 STREP A ASSAY W/OPTIC: CPT | Performed by: INTERNAL MEDICINE

## 2024-09-08 PROCEDURE — 25010000002 AZTREONAM PER 500 MG: Performed by: INTERNAL MEDICINE

## 2024-09-08 PROCEDURE — 96365 THER/PROPH/DIAG IV INF INIT: CPT

## 2024-09-08 PROCEDURE — 80053 COMPREHEN METABOLIC PANEL: CPT | Performed by: INTERNAL MEDICINE

## 2024-09-08 PROCEDURE — 87040 BLOOD CULTURE FOR BACTERIA: CPT | Performed by: INTERNAL MEDICINE

## 2024-09-08 PROCEDURE — 99284 EMERGENCY DEPT VISIT MOD MDM: CPT

## 2024-09-08 PROCEDURE — 93005 ELECTROCARDIOGRAM TRACING: CPT

## 2024-09-08 PROCEDURE — 71045 X-RAY EXAM CHEST 1 VIEW: CPT

## 2024-09-08 PROCEDURE — 84484 ASSAY OF TROPONIN QUANT: CPT | Performed by: INTERNAL MEDICINE

## 2024-09-08 PROCEDURE — 83050 HGB METHEMOGLOBIN QUAN: CPT

## 2024-09-08 PROCEDURE — 87081 CULTURE SCREEN ONLY: CPT | Performed by: INTERNAL MEDICINE

## 2024-09-08 PROCEDURE — 36600 WITHDRAWAL OF ARTERIAL BLOOD: CPT

## 2024-09-08 PROCEDURE — 85025 COMPLETE CBC W/AUTO DIFF WBC: CPT | Performed by: INTERNAL MEDICINE

## 2024-09-08 RX ORDER — SODIUM CHLORIDE 0.9 % (FLUSH) 0.9 %
10 SYRINGE (ML) INJECTION AS NEEDED
Status: DISCONTINUED | OUTPATIENT
Start: 2024-09-08 | End: 2024-09-09 | Stop reason: HOSPADM

## 2024-09-08 RX ORDER — GUAIFENESIN 600 MG/1
1200 TABLET, EXTENDED RELEASE ORAL 2 TIMES DAILY
Qty: 40 TABLET | Refills: 0 | Status: SHIPPED | OUTPATIENT
Start: 2024-09-08

## 2024-09-08 RX ORDER — LEVOFLOXACIN 500 MG/1
500 TABLET, FILM COATED ORAL DAILY
Qty: 7 TABLET | Refills: 0 | Status: SHIPPED | OUTPATIENT
Start: 2024-09-08

## 2024-09-08 RX ORDER — IBUPROFEN 800 MG/1
800 TABLET, FILM COATED ORAL ONCE
Status: COMPLETED | OUTPATIENT
Start: 2024-09-08 | End: 2024-09-08

## 2024-09-08 RX ADMIN — IBUPROFEN 800 MG: 800 TABLET, FILM COATED ORAL at 18:32

## 2024-09-08 RX ADMIN — SODIUM CHLORIDE 1000 MG: 900 INJECTION INTRAVENOUS at 21:49

## 2024-09-08 NOTE — ED PROVIDER NOTES
Subjective   History of Present Illness  73-year-old male presents emergency department with shortness of breath.  He also complains of fever, chills, and cough.  He states he started to get ill about a week and a half ago.  He developed an itchy throat.  Wednesday he declined.  Saturday and Sunday his throat felt swollen.  A couple days he had improvement and now he is starting to get worse again.  He has hot green sputum.  He has not seen any provider.  He has not had a COVID test.  He has had no nausea.  He does not have an appetite.  He has no abdominal pain.  No diarrhea.    Review of Systems   Constitutional:  Positive for chills and fever.   HENT:  Positive for sore throat.    Respiratory:  Positive for cough and shortness of breath.    Cardiovascular:  Negative for chest pain.   Gastrointestinal:  Negative for nausea.       Past Medical History:   Diagnosis Date    A-fib     Arthritis     Asthma     BCC (basal cell carcinoma)     right ear    COPD (chronic obstructive pulmonary disease)     Diabetes mellitus     Hypertension     Kidney stones     Neuropathy     Pancreatitis     Sarcoidosis        Allergies   Allergen Reactions    Bee Venom Anaphylaxis    Penicillins Swelling and Unknown (See Comments)     Reaction: SWELLING/RASH    Reaction: SWELLING/RASH    Acetaminophen Unknown (See Comments)     Other reaction(s): NAUSEA   Start Date: ADULT    Other reaction(s): NAUSEA   Start Date: ADULT    Gabapentin Unknown (See Comments)     Causes blisters to head.   Causes blisters to head.     Causes blisters to head.     Metoprolol Unknown (See Comments)     Leg pain   Leg pain     Leg pain        Past Surgical History:   Procedure Laterality Date    ABLATION OF DYSRHYTHMIC FOCUS      COLONOSCOPY N/A 08/01/2019    4 Tubular adenomas transverse colon and cecum, Diverticulosis repeat exam in 3 years    COLONOSCOPY N/A 7/19/2022    Procedure: COLONOSCOPY WITH ANESTHESIA;  Surgeon: Ino Starks MD;  Location:   [facility];  Service: Gastroenterology;  Laterality: N/A;  preop; hx of colon polyps  postop diverticulosis; polyp   pcp Gisella Valdovinos     COLONOSCOPY W/ POLYPECTOMY  2014    Tubular adenoma ascending colon     ENDOSCOPY  2002    Mild chronic gastritis    ENDOSCOPY N/A 2019    Esophagus dilated normal exam    ENDOSCOPY  2019    Dilation in the entire esophagus    ENDOSCOPY N/A 2022    Procedure: ESOPHAGOGASTRODUODENOSCOPY WITH ANESTHESIA;  Surgeon: Ino Starks MD;  Location: [facility];  Service: Gastroenterology;  Laterality: N/A;  preop; dysphagia  postop; normal   PCP Gisella Valdovinos    FRACTURE SURGERY Right     ARM    FRACTURE SURGERY Left     LEG    INGUINAL NODE BIOPSY Right 2018    Procedure: RIGHT GROIN EXCISIONAL BIOPSY;  Surgeon: Ced Aguirre MD;  Location: [facility] OR;  Service: General    LAPAROSCOPIC CHOLECYSTECTOMY      LASIK Bilateral     SKIN CANCER EXCISION      bcc of right helix  20    SQUAMOUS CELL CARCINOMA EXCISION      back and left arm       Family History   Problem Relation Age of Onset    Colon cancer Neg Hx     Colon polyps Neg Hx        Social History     Socioeconomic History    Marital status:    Tobacco Use    Smoking status: Former     Current packs/day: 0.00     Average packs/day: 0.5 packs/day for 2.0 years (1.0 ttl pk-yrs)     Types: Cigarettes     Start date:      Quit date:      Years since quittin.7     Passive exposure: Past    Smokeless tobacco: Never   Vaping Use    Vaping status: Never Used   Substance and Sexual Activity    Alcohol use: No    Drug use: No    Sexual activity: Defer           Objective   Physical Exam  Vitals reviewed.   Constitutional:       Appearance: He is well-developed. He is ill-appearing.      Comments: Forehead lesions.   HENT:      Head: Normocephalic and atraumatic.      Comments: Posterior pharyngeal erythema.  Oral thrush.     Nose: Nose normal.   Eyes:       Conjunctiva/sclera: Conjunctivae normal.   Cardiovascular:      Rate and Rhythm: Normal rate and regular rhythm.      Heart sounds: Normal heart sounds.   Pulmonary:      Effort: Pulmonary effort is normal.      Breath sounds: Normal breath sounds. No wheezing or rhonchi.   Abdominal:      General: Bowel sounds are normal.      Palpations: Abdomen is soft.   Musculoskeletal:         General: No tenderness.      Cervical back: Normal range of motion and neck supple.      Right lower leg: No edema.      Left lower leg: No edema.   Skin:     General: Skin is warm and dry.   Neurological:      General: No focal deficit present.      Mental Status: He is alert.      Cranial Nerves: No cranial nerve deficit.   Psychiatric:         Mood and Affect: Mood normal. Mood is not anxious.         Procedures       Lab Results (last 24 hours)       Procedure Component Value Units Date/Time    CBC & Differential [[id number]]  (Abnormal) Collected: 09/08/24 1801    Specimen: Blood Updated: 09/08/24 1809    Narrative:      The following orders were created for panel order CBC & Differential.  Procedure                               Abnormality         Status                     ---------                               -----------         ------                     CBC Auto Differential[[id number]]        Abnormal            Final result                 Please view results for these tests on the individual orders.    Comprehensive Metabolic Panel [[id number]]  (Abnormal) Collected: 09/08/24 1801    Specimen: Blood Updated: 09/08/24 1829     Glucose 227 mg/dL      BUN 20 mg/dL      Creatinine 0.85 mg/dL      Sodium 135 mmol/L      Potassium 3.5 mmol/L      Chloride 92 mmol/L      CO2 30.0 mmol/L      Calcium 9.3 mg/dL      Total Protein 7.4 g/dL      Albumin 3.3 g/dL      ALT (SGPT) 14 U/L      AST (SGOT) 20 U/L      Alkaline Phosphatase 85 U/L      Total Bilirubin 0.6 mg/dL      Globulin 4.1 gm/dL      A/G Ratio 0.8 g/dL       BUN/Creatinine Ratio 23.5     Anion Gap 13.0 mmol/L      eGFR 91.2 mL/min/1.73     Narrative:      GFR Normal >60  Chronic Kidney Disease <60  Kidney Failure <15    The GFR formula is only valid for adults with stable renal function between ages 18 and 70.    BNP [[id number]]  (Normal) Collected: 09/08/24 1801    Specimen: Blood Updated: 09/08/24 1827     proBNP 279.0 pg/mL     Narrative:      This assay is used as an aid in the diagnosis of individuals suspected of having heart failure. It can be used as an aid in the diagnosis of acute decompensated heart failure (ADHF) in patients presenting with signs and symptoms of ADHF to the emergency department (ED). In addition, NT-proBNP of <300 pg/mL indicates ADHF is not likely.    Age Range Result Interpretation  NT-proBNP Concentration (pg/mL:      <50             Positive            >450                   Gray                 300-450                    Negative             <300    50-75           Positive            >900                  Gray                300-900                  Negative            <300      >75             Positive            >1800                  Gray                300-1800                  Negative            <300    Single High Sensitivity Troponin T [[id number]]  (Abnormal) Collected: 09/08/24 1801    Specimen: Blood Updated: 09/08/24 1827     HS Troponin T 33 ng/L     Narrative:      High Sensitive Troponin T Reference Range:  <14.0 ng/L- Negative Female for AMI  <22.0 ng/L- Negative Male for AMI  >=14 - Abnormal Female indicating possible myocardial injury.  >=22 - Abnormal Male indicating possible myocardial injury.   Clinicians would have to utilize clinical acumen, EKG, Troponin, and serial changes to determine if it is an Acute Myocardial Infarction or myocardial injury due to an underlying chronic condition.         CBC Auto Differential [[id number]]  (Abnormal) Collected: 09/08/24 1801    Specimen: Blood Updated: 09/08/24 1809     WBC  9.56 10*3/mm3      RBC 4.02 10*6/mm3      Hemoglobin 11.5 g/dL      Hematocrit 33.9 %      MCV 84.3 fL      MCH 28.6 pg      MCHC 33.9 g/dL      RDW 12.5 %      RDW-SD 37.8 fl      MPV 10.5 fL      Platelets 204 10*3/mm3      Neutrophil % 83.8 %      Lymphocyte % 6.2 %      Monocyte % 8.2 %      Eosinophil % 0.7 %      Basophil % 0.3 %      Immature Grans % 0.8 %      Neutrophils, Absolute 8.01 10*3/mm3      Lymphocytes, Absolute 0.59 10*3/mm3      Monocytes, Absolute 0.78 10*3/mm3      Eosinophils, Absolute 0.07 10*3/mm3      Basophils, Absolute 0.03 10*3/mm3      Immature Grans, Absolute 0.08 10*3/mm3      nRBC 0.0 /100 WBC     Lactic Acid, Plasma [[id number]]  (Normal) Collected: 09/08/24 1801    Specimen: Blood Updated: 09/08/24 1953     Lactate 1.3 mmol/L     Respiratory Panel PCR w/COVID-19(SARS-CoV-2) [facility] In-House, NP Swab in UTM/VTM, 2 HR TAT - Swab, Nasopharynx [[id number]]  (Normal) Collected: 09/08/24 1808    Specimen: Swab from Nasopharynx Updated: 09/08/24 1907     ADENOVIRUS, PCR Not Detected     Coronavirus 229E Not Detected     Coronavirus HKU1 Not Detected     Coronavirus NL63 Not Detected     Coronavirus OC43 Not Detected     COVID19 Not Detected     Human Metapneumovirus Not Detected     Human Rhinovirus/Enterovirus Not Detected     Influenza A PCR Not Detected     Influenza B PCR Not Detected     Parainfluenza Virus 1 Not Detected     Parainfluenza Virus 2 Not Detected     Parainfluenza Virus 3 Not Detected     Parainfluenza Virus 4 Not Detected     RSV, PCR Not Detected     Bordetella pertussis pcr Not Detected     Bordetella parapertussis PCR Not Detected     Chlamydophila pneumoniae PCR Not Detected     Mycoplasma pneumo by PCR Not Detected    Narrative:      In the setting of a positive respiratory panel with a viral infection PLUS a negative procalcitonin without other underlying concern for bacterial infection, consider observing off antibiotics or discontinuation of  antibiotics and continue supportive care. If the respiratory panel is positive for atypical bacterial infection (Bordetella pertussis, Chlamydophila pneumoniae, or Mycoplasma pneumoniae), consider antibiotic de-escalation to target atypical bacterial infection.    Rapid Strep A Screen - Swab, Throat [[id number]]  (Normal) Collected: 09/08/24 1808    Specimen: Swab from Throat Updated: 09/08/24 1831     Strep A Ag Negative    Beta Strep Culture, Throat - Swab, Throat [[id number]] Collected: 09/08/24 1808    Specimen: Swab from Throat Updated: 09/08/24 1831    Blood Gas, Arterial With Co-Ox [[id number]]  (Abnormal) Collected: 09/08/24 1956    Specimen: Arterial Blood Updated: 09/08/24 1956     Site Left Radial     Ariel's Test Positive     pH, Arterial 7.444 pH units      pCO2, Arterial 45.7 mm Hg      Comment: 83 Value above reference range        pO2, Arterial 65.3 mm Hg      Comment: 84 Value below reference range        HCO3, Arterial 31.3 mmol/L      Comment: 83 Value above reference range        Base Excess, Arterial 6.4 mmol/L      Comment: 83 Value above reference range        O2 Saturation, Arterial 94.8 %      Hemoglobin, Blood Gas 11.1 g/dL      Comment: 84 Value below reference range        Hematocrit, Blood Gas 34.0 %      Comment: 84 Value below reference range        Oxyhemoglobin 93.5 %      Comment: 84 Value below reference range        Methemoglobin 0.00 %      Comment: 84 Value below reference range        Carboxyhemoglobin 1.6 %      Temperature 37.0     Sodium, Arterial 136 mmol/L      Comment: 84 Value below reference range        Potassium, Arterial 3.3 mmol/L      Comment: 84 Value below reference range        Ionized Calcium 4.74 mg/dL      Barometric Pressure for Blood Gas 756 mmHg      Modality Room Air     Ventilator Mode NA     Collected by [id number]     Comment: Meter: [id number]     :  [id number]        pH, Temp Corrected 7.444 pH Units      pCO2, Temperature Corrected 45.7 mm Hg       pO2, Temperature Corrected 65.3 mm Hg     Blood Culture - Blood, Arm, Left [[id number]] Collected: 09/08/24 2109    Specimen: Blood from Arm, Left Updated: 09/08/24 2116    Blood Culture - Blood, Arm, Left [[id number]] Collected: 09/08/24 2129    Specimen: Blood from Arm, Left Updated: 09/08/24 2135          XR Chest 1 View   Final Result   1.. Expiratory chest with basilar atelectasis.       This report was signed and finalized on 9/8/2024 5:35 PM by Dr. Bala Daniels MD.                ED Course  ED Course as of 09/08/24 2202   Sun Sep 08, 2024   1954 Offered admission.  Patient states he would rather have oral antibiotics and be discharged home.  Azactam as ordered.  Will give a dose of IV.  My concern was that patient's immunocompromise status with his Imuran.  He notes that he takes this for sarcoid. [AJ]   2133 Azactam still not given.   [AJ]      ED Course User Index  [AJ] Yovanny Cardona,                                    Please follow-up with Dr. Ramesh this week.  Antibiotic and mucolytic were called into your pharmacy this evening.    Continue to monitor your temperature at home.  Fall precautions.  Return to the emergency department if your symptoms worsen or fail to improve.          Medical Decision Making  Differential diagnosis includes: COVID, pneumonia, strep pharyngitis    Problems Addressed:  Fever, unspecified fever cause: complicated acute illness or injury    Amount and/or Complexity of Data Reviewed  Labs: ordered.     Details: CBC CMP lactic strep respiratory panel  Radiology: ordered.     Details: Chest x-ray  ECG/medicine tests: ordered.    Risk  OTC drugs.  Prescription drug management.        Final diagnoses:   Fever, unspecified fever cause       ED Disposition  ED Disposition       ED Disposition   Discharge    Condition   Stable    Comment   --               Shasha Gonzalez MD  [address] [place] [address] [address]  [facility] [id number] [phone]               Medication List         New Prescriptions      guaiFENesin 600 MG 12 hr tablet  Commonly known as: MUCINEX  Take 2 tablets by mouth 2 (Two) Times a Day.     levoFLOXacin 500 MG tablet  Commonly known as: LEVAQUIN  Take 1 tablet by mouth Daily.               Where to Get Your Medications        These medications were sent to [facility] [address] [place] [address] [address] [facility] - [phone] [facility] [phone] [address] [address]      Phone: [phone]   guaiFENesin 600 MG 12 hr tablet  levoFLOXacin 500 MG tablet            Yovanny Cardona, DO  09/08/24 2442       Yovanny Cardona, DO  09/08/24 3112

## 2024-09-09 LAB
QT INTERVAL: 356 MS
QTC INTERVAL: 440 MS

## 2024-09-09 NOTE — DISCHARGE INSTRUCTIONS
Please follow-up with Dr. Ramesh this week.  Antibiotic and mucolytic were called into your pharmacy this evening.    Continue to monitor your temperature at home.  Fall precautions.  Return to the emergency department if your symptoms worsen or fail to improve.

## 2024-09-10 ENCOUNTER — APPOINTMENT (OUTPATIENT)
Dept: GENERAL RADIOLOGY | Age: 74
End: 2024-09-10
Payer: MEDICARE

## 2024-09-10 ENCOUNTER — OFFICE VISIT (OUTPATIENT)
Dept: INTERNAL MEDICINE | Age: 74
End: 2024-09-10

## 2024-09-10 ENCOUNTER — HOSPITAL ENCOUNTER (OUTPATIENT)
Age: 74
Setting detail: OBSERVATION
Discharge: HOME | End: 2024-09-11
Attending: HOSPITALIST | Admitting: HOSPITALIST
Payer: MEDICARE

## 2024-09-10 VITALS
DIASTOLIC BLOOD PRESSURE: 60 MMHG | SYSTOLIC BLOOD PRESSURE: 118 MMHG | WEIGHT: 185 LBS | OXYGEN SATURATION: 99 % | HEART RATE: 99 BPM | BODY MASS INDEX: 27.32 KG/M2 | TEMPERATURE: 98.1 F

## 2024-09-10 DIAGNOSIS — J18.9 COMMUNITY ACQUIRED PNEUMONIA, BILATERAL: Primary | ICD-10-CM

## 2024-09-10 DIAGNOSIS — D86.9 SARCOIDOSIS: ICD-10-CM

## 2024-09-10 DIAGNOSIS — R06.02 SOB (SHORTNESS OF BREATH): Primary | ICD-10-CM

## 2024-09-10 DIAGNOSIS — Z78.9 FAILURE OF OUTPATIENT TREATMENT: ICD-10-CM

## 2024-09-10 LAB
ALBUMIN SERPL-MCNC: 3.5 G/DL (ref 3.5–5.2)
ALP SERPL-CCNC: 87 U/L (ref 40–129)
ALT SERPL-CCNC: 13 U/L (ref 5–41)
ANION GAP SERPL CALCULATED.3IONS-SCNC: 15 MMOL/L (ref 7–19)
AST SERPL-CCNC: 15 U/L (ref 5–40)
B PARAP IS1001 DNA NPH QL NAA+NON-PROBE: NOT DETECTED
B PERT.PT PRMT NPH QL NAA+NON-PROBE: NOT DETECTED
BACTERIA SPEC AEROBE CULT: NORMAL
BACTERIA URNS QL MICRO: NEGATIVE /HPF
BASOPHILS # BLD: 0 K/UL (ref 0–0.2)
BASOPHILS NFR BLD: 0.4 % (ref 0–1)
BILIRUB SERPL-MCNC: 0.7 MG/DL (ref 0.2–1.2)
BILIRUB UR QL STRIP: ABNORMAL
BUN SERPL-MCNC: 18 MG/DL (ref 8–23)
C PNEUM DNA NPH QL NAA+NON-PROBE: NOT DETECTED
CALCIUM SERPL-MCNC: 9.6 MG/DL (ref 8.8–10.2)
CHLORIDE SERPL-SCNC: 91 MMOL/L (ref 98–111)
CLARITY UR: CLEAR
CO2 SERPL-SCNC: 31 MMOL/L (ref 22–29)
COLOR UR: ABNORMAL
CREAT SERPL-MCNC: 0.9 MG/DL (ref 0.7–1.2)
CRYSTALS URNS MICRO: ABNORMAL /HPF
EOSINOPHIL # BLD: 0.1 K/UL (ref 0–0.6)
EOSINOPHIL NFR BLD: 1.1 % (ref 0–5)
EPI CELLS #/AREA URNS AUTO: 0 /HPF (ref 0–5)
ERYTHROCYTE [DISTWIDTH] IN BLOOD BY AUTOMATED COUNT: 12.3 % (ref 11.5–14.5)
FLUAV RNA NPH QL NAA+NON-PROBE: NOT DETECTED
FLUBV RNA NPH QL NAA+NON-PROBE: NOT DETECTED
GLUCOSE BLD-MCNC: 237 MG/DL (ref 70–99)
GLUCOSE BLD-MCNC: 284 MG/DL (ref 70–99)
GLUCOSE SERPL-MCNC: 285 MG/DL (ref 70–99)
GLUCOSE UR STRIP.AUTO-MCNC: =>1000 MG/DL
HADV DNA NPH QL NAA+NON-PROBE: NOT DETECTED
HBA1C MFR BLD: 9.4 % (ref 4–5.6)
HCOV 229E RNA NPH QL NAA+NON-PROBE: NOT DETECTED
HCOV HKU1 RNA NPH QL NAA+NON-PROBE: NOT DETECTED
HCOV NL63 RNA NPH QL NAA+NON-PROBE: NOT DETECTED
HCOV OC43 RNA NPH QL NAA+NON-PROBE: NOT DETECTED
HCT VFR BLD AUTO: 37.4 % (ref 42–52)
HGB BLD-MCNC: 12.3 G/DL (ref 14–18)
HGB UR STRIP.AUTO-MCNC: NEGATIVE MG/L
HMPV RNA NPH QL NAA+NON-PROBE: NOT DETECTED
HPIV1 RNA NPH QL NAA+NON-PROBE: NOT DETECTED
HPIV2 RNA NPH QL NAA+NON-PROBE: NOT DETECTED
HPIV3 RNA NPH QL NAA+NON-PROBE: NOT DETECTED
HPIV4 RNA NPH QL NAA+NON-PROBE: NOT DETECTED
HYALINE CASTS #/AREA URNS AUTO: 9 /HPF (ref 0–8)
IMM GRANULOCYTES # BLD: 0.1 K/UL
KETONES UR STRIP.AUTO-MCNC: 40 MG/DL
LACTATE BLDV-SCNC: 1.4 MMOL/L (ref 0.5–1.9)
LEGIONELLA AG UR QL: NORMAL
LEUKOCYTE ESTERASE UR QL STRIP.AUTO: ABNORMAL
LYMPHOCYTES # BLD: 0.5 K/UL (ref 1.1–4.5)
LYMPHOCYTES NFR BLD: 6.9 % (ref 20–40)
M PNEUMO DNA NPH QL NAA+NON-PROBE: NOT DETECTED
MCH RBC QN AUTO: 28 PG (ref 27–31)
MCHC RBC AUTO-ENTMCNC: 32.9 G/DL (ref 33–37)
MCV RBC AUTO: 85 FL (ref 80–94)
MONOCYTES # BLD: 0.5 K/UL (ref 0–0.9)
MONOCYTES NFR BLD: 7.5 % (ref 0–10)
NEUTROPHILS # BLD: 5.9 K/UL (ref 1.5–7.5)
NEUTS SEG NFR BLD: 82.4 % (ref 50–65)
NITRITE UR QL STRIP.AUTO: NEGATIVE
PERFORMED ON: ABNORMAL
PERFORMED ON: ABNORMAL
PH UR STRIP.AUTO: 5.5 [PH] (ref 5–8)
PLATELET # BLD AUTO: 243 K/UL (ref 130–400)
PMV BLD AUTO: 10.1 FL (ref 9.4–12.4)
POTASSIUM SERPL-SCNC: 3.6 MMOL/L (ref 3.5–5)
PROT SERPL-MCNC: 7.9 G/DL (ref 6.4–8.3)
PROT UR STRIP.AUTO-MCNC: 30 MG/DL
RBC # BLD AUTO: 4.4 M/UL (ref 4.7–6.1)
RBC #/AREA URNS AUTO: 3 /HPF (ref 0–4)
RSV RNA NPH QL NAA+NON-PROBE: NOT DETECTED
RV+EV RNA NPH QL NAA+NON-PROBE: NOT DETECTED
S PNEUM AG SPEC QL: NORMAL
S PYO AG THROAT QL: NEGATIVE
SARS-COV-2 RNA NPH QL NAA+NON-PROBE: NOT DETECTED
SODIUM SERPL-SCNC: 137 MMOL/L (ref 136–145)
SP GR UR STRIP.AUTO: 1.04 (ref 1–1.03)
TROPONIN, HIGH SENSITIVITY: 37 NG/L (ref 0–22)
UROBILINOGEN UR STRIP.AUTO-MCNC: 2 E.U./DL
WBC # BLD AUTO: 7.1 K/UL (ref 4.8–10.8)
WBC #/AREA URNS AUTO: 28 /HPF (ref 0–5)

## 2024-09-10 PROCEDURE — G0378 HOSPITAL OBSERVATION PER HR: HCPCS

## 2024-09-10 PROCEDURE — 87880 STREP A ASSAY W/OPTIC: CPT

## 2024-09-10 PROCEDURE — 94760 N-INVAS EAR/PLS OXIMETRY 1: CPT

## 2024-09-10 PROCEDURE — 96375 TX/PRO/DX INJ NEW DRUG ADDON: CPT

## 2024-09-10 PROCEDURE — 82962 GLUCOSE BLOOD TEST: CPT

## 2024-09-10 PROCEDURE — 96365 THER/PROPH/DIAG IV INF INIT: CPT

## 2024-09-10 PROCEDURE — 36415 COLL VENOUS BLD VENIPUNCTURE: CPT

## 2024-09-10 PROCEDURE — 83605 ASSAY OF LACTIC ACID: CPT

## 2024-09-10 PROCEDURE — 81001 URINALYSIS AUTO W/SCOPE: CPT

## 2024-09-10 PROCEDURE — 96374 THER/PROPH/DIAG INJ IV PUSH: CPT

## 2024-09-10 PROCEDURE — 2700000000 HC OXYGEN THERAPY PER DAY

## 2024-09-10 PROCEDURE — 87086 URINE CULTURE/COLONY COUNT: CPT

## 2024-09-10 PROCEDURE — 2580000003 HC RX 258: Performed by: PHYSICIAN ASSISTANT

## 2024-09-10 PROCEDURE — 71045 X-RAY EXAM CHEST 1 VIEW: CPT

## 2024-09-10 PROCEDURE — 87449 NOS EACH ORGANISM AG IA: CPT

## 2024-09-10 PROCEDURE — 84484 ASSAY OF TROPONIN QUANT: CPT

## 2024-09-10 PROCEDURE — 87081 CULTURE SCREEN ONLY: CPT

## 2024-09-10 PROCEDURE — 85025 COMPLETE CBC W/AUTO DIFF WBC: CPT

## 2024-09-10 PROCEDURE — 6370000000 HC RX 637 (ALT 250 FOR IP): Performed by: NURSE PRACTITIONER

## 2024-09-10 PROCEDURE — 2580000003 HC RX 258: Performed by: NURSE PRACTITIONER

## 2024-09-10 PROCEDURE — 80053 COMPREHEN METABOLIC PANEL: CPT

## 2024-09-10 PROCEDURE — 6360000002 HC RX W HCPCS: Performed by: PHYSICIAN ASSISTANT

## 2024-09-10 PROCEDURE — 87040 BLOOD CULTURE FOR BACTERIA: CPT

## 2024-09-10 PROCEDURE — 99285 EMERGENCY DEPT VISIT HI MDM: CPT

## 2024-09-10 PROCEDURE — 83036 HEMOGLOBIN GLYCOSYLATED A1C: CPT

## 2024-09-10 PROCEDURE — 0202U NFCT DS 22 TRGT SARS-COV-2: CPT

## 2024-09-10 RX ORDER — ONDANSETRON 2 MG/ML
4 INJECTION INTRAMUSCULAR; INTRAVENOUS EVERY 6 HOURS PRN
Status: DISCONTINUED | OUTPATIENT
Start: 2024-09-10 | End: 2024-09-11 | Stop reason: HOSPADM

## 2024-09-10 RX ORDER — ENOXAPARIN SODIUM 100 MG/ML
40 INJECTION SUBCUTANEOUS DAILY
Status: DISCONTINUED | OUTPATIENT
Start: 2024-09-10 | End: 2024-09-10

## 2024-09-10 RX ORDER — ONDANSETRON 4 MG/1
4 TABLET, ORALLY DISINTEGRATING ORAL EVERY 8 HOURS PRN
Status: DISCONTINUED | OUTPATIENT
Start: 2024-09-10 | End: 2024-09-11 | Stop reason: HOSPADM

## 2024-09-10 RX ORDER — DEXAMETHASONE SODIUM PHOSPHATE 10 MG/ML
4 INJECTION, SOLUTION INTRAMUSCULAR; INTRAVENOUS ONCE
Status: COMPLETED | OUTPATIENT
Start: 2024-09-10 | End: 2024-09-10

## 2024-09-10 RX ORDER — DEXTROSE MONOHYDRATE 100 MG/ML
INJECTION, SOLUTION INTRAVENOUS CONTINUOUS PRN
Status: DISCONTINUED | OUTPATIENT
Start: 2024-09-10 | End: 2024-09-11 | Stop reason: HOSPADM

## 2024-09-10 RX ORDER — ATORVASTATIN CALCIUM 10 MG/1
10 TABLET, FILM COATED ORAL DAILY
Status: DISCONTINUED | OUTPATIENT
Start: 2024-09-11 | End: 2024-09-11 | Stop reason: HOSPADM

## 2024-09-10 RX ORDER — PREDNISOLONE 5 MG/1
5 TABLET ORAL DAILY
COMMUNITY

## 2024-09-10 RX ORDER — SODIUM CHLORIDE 9 MG/ML
INJECTION, SOLUTION INTRAVENOUS CONTINUOUS
Status: DISCONTINUED | OUTPATIENT
Start: 2024-09-10 | End: 2024-09-11 | Stop reason: HOSPADM

## 2024-09-10 RX ORDER — SODIUM CHLORIDE 9 MG/ML
INJECTION, SOLUTION INTRAVENOUS PRN
Status: DISCONTINUED | OUTPATIENT
Start: 2024-09-10 | End: 2024-09-11 | Stop reason: HOSPADM

## 2024-09-10 RX ORDER — ATENOLOL 25 MG/1
25 TABLET ORAL DAILY
Status: DISCONTINUED | OUTPATIENT
Start: 2024-09-11 | End: 2024-09-11 | Stop reason: HOSPADM

## 2024-09-10 RX ORDER — SODIUM CHLORIDE 0.9 % (FLUSH) 0.9 %
5-40 SYRINGE (ML) INJECTION PRN
Status: DISCONTINUED | OUTPATIENT
Start: 2024-09-10 | End: 2024-09-11 | Stop reason: HOSPADM

## 2024-09-10 RX ORDER — LOSARTAN POTASSIUM 100 MG/1
100 TABLET ORAL DAILY
Status: DISCONTINUED | OUTPATIENT
Start: 2024-09-11 | End: 2024-09-11 | Stop reason: HOSPADM

## 2024-09-10 RX ORDER — ALBUTEROL SULFATE 0.83 MG/ML
2.5 SOLUTION RESPIRATORY (INHALATION) EVERY 4 HOURS PRN
Status: DISCONTINUED | OUTPATIENT
Start: 2024-09-10 | End: 2024-09-11 | Stop reason: HOSPADM

## 2024-09-10 RX ORDER — SODIUM CHLORIDE 0.9 % (FLUSH) 0.9 %
5-40 SYRINGE (ML) INJECTION EVERY 12 HOURS SCHEDULED
Status: DISCONTINUED | OUTPATIENT
Start: 2024-09-10 | End: 2024-09-11 | Stop reason: HOSPADM

## 2024-09-10 RX ORDER — ACETAMINOPHEN 325 MG/1
650 TABLET ORAL EVERY 6 HOURS PRN
Status: DISCONTINUED | OUTPATIENT
Start: 2024-09-10 | End: 2024-09-11 | Stop reason: HOSPADM

## 2024-09-10 RX ORDER — GUAIFENESIN/DEXTROMETHORPHAN 100-10MG/5
5 SYRUP ORAL EVERY 4 HOURS PRN
Status: DISCONTINUED | OUTPATIENT
Start: 2024-09-10 | End: 2024-09-11 | Stop reason: HOSPADM

## 2024-09-10 RX ORDER — INSULIN GLARGINE 100 [IU]/ML
18 INJECTION, SOLUTION SUBCUTANEOUS NIGHTLY
Status: DISCONTINUED | OUTPATIENT
Start: 2024-09-10 | End: 2024-09-11 | Stop reason: HOSPADM

## 2024-09-10 RX ORDER — INSULIN LISPRO 100 [IU]/ML
0-4 INJECTION, SOLUTION INTRAVENOUS; SUBCUTANEOUS NIGHTLY
Status: DISCONTINUED | OUTPATIENT
Start: 2024-09-10 | End: 2024-09-11 | Stop reason: HOSPADM

## 2024-09-10 RX ORDER — DONEPEZIL HYDROCHLORIDE 5 MG/1
10 TABLET, FILM COATED ORAL NIGHTLY
Status: DISCONTINUED | OUTPATIENT
Start: 2024-09-10 | End: 2024-09-11 | Stop reason: HOSPADM

## 2024-09-10 RX ORDER — ASPIRIN 81 MG/1
81 TABLET ORAL DAILY
Status: DISCONTINUED | OUTPATIENT
Start: 2024-09-11 | End: 2024-09-11 | Stop reason: HOSPADM

## 2024-09-10 RX ORDER — AZATHIOPRINE 50 MG/1
50 TABLET ORAL DAILY
Status: DISCONTINUED | OUTPATIENT
Start: 2024-09-11 | End: 2024-09-11 | Stop reason: HOSPADM

## 2024-09-10 RX ORDER — BUDESONIDE AND FORMOTEROL FUMARATE DIHYDRATE 160; 4.5 UG/1; UG/1
2 AEROSOL RESPIRATORY (INHALATION)
Status: DISCONTINUED | OUTPATIENT
Start: 2024-09-10 | End: 2024-09-11 | Stop reason: HOSPADM

## 2024-09-10 RX ORDER — POLYETHYLENE GLYCOL 3350 17 G/17G
17 POWDER, FOR SOLUTION ORAL DAILY PRN
Status: DISCONTINUED | OUTPATIENT
Start: 2024-09-10 | End: 2024-09-11 | Stop reason: HOSPADM

## 2024-09-10 RX ORDER — INSULIN LISPRO 100 [IU]/ML
0-4 INJECTION, SOLUTION INTRAVENOUS; SUBCUTANEOUS
Status: DISCONTINUED | OUTPATIENT
Start: 2024-09-10 | End: 2024-09-11 | Stop reason: HOSPADM

## 2024-09-10 RX ADMIN — AZITHROMYCIN DIHYDRATE 500 MG: 500 INJECTION, POWDER, LYOPHILIZED, FOR SOLUTION INTRAVENOUS at 18:24

## 2024-09-10 RX ADMIN — DONEPEZIL HYDROCHLORIDE 10 MG: 5 TABLET, FILM COATED ORAL at 22:46

## 2024-09-10 RX ADMIN — PREGABALIN 75 MG: 50 CAPSULE ORAL at 22:46

## 2024-09-10 RX ADMIN — DEXAMETHASONE SODIUM PHOSPHATE 4 MG: 10 INJECTION, SOLUTION INTRAMUSCULAR; INTRAVENOUS at 17:54

## 2024-09-10 RX ADMIN — WATER 1000 MG: 1 INJECTION INTRAMUSCULAR; INTRAVENOUS; SUBCUTANEOUS at 18:16

## 2024-09-10 RX ADMIN — SODIUM CHLORIDE: 9 INJECTION, SOLUTION INTRAVENOUS at 21:37

## 2024-09-10 SDOH — ECONOMIC STABILITY: FOOD INSECURITY: WITHIN THE PAST 12 MONTHS, YOU WORRIED THAT YOUR FOOD WOULD RUN OUT BEFORE YOU GOT MONEY TO BUY MORE.: NEVER TRUE

## 2024-09-10 SDOH — ECONOMIC STABILITY: INCOME INSECURITY: HOW HARD IS IT FOR YOU TO PAY FOR THE VERY BASICS LIKE FOOD, HOUSING, MEDICAL CARE, AND HEATING?: NOT VERY HARD

## 2024-09-10 SDOH — ECONOMIC STABILITY: FOOD INSECURITY: WITHIN THE PAST 12 MONTHS, THE FOOD YOU BOUGHT JUST DIDN'T LAST AND YOU DIDN'T HAVE MONEY TO GET MORE.: NEVER TRUE

## 2024-09-10 ASSESSMENT — ENCOUNTER SYMPTOMS
EYE ITCHING: 0
SHORTNESS OF BREATH: 0
COUGH: 1
PHOTOPHOBIA: 0
APNEA: 0
COUGH: 0
COLOR CHANGE: 0
BACK PAIN: 0
SHORTNESS OF BREATH: 1
EYE DISCHARGE: 0

## 2024-09-10 ASSESSMENT — PAIN SCALES - GENERAL: PAINLEVEL_OUTOF10: 0

## 2024-09-11 VITALS
RESPIRATION RATE: 17 BRPM | SYSTOLIC BLOOD PRESSURE: 160 MMHG | DIASTOLIC BLOOD PRESSURE: 77 MMHG | HEART RATE: 85 BPM | OXYGEN SATURATION: 96 % | TEMPERATURE: 97.3 F | HEIGHT: 69 IN | BODY MASS INDEX: 27.31 KG/M2

## 2024-09-11 LAB
ANION GAP SERPL CALCULATED.3IONS-SCNC: 15 MMOL/L (ref 7–19)
BUN SERPL-MCNC: 19 MG/DL (ref 8–23)
CALCIUM SERPL-MCNC: 8.8 MG/DL (ref 8.8–10.2)
CHLORIDE SERPL-SCNC: 92 MMOL/L (ref 98–111)
CO2 SERPL-SCNC: 28 MMOL/L (ref 22–29)
CREAT SERPL-MCNC: 0.8 MG/DL (ref 0.7–1.2)
ERYTHROCYTE [DISTWIDTH] IN BLOOD BY AUTOMATED COUNT: 12.3 % (ref 11.5–14.5)
GLUCOSE BLD-MCNC: 233 MG/DL (ref 70–99)
GLUCOSE BLD-MCNC: 266 MG/DL (ref 70–99)
GLUCOSE SERPL-MCNC: 257 MG/DL (ref 70–99)
HCT VFR BLD AUTO: 33.5 % (ref 42–52)
HGB BLD-MCNC: 11.2 G/DL (ref 14–18)
MCH RBC QN AUTO: 28.7 PG (ref 27–31)
MCHC RBC AUTO-ENTMCNC: 33.4 G/DL (ref 33–37)
MCV RBC AUTO: 85.9 FL (ref 80–94)
PERFORMED ON: ABNORMAL
PERFORMED ON: ABNORMAL
PLATELET # BLD AUTO: 248 K/UL (ref 130–400)
PMV BLD AUTO: 10.7 FL (ref 9.4–12.4)
POTASSIUM SERPL-SCNC: 4 MMOL/L (ref 3.5–5)
RBC # BLD AUTO: 3.9 M/UL (ref 4.7–6.1)
SODIUM SERPL-SCNC: 135 MMOL/L (ref 136–145)
WBC # BLD AUTO: 7.1 K/UL (ref 4.8–10.8)

## 2024-09-11 PROCEDURE — 6370000000 HC RX 637 (ALT 250 FOR IP): Performed by: NURSE PRACTITIONER

## 2024-09-11 PROCEDURE — 94760 N-INVAS EAR/PLS OXIMETRY 1: CPT

## 2024-09-11 PROCEDURE — G0378 HOSPITAL OBSERVATION PER HR: HCPCS

## 2024-09-11 PROCEDURE — 2580000003 HC RX 258: Performed by: NURSE PRACTITIONER

## 2024-09-11 PROCEDURE — 6360000002 HC RX W HCPCS: Performed by: NURSE PRACTITIONER

## 2024-09-11 PROCEDURE — 36415 COLL VENOUS BLD VENIPUNCTURE: CPT

## 2024-09-11 PROCEDURE — 82962 GLUCOSE BLOOD TEST: CPT

## 2024-09-11 PROCEDURE — 2700000000 HC OXYGEN THERAPY PER DAY

## 2024-09-11 PROCEDURE — 96376 TX/PRO/DX INJ SAME DRUG ADON: CPT

## 2024-09-11 PROCEDURE — 6370000000 HC RX 637 (ALT 250 FOR IP): Performed by: HOSPITALIST

## 2024-09-11 PROCEDURE — 94640 AIRWAY INHALATION TREATMENT: CPT

## 2024-09-11 PROCEDURE — 80048 BASIC METABOLIC PNL TOTAL CA: CPT

## 2024-09-11 PROCEDURE — 85027 COMPLETE CBC AUTOMATED: CPT

## 2024-09-11 RX ORDER — CEFDINIR 300 MG/1
300 CAPSULE ORAL 2 TIMES DAILY
Qty: 10 CAPSULE | Refills: 0 | Status: SHIPPED | OUTPATIENT
Start: 2024-09-11 | End: 2024-09-16

## 2024-09-11 RX ORDER — PREDNISOLONE 5 MG/1
5 TABLET ORAL DAILY
Status: DISCONTINUED | OUTPATIENT
Start: 2024-09-11 | End: 2024-09-11 | Stop reason: CLARIF

## 2024-09-11 RX ORDER — PREDNISONE 10 MG/1
5 TABLET ORAL DAILY
Status: DISCONTINUED | OUTPATIENT
Start: 2024-09-11 | End: 2024-09-11 | Stop reason: HOSPADM

## 2024-09-11 RX ORDER — INSULIN LISPRO 100 [IU]/ML
5 INJECTION, SOLUTION INTRAVENOUS; SUBCUTANEOUS
Status: DISCONTINUED | OUTPATIENT
Start: 2024-09-11 | End: 2024-09-11 | Stop reason: HOSPADM

## 2024-09-11 RX ORDER — AZITHROMYCIN 250 MG/1
250 TABLET, FILM COATED ORAL DAILY
Qty: 5 TABLET | Refills: 0 | Status: SHIPPED | OUTPATIENT
Start: 2024-09-11 | End: 2024-09-16

## 2024-09-11 RX ORDER — AZITHROMYCIN 250 MG/1
250 TABLET, FILM COATED ORAL ONCE
Status: COMPLETED | OUTPATIENT
Start: 2024-09-11 | End: 2024-09-11

## 2024-09-11 RX ADMIN — SODIUM CHLORIDE, PRESERVATIVE FREE 10 ML: 5 INJECTION INTRAVENOUS at 12:24

## 2024-09-11 RX ADMIN — ASPIRIN 81 MG: 81 TABLET, COATED ORAL at 08:40

## 2024-09-11 RX ADMIN — PREGABALIN 75 MG: 50 CAPSULE ORAL at 08:40

## 2024-09-11 RX ADMIN — LOSARTAN POTASSIUM 100 MG: 100 TABLET, FILM COATED ORAL at 08:40

## 2024-09-11 RX ADMIN — INSULIN LISPRO 1 UNITS: 100 INJECTION, SOLUTION INTRAVENOUS; SUBCUTANEOUS at 08:51

## 2024-09-11 RX ADMIN — PREDNISONE 5 MG: 10 TABLET ORAL at 08:40

## 2024-09-11 RX ADMIN — AZATHIOPRINE 50 MG: 50 TABLET ORAL at 08:40

## 2024-09-11 RX ADMIN — WATER 1000 MG: 1 INJECTION INTRAMUSCULAR; INTRAVENOUS; SUBCUTANEOUS at 15:55

## 2024-09-11 RX ADMIN — ATENOLOL 25 MG: 25 TABLET ORAL at 08:40

## 2024-09-11 RX ADMIN — INSULIN LISPRO 5 UNITS: 100 INJECTION, SOLUTION INTRAVENOUS; SUBCUTANEOUS at 12:23

## 2024-09-11 RX ADMIN — SODIUM CHLORIDE: 9 INJECTION, SOLUTION INTRAVENOUS at 12:09

## 2024-09-11 RX ADMIN — AZITHROMYCIN DIHYDRATE 250 MG: 250 TABLET ORAL at 15:55

## 2024-09-11 RX ADMIN — ATORVASTATIN CALCIUM 10 MG: 10 TABLET, FILM COATED ORAL at 08:40

## 2024-09-11 RX ADMIN — INSULIN LISPRO 2 UNITS: 100 INJECTION, SOLUTION INTRAVENOUS; SUBCUTANEOUS at 12:23

## 2024-09-11 RX ADMIN — BUDESONIDE AND FORMOTEROL FUMARATE DIHYDRATE 2 PUFF: 160; 4.5 AEROSOL RESPIRATORY (INHALATION) at 06:30

## 2024-09-12 ENCOUNTER — TELEPHONE (OUTPATIENT)
Dept: PRIMARY CARE CLINIC | Age: 74
End: 2024-09-12

## 2024-09-12 LAB
BACTERIA UR CULT: NORMAL
S PYO THROAT QL CULT: NORMAL

## 2024-09-13 LAB
BACTERIA SPEC AEROBE CULT: NORMAL
BACTERIA SPEC AEROBE CULT: NORMAL

## 2024-09-15 LAB
BACTERIA BLD CULT ORG #2: NORMAL
BACTERIA BLD CULT: NORMAL

## 2024-09-24 ENCOUNTER — OFFICE VISIT (OUTPATIENT)
Dept: INTERNAL MEDICINE | Age: 74
End: 2024-09-24

## 2024-09-24 VITALS
SYSTOLIC BLOOD PRESSURE: 118 MMHG | HEIGHT: 69 IN | HEART RATE: 62 BPM | DIASTOLIC BLOOD PRESSURE: 56 MMHG | BODY MASS INDEX: 25.77 KG/M2 | WEIGHT: 174 LBS | OXYGEN SATURATION: 98 %

## 2024-09-24 DIAGNOSIS — Z09 HOSPITAL DISCHARGE FOLLOW-UP: ICD-10-CM

## 2024-09-24 DIAGNOSIS — J45.20 MILD INTERMITTENT ASTHMA WITHOUT COMPLICATION: ICD-10-CM

## 2024-09-24 DIAGNOSIS — E11.42 TYPE 2 DIABETES MELLITUS WITH PERIPHERAL NEUROPATHY (HCC): ICD-10-CM

## 2024-09-24 DIAGNOSIS — J63.6: ICD-10-CM

## 2024-09-24 DIAGNOSIS — J18.9 COMMUNITY ACQUIRED PNEUMONIA OF BOTH LUNGS: Primary | ICD-10-CM

## 2024-09-24 DIAGNOSIS — J44.9 CHRONIC OBSTRUCTIVE PULMONARY DISEASE, UNSPECIFIED COPD TYPE (HCC): ICD-10-CM

## 2024-09-24 RX ORDER — FLUTICASONE PROPIONATE AND SALMETEROL 50; 500 UG/1; UG/1
1 POWDER RESPIRATORY (INHALATION) EVERY 12 HOURS
Qty: 60 EACH | Refills: 5 | Status: SHIPPED | OUTPATIENT
Start: 2024-09-24 | End: 2024-09-27

## 2024-09-24 RX ORDER — INSULIN GLARGINE 300 U/ML
30 INJECTION, SOLUTION SUBCUTANEOUS NIGHTLY
Qty: 9 ML | Refills: 1 | Status: SHIPPED | OUTPATIENT
Start: 2024-09-24

## 2024-09-24 NOTE — PROGRESS NOTES
Post-Discharge Transitional Care  Follow Up      Alvaro Pena   YOB: 1950    Date of Office Visit:  9/24/2024  Date of Hospital Admission: 9/10/24  Date of Hospital Discharge: 9/11/24  Risk of hospital readmission (high >=14%. 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  We also discussed his blood sugar which is running much higher than it should we wrote for Toujeo max 30 units nightly we explained insulin and monitoring and increasing until we can get his fasting blood sugar under 140 get his blood sugar in better control  Medical Decision Making: high complexity  No follow-ups on file.           Subjective:   HPI:  Follow up of Hospital problems/diagnosis(es): Patient is here today for moderate-level TCM follow-up of recent hospitalization for pneumonia.  Patient with baseline lung conditions had flareup of symptoms cough congestion wheezing flareup of pleuritic pain felt like pneumonias had repetitively in the past he went to

## 2024-09-26 ENCOUNTER — TELEPHONE (OUTPATIENT)
Dept: INTERNAL MEDICINE | Age: 74
End: 2024-09-26

## 2024-09-26 DIAGNOSIS — J44.9 CHRONIC OBSTRUCTIVE PULMONARY DISEASE, UNSPECIFIED COPD TYPE (HCC): Primary | ICD-10-CM

## 2024-09-27 ENCOUNTER — TRANSCRIBE ORDERS (OUTPATIENT)
Dept: ADMINISTRATIVE | Facility: HOSPITAL | Age: 74
End: 2024-09-27
Payer: MEDICARE

## 2024-09-27 ENCOUNTER — HOSPITAL ENCOUNTER (OUTPATIENT)
Dept: CT IMAGING | Facility: HOSPITAL | Age: 74
Discharge: HOME | End: 2024-09-27

## 2024-09-27 DIAGNOSIS — Z13.9 SCREENING FOR UNSPECIFIED CONDITION: ICD-10-CM

## 2024-09-27 DIAGNOSIS — J44.9 CHRONIC OBSTRUCTIVE PULMONARY DISEASE, UNSPECIFIED COPD TYPE (HCC): ICD-10-CM

## 2024-09-27 DIAGNOSIS — Z13.9 SCREENING FOR UNSPECIFIED CONDITION: Primary | ICD-10-CM

## 2024-09-27 PROCEDURE — 71271 CT THORAX LUNG CANCER SCR C-: CPT

## 2024-09-27 RX ORDER — FLUTICASONE FUROATE AND VILANTEROL TRIFENATATE 200; 25 UG/1; UG/1
1 POWDER RESPIRATORY (INHALATION)
Qty: 1 EACH | Refills: 3 | Status: SHIPPED | OUTPATIENT
Start: 2024-09-27

## 2024-09-27 RX ORDER — FLUTICASONE FUROATE AND VILANTEROL 200; 25 UG/1; UG/1
1 POWDER RESPIRATORY (INHALATION)
Qty: 1 EACH | Refills: 3 | Status: SHIPPED | OUTPATIENT
Start: 2024-09-27 | End: 2024-09-27 | Stop reason: SDUPTHER

## 2024-09-30 ENCOUNTER — OFFICE VISIT (OUTPATIENT)
Dept: INTERNAL MEDICINE | Age: 74
End: 2024-09-30

## 2024-09-30 VITALS
SYSTOLIC BLOOD PRESSURE: 118 MMHG | OXYGEN SATURATION: 95 % | DIASTOLIC BLOOD PRESSURE: 66 MMHG | BODY MASS INDEX: 25.77 KG/M2 | HEIGHT: 69 IN | TEMPERATURE: 97.2 F | HEART RATE: 60 BPM | WEIGHT: 174 LBS

## 2024-09-30 DIAGNOSIS — J18.9 COMMUNITY ACQUIRED PNEUMONIA OF BOTH LUNGS: Primary | ICD-10-CM

## 2024-09-30 DIAGNOSIS — I48.0 PAF (PAROXYSMAL ATRIAL FIBRILLATION) (HCC): ICD-10-CM

## 2024-09-30 DIAGNOSIS — D86.0 SARCOIDOSIS, LUNG (HCC): ICD-10-CM

## 2024-09-30 DIAGNOSIS — J63.6: ICD-10-CM

## 2024-09-30 DIAGNOSIS — J98.4 RESTRICTIVE LUNG DISEASE: ICD-10-CM

## 2024-09-30 RX ORDER — METHYLPREDNISOLONE ACETATE 80 MG/ML
80 INJECTION, SUSPENSION INTRA-ARTICULAR; INTRALESIONAL; INTRAMUSCULAR; SOFT TISSUE ONCE
Status: COMPLETED | OUTPATIENT
Start: 2024-09-30 | End: 2024-09-30

## 2024-09-30 RX ORDER — AZITHROMYCIN 250 MG/1
TABLET, FILM COATED ORAL
Qty: 6 TABLET | Refills: 0 | Status: SHIPPED | OUTPATIENT
Start: 2024-09-30 | End: 2024-10-10

## 2024-09-30 RX ORDER — CEFDINIR 300 MG/1
300 CAPSULE ORAL 2 TIMES DAILY
Qty: 20 CAPSULE | Refills: 0 | Status: SHIPPED | OUTPATIENT
Start: 2024-09-30 | End: 2024-10-10

## 2024-09-30 RX ADMIN — METHYLPREDNISOLONE ACETATE 80 MG: 80 INJECTION, SUSPENSION INTRA-ARTICULAR; INTRALESIONAL; INTRAMUSCULAR; SOFT TISSUE at 12:16

## 2024-10-08 ENCOUNTER — TELEPHONE (OUTPATIENT)
Dept: PULMONOLOGY | Facility: CLINIC | Age: 74
End: 2024-10-08
Payer: MEDICARE

## 2024-10-08 DIAGNOSIS — R91.8 LUNG NODULES: ICD-10-CM

## 2024-10-08 DIAGNOSIS — J18.9 COMMUNITY ACQUIRED PNEUMONIA OF BOTH LUNGS: Primary | ICD-10-CM

## 2024-10-08 NOTE — TELEPHONE ENCOUNTER
Caller: Carolina Riley    Relationship to patient: Emergency Contact    Best call back number: [phone]      Patient is needing: PT HAD SCAN DONE ON 9/27 WITH DEPT OF LABOR AND RECEIVED LETTER STATING TO FOLLOW UP URGENTLY WITH PROVIDER BASED ON SCAN. PT WOULD LIKE TO KNOW IF THERE IS A NEED TO SEE DR SOONER THAN NEXT SCHEDULED VISIT IN 2025. PLEASE CALL PT TO ADVISE IF ANYTHING HAS CHANGED

## 2024-10-08 NOTE — TELEPHONE ENCOUNTER
I did contact the patient's wife by phone and she does have the actual report of his recent CT.  The CT images are available for me to review on the [facility] system and the patient can come in to see me for an appointment tomorrow at 1:45 so I can review the scan with him and his wife and go over the report as well regarding any new findings.  We can then determine the need for additional workup.  And I did advise her to have him come in to see me at 1:45 tomorrow.

## 2024-10-09 ENCOUNTER — OFFICE VISIT (OUTPATIENT)
Dept: PULMONOLOGY | Facility: CLINIC | Age: 74
End: 2024-10-09
Payer: OTHER MISCELLANEOUS

## 2024-10-09 VITALS
HEART RATE: 64 BPM | DIASTOLIC BLOOD PRESSURE: 78 MMHG | HEIGHT: 69 IN | OXYGEN SATURATION: 99 % | WEIGHT: 168 LBS | BODY MASS INDEX: 24.88 KG/M2 | SYSTOLIC BLOOD PRESSURE: 122 MMHG

## 2024-10-09 DIAGNOSIS — J44.9 COPD, MODERATE: Chronic | ICD-10-CM

## 2024-10-09 DIAGNOSIS — J98.4 RESTRICTIVE LUNG DISEASE: Chronic | ICD-10-CM

## 2024-10-09 DIAGNOSIS — D86.2 SARCOIDOSIS OF LUNG WITH SARCOIDOSIS OF LYMPH NODES: Chronic | ICD-10-CM

## 2024-10-09 DIAGNOSIS — R91.8 LUNG NODULES: Primary | Chronic | ICD-10-CM

## 2024-10-09 NOTE — ASSESSMENT & PLAN NOTE
I suspect the changes on his recent CT are related to his sarcoidosis and possibly recent episode of pneumonia but I will get a follow-up scan when he returns in 4 weeks.

## 2024-10-09 NOTE — PATIENT INSTRUCTIONS
The patient had a recent chest CT through the [facility] which showed some new nodular densities.  I reviewed the scan with the patient and his wife today.  He had pneumonia a few weeks ago and this may just be residual from his pneumonia or could be associated with his sarcoidosis but I will plan on a follow-up visit in about a month with a repeat CT.  If it shows improvement we will just continue serial imaging studies.  If it is not improving or worsening we can consider possible bronchoscopy by Dr. Philip.

## 2024-10-09 NOTE — ASSESSMENT & PLAN NOTE
Again his recent CT did show perihilar nodular densities which may represent areas of scarring on both the right and the left and also a more discrete nodule left lower lobe.  I will get a follow-up CT when he returns about 4 weeks.  If there is no improvement and in particular if there is worsening we can consider invasive workup and in particular bronchoscopy

## 2024-10-09 NOTE — ASSESSMENT & PLAN NOTE
Continues Wixela and have been as needed albuterol HFA inhaler.  He also has utilize DuoNeb treatments, Xopenex treatments, and Atrovent treatments in the past can utilize these as needed.

## 2024-10-09 NOTE — PROGRESS NOTES
Chief Complaint  COPD, Lung nodules, and Sarcoidosis    Subjective    History of Present Illness {CC  Problem List  Visit Diagnosis   Encounters  Notes  Medications  Labs  Result Review Imaging  Media: 23}    Caio Riley presents to [facility] for COPD, lung nodules, and sarcoidosis.    History of Present Illness   The patient had a chest CT through the [employer] on 27 September at [facility] and he received the report of the scan recently.  He subsequently called the office yesterday indicating that there was concern about some new nodules and for that reason comes in today.  The report indicated 3 new nodules and to my review two the nodular areas are actually more consistent with areas of fibrosis in the right and left hilar area in particular and a more discrete nodules located in the left lower lobe.  I reviewed the scan with the patient and his wife accompanies him today.  He had an episode of pneumonia in September a few weeks prior to that scan and some of the changes may be residual from that but they also could be progressive scarring from his known sarcoidosis.  A malignant process would be in the differential but I think would be less likely.  He is not a candidate for the Nodify studies he has had multiple skin cancers removed recently from his face and scalp in particular both basal cell and squamous cell.  He does have calcified adenopathy consistent with his history of sarcoidosis.  He inquired about any possible asbestos related lung disease and I told him the classic picture for calcifications in the thorax associate with asbestos was pleural plaques and he did not have any evidence of of such findings and also did not have evidence of diffuse interstitial lung disease rather a picture of areas of focal fibrosis again almost certainly related to his history of sarcoidosis.  Again the changes on his CT may relate  to his recent pneumonia or possibly progressive scarring associated with his sarcoidosis but I will plan on a follow-up CT when he returns in about 4 weeks and depending on the results of that scan determine the need for possible invasive workup.  He declines the flu shot today.  He has had a Prevnar 13 and Pneumovax in the past.  Prior to Admission medications    Medication Sig Start Date End Date Taking? Authorizing Provider   aspirin 81 MG chewable tablet Chew 1 tablet Daily.   Yes Emergency, Nurse Alessandro, RN   atenolol (TENORMIN) 25 MG tablet Take 1 tablet by mouth Daily.   Yes ProviderFredrick MD   atorvastatin (LIPITOR) 10 MG tablet Take 1 tablet by mouth Daily.   Yes ProviderFredrick MD   azaTHIOprine (IMURAN) 50 MG tablet Take 1 tablet by mouth Every Night. 3/22/19  Yes Fredrick Denis MD   donepezil (ARICEPT) 5 MG tablet Take 1 tablet by mouth Every Night. 3/26/19  Yes Fredrick Denis MD   EPINEPHrine (EPIPEN) 0.3 MG/0.3ML solution auto-injector injection Inject 0.3 mL into the appropriate muscle as directed by prescriber.   Yes Fredrick Denis MD   fluticasone-salmeterol (ADVAIR) 500-50 MCG/DOSE DISKUS Inhale 2 (Two) Times a Day.   Yes Fredrick Denis MD   Fluticasone-Salmeterol (ADVAIR/WIXELA) 500-50 MCG/ACT DISKUS INHALE 1 DOSE BY MOUTH TWICE DAILY RINSE  AND  SPIT  AFTER  USING 3/25/24  Yes Salvatore Shah MD   glipizide (GLUCOTROL) 5 MG tablet Take 1 tablet by mouth Every 12 (Twelve) Hours. 4/1/24  Yes Fredrick Denis MD   guaiFENesin (MUCINEX) 600 MG 12 hr tablet Take 2 tablets by mouth 2 (Two) Times a Day. 9/8/24  Yes Yovanny Cardona,    insulin aspart (NovoLOG FlexPen) 100 UNIT/ML solution pen-injector sc pen Blood sugar minus 100/20 is number of units to take 3-4 times a day;  max is 200 units a day; 4/9/24  Yes Fredrick Denis MD   Insulin Glargine (Lantus SoloStar) 100 UNIT/ML injection pen Inject 18 Units under the skin into the appropriate  area as directed. 4/9/24  Yes Fredrick Denis MD   Insulin Pen Needle 32G X 4 MM misc 1 each. 2/25/19  Yes Fredrick Denis MD   ipratropium (ATROVENT) 0.02 % nebulizer solution Take 2.5 mL by nebulization 4 (Four) Times a Day. 7/30/19  Yes Salvatore Shah MD   ipratropium-albuterol (DUO-NEB) 0.5-2.5 mg/3 ml nebulizer Take 3 mL by nebulization 4 (Four) Times a Day As Needed for Wheezing. 7/12/19  Yes Salvatore Shah MD   levalbuterol (XOPENEX) 1.25 MG/3ML nebulizer solution Take 1 ampule by nebulization 4 (Four) Times a Day. 7/30/19  Yes Salvatore Shah MD   levalbuterol (XOPENEX) 1.25 MG/3ML nebulizer solution Take 1 ampule by nebulization 4 (Four) Times a Day As Needed for Wheezing. 4/27/20  Yes Salvatore Shah MD   levoFLOXacin (LEVAQUIN) 500 MG tablet Take 1 tablet by mouth Daily. 9/8/24  Yes Yovanny Cardona,    losartan (COZAAR) 100 MG tablet Take 1 tablet by mouth Daily. 3/18/19  Yes Fredrick Denis MD   O2 (OXYGEN) Inhale 2 (Two) Times a Day As Needed. Unsure or what Liter he uses.   Yes Fredrick Denis MD   Ozempic, 0.25 or 0.5 MG/DOSE, 2 MG/1.5ML solution pen-injector INJECT 0.25MG INTO SKIN ONCE A WEEK 5/10/22  Yes Fredrick Denis MD   predniSONE (DELTASONE) 5 MG tablet Take 1 tablet by mouth Daily.   Yes Fredrick Denis MD   pregabalin (LYRICA) 75 MG capsule Take 1 capsule by mouth 2 (Two) Times a Day. 3/15/21  Yes Fredrick Denis MD   RELION PEN NEEDLES 32G X 4 MM misc  2/25/19  Yes Fredrick Denis MD   rivaroxaban (XARELTO) 20 MG tablet Take 1 tablet by mouth Daily. STOPPED 8/1/18   Indications: Resume taking Xarelto on Thursday morning   Yes Fredrick Denis MD   sulfamethoxazole-trimethoprim (BACTRIM,SEPTRA) 400-80 MG tablet Take 1 tablet by mouth. Three times a week   Yes Provider, MD Fredrick   Ventolin  (90 Base) MCG/ACT inhaler INHALE 2 PUFFS BY MOUTH EVERY 4 HOURS AS NEEDED FOR WHEEZING OR SHORTNESS OF  "BREATH 24  Yes Salvatore Shah MD       Social History     Socioeconomic History    Marital status:    Tobacco Use    Smoking status: Former     Current packs/day: 0.00     Average packs/day: 0.5 packs/day for 2.0 years (1.0 ttl pk-yrs)     Types: Cigarettes     Start date:      Quit date:      Years since quittin.8     Passive exposure: Past    Smokeless tobacco: Never   Vaping Use    Vaping status: Never Used   Substance and Sexual Activity    Alcohol use: No    Drug use: No    Sexual activity: Defer       Objective   Vital Signs:   /78   Pulse 64   Ht 175.3 cm (69\")   Wt 76.2 kg (168 lb)   SpO2 99% Comment: RA  BMI 24.81 kg/m²     Physical Exam  Vitals and nursing note reviewed.   HENT:      Head:      Comments: Has multiple areas of erythema on his face and scalp where he had some skin cancers removed.  Eyes:      Extraocular Movements: Extraocular movements intact.      Pupils: Pupils are equal, round, and reactive to light.   Cardiovascular:      Rate and Rhythm: Normal rate and regular rhythm.   Pulmonary:      Effort: Pulmonary effort is normal.      Comments: Lung fields are clear with fair air movement bilaterally.  Musculoskeletal:         General: Normal range of motion.   Skin:     General: Skin is warm and dry.   Neurological:      General: No focal deficit present.      Mental Status: He is alert and oriented to person, place, and time.   Psychiatric:         Mood and Affect: Mood normal.         Behavior: Behavior normal.        Result Review :    PFT Values          2024    11:30   Pre Drug PFT Results   FVC 62   FEV1 57   FEF 25-75% 46   FEV1/FVC 70   Other Tests PFT Results   TLC 65   RV 78   DLCO 56   D/VAsb 88         Results for orders placed in visit on 24    Complete PFT - Pre & Post Bronchodilator    Narrative  Complete PFT - Pre & Post Bronchodilator    Performed by: Machelle Elliott, RRT  Authorized by: Salvatore Shah, " MD  Pre Drug % Predicted  FVC: 62%  FEV1: 57%  FEF 25-75%: 46%  FEV1/FVC: 70%  T%  RV: 78%  DLCO: 56%  D/VAsb: 88%    Interpretation  Spirometry  Spirometry shows moderate restriction. There is reduced midflow suggesting small airway/airflow obstruction.  Lung Volume Measurements  Measurements show reduced lung volumes consistent with restriction.  Diffusion Capacity  The patient's diffusion capacity is moderately reduced.  Diffusion capacity is normal when corrected for alveolar volume.  Overall comments: The patient's spirometry is consistent with a moderate restrictive ventilatory defect with a coexisting decrease in mid flows.  The patient's FEV1 to FVC ratio is at the lower limits of normal at 70%.  Lung volumes confirm a restrictive ventilatory defect of moderate severity.  There is also a mild decrease in inspiratory capacity.  There is a moderate diffusion impairment which when corrected for alveolar volume is normalized.  When current studies are compared to studies performed on 2023, the patient's current baseline spirometry does reveal a decline both the FVC and FEV1 compared to previous pre and postbronchodilator values.  There has also been a decline in total lung capacity as well as slow vital capacity compared to previous.  When corrected for alveolar volume there has actually been some improvement in diffusion capacity compared to previous.      Results for orders placed during the hospital encounter of 23    Spirometry - Pre & Post Bronchodilator with Lung Volumes    Narrative  [facility] - Pulmonary Function Test    [address] [place] [address]  [id number]  [phone]    Patient : Caio Riley  MRN : [id number]  CSN : [id number]  Pulmonologist : Salvatore Shah MD  Date : 2023    ______________________________________________________________________    Interpretation :  1.  Spirometry is consistent with a moderate obstructive ventilatory defect.  2.  There  is improvement in spirometry postbronchodilator particularly in midflows but a moderate obstructive ventilatory defect is still present.  3.  Lung volumes reveal a coexisting mild restrictive ventilatory defect.  There is also a decrease in inspiratory capacity.  4.  There is a moderate diffusion impairment which when corrected for alveolar volume is a low normal diffusion capacity.  5.  When current studies are compared to studies performed on 2022, the patient's current pre and postbronchodilator spirometry reveals improvement both the FVC and FEV1 compared to previous baseline values.  There has also been significant improvement in the patient's total lung capacity compared to previous.  When corrected for alveolar volume there has been a decline in diffusion capacity compared to previous although it remains within normal limits.      Salvatore Shah MD      Results for orders placed in visit on 22    Pulmonary Function Test    Narrative  Pulmonary Function Test  Performed by: Machelle Elliott, RRT  Authorized by: Salvatore Shah MD    Pre Drug % Predicted  FVC: 66%  FEV1: 60%  FEF 25-75%: 40%  FEV1/FVC: 68%  T%  RV: 78%  DLCO: 63%  D/VAsb: 92%    Interpretation  Spirometry  Spirometry shows moderate obstruction.  Lung Volume Measurements  Measurements show reduced lung volumes consistent with restriction.  Diffusion Capacity  The patient's diffusion capacity is mildly reduced.  Diffusion capacity is normal when corrected for alveolar volume.  Overall comments: The patient's spirometry is consistent with a moderate obstructive ventilatory defect.  Lung volumes reveal a coexisting restrictive ventilatory defect along with a decrease in inspiratory capacity.  There is a mild bordering on moderate diffusion impairment which when corrected for alveolar volume is normalized.  When current studies are compared to [facility]   of [facility], his FVC is unchanged, his FEV1 has improved slightly, his total lung capacity is essentially unchanged, and when corrected for alveolar volume there has been a slight but not significant drop in his diffusion capacity compared to previous.                 My interpretation of imaging:    CT Chest Low Dose Cancer Screening WO (09/27/2024 13:22)         Assessment and Plan {CC Problem List  Visit Diagnosis  ROS  Review (Popup)  Health Maintenance  Quality  BestPractice  Medications  SmartSets  SnapShot Encounters  Media : 23}    There are no diagnoses linked to this encounter.      Salvatore Shah MD  10/9/2024  18:46 CDT    Follow Up   Return in about 4 weeks (around 11/6/2024) for To see me specifically.    Patient was given instructions and counseling regarding his condition or for health maintenance advice. Please see specific information pulled into the AVS if appropriate.

## 2024-10-12 NOTE — PROGRESS NOTES
Chief Complaint   Patient presents with    Congestion     Possible pneumonia       HPI: Patient is here today for problem visit recently in the hospital for pneumonia he was getting better we had seen him in follow-up he feels much worse again he is having pain pleuritic type and increase again in his cough patient with chronic lung disease occupational related lung disease sarcoid and restrictive lung disease he has had recurrent pneumonia he says this feels the same as his pneumonia he is in a lot of pain in his chest with a deep breath.  I reviewed his record from recent hospitalization.    Past Medical History:   Diagnosis Date    Achalasia     going to [place] for surgery in March    Acute pancreatitis     Anemia     Asthma     Atrial fibrillation (HCC)     h/o paroxysmal a-fib ? anesthsia induced    Benign colonic polyp     cscope 12/07 Dr Majano adenomatous: 12/14 adenoma    Chest pain     Chronic kidney disease     Chronic pain syndrome     Depression     Diabetic peripheral neuropathy (HCC)     Extrinsic asthma     Hyperlipidemia     Hypertension     Idiopathic peripheral neuropathy     Lyme disease     Mediastinal lymphadenopathy     Microalbuminuria     Mixed hyperlipidemia     Paroxysmal A-fib (HCC)     S/P ablation of atrial fibrillation     4/16 A fib ablation , Dr Chaparro, [place]     Sarcoidosis, lung (HCC)     restrictive lung impairment    Tick bite     Type 2 diabetes, controlled, with neuropathy (HCC)        Past Surgical History:   Procedure Laterality Date    ANKLE FRACTURE SURGERY  april 14, 2015    ATRIAL ABLATION SURGERY  2015    Dr. Chaparro- [place]    BACK SURGERY  10/06/2020    CARDIAC SURGERY      Catheter Ablation A-fib    CHOLECYSTECTOMY      COLONOSCOPY  12/02/2014    [facility]io    CYSTOSCOPY Left 8/16/2019    CYSTOSCOPY; LEFT RETROGRADE PYELOGRAM; INSERTION LEFT URETERAL STENT performed by Mario Barone MD at [facility] OR    EYE SURGERY      MOHS SURGERY      RI CYSTO BLADDER  show

## 2024-11-06 ENCOUNTER — OFFICE VISIT (OUTPATIENT)
Dept: PULMONOLOGY | Facility: CLINIC | Age: 74
End: 2024-11-06
Payer: OTHER MISCELLANEOUS

## 2024-11-06 ENCOUNTER — HOSPITAL ENCOUNTER (OUTPATIENT)
Dept: CT IMAGING | Facility: HOSPITAL | Age: 74
Discharge: HOME | End: 2024-11-06
Admitting: INTERNAL MEDICINE
Payer: OTHER MISCELLANEOUS

## 2024-11-06 VITALS
BODY MASS INDEX: 25.92 KG/M2 | HEART RATE: 61 BPM | SYSTOLIC BLOOD PRESSURE: 130 MMHG | HEIGHT: 69 IN | WEIGHT: 175 LBS | OXYGEN SATURATION: 100 % | DIASTOLIC BLOOD PRESSURE: 70 MMHG

## 2024-11-06 DIAGNOSIS — E66.3 OVERWEIGHT: Chronic | ICD-10-CM

## 2024-11-06 DIAGNOSIS — J44.9 COPD, MODERATE: Chronic | ICD-10-CM

## 2024-11-06 DIAGNOSIS — D86.2 SARCOIDOSIS OF LUNG WITH SARCOIDOSIS OF LYMPH NODES: Chronic | ICD-10-CM

## 2024-11-06 DIAGNOSIS — R91.8 LUNG NODULES: Primary | Chronic | ICD-10-CM

## 2024-11-06 DIAGNOSIS — R91.8 LUNG NODULES: Chronic | ICD-10-CM

## 2024-11-06 DIAGNOSIS — J98.4 RESTRICTIVE LUNG DISEASE: Chronic | ICD-10-CM

## 2024-11-06 DIAGNOSIS — Z87.891 FORMER SMOKER: Chronic | ICD-10-CM

## 2024-11-06 PROCEDURE — 71250 CT THORAX DX C-: CPT

## 2024-11-06 NOTE — ASSESSMENT & PLAN NOTE
He had stability of some discrete nodules and improvement in his CT otherwise compared to his most recent scan from September.  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  I did explain to the patient and his wife that these 3 conditions are basically indistinguishable in their chronic phase particular when they are associated with calcifications.

## 2024-11-06 NOTE — ASSESSMENT & PLAN NOTE
Again I think the changes on his recent CT were predominately due to his chronic sarcoidosis versus histoplasmosis versus berylliosis versus a combination of the above and he had some stable small nodules.  There was some improvement where I think he had some component of superimposed pneumonia recently.  I will get a follow-up scan in 3 months.

## 2024-11-06 NOTE — ASSESSMENT & PLAN NOTE
Patient's (Body mass index is 25.84 kg/m².) indicates that they are overweight with health conditions that include hypertension . Weight is unchanged.  Diet and exercise are encouraged and he will follow-up with his primary care physician regarding his elevated BMI otherwise.

## 2024-11-06 NOTE — PATIENT INSTRUCTIONS
I did review the patient's chest CT performed earlier today with the patient and his wife who accompanies him.  To my review he appears to have chronic changes due to scarring from his history of sarcoidosis and histoplasmosis and he also conceivably could have a component of berylliosis.  The official report is pending.  If the official report reveals no definite new or enlarging nodules I told him I would just recommend a repeat chest CT in about 3 months.  If there is any question of any new or enlarging nodules then we could certainly proceed with a PET scan and a possible navigational bronchoscopy to assess any nodules and endobronchial ultrasound to assess his lymph nodes.  I will call him with the results when available and again if the scan is stable I will plan on a follow-up visit in 3 months with a repeat scan.

## 2024-11-06 NOTE — PROGRESS NOTES
Chief Complaint  Lung nodules and Sarcoidosis    Subjective    History of Present Illness {CC  Problem List  Visit Diagnosis   Encounters  Notes  Medications  Labs  Result Review Imaging  Media: 23}    Caio Riley presents to [facility] for lung nodules and sarcoidosis.    History of Present Illness   Patient is accompanied by his wife today.  His CT was performed earlier today but the official report was pending at the time of his office visit.  I reviewed the scan with the patient and his wife.  To my review he had some stable lung nodules and some chronic scarring in the hilar areas.  The official report subsequently came available and was felt that he is lung nodules were stable and he had some decreased consolidative areas in the right lower lobe and the lingular area compared to his most recent scan from September likely representing a component of resolving pneumonia.  Again he still has extensive scarring but does appear to have some improvement in his most recent scan.  I did contact the patient and his wife with these results and they expressed understanding.  I will still plan on a follow-up scan in about 3 months.  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  I told him that based the recommendation would be to start antibiotics at the first sign of a respiratory infection.  He has had the Prevnar 13 and Pneumovax in the past.  Prior to Admission medications    Medication Sig Start Date End Date Taking? Authorizing Provider   aspirin 81 MG chewable tablet Chew 1 tablet Daily.   Yes Emergency, Nurse Alessandro, RN   atenolol (TENORMIN) 25 MG tablet Take 1 tablet by mouth Daily.   Yes Provider, MD Fredrick   atorvastatin (LIPITOR) 10 MG  tablet Take 1 tablet by mouth Daily.   Yes Fredrick Denis MD   azaTHIOprine (IMURAN) 50 MG tablet Take 1 tablet by mouth Every Night. 3/22/19  Yes Fredrick Denis MD   donepezil (ARICEPT) 5 MG tablet Take 1 tablet by mouth Every Night. 3/26/19  Yes Fredrick Denis MD   EPINEPHrine (EPIPEN) 0.3 MG/0.3ML solution auto-injector injection Inject 0.3 mL into the appropriate muscle as directed by prescriber.   Yes Fredrick Denis MD   fluticasone-salmeterol (ADVAIR) 500-50 MCG/DOSE DISKUS Inhale 2 (Two) Times a Day.   Yes Fredrick Denis MD   Fluticasone-Salmeterol (ADVAIR/WIXELA) 500-50 MCG/ACT DISKUS INHALE 1 DOSE BY MOUTH TWICE DAILY RINSE  AND  SPIT  AFTER  USING 3/25/24  Yes Salvatore Shah MD   glipizide (GLUCOTROL) 5 MG tablet Take 1 tablet by mouth Every 12 (Twelve) Hours. 4/1/24  Yes Fredrick Denis MD   guaiFENesin (MUCINEX) 600 MG 12 hr tablet Take 2 tablets by mouth 2 (Two) Times a Day. 9/8/24  Yes Yovanny Cardona,    insulin aspart (NovoLOG FlexPen) 100 UNIT/ML solution pen-injector sc pen Blood sugar minus 100/20 is number of units to take 3-4 times a day;  max is 200 units a day; 4/9/24  Yes Fredrick Denis MD   Insulin Glargine (Lantus SoloStar) 100 UNIT/ML injection pen Inject 18 Units under the skin into the appropriate area as directed. 4/9/24  Yes Fredrick Denis MD   Insulin Pen Needle 32G X 4 MM misc 1 each. 2/25/19  Yes Fredrick Denis MD   ipratropium (ATROVENT) 0.02 % nebulizer solution Take 2.5 mL by nebulization 4 (Four) Times a Day. 7/30/19  Yes Salvatore Shah MD   ipratropium-albuterol (DUO-NEB) 0.5-2.5 mg/3 ml nebulizer Take 3 mL by nebulization 4 (Four) Times a Day As Needed for Wheezing. 7/12/19  Yes Salvatore Shah MD   levalbuterol (XOPENEX) 1.25 MG/3ML nebulizer solution Take 1 ampule by nebulization 4 (Four) Times a Day. 7/30/19  Yes Salvatore Shah MD   levalbuterol (XOPENEX) 1.25 MG/3ML nebulizer  solution Take 1 ampule by nebulization 4 (Four) Times a Day As Needed for Wheezing. 20  Yes Salvatore Shah MD   losartan (COZAAR) 100 MG tablet Take 1 tablet by mouth Daily. 3/18/19  Yes Fredrick Denis MD   O2 (OXYGEN) Inhale 2 (Two) Times a Day As Needed. Unsure or what Liter he uses.   Yes Fredrick Denis MD   Ozempic, 0.25 or 0.5 MG/DOSE, 2 MG/1.5ML solution pen-injector INJECT 0.25MG INTO SKIN ONCE A WEEK 5/10/22  Yes Fredrick Denis MD   predniSONE (DELTASONE) 5 MG tablet Take 1 tablet by mouth Daily.   Yes Fredrick Denis MD   pregabalin (LYRICA) 75 MG capsule Take 1 capsule by mouth 2 (Two) Times a Day. 3/15/21  Yes Fredrick Denis MD   RELION PEN NEEDLES 32G X 4 MM misc  19  Yes Fredrick Denis MD   rivaroxaban (XARELTO) 20 MG tablet Take 1 tablet by mouth Daily. STOPPED 18   Indications: Resume taking Xarelto on Thursday morning   Yes Fredrick Denis MD   Ventolin  (90 Base) MCG/ACT inhaler INHALE 2 PUFFS BY MOUTH EVERY 4 HOURS AS NEEDED FOR WHEEZING OR SHORTNESS OF BREATH 24  Yes Salvatore Shah MD   levoFLOXacin (LEVAQUIN) 500 MG tablet Take 1 tablet by mouth Daily.  Patient not taking: Reported on 2024   Yovanny Cardona DO   sulfamethoxazole-trimethoprim (BACTRIM,SEPTRA) 400-80 MG tablet Take 1 tablet by mouth. Three times a week  Patient not taking: Reported on 2024    Fredrick Denis MD       Social History     Socioeconomic History    Marital status:    Tobacco Use    Smoking status: Former     Current packs/day: 0.00     Average packs/day: 0.5 packs/day for 2.0 years (1.0 ttl pk-yrs)     Types: Cigarettes     Start date:      Quit date:      Years since quittin.8     Passive exposure: Past    Smokeless tobacco: Never   Vaping Use    Vaping status: Never Used   Substance and Sexual Activity    Alcohol use: No    Drug use: No    Sexual activity: Defer       Objective  "  Vital Signs:   /70   Pulse 61   Ht 175.3 cm (69\")   Wt 79.4 kg (175 lb)   SpO2 100% Comment: RA  BMI 25.84 kg/m²     Physical Exam  Vitals and nursing note reviewed.   Constitutional:       Comments: His BMI is minimally elevated.   HENT:      Head: Normocephalic.   Eyes:      Extraocular Movements: Extraocular movements intact.      Pupils: Pupils are equal, round, and reactive to light.   Cardiovascular:      Rate and Rhythm: Normal rate and regular rhythm.   Pulmonary:      Effort: Pulmonary effort is normal.      Comments: Lung fields are clear with fair air movement bilaterally.  Musculoskeletal:         General: Normal range of motion.   Skin:     General: Skin is warm and dry.   Neurological:      General: No focal deficit present.      Mental Status: He is alert and oriented to person, place, and time.   Psychiatric:         Mood and Affect: Mood normal.         Behavior: Behavior normal.        Result Review :    PFT Values          2024    11:30   Pre Drug PFT Results   FVC 62   FEV1 57   FEF 25-75% 46   FEV1/FVC 70   Other Tests PFT Results   TLC 65   RV 78   DLCO 56   D/VAsb 88         Results for orders placed in visit on 24    Complete PFT - Pre & Post Bronchodilator    Narrative  Complete PFT - Pre & Post Bronchodilator    Performed by: Machelle Elliott, RRT  Authorized by: Salvatore Shah MD  Pre Drug % Predicted  FVC: 62%  FEV1: 57%  FEF 25-75%: 46%  FEV1/FVC: 70%  T%  RV: 78%  DLCO: 56%  D/VAsb: 88%    Interpretation  Spirometry  Spirometry shows moderate restriction. There is reduced midflow suggesting small airway/airflow obstruction.  Lung Volume Measurements  Measurements show reduced lung volumes consistent with restriction.  Diffusion Capacity  The patient's diffusion capacity is moderately reduced.  Diffusion capacity is normal when corrected for alveolar volume.  Overall comments: The patient's spirometry is consistent with a moderate restrictive " ventilatory defect with a coexisting decrease in mid flows.  The patient's FEV1 to FVC ratio is at the lower limits of normal at 70%.  Lung volumes confirm a restrictive ventilatory defect of moderate severity.  There is also a mild decrease in inspiratory capacity.  There is a moderate diffusion impairment which when corrected for alveolar volume is normalized.  When current studies are compared to studies performed on August 9, 2023, the patient's current baseline spirometry does reveal a decline both the FVC and FEV1 compared to previous pre and postbronchodilator values.  There has also been a decline in total lung capacity as well as slow vital capacity compared to previous.  When corrected for alveolar volume there has actually been some improvement in diffusion capacity compared to previous.      Results for orders placed during the hospital encounter of 08/09/23    Spirometry - Pre & Post Bronchodilator with Lung Volumes    [facility] - Pulmonary Function Test    [address] [place] [address]  [id number]  [phone]    Patient : Caio Riley  MRN : [id number]  CSN : [id number]  Pulmonologist : Salvatore Shah MD  Date : 8/9/2023    ______________________________________________________________________    Interpretation :  1.  Spirometry is consistent with a moderate obstructive ventilatory defect.  2.  There is improvement in spirometry postbronchodilator particularly in midflows but a moderate obstructive ventilatory defect is still present.  3.  Lung volumes reveal a coexisting mild restrictive ventilatory defect.  There is also a decrease in inspiratory capacity.  4.  There is a moderate diffusion impairment which when corrected for alveolar volume is a low normal diffusion capacity.  5.  When current studies are compared to studies performed on August 25, 2022, the patient's current pre and postbronchodilator spirometry reveals improvement both the FVC and FEV1 compared to  previous baseline values.  There has also been significant improvement in the patient's total lung capacity compared to previous.  When corrected for alveolar volume there has been a decline in diffusion capacity compared to previous although it remains within normal limits.      Salvatore Shah MD      Results for orders placed in visit on 22    Pulmonary Function Test    Narrative  Pulmonary Function Test  Performed by: Machelle Elliott, RRT  Authorized by: Salvatore Shah MD    Pre Drug % Predicted  FVC: 66%  FEV1: 60%  FEF 25-75%: 40%  FEV1/FVC: 68%  T%  RV: 78%  DLCO: 63%  D/VAsb: 92%    Interpretation  Spirometry  Spirometry shows moderate obstruction.  Lung Volume Measurements  Measurements show reduced lung volumes consistent with restriction.  Diffusion Capacity  The patient's diffusion capacity is mildly reduced.  Diffusion capacity is normal when corrected for alveolar volume.  Overall comments: The patient's spirometry is consistent with a moderate obstructive ventilatory defect.  Lung volumes reveal a coexisting restrictive ventilatory defect along with a decrease in inspiratory capacity.  There is a mild bordering on moderate diffusion impairment which when corrected for alveolar volume is normalized.  When current studies are compared to [facility], his FVC is unchanged, his FEV1 has improved slightly, his total lung capacity is essentially unchanged, and when corrected for alveolar volume there has been a slight but not significant drop in his diffusion capacity compared to previous.                 My interpretation of imaging:    CT Chest Without Contrast Diagnostic (2024 11:08)         Assessment and Plan {CC Problem List  Visit Diagnosis  ROS  Review (Popup)  Health Maintenance  Quality  BestPractice  Medications  SmartSets  SnapShot Encounters  Media : 23}    Diagnoses and all  orders for this visit:    1. Lung nodules (Primary)  Assessment & Plan:  He had stability of some discrete nodules and improvement in his CT otherwise compared to his most recent scan from September.  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  I did explain to the patient and his wife that these 3 conditions are basically indistinguishable in their chronic phase particular when they are associated with calcifications.    Orders:  -     CT Chest Without Contrast Diagnostic; Future    2. Sarcoidosis of lung with sarcoidosis of lymph nodes  Assessment & Plan:  Again I think the changes on his recent CT were predominately due to his chronic sarcoidosis versus histoplasmosis versus berylliosis versus a combination of the above and he had some stable small nodules.  There was some improvement where I think he had some component of superimposed pneumonia recently.  I will get a follow-up scan in 3 months.    Orders:  -     CT Chest Without Contrast Diagnostic; Future    3. COPD, moderate  Assessment & Plan:  He may continue his Wixela Inhub and as needed albuterol HFA or Xopenex neb treatments and also may continue Atrovent neb treatments.      4. Restrictive lung disease  Assessment & Plan:  This has been noted on prior pulmonary functions.      5. Former smoker  Assessment & Plan:  He has a minimal smoking history and has not smoked since 1968.      6. Overweight  Assessment & Plan:  Patient's (Body mass index is 25.84 kg/m².) indicates that they are overweight with health conditions that include hypertension . Weight is unchanged.  Diet and exercise are encouraged and he will follow-up with his primary care physician regarding his elevated BMI otherwise.            Salvatore Shah MD  11/6/2024  16:42 CST    Follow Up   Return in about 14 weeks  (around 2/12/2025) for To see me specifically.    Patient was given instructions and counseling regarding his condition or for health maintenance advice. Please see specific information pulled into the AVS if appropriate.

## 2024-11-06 NOTE — ASSESSMENT & PLAN NOTE
He may continue his Wixela Inhub and as needed albuterol HFA or Xopenex neb treatments and also may continue Atrovent neb treatments.

## 2024-11-21 ENCOUNTER — OFFICE VISIT (OUTPATIENT)
Dept: INTERNAL MEDICINE | Age: 74
End: 2024-11-21
Payer: MEDICARE

## 2024-11-21 VITALS
OXYGEN SATURATION: 100 % | BODY MASS INDEX: 25.92 KG/M2 | DIASTOLIC BLOOD PRESSURE: 50 MMHG | HEART RATE: 68 BPM | SYSTOLIC BLOOD PRESSURE: 110 MMHG | HEIGHT: 69 IN | WEIGHT: 175 LBS

## 2024-11-21 DIAGNOSIS — J63.6: ICD-10-CM

## 2024-11-21 DIAGNOSIS — D86.0 SARCOIDOSIS, LUNG (HCC): ICD-10-CM

## 2024-11-21 DIAGNOSIS — E11.42 TYPE 2 DIABETES MELLITUS WITH PERIPHERAL NEUROPATHY (HCC): ICD-10-CM

## 2024-11-21 DIAGNOSIS — Z57.5 OCCUPATIONAL EXPOSURE TO INDUSTRIAL TOXINS: ICD-10-CM

## 2024-11-21 DIAGNOSIS — Z23 INFLUENZA VACCINE NEEDED: ICD-10-CM

## 2024-11-21 DIAGNOSIS — J44.9 CHRONIC OBSTRUCTIVE PULMONARY DISEASE, UNSPECIFIED COPD TYPE (HCC): Primary | ICD-10-CM

## 2024-11-21 PROCEDURE — G8482 FLU IMMUNIZE ORDER/ADMIN: HCPCS | Performed by: INTERNAL MEDICINE

## 2024-11-21 PROCEDURE — G0008 ADMIN INFLUENZA VIRUS VAC: HCPCS | Performed by: INTERNAL MEDICINE

## 2024-11-21 PROCEDURE — 2022F DILAT RTA XM EVC RTNOPTHY: CPT | Performed by: INTERNAL MEDICINE

## 2024-11-21 PROCEDURE — 90653 IIV ADJUVANT VACCINE IM: CPT | Performed by: INTERNAL MEDICINE

## 2024-11-21 PROCEDURE — 3078F DIAST BP <80 MM HG: CPT | Performed by: INTERNAL MEDICINE

## 2024-11-21 PROCEDURE — 3074F SYST BP LT 130 MM HG: CPT | Performed by: INTERNAL MEDICINE

## 2024-11-21 PROCEDURE — 1036F TOBACCO NON-USER: CPT | Performed by: INTERNAL MEDICINE

## 2024-11-21 PROCEDURE — 99214 OFFICE O/P EST MOD 30 MIN: CPT | Performed by: INTERNAL MEDICINE

## 2024-11-21 PROCEDURE — 3046F HEMOGLOBIN A1C LEVEL >9.0%: CPT | Performed by: INTERNAL MEDICINE

## 2024-11-21 PROCEDURE — 3023F SPIROM DOC REV: CPT | Performed by: INTERNAL MEDICINE

## 2024-11-21 PROCEDURE — G8427 DOCREV CUR MEDS BY ELIG CLIN: HCPCS | Performed by: INTERNAL MEDICINE

## 2024-11-21 PROCEDURE — G8417 CALC BMI ABV UP PARAM F/U: HCPCS | Performed by: INTERNAL MEDICINE

## 2024-11-21 PROCEDURE — 3017F COLORECTAL CA SCREEN DOC REV: CPT | Performed by: INTERNAL MEDICINE

## 2024-11-21 PROCEDURE — 1123F ACP DISCUSS/DSCN MKR DOCD: CPT | Performed by: INTERNAL MEDICINE

## 2024-11-21 SDOH — HEALTH STABILITY - PHYSICAL HEALTH: OCCUPATIONAL EXPOSURE TO TOXIC AGENTS IN OTHER INDUSTRIES: Z57.5

## 2024-11-21 NOTE — PROGRESS NOTES
worsening in his status    3. 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 Sarcoidosis, lung (HCC)  See above some worsening status    5. Type 2 diabetes mellitus with peripheral neuropathy (HCC)  Not in good control increase his insulin to 38 units if still running above 200s and 2 or so weeks we will plan to keep increasing his wife or he is going to let me know his blood sugar in about 2 weeks we will also consider increasing his Ozempic in the near future.  - CBC; Future  - Comprehensive Metabolic Panel; Future  - Hemoglobin A1C; Future  - TSH; Future  - Lipid Panel; Future    Chart, medications, labs, vaccines reviewed.  Keep up to date with routine care and follow up.  Call with any problems or complaints.  Keep up to date with routine screening recomendations and vaccines.   Flu shot given

## 2024-12-02 ENCOUNTER — TELEPHONE (OUTPATIENT)
Dept: INTERNAL MEDICINE | Age: 74
End: 2024-12-02

## 2024-12-02 DIAGNOSIS — E11.42 TYPE 2 DIABETES MELLITUS WITH PERIPHERAL NEUROPATHY (HCC): Primary | ICD-10-CM

## 2024-12-02 DIAGNOSIS — E11.42 TYPE 2 DIABETES MELLITUS WITH PERIPHERAL NEUROPATHY (HCC): ICD-10-CM

## 2024-12-02 NOTE — TELEPHONE ENCOUNTER
Patients spouse called to get a refill on patients ozempic medication    [facility] [address] [place] [address] [facility] - P [phone] - F [phone]

## 2024-12-02 NOTE — TELEPHONE ENCOUNTER
I must have given a sample- I sent in 0.5mg sq q 7 days- - make sure he has been on the 0.25mg sq weekly for 4 weeks- I think he has but if not then do that and then increase to the 0.5mg  -- (I think his wife is a nurse)

## 2024-12-03 RX ORDER — SEMAGLUTIDE 0.68 MG/ML
INJECTION, SOLUTION SUBCUTANEOUS
Qty: 3 ML | Refills: 1 | Status: SHIPPED | OUTPATIENT
Start: 2024-12-03

## 2024-12-09 ENCOUNTER — OFFICE VISIT (OUTPATIENT)
Dept: INTERNAL MEDICINE | Age: 74
End: 2024-12-09
Payer: MEDICARE

## 2024-12-09 ENCOUNTER — HOSPITAL ENCOUNTER (OUTPATIENT)
Dept: GENERAL RADIOLOGY | Age: 74
Discharge: HOME | End: 2024-12-09
Payer: COMMERCIAL

## 2024-12-09 VITALS
WEIGHT: 185 LBS | DIASTOLIC BLOOD PRESSURE: 72 MMHG | BODY MASS INDEX: 27.32 KG/M2 | HEART RATE: 68 BPM | TEMPERATURE: 97 F | SYSTOLIC BLOOD PRESSURE: 140 MMHG | OXYGEN SATURATION: 99 %

## 2024-12-09 DIAGNOSIS — R05.9 COUGH, UNSPECIFIED TYPE: ICD-10-CM

## 2024-12-09 DIAGNOSIS — I48.20 CHRONIC ATRIAL FIBRILLATION (HCC): ICD-10-CM

## 2024-12-09 DIAGNOSIS — R06.2 WHEEZING: ICD-10-CM

## 2024-12-09 DIAGNOSIS — R05.9 COUGH, UNSPECIFIED TYPE: Primary | ICD-10-CM

## 2024-12-09 DIAGNOSIS — R06.02 SOB (SHORTNESS OF BREATH): ICD-10-CM

## 2024-12-09 LAB
INFLUENZA A ANTIGEN, POC: NEGATIVE
INFLUENZA B ANTIGEN, POC: NEGATIVE
LOT EXPIRE DATE: NORMAL
LOT KIT NUMBER: NORMAL
SARS-COV-2, POC: NORMAL
VALID INTERNAL CONTROL: NORMAL
VENDOR AND KIT NAME POC: NORMAL

## 2024-12-09 PROCEDURE — 1123F ACP DISCUSS/DSCN MKR DOCD: CPT | Performed by: NURSE PRACTITIONER

## 2024-12-09 PROCEDURE — 96372 THER/PROPH/DIAG INJ SC/IM: CPT | Performed by: NURSE PRACTITIONER

## 2024-12-09 PROCEDURE — 1036F TOBACCO NON-USER: CPT | Performed by: NURSE PRACTITIONER

## 2024-12-09 PROCEDURE — G8417 CALC BMI ABV UP PARAM F/U: HCPCS | Performed by: NURSE PRACTITIONER

## 2024-12-09 PROCEDURE — 3077F SYST BP >= 140 MM HG: CPT | Performed by: NURSE PRACTITIONER

## 2024-12-09 PROCEDURE — 71046 X-RAY EXAM CHEST 2 VIEWS: CPT

## 2024-12-09 PROCEDURE — 99214 OFFICE O/P EST MOD 30 MIN: CPT | Performed by: NURSE PRACTITIONER

## 2024-12-09 PROCEDURE — G8482 FLU IMMUNIZE ORDER/ADMIN: HCPCS | Performed by: NURSE PRACTITIONER

## 2024-12-09 PROCEDURE — 3017F COLORECTAL CA SCREEN DOC REV: CPT | Performed by: NURSE PRACTITIONER

## 2024-12-09 PROCEDURE — 87428 SARSCOV & INF VIR A&B AG IA: CPT | Performed by: NURSE PRACTITIONER

## 2024-12-09 PROCEDURE — G8427 DOCREV CUR MEDS BY ELIG CLIN: HCPCS | Performed by: NURSE PRACTITIONER

## 2024-12-09 PROCEDURE — 3078F DIAST BP <80 MM HG: CPT | Performed by: NURSE PRACTITIONER

## 2024-12-09 RX ORDER — LOSARTAN POTASSIUM 100 MG/1
100 TABLET ORAL DAILY
Qty: 90 TABLET | Refills: 3 | Status: SHIPPED | OUTPATIENT
Start: 2024-12-09

## 2024-12-09 RX ORDER — RIVAROXABAN 20 MG/1
TABLET, FILM COATED ORAL
Qty: 90 TABLET | Refills: 3 | Status: SHIPPED | OUTPATIENT
Start: 2024-12-09

## 2024-12-09 RX ORDER — AZITHROMYCIN 250 MG/1
TABLET, FILM COATED ORAL
Qty: 6 TABLET | Refills: 0 | Status: SHIPPED | OUTPATIENT
Start: 2024-12-09 | End: 2024-12-19

## 2024-12-09 RX ORDER — DEXAMETHASONE SODIUM PHOSPHATE 10 MG/ML
10 INJECTION, SOLUTION INTRAMUSCULAR; INTRAVENOUS ONCE
Status: COMPLETED | OUTPATIENT
Start: 2024-12-09 | End: 2024-12-09

## 2024-12-09 RX ADMIN — DEXAMETHASONE SODIUM PHOSPHATE 10 MG: 10 INJECTION, SOLUTION INTRAMUSCULAR; INTRAVENOUS at 11:28

## 2024-12-09 ASSESSMENT — ENCOUNTER SYMPTOMS
GASTROINTESTINAL NEGATIVE: 1
SHORTNESS OF BREATH: 1
ALLERGIC/IMMUNOLOGIC NEGATIVE: 1
WHEEZING: 1
COUGH: 1
EYES NEGATIVE: 1

## 2024-12-09 NOTE — PROGRESS NOTES
may be inadvertentlytranscribed.  Although I have reviewed the note for such errors, some may still exist.

## 2025-01-07 RX ORDER — ATORVASTATIN CALCIUM 20 MG/1
TABLET, FILM COATED ORAL
Qty: 45 TABLET | Refills: 3 | Status: SHIPPED | OUTPATIENT
Start: 2025-01-07

## 2025-02-10 ENCOUNTER — HOSPITAL ENCOUNTER (OUTPATIENT)
Dept: CT IMAGING | Facility: HOSPITAL | Age: 75
Discharge: HOME | End: 2025-02-10
Admitting: INTERNAL MEDICINE
Payer: OTHER MISCELLANEOUS

## 2025-02-10 DIAGNOSIS — R91.8 LUNG NODULES: Chronic | ICD-10-CM

## 2025-02-10 DIAGNOSIS — D86.2 SARCOIDOSIS OF LUNG WITH SARCOIDOSIS OF LYMPH NODES: Chronic | ICD-10-CM

## 2025-02-10 PROCEDURE — 71250 CT THORAX DX C-: CPT

## 2025-02-12 ENCOUNTER — OFFICE VISIT (OUTPATIENT)
Dept: PULMONOLOGY | Facility: CLINIC | Age: 75
End: 2025-02-12
Payer: OTHER MISCELLANEOUS

## 2025-02-12 VITALS
HEIGHT: 69 IN | HEART RATE: 72 BPM | WEIGHT: 180 LBS | OXYGEN SATURATION: 97 % | DIASTOLIC BLOOD PRESSURE: 70 MMHG | SYSTOLIC BLOOD PRESSURE: 132 MMHG | BODY MASS INDEX: 26.66 KG/M2

## 2025-02-12 DIAGNOSIS — E66.3 OVERWEIGHT: Chronic | ICD-10-CM

## 2025-02-12 DIAGNOSIS — R91.8 LUNG NODULES: Chronic | ICD-10-CM

## 2025-02-12 DIAGNOSIS — J98.4 RESTRICTIVE LUNG DISEASE: Chronic | ICD-10-CM

## 2025-02-12 DIAGNOSIS — J44.9 COPD, MODERATE: Chronic | ICD-10-CM

## 2025-02-12 DIAGNOSIS — Z87.891 FORMER SMOKER: Chronic | ICD-10-CM

## 2025-02-12 DIAGNOSIS — D86.2 SARCOIDOSIS OF LUNG WITH SARCOIDOSIS OF LYMPH NODES: Primary | Chronic | ICD-10-CM

## 2025-02-12 RX ORDER — DOXYCYCLINE HYCLATE 100 MG
100 TABLET ORAL 2 TIMES DAILY WITH MEALS
COMMUNITY
Start: 2025-02-10

## 2025-02-12 NOTE — ASSESSMENT & PLAN NOTE
Patient's (Body mass index is 26.57 kg/m².) indicates that they are overweight with health conditions that include hypertension . Weight is unchanged.

## 2025-02-12 NOTE — PATIENT INSTRUCTIONS
The patient's CT showed patchy area of groundglass change in the right upper lobe medially that was not present on his most recent scan but clear was present on the scan from September.  Otherwise the scan was stable.  I think the waxing and waning groundglass changes are clearly related to his sarcoidosis.  I will get a follow-up scan shortly prior to his return in 4 months and I will get pulmonary functions at that time as well.

## 2025-02-12 NOTE — ASSESSMENT & PLAN NOTE
He had a new area of groundglass change but no new or suspicious nodules.  I will get a follow-up scan when he returns in about 4 months.

## 2025-02-12 NOTE — ASSESSMENT & PLAN NOTE
The waxing and waning groundglass changes on his recent chest CT scans could relate to his underlying sarcoidosis.

## 2025-02-12 NOTE — PROGRESS NOTES
Chief Complaint  Lung nodules, Sarcoidosis of lung with sarcoidosis of lymph nodes, and COPD    Subjective    History of Present Illness {CC  Problem List  Visit Diagnosis   Encounters  Notes  Medications  Labs  Result Review Imaging  Media: 23}    Caio Riley presents to [facility] for lung nodules, sarcoidosis, and COPD.    History of Present Illness   The patient is accompanied by his wife today.  I did review his recent CAT scan with both the patient and his wife.  It showed an area of new groundglass change in the right upper lobe medially and otherwise was stable compared to his most recent scan.  The area of density in the right upper lobe medially appeared to be present in a similar fashion on his scan from September.  The scan otherwise was improved compared to the scan from September.  I do think the waxing and waning groundglass changes relate to his underlying sarcoidosis.  He states he gets somewhat short of breath with exertion but otherwise is having no respiratory tract complaints in particular no acute respiratory tract issues.  I told him we will get a follow-up scan when he returns in several months along with PFTs.  He had the flu shot in November and has had a Prevnar 13 and Pneumovax in the past as well.Caio Riley  reports that he quit smoking about 57 years ago. His smoking use included cigarettes. He started smoking about 59 years ago. He has a 1 pack-year smoking history. He has been exposed to tobacco smoke. He has never used smokeless tobacco.          Current Outpatient Medications:     aspirin 81 MG chewable tablet, Chew 1 tablet Daily., Disp: , Rfl:     atenolol (TENORMIN) 25 MG tablet, Take 1 tablet by mouth Daily., Disp: , Rfl:     atorvastatin (LIPITOR) 10 MG tablet, Take 1 tablet by mouth Daily., Disp: , Rfl:     azaTHIOprine (IMURAN) 50 MG tablet, Take 1 tablet by mouth Every Night., Disp: , Rfl: 5    donepezil  (ARICEPT) 5 MG tablet, Take 1 tablet by mouth Every Night., Disp: , Rfl: 0    doxycycline (VIBRAMYICN) 100 MG tablet, Take 1 tablet by mouth 2 (Two) Times a Day With Meals., Disp: , Rfl:     EPINEPHrine (EPIPEN) 0.3 MG/0.3ML solution auto-injector injection, Inject 0.3 mL into the appropriate muscle as directed by prescriber., Disp: , Rfl:     fluticasone-salmeterol (ADVAIR) 500-50 MCG/DOSE DISKUS, Inhale 2 (Two) Times a Day., Disp: , Rfl:     Fluticasone-Salmeterol (ADVAIR/WIXELA) 500-50 MCG/ACT DISKUS, INHALE 1 DOSE BY MOUTH TWICE DAILY RINSE  AND  SPIT  AFTER  USING, Disp: 60 each, Rfl: 3    insulin aspart (NovoLOG FlexPen) 100 UNIT/ML solution pen-injector sc pen, Blood sugar minus 100/20 is number of units to take 3-4 times a day;  max is 200 units a day;, Disp: , Rfl:     Insulin Glargine (Lantus SoloStar) 100 UNIT/ML injection pen, Inject 18 Units under the skin into the appropriate area as directed., Disp: , Rfl:     Insulin Pen Needle 32G X 4 MM misc, 1 each., Disp: , Rfl:     levalbuterol (XOPENEX) 1.25 MG/3ML nebulizer solution, Take 1 ampule by nebulization 4 (Four) Times a Day., Disp: 120 ampule, Rfl: 11    levalbuterol (XOPENEX) 1.25 MG/3ML nebulizer solution, Take 1 ampule by nebulization 4 (Four) Times a Day As Needed for Wheezing., Disp: 360 mL, Rfl: 5    losartan (COZAAR) 100 MG tablet, Take 1 tablet by mouth Daily., Disp: , Rfl:     O2 (OXYGEN), Inhale 2 (Two) Times a Day As Needed. Unsure or what Liter he uses., Disp: , Rfl:     Ozempic, 0.25 or 0.5 MG/DOSE, 2 MG/1.5ML solution pen-injector, INJECT 0.25MG INTO SKIN ONCE A WEEK, Disp: , Rfl:     predniSONE (DELTASONE) 5 MG tablet, Take 1 tablet by mouth Daily., Disp: , Rfl:     pregabalin (LYRICA) 75 MG capsule, Take 1 capsule by mouth 2 (Two) Times a Day., Disp: , Rfl:     RELION PEN NEEDLES 32G X 4 MM misc, , Disp: , Rfl:     rivaroxaban (XARELTO) 20 MG tablet, Take 1 tablet by mouth Daily. STOPPED 8/1/18   Indications: Resume taking Xarelto on  "Thursday morning, Disp: , Rfl:     sulfamethoxazole-trimethoprim (BACTRIM,SEPTRA) 400-80 MG tablet, Take 1 tablet by mouth. Three times a week, Disp: , Rfl:     Ventolin  (90 Base) MCG/ACT inhaler, INHALE 2 PUFFS BY MOUTH EVERY 4 HOURS AS NEEDED FOR WHEEZING OR SHORTNESS OF BREATH, Disp: 18 g, Rfl: 11    glipizide (GLUCOTROL) 5 MG tablet, Take 1 tablet by mouth Every 12 (Twelve) Hours. (Patient not taking: Reported on 2025), Disp: , Rfl:     guaiFENesin (MUCINEX) 600 MG 12 hr tablet, Take 2 tablets by mouth 2 (Two) Times a Day. (Patient not taking: Reported on 2025), Disp: 40 tablet, Rfl: 0    ipratropium (ATROVENT) 0.02 % nebulizer solution, Take 2.5 mL by nebulization 4 (Four) Times a Day. (Patient not taking: Reported on 2025), Disp: 300 mL, Rfl: 11    ipratropium-albuterol (DUO-NEB) 0.5-2.5 mg/3 ml nebulizer, Take 3 mL by nebulization 4 (Four) Times a Day As Needed for Wheezing. (Patient not taking: Reported on 2025), Disp: 120 mL, Rfl: 11    levoFLOXacin (LEVAQUIN) 500 MG tablet, Take 1 tablet by mouth Daily. (Patient not taking: Reported on 2025), Disp: 7 tablet, Rfl: 0    Social History     Socioeconomic History    Marital status:    Tobacco Use    Smoking status: Former     Current packs/day: 0.00     Average packs/day: 0.5 packs/day for 2.0 years (1.0 ttl pk-yrs)     Types: Cigarettes     Start date:      Quit date:      Years since quittin.1     Passive exposure: Past    Smokeless tobacco: Never   Vaping Use    Vaping status: Never Used   Substance and Sexual Activity    Alcohol use: No    Drug use: No    Sexual activity: Defer       Objective   Vital Signs:   /70   Pulse 72   Ht 175.3 cm (69.02\")   Wt 81.6 kg (180 lb)   SpO2 97% Comment: RA  BMI 26.57 kg/m²     Physical Exam  Vitals and nursing note reviewed.   Constitutional:       Comments: His BMI is somewhat elevated.   HENT:      Head: Normocephalic.   Eyes:      Extraocular Movements: " Extraocular movements intact.      Pupils: Pupils are equal, round, and reactive to light.   Cardiovascular:      Rate and Rhythm: Normal rate and regular rhythm.   Pulmonary:      Effort: Pulmonary effort is normal.      Comments: Lung fields are clear with reasonable air movement bilaterally.  No adventitious sounds are heard.  Musculoskeletal:         General: Normal range of motion.   Skin:     General: Skin is warm and dry.   Neurological:      General: No focal deficit present.      Mental Status: He is alert and oriented to person, place, and time.   Psychiatric:         Mood and Affect: Mood normal.         Behavior: Behavior normal.        Result Review :    PFT Values          2024    11:30   Pre Drug PFT Results   FVC 62   FEV1 57   FEF 25-75% 46   FEV1/FVC 70   Other Tests PFT Results   TLC 65   RV 78   DLCO 56   D/VAsb 88         Results for orders placed in visit on 24    Complete PFT - Pre & Post Bronchodilator    Narrative  Complete PFT - Pre & Post Bronchodilator    Performed by: Machelle Elliott, RRT  Authorized by: Salvatore Shah MD  Pre Drug % Predicted  FVC: 62%  FEV1: 57%  FEF 25-75%: 46%  FEV1/FVC: 70%  T%  RV: 78%  DLCO: 56%  D/VAsb: 88%    Interpretation  Spirometry  Spirometry shows moderate restriction. There is reduced midflow suggesting small airway/airflow obstruction.  Lung Volume Measurements  Measurements show reduced lung volumes consistent with restriction.  Diffusion Capacity  The patient's diffusion capacity is moderately reduced.  Diffusion capacity is normal when corrected for alveolar volume.  Overall comments: The patient's spirometry is consistent with a moderate restrictive ventilatory defect with a coexisting decrease in mid flows.  The patient's FEV1 to FVC ratio is at the lower limits of normal at 70%.  Lung volumes confirm a restrictive ventilatory defect of moderate severity.  There is also a mild decrease in inspiratory capacity.  There  is a moderate diffusion impairment which when corrected for alveolar volume is normalized.  When current studies are compared to studies performed on August 9, 2023, the patient's current baseline spirometry does reveal a decline both the FVC and FEV1 compared to previous pre and postbronchodilator values.  There has also been a decline in total lung capacity as well as slow vital capacity compared to previous.  When corrected for alveolar volume there has actually been some improvement in diffusion capacity compared to previous.      Results for orders placed during the hospital encounter of 08/09/23    Spirometry - Pre & Post Bronchodilator with Lung Volumes    [facility] - Pulmonary Function Test    [address] [place] [address]  [place]  [address]  [phone]    Patient : Caio Riley  MRN : [id number]  CSN : [id number]  Pulmonologist : Salvatore Shah MD  Date : 8/9/2023    ______________________________________________________________________    Interpretation :  1.  Spirometry is consistent with a moderate obstructive ventilatory defect.  2.  There is improvement in spirometry postbronchodilator particularly in midflows but a moderate obstructive ventilatory defect is still present.  3.  Lung volumes reveal a coexisting mild restrictive ventilatory defect.  There is also a decrease in inspiratory capacity.  4.  There is a moderate diffusion impairment which when corrected for alveolar volume is a low normal diffusion capacity.  5.  When current studies are compared to studies performed on August 25, 2022, the patient's current pre and postbronchodilator spirometry reveals improvement both the FVC and FEV1 compared to previous baseline values.  There has also been significant improvement in the patient's total lung capacity compared to previous.  When corrected for alveolar volume there has been a decline in diffusion capacity compared to previous although it remains within normal  limits.      Salvatore Shah MD      Results for orders placed in visit on 22    Pulmonary Function Test    Narrative  Pulmonary Function Test  Performed by: Machelle Elliott, RRT  Authorized by: Salvatore Shah MD    Pre Drug % Predicted  FVC: 66%  FEV1: 60%  FEF 25-75%: 40%  FEV1/FVC: 68%  T%  RV: 78%  DLCO: 63%  D/VAsb: 92%    Interpretation  Spirometry  Spirometry shows moderate obstruction.  Lung Volume Measurements  Measurements show reduced lung volumes consistent with restriction.  Diffusion Capacity  The patient's diffusion capacity is mildly reduced.  Diffusion capacity is normal when corrected for alveolar volume.  Overall comments: The patient's spirometry is consistent with a moderate obstructive ventilatory defect.  Lung volumes reveal a coexisting restrictive ventilatory defect along with a decrease in inspiratory capacity.  There is a mild bordering on moderate diffusion impairment which when corrected for alveolar volume is normalized.  When current studies are compared to [facility]  [facility], his FVC is unchanged, his FEV1 has improved slightly, his total lung capacity is essentially unchanged, and when corrected for alveolar volume there has been a slight but not significant drop in his diffusion capacity compared to previous.                 My interpretation of imaging:    CT Chest Without Contrast Diagnostic (02/10/2025 08:35)         Assessment and Plan {CC Problem List  Visit Diagnosis  ROS  Review (Popup)  Health Maintenance  Quality  BestPractice  Medications  SmartSets  SnapShot Encounters  Media : 23}    Diagnoses and all orders for this visit:    1. Sarcoidosis of lung with sarcoidosis of lymph nodes (Primary)  Assessment & Plan:  The waxing and waning groundglass changes on his recent chest CT scans could relate to his underlying sarcoidosis.    Orders:  -     CT Chest Without Contrast  Diagnostic; Future    2. COPD, moderate  Assessment & Plan:  He will continue his Wixela Inhub and as needed albuterol HFA or his Xopenex neb treatments.    Orders:  -     Spirometry with Diffusion Capacity & Lung Volumes; Future    3. Lung nodules  Assessment & Plan:  He had a new area of groundglass change but no new or suspicious nodules.  I will get a follow-up scan when he returns in about 4 months.    Orders:  -     CT Chest Without Contrast Diagnostic; Future    4. Restrictive lung disease  Assessment & Plan:  This has been noted on prior pulmonary functions.    Orders:  -     Spirometry with Diffusion Capacity & Lung Volumes; Future    5. Former smoker  Assessment & Plan:  Caio Riley  reports that he quit smoking about 57 years ago. His smoking use included cigarettes. He started smoking about 59 years ago. He has a 1 pack-year smoking history. He has been exposed to tobacco smoke. He has never used smokeless tobacco.             6. Overweight  Assessment & Plan:  Patient's (Body mass index is 26.57 kg/m².) indicates that they are overweight with health conditions that include hypertension . Weight is unchanged.              Salvatore Shah MD  2/12/2025  13:08 CST    Follow Up   Return in about 4 months (around 6/12/2025) for Complete PFT, To see me specifically.    Patient was given instructions and counseling regarding his condition or for health maintenance advice. Please see specific information pulled into the AVS if appropriate.

## 2025-02-12 NOTE — ASSESSMENT & PLAN NOTE
Caio Riley  reports that he quit smoking about 57 years ago. His smoking use included cigarettes. He started smoking about 59 years ago. He has a 1 pack-year smoking history. He has been exposed to tobacco smoke. He has never used smokeless tobacco.

## 2025-03-05 ENCOUNTER — OFFICE VISIT (OUTPATIENT)
Dept: CARDIOLOGY CLINIC | Age: 75
End: 2025-03-05
Payer: MEDICARE

## 2025-03-05 VITALS
BODY MASS INDEX: 27.25 KG/M2 | WEIGHT: 184 LBS | DIASTOLIC BLOOD PRESSURE: 74 MMHG | HEIGHT: 69 IN | OXYGEN SATURATION: 94 % | HEART RATE: 74 BPM | SYSTOLIC BLOOD PRESSURE: 130 MMHG

## 2025-03-05 DIAGNOSIS — E78.5 DYSLIPIDEMIA: ICD-10-CM

## 2025-03-05 DIAGNOSIS — I48.0 PAROXYSMAL ATRIAL FIBRILLATION (HCC): Primary | ICD-10-CM

## 2025-03-05 DIAGNOSIS — I10 ESSENTIAL HYPERTENSION: ICD-10-CM

## 2025-03-05 PROCEDURE — 99214 OFFICE O/P EST MOD 30 MIN: CPT | Performed by: NURSE PRACTITIONER

## 2025-03-05 PROCEDURE — 1036F TOBACCO NON-USER: CPT | Performed by: NURSE PRACTITIONER

## 2025-03-05 PROCEDURE — 3017F COLORECTAL CA SCREEN DOC REV: CPT | Performed by: NURSE PRACTITIONER

## 2025-03-05 PROCEDURE — 1159F MED LIST DOCD IN RCRD: CPT | Performed by: NURSE PRACTITIONER

## 2025-03-05 PROCEDURE — G8417 CALC BMI ABV UP PARAM F/U: HCPCS | Performed by: NURSE PRACTITIONER

## 2025-03-05 PROCEDURE — 93000 ELECTROCARDIOGRAM COMPLETE: CPT | Performed by: NURSE PRACTITIONER

## 2025-03-05 PROCEDURE — 3078F DIAST BP <80 MM HG: CPT | Performed by: NURSE PRACTITIONER

## 2025-03-05 PROCEDURE — 3075F SYST BP GE 130 - 139MM HG: CPT | Performed by: NURSE PRACTITIONER

## 2025-03-05 PROCEDURE — 1123F ACP DISCUSS/DSCN MKR DOCD: CPT | Performed by: NURSE PRACTITIONER

## 2025-03-05 PROCEDURE — G8427 DOCREV CUR MEDS BY ELIG CLIN: HCPCS | Performed by: NURSE PRACTITIONER

## 2025-03-05 ASSESSMENT — ENCOUNTER SYMPTOMS
SORE THROAT: 0
CHEST TIGHTNESS: 0
SHORTNESS OF BREATH: 0
COUGH: 0
WHEEZING: 0

## 2025-03-05 NOTE — PROGRESS NOTES
Dyslipidemia Stable Patient is on Lipitor 20 mg daily. No Continue current medications:       Yes                     Orders Placed This Encounter   Procedures    EKG 12 lead     Order Specific Question:   Reason for Exam?     Answer:   Other     No orders of the defined types were placed in this encounter.      Discussed with patient.    Return in about 1 year (around 3/5/2026) for Yearly with Kayla .    I greatly appreciate the opportunity to meet Alvaro Pena and your confidence in allowing me to participate in his cardiovascular care.    NUNO Moncada - NP  3/5/2025 10:09 AM CST                    This dictation was generated by voice recognition computer software. Although all attempts are made to edit dictation for accuracy, there may be errors in the transcription that are not intended.

## 2025-03-27 DIAGNOSIS — G62.9 PERIPHERAL NERVE DISEASE: ICD-10-CM

## 2025-03-28 RX ORDER — PREGABALIN 75 MG/1
75 CAPSULE ORAL 2 TIMES DAILY
Qty: 180 CAPSULE | Refills: 0 | Status: SHIPPED | OUTPATIENT
Start: 2025-03-28 | End: 2025-06-26

## 2025-03-31 ENCOUNTER — TELEPHONE (OUTPATIENT)
Dept: CARDIOLOGY CLINIC | Age: 75
End: 2025-03-31

## 2025-03-31 NOTE — TELEPHONE ENCOUNTER
Date: 4/29/25    Cardiologist: Kendall    Procedure: Dermatology Procedure    Surgeon: Trice    Last Office Visit: 3/5/25  Reason for office visit and medical concerns addressed at this office visit: afib, copd, ckd, cad, dm, , hyperlipidemia,     Testing Performed and Date of Service:  5/5/23 Echo Normal left ventricular size with preserved LV function and an estimated   ejection fraction of approximately 60-65%. No gross regional wall motion   abnormalities. Mild concentric left ventricular hypertrophy. Normal   diastolic filling pattern for age.   Normal right ventricular size with preserved RV function (TAPSE 21 mm).   Normal bi-atrial size.   Trace- mild mitral regurgitation.   Trivial tricuspid regurgitation with estimated RVSP of 30 mmHg.   The visualized aorta is within normal limits.   The IVC is normal.   No evidence of significant pericardial effusion is noted.   The rhythm is sinus.    Does the patient have a stent? If so, what type?  none    Current Medications: ozempic, xarelto, lyrica, prednisone, losartan, insulin, aricfept, imuran, atorvastatin, atenolol, aspirin    Is the patient currently taking an anticoagulant? If so, what is the diagnosis the patient has been given to warrant the need for the anticoagulant? Xarelto for afib    Additional Notes: requesting to hold xarelto 1 day prior and 1 day pos tprocedure

## 2025-05-13 ENCOUNTER — PREP FOR PROCEDURE (OUTPATIENT)
Age: 75
End: 2025-05-13

## 2025-05-13 ENCOUNTER — OFFICE VISIT (OUTPATIENT)
Age: 75
End: 2025-05-13
Payer: MEDICARE

## 2025-05-13 VITALS — WEIGHT: 184 LBS | HEIGHT: 69 IN | BODY MASS INDEX: 27.25 KG/M2

## 2025-05-13 DIAGNOSIS — T84.84XA PAINFUL ORTHOPAEDIC HARDWARE: Primary | ICD-10-CM

## 2025-05-13 DIAGNOSIS — M79.662 PAIN IN LEFT LOWER LEG: Primary | ICD-10-CM

## 2025-05-13 DIAGNOSIS — T84.84XA PAINFUL ORTHOPAEDIC HARDWARE: ICD-10-CM

## 2025-05-13 PROCEDURE — 1159F MED LIST DOCD IN RCRD: CPT | Performed by: PHYSICIAN ASSISTANT

## 2025-05-13 PROCEDURE — 99204 OFFICE O/P NEW MOD 45 MIN: CPT | Performed by: PHYSICIAN ASSISTANT

## 2025-05-13 PROCEDURE — 1123F ACP DISCUSS/DSCN MKR DOCD: CPT | Performed by: PHYSICIAN ASSISTANT

## 2025-05-13 PROCEDURE — G8427 DOCREV CUR MEDS BY ELIG CLIN: HCPCS | Performed by: PHYSICIAN ASSISTANT

## 2025-05-13 PROCEDURE — 3017F COLORECTAL CA SCREEN DOC REV: CPT | Performed by: PHYSICIAN ASSISTANT

## 2025-05-13 PROCEDURE — G8417 CALC BMI ABV UP PARAM F/U: HCPCS | Performed by: PHYSICIAN ASSISTANT

## 2025-05-13 PROCEDURE — 1036F TOBACCO NON-USER: CPT | Performed by: PHYSICIAN ASSISTANT

## 2025-05-13 RX ORDER — SODIUM CHLORIDE 0.9 % (FLUSH) 0.9 %
5-40 SYRINGE (ML) INJECTION EVERY 12 HOURS SCHEDULED
Status: CANCELLED | OUTPATIENT
Start: 2025-05-22

## 2025-05-13 RX ORDER — SODIUM CHLORIDE 0.9 % (FLUSH) 0.9 %
5-40 SYRINGE (ML) INJECTION PRN
Status: CANCELLED | OUTPATIENT
Start: 2025-05-22

## 2025-05-13 RX ORDER — SODIUM CHLORIDE 9 MG/ML
INJECTION, SOLUTION INTRAVENOUS PRN
Status: CANCELLED | OUTPATIENT
Start: 2025-05-22

## 2025-05-13 NOTE — PROGRESS NOTES
Orthopaedic Clinic Note-New Patient    NAME:  Alvaro Pena   : 1950  MRN: [id number]      2025     CHIEF COMPLAINT: Left leg pain    HISTORY OF PRESENT ILLNESS:   Alvaro is a 74 y.o. male who presents to the office for evaluation and treatment of the left ankle.  Patient had ORIF of the distal tibia in  with Dr Prasad.  He has done well and had intermittent chronic swelling.  However, in the last 2 months, he has had increased pain in the area.  He also notes increase sensitivity over the plate and incision.  Over the last 2 weeks, he started having a shock sensation that radiates down the foot as well as a sensation of rubber bands around the ankle.  He has no associated calf pain.  He denies any redness, warmth, or fever.    Past Medical History:        Diagnosis Date    Achalasia     going to [place] for surgery in March    Acute pancreatitis     Anemia     Asthma     Atrial fibrillation (HCC)     h/o paroxysmal a-fib ? anesthsia induced    Benign colonic polyp     cscope  Dr Majano adenomatous:  adenoma    Chest pain     Chronic kidney disease     Chronic pain syndrome     Depression     Diabetic peripheral neuropathy (HCC)     Extrinsic asthma     Hyperlipidemia     Hypertension     Idiopathic peripheral neuropathy     Lyme disease     Mediastinal lymphadenopathy     Microalbuminuria     Mixed hyperlipidemia     Paroxysmal A-fib (HCC)     S/P ablation of atrial fibrillation      A fib ablation , Dr Chaparro, [place]     Sarcoidosis, lung     restrictive lung impairment    Tick bite     Type 2 diabetes, controlled, with neuropathy (HCC)        Past Surgical History:        Procedure Laterality Date    ANKLE FRACTURE SURGERY  2015    ATRIAL ABLATION SURGERY      Dr. Chaparro- [place]    BACK SURGERY  10/06/2020    CARDIAC SURGERY      Catheter Ablation A-fib    CHOLECYSTECTOMY      COLONOSCOPY  2014    [facility]    CYSTOSCOPY Left 2019    CYSTOSCOPY; LEFT

## 2025-05-14 ENCOUNTER — TELEPHONE (OUTPATIENT)
Dept: CARDIOLOGY CLINIC | Age: 75
End: 2025-05-14

## 2025-05-14 NOTE — TELEPHONE ENCOUNTER
Date: 5/22/25     Cardiologist: Kendall     Procedure: Hardware Removal L Ankle     Surgeon: Shanice     Last Office Visit: 3/5/25  Reason for office visit and medical concerns addressed at this office visit: afib, copd, ckd, cad, dm, , hyperlipidemia,      Testing Performed and Date of Service:  5/5/23 Echo Normal left ventricular size with preserved LV function and an estimated   ejection fraction of approximately 60-65%. No gross regional wall motion   abnormalities. Mild concentric left ventricular hypertrophy. Normal   diastolic filling pattern for age.   Normal right ventricular size with preserved RV function (TAPSE 21 mm).   Normal bi-atrial size.   Trace- mild mitral regurgitation.   Trivial tricuspid regurgitation with estimated RVSP of 30 mmHg.   The visualized aorta is within normal limits.   The IVC is normal.   No evidence of significant pericardial effusion is noted.   The rhythm is sinus.     Does the patient have a stent? If so, what type?  none     Current Medications: ozempic, xarelto, lyrica, prednisone, losartan, insulin, aricfept, imuran, atorvastatin, atenolol, aspirin     Is the patient currently taking an anticoagulant? If so, what is the diagnosis the patient has been given to warrant the need for the anticoagulant? Xarelto for afib     Additional Notes: requesting to hold xarelto and for cardiac clearance

## 2025-05-15 ENCOUNTER — HOSPITAL ENCOUNTER (OUTPATIENT)
Dept: PREADMISSION TESTING | Age: 75
Discharge: HOME | End: 2025-05-19
Payer: MEDICARE

## 2025-05-15 VITALS — WEIGHT: 178 LBS | BODY MASS INDEX: 26.29 KG/M2

## 2025-05-15 DIAGNOSIS — T84.84XA PAINFUL ORTHOPAEDIC HARDWARE: ICD-10-CM

## 2025-05-15 LAB
ANION GAP SERPL CALCULATED.3IONS-SCNC: 12 MMOL/L (ref 8–16)
APTT PPP: 23.2 SEC (ref 26–36.2)
BUN SERPL-MCNC: 15 MG/DL (ref 8–23)
CALCIUM SERPL-MCNC: 9.7 MG/DL (ref 8.8–10.2)
CHLORIDE SERPL-SCNC: 98 MMOL/L (ref 98–107)
CO2 SERPL-SCNC: 29 MMOL/L (ref 22–29)
CREAT SERPL-MCNC: 0.9 MG/DL (ref 0.7–1.2)
ERYTHROCYTE [DISTWIDTH] IN BLOOD BY AUTOMATED COUNT: 13.6 % (ref 11.5–14.5)
GLUCOSE SERPL-MCNC: 307 MG/DL (ref 70–99)
HCT VFR BLD AUTO: 35.4 % (ref 42–52)
HGB BLD-MCNC: 11.5 G/DL (ref 14–18)
INR PPP: 1.46 (ref 0.88–1.18)
MCH RBC QN AUTO: 27.7 PG (ref 27–31)
MCHC RBC AUTO-ENTMCNC: 32.5 G/DL (ref 33–37)
MCV RBC AUTO: 85.3 FL (ref 80–94)
PLATELET # BLD AUTO: 142 K/UL (ref 130–400)
PMV BLD AUTO: 11.5 FL (ref 9.4–12.4)
POTASSIUM SERPL-SCNC: 3.9 MMOL/L (ref 3.5–5.1)
PROTHROMBIN TIME: 17.6 SEC (ref 12–14.6)
RBC # BLD AUTO: 4.15 M/UL (ref 4.7–6.1)
SODIUM SERPL-SCNC: 139 MMOL/L (ref 136–145)
WBC # BLD AUTO: 4 K/UL (ref 4.8–10.8)

## 2025-05-15 PROCEDURE — 85027 COMPLETE CBC AUTOMATED: CPT

## 2025-05-15 PROCEDURE — 85730 THROMBOPLASTIN TIME PARTIAL: CPT

## 2025-05-15 PROCEDURE — 85610 PROTHROMBIN TIME: CPT

## 2025-05-15 PROCEDURE — 80048 BASIC METABOLIC PNL TOTAL CA: CPT

## 2025-05-15 RX ORDER — FLUTICASONE PROPIONATE AND SALMETEROL 500; 50 UG/1; UG/1
1 POWDER RESPIRATORY (INHALATION) EVERY 12 HOURS
COMMUNITY

## 2025-05-15 RX ORDER — ALBUTEROL SULFATE 90 UG/1
2 INHALANT RESPIRATORY (INHALATION) EVERY 6 HOURS PRN
COMMUNITY

## 2025-05-15 NOTE — DISCHARGE INSTRUCTIONS
You will be contacted by someone from same-day surgery between 2-4 pm the day before your surgery regarding your arrival time. Please check your voicemail as they may leave a message with that information.  If you do not receive a call or voicemail or you have a problem please call [phone].    If you are running late the morning of your surgery or you wake up with signs/symptoms of COVID call [phone] for instructions    You will be scheduled to arrive 11/2-2 hours prior to your surgery time. Do not come earlier than when you are told to arrive.  You will come to the [facility] to register and be taken to the outpatient services area.    Do Not eat  anything after midnight. That includes candy, gum and mints.  You may have water until 2 hours before arrival time.    Do not use any form of tobacco/nicotine products the day of surgery. This includes, smoking, vaping, pouches, patches, dip and chew. Your surgery may be cancelled if you do.    The morning of surgery take the following medications with a small sip of water:    Always, follow your surgeon's instructions on any blood thinner or aspirin use before surgery.    THE MORNING OF SURGERY DO NOT TAKE LOSARTAN  BUT  Take your prescribed betablocker   ATENOLOL     with a sip of water the morning of surgery.  AND TAKE YOUR OTHER PRESCRIPTIONS, BUT NO DIABETIC MEDICINE AND TAKE 1/2 DOSE OF TOUJEO THE NIGHT BEFORE SURGERY  HOLD YOU GLP1 (OZEMPIC) FOR 7 DAYS BEFORE SURGERY (PT ALREADY IS)    Brush your teeth and shower the morning of surgery.    Chlorhexidine Gluconate 4% Solution    Patient should shower with this soap a minimum of 3 consecutive showers (2 nights before surgery, the night before surgery and the morning of surgery) washing from the neck down (avoiding contact with genitalia).      DO NOT WASH YOUR HAIR OR FACE WITH THIS SOAP.  When washing with this soap, apply enough to suds up the body thoroughly, turn the water away from your

## 2025-05-21 NOTE — DISCHARGE INSTRUCTIONS
Lower Extremity Post-op Instructions  Dr. FARNSWORTH        POST-OP CARE: Please follow these instructions closely!    IMPORTANT PHONE NUMBERS:  For emergencies, please call 911  You may reach Dr. Farnsworth or his medical assistants at [phone], M-F 8:00am-5:00pm  After 5pm or on the weekends, please call the answering service which can be reached from the number above   Call immediately if you have any of the following symptoms:  Elevated temperature above 101.5 degrees for more than 48 hours after surgery  Persistent drainage from wound  Severe pain around surgical site  Calf pain    Weight Bearing:   __x___ Weight Bearing as tolerated (with or without crutches)   _____ Touch-Toe Weight-bearing    _____ Partial Weight Bearing  ___ % for ___ weeks (must use crutches)   _____ Non Weight Bearing for ____ weeks (must use crutches, wheelchair or                          knee walker)    Bathing:  If stated below, it is ok to remove your postop dressing and shower.  DO NOT SOAK the incision in water.  Pat the incision site dry after surgery  _x__ You may remove your dressing and shower on the 3rd day following surgery; if you shower before this, please cover your incision thoroughly  ___Keep your splint/dressing clean, dry, intact.  Do not place foreign objects inside the splint/dressing.    Dressings: Do NOT remove dressing/splint unless unless told to do so. SOME DRAINAGE IS NORMAL!  If you have a splint or cast, do NOT get wet!!    DO NOT touch, remove, or apply ointment to the incision and/or steri strips  Steri strips may fall off on their own  Signs of infection that warrant a phone call to our clinical line:  Excessive drainage or redness  Red streaking coming away from the incision  Increased pain  Increased temperature above 101.5 degrees    DRIVING:  absolutely no driving while taking pain medication.  This will be discussed at your first postop visit.    Incision: do NOT uncover unless you are told it is ok (see

## 2025-05-22 ENCOUNTER — HOSPITAL ENCOUNTER (OUTPATIENT)
Age: 75
Setting detail: OUTPATIENT SURGERY
Discharge: HOME | End: 2025-05-22
Attending: ORTHOPAEDIC SURGERY | Admitting: ORTHOPAEDIC SURGERY
Payer: MEDICARE

## 2025-05-22 ENCOUNTER — ANESTHESIA (OUTPATIENT)
Dept: OPERATING ROOM | Age: 75
End: 2025-05-22
Payer: MEDICARE

## 2025-05-22 ENCOUNTER — APPOINTMENT (OUTPATIENT)
Dept: GENERAL RADIOLOGY | Age: 75
End: 2025-05-22
Attending: ORTHOPAEDIC SURGERY
Payer: MEDICARE

## 2025-05-22 ENCOUNTER — ANESTHESIA EVENT (OUTPATIENT)
Dept: OPERATING ROOM | Age: 75
End: 2025-05-22
Payer: MEDICARE

## 2025-05-22 VITALS
WEIGHT: 175 LBS | DIASTOLIC BLOOD PRESSURE: 71 MMHG | HEART RATE: 69 BPM | SYSTOLIC BLOOD PRESSURE: 141 MMHG | RESPIRATION RATE: 16 BRPM | OXYGEN SATURATION: 91 % | HEIGHT: 69 IN | BODY MASS INDEX: 25.92 KG/M2 | TEMPERATURE: 97.5 F

## 2025-05-22 DIAGNOSIS — M25.371 RIGHT ANKLE INSTABILITY: Primary | ICD-10-CM

## 2025-05-22 DIAGNOSIS — T84.84XA PAINFUL ORTHOPAEDIC HARDWARE: ICD-10-CM

## 2025-05-22 LAB
GLUCOSE BLD-MCNC: 138 MG/DL (ref 70–99)
GLUCOSE BLD-MCNC: 140 MG/DL (ref 70–99)
PERFORMED ON: ABNORMAL
PERFORMED ON: ABNORMAL

## 2025-05-22 PROCEDURE — 6360000002 HC RX W HCPCS: Performed by: ANESTHESIOLOGY

## 2025-05-22 PROCEDURE — 7100000000 HC PACU RECOVERY - FIRST 15 MIN: Performed by: ORTHOPAEDIC SURGERY

## 2025-05-22 PROCEDURE — 3700000001 HC ADD 15 MINUTES (ANESTHESIA): Performed by: ORTHOPAEDIC SURGERY

## 2025-05-22 PROCEDURE — 2500000003 HC RX 250 WO HCPCS: Performed by: NURSE ANESTHETIST, CERTIFIED REGISTERED

## 2025-05-22 PROCEDURE — 7100000011 HC PHASE II RECOVERY - ADDTL 15 MIN: Performed by: ORTHOPAEDIC SURGERY

## 2025-05-22 PROCEDURE — 2709999900 HC NON-CHARGEABLE SUPPLY: Performed by: ORTHOPAEDIC SURGERY

## 2025-05-22 PROCEDURE — 7100000010 HC PHASE II RECOVERY - FIRST 15 MIN: Performed by: ORTHOPAEDIC SURGERY

## 2025-05-22 PROCEDURE — 6360000002 HC RX W HCPCS: Performed by: NURSE ANESTHETIST, CERTIFIED REGISTERED

## 2025-05-22 PROCEDURE — 3600000003 HC SURGERY LEVEL 3 BASE: Performed by: ORTHOPAEDIC SURGERY

## 2025-05-22 PROCEDURE — 2580000003 HC RX 258: Performed by: NURSE ANESTHETIST, CERTIFIED REGISTERED

## 2025-05-22 PROCEDURE — 2580000003 HC RX 258: Performed by: ANESTHESIOLOGY

## 2025-05-22 PROCEDURE — 73610 X-RAY EXAM OF ANKLE: CPT

## 2025-05-22 PROCEDURE — 7100000001 HC PACU RECOVERY - ADDTL 15 MIN: Performed by: ORTHOPAEDIC SURGERY

## 2025-05-22 PROCEDURE — 3600000013 HC SURGERY LEVEL 3 ADDTL 15MIN: Performed by: ORTHOPAEDIC SURGERY

## 2025-05-22 PROCEDURE — 2500000003 HC RX 250 WO HCPCS: Performed by: ORTHOPAEDIC SURGERY

## 2025-05-22 PROCEDURE — 6360000002 HC RX W HCPCS: Performed by: ORTHOPAEDIC SURGERY

## 2025-05-22 PROCEDURE — 3700000000 HC ANESTHESIA ATTENDED CARE: Performed by: ORTHOPAEDIC SURGERY

## 2025-05-22 RX ORDER — SODIUM CHLORIDE 9 MG/ML
INJECTION, SOLUTION INTRAVENOUS PRN
Status: DISCONTINUED | OUTPATIENT
Start: 2025-05-22 | End: 2025-05-22 | Stop reason: HOSPADM

## 2025-05-22 RX ORDER — SODIUM CHLORIDE 0.9 % (FLUSH) 0.9 %
5-40 SYRINGE (ML) INJECTION EVERY 12 HOURS SCHEDULED
Status: DISCONTINUED | OUTPATIENT
Start: 2025-05-22 | End: 2025-05-22 | Stop reason: HOSPADM

## 2025-05-22 RX ORDER — PROPOFOL 10 MG/ML
INJECTION, EMULSION INTRAVENOUS
Status: DISCONTINUED | OUTPATIENT
Start: 2025-05-22 | End: 2025-05-22 | Stop reason: SDUPTHER

## 2025-05-22 RX ORDER — ONDANSETRON 2 MG/ML
4 INJECTION INTRAMUSCULAR; INTRAVENOUS
Status: DISCONTINUED | OUTPATIENT
Start: 2025-05-22 | End: 2025-05-22 | Stop reason: HOSPADM

## 2025-05-22 RX ORDER — FENTANYL CITRATE 50 UG/ML
25 INJECTION, SOLUTION INTRAMUSCULAR; INTRAVENOUS EVERY 5 MIN PRN
Status: DISCONTINUED | OUTPATIENT
Start: 2025-05-22 | End: 2025-05-22 | Stop reason: HOSPADM

## 2025-05-22 RX ORDER — HYDROCODONE BITARTRATE AND ACETAMINOPHEN 7.5; 325 MG/1; MG/1
1 TABLET ORAL EVERY 8 HOURS PRN
Qty: 20 TABLET | Refills: 0 | Status: SHIPPED | OUTPATIENT
Start: 2025-05-22 | End: 2025-05-27

## 2025-05-22 RX ORDER — EPHEDRINE SULFATE/0.9% NACL/PF 25 MG/5 ML
SYRINGE (ML) INTRAVENOUS
Status: DISCONTINUED | OUTPATIENT
Start: 2025-05-22 | End: 2025-05-22 | Stop reason: SDUPTHER

## 2025-05-22 RX ORDER — LIDOCAINE HYDROCHLORIDE 10 MG/ML
INJECTION, SOLUTION EPIDURAL; INFILTRATION; INTRACAUDAL; PERINEURAL
Status: DISCONTINUED | OUTPATIENT
Start: 2025-05-22 | End: 2025-05-22 | Stop reason: SDUPTHER

## 2025-05-22 RX ORDER — FENTANYL CITRATE 50 UG/ML
50 INJECTION, SOLUTION INTRAMUSCULAR; INTRAVENOUS EVERY 5 MIN PRN
Status: DISCONTINUED | OUTPATIENT
Start: 2025-05-22 | End: 2025-05-22 | Stop reason: HOSPADM

## 2025-05-22 RX ORDER — MIDAZOLAM HYDROCHLORIDE 1 MG/ML
INJECTION, SOLUTION INTRAMUSCULAR; INTRAVENOUS
Status: DISCONTINUED | OUTPATIENT
Start: 2025-05-22 | End: 2025-05-22 | Stop reason: SDUPTHER

## 2025-05-22 RX ORDER — SODIUM CHLORIDE, SODIUM LACTATE, POTASSIUM CHLORIDE, CALCIUM CHLORIDE 600; 310; 30; 20 MG/100ML; MG/100ML; MG/100ML; MG/100ML
INJECTION, SOLUTION INTRAVENOUS
Status: DISCONTINUED | OUTPATIENT
Start: 2025-05-22 | End: 2025-05-22 | Stop reason: SDUPTHER

## 2025-05-22 RX ORDER — HYDROCODONE BITARTRATE AND ACETAMINOPHEN 7.5; 325 MG/1; MG/1
1 TABLET ORAL
Status: DISCONTINUED | OUTPATIENT
Start: 2025-05-22 | End: 2025-05-22 | Stop reason: HOSPADM

## 2025-05-22 RX ORDER — ONDANSETRON 2 MG/ML
INJECTION INTRAMUSCULAR; INTRAVENOUS
Status: DISCONTINUED | OUTPATIENT
Start: 2025-05-22 | End: 2025-05-22 | Stop reason: SDUPTHER

## 2025-05-22 RX ORDER — SODIUM CHLORIDE 0.9 % (FLUSH) 0.9 %
5-40 SYRINGE (ML) INJECTION PRN
Status: DISCONTINUED | OUTPATIENT
Start: 2025-05-22 | End: 2025-05-22 | Stop reason: HOSPADM

## 2025-05-22 RX ORDER — ROCURONIUM BROMIDE 10 MG/ML
INJECTION, SOLUTION INTRAVENOUS
Status: DISCONTINUED | OUTPATIENT
Start: 2025-05-22 | End: 2025-05-22 | Stop reason: SDUPTHER

## 2025-05-22 RX ORDER — MUPIROCIN/LIDOCAINE 2 %-2 %
1 OINTMENT (GRAM) TOPICAL 2 TIMES DAILY
COMMUNITY

## 2025-05-22 RX ORDER — DIPHENHYDRAMINE HYDROCHLORIDE 50 MG/ML
12.5 INJECTION, SOLUTION INTRAMUSCULAR; INTRAVENOUS
Status: DISCONTINUED | OUTPATIENT
Start: 2025-05-22 | End: 2025-05-22 | Stop reason: HOSPADM

## 2025-05-22 RX ORDER — LIDOCAINE HYDROCHLORIDE 10 MG/ML
1 INJECTION, SOLUTION EPIDURAL; INFILTRATION; INTRACAUDAL; PERINEURAL
Status: DISCONTINUED | OUTPATIENT
Start: 2025-05-22 | End: 2025-05-22 | Stop reason: HOSPADM

## 2025-05-22 RX ORDER — SODIUM CHLORIDE, SODIUM LACTATE, POTASSIUM CHLORIDE, CALCIUM CHLORIDE 600; 310; 30; 20 MG/100ML; MG/100ML; MG/100ML; MG/100ML
INJECTION, SOLUTION INTRAVENOUS CONTINUOUS
Status: DISCONTINUED | OUTPATIENT
Start: 2025-05-22 | End: 2025-05-22 | Stop reason: HOSPADM

## 2025-05-22 RX ORDER — NALOXONE HYDROCHLORIDE 0.4 MG/ML
INJECTION, SOLUTION INTRAMUSCULAR; INTRAVENOUS; SUBCUTANEOUS PRN
Status: DISCONTINUED | OUTPATIENT
Start: 2025-05-22 | End: 2025-05-22 | Stop reason: HOSPADM

## 2025-05-22 RX ORDER — DEXAMETHASONE SODIUM PHOSPHATE 10 MG/ML
INJECTION, SOLUTION INTRAMUSCULAR; INTRAVENOUS
Status: DISCONTINUED | OUTPATIENT
Start: 2025-05-22 | End: 2025-05-22 | Stop reason: SDUPTHER

## 2025-05-22 RX ORDER — ONDANSETRON 4 MG/1
4 TABLET, FILM COATED ORAL EVERY 8 HOURS PRN
Qty: 10 TABLET | Refills: 0 | Status: SHIPPED | OUTPATIENT
Start: 2025-05-22

## 2025-05-22 RX ORDER — FENTANYL CITRATE 50 UG/ML
INJECTION, SOLUTION INTRAMUSCULAR; INTRAVENOUS
Status: DISCONTINUED | OUTPATIENT
Start: 2025-05-22 | End: 2025-05-22 | Stop reason: SDUPTHER

## 2025-05-22 RX ORDER — MIDAZOLAM HYDROCHLORIDE 2 MG/2ML
2 INJECTION, SOLUTION INTRAMUSCULAR; INTRAVENOUS
Status: DISCONTINUED | OUTPATIENT
Start: 2025-05-22 | End: 2025-05-22 | Stop reason: HOSPADM

## 2025-05-22 RX ORDER — PROCHLORPERAZINE EDISYLATE 5 MG/ML
5 INJECTION INTRAMUSCULAR; INTRAVENOUS
Status: DISCONTINUED | OUTPATIENT
Start: 2025-05-22 | End: 2025-05-22 | Stop reason: HOSPADM

## 2025-05-22 RX ADMIN — FENTANYL CITRATE 50 MCG: 50 INJECTION INTRAMUSCULAR; INTRAVENOUS at 08:42

## 2025-05-22 RX ADMIN — LIDOCAINE HYDROCHLORIDE 50 MG: 10 INJECTION, SOLUTION EPIDURAL; INFILTRATION; INTRACAUDAL; PERINEURAL at 07:31

## 2025-05-22 RX ADMIN — PROPOFOL 150 MG: 10 INJECTION, EMULSION INTRAVENOUS at 07:31

## 2025-05-22 RX ADMIN — ONDANSETRON 4 MG: 2 INJECTION INTRAMUSCULAR; INTRAVENOUS at 07:35

## 2025-05-22 RX ADMIN — FENTANYL CITRATE 50 MCG: 0.05 INJECTION, SOLUTION INTRAMUSCULAR; INTRAVENOUS at 07:31

## 2025-05-22 RX ADMIN — MIDAZOLAM 2 MG: 1 INJECTION INTRAMUSCULAR; INTRAVENOUS at 07:23

## 2025-05-22 RX ADMIN — SODIUM CHLORIDE, SODIUM LACTATE, POTASSIUM CHLORIDE, AND CALCIUM CHLORIDE: .6; .31; .03; .02 INJECTION, SOLUTION INTRAVENOUS at 06:24

## 2025-05-22 RX ADMIN — SODIUM CHLORIDE, SODIUM LACTATE, POTASSIUM CHLORIDE, AND CALCIUM CHLORIDE: 600; 310; 30; 20 INJECTION, SOLUTION INTRAVENOUS at 07:23

## 2025-05-22 RX ADMIN — CEFAZOLIN 2000 MG: 1 INJECTION, POWDER, FOR SOLUTION INTRAMUSCULAR; INTRAVENOUS at 07:32

## 2025-05-22 RX ADMIN — SODIUM CHLORIDE, PRESERVATIVE FREE 20 MG: 5 INJECTION INTRAVENOUS at 06:46

## 2025-05-22 RX ADMIN — EPHEDRINE SULFATE 10 MG: 5 INJECTION INTRAVENOUS at 07:38

## 2025-05-22 RX ADMIN — DEXAMETHASONE SODIUM PHOSPHATE 10 MG: 10 INJECTION, SOLUTION INTRAMUSCULAR; INTRAVENOUS at 07:35

## 2025-05-22 RX ADMIN — FENTANYL CITRATE 50 MCG: 50 INJECTION INTRAMUSCULAR; INTRAVENOUS at 08:35

## 2025-05-22 RX ADMIN — EPHEDRINE SULFATE 5 MG: 5 INJECTION INTRAVENOUS at 07:35

## 2025-05-22 RX ADMIN — SUGAMMADEX 250 MG: 100 INJECTION, SOLUTION INTRAVENOUS at 08:15

## 2025-05-22 RX ADMIN — ROCURONIUM BROMIDE 50 MG: 10 INJECTION, SOLUTION INTRAVENOUS at 07:31

## 2025-05-22 ASSESSMENT — PAIN - FUNCTIONAL ASSESSMENT
PAIN_FUNCTIONAL_ASSESSMENT: NONE - DENIES PAIN
PAIN_FUNCTIONAL_ASSESSMENT: 0-10
PAIN_FUNCTIONAL_ASSESSMENT: 0-10

## 2025-05-22 ASSESSMENT — PAIN DESCRIPTION - LOCATION
LOCATION: ANKLE
LOCATION: ANKLE

## 2025-05-22 ASSESSMENT — PAIN DESCRIPTION - DESCRIPTORS
DESCRIPTORS: ACHING
DESCRIPTORS: ACHING

## 2025-05-22 ASSESSMENT — LIFESTYLE VARIABLES: SMOKING_STATUS: 0

## 2025-05-22 ASSESSMENT — ENCOUNTER SYMPTOMS: SHORTNESS OF BREATH: 1

## 2025-05-22 NOTE — H&P
Patient Name: Alvaro Pena  MRN: [id number]  YOB: 1950  Date of evaluation: 5/22/2025    H&P including current review of systems was updated in the paper chart and/or the document previously scanned into the record.  There have been no significant changes or new problems since the original evaluation.  The patient's problems continue and indications for contemplated procedure have not changed.    Electronically signed by Logan Praasd MD on 5/22/2025 at 6:25 AM.

## 2025-05-22 NOTE — ANESTHESIA PRE PROCEDURE
02/11/2020 10:40 PM    OCD3VTE 26.0 02/11/2020 10:40 PM    BEART 0.5 02/11/2020 10:40 PM    D9KIRVVA 91.2 02/11/2020 10:40 PM        Type & Screen (If Applicable):  No results found for: \"ABORH\", \"LABANTI\"    Drug/Infectious Status (If Applicable):  No results found for: \"HIV\", \"HEPCAB\"    COVID-19 Screening (If Applicable):   Lab Results   Component Value Date/Time    COVID19 Not-Detected 12/09/2024 11:12 AM    COVID19 Not Detected 09/10/2024 04:26 PM           Anesthesia Evaluation  Patient summary reviewed and Nursing notes reviewed   no history of anesthetic complications:   Airway: Mallampati: II  TM distance: >3 FB   Neck ROM: full  Mouth opening: > = 3 FB   Dental:          Pulmonary:   (+)  COPD:  shortness of breath: chronic,         asthma: exercise-induced asthma,     (-) not a current smoker                          ROS comment: Sarcoidosis, pt wears oxygen 2.5L NC prn   Cardiovascular:  Exercise tolerance: good (>4 METS)  (+) hypertension:, CAD:, dysrhythmias (s/p ablation 2016, controlled with medications): atrial fibrillation, hyperlipidemia    (-) pacemaker    ECG reviewed      Echocardiogram reviewed         Beta Blocker:  Dose within 24 Hrs      ROS comment: Echo 2023:   Summary   Normal left ventricular size with preserved LV function and an estimated   ejection fraction of approximately 60-65%. No gross regional wall motion   abnormalities. Mild concentric left ventricular hypertrophy. Normal   diastolic filling pattern for age.   Normal right ventricular size with preserved RV function (TAPSE 21 mm).   Normal bi-atrial size.   Trace- mild mitral regurgitation.   Trivial tricuspid regurgitation with estimated RVSP of 30 mmHg.   The visualized aorta is within normal limits.   The IVC is normal.   No evidence of significant pericardial effusion is noted.   The rhythm is sinus.       Neuro/Psych:   (+) psychiatric history:   (-) seizures and CVA           GI/Hepatic/Renal:   (+) GERD: well

## 2025-05-22 NOTE — OP NOTE
Patient Name: Kehinde  MRN: [id number]  : 1950    [facility]    DATE of SURGERY: 2025    SURGEON: Logan Prasad MD    ASSISTANT: NONE     PREOPERATIVE DIAGNOSES: Painful retained orthopaedic hardware, Left ankle     POSTOPERATIVE DIAGNOSES: Painful retained orthopaedic hardware, Left ankle    PROCEDURES PERFORMED: Removal of hardware (deep implant) Left ankle    IMPLANTS:  none    ANESTHESIA USED:  General endotracheal anesthesia.     INDICATIONS:  The patient is a 74 y.o. male who underwent an ORIF of the left distal tibia in . He did well postop but recently began noticing increased pain over the previously placed hardware and requested removal.  Risks included to that of anesthesia, bleeding, infection, pain, damage to local structures, need for further surgery, intraoperative fracture, incomplete removal of hardware.     ESTIMATED BLOOD LOSS:  Less than 20 mL.     SPECIMEN:  None.     DRAINS:  None.     COMPLICATIONS:  None.     PROCEDURE IN DETAIL:  The patient was seen in the preoperative holding room, Once again the informed consent form was reviewed with the patient and signed.  The site of surgery was marked with the patient's agreement.  The patient was transported to the operating room where a timeout was performed identifying the correct patient as well as the operative site.  A 2 g of IV Kefzol was given as perioperative antibiotics. The left extremity was prepped and draped in the usual sterile fashion.      Utilizing the previous incision on the medial side of the left ankle, soft tissue was dissected to the level of the previously placed hardware.  All hardware was removed without complication. C-arm images were used verifying no intraoperative fracture. The incision was irrigated, again, closed in layers and adhesive glue was used to close the skin.  Sterile dressing was placed.  The patient was awakened from anesthesia, transported to the recovery room in

## 2025-05-22 NOTE — PROGRESS NOTES
CLINICAL PHARMACY NOTE: MEDS TO BEDS    Total # of Prescriptions Filled: 2   The following medications were delivered to the patient:  Discharge Medication List as of 5/22/2025  9:09 AM        START taking these medications    Details   HYDROcodone-acetaminophen (NORCO) 7.5-325 MG per tablet Take 1 tablet by mouth every 8 hours as needed for Pain for up to 5 days. Take one (1) tablet by oral route every 8 hours as needed for pain Max Daily Amount: 3 tablets, Disp-20 tablet, R-0Normal      ondansetron (ZOFRAN) 4 MG tablet Take 1 tablet by mouth every 8 hours as needed for Nausea or Vomiting, Disp-10 tablet, R-0Normal               Additional Documentation:    Handed scripts to patients family at [facility]. Paid with cash.

## 2025-05-22 NOTE — ANESTHESIA POSTPROCEDURE EVALUATION
Department of Anesthesiology  Postprocedure Note    Patient: Alvaro Pena  MRN: [id number]  YOB: 1950  Date of evaluation: 5/22/2025    Procedure Summary       Date: 05/22/25 Room / Location: [address]    Anesthesia Start: 0723 Anesthesia Stop: 0826    Procedure: LEFT ANKLE HARDWARE REMOVAL OF TIBIA (Left) Diagnosis:       Painful orthopaedic hardware      (Painful orthopaedic hardware [T84.84XA])    Surgeons: Logan Prasad MD Responsible Provider: Eliu Saldana APRN - CRNA    Anesthesia Type: general ASA Status: 3            Anesthesia Type: No value filed.    Alma Phase I: Alma Score: 9    Alma Phase II:      Anesthesia Post Evaluation    Patient location during evaluation: bedside  Patient participation: complete - patient participated  Level of consciousness: sleepy but conscious  Pain score: 0  Airway patency: patent  Nausea & Vomiting: no nausea  Cardiovascular status: hemodynamically stable  Respiratory status: acceptable  Hydration status: euvolemic  Comments: /62   Pulse 68   Temp 97.1 °F (36.2 °C)   Resp 12   Ht 1.753 m (5' 9\")   Wt 79.4 kg (175 lb)   SpO2 95%   BMI 25.84 kg/m²       No notable events documented.

## 2025-05-22 NOTE — BRIEF OP NOTE
Brief Postoperative Note      Patient: Alvaro Pena  YOB: 1950  MRN: [id number]    Date of Procedure: 5/22/2025    Pre-Op Diagnosis Codes:      * Painful orthopaedic hardware [T84.84XA]    Post-Op Diagnosis: Same       Procedure(s):  LEFT ANKLE HARDWARE REMOVAL OF TIBIA    Surgeon(s):  Logan Prasad MD    Assistant:  * No surgical staff found *    Anesthesia: General    Estimated Blood Loss (mL): Minimal    Complications: None    Specimens:   * No specimens in log *    Implants:  * No implants in log *      Drains: * No LDAs found *    Findings:  Infection Present At Time Of Surgery (PATOS) (choose all levels that have infection present):  No infection present  Other Findings: See op note    Electronically signed by Logan Prasad MD on 5/22/2025 at 8:21 AM

## 2025-05-22 NOTE — PROGRESS NOTES
DR FARNSWORTH AT BEDSIDE TO SPEAK WITH PATIENT AND WIFE, NO NEW ORDERS, PATIENT STATES HE IS READY TO DISCHARGE HOME AT THIS TIME

## 2025-05-27 ENCOUNTER — TELEPHONE (OUTPATIENT)
Age: 75
End: 2025-05-27

## 2025-05-27 NOTE — TELEPHONE ENCOUNTER
Called and spoke with patient's wife Jasmyne. She wants to know when patient can start his Ozempic? He does his injections on Sunday.     Patient does not want to take the Norco. He is currently on a small dose of aspirin and xerelto. Can patient take an NSAID. Last night patient got up after wife was in bed and took a regular 325 mg. So wife wants to know would it be better for him to take advil, tylonol, NSAID Etc.

## 2025-05-27 NOTE — TELEPHONE ENCOUNTER
Yandel Huston PA L[place]Barnes, [place], [place]  Caller: Unspecified (Today, 10:34 AM)  He can resume his Ozempic now. As far as pain control, tylenol or NSAIDs is fine. Thanks!

## 2025-05-27 NOTE — TELEPHONE ENCOUNTER
Called patient's wife and relayed Yandel's message.  She stated appreciation and understanding. Nothing further is needed at this time.

## 2025-06-04 ENCOUNTER — TELEPHONE (OUTPATIENT)
Dept: PULMONOLOGY | Facility: CLINIC | Age: 75
End: 2025-06-04
Payer: MEDICARE

## 2025-06-04 NOTE — PROGRESS NOTES
Orthopaedic Clinic Note-Postop    NAME:  Alvaro Pena   : 1950  MRN: [id number]      2025     CHIEF COMPLAINT: Status post left ankle hardware removal of tibia    HISTORY OF PRESENT ILLNESS:   Alvaro returns today for follow up of the left ankle.  Pain has improved.  He is no longer having pain shooting into the side of his foot that he was before but is having some pain around the incision site and ankle.  There have been no postoperative complications to date.         Past Medical History:        Diagnosis Date    Achalasia     going to [place] for surgery in March    Acute pancreatitis     Anemia     Asthma     Atrial fibrillation (HCC)     h/o paroxysmal a-fib ? anesthsia induced    Benign colonic polyp     cscope  Dr Majano adenomatous:  adenoma    Chest pain     Chronic kidney disease     sees [facility] kidney    Chronic pain syndrome     Depression     Diabetic peripheral neuropathy (HCC)     Extrinsic asthma     Hyperlipidemia     Hypertension     Idiopathic peripheral neuropathy     Lyme disease     hx of tick bite    Mediastinal lymphadenopathy     Microalbuminuria     Paroxysmal A-fib (HCC)     sees [facility] cardiology    S/P ablation of atrial fibrillation 2016    A fib ablation , Dr Chaparro, [place]    Sarcoidosis, lung     restrictive lung impairment    Type 2 diabetes, controlled, with neuropathy (HCC)        Past Surgical History:        Procedure Laterality Date    ANKLE FRACTURE SURGERY  2015    ANKLE SURGERY Left 2025    LEFT ANKLE HARDWARE REMOVAL OF TIBIA performed by Logan Prasad MD at [facility] OR    ATRIAL ABLATION SURGERY      Dr. Chaparro- [place]    BACK SURGERY  10/06/2020    CHOLECYSTECTOMY      COLONOSCOPY  2014    [facility]    CYSTOSCOPY Left 2019    CYSTOSCOPY; LEFT RETROGRADE PYELOGRAM; INSERTION LEFT URETERAL STENT performed by Mario Barone MD at [facility] OR    EYE SURGERY      MOHS SURGERY      VT CYSTO BLADDER W/URETERAL

## 2025-06-04 NOTE — TELEPHONE ENCOUNTER
Spoke with patient's spouse and reminded them to get upcoming CT scan for Dr. Shah. She voiced understanding.

## 2025-06-05 ENCOUNTER — OFFICE VISIT (OUTPATIENT)
Age: 75
End: 2025-06-05

## 2025-06-05 VITALS — BODY MASS INDEX: 25.98 KG/M2 | WEIGHT: 175.4 LBS | HEIGHT: 69 IN

## 2025-06-05 DIAGNOSIS — T84.84XA PAINFUL ORTHOPAEDIC HARDWARE: Primary | ICD-10-CM

## 2025-06-05 DIAGNOSIS — Z98.890 S/P HARDWARE REMOVAL: ICD-10-CM

## 2025-06-05 PROCEDURE — 99024 POSTOP FOLLOW-UP VISIT: CPT | Performed by: PHYSICIAN ASSISTANT

## 2025-06-09 ENCOUNTER — HOSPITAL ENCOUNTER (OUTPATIENT)
Dept: CT IMAGING | Facility: HOSPITAL | Age: 75
Discharge: HOME | End: 2025-06-09
Admitting: INTERNAL MEDICINE
Payer: OTHER MISCELLANEOUS

## 2025-06-09 DIAGNOSIS — R91.8 LUNG NODULES: Chronic | ICD-10-CM

## 2025-06-09 DIAGNOSIS — D86.2 SARCOIDOSIS OF LUNG WITH SARCOIDOSIS OF LYMPH NODES: Chronic | ICD-10-CM

## 2025-06-09 PROCEDURE — 71250 CT THORAX DX C-: CPT

## 2025-06-11 ENCOUNTER — OFFICE VISIT (OUTPATIENT)
Dept: PULMONOLOGY | Facility: CLINIC | Age: 75
End: 2025-06-11
Payer: OTHER MISCELLANEOUS

## 2025-06-11 VITALS
SYSTOLIC BLOOD PRESSURE: 132 MMHG | BODY MASS INDEX: 28.12 KG/M2 | HEART RATE: 75 BPM | DIASTOLIC BLOOD PRESSURE: 68 MMHG | WEIGHT: 175 LBS | HEIGHT: 66 IN | OXYGEN SATURATION: 96 %

## 2025-06-11 DIAGNOSIS — J98.4 RESTRICTIVE LUNG DISEASE: Chronic | ICD-10-CM

## 2025-06-11 DIAGNOSIS — J44.9 COPD, MODERATE: Chronic | ICD-10-CM

## 2025-06-11 DIAGNOSIS — R91.8 LUNG NODULES: Chronic | ICD-10-CM

## 2025-06-11 DIAGNOSIS — D86.2 SARCOIDOSIS OF LUNG WITH SARCOIDOSIS OF LYMPH NODES: Primary | Chronic | ICD-10-CM

## 2025-06-11 DIAGNOSIS — Z87.891 FORMER SMOKER: Chronic | ICD-10-CM

## 2025-06-11 DIAGNOSIS — E66.3 OVERWEIGHT: Chronic | ICD-10-CM

## 2025-06-11 RX ORDER — ONDANSETRON 4 MG/1
4 TABLET, FILM COATED ORAL EVERY 8 HOURS PRN
COMMUNITY
Start: 2025-05-22

## 2025-06-11 RX ORDER — HYDROCODONE BITARTRATE AND ACETAMINOPHEN 7.5; 325 MG/1; MG/1
TABLET ORAL
COMMUNITY
Start: 2025-05-22

## 2025-06-11 NOTE — ASSESSMENT & PLAN NOTE
He still has evidence of a restrictive ventilatory defect on pulmonary functions today but his total lung capacity reveals improvement compared to studies performed on August 19 of last year.

## 2025-06-11 NOTE — PROGRESS NOTES
Chief Complaint  Sarcoidosis of lung with sarcoidosis of lymph nodes, Lung nodules, COPD, and Restrictive lung disease    Subjective    History of Present Illness {CC  Problem List  Visit Diagnosis   Encounters  Notes  Medications  Labs  Result Review Imaging  Media: 23}    Caio Riley presents to [facility] for sarcoidosis, COPD, restrictive lung disease, and lung nodules.    History of Present Illness   The patient is accompanied by his wife today.  He did have pulmonary functions performed which showed essentially no change in spirometry with improvement in his total lung capacity and a slight but not significant decline in diffusion capacity compared to studies performed on August 19 of last year and I reviewed the studies with him.  His recent chest CT that was performed earlier this week was stable and I reviewed this with him as well.  I did tell him we will just continue his current regimen and I will plan on a follow-up scan when he returns in 6 months and we can just continue pulmonary functions on approximately a once yearly basis as long as he remains stable clinically.  He had the flu shot this past November and has had a Prevnar 13 and Pneumovax in the past as well.  Caio Riley  reports that he quit smoking about 57 years ago. His smoking use included cigarettes. He started smoking about 59 years ago. He has a 1 pack-year smoking history. He has been exposed to tobacco smoke. He has never used smokeless tobacco.           Current Outpatient Medications:     aspirin 81 MG chewable tablet, Chew 1 tablet Daily., Disp: , Rfl:     atenolol (TENORMIN) 25 MG tablet, Take 1 tablet by mouth Daily., Disp: , Rfl:     atorvastatin (LIPITOR) 10 MG tablet, Take 1 tablet by mouth Daily., Disp: , Rfl:     azaTHIOprine (IMURAN) 50 MG tablet, Take 1 tablet by mouth Every Night., Disp: , Rfl: 5    donepezil (ARICEPT) 5 MG tablet, Take 1 tablet by  mouth Every Night., Disp: , Rfl: 0    EPINEPHrine (EPIPEN) 0.3 MG/0.3ML solution auto-injector injection, Inject 0.3 mL into the appropriate muscle as directed by prescriber., Disp: , Rfl:     Fluticasone-Salmeterol (ADVAIR/WIXELA) 500-50 MCG/ACT DISKUS, INHALE 1 DOSE BY MOUTH TWICE DAILY RINSE  AND  SPIT  AFTER  USING, Disp: 60 each, Rfl: 3    HYDROcodone-acetaminophen (NORCO) 7.5-325 MG per tablet, , Disp: , Rfl:     insulin aspart (NovoLOG FlexPen) 100 UNIT/ML solution pen-injector sc pen, Blood sugar minus 100/20 is number of units to take 3-4 times a day;  max is 200 units a day;, Disp: , Rfl:     Insulin Glargine (Lantus SoloStar) 100 UNIT/ML injection pen, Inject 18 Units under the skin into the appropriate area as directed., Disp: , Rfl:     Insulin Pen Needle 32G X 4 MM misc, 1 each., Disp: , Rfl:     losartan (COZAAR) 100 MG tablet, Take 1 tablet by mouth Daily., Disp: , Rfl:     O2 (OXYGEN), Inhale 2 L/min 2 (Two) Times a Day As Needed. 2.5 L nightly, prn, Disp: , Rfl:     ondansetron (ZOFRAN) 4 MG tablet, Take 1 tablet by mouth Every 8 (Eight) Hours As Needed for Nausea or Vomiting., Disp: , Rfl:     Ozempic, 0.25 or 0.5 MG/DOSE, 2 MG/1.5ML solution pen-injector, INJECT 0.25MG INTO SKIN ONCE A WEEK, Disp: , Rfl:     predniSONE (DELTASONE) 5 MG tablet, Take 1 tablet by mouth Daily., Disp: , Rfl:     pregabalin (LYRICA) 75 MG capsule, Take 1 capsule by mouth 2 (Two) Times a Day., Disp: , Rfl:     RELION PEN NEEDLES 32G X 4 MM misc, , Disp: , Rfl:     rivaroxaban (XARELTO) 20 MG tablet, Take 1 tablet by mouth Daily. STOPPED 8/1/18   Indications: Resume taking Xarelto on Thursday morning, Disp: , Rfl:     sulfamethoxazole-trimethoprim (BACTRIM,SEPTRA) 400-80 MG tablet, Take 1 tablet by mouth. Three times a week, Disp: , Rfl:     Ventolin  (90 Base) MCG/ACT inhaler, INHALE 2 PUFFS BY MOUTH EVERY 4 HOURS AS NEEDED FOR WHEEZING OR SHORTNESS OF BREATH, Disp: 18 g, Rfl: 11    doxycycline (VIBRAMYICN) 100  MG tablet, Take 1 tablet by mouth 2 (Two) Times a Day With Meals. (Patient not taking: Reported on 2025), Disp: , Rfl:     fluticasone-salmeterol (ADVAIR) 500-50 MCG/DOSE DISKUS, Inhale 2 (Two) Times a Day. (Patient not taking: Reported on 2025), Disp: , Rfl:     glipizide (GLUCOTROL) 5 MG tablet, Take 1 tablet by mouth Every 12 (Twelve) Hours. (Patient not taking: Reported on 2025), Disp: , Rfl:     guaiFENesin (MUCINEX) 600 MG 12 hr tablet, Take 2 tablets by mouth 2 (Two) Times a Day. (Patient not taking: Reported on 2025), Disp: 40 tablet, Rfl: 0    ipratropium (ATROVENT) 0.02 % nebulizer solution, Take 2.5 mL by nebulization 4 (Four) Times a Day. (Patient not taking: Reported on 2025), Disp: 300 mL, Rfl: 11    ipratropium-albuterol (DUO-NEB) 0.5-2.5 mg/3 ml nebulizer, Take 3 mL by nebulization 4 (Four) Times a Day As Needed for Wheezing. (Patient not taking: Reported on 2025), Disp: 120 mL, Rfl: 11    levalbuterol (XOPENEX) 1.25 MG/3ML nebulizer solution, Take 1 ampule by nebulization 4 (Four) Times a Day. (Patient not taking: Reported on 2025), Disp: 120 ampule, Rfl: 11    levalbuterol (XOPENEX) 1.25 MG/3ML nebulizer solution, Take 1 ampule by nebulization 4 (Four) Times a Day As Needed for Wheezing. (Patient not taking: Reported on 2025), Disp: 360 mL, Rfl: 5    levoFLOXacin (LEVAQUIN) 500 MG tablet, Take 1 tablet by mouth Daily. (Patient not taking: Reported on 2024), Disp: 7 tablet, Rfl: 0    Social History     Socioeconomic History    Marital status:    Tobacco Use    Smoking status: Former     Current packs/day: 0.00     Average packs/day: 0.5 packs/day for 2.0 years (1.0 ttl pk-yrs)     Types: Cigarettes     Start date:      Quit date:      Years since quittin.4     Passive exposure: Past    Smokeless tobacco: Never   Vaping Use    Vaping status: Never Used   Substance and Sexual Activity    Alcohol use: No    Drug use: No    Sexual  "activity: Defer       Objective   Vital Signs:   /68   Pulse 75   Ht 166.4 cm (65.5\")   Wt 79.4 kg (175 lb)   SpO2 96% Comment: RA  BMI 28.68 kg/m²     Physical Exam  Vitals and nursing note reviewed.   Constitutional:       Comments: His BMI is somewhat elevated   HENT:      Head:      Comments: He has a Band-Aid on the upper posterior scalp where he had a basal cell cancer resected recently.  Eyes:      Extraocular Movements: Extraocular movements intact.      Pupils: Pupils are equal, round, and reactive to light.   Cardiovascular:      Rate and Rhythm: Normal rate and regular rhythm.   Pulmonary:      Effort: Pulmonary effort is normal.      Comments: Lung fields are clear with fair air movement bilaterally and no adventitious sounds are heard.  Musculoskeletal:         General: Normal range of motion.   Skin:     Comments: Again he has had a basal cell cancer removed from his upper posterior scalp recently.   Neurological:      General: No focal deficit present.      Mental Status: He is alert and oriented to person, place, and time.   Psychiatric:         Mood and Affect: Mood normal.         Behavior: Behavior normal.        Result Review :    PFT Values          2024    11:30 2025    14:15   Pre Drug PFT Results   FVC 62 68   FEV1 57 58   FEF 25-75% 46 34   FEV1/FVC 70 65   Other Tests PFT Results   TLC 65 75   RV 78 101   DLCO 56 54   D/VAsb 88 82         Results for orders placed in visit on 25    Spirometry with Diffusion Capacity & Lung Volumes    Narrative  Spirometry with Diffusion Capacity & Lung Volumes    Performed by: Machelle Elliott, RRT  Authorized by: Salvatore Shah MD  Pre Drug % Predicted  FVC: 68%  FEV1: 58%  FEF 25-75%: 34%  FEV1/FVC: 65%  T%  RV: 101%  DLCO: 54%  D/VAsb: 82%    Interpretation  Spirometry  Spirometry shows moderate obstruction.  Lung Volume Measurements  Measurements show reduced lung volumes consistent with " restriction.  Diffusion Capacity  The patient's diffusion capacity is moderately reduced.  Diffusion capacity is normal when corrected for alveolar volume.  Overall comments: The patient's spirometry is consistent with a moderate obstructive ventilatory defect.  Lung volumes reveal a coexisting restrictive ventilatory defect along with a decrease in inspiratory capacity.  There is a moderate diffusion impairment which when corrected for alveolar volume is normalized.  When current studies are compared to studies performed on 2024, there is no significant change in spirometry compared to previous.  There has been an improvement in total lung capacity compared to previous.  When corrected for alveolar volume there has been a decline in diffusion capacity compared to previous but it remains within normal limits.      Results for orders placed in visit on 24    Complete PFT - Pre & Post Bronchodilator    Narrative  Complete PFT - Pre & Post Bronchodilator    Performed by: Machelle Elliott, RRT  Authorized by: Salvatore Shah MD  Pre Drug % Predicted  FVC: 62%  FEV1: 57%  FEF 25-75%: 46%  FEV1/FVC: 70%  T%  RV: 78%  DLCO: 56%  D/VAsb: 88%    Interpretation  Spirometry  Spirometry shows moderate restriction. There is reduced midflow suggesting small airway/airflow obstruction.  Lung Volume Measurements  Measurements show reduced lung volumes consistent with restriction.  Diffusion Capacity  The patient's diffusion capacity is moderately reduced.  Diffusion capacity is normal when corrected for alveolar volume.  Overall comments: The patient's spirometry is consistent with a moderate restrictive ventilatory defect with a coexisting decrease in mid flows.  The patient's FEV1 to FVC ratio is at the lower limits of normal at 70%.  Lung volumes confirm a restrictive ventilatory defect of moderate severity.  There is also a mild decrease in inspiratory capacity.  There is a moderate diffusion  impairment which when corrected for alveolar volume is normalized.  When current studies are compared to studies performed on August 9, 2023, the patient's current baseline spirometry does reveal a decline both the FVC and FEV1 compared to previous pre and postbronchodilator values.  There has also been a decline in total lung capacity as well as slow vital capacity compared to previous.  When corrected for alveolar volume there has actually been some improvement in diffusion capacity compared to previous.      Results for orders placed during the hospital encounter of 08/09/23    Spirometry - Pre & Post Bronchodilator with Lung Volumes    [facility] - Pulmonary Function Test    [address] [place] [address]  [place]  [address]  [phone]    Patient : Caio Riley  MRN : [id number]  CSN : [id number]  Pulmonologist : Salvatore Shah MD  Date : 8/9/2023    ______________________________________________________________________    Interpretation :  1.  Spirometry is consistent with a moderate obstructive ventilatory defect.  2.  There is improvement in spirometry postbronchodilator particularly in midflows but a moderate obstructive ventilatory defect is still present.  3.  Lung volumes reveal a coexisting mild restrictive ventilatory defect.  There is also a decrease in inspiratory capacity.  4.  There is a moderate diffusion impairment which when corrected for alveolar volume is a low normal diffusion capacity.  5.  When current studies are compared to studies performed on August 25, 2022, the patient's current pre and postbronchodilator spirometry reveals improvement both the FVC and FEV1 compared to previous baseline values.  There has also been significant improvement in the patient's total lung capacity compared to previous.  When corrected for alveolar volume there has been a decline in diffusion capacity compared to previous although it remains within normal limits.      Salvatore Shah,  MD                 My interpretation of imaging:    CT Chest Without Contrast Diagnostic (06/09/2025 07:13)         Assessment and Plan {CC Problem List  Visit Diagnosis  ROS  Review (Popup)  [facility] Maintenance  Quality  BestPractice  Medications  SmartSets  SnapShot Encounters  Media : 23}    Diagnoses and all orders for this visit:    1. Sarcoidosis of lung with sarcoidosis of lymph nodes (Primary)  Assessment & Plan:  His CT is stable.  I do not think he has any evidence of any active sarcoidosis at this time.  He may continue his low-dose prednisone.    Orders:  -     CT Chest Without Contrast Diagnostic; Future    2. Lung nodules  Assessment & Plan:  His chest CT was stable.  I will repeat a scan in about 6 months.    Orders:  -     CT Chest Without Contrast Diagnostic; Future    3. COPD, moderate  Assessment & Plan:  He can continue his Wixela Inhub and as needed Xopenex neb treatments or albuterol HFA.      4. Restrictive lung disease  Assessment & Plan:  He still has evidence of a restrictive ventilatory defect on pulmonary functions today but his total lung capacity reveals improvement compared to studies performed on August 19 of last year.      5. Former smoker  Assessment & Plan:  Caio Riley  reports that he quit smoking about 57 years ago. His smoking use included cigarettes. He started smoking about 59 years ago. He has a 1 pack-year smoking history. He has been exposed to tobacco smoke. He has never used smokeless tobacco.           6. Overweight  Assessment & Plan:  Patient's (Body mass index is 28.68 kg/m².) indicates that they are overweight with health conditions that include hypertension . Weight is unchanged.  Is encouraged to follow-up with his primary care physician regarding his elevated BMI otherwise.            Salvatore Shah MD  6/11/2025  17:10 CDT    Follow Up   Return in about 6 months (around 12/11/2025) for To see me specifically.    Patient was given  instructions and counseling regarding his condition or for health maintenance advice. Please see specific information pulled into the AVS if appropriate.

## 2025-06-11 NOTE — PROCEDURES
Spirometry with Diffusion Capacity & Lung Volumes    Performed by: Machelle Elliott, RRT  Authorized by: Salvatore Shah MD     Pre Drug % Predicted    FVC: 68%   FEV1: 58%   FEF 25-75%: 34%   FEV1/FVC: 65%   T%   RV: 101%   DLCO: 54%   D/VAsb: 82%    Interpretation   Spirometry   Spirometry shows moderate obstruction.   Lung Volume Measurements  Measurements show reduced lung volumes consistent with restriction.   Diffusion Capacity  The patient's diffusion capacity is moderately reduced.  Diffusion capacity is normal when corrected for alveolar volume.   Overall comments: The patient's spirometry is consistent with a moderate obstructive ventilatory defect.  Lung volumes reveal a coexisting restrictive ventilatory defect along with a decrease in inspiratory capacity.  There is a moderate diffusion impairment which when corrected for alveolar volume is normalized.  When current studies are compared to studies performed on 2024, there is no significant change in spirometry compared to previous.  There has been an improvement in total lung capacity compared to previous.  When corrected for alveolar volume there has been a decline in diffusion capacity compared to previous but it remains within normal limits.

## 2025-06-11 NOTE — ASSESSMENT & PLAN NOTE
Patient's (Body mass index is 28.68 kg/m².) indicates that they are overweight with health conditions that include hypertension . Weight is unchanged.  Is encouraged to follow-up with his primary care physician regarding his elevated BMI otherwise.

## 2025-06-11 NOTE — ASSESSMENT & PLAN NOTE
His CT is stable.  I do not think he has any evidence of any active sarcoidosis at this time.  He may continue his low-dose prednisone.

## 2025-06-11 NOTE — PATIENT INSTRUCTIONS
The patient is stable from a pulmonary standpoint.  Pulmonary functions in terms of spirometry are reasonably stable compared to previous as well and I reviewed these with him and his wife who accompanies him.  His total lung capacity actually has improved significantly from previous.  His recent CT showed stability of his chronic scarring almost certain related to his history of sarcoidosis no previous occupational exposure including asbestos could play a role and I will get a follow-up scan when he returns in 6 months.  He will continue his current regimen otherwise.

## 2025-07-10 ENCOUNTER — OFFICE VISIT (OUTPATIENT)
Age: 75
End: 2025-07-10

## 2025-07-10 VITALS — HEIGHT: 69 IN | BODY MASS INDEX: 25.92 KG/M2 | WEIGHT: 175 LBS

## 2025-07-10 DIAGNOSIS — T84.84XA PAINFUL ORTHOPAEDIC HARDWARE: Primary | ICD-10-CM

## 2025-07-10 DIAGNOSIS — Z98.890 S/P HARDWARE REMOVAL: ICD-10-CM

## 2025-07-10 PROCEDURE — 99024 POSTOP FOLLOW-UP VISIT: CPT | Performed by: PHYSICIAN ASSISTANT

## 2025-07-17 DIAGNOSIS — G62.9 PERIPHERAL NERVE DISEASE: ICD-10-CM

## 2025-07-21 RX ORDER — PREGABALIN 75 MG/1
75 CAPSULE ORAL 2 TIMES DAILY
Qty: 180 CAPSULE | Refills: 0 | Status: SHIPPED | OUTPATIENT
Start: 2025-07-21 | End: 2025-10-19

## 2025-07-25 DIAGNOSIS — J44.9 COPD, MODERATE: ICD-10-CM

## 2025-07-25 RX ORDER — ALBUTEROL SULFATE 90 UG/1
AEROSOL, METERED RESPIRATORY (INHALATION)
Qty: 18 G | Refills: 11 | Status: SHIPPED | OUTPATIENT
Start: 2025-07-25

## 2025-07-25 NOTE — TELEPHONE ENCOUNTER
Rx Refill Note  Requested Prescriptions     Pending Prescriptions Disp Refills    Ventolin  (90 Base) MCG/ACT inhaler [Pharmacy Med Name: Ventolin  (90 Base) MCG/ACT Inhalation Aerosol Solution] 18 g 0     Sig: INHALE 2 PUFFS BY MOUTH EVERY 4 HOURS AS NEEDED FOR WHEEZING AND FOR SHORTNESS OF BREATH      Last office visit with prescribing clinician: 6/11/2025   Last telemedicine visit with prescribing clinician: Visit date not found   Next office visit with prescribing clinician: 12/10/2025                         Would you like a call back once the refill request has been completed: [] Yes [] No    If the office needs to give you a call back, can they leave a voicemail: [] Yes [] No    Machelle Urias MA  07/25/25, 11:37 CDT

## 2025-08-04 RX ORDER — DONEPEZIL HYDROCHLORIDE 10 MG/1
10 TABLET, FILM COATED ORAL NIGHTLY
Qty: 90 TABLET | Refills: 0 | Status: SHIPPED | OUTPATIENT
Start: 2025-08-04

## (undated) DEVICE — TUBE ET 7.5MM NSL ORAL BASIC CUF INTMED MURPHY EYE RADPQ

## (undated) DEVICE — MASK,OXYGEN,MED CONC,ADLT,7' TUB, UC: Brand: PENDING

## (undated) DEVICE — SUT SILK 2/0 SH 30IN K833H

## (undated) DEVICE — THE SINGLE USE ETRAP – POLYP TRAP IS USED FOR SUCTION RETRIEVAL OF ENDOSCOPICALLY REMOVED POLYPS.: Brand: ETRAP

## (undated) DEVICE — PAD,EYE,1-5/8X2 5/8,STERILE,LF,1/PK: Brand: MEDLINE

## (undated) DEVICE — 3M™ STERI-STRIP™ REINFORCED ADHESIVE SKIN CLOSURES, R1546, 1/4 IN X 4 IN (6 MM X 100 MM), 10 STRIPS/ENVELOPE: Brand: 3M™ STERI-STRIP™

## (undated) DEVICE — Z DISCONTINUED NO SUB IDED BF FIBER LSR DIA365UM HOLM 2 WVLNGTH DEL SYS REUSE SLM LN

## (undated) DEVICE — THE CHANNEL CLEANING BRUSH IS A NYLON FLEXI BRUSH ATTACHED TO A FLEXIBLE PLASTIC SHEATH DESIGNED TO SAFELY REMOVE DEBRIS FROM FLEXIBLE ENDOSCOPES.

## (undated) DEVICE — TOWEL,OR,DSP,ST,BLUE,DLX,10/PK,8PK/CS: Brand: MEDLINE

## (undated) DEVICE — SYR CONTRL LUERLOK 10CC

## (undated) DEVICE — ENDOGATOR AUXILIARY WATER JET CONNECTOR: Brand: ENDOGATOR

## (undated) DEVICE — SEAL ENDO INSTR SELF SEAL UROLOGY

## (undated) DEVICE — TRY PREP SCRB VAG PVP

## (undated) DEVICE — SUT VIC 4/0 P3 18IN UD VCP494H

## (undated) DEVICE — RADIFOCUS GLIDEWIRE: Brand: GLIDEWIRE

## (undated) DEVICE — LIQUIBAND RAPID ADHESIVE 36/CS 0.8ML: Brand: MEDLINE

## (undated) DEVICE — DEVICE TORQ DIA0.018-0.038IN GRN ERGO 1 HND FOR PTFE GWIRE

## (undated) DEVICE — CUFF,BP,DISP,1 TUBE,ADULT,HP: Brand: MEDLINE

## (undated) DEVICE — FRCP BIOP ENDO CAPTURAPRO SPK SERR 2.8MM 230CM

## (undated) DEVICE — BAG DRNGE COMB PK

## (undated) DEVICE — SHEET, T, LAPAROTOMY, STERILE: Brand: MEDLINE

## (undated) DEVICE — YANKAUER,BULB TIP WITH VENT: Brand: ARGYLE

## (undated) DEVICE — SNAR POLYP SENSATION MICRO OVL 13 240X40

## (undated) DEVICE — SUT VIC 3/0 SUTUPAK TIES 18IN J910T

## (undated) DEVICE — 3M™ COBAN™ NL STERILE NON-LATEX SELF-ADHERENT WRAP, 2086S, 6 IN X 5 YD (15 CM X 4,5 M), 12 ROLLS/CASE: Brand: 3M™ COBAN™

## (undated) DEVICE — GUIDEWIRE ENDOSCP L150CM DIA0.035IN TIP 3CM PTFE NIT

## (undated) DEVICE — NEPTUNE E-SEP SMOKE EVACUATION PENCIL, COATED, 70MM BLADE, ROCKER SWITCH: Brand: NEPTUNE E-SEP

## (undated) DEVICE — GLOVE SURG SZ 8 CRM LTX FREE POLYISOPRENE POLYMER BEAD ANTI

## (undated) DEVICE — RESERVOIR,SUCTION,100CC,SILICONE: Brand: MEDLINE

## (undated) DEVICE — STERILE LATEX POWDER FREE SURGICAL GLOVES WITH HYDROGEL COATING: Brand: PROTEXIS

## (undated) DEVICE — SPNG GZ WOVN 4X4IN 12PLY 10/BX STRL

## (undated) DEVICE — DRAPE C ARM W42XL120IN W POLY STRP W OUT LENS

## (undated) DEVICE — GLV SURG TRIUMPH MICRO PF LTX 7.5 STRL

## (undated) DEVICE — ADHS LIQ MASTISOL 2/3ML

## (undated) DEVICE — UNDERGLOVE SURG SZ 8 FNGR THK0.21MIL GRN LTX BEAD CUF

## (undated) DEVICE — CATHETER URET 5FR L70CM OPN END SGL LUMN INJ HUB FLEXIMA

## (undated) DEVICE — DRAINBAG,ANTI-REFLUX TOWER,L/F,2000ML,LL: Brand: MEDLINE

## (undated) DEVICE — PAD MINOR UNIVERSAL: Brand: MEDLINE INDUSTRIES, INC.

## (undated) DEVICE — SUTURE VICRYL + SZ 2-0 L36IN ABSRB UD L36MM CT-1 1/2 CIR VCP945H

## (undated) DEVICE — TBG SMPL FLTR LINE NASL 02/C02 A/ BX/100

## (undated) DEVICE — SENSR O2 OXIMAX FNGR A/ 18IN NONSTR

## (undated) DEVICE — SUT VIC 3/0 RB1 27IN UD VCP215H

## (undated) DEVICE — PAD GRND REM POLYHESIVE A/ DISP

## (undated) DEVICE — UTILITY MARKER W/MED LABELS: Brand: MEDLINE

## (undated) DEVICE — DRN JP RND NO TROC SIL 15F 3/16IN

## (undated) DEVICE — CONMED SCOPE SAVER BITE BLOCK, 20X27 MM: Brand: SCOPE SAVER

## (undated) DEVICE — Z INACTIVE USE 2660664 SOLUTION IRRIG 3000ML 0.9% SOD CHL USP UROMATIC PLAS CONT

## (undated) DEVICE — RADIFOCUS GLIDECATH: Brand: GLIDECATH

## (undated) DEVICE — SURGICAL PROCEDURE PACK CYTOSCOPY

## (undated) DEVICE — ZIMMER® STERILE DISPOSABLE TOURNIQUET CUFF WITH PLC, DUAL PORT, SINGLE BLADDER, 34 IN. (86 CM)

## (undated) DEVICE — DRSNG SURESITE WNDW 4X4.5

## (undated) DEVICE — BALLOON DILATATION CATHETER: Brand: UROMAX ULTRA

## (undated) DEVICE — Device

## (undated) DEVICE — CATHETER FOL 18 FR 5 CC 2 W HYDRGEL COAT DOVER

## (undated) DEVICE — SPNG GZ 2S 2X2 8PLY STRL PK/2

## (undated) DEVICE — Device: Brand: DEFENDO AIR/WATER/SUCTION AND BIOPSY VALVE

## (undated) DEVICE — 3M™ IOBAN™ 2 ANTIMICROBIAL INCISE DRAPE 6650EZ: Brand: IOBAN™ 2

## (undated) DEVICE — CONTRAST IOTHALAMATE MEGLUMINE 60% 50 ML INJ CONRAY 60

## (undated) DEVICE — PK TURNOVER RM ADV

## (undated) DEVICE — ANTIBACTERIAL UNDYED BRAIDED (POLYGLACTIN 910), SYNTHETIC ABSORBABLE SUTURE: Brand: COATED VICRYL

## (undated) DEVICE — ELECTRD BLD EDGE/INSUL1P 2.4X5.1MM STRL

## (undated) DEVICE — LARYNGOSCOPE BLDE MAC HNDL M SZ 35 ST CURAPLEX CURAVIEW LED

## (undated) DEVICE — CURAVIEW LED LARYNGOSCOPE BLADE & HANDLE,DISPOSABLE,MAC 3.5: Brand: CURAPLEX

## (undated) DEVICE — SUT SILK 2/0 SUTUPAK TIES 24IN SA75H